# Patient Record
Sex: FEMALE | Race: WHITE | NOT HISPANIC OR LATINO | Employment: OTHER | URBAN - METROPOLITAN AREA
[De-identification: names, ages, dates, MRNs, and addresses within clinical notes are randomized per-mention and may not be internally consistent; named-entity substitution may affect disease eponyms.]

---

## 2017-01-10 ENCOUNTER — TELEPHONE (OUTPATIENT)
Dept: FAMILY MEDICINE | Facility: CLINIC | Age: 62
End: 2017-01-10

## 2017-01-10 NOTE — TELEPHONE ENCOUNTER
"01/10/17      Call not required per note    Lynda Mcdonald at 6/27/2016   Patient calling for 6-8-16 message, and informed of Mammo and Colonoscopy.  Patient states that she does the self breast exam and she is currently doing a cleanse.  Declined to schedule an appointments    Yecenia \"Tiffany\" Ehsan  Central Scheduler    "

## 2020-03-04 ENCOUNTER — ANCILLARY PROCEDURE (OUTPATIENT)
Dept: MAMMOGRAPHY | Facility: CLINIC | Age: 65
End: 2020-03-04
Attending: NURSE PRACTITIONER
Payer: COMMERCIAL

## 2020-03-04 ENCOUNTER — TRANSFERRED RECORDS (OUTPATIENT)
Dept: HEALTH INFORMATION MANAGEMENT | Facility: CLINIC | Age: 65
End: 2020-03-04

## 2020-03-04 ENCOUNTER — OFFICE VISIT (OUTPATIENT)
Dept: FAMILY MEDICINE | Facility: CLINIC | Age: 65
End: 2020-03-04
Payer: COMMERCIAL

## 2020-03-04 ENCOUNTER — ANCILLARY PROCEDURE (OUTPATIENT)
Dept: ULTRASOUND IMAGING | Facility: CLINIC | Age: 65
End: 2020-03-04
Attending: NURSE PRACTITIONER
Payer: COMMERCIAL

## 2020-03-04 VITALS
HEART RATE: 140 BPM | HEIGHT: 62 IN | TEMPERATURE: 98.5 F | SYSTOLIC BLOOD PRESSURE: 118 MMHG | DIASTOLIC BLOOD PRESSURE: 80 MMHG | BODY MASS INDEX: 45.64 KG/M2 | WEIGHT: 248 LBS | OXYGEN SATURATION: 93 %

## 2020-03-04 DIAGNOSIS — N64.52 NIPPLE DISCHARGE, BLOODY: ICD-10-CM

## 2020-03-04 DIAGNOSIS — R23.4 BREAST SKIN CHANGES: ICD-10-CM

## 2020-03-04 DIAGNOSIS — Z23 NEED FOR DIPHTHERIA-TETANUS-PERTUSSIS (TDAP) VACCINE: ICD-10-CM

## 2020-03-04 DIAGNOSIS — R23.4 BREAST SKIN CHANGES: Primary | ICD-10-CM

## 2020-03-04 DIAGNOSIS — Z80.3 FAMILY HISTORY OF MALIGNANT NEOPLASM OF BREAST: ICD-10-CM

## 2020-03-04 DIAGNOSIS — R73.09 ELEVATED GLUCOSE: ICD-10-CM

## 2020-03-04 DIAGNOSIS — Z23 INFLUENZA VACCINE NEEDED: ICD-10-CM

## 2020-03-04 LAB
ALBUMIN SERPL-MCNC: 3.6 G/DL (ref 3.4–5)
ALP SERPL-CCNC: 127 U/L (ref 40–150)
ALT SERPL W P-5'-P-CCNC: 56 U/L (ref 0–50)
ANION GAP SERPL CALCULATED.3IONS-SCNC: 4 MMOL/L (ref 3–14)
AST SERPL W P-5'-P-CCNC: 34 U/L (ref 0–45)
BASOPHILS # BLD AUTO: 0.1 10E9/L (ref 0–0.2)
BASOPHILS NFR BLD AUTO: 0.7 %
BILIRUB SERPL-MCNC: 0.5 MG/DL (ref 0.2–1.3)
BUN SERPL-MCNC: 12 MG/DL (ref 7–30)
CALCIUM SERPL-MCNC: 10.2 MG/DL (ref 8.5–10.1)
CHLORIDE SERPL-SCNC: 102 MMOL/L (ref 94–109)
CO2 SERPL-SCNC: 27 MMOL/L (ref 20–32)
CREAT SERPL-MCNC: 0.85 MG/DL (ref 0.52–1.04)
DIFFERENTIAL METHOD BLD: ABNORMAL
EOSINOPHIL # BLD AUTO: 0.1 10E9/L (ref 0–0.7)
EOSINOPHIL NFR BLD AUTO: 0.7 %
ERYTHROCYTE [DISTWIDTH] IN BLOOD BY AUTOMATED COUNT: 14.8 % (ref 10–15)
GFR SERPL CREATININE-BSD FRML MDRD: 73 ML/MIN/{1.73_M2}
GLUCOSE SERPL-MCNC: 192 MG/DL (ref 70–99)
HCT VFR BLD AUTO: 48.2 % (ref 35–47)
HGB BLD-MCNC: 15.7 G/DL (ref 11.7–15.7)
LYMPHOCYTES # BLD AUTO: 1.5 10E9/L (ref 0.8–5.3)
LYMPHOCYTES NFR BLD AUTO: 16.3 %
MCH RBC QN AUTO: 27.9 PG (ref 26.5–33)
MCHC RBC AUTO-ENTMCNC: 32.6 G/DL (ref 31.5–36.5)
MCV RBC AUTO: 86 FL (ref 78–100)
MONOCYTES # BLD AUTO: 0.6 10E9/L (ref 0–1.3)
MONOCYTES NFR BLD AUTO: 6.4 %
NEUTROPHILS # BLD AUTO: 7 10E9/L (ref 1.6–8.3)
NEUTROPHILS NFR BLD AUTO: 75.9 %
PLATELET # BLD AUTO: 325 10E9/L (ref 150–450)
POTASSIUM SERPL-SCNC: 4.7 MMOL/L (ref 3.4–5.3)
PROT SERPL-MCNC: 9 G/DL (ref 6.8–8.8)
RBC # BLD AUTO: 5.63 10E12/L (ref 3.8–5.2)
SODIUM SERPL-SCNC: 133 MMOL/L (ref 133–144)
WBC # BLD AUTO: 9.2 10E9/L (ref 4–11)

## 2020-03-04 PROCEDURE — 19084 BX BREAST ADD LESION US IMAG: CPT | Mod: LT

## 2020-03-04 PROCEDURE — 99205 OFFICE O/P NEW HI 60 MIN: CPT | Mod: 25 | Performed by: NURSE PRACTITIONER

## 2020-03-04 PROCEDURE — 90471 IMMUNIZATION ADMIN: CPT | Performed by: NURSE PRACTITIONER

## 2020-03-04 PROCEDURE — 36415 COLL VENOUS BLD VENIPUNCTURE: CPT | Performed by: NURSE PRACTITIONER

## 2020-03-04 PROCEDURE — 19084 BX BREAST ADD LESION US IMAG: CPT | Mod: RT

## 2020-03-04 PROCEDURE — 00000159 ZZHCL STATISTIC H-SEND OUTS PREP

## 2020-03-04 PROCEDURE — 90472 IMMUNIZATION ADMIN EACH ADD: CPT | Performed by: NURSE PRACTITIONER

## 2020-03-04 PROCEDURE — 90756 CCIIV4 VACC ABX FREE IM: CPT | Performed by: NURSE PRACTITIONER

## 2020-03-04 PROCEDURE — 00000158 ZZHCL STATISTIC H-FISH PROCESS B/S

## 2020-03-04 PROCEDURE — G0279 TOMOSYNTHESIS, MAMMO: HCPCS

## 2020-03-04 PROCEDURE — 90715 TDAP VACCINE 7 YRS/> IM: CPT | Performed by: NURSE PRACTITIONER

## 2020-03-04 PROCEDURE — 76642 ULTRASOUND BREAST LIMITED: CPT | Mod: 50

## 2020-03-04 PROCEDURE — 19083 BX BREAST 1ST LESION US IMAG: CPT | Mod: RT

## 2020-03-04 PROCEDURE — 88360 TUMOR IMMUNOHISTOCHEM/MANUAL: CPT

## 2020-03-04 PROCEDURE — 88377 M/PHMTRC ALYS ISHQUANT/SEMIQ: CPT

## 2020-03-04 PROCEDURE — 80053 COMPREHEN METABOLIC PANEL: CPT | Performed by: NURSE PRACTITIONER

## 2020-03-04 PROCEDURE — 85025 COMPLETE CBC W/AUTO DIFF WBC: CPT | Performed by: NURSE PRACTITIONER

## 2020-03-04 PROCEDURE — 77066 DX MAMMO INCL CAD BI: CPT

## 2020-03-04 PROCEDURE — 88305 TISSUE EXAM BY PATHOLOGIST: CPT

## 2020-03-04 ASSESSMENT — MIFFLIN-ST. JEOR: SCORE: 1633.17

## 2020-03-04 NOTE — PATIENT INSTRUCTIONS
We have scheduled you for diagnostic mammogram today. We are still waiting for the appointment with the [facility].   You will receive a call today regarding your results and next steps.     Please make an appointment to follow up with this clinic within a month.

## 2020-03-04 NOTE — TELEPHONE ENCOUNTER
RECORDS STATUS - BREAST    RECORDS REQUESTED FROM: Epic/[facility]   DATE REQUESTED: 3/12/2020    NOTES DETAILS STATUS   OFFICE NOTE from referring provider Complete  Princess Ring    OFFICE NOTE from medical oncologist     OFFICE NOTE from surgeon Pending Pt may have a biopsy done today at the [facility]    OFFICE NOTE from radiation oncologist     DISCHARGE SUMMARY from hospital N/A    DISCHARGE REPORT from the ER     OPERATIVE REPORT N/A    MEDICATION LIST Complete [facility]   CLINICAL TRIAL TREATMENTS TO DATE     LABS     PATHOLOGY REPORTS  (Tissue diagnosis, Stage, ER/WA percentage positive and intensity of staining, HER2 IHC, FISH, and all biopsies from breast and any distant metastasis)                 Pt said she might have a biopsy done today 3/4/2020     GENONOMIC TESTING     TYPE:   (Next Generation Sequencing, including Foundation One testing, and Oncotype score)     IMAGING (NEED IMAGES & REPORT)     CT SCANS     MRI     MAMMO Complete PACS- 3/4/2020 (Scheduled)    ULTRASOUND Complete PACS- Breast 3/4/2020    PET     BONE SCAN     BRAIN MRI       Action    Action Taken 3/4/2020 12:21pm     I called pt Mary - pt is having breast IMG done today and possibly Bx slides too. Pt doesn't have any outside records to collect.

## 2020-03-04 NOTE — TELEPHONE ENCOUNTER
ONCOLOGY INTAKE: Records Information      APPT INFORMATION:  Referring provider:  Princess Ring   Referring provider s clinic:   [facility]   Reason for visit/diagnosis:  Breast Cancer   Has patient been notified of appointment date and time?: yes     RECORDS INFORMATION:  Were the records received with the referral (via [employer])? Records in [facility]     Has patient been seen for any external appt for this diagnosis? No    If yes, where? NA    Has patient had any imaging or procedures outside of [facility] for this condition? no      If Yes, where? NA    ADDITIONAL INFORMATION:  None

## 2020-03-04 NOTE — PROGRESS NOTES
"Subjective     Mary Olson is a 64 year old female who presents to clinic today for the following health issues:    HPI     Breast Changes  Patient reports that she had what she believed to be a bug bite on left breast in October. She notes the 2 red spots weren't particularly itchy, but she put calamine lotion on and they sort of resolved but never completely went away, always had a little lump on the outer side of her left breast. The lump got larger around the holidays and she talked to her friend about it. Her friend had cysts so told her that was the problem and she should put tea tree oil on the area. She reports after doing this that the area opened up and drained a lot of clear fluid. There has also been some intermittent blood and pus draining from the side when the scab comes off. She denies that the drainage is coming from the nipple, but rather to the side of her nipple. On Monday the breast began to bleed again, so she decided to have it looked at. The breast is swollen and red, but per her report has improved a lot since the holidays.     Family history is notable for breast cancer in her mother. She had a lumpectomy in her 60's, 12 radiation treatments, and had to take a chemotherapy pill afterwards for over one year. Patient denies personal or family history of other cancers including colorectal, cervical, and ovarian.     Patient refuses to do mammograms because they are uncomfortable. She does \"self checks.\" She admits the breast has felt slightly different since she got the \"bug bites\" and she has felt a lump before. She denies feeling any lymphadenopathy. She denies fever, night sweats, and fatigue.     She has had no preventative health care in a few years as her provider left. She has been looking for a holistic provider because she wants to take care of her health naturally and avoid medications at all costs.     Patient Active Problem List   Diagnosis     Hyperlipidemia LDL goal <160 " "    Essential hypertension     Morbid obesity (H)     No past surgical history on file.    Social History     Tobacco Use     Smoking status: Never Smoker     Smokeless tobacco: Never Used   Substance Use Topics     Alcohol use: Yes     Comment: 2-3 drinks a month      Family History   Problem Relation Age of Onset     Hypertension Mother      Coronary Artery Disease Mother      Arrhythmia Brother      Diabetes No family hx of          BP Readings from Last 3 Encounters:   03/04/20 118/80   06/29/16 140/84   12/16/15 158/90    Wt Readings from Last 3 Encounters:   03/04/20 112.5 kg (248 lb)   06/29/16 103.4 kg (228 lb)   12/16/15 104.6 kg (230 lb 8 oz)            Reviewed and updated as needed this visit by Provider       Review of Systems   ROS COMP: CONSTITUTIONAL: NEGATIVE for fever, chills, change in weight  INTEGUMENTARY/SKIN: POSITIVE for left breast lump, non-healing lesion, and ulceration  EYES: NEGATIVE for vision changes or irritation  ENT/MOUTH: NEGATIVE for ear, mouth and throat problems  RESP: NEGATIVE for significant cough or SOB  BREAST: LEFT is POSITIVE for erythema, bloody nipple discharge, lump and NEGATIVE for pain. RIGHT is NEGATIVE for erythema, discharge, lump, or pain.   CV: NEGATIVE for chest pain, palpitations or peripheral edema  GI: NEGATIVE for nausea, abdominal pain, heartburn, or change in bowel habits  : NEGATIVE for frequency, dysuria, or hematuria  MUSCULOSKELETAL: NEGATIVE for significant arthralgias or myalgia  NEURO: NEGATIVE for weakness, dizziness or paresthesias  ENDOCRINE: NEGATIVE for temperature intolerance, skin/hair changes  HEME: NEGATIVE for bleeding problems  PSYCHIATRIC: NEGATIVE for changes in mood or affect      Objective    /80   Pulse 140   Temp 98.5  F (36.9  C) (Oral)   Ht 1.575 m (5' 2\")   Wt 112.5 kg (248 lb)   SpO2 93%   BMI 45.36 kg/m    Body mass index is 45.36 kg/m .  Physical Exam   GENERAL: healthy, alert and no distress  EYES: Eyes grossly " normal to inspection, PERRL and conjunctivae and sclerae normal  HENT: ear canals and TM's normal, nose and mouth without ulcers or lesions  NECK: no adenopathy, no asymmetry, masses, or scars and thyroid normal to palpation  RESP: lungs clear to auscultation - no rales, rhonchi or wheezes  BREAST: axillary findings: Left lymphadenopathy, breast asymmetry- right breast is pendulous and normal in appearance while the left is essentially immobile and higher on the chest wall, erythema left, bloody nipple discharge left, nipple inversion left, skin dimpling left, excoriation around left nipple. Left breast without solitary palpable mass, rather entire breast is firm, skin is taut. The nipple is almost indiscernible. There is no warmth, the breast is not tender to palpation. Right breast without lumps, skin or nipple changes, or nipple drainage  CV: regular rate and rhythm, normal S1 S2, no S3 or S4, no murmur, click or rub, no peripheral edema and peripheral pulses strong  ABDOMEN: markedly obese, distended abdomen. soft, nontender, without hepatosplenomegaly or masses  MS: no gross musculoskeletal defects noted, no edema  SKIN: no suspicious lesions or rashes other than those noted above for left breast  NEURO: Normal strength and tone, mentation intact and speech normal  PSYCH: mentation appears normal, affect normal/bright  LYMPH: axillary: enlarged lymph nodes in the left     Diagnostic Test Results:  Results for orders placed or performed in visit on 03/04/20 (from the past 24 hour(s))   CBC with platelets and differential   Result Value Ref Range    WBC 9.2 4.0 - 11.0 10e9/L    RBC Count 5.63 (H) 3.8 - 5.2 10e12/L    Hemoglobin 15.7 11.7 - 15.7 g/dL    Hematocrit 48.2 (H) 35.0 - 47.0 %    MCV 86 78 - 100 fl    MCH 27.9 26.5 - 33.0 pg    MCHC 32.6 31.5 - 36.5 g/dL    RDW 14.8 10.0 - 15.0 %    Platelet Count 325 150 - 450 10e9/L    % Neutrophils 75.9 %    % Lymphocytes 16.3 %    % Monocytes 6.4 %    % Eosinophils  0.7 %    % Basophils 0.7 %    Absolute Neutrophil 7.0 1.6 - 8.3 10e9/L    Absolute Lymphocytes 1.5 0.8 - 5.3 10e9/L    Absolute Monocytes 0.6 0.0 - 1.3 10e9/L    Absolute Eosinophils 0.1 0.0 - 0.7 10e9/L    Absolute Basophils 0.1 0.0 - 0.2 10e9/L    Diff Method Automated Method      Diagnostic Mammogram 3/4/2020        Assessment & Plan     1. Breast skin changes  Urgent. The physical exam is very concerning for left breast malignancy. CBC was ordered to rule out infection-normal WBC. Patient to have diagnostic mammogram this afternoon scheduled by [facility] and follow up per Breast Center recommendations. Referral for Breast Center placed for urgent appointment. Discussed with patient that based on her exam and family history, the mammogram needs to be completed today and that team will call her with the results and plan, including breast biopsy. Patient asked appropriate questions and voiced understanding of education and plan.   - BREAST CENTER REFERRAL  - MA Diagnostic Digital Bilateral; Future  - CBC with platelets and differential  - Comprehensive metabolic panel (BMP + Alb, Alk Phos, ALT, AST, Total. Bili, TP)    2. Nipple discharge, bloody  Plan as above.   - BREAST CENTER REFERRAL  - MA Diagnostic Digital Bilateral; Future  - CBC with platelets and differential  - Comprehensive metabolic panel (BMP + Alb, Alk Phos, ALT, AST, Total. Bili, TP)    3. Influenza vaccine needed  Risk and benefits of immunization discussed. Patient agreed to vaccine today.   - INFLUENZA VACCINE IM 4YRS+ 4 VALENT CCIIV4    4. Need for diphtheria-tetanus-pertussis (Tdap) vaccine  Risk and benefits of immunization discussed. Patient agreed to vaccine today.   - TDAP VACCINE (ADACEL)    5. Family history of malignant neoplasm of breast  Plan for evaluation as above.     Addendum: Sugar returned at quite elevated 190.  No history of diabetes.  Patient will need to return for A1c.   BMI:   Estimated body mass index is 45.36 kg/m  as calculated  "from the following:    Height as of this encounter: 1.575 m (5' 2\").    Weight as of this encounter: 112.5 kg (248 lb).   Weight management plan: Discussed healthy diet and exercise guidelines        FURTHER TESTING:       - mammogram  CONSULTATION/REFERRAL to Breast Center    Return in about 4 weeks (around 4/1/2020).    PUNEET BanksN, RN, CCTC  DNP-FNP student, [facility]      NICOLE Gomes [facility]    "

## 2020-03-06 ENCOUNTER — TELEPHONE (OUTPATIENT)
Dept: FAMILY MEDICINE | Facility: CLINIC | Age: 65
End: 2020-03-06

## 2020-03-06 NOTE — TELEPHONE ENCOUNTER
Please call patient to advise that her sugar level was quite elevated.  In light of what she is having to deal with (high suspicion of breast cancer pathology in process) the priority is following up with the breast center as scheduled.  I would like to see her at her earliest convenience in the next few weeks to review this as well as an annual exam.

## 2020-03-09 ENCOUNTER — TELEPHONE (OUTPATIENT)
Dept: GENERAL RADIOLOGY | Facility: CLINIC | Age: 65
End: 2020-03-09

## 2020-03-09 LAB — COPATH REPORT: NORMAL

## 2020-03-09 NOTE — TELEPHONE ENCOUNTER
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 Notified pt that the radiologist recommendation is oncologic consultation as well a breast MRI. Pt verbalized understanding of these results and all questions answered to her satisfaction. Pt defers scheduling an MRI until she meets with Dr. Birch.

## 2020-03-09 NOTE — TELEPHONE ENCOUNTER
Patient returns call to RN line.  She was notified of results and recommendations with understanding voiced. She was not fasting for the CMP, had eaten breakfast not long before that.  We discussed that this result may not be concerning then and that provider may order A1C in future to get average BG and status.  She'll attend breast center appt, then f/u with primary care after that in April.  No further questions/concerns at this time.    Joya Carey RN

## 2020-03-09 NOTE — TELEPHONE ENCOUNTER
Patient returns call to KAYLEY MEJIA.  RN called back right away with no answer. Patient/family was instructed to return call to [facility], directly on the RN Call back line at [phone].    Joya Carey RN

## 2020-03-10 LAB — COPATH REPORT: NORMAL

## 2020-03-12 ENCOUNTER — ONCOLOGY VISIT (OUTPATIENT)
Dept: ONCOLOGY | Facility: CLINIC | Age: 65
End: 2020-03-12
Attending: NURSE PRACTITIONER
Payer: COMMERCIAL

## 2020-03-12 ENCOUNTER — PRE VISIT (OUTPATIENT)
Dept: ONCOLOGY | Facility: CLINIC | Age: 65
End: 2020-03-12

## 2020-03-12 ENCOUNTER — PATIENT OUTREACH (OUTPATIENT)
Dept: ONCOLOGY | Facility: CLINIC | Age: 65
End: 2020-03-12

## 2020-03-12 VITALS
TEMPERATURE: 98.1 F | RESPIRATION RATE: 15 BRPM | HEART RATE: 107 BPM | HEIGHT: 63 IN | OXYGEN SATURATION: 96 % | BODY MASS INDEX: 43.59 KG/M2 | WEIGHT: 246 LBS

## 2020-03-12 DIAGNOSIS — C50.812 MALIGNANT NEOPLASM OF OVERLAPPING SITES OF LEFT BREAST IN FEMALE, ESTROGEN RECEPTOR POSITIVE (H): Primary | ICD-10-CM

## 2020-03-12 DIAGNOSIS — Z17.0 MALIGNANT NEOPLASM OF OVERLAPPING SITES OF LEFT BREAST IN FEMALE, ESTROGEN RECEPTOR POSITIVE (H): Primary | ICD-10-CM

## 2020-03-12 PROCEDURE — 99205 OFFICE O/P NEW HI 60 MIN: CPT | Performed by: INTERNAL MEDICINE

## 2020-03-12 RX ORDER — LIDOCAINE/PRILOCAINE 2.5 %-2.5%
CREAM (GRAM) TOPICAL
Qty: 30 G | Refills: 1 | Status: SHIPPED | OUTPATIENT
Start: 2020-03-12 | End: 2020-07-07

## 2020-03-12 ASSESSMENT — PAIN SCALES - GENERAL: PAINLEVEL: NO PAIN (0)

## 2020-03-12 ASSESSMENT — MIFFLIN-ST. JEOR: SCORE: 1632.04

## 2020-03-12 NOTE — PATIENT INSTRUCTIONS
Dr. Birch has recommended chemotherapy treatment for you.  This will consist of Taxol weekly x 12 weeks, followed by Adriamycin and Cytoxan every other week x 8 weeks (20 weeks total of chemotherapy treatments).  The chemotherapy will be given through a port-a-cath.  We can give you a prescription for numbing cream (EMLA cream), to apply to your port on your chemotherapy treatment dates.  Dr. Birch has also referred you to meet with a Surgeon, and has ordered some testing for you to have completed in the near future including imaging exams, heart test, and a lymph node biopsy.    Appointments to schedule:  1. PET scan.  2. Port-a-cath placement procedure.  3. MUGA (heart test).  4. Surgery Referral.  5. Breast MRI.  6. Lymph Node Biopsy - Axilla (armpit area) - preferably before starting chemo.  7. Start chemotherapy treatment as soon as possible.  8. See Dr. Birch in clinic again after she returns from vacation, to review your PET scan results in person.    9. The phone number for our Financial Counseling team is [phone].  10. The phone number for our Cancer Center  is [phone].    Please call with any questions or concerns following today's appointment.

## 2020-03-12 NOTE — LETTER
3/12/2020         RE: Mary Olson  [address]  [facility] [address]        Dear Colleague,    Thank you for referring your patient, Mary Olson, to the [facility]. Please see a copy of my visit note below.    2020         Lashaun Herrera MD    [facility] [address] [place] [address]       Patient:  Mary Olson   MRN:  [id number]   :  1955       Dear Dr. Herrera:      Thank you for asking me to see your patient, Mary Olson in consultation for further evaluation and management of her recently diagnosed left and right breast cancer.  Please see the enclosed note for details of our visit on 2020, as well as the recommendations.      HISTORY OF PRESENT ILLNESS:  Mary Olson is a 64-year-old female who presents to the [facility] for further evaluation of bilateral breast cancer.  The patient's history dates back to 2019, when she noticed what she believed to be a bug bite on her left breast.  She initially noted 3 red spots.  She tried calamine lotion, which gave some relief.  One of the lumps got larger and she tried a tree oil in this area, but then found that it opened up and started draining clear fluid.  There was also some blood and drainage when the scab came off.  The patient eventually sought medical help, met with Ms. Princess Ring.  She underwent imaging with a diagnostic mammogram on 2020, as well as ultrasound.  This did confirm in the left breast in the center of the left nipple, a large irregular mass with ill-defined margins measuring at least 6.2 cm.  Other masses are seen posterior to this including a 2.1 cm mass slightly medial on mammography.  Diffuse left breast skin thickening.  There are at least 3 abnormal enlarged lymph nodes in the left axilla measuring 2.5 x 1.3 x 2.4 cm.  The right breast at the 12:30 position 7 cm  "from the nipple, there is a spiculated mass corresponding mammographic finding measuring approximately 1.3 x 0.9 x 1.0 cm.  A smaller spiculated hypoechoic mass is at the 12 o'clock position 2 cm from the nipple, measuring 0.9 x 0.5 x 0.7 cm.  Normal lymph nodes in the right axilla.  The patient proceeded to undergo a biopsy.  The left breast mass confirmed invasive mammary carcinoma, no special type, invasive ductal, grade 2, estrogen receptor positive, progesterone receptor positive, HER-2 negative.  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  The second right breast mass at the 12 o'clock position showed benign breast tissue, composed predominantly of uncolonized stroma with rare ducts.  No discrete masses identified.  No atypical or malignant findings.  The patient was then referred to Medical Oncology for further evaluation.        On today's visit, the patient states that overall she is doing well.  She feels that the left breast nodules have decreased in size.  Denies any fevers, chills, nausea, vomiting, chest pain, shortness of breath and cough, no abdominal discomfort, and the remainder of the comprehensive review of systems is negative.      PAST MEDICAL HISTORY:   1.  Bilateral breast cancer, see above.   2.  Hypertension.      In the chart, it is noted she has seizures; however, the patient states this is inaccurate.  She had gone to [facility], was feeling lightheaded, dizzy and fainted, but she did not have a seizure.  She has undergone a neurological workup which was negative.      FAMILY HISTORY:  Mother had breast cancer at 62, underwent lumpectomy and radiation and then had \"pills.\"      SOCIAL HISTORY:  , comes in with her , Dieter today.  Has a son and daughter, 2 children.  Denies tobacco use.  Occasional alcohol use, " "usually during the holidays.  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      GYNECOLOGIC HISTORY:  Menses at 13 or 14.  Menopause at 56.   3, para 2 -- 1 miscarriage.  First live birth at 34.  Did not breast feed.  No prior history of fertility drugs or hormone replacement therapy.      PHYSICAL EXAMINATION  Pulse 107   Temp 98.1  F (36.7  C) (Oral)   Resp 15   Ht 1.588 m (5' 2.5\")   Wt 111.6 kg (246 lb)   SpO2 96%   BMI 44.28 kg/m     GENERAL:  Comfortable, in no acute distress, pleasant.   HEENT:  Atraumatic, normocephalic.  Pupils equal, round, reactive.  Sclerae anicteric.  Oropharynx:  Moist mucous membranes.  No lesions, ulcers or exudates.   NECK:  Supple, full range of motion.  Trachea midline.   HEART:  Regular rate and rhythm, normal S1, S2.  No murmurs or gallop.  No edema.   LUNGS:  Clear to auscultation bilaterally.  No crackles or wheezes.  Normal respiratory effort.   ABDOMEN:  Positive bowel sounds.  Soft, nontender, nondistended.  No hepatomegaly.   EXTREMITIES:  No cyanosis.  Warm.   MUSCULOSKELETAL:  No point tenderness.   LYMPH NODES:  No cervical, supraclavicular or axillary nodes palpable.   SKIN:  No petechiae or rashes.   NEUROLOGIC:  Alert and oriented.   BREASTS:  Right breast bruising at biopsy site, no dominant masses.  Nipple everted without discharge.  Left breast, a large palpable 8 cm mass eroding through the skin, thickening with crusting.   LYMPHATICS:  No cervical, supraclavicular or right axillary node palpable.  Left axilla, a large 2.5 cm lymph node palpable.      IMAGING:  As stated in the HPI.      PATHOLOGY:  As stated in the HPI.      LABORATORY:  From 2020, alkaline phosphatase 127, ALT 56, AST 34, calcium 10.2, creatinine 0.85.  WBC 9.2, hemoglobin 15.7, hematocrit 48.2, platelet 325,000, ANC 7.0.      ASSESSMENT AND PLAN:  Mary Olson is a 64-year-old female with newly diagnosed bilateral breast cancer.  The " left breast is a T4 with lymph node positive, ER positive, HER-2 negative cancer.  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  I discussed at length with the patient in the presence of her  and our care coordinator, Dmitri Figueredo regarding her diagnosis, prognosis and treatment options.  I explained that the patient has bilateral breast cancer.  The right-sided cancer is tubular differentiation and tends to have a good prognosis.  However, on the left side, I am quite concerned.  This is a large breast mass that is ulcerating through her skin.  I was able to palpate a lymph node on today's exam as well.  I explained that there are 4 stages and at this point the patient is at least a stage III.  However, I would want a PET scan to see whether this has metastasized to other places.  For the purpose of the visit, we proceeded as stage III, but the patient understands if she were to have disease elsewhere, this would change her prognosis and potentially her treatment.  I explained the role of chemotherapy followed by surgery.  She will likely require radiation followed by endocrine therapy.  Chemotherapy is to shrink the tumor down to optimize for surgery.  It would also give us time to assess her disease.  Based on her appearance, I do not feel she is a surgical candidate at this time.  The patient questioned this and I explained that this is because I do not feel that the surgeon will be able to completely remove the tumor and close her skin.   There are numerous treatments:        One is standard therapy with chemotherapy, which would be Taxol followed by Adriamycin and Cytoxan.  Reviewed potential side effects and toxicities including but not limited to alopecia, nausea, vomiting, bone marrow suppression, increase for infection, bleeding, allergic reactions, organ dysfunction, cardiotoxicity, neuropathy which may be permanent and debilitating, secondary malignancies, and death.        The  second option is consider a clinical trial.  I did speak to the patient briefly about the I-SPY trial.  During her visit, I was notified by the trial coordinator that this trial is closed to accrual.        The third option is do nothing, which I would not recommend since her disease would only continue to grow and I was quite guillermo with the patient that she would die of her disease.  The patient at this point is interested in pursuing standard therapy.      I did review with the patient that I do feel it is important for us to have an understanding of the extent of her disease and would recommend a PET scan.  I would also like an MRI of her breast to help us assess her response to disease.  I did palpate a lymph node on exam, and it would be helpful to have a biopsy of this area as well.      According to notes and discussion with the patient, she is interested more holistic therapy.  I was very honest with the patient that I do not practice homeopathic therapy and do not participate in these services.  I was also very candid and told the patient that if she does not receive treatment that her cancer will only continue to grow.  I would not support going to areas that do not practice standard of care or scientifically-proven Medicine.  I also advised the patient that if she were to pursue some of these other options, I would fear that she would die of her disease.  The patient did have a number of questions regarding homeopathic therapy, but again, I informed her that I do not partake in this type of protocol.        The patient's  was very clear that he wanted her to proceed with standard therapy.  We did review that ultimately it is the patient's decision and we would respect what she decided, but if she did want standard treatment, we would happy to deliver this through our cancer center.      PLAN:   1.  PET scan.   2.  Port placement.   3.  MUGA.   4.  General Surgery referral.   5.  MRI of the breast.    6.  Left axillary lymph node biopsy.   7.   to meet with the patient.   8.  The patient will return to see MD after PET scan.   9.  Taxol, Adriamycin, and Cytoxan teaching.   10.  The patient to return to start treatment as soon as possible.  She is willing to go to any site to have the above tests and procedures completed.      At the end of the discussion, the patient and her  have verbalized understanding and would like to proceed with standard therapy.  If at any point they decide otherwise, they should feel free to contact our clinic and we will respect their decision.      Dr. Herrera, thank you for giving me the opportunity to participate in this delightful patient's care.  If you have any questions, please feel free to contact me.         Sincerely,      BRY BIRCH MD      Case discussed with Dr. Joshi who offered to place port as well.    Above note accurate for diagnosis, plan and orders placed. Epic/EMR orders and data may not be accurate because of available options, information not entered correctly/updated by staff other than writer or interface issues. All data entered by writer with the intent patient care not be compromised nor patient be unnecessarily billed'        D: 2020   T: 2020   MT: RIC      Name:     ARTHUR JENKINS   MRN:      [id number]        Account:      [id number]   :      1955      Document: [id number]       cc: Princess RIVERA, BRYANNA Herrera MD       Again, thank you for allowing me to participate in the care of your patient.        Sincerely,        Bry Birch MD

## 2020-03-12 NOTE — NURSING NOTE
"Oncology Rooming Note    March 12, 2020 1:01 PM   Mary Olson is a 64 year old female who presents for:    Chief Complaint   Patient presents with     Oncology Clinic Visit     Initial Vitals: Pulse 107   Temp 98.1  F (36.7  C) (Oral)   Resp 15   Ht 1.588 m (5' 2.5\")   Wt 111.6 kg (246 lb)   SpO2 96%   BMI 44.28 kg/m   Estimated body mass index is 44.28 kg/m  as calculated from the following:    Height as of this encounter: 1.588 m (5' 2.5\").    Weight as of this encounter: 111.6 kg (246 lb). Body surface area is 2.22 meters squared.  No Pain (0) Comment: Data Unavailable   No LMP recorded. Patient is postmenopausal.  Allergies reviewed: Yes  Medications reviewed: Yes    Medications: Medication refills not needed today.  Pharmacy name entered into [email or web address]:    [facility] - [place], [place] - [address] [place] [address].  [facility] - FAX ONLY - [place], [place]  [facility], [place] - [address]      Lexy Butterfield RN              "

## 2020-03-12 NOTE — PROGRESS NOTES
2020         Lashaun Herrera MD    [facility] [address]   [place] [address]       Patient:  Mary Olson   MRN:  [id number]   :  1955       Dear Dr. Herrera:      Thank you for asking me to see your patient, Mary Olson in consultation for further evaluation and management of her recently diagnosed left and right breast cancer.  Please see the enclosed note for details of our visit on 2020, as well as the recommendations.      HISTORY OF PRESENT ILLNESS:  Mary Olson is a 64-year-old female who presents to the [facility] for further evaluation of bilateral breast cancer.  The patient's history dates back to 2019, when she noticed what she believed to be a bug bite on her left breast.  She initially noted 3 red spots.  She tried calamine lotion, which gave some relief.  One of the lumps got larger and she tried a tree oil in this area, but then found that it opened up and started draining clear fluid.  There was also some blood and drainage when the scab came off.  The patient eventually sought medical help, met with Ms. Princess Ring.  She underwent imaging with a diagnostic mammogram on 2020, as well as ultrasound.  This did confirm in the left breast in the center of the left nipple, a large irregular mass with ill-defined margins measuring at least 6.2 cm.  Other masses are seen posterior to this including a 2.1 cm mass slightly medial on mammography.  Diffuse left breast skin thickening.  There are at least 3 abnormal enlarged lymph nodes in the left axilla measuring 2.5 x 1.3 x 2.4 cm.  The right breast at the 12:30 position 7 cm from the nipple, there is a spiculated mass corresponding mammographic finding measuring approximately 1.3 x 0.9 x 1.0 cm.  A smaller spiculated hypoechoic mass is at the 12 o'clock position 2 cm from the nipple, measuring 0.9 x 0.5 x 0.7 cm.  Normal  "lymph nodes in the right axilla.  The patient proceeded to undergo a biopsy.  The left breast mass confirmed invasive mammary carcinoma, no special type, invasive ductal, grade 2, estrogen receptor positive, progesterone receptor positive, HER-2 negative.  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  The second right breast mass at the 12 o'clock position showed benign breast tissue, composed predominantly of uncolonized stroma with rare ducts.  No discrete masses identified.  No atypical or malignant findings.  The patient was then referred to Medical Oncology for further evaluation.        On today's visit, the patient states that overall she is doing well.  She feels that the left breast nodules have decreased in size.  Denies any fevers, chills, nausea, vomiting, chest pain, shortness of breath and cough, no abdominal discomfort, and the remainder of the comprehensive review of systems is negative.      PAST MEDICAL HISTORY:   1.  Bilateral breast cancer, see above.   2.  Hypertension.      In the chart, it is noted she has seizures; however, the patient states this is inaccurate.  She had gone to [facility], was feeling lightheaded, dizzy and fainted, but she did not have a seizure.  She has undergone a neurological workup which was negative.      FAMILY HISTORY:  Mother had breast cancer at 62, underwent lumpectomy and radiation and then had \"pills.\"      SOCIAL HISTORY:  , comes in with her , Dieter today.  Has a son and daughter, 2 children.  Denies tobacco use.  Occasional alcohol use, usually during the holidays.  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      GYNECOLOGIC HISTORY:  Menses at 13 or 14.  Menopause at 56.   3, para 2 -- 1 miscarriage.  First live birth at 34.  Did not breast " "feed.  No prior history of fertility drugs or hormone replacement therapy.      PHYSICAL EXAMINATION  Pulse 107   Temp 98.1  F (36.7  C) (Oral)   Resp 15   Ht 1.588 m (5' 2.5\")   Wt 111.6 kg (246 lb)   SpO2 96%   BMI 44.28 kg/m     GENERAL:  Comfortable, in no acute distress, pleasant.   HEENT:  Atraumatic, normocephalic.  Pupils equal, round, reactive.  Sclerae anicteric.  Oropharynx:  Moist mucous membranes.  No lesions, ulcers or exudates.   NECK:  Supple, full range of motion.  Trachea midline.   HEART:  Regular rate and rhythm, normal S1, S2.  No murmurs or gallop.  No edema.   LUNGS:  Clear to auscultation bilaterally.  No crackles or wheezes.  Normal respiratory effort.   ABDOMEN:  Positive bowel sounds.  Soft, nontender, nondistended.  No hepatomegaly.   EXTREMITIES:  No cyanosis.  Warm.   MUSCULOSKELETAL:  No point tenderness.   LYMPH NODES:  No cervical, supraclavicular or axillary nodes palpable.   SKIN:  No petechiae or rashes.   NEUROLOGIC:  Alert and oriented.   BREASTS:  Right breast bruising at biopsy site, no dominant masses.  Nipple everted without discharge.  Left breast, a large palpable 8 cm mass eroding through the skin, thickening with crusting.   LYMPHATICS:  No cervical, supraclavicular or right axillary node palpable.  Left axilla, a large 2.5 cm lymph node palpable.      IMAGING:  As stated in the HPI.      PATHOLOGY:  As stated in the HPI.      LABORATORY:  From March 4, 2020, alkaline phosphatase 127, ALT 56, AST 34, calcium 10.2, creatinine 0.85.  WBC 9.2, hemoglobin 15.7, hematocrit 48.2, platelet 325,000, ANC 7.0.      ASSESSMENT AND PLAN:  Mary Olson is a 64-year-old female with newly diagnosed bilateral breast cancer.  The left breast is a T4 with lymph node positive, ER positive, HER-2 negative cancer.  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  I discussed at length with the patient in the presence of her  and our care coordinator, Aren " Bea regarding her diagnosis, prognosis and treatment options.  I explained that the patient has bilateral breast cancer.  The right-sided cancer is tubular differentiation and tends to have a good prognosis.  However, on the left side, I am quite concerned.  This is a large breast mass that is ulcerating through her skin.  I was able to palpate a lymph node on today's exam as well.  I explained that there are 4 stages and at this point the patient is at least a stage III.  However, I would want a PET scan to see whether this has metastasized to other places.  For the purpose of the visit, we proceeded as stage III, but the patient understands if she were to have disease elsewhere, this would change her prognosis and potentially her treatment.  I explained the role of chemotherapy followed by surgery.  She will likely require radiation followed by endocrine therapy.  Chemotherapy is to shrink the tumor down to optimize for surgery.  It would also give us time to assess her disease.  Based on her appearance, I do not feel she is a surgical candidate at this time.  The patient questioned this and I explained that this is because I do not feel that the surgeon will be able to completely remove the tumor and close her skin.   There are numerous treatments:        One is standard therapy with chemotherapy, which would be Taxol followed by Adriamycin and Cytoxan.  Reviewed potential side effects and toxicities including but not limited to alopecia, nausea, vomiting, bone marrow suppression, increase for infection, bleeding, allergic reactions, organ dysfunction, cardiotoxicity, neuropathy which may be permanent and debilitating, secondary malignancies, and death.        The second option is consider a clinical trial.  I did speak to the patient briefly about the I-SPY trial.  During her visit, I was notified by the trial coordinator that this trial is closed to accrual.        The third option is do nothing, which I  would not recommend since her disease would only continue to grow and I was quite guillermo with the patient that she would die of her disease.  The patient at this point is interested in pursuing standard therapy.      I did review with the patient that I do feel it is important for us to have an understanding of the extent of her disease and would recommend a PET scan.  I would also like an MRI of her breast to help us assess her response to disease.  I did palpate a lymph node on exam, and it would be helpful to have a biopsy of this area as well.      According to notes and discussion with the patient, she is interested more holistic therapy.  I was very honest with the patient that I do not practice homeopathic therapy and do not participate in these services.  I was also very candid and told the patient that if she does not receive treatment that her cancer will only continue to grow.  I would not support going to areas that do not practice standard of care or scientifically-proven Medicine.  I also advised the patient that if she were to pursue some of these other options, I would fear that she would die of her disease.  The patient did have a number of questions regarding homeopathic therapy, but again, I informed her that I do not partake in this type of protocol.        The patient's  was very clear that he wanted her to proceed with standard therapy.  We did review that ultimately it is the patient's decision and we would respect what she decided, but if she did want standard treatment, we would happy to deliver this through our cancer center.      PLAN:   1.  PET scan.   2.  Port placement.   3.  MUGA.   4.  General Surgery referral.   5.  MRI of the breast.   6.  Left axillary lymph node biopsy.   7.   to meet with the patient.   8.  The patient will return to see MD after PET scan.   9.  Taxol, Adriamycin, and Cytoxan teaching.   10.  The patient to return to start treatment  as soon as possible.  She is willing to go to any site to have the above tests and procedures completed.      At the end of the discussion, the patient and her  have verbalized understanding and would like to proceed with standard therapy.  If at any point they decide otherwise, they should feel free to contact our clinic and we will respect their decision.      Dr. Herrera, thank you for giving me the opportunity to participate in this delightful patient's care.  If you have any questions, please feel free to contact me.         Sincerely,      BRY WHITFIELD MD      Case discussed with Dr. Joshi who offered to place port as well.    Above note accurate for diagnosis, plan and orders placed. Epic/EMR orders and data may not be accurate because of available options, information not entered correctly/updated by staff other than writer or interface issues. All data entered by writer with the intent patient care not be compromised nor patient be unnecessarily billed'        D: 2020   T: 2020   MT: RIC      Name:     ARTHUR JENKINS   MRN:      [id number]        Account:      [id number]   :      1955      Document: [id number]       cc: Princess RIVERA, BRYANNA Herrera MD

## 2020-03-12 NOTE — PROGRESS NOTES
"[facility]: Chemotherapy Education Notes    RN met with patient and spouse in clinic for chemotherapy education on Taxol, Adriamycin, and Cytoxan. Jacqueline handouts dated 3/12/20 were discussed and given to patient to take home.  Reviewed the following with the patient and their support person:     Treatment Goal/Regimen/Duration: specific medication names and medication delivery methods; possible chemotherapy side effects and management of side effects, including: skin changes/nail changes, anemia, neutropenia, thrombocytopenia, diarrhea/constipation, nausea/vomiting, hair loss, memory changes, mouth sores, taste changes, appetite changes, peripheral neuropathy, fatigue, myelosuppression, increased risk for infection, increased risk for bleeding and/or bruising, body aches, bladder sensitivity/irritation with the Cytoxan, or possible cardiotoxicity with the Adriamycin.  Infection prevention, and monitoring of lab values, what lab tests and what changes of these values meant, along with the possibility of IV hydration or the need to defer or hold treatment.  Also reviewed signs/symptoms that should be reported to care team or on-call provider immediately, including: temperature of 100.4 degrees or higher, shortness of breath, chest pain, unusual bruising, bleeding symptoms, extreme fatigue, >4-6 episodes of diarrhea in 24 hours, uncontrolled nausea/vomiting, symptoms of dehydration (severe dry mouth/severe thirst, rapid heart beat, dizziness, dark or decreased urination, and lethargy), signs of an allergic reaction.    General Chemotherapy Information, including: what to do if needing to miss a treatment, and when to call the provider.  Importance of Central line care (port-a-cath) or IV site care.  Reviewed Port book, provided [employer] handout \"Vascular Access Port Implantation,\" discussed port placement procedure and rationale for port placement. Reviewed usage and purpose of EMLA cream on days the port will be " "accessed, EMLA cream prescription has been sent to patient's local pharmacy.     Written Information: Patient was provided with the [facility] \"My Cancer Guidebook\" binder, which includes information on the following: \"Getting Ready for Chemotherapy: What to Expect, Before, During, and After your Treatment,\" printouts from [employer] on specific chemotherapy drugs, \"Eating Hints: Before, During, and After Cancer Treatment,\" printouts on possible chemotherapy side effects/ways to cope with side effects, \"When to Call the Doctor,\" and \"Self-Care Tips During Cancer Treatment.\"  Also, information regarding various programs offered at [facility], and our business card with contact information given for the following: Oncology Clinic/scheduling, RN Care Coordinator, and the after-hours Nurse Advice Line.    No barriers to learning identified. Patient and spouse verbalized understanding of all written and verbal information. All questions answered patient s satisfaction.  Learning barriers and method preference are documented in the patient education flowsheet.     General Orientation to the Medical Oncology department, Infusion Services department, scheduling, bathrooms, and usual flow of the treatment day provided.     Patient instructed to call with further questions or concerns.  Patient states understanding and is in agreement with this plan.  Patient and spouse were then escorted to the [facility] lobby to set up future appointments, as ordered by Dr. Birch.    Dmitri Figueredo RN, BSN, OCN  Oncology Care Coordinator  [facility]      "

## 2020-03-13 NOTE — PROGRESS NOTES
[facility]:  Care Coordination Note    Late entry from 3/12/20 - during chemotherapy education session today, patient mentioned to writer that she has a long-standing history of swallowing pills - even small ones.  She shares that most of what she takes now she either crushes, splits into smaller pieces, or takes in liquid form if available.  Dr. Birch and pharmacy team were notified, as patient is concerned about what forms of medications would be available for her antiemetic take-home pills (she is nervous about this).  Advised patient we would have our pharmacy team plan to discuss this with her further on her first chemo day, and patient felt comfortable with this plan.     Dmitri Figueredo, RN, BSN, OCN  Oncology Care Coordinator  [facility]

## 2020-03-16 ENCOUNTER — HOSPITAL ENCOUNTER (OUTPATIENT)
Dept: NUCLEAR MEDICINE | Facility: CLINIC | Age: 65
Setting detail: NUCLEAR MEDICINE
Discharge: HOME | End: 2020-03-16
Attending: INTERNAL MEDICINE | Admitting: INTERNAL MEDICINE
Payer: COMMERCIAL

## 2020-03-16 DIAGNOSIS — C50.812 MALIGNANT NEOPLASM OF OVERLAPPING SITES OF LEFT BREAST IN FEMALE, ESTROGEN RECEPTOR POSITIVE (H): ICD-10-CM

## 2020-03-16 DIAGNOSIS — Z17.0 MALIGNANT NEOPLASM OF OVERLAPPING SITES OF LEFT BREAST IN FEMALE, ESTROGEN RECEPTOR POSITIVE (H): Primary | ICD-10-CM

## 2020-03-16 DIAGNOSIS — C50.812 MALIGNANT NEOPLASM OF OVERLAPPING SITES OF LEFT BREAST IN FEMALE, ESTROGEN RECEPTOR POSITIVE (H): Primary | ICD-10-CM

## 2020-03-16 DIAGNOSIS — Z17.0 MALIGNANT NEOPLASM OF OVERLAPPING SITES OF LEFT BREAST IN FEMALE, ESTROGEN RECEPTOR POSITIVE (H): ICD-10-CM

## 2020-03-16 PROCEDURE — A9560 TC99M LABELED RBC: HCPCS | Performed by: INTERNAL MEDICINE

## 2020-03-16 PROCEDURE — 78472 GATED HEART PLANAR SINGLE: CPT

## 2020-03-16 PROCEDURE — 34300033 ZZH RX 343: Performed by: INTERNAL MEDICINE

## 2020-03-16 PROCEDURE — 40000141 ZZH STATISTIC PERIPHERAL IV START W/O US GUIDANCE

## 2020-03-16 RX ORDER — DIPHENHYDRAMINE HCL 25 MG
50 CAPSULE ORAL ONCE
Status: CANCELLED
Start: 2020-04-01

## 2020-03-16 RX ORDER — LORAZEPAM 2 MG/ML
0.5 INJECTION INTRAMUSCULAR EVERY 4 HOURS PRN
Status: CANCELLED
Start: 2020-03-25

## 2020-03-16 RX ORDER — DIPHENHYDRAMINE HYDROCHLORIDE 50 MG/ML
50 INJECTION INTRAMUSCULAR; INTRAVENOUS
Status: CANCELLED
Start: 2020-04-01

## 2020-03-16 RX ORDER — HEPARIN SODIUM (PORCINE) LOCK FLUSH IV SOLN 100 UNIT/ML 100 UNIT/ML
5 SOLUTION INTRAVENOUS
Status: CANCELLED | OUTPATIENT
Start: 2020-04-01

## 2020-03-16 RX ORDER — HEPARIN SODIUM (PORCINE) LOCK FLUSH IV SOLN 100 UNIT/ML 100 UNIT/ML
5 SOLUTION INTRAVENOUS
Status: CANCELLED | OUTPATIENT
Start: 2020-03-25

## 2020-03-16 RX ORDER — ALBUTEROL SULFATE 0.83 MG/ML
2.5 SOLUTION RESPIRATORY (INHALATION)
Status: CANCELLED | OUTPATIENT
Start: 2020-04-01

## 2020-03-16 RX ORDER — EPINEPHRINE 1 MG/ML
0.3 INJECTION, SOLUTION INTRAMUSCULAR; SUBCUTANEOUS EVERY 5 MIN PRN
Status: CANCELLED | OUTPATIENT
Start: 2020-04-01

## 2020-03-16 RX ORDER — MEPERIDINE HYDROCHLORIDE 25 MG/ML
25 INJECTION INTRAMUSCULAR; INTRAVENOUS; SUBCUTANEOUS EVERY 30 MIN PRN
Status: CANCELLED | OUTPATIENT
Start: 2020-03-25

## 2020-03-16 RX ORDER — HEPARIN SODIUM,PORCINE 10 UNIT/ML
5 VIAL (ML) INTRAVENOUS
Status: CANCELLED | OUTPATIENT
Start: 2020-04-01

## 2020-03-16 RX ORDER — ALBUTEROL SULFATE 0.83 MG/ML
2.5 SOLUTION RESPIRATORY (INHALATION)
Status: CANCELLED | OUTPATIENT
Start: 2020-03-25

## 2020-03-16 RX ORDER — METHYLPREDNISOLONE SODIUM SUCCINATE 125 MG/2ML
125 INJECTION, POWDER, LYOPHILIZED, FOR SOLUTION INTRAMUSCULAR; INTRAVENOUS
Status: CANCELLED
Start: 2020-04-01

## 2020-03-16 RX ORDER — NALOXONE HYDROCHLORIDE 0.4 MG/ML
.1-.4 INJECTION, SOLUTION INTRAMUSCULAR; INTRAVENOUS; SUBCUTANEOUS
Status: CANCELLED | OUTPATIENT
Start: 2020-03-25

## 2020-03-16 RX ORDER — DIPHENHYDRAMINE HYDROCHLORIDE 50 MG/ML
50 INJECTION INTRAMUSCULAR; INTRAVENOUS
Status: CANCELLED
Start: 2020-03-25

## 2020-03-16 RX ORDER — ALBUTEROL SULFATE 0.83 MG/ML
2.5 SOLUTION RESPIRATORY (INHALATION)
Status: CANCELLED | OUTPATIENT
Start: 2020-04-08

## 2020-03-16 RX ORDER — HEPARIN SODIUM,PORCINE 10 UNIT/ML
5 VIAL (ML) INTRAVENOUS
Status: CANCELLED | OUTPATIENT
Start: 2020-03-25

## 2020-03-16 RX ORDER — HEPARIN SODIUM,PORCINE 10 UNIT/ML
5 VIAL (ML) INTRAVENOUS
Status: CANCELLED | OUTPATIENT
Start: 2020-04-08

## 2020-03-16 RX ORDER — LORAZEPAM 2 MG/ML
0.5 INJECTION INTRAMUSCULAR EVERY 4 HOURS PRN
Status: CANCELLED
Start: 2020-04-01

## 2020-03-16 RX ORDER — LORAZEPAM 2 MG/ML
0.5 INJECTION INTRAMUSCULAR EVERY 4 HOURS PRN
Status: CANCELLED
Start: 2020-04-08

## 2020-03-16 RX ORDER — HEPARIN SODIUM (PORCINE) LOCK FLUSH IV SOLN 100 UNIT/ML 100 UNIT/ML
5 SOLUTION INTRAVENOUS
Status: CANCELLED | OUTPATIENT
Start: 2020-04-08

## 2020-03-16 RX ORDER — NALOXONE HYDROCHLORIDE 0.4 MG/ML
.1-.4 INJECTION, SOLUTION INTRAMUSCULAR; INTRAVENOUS; SUBCUTANEOUS
Status: CANCELLED | OUTPATIENT
Start: 2020-04-01

## 2020-03-16 RX ORDER — ALBUTEROL SULFATE 90 UG/1
1-2 AEROSOL, METERED RESPIRATORY (INHALATION)
Status: CANCELLED
Start: 2020-04-01

## 2020-03-16 RX ORDER — EPINEPHRINE 1 MG/ML
0.3 INJECTION, SOLUTION INTRAMUSCULAR; SUBCUTANEOUS EVERY 5 MIN PRN
Status: CANCELLED | OUTPATIENT
Start: 2020-04-08

## 2020-03-16 RX ORDER — DIPHENHYDRAMINE HCL 25 MG
50 CAPSULE ORAL ONCE
Status: CANCELLED
Start: 2020-03-25

## 2020-03-16 RX ORDER — METHYLPREDNISOLONE SODIUM SUCCINATE 125 MG/2ML
125 INJECTION, POWDER, LYOPHILIZED, FOR SOLUTION INTRAMUSCULAR; INTRAVENOUS
Status: CANCELLED
Start: 2020-03-25

## 2020-03-16 RX ORDER — SODIUM CHLORIDE 9 MG/ML
1000 INJECTION, SOLUTION INTRAVENOUS CONTINUOUS PRN
Status: CANCELLED
Start: 2020-04-01

## 2020-03-16 RX ORDER — SODIUM CHLORIDE 9 MG/ML
1000 INJECTION, SOLUTION INTRAVENOUS CONTINUOUS PRN
Status: CANCELLED
Start: 2020-04-08

## 2020-03-16 RX ORDER — EPINEPHRINE 1 MG/ML
0.3 INJECTION, SOLUTION INTRAMUSCULAR; SUBCUTANEOUS EVERY 5 MIN PRN
Status: CANCELLED | OUTPATIENT
Start: 2020-03-25

## 2020-03-16 RX ORDER — NALOXONE HYDROCHLORIDE 0.4 MG/ML
.1-.4 INJECTION, SOLUTION INTRAMUSCULAR; INTRAVENOUS; SUBCUTANEOUS
Status: CANCELLED | OUTPATIENT
Start: 2020-04-08

## 2020-03-16 RX ORDER — MEPERIDINE HYDROCHLORIDE 25 MG/ML
25 INJECTION INTRAMUSCULAR; INTRAVENOUS; SUBCUTANEOUS EVERY 30 MIN PRN
Status: CANCELLED | OUTPATIENT
Start: 2020-04-01

## 2020-03-16 RX ORDER — DIPHENHYDRAMINE HYDROCHLORIDE 50 MG/ML
50 INJECTION INTRAMUSCULAR; INTRAVENOUS
Status: CANCELLED
Start: 2020-04-08

## 2020-03-16 RX ORDER — ALBUTEROL SULFATE 90 UG/1
1-2 AEROSOL, METERED RESPIRATORY (INHALATION)
Status: CANCELLED
Start: 2020-03-25

## 2020-03-16 RX ORDER — EPINEPHRINE 0.3 MG/.3ML
0.3 INJECTION SUBCUTANEOUS EVERY 5 MIN PRN
Status: CANCELLED | OUTPATIENT
Start: 2020-04-08

## 2020-03-16 RX ORDER — EPINEPHRINE 0.3 MG/.3ML
0.3 INJECTION SUBCUTANEOUS EVERY 5 MIN PRN
Status: CANCELLED | OUTPATIENT
Start: 2020-03-25

## 2020-03-16 RX ORDER — EPINEPHRINE 0.3 MG/.3ML
0.3 INJECTION SUBCUTANEOUS EVERY 5 MIN PRN
Status: CANCELLED | OUTPATIENT
Start: 2020-04-01

## 2020-03-16 RX ORDER — METHYLPREDNISOLONE SODIUM SUCCINATE 125 MG/2ML
125 INJECTION, POWDER, LYOPHILIZED, FOR SOLUTION INTRAMUSCULAR; INTRAVENOUS
Status: CANCELLED
Start: 2020-04-08

## 2020-03-16 RX ORDER — MEPERIDINE HYDROCHLORIDE 25 MG/ML
25 INJECTION INTRAMUSCULAR; INTRAVENOUS; SUBCUTANEOUS EVERY 30 MIN PRN
Status: CANCELLED | OUTPATIENT
Start: 2020-04-08

## 2020-03-16 RX ORDER — DIPHENHYDRAMINE HCL 25 MG
50 CAPSULE ORAL ONCE
Status: CANCELLED
Start: 2020-04-08

## 2020-03-16 RX ORDER — SODIUM CHLORIDE 9 MG/ML
1000 INJECTION, SOLUTION INTRAVENOUS CONTINUOUS PRN
Status: CANCELLED
Start: 2020-03-25

## 2020-03-16 RX ORDER — ALBUTEROL SULFATE 90 UG/1
1-2 AEROSOL, METERED RESPIRATORY (INHALATION)
Status: CANCELLED
Start: 2020-04-08

## 2020-03-16 RX ADMIN — Medication 29 MCI.: at 11:10

## 2020-03-17 ENCOUNTER — TELEPHONE (OUTPATIENT)
Dept: SURGERY | Facility: CLINIC | Age: 65
End: 2020-03-17

## 2020-03-17 NOTE — TELEPHONE ENCOUNTER
Attempted to reach patient to inform her of the no visitor rule at her appointment with Dr. Joshi on 3/18/20. Line busy    Patient can skype, facetime or use speaker phone with a vistor/vistors.    Elizabeth Osborne LPN

## 2020-03-18 ENCOUNTER — TRANSFERRED RECORDS (OUTPATIENT)
Dept: HEALTH INFORMATION MANAGEMENT | Facility: CLINIC | Age: 65
End: 2020-03-18

## 2020-03-18 ENCOUNTER — ANCILLARY PROCEDURE (OUTPATIENT)
Dept: MRI IMAGING | Facility: CLINIC | Age: 65
End: 2020-03-18
Attending: INTERNAL MEDICINE
Payer: COMMERCIAL

## 2020-03-18 DIAGNOSIS — C50.812 MALIGNANT NEOPLASM OF OVERLAPPING SITES OF LEFT BREAST IN FEMALE, ESTROGEN RECEPTOR POSITIVE (H): ICD-10-CM

## 2020-03-18 DIAGNOSIS — Z17.0 MALIGNANT NEOPLASM OF OVERLAPPING SITES OF LEFT BREAST IN FEMALE, ESTROGEN RECEPTOR POSITIVE (H): ICD-10-CM

## 2020-03-18 PROCEDURE — A9585 GADOBUTROL INJECTION: HCPCS | Performed by: INTERNAL MEDICINE

## 2020-03-18 PROCEDURE — 77049 MRI BREAST C-+ W/CAD BI: CPT | Performed by: RADIOLOGY

## 2020-03-18 RX ORDER — GADOBUTROL 604.72 MG/ML
10 INJECTION INTRAVENOUS ONCE
Status: COMPLETED | OUTPATIENT
Start: 2020-03-18 | End: 2020-03-18

## 2020-03-18 RX ADMIN — GADOBUTROL 10 ML: 604.72 INJECTION INTRAVENOUS at 13:21

## 2020-03-18 NOTE — TELEPHONE ENCOUNTER
Writer called and spoke with patient and . They agreed with plan to use alternative methods to communicate during visit.    Elizabeth Osborne LPN

## 2020-03-19 ENCOUNTER — ANCILLARY PROCEDURE (OUTPATIENT)
Dept: ULTRASOUND IMAGING | Facility: CLINIC | Age: 65
End: 2020-03-19
Attending: INTERNAL MEDICINE
Payer: COMMERCIAL

## 2020-03-19 ENCOUNTER — PATIENT OUTREACH (OUTPATIENT)
Dept: ONCOLOGY | Facility: CLINIC | Age: 65
End: 2020-03-19

## 2020-03-19 DIAGNOSIS — Z17.0 MALIGNANT NEOPLASM OF OVERLAPPING SITES OF LEFT BREAST IN FEMALE, ESTROGEN RECEPTOR POSITIVE (H): ICD-10-CM

## 2020-03-19 DIAGNOSIS — C50.812 MALIGNANT NEOPLASM OF OVERLAPPING SITES OF LEFT BREAST IN FEMALE, ESTROGEN RECEPTOR POSITIVE (H): ICD-10-CM

## 2020-03-19 PROCEDURE — 00000159 ZZHCL STATISTIC H-SEND OUTS PREP: Performed by: RADIOLOGY

## 2020-03-19 PROCEDURE — 88305 TISSUE EXAM BY PATHOLOGIST: CPT | Performed by: RADIOLOGY

## 2020-03-19 PROCEDURE — 76942 ECHO GUIDE FOR BIOPSY: CPT | Performed by: RADIOLOGY

## 2020-03-19 PROCEDURE — 88377 M/PHMTRC ALYS ISHQUANT/SEMIQ: CPT | Performed by: RADIOLOGY

## 2020-03-19 PROCEDURE — 00000158 ZZHCL STATISTIC H-FISH PROCESS B/S: Performed by: RADIOLOGY

## 2020-03-19 PROCEDURE — 38505 NEEDLE BIOPSY LYMPH NODES: CPT | Performed by: RADIOLOGY

## 2020-03-19 NOTE — PROGRESS NOTES
Received faxed report from [facility] with details regarding patient's recent port placement.  Report labeled for EMR scanning.  Doc flowsheets updated with new port information.    Dmitri Figueredo RN, BSN, OCN  Oncology Care Coordinator  [facility]

## 2020-03-20 LAB — COPATH REPORT: NORMAL

## 2020-03-23 ENCOUNTER — HOSPITAL ENCOUNTER (OUTPATIENT)
Dept: PET IMAGING | Facility: CLINIC | Age: 65
Discharge: HOME | End: 2020-03-23
Attending: INTERNAL MEDICINE | Admitting: INTERNAL MEDICINE
Payer: COMMERCIAL

## 2020-03-23 ENCOUNTER — TELEPHONE (OUTPATIENT)
Dept: GENERAL RADIOLOGY | Facility: CLINIC | Age: 65
End: 2020-03-23

## 2020-03-23 DIAGNOSIS — C50.812 MALIGNANT NEOPLASM OF OVERLAPPING SITES OF LEFT BREAST IN FEMALE, ESTROGEN RECEPTOR POSITIVE (H): ICD-10-CM

## 2020-03-23 DIAGNOSIS — Z17.0 MALIGNANT NEOPLASM OF OVERLAPPING SITES OF LEFT BREAST IN FEMALE, ESTROGEN RECEPTOR POSITIVE (H): ICD-10-CM

## 2020-03-23 PROCEDURE — 25000128 H RX IP 250 OP 636: Performed by: INTERNAL MEDICINE

## 2020-03-23 PROCEDURE — 34300033 ZZH RX 343: Performed by: INTERNAL MEDICINE

## 2020-03-23 PROCEDURE — A9552 F18 FDG: HCPCS | Performed by: INTERNAL MEDICINE

## 2020-03-23 PROCEDURE — 40000141 ZZH STATISTIC PERIPHERAL IV START W/O US GUIDANCE

## 2020-03-23 PROCEDURE — 78816 PET IMAGE W/CT FULL BODY: CPT | Mod: PI

## 2020-03-23 RX ORDER — IOPAMIDOL 755 MG/ML
45-135 INJECTION, SOLUTION INTRAVASCULAR ONCE
Status: COMPLETED | OUTPATIENT
Start: 2020-03-23 | End: 2020-03-23

## 2020-03-23 RX ADMIN — IOPAMIDOL 135 ML: 755 INJECTION, SOLUTION INTRAVENOUS at 14:41

## 2020-03-23 RX ADMIN — FLUDEOXYGLUCOSE F-18 15.14 MCI.: 500 INJECTION, SOLUTION INTRAVENOUS at 13:15

## 2020-03-23 NOTE — TELEPHONE ENCOUNTER
Spoke to Desmond regarding the results from her recent Left axillary lymph node biopsy, which indicates Metastatic Mammary Ductal Carcinoma. Discussed the radiologist's recommendation for continued oncologic care and consultation. Desmond has consulted and seeking care with Dr. Keysha Birch in [place]. Desmond verbalized understanding and all questions have been addressed.

## 2020-03-24 DIAGNOSIS — Z17.0 MALIGNANT NEOPLASM OF OVERLAPPING SITES OF LEFT BREAST IN FEMALE, ESTROGEN RECEPTOR POSITIVE (H): Primary | ICD-10-CM

## 2020-03-24 DIAGNOSIS — C50.812 MALIGNANT NEOPLASM OF OVERLAPPING SITES OF LEFT BREAST IN FEMALE, ESTROGEN RECEPTOR POSITIVE (H): Primary | ICD-10-CM

## 2020-03-24 LAB — COPATH REPORT: NORMAL

## 2020-03-24 RX ORDER — PROCHLORPERAZINE MALEATE 10 MG
10 TABLET ORAL EVERY 6 HOURS PRN
Qty: 30 TABLET | Refills: 2 | Status: SHIPPED | OUTPATIENT
Start: 2020-03-24 | End: 2020-10-14

## 2020-03-24 RX ORDER — LORAZEPAM 0.5 MG/1
0.5 TABLET ORAL EVERY 4 HOURS PRN
Qty: 30 TABLET | Refills: 1 | Status: SHIPPED | OUTPATIENT
Start: 2020-03-24 | End: 2020-10-14

## 2020-03-24 NOTE — PROGRESS NOTES
Oncology Follow Up Visit: March 25, 2020    Oncologist: Dr Keysha Birch  PCP: Clinic, [facility]    Diagnosis:Metastatic Breast Cancer  Mary Olson is a 65 yo female who noticed what she believed to be a bug bite on her left breast in 10/2019.  She initially noted 3 red spots- treated at home. She underwent imaging with a diagnostic mammogram on March 4, 2020, as well as ultrasound where they found an ill-defined margins measuring at least 6.2 cm to center of the left nipple.  Other masses are seen posterior to this including a 2.1 cm mass slightly medial on mammography.  Diffuse left breast skin thickening.  There are at least 3 abnormal enlarged lymph nodes in the left axilla measuring 2.5 x 1.3 x 2.4 cm.  The right breast at the 12:30 position 7 cm from the nipple, there is a spiculated mass corresponding mammographic finding measuring approximately 1.3 x 0.9 x 1.0 cm.  A smaller spiculated hypoechoic mass is at the 12 o'clock position 2 cm from the nipple, measuring 0.9 x 0.5 x 0.7 cm.  Normal lymph nodes in the right axilla.  Biopsy of the left breast mass confirmed invasive mammary carcinoma, no special type, invasive ductal, grade 2, estrogen receptor positive, progesterone receptor positive, HER-2 negative.  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  The second right breast mass at the 12 o'clock position showed benign breast tissue, composed predominantly of uncolonized stroma with rare ducts.  No discrete masses identified.  No atypical or malignant findings.  3/12/2020 MUGA showed normal heart function with LVEF of 58% and no other abnormalities.  Left Axillary biopsy was positive for metastatic mammary cancer with Negative Her2  PET scan showing metastasis to left axilla, retropectoral lymph nodes and diffuse bone  "metastasis  Treatment:   3/25/2020 to begin weekly Taxol x 12 followed by ddAC x 4 cycles  3/25/2020 Begin zometa    Interval History: Ms. Olson comes to clinic to begin chemotherapy treatment for her bilateral breast cancer. She admits she is still trying to accept diagnosis.  She otherwise states she is feeling well with no recent illnesses fevers cough congestion increased fatigue or weight loss.  Patient does report that she is eating well with the use of a Mediterranean diet and protein supplements.  Bowel and bladder remain normal.  She admits she is somewhat anxious about the disease but is still sleeping well.  Rest of comprehensive and complete ROS is reviewed and is negative.   Past Medical History:   Diagnosis Date     ASCUS favoring benign 10/28/15    neg HPV     Current Outpatient Medications   Medication     Ascorbic Acid (VITAMIN C PO)     aspirin 81 MG tablet     calcium-vitamin D-vitamin K (VIACTIV) 500-500-40 MG-UNT-MCG CHEW     Cholecalciferol (VITAMIN D3 PO)     Coenzyme Q10 (COQ-10) 200 MG CAPS     Cyanocobalamin (B-12) 2500 MCG TABS     lidocaine-prilocaine (EMLA) 2.5-2.5 % external cream     LORazepam (ATIVAN) 0.5 MG tablet     Multiple Vitamins-Minerals (CENTRUM ADULTS PO)     Omega-3 Fatty Acids (OMEGA-3 FISH OIL PO)     prochlorperazine (COMPAZINE) 10 MG tablet     UNABLE TO FIND     VITAMIN E NATURAL PO     Zinc Acetate, Oral, (ZINC ACETATE PO)     No current facility-administered medications for this visit.      Facility-Administered Medications Ordered in Other Visits   Medication     sodium bicarbonate 8.4 % injection 1 mEq     Allergies   Allergen Reactions     Lisinopril Cough       Physical Exam:BP (!) 164/105 (BP Location: Right arm)   Pulse 117   Temp 98.3  F (36.8  C) (Oral)   Resp 20   Ht 1.575 m (5' 2\")   Wt 110.7 kg (244 lb 1.6 oz)   SpO2 95%   BMI 44.65 kg/m     ECOG PS- 0  Constitutional: Alert, cooperative, and in no distress.   ENT: Eyes bright , No mouth " sores  Neck: Supple, No adenopathy.Thyroid symmetric, normal size  Cardiac: Heart rate and rhythm is regular and strong without murmur  Respiratory: Breathing easy. Lung sounds clear to auscultation-no cough  Breasts: Left breast with large firm mass including nipple and working back into the breast with serous discharge.  Left axillary is noted to have small masses-area of biopsy healing well though still mildly tender.  Right breast cannot feel the masses.  Port in place and without redness or swelling today of her right breast.  GI: Abdomen is rounded, soft, non-tender, BS normal. No masses or organomegaly  MS: Muscle tone normal, extremities normal with no edema.   Skin: No suspicious lesions or rashes-except breast as above  Neuro: Sensory grossly WNL, gait normal.   Lymph: Normal ant/post cervical, axillary, supraclavicular nodes  Psych: Mentation appears normal and affect mildly anxious and talkative with tears as we discussed her metastasis.    Laboratory Results:   Results for orders placed or performed in visit on 03/25/20   Creatinine     Status: None   Result Value Ref Range    Creatinine 0.80 0.52 - 1.04 mg/dL    GFR Estimate 78 >60 mL/min/[1.73_m2]    GFR Estimate If Black >90 >60 mL/min/[1.73_m2]   Calcium     Status: None   Result Value Ref Range    Calcium 9.2 8.5 - 10.1 mg/dL   Albumin level     Status: Abnormal   Result Value Ref Range    Albumin 3.2 (L) 3.4 - 5.0 g/dL   Results for orders placed or performed in visit on 03/25/20   CBC with platelets differential     Status: None   Result Value Ref Range    WBC 10.3 4.0 - 11.0 10e9/L    RBC Count 5.14 3.8 - 5.2 10e12/L    Hemoglobin 14.4 11.7 - 15.7 g/dL    Hematocrit 44.8 35.0 - 47.0 %    MCV 87 78 - 100 fl    MCH 28.0 26.5 - 33.0 pg    MCHC 32.1 31.5 - 36.5 g/dL    RDW 13.5 10.0 - 15.0 %    Platelet Count 342 150 - 450 10e9/L    Diff Method Automated Method     % Neutrophils 70.9 %    % Lymphocytes 18.3 %    % Monocytes 7.9 %    % Eosinophils  1.8 %    % Basophils 0.8 %    % Immature Granulocytes 0.3 %    Absolute Neutrophil 7.3 1.6 - 8.3 10e9/L    Absolute Lymphocytes 1.9 0.8 - 5.3 10e9/L    Absolute Monocytes 0.8 0.0 - 1.3 10e9/L    Absolute Eosinophils 0.2 0.0 - 0.7 10e9/L    Absolute Basophils 0.1 0.0 - 0.2 10e9/L    Abs Immature Granulocytes 0.0 0 - 0.4 10e9/L   Hepatic panel     Status: Abnormal   Result Value Ref Range    Bilirubin Direct <0.1 0.0 - 0.2 mg/dL    Bilirubin Total 0.3 0.2 - 1.3 mg/dL    Albumin 3.1 (L) 3.4 - 5.0 g/dL    Protein Total 8.2 6.8 - 8.8 g/dL    Alkaline Phosphatase 121 40 - 150 U/L    ALT 34 0 - 50 U/L    AST 29 0 - 45 U/L     3/23/2020 PET scan-    IMPRESSION: In this patient with history of bilateral breast cancer  (invasive ductal carcinoma left and tubular carcinoma the right):  1. Large left breast mass with skin invasion and multiple satellite  nodules.  2. Right smaller breast mass.  3. Numerous left axillary and retropectoral metastatic  lymphadenopathy.  4. Diffuse metastatic involvement of the bones.  5. Right lung lupillo-fissural nodule, attention on follow up is  recommended. Favor unrelated to breast cancer, likely benign.    Assessment and Plan:   Metastatic Breast Cancer-patient now has proven metastatic disease as noted with left axillary biopsy and 3/23/2020 PET scan results showing retro-pectoral as well as the axillary nodes involved with disease and diffuse bony disease.    We discussed that we can treat and control disease but we cannot cure her disease at this time.    She had been told about the Taxol plan and we did review administration and side effects of continuing with this plan and she does give her permission to continue today  Due to the bone metastasis we will start Zometa as well today and patient is already on daily calcium plus vitamin D but did review recommended intake of calcium to support Zometa.  Adjustment disorder with anxiety and depression-we had a long talk today about the state of  metastasis.  I believe in the future she would benefit from seeing Dr. Smith however I think we need to get started with her treatment first.  Does appear her family is very supportive.  Though she is sleeping well she will have Ativan available to her if needed for sleep and anxiety.  Health maintenance -highly recommended the patient continue on a healthy diet though not expect to lose significant weight while she is on this plan.  She is on several supplements which were reviewed and she will stop the krill oil.  We will have dietitian call her for consult.    Also discussed trying to increase her movement throughout the day which may help her strength and weight.  This was a 40 min visit with > 50% in counseling and coordinating care including education and management of concerns.    Swathi Page,CNP

## 2020-03-25 ENCOUNTER — ONCOLOGY VISIT (OUTPATIENT)
Dept: ONCOLOGY | Facility: CLINIC | Age: 65
End: 2020-03-25
Payer: COMMERCIAL

## 2020-03-25 ENCOUNTER — INFUSION THERAPY VISIT (OUTPATIENT)
Dept: INFUSION THERAPY | Facility: CLINIC | Age: 65
End: 2020-03-25
Payer: COMMERCIAL

## 2020-03-25 ENCOUNTER — PATIENT OUTREACH (OUTPATIENT)
Dept: ONCOLOGY | Facility: CLINIC | Age: 65
End: 2020-03-25

## 2020-03-25 VITALS
BODY MASS INDEX: 44.92 KG/M2 | SYSTOLIC BLOOD PRESSURE: 164 MMHG | RESPIRATION RATE: 20 BRPM | HEART RATE: 117 BPM | DIASTOLIC BLOOD PRESSURE: 105 MMHG | OXYGEN SATURATION: 95 % | HEIGHT: 62 IN | TEMPERATURE: 98.3 F | WEIGHT: 244.1 LBS

## 2020-03-25 DIAGNOSIS — C50.812 MALIGNANT NEOPLASM OF OVERLAPPING SITES OF LEFT BREAST IN FEMALE, ESTROGEN RECEPTOR POSITIVE (H): Primary | ICD-10-CM

## 2020-03-25 DIAGNOSIS — Z17.0 MALIGNANT NEOPLASM OF OVERLAPPING SITES OF LEFT BREAST IN FEMALE, ESTROGEN RECEPTOR POSITIVE (H): Primary | ICD-10-CM

## 2020-03-25 DIAGNOSIS — C79.51 BONE METASTASIS: ICD-10-CM

## 2020-03-25 DIAGNOSIS — E66.813 CLASS 3 SEVERE OBESITY WITHOUT SERIOUS COMORBIDITY WITH BODY MASS INDEX (BMI) OF 40.0 TO 44.9 IN ADULT, UNSPECIFIED OBESITY TYPE (H): ICD-10-CM

## 2020-03-25 DIAGNOSIS — Z17.0 MALIGNANT NEOPLASM OF OVERLAPPING SITES OF LEFT BREAST IN FEMALE, ESTROGEN RECEPTOR POSITIVE (H): ICD-10-CM

## 2020-03-25 DIAGNOSIS — E66.01 CLASS 3 SEVERE OBESITY WITHOUT SERIOUS COMORBIDITY WITH BODY MASS INDEX (BMI) OF 40.0 TO 44.9 IN ADULT, UNSPECIFIED OBESITY TYPE (H): ICD-10-CM

## 2020-03-25 DIAGNOSIS — C50.812 MALIGNANT NEOPLASM OF OVERLAPPING SITES OF LEFT BREAST IN FEMALE, ESTROGEN RECEPTOR POSITIVE (H): ICD-10-CM

## 2020-03-25 DIAGNOSIS — F43.23 ADJUSTMENT DISORDER WITH MIXED ANXIETY AND DEPRESSED MOOD: ICD-10-CM

## 2020-03-25 LAB
ALBUMIN SERPL-MCNC: 3.1 G/DL (ref 3.4–5)
ALBUMIN SERPL-MCNC: 3.2 G/DL (ref 3.4–5)
ALP SERPL-CCNC: 121 U/L (ref 40–150)
ALT SERPL W P-5'-P-CCNC: 34 U/L (ref 0–50)
AST SERPL W P-5'-P-CCNC: 29 U/L (ref 0–45)
BASOPHILS # BLD AUTO: 0.1 10E9/L (ref 0–0.2)
BASOPHILS NFR BLD AUTO: 0.8 %
BILIRUB DIRECT SERPL-MCNC: <0.1 MG/DL (ref 0–0.2)
BILIRUB SERPL-MCNC: 0.3 MG/DL (ref 0.2–1.3)
CALCIUM SERPL-MCNC: 9.2 MG/DL (ref 8.5–10.1)
CREAT SERPL-MCNC: 0.8 MG/DL (ref 0.52–1.04)
DIFFERENTIAL METHOD BLD: NORMAL
EOSINOPHIL # BLD AUTO: 0.2 10E9/L (ref 0–0.7)
EOSINOPHIL NFR BLD AUTO: 1.8 %
ERYTHROCYTE [DISTWIDTH] IN BLOOD BY AUTOMATED COUNT: 13.5 % (ref 10–15)
GFR SERPL CREATININE-BSD FRML MDRD: 78 ML/MIN/{1.73_M2}
HCT VFR BLD AUTO: 44.8 % (ref 35–47)
HGB BLD-MCNC: 14.4 G/DL (ref 11.7–15.7)
IMM GRANULOCYTES # BLD: 0 10E9/L (ref 0–0.4)
IMM GRANULOCYTES NFR BLD: 0.3 %
LYMPHOCYTES # BLD AUTO: 1.9 10E9/L (ref 0.8–5.3)
LYMPHOCYTES NFR BLD AUTO: 18.3 %
MCH RBC QN AUTO: 28 PG (ref 26.5–33)
MCHC RBC AUTO-ENTMCNC: 32.1 G/DL (ref 31.5–36.5)
MCV RBC AUTO: 87 FL (ref 78–100)
MONOCYTES # BLD AUTO: 0.8 10E9/L (ref 0–1.3)
MONOCYTES NFR BLD AUTO: 7.9 %
NEUTROPHILS # BLD AUTO: 7.3 10E9/L (ref 1.6–8.3)
NEUTROPHILS NFR BLD AUTO: 70.9 %
PLATELET # BLD AUTO: 342 10E9/L (ref 150–450)
PROT SERPL-MCNC: 8.2 G/DL (ref 6.8–8.8)
RBC # BLD AUTO: 5.14 10E12/L (ref 3.8–5.2)
WBC # BLD AUTO: 10.3 10E9/L (ref 4–11)

## 2020-03-25 PROCEDURE — 99207 ZZC NO CHARGE NURSE ONLY: CPT

## 2020-03-25 PROCEDURE — 82040 ASSAY OF SERUM ALBUMIN: CPT | Performed by: NURSE PRACTITIONER

## 2020-03-25 PROCEDURE — 99215 OFFICE O/P EST HI 40 MIN: CPT | Mod: 25 | Performed by: NURSE PRACTITIONER

## 2020-03-25 PROCEDURE — 96367 TX/PROPH/DG ADDL SEQ IV INF: CPT | Performed by: NURSE PRACTITIONER

## 2020-03-25 PROCEDURE — 82310 ASSAY OF CALCIUM: CPT | Performed by: NURSE PRACTITIONER

## 2020-03-25 PROCEDURE — 36415 COLL VENOUS BLD VENIPUNCTURE: CPT | Performed by: NURSE PRACTITIONER

## 2020-03-25 PROCEDURE — 96375 TX/PRO/DX INJ NEW DRUG ADDON: CPT | Performed by: NURSE PRACTITIONER

## 2020-03-25 PROCEDURE — S0028 INJECTION, FAMOTIDINE, 20 MG: HCPCS | Performed by: NURSE PRACTITIONER

## 2020-03-25 PROCEDURE — 85025 COMPLETE CBC W/AUTO DIFF WBC: CPT | Performed by: INTERNAL MEDICINE

## 2020-03-25 PROCEDURE — 80076 HEPATIC FUNCTION PANEL: CPT | Performed by: INTERNAL MEDICINE

## 2020-03-25 PROCEDURE — 99207 ZZC NO CHARGE LOS: CPT

## 2020-03-25 PROCEDURE — 96413 CHEMO IV INFUSION 1 HR: CPT | Performed by: NURSE PRACTITIONER

## 2020-03-25 PROCEDURE — 82565 ASSAY OF CREATININE: CPT | Performed by: NURSE PRACTITIONER

## 2020-03-25 RX ORDER — HEPARIN SODIUM,PORCINE 10 UNIT/ML
5 VIAL (ML) INTRAVENOUS
Status: CANCELLED | OUTPATIENT
Start: 2020-03-25

## 2020-03-25 RX ORDER — HEPARIN SODIUM (PORCINE) LOCK FLUSH IV SOLN 100 UNIT/ML 100 UNIT/ML
5 SOLUTION INTRAVENOUS
Status: DISCONTINUED | OUTPATIENT
Start: 2020-03-25 | End: 2020-03-25 | Stop reason: HOSPADM

## 2020-03-25 RX ORDER — HEPARIN SODIUM (PORCINE) LOCK FLUSH IV SOLN 100 UNIT/ML 100 UNIT/ML
5 SOLUTION INTRAVENOUS
Status: CANCELLED | OUTPATIENT
Start: 2020-03-25

## 2020-03-25 RX ORDER — ZOLEDRONIC ACID 0.04 MG/ML
4 INJECTION, SOLUTION INTRAVENOUS ONCE
Status: CANCELLED | OUTPATIENT
Start: 2020-03-25

## 2020-03-25 RX ORDER — ZOLEDRONIC ACID 0.04 MG/ML
4 INJECTION, SOLUTION INTRAVENOUS ONCE
Status: COMPLETED | OUTPATIENT
Start: 2020-03-25 | End: 2020-03-25

## 2020-03-25 RX ADMIN — Medication 250 ML: at 14:40

## 2020-03-25 RX ADMIN — ZOLEDRONIC ACID 4 MG: 0.04 INJECTION, SOLUTION INTRAVENOUS at 16:41

## 2020-03-25 RX ADMIN — HEPARIN SODIUM (PORCINE) LOCK FLUSH IV SOLN 100 UNIT/ML 5 ML: 100 SOLUTION at 16:57

## 2020-03-25 ASSESSMENT — PAIN SCALES - GENERAL: PAINLEVEL: NO PAIN (0)

## 2020-03-25 ASSESSMENT — MIFFLIN-ST. JEOR: SCORE: 1615.48

## 2020-03-25 NOTE — PROGRESS NOTES
Infusion Nursing Note:  Mary Olson presents today for C1 D1 Taxol & Zometa  Patient seen by provider today: Yes: Swathi Page NP   present during visit today: Not Applicable.    Note:  Accessed patient's port today for the first time after being placed.  Patient reporting tenderness upon accessing. Site was yellow in color and swelling was noted around neck and slightly above port. Great blood return noted. Patient used emla cream, but only had it on for 15 minutes prior to accessing.     Patient orientated to infusion and all questions answered.  Pharmacist provided teaching on take home medications.      Intravenous Access:  Implanted Port.    Treatment Conditions:  Lab Results   Component Value Date    HGB 14.4 03/25/2020     Lab Results   Component Value Date    WBC 10.3 03/25/2020      Lab Results   Component Value Date    ANEU 7.3 03/25/2020     Lab Results   Component Value Date     03/25/2020      Lab Results   Component Value Date     03/04/2020                   Lab Results   Component Value Date    POTASSIUM 4.7 03/04/2020           No results found for: MAG         Lab Results   Component Value Date    CR 0.80 03/25/2020                   Lab Results   Component Value Date    BUFFY 9.2 03/25/2020                Lab Results   Component Value Date    BILITOTAL 0.3 03/25/2020           Lab Results   Component Value Date    ALBUMIN 3.1 03/25/2020    ALBUMIN 3.2 03/25/2020                    Lab Results   Component Value Date    ALT 34 03/25/2020           Lab Results   Component Value Date    AST 29 03/25/2020       Results reviewed, labs MET treatment parameters, ok to proceed with treatment.      Post Infusion Assessment:  Patient tolerated infusion without incident.  Blood return noted pre and post infusion.  Site patent and intact, free from redness, edema or discomfort.  No evidence of extravasations.  Access discontinued per protocol.       Discharge Plan:   Discharge  instructions reviewed with: Patient.  Patient and/or family verbalized understanding of discharge instructions and all questions answered.  Patient discharged in stable condition accompanied by: self.  Departure Mode: Ambulatory.    Marlena Duval RN

## 2020-03-25 NOTE — PROGRESS NOTES
Patient requested an arm draw to allow time for Emla cream to numb port site. Labs will be drawn peripherally. Lexy Butterfield RN

## 2020-03-25 NOTE — PATIENT INSTRUCTIONS
Suggest patient get 1200 to 1500 mg of calcium daily.  1 dairy serving equals 500 mg.  Recommend 1-2 calcium supplements daily with vitamin D should be  throughout the day to improve absorption.    Patient should focus on protein and fluids daily.  Protein source should be taken with each meal.  Fluids should total 64 ounces daily.  We will have dietitian call in near future

## 2020-03-25 NOTE — PROGRESS NOTES
[facility]:  Care Coordination Note    Received telephone call from patient this morning, calling with questions regarding her first chemotherapy treatment today.  Patient asking if she can still take all of her vitamins and supplements (multivitamin, krill oil, vitamin B12, Vitamin C, Vitamin E, Vitamin D3, COQ10, and Viactiv which contains Vitamin D3, Calcium, and Vitamin K).  Writer reviewed with Oncology Pharmacy team, who advises in regards to her chemo patient can safely continue all of her supplements except for the krill oil which they recommend she stop taking during chemo.  Patient was informed of this, and verbalizes understanding.    Patient asked many questions about what she should be eating and drinking, and if there are any food restrictions.  Reviewed with patient that she should make sure to eat at least a light breakfast on her treatment days, and she may find it easier to eat foods that are bland or easy to digest.  Discussed avoiding foods that are fatty/greasy, fried, or very spicy as these can sometimes contribute to stomach upset.  Reviewed the importance of adequate protein intake, and the importance of avoiding raw or undercooked meats/eggs, and washing raw fruits and vegetables thoroughly before eating.  Advised patient caffeine is ok in moderation, but to make sure she is drinking 8-10 glasses of fluids each day (water, juices, Gatorade).  Patient asked if she could bring some snacks from home, advise her this is ok.      Patient also mentions that she still has a dressing in place over her port-a-cath, and it seems to be stuck to some of her steri strips.  She is nervous about removing this herself at home, and at this time is unable to access her port to apply the EMLA cream.  Encouraged patient to bring her EMLA cream with to the clinic, and that our nursing team will assess her port site and assist with removing the dressing and applying the cream.  Advised patient that we  "could always do an arm draw for her labs if needed.    Patient also asked about hair loss resources, and if we have any wigs or items in our pharmacy or cancer center here.  Advised patient that unfortunately, we do not have any of these items in our cancer center, and unfortunately due to concerns with the COVID-19 pandemic most (if not all) wig shops and salons are temporarily closed.  Advised patient there are places that she could order from online, and will provide patient with some printed handouts when she is here in clinic today.    Patient also mentioned to writer that she found out her lymph node was positive for cancer.  Patient is aware that her PET scan results will be discussed with her at her clinic appointment today, with our Oncology NP Swathi.  Patient asked writer, \"Even if this is stage IV cancer, there are still treatments available right?\"  Writer assured patient that yes, there are many treatment options available for stage IV cancer at this time - and, there is still research being done and new treatment options becoming available all of the time.      Patient shares that she is very scared and nervous about starting her first treatment today.  Writer assured patient that these are normal feelings for her to have, and that she can call us at any time with questions or concerns.  Reassured patient that our cancer care team will take great care of her, and that we are here to help her in any way that we can.      No further questions or concerns from patient at this time.  All of patient's questions were answered to patient's satisfaction, and to the best of writer's ability.     Dmitri Figueredo, RN, BSN, OCN  Oncology Care Coordinator  [facility]      "

## 2020-03-25 NOTE — NURSING NOTE
"Oncology Rooming Note    March 25, 2020 1:20 PM   Mary Olson is a 64 year old female who presents for:    Chief Complaint   Patient presents with     Oncology Clinic Visit     Follow up/prior to infusion     Initial Vitals: BP (!) 164/105 (BP Location: Right arm)   Pulse 117   Temp 98.3  F (36.8  C) (Oral)   Resp 20   Ht 1.575 m (5' 2\")   Wt 110.7 kg (244 lb 1.6 oz)   SpO2 95%   BMI 44.65 kg/m   Estimated body mass index is 44.65 kg/m  as calculated from the following:    Height as of this encounter: 1.575 m (5' 2\").    Weight as of this encounter: 110.7 kg (244 lb 1.6 oz). Body surface area is 2.2 meters squared.  No Pain (0) Comment: Data Unavailable   No LMP recorded. Patient is postmenopausal.  Allergies reviewed: Yes  Medications reviewed: Yes    Medications: Medication refills not needed today.  Pharmacy name entered into Exacter:    [facility] [place] - [place], [place] - [address] [place] [address].  [facility] FAX ONLY - [place], [place]  [facility] - [place], [place] - [address]    Jayna Justice LPN              "

## 2020-03-25 NOTE — LETTER
3/25/2020         RE: Mary Olson  [address]  [facility] [address]        Dear Colleague,    Thank you for referring your patient, Mary Olson, to the [facility]. Please see a copy of my visit note below.    Oncology Follow Up Visit: March 25, 2020    Oncologist: Dr Keysha Birch  PCP: Clinic, [facility]    Diagnosis:Metastatic Breast Cancer  Mary Olson is a 65 yo female who noticed what she believed to be a bug bite on her left breast in 10/2019.  She initially noted 3 red spots- treated at home. She underwent imaging with a diagnostic mammogram on March 4, 2020, as well as ultrasound where they found an ill-defined margins measuring at least 6.2 cm to center of the left nipple.  Other masses are seen posterior to this including a 2.1 cm mass slightly medial on mammography.  Diffuse left breast skin thickening.  There are at least 3 abnormal enlarged lymph nodes in the left axilla measuring 2.5 x 1.3 x 2.4 cm.  The right breast at the 12:30 position 7 cm from the nipple, there is a spiculated mass corresponding mammographic finding measuring approximately 1.3 x 0.9 x 1.0 cm.  A smaller spiculated hypoechoic mass is at the 12 o'clock position 2 cm from the nipple, measuring 0.9 x 0.5 x 0.7 cm.  Normal lymph nodes in the right axilla.  Biopsy of the left breast mass confirmed invasive mammary carcinoma, no special type, invasive ductal, grade 2, estrogen receptor positive, progesterone receptor positive, HER-2 negative.  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  The second right breast mass at the 12 o'clock position showed benign breast tissue, composed predominantly of uncolonized stroma with rare ducts.  No discrete masses identified.  No atypical or malignant findings.  3/12/2020  MUGA showed normal heart function with LVEF of 58% and no other abnormalities.  Left Axillary biopsy was positive for metastatic mammary cancer with Negative Her2  PET scan showing metastasis to left axilla, retropectoral lymph nodes and diffuse bone metastasis  Treatment:   3/25/2020 to begin weekly Taxol x 12 followed by ddAC x 4 cycles  3/25/2020 Begin zometa    Interval History: Ms. Olson comes to clinic to begin chemotherapy treatment for her bilateral breast cancer. She admits she is still trying to accept diagnosis.  She otherwise states she is feeling well with no recent illnesses fevers cough congestion increased fatigue or weight loss.  Patient does report that she is eating well with the use of a Mediterranean diet and protein supplements.  Bowel and bladder remain normal.  She admits she is somewhat anxious about the disease but is still sleeping well.  Rest of comprehensive and complete ROS is reviewed and is negative.   Past Medical History:   Diagnosis Date     ASCUS favoring benign 10/28/15    neg HPV     Current Outpatient Medications   Medication     Ascorbic Acid (VITAMIN C PO)     aspirin 81 MG tablet     calcium-vitamin D-vitamin K (VIACTIV) 500-500-40 MG-UNT-MCG CHEW     Cholecalciferol (VITAMIN D3 PO)     Coenzyme Q10 (COQ-10) 200 MG CAPS     Cyanocobalamin (B-12) 2500 MCG TABS     lidocaine-prilocaine (EMLA) 2.5-2.5 % external cream     LORazepam (ATIVAN) 0.5 MG tablet     Multiple Vitamins-Minerals (CENTRUM ADULTS PO)     Omega-3 Fatty Acids (OMEGA-3 FISH OIL PO)     prochlorperazine (COMPAZINE) 10 MG tablet     UNABLE TO FIND     VITAMIN E NATURAL PO     Zinc Acetate, Oral, (ZINC ACETATE PO)     No current facility-administered medications for this visit.      Facility-Administered Medications Ordered in Other Visits   Medication     sodium bicarbonate 8.4 % injection 1 mEq     Allergies   Allergen Reactions     Lisinopril Cough       Physical Exam:BP (!) 164/105 (BP Location: Right arm)    "Pulse 117   Temp 98.3  F (36.8  C) (Oral)   Resp 20   Ht 1.575 m (5' 2\")   Wt 110.7 kg (244 lb 1.6 oz)   SpO2 95%   BMI 44.65 kg/m     ECOG PS- 0  Constitutional: Alert, cooperative, and in no distress.   ENT: Eyes bright , No mouth sores  Neck: Supple, No adenopathy.Thyroid symmetric, normal size  Cardiac: Heart rate and rhythm is regular and strong without murmur  Respiratory: Breathing easy. Lung sounds clear to auscultation-no cough  Breasts: Left breast with large firm mass including nipple and working back into the breast with serous discharge.  Left axillary is noted to have small masses-area of biopsy healing well though still mildly tender.  Right breast cannot feel the masses.  Port in place and without redness or swelling today of her right breast.  GI: Abdomen is rounded, soft, non-tender, BS normal. No masses or organomegaly  MS: Muscle tone normal, extremities normal with no edema.   Skin: No suspicious lesions or rashes-except breast as above  Neuro: Sensory grossly WNL, gait normal.   Lymph: Normal ant/post cervical, axillary, supraclavicular nodes  Psych: Mentation appears normal and affect mildly anxious and talkative with tears as we discussed her metastasis.    Laboratory Results:   Results for orders placed or performed in visit on 03/25/20   Creatinine     Status: None   Result Value Ref Range    Creatinine 0.80 0.52 - 1.04 mg/dL    GFR Estimate 78 >60 mL/min/[1.73_m2]    GFR Estimate If Black >90 >60 mL/min/[1.73_m2]   Calcium     Status: None   Result Value Ref Range    Calcium 9.2 8.5 - 10.1 mg/dL   Albumin level     Status: Abnormal   Result Value Ref Range    Albumin 3.2 (L) 3.4 - 5.0 g/dL   Results for orders placed or performed in visit on 03/25/20   CBC with platelets differential     Status: None   Result Value Ref Range    WBC 10.3 4.0 - 11.0 10e9/L    RBC Count 5.14 3.8 - 5.2 10e12/L    Hemoglobin 14.4 11.7 - 15.7 g/dL    Hematocrit 44.8 35.0 - 47.0 %    MCV 87 78 - 100 fl    " MCH 28.0 26.5 - 33.0 pg    MCHC 32.1 31.5 - 36.5 g/dL    RDW 13.5 10.0 - 15.0 %    Platelet Count 342 150 - 450 10e9/L    Diff Method Automated Method     % Neutrophils 70.9 %    % Lymphocytes 18.3 %    % Monocytes 7.9 %    % Eosinophils 1.8 %    % Basophils 0.8 %    % Immature Granulocytes 0.3 %    Absolute Neutrophil 7.3 1.6 - 8.3 10e9/L    Absolute Lymphocytes 1.9 0.8 - 5.3 10e9/L    Absolute Monocytes 0.8 0.0 - 1.3 10e9/L    Absolute Eosinophils 0.2 0.0 - 0.7 10e9/L    Absolute Basophils 0.1 0.0 - 0.2 10e9/L    Abs Immature Granulocytes 0.0 0 - 0.4 10e9/L   Hepatic panel     Status: Abnormal   Result Value Ref Range    Bilirubin Direct <0.1 0.0 - 0.2 mg/dL    Bilirubin Total 0.3 0.2 - 1.3 mg/dL    Albumin 3.1 (L) 3.4 - 5.0 g/dL    Protein Total 8.2 6.8 - 8.8 g/dL    Alkaline Phosphatase 121 40 - 150 U/L    ALT 34 0 - 50 U/L    AST 29 0 - 45 U/L     3/23/2020 PET scan-    IMPRESSION: In this patient with history of bilateral breast cancer  (invasive ductal carcinoma left and tubular carcinoma the right):  1. Large left breast mass with skin invasion and multiple satellite  nodules.  2. Right smaller breast mass.  3. Numerous left axillary and retropectoral metastatic  lymphadenopathy.  4. Diffuse metastatic involvement of the bones.  5. Right lung lupillo-fissural nodule, attention on follow up is  recommended. Favor unrelated to breast cancer, likely benign.    Assessment and Plan:   Metastatic Breast Cancer-patient now has proven metastatic disease as noted with left axillary biopsy and 3/23/2020 PET scan results showing retro-pectoral as well as the axillary nodes involved with disease and diffuse bony disease.    We discussed that we can treat and control disease but we cannot cure her disease at this time.    She had been told about the Taxol plan and we did review administration and side effects of continuing with this plan and she does give her permission to continue today  Due to the bone metastasis we will  start Zometa as well today and patient is already on daily calcium plus vitamin D but did review recommended intake of calcium to support Zometa.  Adjustment disorder with anxiety and depression-we had a long talk today about the state of metastasis.  I believe in the future she would benefit from seeing Dr. Smith however I think we need to get started with her treatment first.  Does appear her family is very supportive.  Though she is sleeping well she will have Ativan available to her if needed for sleep and anxiety.  Health maintenance -highly recommended the patient continue on a healthy diet though not expect to lose significant weight while she is on this plan.  She is on several supplements which were reviewed and she will stop the krill oil.  We will have dietitian call her for consult.    Also discussed trying to increase her movement throughout the day which may help her strength and weight.  This was a 40 min visit with > 50% in counseling and coordinating care including education and management of concerns.    Swathi Page CNP      Again, thank you for allowing me to participate in the care of your patient.        Sincerely,        Swathi Page, LONNY, APRN CNP

## 2020-03-31 ENCOUNTER — VIRTUAL VISIT (OUTPATIENT)
Dept: ONCOLOGY | Facility: CLINIC | Age: 65
End: 2020-03-31
Payer: COMMERCIAL

## 2020-03-31 VITALS — WEIGHT: 244 LBS | HEIGHT: 62 IN | BODY MASS INDEX: 44.9 KG/M2

## 2020-03-31 DIAGNOSIS — L03.313 CELLULITIS OF CHEST WALL: Primary | ICD-10-CM

## 2020-03-31 PROCEDURE — 99214 OFFICE O/P EST MOD 30 MIN: CPT | Mod: TEL | Performed by: INTERNAL MEDICINE

## 2020-03-31 ASSESSMENT — MIFFLIN-ST. JEOR: SCORE: 1615.03

## 2020-03-31 ASSESSMENT — PAIN SCALES - GENERAL: PAINLEVEL: NO PAIN (0)

## 2020-04-01 ENCOUNTER — INFUSION THERAPY VISIT (OUTPATIENT)
Dept: INFUSION THERAPY | Facility: CLINIC | Age: 65
End: 2020-04-01
Payer: COMMERCIAL

## 2020-04-01 ENCOUNTER — ONCOLOGY VISIT (OUTPATIENT)
Dept: ONCOLOGY | Facility: CLINIC | Age: 65
End: 2020-04-01
Payer: COMMERCIAL

## 2020-04-01 VITALS
DIASTOLIC BLOOD PRESSURE: 106 MMHG | HEART RATE: 106 BPM | SYSTOLIC BLOOD PRESSURE: 167 MMHG | BODY MASS INDEX: 44.81 KG/M2 | TEMPERATURE: 98.4 F | RESPIRATION RATE: 16 BRPM | OXYGEN SATURATION: 98 % | WEIGHT: 245 LBS

## 2020-04-01 DIAGNOSIS — L03.313 CELLULITIS OF CHEST WALL: ICD-10-CM

## 2020-04-01 DIAGNOSIS — C50.812 MALIGNANT NEOPLASM OF OVERLAPPING SITES OF LEFT BREAST IN FEMALE, ESTROGEN RECEPTOR POSITIVE (H): Primary | ICD-10-CM

## 2020-04-01 DIAGNOSIS — Z17.0 MALIGNANT NEOPLASM OF OVERLAPPING SITES OF LEFT BREAST IN FEMALE, ESTROGEN RECEPTOR POSITIVE (H): Primary | ICD-10-CM

## 2020-04-01 LAB
BASOPHILS # BLD AUTO: 0.1 10E9/L (ref 0–0.2)
BASOPHILS NFR BLD AUTO: 1.2 %
DIFFERENTIAL METHOD BLD: NORMAL
EOSINOPHIL # BLD AUTO: 0.2 10E9/L (ref 0–0.7)
EOSINOPHIL NFR BLD AUTO: 3.4 %
ERYTHROCYTE [DISTWIDTH] IN BLOOD BY AUTOMATED COUNT: 13.4 % (ref 10–15)
HCT VFR BLD AUTO: 42.9 % (ref 35–47)
HGB BLD-MCNC: 14 G/DL (ref 11.7–15.7)
IMM GRANULOCYTES # BLD: 0.1 10E9/L (ref 0–0.4)
IMM GRANULOCYTES NFR BLD: 0.9 %
LYMPHOCYTES # BLD AUTO: 1.4 10E9/L (ref 0.8–5.3)
LYMPHOCYTES NFR BLD AUTO: 24.7 %
MCH RBC QN AUTO: 27.8 PG (ref 26.5–33)
MCHC RBC AUTO-ENTMCNC: 32.6 G/DL (ref 31.5–36.5)
MCV RBC AUTO: 85 FL (ref 78–100)
MONOCYTES # BLD AUTO: 0.3 10E9/L (ref 0–1.3)
MONOCYTES NFR BLD AUTO: 5.7 %
NEUTROPHILS # BLD AUTO: 3.7 10E9/L (ref 1.6–8.3)
NEUTROPHILS NFR BLD AUTO: 64.1 %
PLATELET # BLD AUTO: 336 10E9/L (ref 150–450)
RBC # BLD AUTO: 5.04 10E12/L (ref 3.8–5.2)
WBC # BLD AUTO: 5.8 10E9/L (ref 4–11)

## 2020-04-01 PROCEDURE — 96375 TX/PRO/DX INJ NEW DRUG ADDON: CPT | Performed by: INTERNAL MEDICINE

## 2020-04-01 PROCEDURE — S0028 INJECTION, FAMOTIDINE, 20 MG: HCPCS | Performed by: INTERNAL MEDICINE

## 2020-04-01 PROCEDURE — 85025 COMPLETE CBC W/AUTO DIFF WBC: CPT | Performed by: INTERNAL MEDICINE

## 2020-04-01 PROCEDURE — 96413 CHEMO IV INFUSION 1 HR: CPT | Performed by: INTERNAL MEDICINE

## 2020-04-01 PROCEDURE — 87040 BLOOD CULTURE FOR BACTERIA: CPT | Performed by: INTERNAL MEDICINE

## 2020-04-01 PROCEDURE — 99207 ZZC NO CHARGE LOS: CPT

## 2020-04-01 PROCEDURE — 96367 TX/PROPH/DG ADDL SEQ IV INF: CPT | Performed by: INTERNAL MEDICINE

## 2020-04-01 PROCEDURE — 99207 ZZC NO CHARGE NURSE ONLY: CPT

## 2020-04-01 RX ORDER — DIPHENHYDRAMINE HCL 25 MG
50 CAPSULE ORAL ONCE
Status: COMPLETED | OUTPATIENT
Start: 2020-04-01 | End: 2020-04-01

## 2020-04-01 RX ORDER — CEPHALEXIN 500 MG/1
500 CAPSULE ORAL 4 TIMES DAILY
Qty: 28 CAPSULE | Refills: 0 | Status: SHIPPED | OUTPATIENT
Start: 2020-04-01 | End: 2020-04-14

## 2020-04-01 RX ORDER — HEPARIN SODIUM (PORCINE) LOCK FLUSH IV SOLN 100 UNIT/ML 100 UNIT/ML
5 SOLUTION INTRAVENOUS
Status: DISCONTINUED | OUTPATIENT
Start: 2020-04-01 | End: 2020-04-01 | Stop reason: HOSPADM

## 2020-04-01 RX ADMIN — Medication 50 MG: at 12:48

## 2020-04-01 RX ADMIN — Medication 250 ML: at 13:15

## 2020-04-01 ASSESSMENT — PAIN SCALES - GENERAL: PAINLEVEL: NO PAIN (0)

## 2020-04-01 NOTE — PROGRESS NOTES
Infusion Nursing Note:  Mary Olson presents today for C1 D8 Taxol.    Patient seen by provider today: No   present during visit today: Not Applicable.    Note: Patient took oral benadryl today prior to taxol.  Port unable to be used- Dr. Birch saw patient and ordered antibiotics.  Patient verbalized site care, fever precautions & instructions and will call with any changes.    Intravenous Access:  Peripheral IV placed.  Implanted Port- not usable today.    Treatment Conditions:  Lab Results   Component Value Date    HGB 14.0 04/01/2020     Lab Results   Component Value Date    WBC 5.8 04/01/2020      Lab Results   Component Value Date    ANEU 3.7 04/01/2020     Lab Results   Component Value Date     04/01/2020      Lab Results   Component Value Date     03/04/2020                   Lab Results   Component Value Date    POTASSIUM 4.7 03/04/2020           No results found for: MAG         Lab Results   Component Value Date    CR 0.80 03/25/2020                   Lab Results   Component Value Date    BUFFY 9.2 03/25/2020                Lab Results   Component Value Date    BILITOTAL 0.3 03/25/2020           Lab Results   Component Value Date    ALBUMIN 3.1 03/25/2020    ALBUMIN 3.2 03/25/2020                    Lab Results   Component Value Date    ALT 34 03/25/2020           Lab Results   Component Value Date    AST 29 03/25/2020       Results reviewed, labs MET treatment parameters, ok to proceed with treatment.      Post Infusion Assessment:  Patient tolerated infusion without incident.  Site patent and intact, free from redness, edema or discomfort.  No evidence of extravasations.  Access discontinued per protocol.       Discharge Plan:   Discharge instructions reviewed with: Patient.  Patient and/or family verbalized understanding of discharge instructions and all questions answered.  Patient discharged in stable condition accompanied by: self.  Departure Mode: Ambulatory.    Marlena  HERBERT Duval RN

## 2020-04-01 NOTE — PROGRESS NOTES
Patient arrived for labs via port access. Port site covered with emla cream and Glad Press and Seal. Noted skin pink to covered area and past, extending to armpit on port site only. Did not access port. Patient stated the part near her armpit felt warm and occasionally itchy. Reports symptoms started possibly over the weekend. Informed Dr. Birch who assessed patient's port and marked red area with a pen. Dr. Birch ordered peripheral labs and blood cultures. Okay to proceed with peripheral chemotherapy pending labs meet parameters. Instructed patient to monitor temperature and pink area to port daily. Take pictures of it today and take pictures with changes. Report if pink area extends beyond area marked by Dr. Birch. Start antibiotics as ordered. If experiencing fevers go to [email or web address] and stated  I am receiving chemotherapy and am having fevers .   Next week have a telephone visit with Swathi Page NP. Typed up instructions for patient and informed infusion KAYLEY Mendiola. Lexy Butterfield RN

## 2020-04-07 ENCOUNTER — VIRTUAL VISIT (OUTPATIENT)
Dept: ONCOLOGY | Facility: CLINIC | Age: 65
End: 2020-04-07
Payer: COMMERCIAL

## 2020-04-07 DIAGNOSIS — Z17.0 MALIGNANT NEOPLASM OF OVERLAPPING SITES OF LEFT BREAST IN FEMALE, ESTROGEN RECEPTOR POSITIVE (H): Primary | ICD-10-CM

## 2020-04-07 DIAGNOSIS — C50.812 MALIGNANT NEOPLASM OF OVERLAPPING SITES OF LEFT BREAST IN FEMALE, ESTROGEN RECEPTOR POSITIVE (H): Primary | ICD-10-CM

## 2020-04-07 LAB
BACTERIA SPEC CULT: NO GROWTH
SPECIMEN SOURCE: NORMAL

## 2020-04-07 PROCEDURE — 99441 ZZC PHYSICIAN TELEPHONE EVALUATION 5-10 MIN: CPT | Mod: TEL | Performed by: INTERNAL MEDICINE

## 2020-04-07 RX ORDER — MEPERIDINE HYDROCHLORIDE 25 MG/ML
25 INJECTION INTRAMUSCULAR; INTRAVENOUS; SUBCUTANEOUS EVERY 30 MIN PRN
Status: CANCELLED | OUTPATIENT
Start: 2020-04-29

## 2020-04-07 RX ORDER — DIPHENHYDRAMINE HCL 25 MG
50 CAPSULE ORAL ONCE
Status: CANCELLED
Start: 2020-04-29

## 2020-04-07 RX ORDER — NALOXONE HYDROCHLORIDE 0.4 MG/ML
.1-.4 INJECTION, SOLUTION INTRAMUSCULAR; INTRAVENOUS; SUBCUTANEOUS
Status: CANCELLED | OUTPATIENT
Start: 2020-04-29

## 2020-04-07 RX ORDER — METHYLPREDNISOLONE SODIUM SUCCINATE 125 MG/2ML
125 INJECTION, POWDER, LYOPHILIZED, FOR SOLUTION INTRAMUSCULAR; INTRAVENOUS
Status: CANCELLED
Start: 2020-04-14

## 2020-04-07 RX ORDER — HEPARIN SODIUM,PORCINE 10 UNIT/ML
5 VIAL (ML) INTRAVENOUS
Status: CANCELLED | OUTPATIENT
Start: 2020-04-22

## 2020-04-07 RX ORDER — HEPARIN SODIUM (PORCINE) LOCK FLUSH IV SOLN 100 UNIT/ML 100 UNIT/ML
5 SOLUTION INTRAVENOUS
Status: CANCELLED | OUTPATIENT
Start: 2020-04-29

## 2020-04-07 RX ORDER — HEPARIN SODIUM (PORCINE) LOCK FLUSH IV SOLN 100 UNIT/ML 100 UNIT/ML
5 SOLUTION INTRAVENOUS
Status: CANCELLED | OUTPATIENT
Start: 2020-04-14

## 2020-04-07 RX ORDER — ALBUTEROL SULFATE 0.83 MG/ML
2.5 SOLUTION RESPIRATORY (INHALATION)
Status: CANCELLED | OUTPATIENT
Start: 2020-04-22

## 2020-04-07 RX ORDER — SODIUM CHLORIDE 9 MG/ML
1000 INJECTION, SOLUTION INTRAVENOUS CONTINUOUS PRN
Status: CANCELLED
Start: 2020-04-22

## 2020-04-07 RX ORDER — ALBUTEROL SULFATE 90 UG/1
1-2 AEROSOL, METERED RESPIRATORY (INHALATION)
Status: CANCELLED
Start: 2020-04-29

## 2020-04-07 RX ORDER — EPINEPHRINE 0.3 MG/.3ML
0.3 INJECTION SUBCUTANEOUS EVERY 5 MIN PRN
Status: CANCELLED | OUTPATIENT
Start: 2020-04-29

## 2020-04-07 RX ORDER — ALBUTEROL SULFATE 0.83 MG/ML
2.5 SOLUTION RESPIRATORY (INHALATION)
Status: CANCELLED | OUTPATIENT
Start: 2020-04-29

## 2020-04-07 RX ORDER — ALBUTEROL SULFATE 90 UG/1
1-2 AEROSOL, METERED RESPIRATORY (INHALATION)
Status: CANCELLED
Start: 2020-04-22

## 2020-04-07 RX ORDER — LORAZEPAM 2 MG/ML
0.5 INJECTION INTRAMUSCULAR EVERY 4 HOURS PRN
Status: CANCELLED
Start: 2020-04-22

## 2020-04-07 RX ORDER — LORAZEPAM 2 MG/ML
0.5 INJECTION INTRAMUSCULAR EVERY 4 HOURS PRN
Status: CANCELLED
Start: 2020-04-29

## 2020-04-07 RX ORDER — LORAZEPAM 2 MG/ML
0.5 INJECTION INTRAMUSCULAR EVERY 4 HOURS PRN
Status: CANCELLED
Start: 2020-04-14

## 2020-04-07 RX ORDER — NALOXONE HYDROCHLORIDE 0.4 MG/ML
.1-.4 INJECTION, SOLUTION INTRAMUSCULAR; INTRAVENOUS; SUBCUTANEOUS
Status: CANCELLED | OUTPATIENT
Start: 2020-04-22

## 2020-04-07 RX ORDER — HEPARIN SODIUM (PORCINE) LOCK FLUSH IV SOLN 100 UNIT/ML 100 UNIT/ML
5 SOLUTION INTRAVENOUS
Status: CANCELLED | OUTPATIENT
Start: 2020-04-22

## 2020-04-07 RX ORDER — ALBUTEROL SULFATE 0.83 MG/ML
2.5 SOLUTION RESPIRATORY (INHALATION)
Status: CANCELLED | OUTPATIENT
Start: 2020-04-14

## 2020-04-07 RX ORDER — DIPHENHYDRAMINE HYDROCHLORIDE 50 MG/ML
50 INJECTION INTRAMUSCULAR; INTRAVENOUS
Status: CANCELLED
Start: 2020-04-14

## 2020-04-07 RX ORDER — MEPERIDINE HYDROCHLORIDE 25 MG/ML
25 INJECTION INTRAMUSCULAR; INTRAVENOUS; SUBCUTANEOUS EVERY 30 MIN PRN
Status: CANCELLED | OUTPATIENT
Start: 2020-04-14

## 2020-04-07 RX ORDER — DIPHENHYDRAMINE HYDROCHLORIDE 50 MG/ML
50 INJECTION INTRAMUSCULAR; INTRAVENOUS
Status: CANCELLED
Start: 2020-04-29

## 2020-04-07 RX ORDER — SODIUM CHLORIDE 9 MG/ML
1000 INJECTION, SOLUTION INTRAVENOUS CONTINUOUS PRN
Status: CANCELLED
Start: 2020-04-29

## 2020-04-07 RX ORDER — NALOXONE HYDROCHLORIDE 0.4 MG/ML
.1-.4 INJECTION, SOLUTION INTRAMUSCULAR; INTRAVENOUS; SUBCUTANEOUS
Status: CANCELLED | OUTPATIENT
Start: 2020-04-14

## 2020-04-07 RX ORDER — EPINEPHRINE 1 MG/ML
0.3 INJECTION, SOLUTION INTRAMUSCULAR; SUBCUTANEOUS EVERY 5 MIN PRN
Status: CANCELLED | OUTPATIENT
Start: 2020-04-14

## 2020-04-07 RX ORDER — DIPHENHYDRAMINE HCL 25 MG
50 CAPSULE ORAL ONCE
Status: CANCELLED
Start: 2020-04-14

## 2020-04-07 RX ORDER — EPINEPHRINE 1 MG/ML
0.3 INJECTION, SOLUTION INTRAMUSCULAR; SUBCUTANEOUS EVERY 5 MIN PRN
Status: CANCELLED | OUTPATIENT
Start: 2020-04-22

## 2020-04-07 RX ORDER — DIPHENHYDRAMINE HYDROCHLORIDE 50 MG/ML
50 INJECTION INTRAMUSCULAR; INTRAVENOUS
Status: CANCELLED
Start: 2020-04-22

## 2020-04-07 RX ORDER — METHYLPREDNISOLONE SODIUM SUCCINATE 125 MG/2ML
125 INJECTION, POWDER, LYOPHILIZED, FOR SOLUTION INTRAMUSCULAR; INTRAVENOUS
Status: CANCELLED
Start: 2020-04-22

## 2020-04-07 RX ORDER — METHYLPREDNISOLONE SODIUM SUCCINATE 125 MG/2ML
125 INJECTION, POWDER, LYOPHILIZED, FOR SOLUTION INTRAMUSCULAR; INTRAVENOUS
Status: CANCELLED
Start: 2020-04-29

## 2020-04-07 RX ORDER — MEPERIDINE HYDROCHLORIDE 25 MG/ML
25 INJECTION INTRAMUSCULAR; INTRAVENOUS; SUBCUTANEOUS EVERY 30 MIN PRN
Status: CANCELLED | OUTPATIENT
Start: 2020-04-22

## 2020-04-07 RX ORDER — EPINEPHRINE 0.3 MG/.3ML
0.3 INJECTION SUBCUTANEOUS EVERY 5 MIN PRN
Status: CANCELLED | OUTPATIENT
Start: 2020-04-22

## 2020-04-07 RX ORDER — SODIUM CHLORIDE 9 MG/ML
1000 INJECTION, SOLUTION INTRAVENOUS CONTINUOUS PRN
Status: CANCELLED
Start: 2020-04-14

## 2020-04-07 RX ORDER — ALBUTEROL SULFATE 90 UG/1
1-2 AEROSOL, METERED RESPIRATORY (INHALATION)
Status: CANCELLED
Start: 2020-04-14

## 2020-04-07 RX ORDER — EPINEPHRINE 0.3 MG/.3ML
0.3 INJECTION SUBCUTANEOUS EVERY 5 MIN PRN
Status: CANCELLED | OUTPATIENT
Start: 2020-04-14

## 2020-04-07 RX ORDER — HEPARIN SODIUM,PORCINE 10 UNIT/ML
5 VIAL (ML) INTRAVENOUS
Status: CANCELLED | OUTPATIENT
Start: 2020-04-14

## 2020-04-07 RX ORDER — DIPHENHYDRAMINE HCL 25 MG
50 CAPSULE ORAL ONCE
Status: CANCELLED
Start: 2020-04-22

## 2020-04-07 RX ORDER — EPINEPHRINE 1 MG/ML
0.3 INJECTION, SOLUTION INTRAMUSCULAR; SUBCUTANEOUS EVERY 5 MIN PRN
Status: CANCELLED | OUTPATIENT
Start: 2020-04-29

## 2020-04-07 RX ORDER — HEPARIN SODIUM,PORCINE 10 UNIT/ML
5 VIAL (ML) INTRAVENOUS
Status: CANCELLED | OUTPATIENT
Start: 2020-04-29

## 2020-04-07 ASSESSMENT — PAIN SCALES - GENERAL: PAINLEVEL: NO PAIN (0)

## 2020-04-07 NOTE — PROGRESS NOTES
"ONCOLOGY DIAGNOSIS:  1. March 2020: Metastatic Breast Cancer    THERAPY TO DATE:  1. March 2020 to Present: Weekly Taxol.    INTERVAL HISTORY:  64 yof diagnosed with metastatic breast cancer in March 2020 after noticing lesion on her breast. Telephone visit with patient today to for toxicity assessment prior to therapy and assess cellulitis. Patient states the erythema over port site is nearly gone. May have improved with antibiotic. But she also questions whether this was related to the blue gloves she uses to put on the EMLA cream. Tolerating chemotherapy. .Denies fever/chills/nausea/emesis. No chest pain or shortness of breath. Happy with how well she is tolerating treatment.     PAST MEDICAL HISTORY: (No new medical history since last visit per patient.)  1.  Bilateral breast cancer, see above.   2.  Hypertension.      FAMILY HISTORY:  Mother had breast cancer at 62, underwent lumpectomy and radiation and then had \"pills.\" (Not addressed on today's visit.)     SOCIAL HISTORY:   to her  Dieter. Has a son and daughter, 2 children. No tobacco use. 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    ALLERGY: Lisinopril    Assessment/Plan:  1. Metastatic Breast Cancer. Overall tolerating therapy. Reviewed labs. No contraindications. Proceed with treatment. RTC to see MD prior to cycle 3. Repeat PET after 12 weeks.  2. Erythema over port site. Unclear if cellulitis versus allergic reaction to blue gloves. Last week on initial telephone visit, patient did not have erythema and this was noticed after she placed EMLA cream. Improving. Complete antibiotic course. Avoid \"blue gloves.\" May use port.   3. Nausea. Controlled. Continue current anti-emetic regimen.  4. Bone Mets. Started Zometa. Coordinate with Taxol visits to avoid unnecessary trips.  5. COVID-19 Precautions.  Discussed the importance of good hand washing techniques, avoiding crowds, social distancing.    Telephone " Visit: 8:33- 8:41 AM     Initially scheduled as video visit. Camera at first would not work and them was not able to contact patient via video. Had to switch to telephone.    Keysha Birch MD

## 2020-04-07 NOTE — PROGRESS NOTES
"Mary Olson is a 64 year old female who is being evaluated via a billable video visit.      The patient has been notified of following:     \"This video visit will be conducted via a call between you and your physician/provider. We have found that certain health care needs can be provided without the need for an in-person physical exam.  This service lets us provide the care you need with a video conversation.  If a prescription is necessary we can send it directly to your pharmacy.  If lab work is needed we can place an order for that and you can then stop by our lab to have the test done at a later time.    If during the course of the call the physician/provider feels a video visit is not appropriate, you will not be charged for this service.\"     Patient has given verbal consent for Video visit? Yes    Patient would like the video invitation sent by: Text to cell phone: [phone]      Mary Olson complains of    Chief Complaint   Patient presents with     Oncology Clinic Visit     prior to treatment 4/8/20 - no concerns       I have reviewed and updated the patient's Past Medical History, Social History, Family History and Medication List.    ALLERGIES  Lisinopril    Sonia Vigil CMA      "

## 2020-04-08 ENCOUNTER — INFUSION THERAPY VISIT (OUTPATIENT)
Dept: INFUSION THERAPY | Facility: CLINIC | Age: 65
End: 2020-04-08
Attending: INTERNAL MEDICINE
Payer: COMMERCIAL

## 2020-04-08 ENCOUNTER — ONCOLOGY VISIT (OUTPATIENT)
Dept: ONCOLOGY | Facility: CLINIC | Age: 65
End: 2020-04-08
Payer: COMMERCIAL

## 2020-04-08 VITALS
SYSTOLIC BLOOD PRESSURE: 203 MMHG | DIASTOLIC BLOOD PRESSURE: 93 MMHG | BODY MASS INDEX: 44.81 KG/M2 | HEART RATE: 106 BPM | OXYGEN SATURATION: 95 % | WEIGHT: 245 LBS | TEMPERATURE: 98.3 F | RESPIRATION RATE: 16 BRPM

## 2020-04-08 DIAGNOSIS — C50.812 MALIGNANT NEOPLASM OF OVERLAPPING SITES OF LEFT BREAST IN FEMALE, ESTROGEN RECEPTOR POSITIVE (H): Primary | ICD-10-CM

## 2020-04-08 DIAGNOSIS — Z17.0 MALIGNANT NEOPLASM OF OVERLAPPING SITES OF LEFT BREAST IN FEMALE, ESTROGEN RECEPTOR POSITIVE (H): Primary | ICD-10-CM

## 2020-04-08 LAB
BASOPHILS # BLD AUTO: 0.1 10E9/L (ref 0–0.2)
BASOPHILS NFR BLD AUTO: 1.7 %
DIFFERENTIAL METHOD BLD: NORMAL
EOSINOPHIL # BLD AUTO: 0.1 10E9/L (ref 0–0.7)
EOSINOPHIL NFR BLD AUTO: 2.7 %
ERYTHROCYTE [DISTWIDTH] IN BLOOD BY AUTOMATED COUNT: 13.7 % (ref 10–15)
HCT VFR BLD AUTO: 40.2 % (ref 35–47)
HGB BLD-MCNC: 12.9 G/DL (ref 11.7–15.7)
IMM GRANULOCYTES # BLD: 0.1 10E9/L (ref 0–0.4)
IMM GRANULOCYTES NFR BLD: 1 %
LYMPHOCYTES # BLD AUTO: 1.6 10E9/L (ref 0.8–5.3)
LYMPHOCYTES NFR BLD AUTO: 33 %
MCH RBC QN AUTO: 27.9 PG (ref 26.5–33)
MCHC RBC AUTO-ENTMCNC: 32.1 G/DL (ref 31.5–36.5)
MCV RBC AUTO: 87 FL (ref 78–100)
MONOCYTES # BLD AUTO: 0.6 10E9/L (ref 0–1.3)
MONOCYTES NFR BLD AUTO: 11.7 %
NEUTROPHILS # BLD AUTO: 2.4 10E9/L (ref 1.6–8.3)
NEUTROPHILS NFR BLD AUTO: 49.9 %
PLATELET # BLD AUTO: 439 10E9/L (ref 150–450)
RBC # BLD AUTO: 4.62 10E12/L (ref 3.8–5.2)
WBC # BLD AUTO: 4.8 10E9/L (ref 4–11)

## 2020-04-08 PROCEDURE — S0028 INJECTION, FAMOTIDINE, 20 MG: HCPCS | Performed by: NURSE PRACTITIONER

## 2020-04-08 PROCEDURE — 85025 COMPLETE CBC W/AUTO DIFF WBC: CPT | Performed by: INTERNAL MEDICINE

## 2020-04-08 PROCEDURE — 99207 ZZC NO CHARGE LOS: CPT

## 2020-04-08 PROCEDURE — 96375 TX/PRO/DX INJ NEW DRUG ADDON: CPT | Performed by: NURSE PRACTITIONER

## 2020-04-08 PROCEDURE — 96367 TX/PROPH/DG ADDL SEQ IV INF: CPT | Performed by: NURSE PRACTITIONER

## 2020-04-08 PROCEDURE — 96413 CHEMO IV INFUSION 1 HR: CPT | Performed by: NURSE PRACTITIONER

## 2020-04-08 PROCEDURE — 99207 ZZC NO CHARGE NURSE ONLY: CPT

## 2020-04-08 RX ORDER — HEPARIN SODIUM (PORCINE) LOCK FLUSH IV SOLN 100 UNIT/ML 100 UNIT/ML
5 SOLUTION INTRAVENOUS
Status: DISCONTINUED | OUTPATIENT
Start: 2020-04-08 | End: 2020-04-08 | Stop reason: HOSPADM

## 2020-04-08 RX ORDER — DIPHENHYDRAMINE HCL 25 MG
50 CAPSULE ORAL ONCE
Status: COMPLETED | OUTPATIENT
Start: 2020-04-08 | End: 2020-04-08

## 2020-04-08 RX ADMIN — HEPARIN SODIUM (PORCINE) LOCK FLUSH IV SOLN 100 UNIT/ML 5 ML: 100 SOLUTION at 14:15

## 2020-04-08 RX ADMIN — HEPARIN SODIUM (PORCINE) LOCK FLUSH IV SOLN 100 UNIT/ML 5 ML: 100 SOLUTION at 12:05

## 2020-04-08 RX ADMIN — Medication 50 MG: at 12:33

## 2020-04-08 RX ADMIN — Medication 250 ML: at 12:32

## 2020-04-08 ASSESSMENT — PAIN SCALES - GENERAL: PAINLEVEL: NO PAIN (0)

## 2020-04-08 NOTE — PROGRESS NOTES
Infusion Nursing Note:  Mary Olson presents today for C1D15 Taxol.    Patient seen by provider today: No   present during visit today: Not Applicable.    Note: N/A.    Intravenous Access:  Implanted Port.    Treatment Conditions:  Lab Results   Component Value Date    HGB 12.9 04/08/2020     Lab Results   Component Value Date    WBC 4.8 04/08/2020      Lab Results   Component Value Date    ANEU 2.4 04/08/2020     Lab Results   Component Value Date     04/08/2020      Lab Results   Component Value Date     03/04/2020                   Lab Results   Component Value Date    POTASSIUM 4.7 03/04/2020           No results found for: MAG         Lab Results   Component Value Date    CR 0.80 03/25/2020                   Lab Results   Component Value Date    BUFFY 9.2 03/25/2020                Lab Results   Component Value Date    BILITOTAL 0.3 03/25/2020           Lab Results   Component Value Date    ALBUMIN 3.1 03/25/2020    ALBUMIN 3.2 03/25/2020                    Lab Results   Component Value Date    ALT 34 03/25/2020           Lab Results   Component Value Date    AST 29 03/25/2020       Post Infusion Assessment:  Patient tolerated infusion without incident.  Site patent and intact, free from redness, edema or discomfort.  No evidence of extravasations.  Access discontinued per protocol.     Discharge Plan:   Patient will return 4/14 for next appointment.   Patient discharged in stable condition accompanied by: self.  Departure Mode: Ambulatory.    Viviane Lawrence RN

## 2020-04-08 NOTE — PROGRESS NOTES
"Skin to port greatly improved. Light pink skin. Denies itching/warmth. Has two more abx tablets to take. Per Dr. Birch note, \"2. Erythema over port site. Unclear if cellulitis versus allergic reaction to blue gloves. Last week on initial telephone visit, patient did not have erythema and this was noticed after she placed EMLA cream. Improving. Complete antibiotic course. Avoid \"blue gloves.\" May use port.\" Port needle left accessed for treatment. Tolerated port access and draw without complaint. Port site scrubbed with Chloraprep for 30 seconds and allowed to dry completely prior to dressing application. Accessed using sterile technique. [place] tubes drawn-Red gel/Green/Purple tubes. Double signed by patient and RN. See documentation flowsheet. Lexy Butterfield RN, BSN, OCN      "

## 2020-04-14 ENCOUNTER — ONCOLOGY VISIT (OUTPATIENT)
Dept: ONCOLOGY | Facility: CLINIC | Age: 65
End: 2020-04-14
Payer: COMMERCIAL

## 2020-04-14 ENCOUNTER — INFUSION THERAPY VISIT (OUTPATIENT)
Dept: INFUSION THERAPY | Facility: CLINIC | Age: 65
End: 2020-04-14
Payer: COMMERCIAL

## 2020-04-14 VITALS
TEMPERATURE: 98.2 F | BODY MASS INDEX: 44.96 KG/M2 | WEIGHT: 245.8 LBS | RESPIRATION RATE: 20 BRPM | HEART RATE: 108 BPM | SYSTOLIC BLOOD PRESSURE: 175 MMHG | DIASTOLIC BLOOD PRESSURE: 101 MMHG | OXYGEN SATURATION: 96 %

## 2020-04-14 DIAGNOSIS — C50.812 MALIGNANT NEOPLASM OF OVERLAPPING SITES OF LEFT BREAST IN FEMALE, ESTROGEN RECEPTOR POSITIVE (H): Primary | ICD-10-CM

## 2020-04-14 DIAGNOSIS — Z17.0 MALIGNANT NEOPLASM OF OVERLAPPING SITES OF LEFT BREAST IN FEMALE, ESTROGEN RECEPTOR POSITIVE (H): Primary | ICD-10-CM

## 2020-04-14 LAB
ALBUMIN SERPL-MCNC: 3.1 G/DL (ref 3.4–5)
ALP SERPL-CCNC: 119 U/L (ref 40–150)
ALT SERPL W P-5'-P-CCNC: 39 U/L (ref 0–50)
AST SERPL W P-5'-P-CCNC: 20 U/L (ref 0–45)
BASOPHILS # BLD AUTO: 0.1 10E9/L (ref 0–0.2)
BASOPHILS NFR BLD AUTO: 1.6 %
BILIRUB DIRECT SERPL-MCNC: 0.1 MG/DL (ref 0–0.2)
BILIRUB SERPL-MCNC: 0.4 MG/DL (ref 0.2–1.3)
DIFFERENTIAL METHOD BLD: NORMAL
EOSINOPHIL # BLD AUTO: 0.1 10E9/L (ref 0–0.7)
EOSINOPHIL NFR BLD AUTO: 1.2 %
ERYTHROCYTE [DISTWIDTH] IN BLOOD BY AUTOMATED COUNT: 13.8 % (ref 10–15)
HCT VFR BLD AUTO: 39.8 % (ref 35–47)
HGB BLD-MCNC: 13 G/DL (ref 11.7–15.7)
IMM GRANULOCYTES # BLD: 0.1 10E9/L (ref 0–0.4)
IMM GRANULOCYTES NFR BLD: 0.9 %
LYMPHOCYTES # BLD AUTO: 1.5 10E9/L (ref 0.8–5.3)
LYMPHOCYTES NFR BLD AUTO: 27.2 %
MCH RBC QN AUTO: 28.2 PG (ref 26.5–33)
MCHC RBC AUTO-ENTMCNC: 32.7 G/DL (ref 31.5–36.5)
MCV RBC AUTO: 86 FL (ref 78–100)
MONOCYTES # BLD AUTO: 0.3 10E9/L (ref 0–1.3)
MONOCYTES NFR BLD AUTO: 5 %
NEUTROPHILS # BLD AUTO: 3.6 10E9/L (ref 1.6–8.3)
NEUTROPHILS NFR BLD AUTO: 64.1 %
PLATELET # BLD AUTO: 393 10E9/L (ref 150–450)
PROT SERPL-MCNC: 7.7 G/DL (ref 6.8–8.8)
RBC # BLD AUTO: 4.61 10E12/L (ref 3.8–5.2)
WBC # BLD AUTO: 5.6 10E9/L (ref 4–11)

## 2020-04-14 PROCEDURE — 96413 CHEMO IV INFUSION 1 HR: CPT | Performed by: INTERNAL MEDICINE

## 2020-04-14 PROCEDURE — 85025 COMPLETE CBC W/AUTO DIFF WBC: CPT | Performed by: INTERNAL MEDICINE

## 2020-04-14 PROCEDURE — 99207 ZZC NO CHARGE NURSE ONLY: CPT

## 2020-04-14 PROCEDURE — 80076 HEPATIC FUNCTION PANEL: CPT | Performed by: INTERNAL MEDICINE

## 2020-04-14 PROCEDURE — 99207 ZZC NO CHARGE LOS: CPT

## 2020-04-14 PROCEDURE — S0028 INJECTION, FAMOTIDINE, 20 MG: HCPCS | Performed by: INTERNAL MEDICINE

## 2020-04-14 PROCEDURE — 96375 TX/PRO/DX INJ NEW DRUG ADDON: CPT | Performed by: INTERNAL MEDICINE

## 2020-04-14 RX ORDER — DIPHENHYDRAMINE HCL 25 MG
50 CAPSULE ORAL ONCE
Status: COMPLETED | OUTPATIENT
Start: 2020-04-14 | End: 2020-04-14

## 2020-04-14 RX ORDER — BIOTIN 1 MG
3000 TABLET ORAL DAILY
COMMUNITY
End: 2023-10-31

## 2020-04-14 RX ORDER — HEPARIN SODIUM (PORCINE) LOCK FLUSH IV SOLN 100 UNIT/ML 100 UNIT/ML
5 SOLUTION INTRAVENOUS
Status: DISCONTINUED | OUTPATIENT
Start: 2020-04-14 | End: 2020-04-14 | Stop reason: HOSPADM

## 2020-04-14 RX ADMIN — Medication 50 MG: at 09:23

## 2020-04-14 RX ADMIN — Medication 250 ML: at 09:29

## 2020-04-14 RX ADMIN — HEPARIN SODIUM (PORCINE) LOCK FLUSH IV SOLN 100 UNIT/ML 5 ML: 100 SOLUTION at 10:58

## 2020-04-14 RX ADMIN — HEPARIN SODIUM (PORCINE) LOCK FLUSH IV SOLN 100 UNIT/ML 5 ML: 100 SOLUTION at 08:37

## 2020-04-14 ASSESSMENT — PAIN SCALES - GENERAL: PAINLEVEL: NO PAIN (0)

## 2020-04-14 NOTE — PROGRESS NOTES
Infusion Nursing Note:  Mary Olson presents today for C2D1 (week 4) Taxol.    Patient seen by provider today: No   present during visit today: Not Applicable.    Note: Patient's hair has begun falling out.  Patient will place an online order this week for wigs/hairpieces.    Intravenous Access:  Implanted Port.    Treatment Conditions:  Lab Results   Component Value Date    HGB 13.0 04/14/2020     Lab Results   Component Value Date    WBC 5.6 04/14/2020      Lab Results   Component Value Date    ANEU 3.6 04/14/2020     Lab Results   Component Value Date     04/14/2020      Lab Results   Component Value Date     03/04/2020                   Lab Results   Component Value Date    POTASSIUM 4.7 03/04/2020           No results found for: MAG         Lab Results   Component Value Date    CR 0.80 03/25/2020                   Lab Results   Component Value Date    BUFFY 9.2 03/25/2020                Lab Results   Component Value Date    BILITOTAL 0.3 03/25/2020           Lab Results   Component Value Date    ALBUMIN 3.1 03/25/2020    ALBUMIN 3.2 03/25/2020                    Lab Results   Component Value Date    ALT 34 03/25/2020           Lab Results   Component Value Date    AST 29 03/25/2020       Results reviewed, labs MET treatment parameters, ok to proceed with treatment.      Post Infusion Assessment:  Patient tolerated infusion without incident.  Blood return noted pre and post infusion.  Site patent and intact, free from redness, edema or discomfort.  No evidence of extravasations.  Access discontinued per protocol.       Discharge Plan:   Patient will return 4/22/2020 for next appointment.   Patient discharged in stable condition accompanied by: self.  Departure Mode: Ambulatory.    An Harding RN

## 2020-04-14 NOTE — PROGRESS NOTES
Port needle left accessed for treatment. Tolerated port access and draw without complaint. Port site scrubbed with Chloraprep for 30 seconds and allowed to dry completely prior to dressing application. Accessed using sterile technique. [place] tubes drawn-Red gel/Green/Purple tubes. Double signed by patient and RN. See documentation flowsheet. Lexy Butterfield, RN, BSN, OCN

## 2020-04-16 DIAGNOSIS — Z17.0 MALIGNANT NEOPLASM OF OVERLAPPING SITES OF LEFT BREAST IN FEMALE, ESTROGEN RECEPTOR POSITIVE (H): ICD-10-CM

## 2020-04-16 DIAGNOSIS — C79.51 BONE METASTASIS: Primary | ICD-10-CM

## 2020-04-16 DIAGNOSIS — C50.812 MALIGNANT NEOPLASM OF OVERLAPPING SITES OF LEFT BREAST IN FEMALE, ESTROGEN RECEPTOR POSITIVE (H): ICD-10-CM

## 2020-04-16 RX ORDER — HEPARIN SODIUM (PORCINE) LOCK FLUSH IV SOLN 100 UNIT/ML 100 UNIT/ML
5 SOLUTION INTRAVENOUS
Status: CANCELLED | OUTPATIENT
Start: 2020-04-22

## 2020-04-16 RX ORDER — HEPARIN SODIUM,PORCINE 10 UNIT/ML
5 VIAL (ML) INTRAVENOUS
Status: CANCELLED | OUTPATIENT
Start: 2020-04-22

## 2020-04-16 RX ORDER — ZOLEDRONIC ACID 0.04 MG/ML
4 INJECTION, SOLUTION INTRAVENOUS ONCE
Status: CANCELLED | OUTPATIENT
Start: 2020-04-22

## 2020-04-22 ENCOUNTER — INFUSION THERAPY VISIT (OUTPATIENT)
Dept: INFUSION THERAPY | Facility: CLINIC | Age: 65
End: 2020-04-22
Payer: COMMERCIAL

## 2020-04-22 ENCOUNTER — ONCOLOGY VISIT (OUTPATIENT)
Dept: ONCOLOGY | Facility: CLINIC | Age: 65
End: 2020-04-22
Payer: COMMERCIAL

## 2020-04-22 VITALS
DIASTOLIC BLOOD PRESSURE: 92 MMHG | SYSTOLIC BLOOD PRESSURE: 197 MMHG | WEIGHT: 245.8 LBS | OXYGEN SATURATION: 97 % | RESPIRATION RATE: 18 BRPM | BODY MASS INDEX: 44.96 KG/M2 | HEART RATE: 109 BPM | TEMPERATURE: 98.7 F

## 2020-04-22 DIAGNOSIS — Z17.0 MALIGNANT NEOPLASM OF OVERLAPPING SITES OF LEFT BREAST IN FEMALE, ESTROGEN RECEPTOR POSITIVE (H): Primary | ICD-10-CM

## 2020-04-22 DIAGNOSIS — C50.812 MALIGNANT NEOPLASM OF OVERLAPPING SITES OF LEFT BREAST IN FEMALE, ESTROGEN RECEPTOR POSITIVE (H): Primary | ICD-10-CM

## 2020-04-22 DIAGNOSIS — C79.51 BONE METASTASIS: ICD-10-CM

## 2020-04-22 LAB
ALBUMIN SERPL-MCNC: 3.2 G/DL (ref 3.4–5)
BASOPHILS # BLD AUTO: 0.1 10E9/L (ref 0–0.2)
BASOPHILS NFR BLD AUTO: 1.1 %
CALCIUM SERPL-MCNC: 9.3 MG/DL (ref 8.5–10.1)
CREAT SERPL-MCNC: 0.64 MG/DL (ref 0.52–1.04)
DIFFERENTIAL METHOD BLD: NORMAL
EOSINOPHIL # BLD AUTO: 0.1 10E9/L (ref 0–0.7)
EOSINOPHIL NFR BLD AUTO: 1.7 %
ERYTHROCYTE [DISTWIDTH] IN BLOOD BY AUTOMATED COUNT: 14.7 % (ref 10–15)
GFR SERPL CREATININE-BSD FRML MDRD: >90 ML/MIN/{1.73_M2}
HCT VFR BLD AUTO: 40.5 % (ref 35–47)
HGB BLD-MCNC: 13.1 G/DL (ref 11.7–15.7)
IMM GRANULOCYTES # BLD: 0.1 10E9/L (ref 0–0.4)
IMM GRANULOCYTES NFR BLD: 1.7 %
LYMPHOCYTES # BLD AUTO: 2.1 10E9/L (ref 0.8–5.3)
LYMPHOCYTES NFR BLD AUTO: 29.3 %
MCH RBC QN AUTO: 27.8 PG (ref 26.5–33)
MCHC RBC AUTO-ENTMCNC: 32.3 G/DL (ref 31.5–36.5)
MCV RBC AUTO: 86 FL (ref 78–100)
MONOCYTES # BLD AUTO: 0.5 10E9/L (ref 0–1.3)
MONOCYTES NFR BLD AUTO: 7.3 %
NEUTROPHILS # BLD AUTO: 4.1 10E9/L (ref 1.6–8.3)
NEUTROPHILS NFR BLD AUTO: 58.9 %
PLATELET # BLD AUTO: 382 10E9/L (ref 150–450)
RBC # BLD AUTO: 4.71 10E12/L (ref 3.8–5.2)
WBC # BLD AUTO: 7 10E9/L (ref 4–11)

## 2020-04-22 PROCEDURE — 82040 ASSAY OF SERUM ALBUMIN: CPT | Performed by: INTERNAL MEDICINE

## 2020-04-22 PROCEDURE — S0028 INJECTION, FAMOTIDINE, 20 MG: HCPCS | Performed by: INTERNAL MEDICINE

## 2020-04-22 PROCEDURE — 85025 COMPLETE CBC W/AUTO DIFF WBC: CPT | Performed by: INTERNAL MEDICINE

## 2020-04-22 PROCEDURE — 96413 CHEMO IV INFUSION 1 HR: CPT | Performed by: INTERNAL MEDICINE

## 2020-04-22 PROCEDURE — 82310 ASSAY OF CALCIUM: CPT | Performed by: INTERNAL MEDICINE

## 2020-04-22 PROCEDURE — 96375 TX/PRO/DX INJ NEW DRUG ADDON: CPT | Performed by: INTERNAL MEDICINE

## 2020-04-22 PROCEDURE — 99207 ZZC NO CHARGE LOS: CPT

## 2020-04-22 PROCEDURE — 99207 ZZC NO CHARGE NURSE ONLY: CPT

## 2020-04-22 PROCEDURE — 82565 ASSAY OF CREATININE: CPT | Performed by: INTERNAL MEDICINE

## 2020-04-22 PROCEDURE — 96367 TX/PROPH/DG ADDL SEQ IV INF: CPT | Performed by: INTERNAL MEDICINE

## 2020-04-22 RX ORDER — HEPARIN SODIUM (PORCINE) LOCK FLUSH IV SOLN 100 UNIT/ML 100 UNIT/ML
5 SOLUTION INTRAVENOUS
Status: DISCONTINUED | OUTPATIENT
Start: 2020-04-22 | End: 2020-04-22 | Stop reason: HOSPADM

## 2020-04-22 RX ORDER — ZOLEDRONIC ACID 0.04 MG/ML
4 INJECTION, SOLUTION INTRAVENOUS ONCE
Status: COMPLETED | OUTPATIENT
Start: 2020-04-22 | End: 2020-04-22

## 2020-04-22 RX ORDER — DIPHENHYDRAMINE HCL 25 MG
50 CAPSULE ORAL ONCE
Status: COMPLETED | OUTPATIENT
Start: 2020-04-22 | End: 2020-04-22

## 2020-04-22 RX ORDER — HEPARIN SODIUM,PORCINE 10 UNIT/ML
5 VIAL (ML) INTRAVENOUS
Status: DISCONTINUED | OUTPATIENT
Start: 2020-04-22 | End: 2020-04-22 | Stop reason: HOSPADM

## 2020-04-22 RX ADMIN — HEPARIN SODIUM (PORCINE) LOCK FLUSH IV SOLN 100 UNIT/ML 5 ML: 100 SOLUTION at 09:57

## 2020-04-22 RX ADMIN — Medication 250 ML: at 11:01

## 2020-04-22 RX ADMIN — ZOLEDRONIC ACID 4 MG: 0.04 INJECTION, SOLUTION INTRAVENOUS at 12:55

## 2020-04-22 RX ADMIN — HEPARIN SODIUM (PORCINE) LOCK FLUSH IV SOLN 100 UNIT/ML 5 ML: 100 SOLUTION at 13:27

## 2020-04-22 RX ADMIN — Medication 50 MG: at 11:28

## 2020-04-22 ASSESSMENT — PAIN SCALES - GENERAL: PAINLEVEL: NO PAIN (0)

## 2020-04-22 NOTE — PROGRESS NOTES
Infusion Nursing Note:  Mary Olson presents today for C2D8 Taxol and Zometa.    Patient seen by provider today: No   present during visit today: Not Applicable.    Note: Pt states she has no new symptoms or concerns other than occasional itchy fingers. Hypertensive (184/98) as has been her trend. Denies H/A, chest pain or SOA. She states she has nerves when she comes here.     Intravenous Access:  Implanted Port.    Treatment Conditions:  Lab Results   Component Value Date    HGB 13.1 04/22/2020     Lab Results   Component Value Date    WBC 7.0 04/22/2020      Lab Results   Component Value Date    ANEU 4.1 04/22/2020     Lab Results   Component Value Date     04/22/2020      Lab Results   Component Value Date     03/04/2020                   Lab Results   Component Value Date    POTASSIUM 4.7 03/04/2020           No results found for: MAG         Lab Results   Component Value Date    CR 0.64 04/22/2020                   Lab Results   Component Value Date    BUFFY 9.3 04/22/2020                Lab Results   Component Value Date    BILITOTAL 0.4 04/14/2020           Lab Results   Component Value Date    ALBUMIN 3.2 04/22/2020                    Lab Results   Component Value Date    ALT 39 04/14/2020           Lab Results   Component Value Date    AST 20 04/14/2020       Results reviewed, labs MET treatment parameters, ok to proceed with treatment. Patient's corrected Ca level today 9.94      Post Infusion Assessment:  Patient tolerated infusion without incident.  Blood return noted pre and post infusion.  Site patent and intact, free from redness, edema or discomfort.  No evidence of extravasations.  Access discontinued per protocol.       Discharge Plan:   Copy of AVS reviewed with patient and/or family.  Patient will return 4/29 for next appointment.  Patient discharged in stable condition accompanied by: self.  Departure Mode: Ambulatory.    Aracely Fung RN

## 2020-04-22 NOTE — PROGRESS NOTES
Port needle left accessed for treatment. Tolerated port access and draw without complaint. Port site scrubbed with Chloraprep for 30 seconds and allowed to dry completely prior to Opsite sensitive skin dressing application. Accessed using sterile technique. [place] tubes drawn-Red gel/Green/Purple tubes. Double signed by patient and RN. See documentation flowsheet. Lexy Butterfield, RN, BSN, OCN

## 2020-04-29 ENCOUNTER — INFUSION THERAPY VISIT (OUTPATIENT)
Dept: INFUSION THERAPY | Facility: CLINIC | Age: 65
End: 2020-04-29
Payer: COMMERCIAL

## 2020-04-29 ENCOUNTER — ONCOLOGY VISIT (OUTPATIENT)
Dept: ONCOLOGY | Facility: CLINIC | Age: 65
End: 2020-04-29
Payer: COMMERCIAL

## 2020-04-29 VITALS
OXYGEN SATURATION: 96 % | TEMPERATURE: 98.1 F | WEIGHT: 243.3 LBS | HEART RATE: 117 BPM | RESPIRATION RATE: 18 BRPM | SYSTOLIC BLOOD PRESSURE: 174 MMHG | BODY MASS INDEX: 44.5 KG/M2 | DIASTOLIC BLOOD PRESSURE: 100 MMHG

## 2020-04-29 DIAGNOSIS — C50.812 MALIGNANT NEOPLASM OF OVERLAPPING SITES OF LEFT BREAST IN FEMALE, ESTROGEN RECEPTOR POSITIVE (H): Primary | ICD-10-CM

## 2020-04-29 DIAGNOSIS — Z17.0 MALIGNANT NEOPLASM OF OVERLAPPING SITES OF LEFT BREAST IN FEMALE, ESTROGEN RECEPTOR POSITIVE (H): Primary | ICD-10-CM

## 2020-04-29 LAB
BASOPHILS # BLD AUTO: 0.1 10E9/L (ref 0–0.2)
BASOPHILS NFR BLD AUTO: 0.9 %
DIFFERENTIAL METHOD BLD: NORMAL
EOSINOPHIL # BLD AUTO: 0.1 10E9/L (ref 0–0.7)
EOSINOPHIL NFR BLD AUTO: 0.6 %
ERYTHROCYTE [DISTWIDTH] IN BLOOD BY AUTOMATED COUNT: 14.9 % (ref 10–15)
HCT VFR BLD AUTO: 38.5 % (ref 35–47)
HGB BLD-MCNC: 12.5 G/DL (ref 11.7–15.7)
IMM GRANULOCYTES # BLD: 0.1 10E9/L (ref 0–0.4)
IMM GRANULOCYTES NFR BLD: 1.5 %
LYMPHOCYTES # BLD AUTO: 1.4 10E9/L (ref 0.8–5.3)
LYMPHOCYTES NFR BLD AUTO: 15.6 %
MCH RBC QN AUTO: 27.8 PG (ref 26.5–33)
MCHC RBC AUTO-ENTMCNC: 32.5 G/DL (ref 31.5–36.5)
MCV RBC AUTO: 86 FL (ref 78–100)
MONOCYTES # BLD AUTO: 0.8 10E9/L (ref 0–1.3)
MONOCYTES NFR BLD AUTO: 8.7 %
NEUTROPHILS # BLD AUTO: 6.3 10E9/L (ref 1.6–8.3)
NEUTROPHILS NFR BLD AUTO: 72.7 %
PLATELET # BLD AUTO: 394 10E9/L (ref 150–450)
RBC # BLD AUTO: 4.49 10E12/L (ref 3.8–5.2)
WBC # BLD AUTO: 8.6 10E9/L (ref 4–11)

## 2020-04-29 PROCEDURE — 99207 ZZC NO CHARGE NURSE ONLY: CPT

## 2020-04-29 PROCEDURE — 99207 ZZC NO CHARGE LOS: CPT

## 2020-04-29 PROCEDURE — S0028 INJECTION, FAMOTIDINE, 20 MG: HCPCS | Performed by: INTERNAL MEDICINE

## 2020-04-29 PROCEDURE — 96367 TX/PROPH/DG ADDL SEQ IV INF: CPT | Performed by: INTERNAL MEDICINE

## 2020-04-29 PROCEDURE — 96375 TX/PRO/DX INJ NEW DRUG ADDON: CPT | Performed by: INTERNAL MEDICINE

## 2020-04-29 PROCEDURE — 96413 CHEMO IV INFUSION 1 HR: CPT | Performed by: INTERNAL MEDICINE

## 2020-04-29 PROCEDURE — 85025 COMPLETE CBC W/AUTO DIFF WBC: CPT | Performed by: INTERNAL MEDICINE

## 2020-04-29 RX ORDER — DIPHENHYDRAMINE HCL 25 MG
50 CAPSULE ORAL ONCE
Status: COMPLETED | OUTPATIENT
Start: 2020-04-29 | End: 2020-04-29

## 2020-04-29 RX ORDER — HEPARIN SODIUM (PORCINE) LOCK FLUSH IV SOLN 100 UNIT/ML 100 UNIT/ML
5 SOLUTION INTRAVENOUS
Status: DISCONTINUED | OUTPATIENT
Start: 2020-04-29 | End: 2020-04-29 | Stop reason: HOSPADM

## 2020-04-29 RX ADMIN — HEPARIN SODIUM (PORCINE) LOCK FLUSH IV SOLN 100 UNIT/ML 5 ML: 100 SOLUTION at 12:55

## 2020-04-29 RX ADMIN — Medication 250 ML: at 11:08

## 2020-04-29 RX ADMIN — HEPARIN SODIUM (PORCINE) LOCK FLUSH IV SOLN 100 UNIT/ML 5 ML: 100 SOLUTION at 10:32

## 2020-04-29 RX ADMIN — Medication 50 MG: at 11:02

## 2020-04-29 ASSESSMENT — PAIN SCALES - GENERAL: PAINLEVEL: NO PAIN (0)

## 2020-04-29 NOTE — PROGRESS NOTES
"Patient's name and  were verified.  See Doc Flowsheet - IV assess for details.  IVAD accessed with 20G  3/4\" carroll gripper plus needle  blood return positive: YES  Site without redness, tenderness or swelling: YES  flushed with 30cc NS and 5cc 100u/ml heparin  Needle: Left accessed for infusion    Comments: Labs drawn.  Patient tolerated procedure without incident.    Emily Brandon  RN, BSN, OCN        "

## 2020-04-29 NOTE — PROGRESS NOTES
Infusion Nursing Note:  Mary Olson presents today for C2D15 Taxol.    Patient seen by provider today: No   present during visit today: Not Applicable.    Note: N/A.    Intravenous Access:  Implanted Port.    Treatment Conditions:  Lab Results   Component Value Date    HGB 12.5 04/29/2020     Lab Results   Component Value Date    WBC 8.6 04/29/2020      Lab Results   Component Value Date    ANEU 6.3 04/29/2020     Lab Results   Component Value Date     04/29/2020      Results reviewed, labs MET treatment parameters, ok to proceed with treatment.      Post Infusion Assessment:  Patient tolerated infusion without incident.  Blood return noted pre and post infusion.  Site patent and intact, free from redness, edema or discomfort.  No evidence of extravasations.  Access discontinued per protocol.       Discharge Plan:   Discharge instructions reviewed with: Patient.  Patient and/or family verbalized understanding of discharge instructions and all questions answered.  Patient discharged in stable condition accompanied by: self.  Departure Mode: Ambulatory,  outside to pick her up.    Katharina Headley RN

## 2020-05-05 ENCOUNTER — VIRTUAL VISIT (OUTPATIENT)
Dept: ONCOLOGY | Facility: CLINIC | Age: 65
End: 2020-05-05
Attending: INTERNAL MEDICINE
Payer: COMMERCIAL

## 2020-05-05 VITALS — WEIGHT: 243 LBS | BODY MASS INDEX: 44.45 KG/M2

## 2020-05-05 DIAGNOSIS — C50.812 MALIGNANT NEOPLASM OF OVERLAPPING SITES OF LEFT BREAST IN FEMALE, ESTROGEN RECEPTOR POSITIVE (H): ICD-10-CM

## 2020-05-05 DIAGNOSIS — Z17.0 MALIGNANT NEOPLASM OF OVERLAPPING SITES OF LEFT BREAST IN FEMALE, ESTROGEN RECEPTOR POSITIVE (H): ICD-10-CM

## 2020-05-05 DIAGNOSIS — C79.51 BONE METASTASIS: Primary | ICD-10-CM

## 2020-05-05 PROCEDURE — 99214 OFFICE O/P EST MOD 30 MIN: CPT | Mod: 95 | Performed by: INTERNAL MEDICINE

## 2020-05-05 RX ORDER — LORAZEPAM 2 MG/ML
0.5 INJECTION INTRAMUSCULAR EVERY 4 HOURS PRN
Status: CANCELLED
Start: 2020-05-27

## 2020-05-05 RX ORDER — DIPHENHYDRAMINE HYDROCHLORIDE 50 MG/ML
50 INJECTION INTRAMUSCULAR; INTRAVENOUS
Status: CANCELLED
Start: 2020-05-27

## 2020-05-05 RX ORDER — DIPHENHYDRAMINE HYDROCHLORIDE 50 MG/ML
50 INJECTION INTRAMUSCULAR; INTRAVENOUS
Status: CANCELLED
Start: 2020-05-13

## 2020-05-05 RX ORDER — METHYLPREDNISOLONE SODIUM SUCCINATE 125 MG/2ML
125 INJECTION, POWDER, LYOPHILIZED, FOR SOLUTION INTRAMUSCULAR; INTRAVENOUS
Status: CANCELLED
Start: 2020-05-27

## 2020-05-05 RX ORDER — METHYLPREDNISOLONE SODIUM SUCCINATE 125 MG/2ML
125 INJECTION, POWDER, LYOPHILIZED, FOR SOLUTION INTRAMUSCULAR; INTRAVENOUS
Status: CANCELLED
Start: 2020-05-06

## 2020-05-05 RX ORDER — ALBUTEROL SULFATE 90 UG/1
1-2 AEROSOL, METERED RESPIRATORY (INHALATION)
Status: CANCELLED
Start: 2020-05-06

## 2020-05-05 RX ORDER — DIPHENHYDRAMINE HCL 25 MG
50 CAPSULE ORAL ONCE
Status: CANCELLED
Start: 2020-05-27

## 2020-05-05 RX ORDER — ALBUTEROL SULFATE 0.83 MG/ML
2.5 SOLUTION RESPIRATORY (INHALATION)
Status: CANCELLED | OUTPATIENT
Start: 2020-05-13

## 2020-05-05 RX ORDER — ALBUTEROL SULFATE 90 UG/1
1-2 AEROSOL, METERED RESPIRATORY (INHALATION)
Status: CANCELLED
Start: 2020-05-27

## 2020-05-05 RX ORDER — HEPARIN SODIUM (PORCINE) LOCK FLUSH IV SOLN 100 UNIT/ML 100 UNIT/ML
5 SOLUTION INTRAVENOUS
Status: CANCELLED | OUTPATIENT
Start: 2020-05-13

## 2020-05-05 RX ORDER — MEPERIDINE HYDROCHLORIDE 25 MG/ML
25 INJECTION INTRAMUSCULAR; INTRAVENOUS; SUBCUTANEOUS EVERY 30 MIN PRN
Status: CANCELLED | OUTPATIENT
Start: 2020-05-27

## 2020-05-05 RX ORDER — LORAZEPAM 2 MG/ML
0.5 INJECTION INTRAMUSCULAR EVERY 4 HOURS PRN
Status: CANCELLED
Start: 2020-05-06

## 2020-05-05 RX ORDER — METHYLPREDNISOLONE SODIUM SUCCINATE 125 MG/2ML
125 INJECTION, POWDER, LYOPHILIZED, FOR SOLUTION INTRAMUSCULAR; INTRAVENOUS
Status: CANCELLED
Start: 2020-05-13

## 2020-05-05 RX ORDER — ALBUTEROL SULFATE 90 UG/1
1-2 AEROSOL, METERED RESPIRATORY (INHALATION)
Status: CANCELLED
Start: 2020-05-13

## 2020-05-05 RX ORDER — NALOXONE HYDROCHLORIDE 0.4 MG/ML
.1-.4 INJECTION, SOLUTION INTRAMUSCULAR; INTRAVENOUS; SUBCUTANEOUS
Status: CANCELLED | OUTPATIENT
Start: 2020-05-13

## 2020-05-05 RX ORDER — ALBUTEROL SULFATE 0.83 MG/ML
2.5 SOLUTION RESPIRATORY (INHALATION)
Status: CANCELLED | OUTPATIENT
Start: 2020-05-06

## 2020-05-05 RX ORDER — EPINEPHRINE 0.3 MG/.3ML
0.3 INJECTION SUBCUTANEOUS EVERY 5 MIN PRN
Status: CANCELLED | OUTPATIENT
Start: 2020-05-06

## 2020-05-05 RX ORDER — NALOXONE HYDROCHLORIDE 0.4 MG/ML
.1-.4 INJECTION, SOLUTION INTRAMUSCULAR; INTRAVENOUS; SUBCUTANEOUS
Status: CANCELLED | OUTPATIENT
Start: 2020-05-27

## 2020-05-05 RX ORDER — HEPARIN SODIUM,PORCINE 10 UNIT/ML
5 VIAL (ML) INTRAVENOUS
Status: CANCELLED | OUTPATIENT
Start: 2020-05-27

## 2020-05-05 RX ORDER — HEPARIN SODIUM (PORCINE) LOCK FLUSH IV SOLN 100 UNIT/ML 100 UNIT/ML
5 SOLUTION INTRAVENOUS
Status: CANCELLED | OUTPATIENT
Start: 2020-05-27

## 2020-05-05 RX ORDER — NALOXONE HYDROCHLORIDE 0.4 MG/ML
.1-.4 INJECTION, SOLUTION INTRAMUSCULAR; INTRAVENOUS; SUBCUTANEOUS
Status: CANCELLED | OUTPATIENT
Start: 2020-05-06

## 2020-05-05 RX ORDER — HEPARIN SODIUM,PORCINE 10 UNIT/ML
5 VIAL (ML) INTRAVENOUS
Status: CANCELLED | OUTPATIENT
Start: 2020-05-13

## 2020-05-05 RX ORDER — LORAZEPAM 2 MG/ML
0.5 INJECTION INTRAMUSCULAR EVERY 4 HOURS PRN
Status: CANCELLED
Start: 2020-05-13

## 2020-05-05 RX ORDER — SODIUM CHLORIDE 9 MG/ML
1000 INJECTION, SOLUTION INTRAVENOUS CONTINUOUS PRN
Status: CANCELLED
Start: 2020-05-27

## 2020-05-05 RX ORDER — ALBUTEROL SULFATE 0.83 MG/ML
2.5 SOLUTION RESPIRATORY (INHALATION)
Status: CANCELLED | OUTPATIENT
Start: 2020-05-27

## 2020-05-05 RX ORDER — EPINEPHRINE 0.3 MG/.3ML
0.3 INJECTION SUBCUTANEOUS EVERY 5 MIN PRN
Status: CANCELLED | OUTPATIENT
Start: 2020-05-13

## 2020-05-05 RX ORDER — HEPARIN SODIUM,PORCINE 10 UNIT/ML
5 VIAL (ML) INTRAVENOUS
Status: CANCELLED | OUTPATIENT
Start: 2020-05-06

## 2020-05-05 RX ORDER — HEPARIN SODIUM (PORCINE) LOCK FLUSH IV SOLN 100 UNIT/ML 100 UNIT/ML
5 SOLUTION INTRAVENOUS
Status: CANCELLED | OUTPATIENT
Start: 2020-05-06

## 2020-05-05 RX ORDER — HEPARIN SODIUM,PORCINE 10 UNIT/ML
5 VIAL (ML) INTRAVENOUS
Status: CANCELLED | OUTPATIENT
Start: 2020-05-20

## 2020-05-05 RX ORDER — EPINEPHRINE 1 MG/ML
0.3 INJECTION, SOLUTION INTRAMUSCULAR; SUBCUTANEOUS EVERY 5 MIN PRN
Status: CANCELLED | OUTPATIENT
Start: 2020-05-13

## 2020-05-05 RX ORDER — DIPHENHYDRAMINE HCL 25 MG
50 CAPSULE ORAL ONCE
Status: CANCELLED
Start: 2020-05-13

## 2020-05-05 RX ORDER — MEPERIDINE HYDROCHLORIDE 25 MG/ML
25 INJECTION INTRAMUSCULAR; INTRAVENOUS; SUBCUTANEOUS EVERY 30 MIN PRN
Status: CANCELLED | OUTPATIENT
Start: 2020-05-13

## 2020-05-05 RX ORDER — MEPERIDINE HYDROCHLORIDE 25 MG/ML
25 INJECTION INTRAMUSCULAR; INTRAVENOUS; SUBCUTANEOUS EVERY 30 MIN PRN
Status: CANCELLED | OUTPATIENT
Start: 2020-05-06

## 2020-05-05 RX ORDER — DIPHENHYDRAMINE HYDROCHLORIDE 50 MG/ML
50 INJECTION INTRAMUSCULAR; INTRAVENOUS
Status: CANCELLED
Start: 2020-05-06

## 2020-05-05 RX ORDER — EPINEPHRINE 0.3 MG/.3ML
0.3 INJECTION SUBCUTANEOUS EVERY 5 MIN PRN
Status: CANCELLED | OUTPATIENT
Start: 2020-05-27

## 2020-05-05 RX ORDER — EPINEPHRINE 1 MG/ML
0.3 INJECTION, SOLUTION INTRAMUSCULAR; SUBCUTANEOUS EVERY 5 MIN PRN
Status: CANCELLED | OUTPATIENT
Start: 2020-05-06

## 2020-05-05 RX ORDER — SODIUM CHLORIDE 9 MG/ML
1000 INJECTION, SOLUTION INTRAVENOUS CONTINUOUS PRN
Status: CANCELLED
Start: 2020-05-13

## 2020-05-05 RX ORDER — EPINEPHRINE 1 MG/ML
0.3 INJECTION, SOLUTION INTRAMUSCULAR; SUBCUTANEOUS EVERY 5 MIN PRN
Status: CANCELLED | OUTPATIENT
Start: 2020-05-27

## 2020-05-05 RX ORDER — SODIUM CHLORIDE 9 MG/ML
1000 INJECTION, SOLUTION INTRAVENOUS CONTINUOUS PRN
Status: CANCELLED
Start: 2020-05-06

## 2020-05-05 RX ORDER — HEPARIN SODIUM (PORCINE) LOCK FLUSH IV SOLN 100 UNIT/ML 100 UNIT/ML
5 SOLUTION INTRAVENOUS
Status: CANCELLED | OUTPATIENT
Start: 2020-05-20

## 2020-05-05 RX ORDER — ZOLEDRONIC ACID 0.04 MG/ML
4 INJECTION, SOLUTION INTRAVENOUS ONCE
Status: CANCELLED | OUTPATIENT
Start: 2020-05-20

## 2020-05-05 RX ORDER — DIPHENHYDRAMINE HCL 25 MG
50 CAPSULE ORAL ONCE
Status: CANCELLED
Start: 2020-05-06

## 2020-05-05 ASSESSMENT — PAIN SCALES - GENERAL: PAINLEVEL: NO PAIN (0)

## 2020-05-05 NOTE — PROGRESS NOTES
"Mary Olson is a 64 year old female who is being evaluated via a billable telephone visit.      The patient has been notified of following:     \"This telephone visit will be conducted via a call between you and your physician/provider. We have found that certain health care needs can be provided without the need for a physical exam.  This service lets us provide the care you need with a short phone conversation.  If a prescription is necessary we can send it directly to your pharmacy.  If lab work is needed we can place an order for that and you can then stop by our lab to have the test done at a later time.    Telephone visits are billed at different rates depending on your insurance coverage. During this emergency period, for some insurers they may be billed the same as an in-person visit.  Please reach out to your insurance provider with any questions.    If during the course of the call the physician/provider feels a telephone visit is not appropriate, you will not be charged for this service.\"    Patient has given verbal consent for Telephone visit?  Yes    What phone number would you like to be contacted at? [phone]    How would you like to obtain your AVS? Jaydon Jarvis LPN on 5/5/2020 at 9:49 AM          "

## 2020-05-05 NOTE — NURSING NOTE
SYMPTOM ASSESSMENT    Pain: no, occasional cramping in right leg but states she can 'work it out', denies swelling    Nausea/Vomiting: no    Mouth Sores: no    Shortness of Breath: no    Smoking: no    Fever or Chills: no    Hard Stools: no    Soft Stools: yes, x 1 day post-chemo    Weight Loss: no    Weakness: no    Burning, numbness or tingling in hands or feet: no    Problems with skin or swelling: no    Memory Loss: no    Anxiety or Depression: no    Trouble Sleeping: no    Kailee Jarvis LPN on 5/5/2020 at 9:49 AM

## 2020-05-05 NOTE — PROGRESS NOTES
"Visit Date:   05/05/2020      ONCOLOGY DIAGNOSIS:  1. March 2020: Metastatic Breast Cancer     THERAPY TO DATE:  1. March 2020 to Present: Weekly Taxol.     INTERVAL HISTORY:  Desmond Olson is a 64-year-old female diagnosed with metastatic breast cancer in 03/2020 after noticing a lesion on her breast.  The patient presents via telephone visit today for toxicity evaluation prior to next therapy.  On today's visit, Desmond says that overall she is doing well.  In fact, she is quite surprised since she heard such terrible things about breast cancer treatment.  Denies any nausea or vomiting.  Has been social distancing and watching COVID precautions.  The rash over the port site that was previously noted has resolved.  She does think this is related to gloves she had used that day for the EMLA cream.  Does have diarrhea once or twice, but it resolves on its own.  Did have leg cramps x1 that also resolved.  She feels her breast mass is responding nicely, it is smaller in size and is no longer bleeding.  Denies any fevers, chills, nausea, vomiting, chest pain, shortness of breath, cough.  No abdominal discomfort.  The remainder of comprehensive review of systems is negative.      PAST MEDICAL HISTORY:  (No new medical history since last seen).   1.  Metastatic breast cancer, see above.   2.  Hypertension.      FAMILY HISTORY:  Mother had breast cancer at 62.  Underwent lumpectomy and radiation, then had \"pills.\"  Not addressed on today's visit.      SOCIAL HISTORY:   to her , Dieter.  Is on the telephone visit by herself.  Has 1 son and 1 daughter.  No current tobacco use.  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      ALLERGIES:  LISINOPRIL.      LABORATORY DATA:  Pending.      ASSESSMENT AND PLAN:   1.  Metastatic breast cancer.  Overall, patient appears to be tolerating therapy well.  We will check labs tomorrow and if no contraindications proceed with Taxol.  " Return to clinic in 3 weeks to see MD with PET scan.  Depending on response, can consider switching her over to endocrine therapy versus continuing Taxol, if she still has a large burden of disease can switch her over to Adriamycin and Cytoxan.   2.  Erythema at port site.  The patient was treated with a course of antibiotics.  However, retrospectively, the patient feels this was more of a rash related to the gloves she had used earlier in the day.  No further episodes.   3.  Nausea.  Controlled on current antiemetic regimen.   4.  Bone metastasis.  Due for Zometa .  We will coordinate with her Taxol infusion.   5.  COVID-19 Precautions. Discussed the importance of good hand washing techniques with soap and water for at least 20 seconds, avoid touching eyes, nose, mouth, avoiding crowds, social distancing.     Telephone visit from 10:00 a.m. to 10:11 a.m.         BRY WHITFIELD MD    Above note accurate for diagnosis, plan and orders placed. Epic/EMR orders and data may not be accurate because of available options, information not entered correctly/updated by staff other than writer or interface issues. All data entered by writer with the intent patient care not be compromised nor patient be unnecessarily billed       D: 2020   T: 2020   MT: [id number]      Name:     ARTHUR JENKINS   MRN:      [id number]        Account:      [id number]   :      1955           Visit Date:   2020      Document: [id number]

## 2020-05-06 ENCOUNTER — INFUSION THERAPY VISIT (OUTPATIENT)
Dept: INFUSION THERAPY | Facility: CLINIC | Age: 65
End: 2020-05-06
Payer: COMMERCIAL

## 2020-05-06 ENCOUNTER — ONCOLOGY VISIT (OUTPATIENT)
Dept: ONCOLOGY | Facility: CLINIC | Age: 65
End: 2020-05-06
Payer: COMMERCIAL

## 2020-05-06 VITALS
BODY MASS INDEX: 42.88 KG/M2 | HEIGHT: 63 IN | SYSTOLIC BLOOD PRESSURE: 183 MMHG | TEMPERATURE: 97 F | DIASTOLIC BLOOD PRESSURE: 103 MMHG | WEIGHT: 242 LBS | HEART RATE: 116 BPM | RESPIRATION RATE: 20 BRPM | OXYGEN SATURATION: 96 %

## 2020-05-06 VITALS
BODY MASS INDEX: 42.88 KG/M2 | RESPIRATION RATE: 20 BRPM | HEART RATE: 125 BPM | HEIGHT: 63 IN | SYSTOLIC BLOOD PRESSURE: 181 MMHG | OXYGEN SATURATION: 96 % | WEIGHT: 242 LBS | TEMPERATURE: 97 F | DIASTOLIC BLOOD PRESSURE: 93 MMHG

## 2020-05-06 DIAGNOSIS — I10 ESSENTIAL HYPERTENSION: ICD-10-CM

## 2020-05-06 DIAGNOSIS — Z17.0 MALIGNANT NEOPLASM OF OVERLAPPING SITES OF LEFT BREAST IN FEMALE, ESTROGEN RECEPTOR POSITIVE (H): Primary | ICD-10-CM

## 2020-05-06 DIAGNOSIS — I10 ESSENTIAL HYPERTENSION: Primary | ICD-10-CM

## 2020-05-06 DIAGNOSIS — C50.812 MALIGNANT NEOPLASM OF OVERLAPPING SITES OF LEFT BREAST IN FEMALE, ESTROGEN RECEPTOR POSITIVE (H): Primary | ICD-10-CM

## 2020-05-06 DIAGNOSIS — Z17.0 MALIGNANT NEOPLASM OF OVERLAPPING SITES OF LEFT BREAST IN FEMALE, ESTROGEN RECEPTOR POSITIVE (H): ICD-10-CM

## 2020-05-06 DIAGNOSIS — C50.812 MALIGNANT NEOPLASM OF OVERLAPPING SITES OF LEFT BREAST IN FEMALE, ESTROGEN RECEPTOR POSITIVE (H): ICD-10-CM

## 2020-05-06 LAB
ALBUMIN SERPL-MCNC: 3 G/DL (ref 3.4–5)
ALP SERPL-CCNC: 139 U/L (ref 40–150)
ALT SERPL W P-5'-P-CCNC: 41 U/L (ref 0–50)
AST SERPL W P-5'-P-CCNC: 18 U/L (ref 0–45)
BASOPHILS # BLD AUTO: 0.1 10E9/L (ref 0–0.2)
BASOPHILS NFR BLD AUTO: 1.2 %
BILIRUB DIRECT SERPL-MCNC: <0.1 MG/DL (ref 0–0.2)
BILIRUB SERPL-MCNC: 0.2 MG/DL (ref 0.2–1.3)
DIFFERENTIAL METHOD BLD: ABNORMAL
EOSINOPHIL # BLD AUTO: 0.1 10E9/L (ref 0–0.7)
EOSINOPHIL NFR BLD AUTO: 1.3 %
ERYTHROCYTE [DISTWIDTH] IN BLOOD BY AUTOMATED COUNT: 15.3 % (ref 10–15)
HCT VFR BLD AUTO: 37.9 % (ref 35–47)
HGB BLD-MCNC: 12.3 G/DL (ref 11.7–15.7)
IMM GRANULOCYTES # BLD: 0.1 10E9/L (ref 0–0.4)
IMM GRANULOCYTES NFR BLD: 1.3 %
LYMPHOCYTES # BLD AUTO: 2 10E9/L (ref 0.8–5.3)
LYMPHOCYTES NFR BLD AUTO: 27.2 %
MCH RBC QN AUTO: 28.1 PG (ref 26.5–33)
MCHC RBC AUTO-ENTMCNC: 32.5 G/DL (ref 31.5–36.5)
MCV RBC AUTO: 87 FL (ref 78–100)
MONOCYTES # BLD AUTO: 0.6 10E9/L (ref 0–1.3)
MONOCYTES NFR BLD AUTO: 8.1 %
NEUTROPHILS # BLD AUTO: 4.5 10E9/L (ref 1.6–8.3)
NEUTROPHILS NFR BLD AUTO: 60.9 %
PLATELET # BLD AUTO: 494 10E9/L (ref 150–450)
PROT SERPL-MCNC: 7.8 G/DL (ref 6.8–8.8)
RBC # BLD AUTO: 4.38 10E12/L (ref 3.8–5.2)
WBC # BLD AUTO: 7.4 10E9/L (ref 4–11)

## 2020-05-06 PROCEDURE — 80076 HEPATIC FUNCTION PANEL: CPT | Performed by: INTERNAL MEDICINE

## 2020-05-06 PROCEDURE — 85025 COMPLETE CBC W/AUTO DIFF WBC: CPT | Performed by: INTERNAL MEDICINE

## 2020-05-06 PROCEDURE — 99207 ZZC NO CHARGE LOS: CPT

## 2020-05-06 PROCEDURE — 96375 TX/PRO/DX INJ NEW DRUG ADDON: CPT | Performed by: INTERNAL MEDICINE

## 2020-05-06 PROCEDURE — 99207 ZZC NO CHARGE NURSE ONLY: CPT

## 2020-05-06 PROCEDURE — 96367 TX/PROPH/DG ADDL SEQ IV INF: CPT | Performed by: INTERNAL MEDICINE

## 2020-05-06 PROCEDURE — S0028 INJECTION, FAMOTIDINE, 20 MG: HCPCS | Performed by: INTERNAL MEDICINE

## 2020-05-06 PROCEDURE — 99213 OFFICE O/P EST LOW 20 MIN: CPT | Mod: 25 | Performed by: NURSE PRACTITIONER

## 2020-05-06 PROCEDURE — 96413 CHEMO IV INFUSION 1 HR: CPT | Performed by: INTERNAL MEDICINE

## 2020-05-06 RX ORDER — DIPHENHYDRAMINE HCL 25 MG
50 CAPSULE ORAL ONCE
Status: COMPLETED | OUTPATIENT
Start: 2020-05-06 | End: 2020-05-06

## 2020-05-06 RX ORDER — AMLODIPINE BESYLATE 5 MG/1
5 TABLET ORAL DAILY
Qty: 30 TABLET | Refills: 0 | Status: SHIPPED | OUTPATIENT
Start: 2020-05-06 | End: 2020-05-20 | Stop reason: DRUGHIGH

## 2020-05-06 RX ORDER — HEPARIN SODIUM (PORCINE) LOCK FLUSH IV SOLN 100 UNIT/ML 100 UNIT/ML
5 SOLUTION INTRAVENOUS
Status: DISCONTINUED | OUTPATIENT
Start: 2020-05-06 | End: 2020-05-06 | Stop reason: HOSPADM

## 2020-05-06 RX ADMIN — Medication 250 ML: at 11:09

## 2020-05-06 RX ADMIN — HEPARIN SODIUM (PORCINE) LOCK FLUSH IV SOLN 100 UNIT/ML 5 ML: 100 SOLUTION at 14:20

## 2020-05-06 RX ADMIN — HEPARIN SODIUM (PORCINE) LOCK FLUSH IV SOLN 100 UNIT/ML 5 ML: 100 SOLUTION at 10:34

## 2020-05-06 RX ADMIN — Medication 50 MG: at 11:14

## 2020-05-06 ASSESSMENT — MIFFLIN-ST. JEOR
SCORE: 1614.21
SCORE: 1613.89

## 2020-05-06 ASSESSMENT — PAIN SCALES - GENERAL
PAINLEVEL: NO PAIN (0)
PAINLEVEL: NO PAIN (0)

## 2020-05-06 NOTE — PATIENT INSTRUCTIONS
Pt to return on 05/13/20 for C3 D8 Taxol. Copies of medication list and upcoming appointments given prior to discharge.

## 2020-05-06 NOTE — PROGRESS NOTES
Port needle left accessed for treatment. Tolerated port access and draw without complaint. Port site scrubbed with Chloraprep for 30 seconds and allowed to dry completely prior to Opsite  dressing application. Accessed using sterile technique. McDermott tubes drawn-Red gel/Green/Purple tubes. Double signed by patient and RN. See documentation flowsheet. Lexy Butterfield, RN, BSN, OCN

## 2020-05-06 NOTE — LETTER
5/6/2020         RE: Mary Olson  [address]  [facility] [address]        Dear Colleague,    Thank you for referring your patient, Mary Olson, to the [facility]. Please see a copy of my visit note below.    Oncology Follow Up Visit: May 6, 2020     Oncologist: Dr Keysha Birch  PCP: [facility], [facility]    Diagnosis:Metastatic Breast Cancer- seen today for elevated blood pressure   Mary Olson is a 63 yo female who noticed what she believed to be a bug bite on her left breast in 10/2019.  She initially noted 3 red spots- treated at home. She underwent imaging with a diagnostic mammogram on March 4, 2020, as well as ultrasound where they found an ill-defined margins measuring at least 6.2 cm to center of the left nipple.  Other masses are seen posterior to this including a 2.1 cm mass slightly medial on mammography.  Diffuse left breast skin thickening.  There are at least 3 abnormal enlarged lymph nodes in the left axilla measuring 2.5 x 1.3 x 2.4 cm.  The right breast at the 12:30 position 7 cm from the nipple, there is a spiculated mass corresponding mammographic finding measuring approximately 1.3 x 0.9 x 1.0 cm.  A smaller spiculated hypoechoic mass is at the 12 o'clock position 2 cm from the nipple, measuring 0.9 x 0.5 x 0.7 cm.  Normal lymph nodes in the right axilla.  Biopsy of the left breast mass confirmed invasive mammary carcinoma, no special type, invasive ductal, grade 2, estrogen receptor positive, progesterone receptor positive, HER-2 negative.  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  The second right breast mass at the 12 o'clock position showed benign breast tissue, composed predominantly of uncolonized stroma with rare ducts.  No discrete masses identified.  No  atypical or malignant findings.  3/12/2020 MUGA showed normal heart function with LVEF of 58% and no other abnormalities.  Left Axillary biopsy was positive for metastatic mammary cancer with Negative Her2  PET scan showing metastasis to left axilla, retropectoral lymph nodes and diffuse bone metastasis  Treatment:   3/25/2020 to begin weekly Taxol x 12 followed by ddAC x 4 cycles  3/25/2020 Begin zometa    Interval History: Ms. Olson comes to clinic  For continuation of Taxol treatment week 7 for her bilateral breast cancer and was noted to have continued elevation of her blood pressure which is not new but not currently being treated. She is having some blurring of the vision to one eye but no headaches, hearing changes, weakness, SOB or chest pain. States she is receiving a new vitamin supplement from Dr Arceo that she is interested trying for help with this and is starting a walking program this week but aware these moves will not correct BP in a short time.   Rest of comprehensive and complete ROS is reviewed and is negative.   Past Medical History:   Diagnosis Date     ASCUS favoring benign 10/28/15    neg HPV     Current Outpatient Medications   Medication     amLODIPine (NORVASC) 5 MG tablet     Ascorbic Acid (VITAMIN C PO)     aspirin 81 MG tablet     biotin 1000 MCG TABS tablet     calcium-vitamin D-vitamin K (VIACTIV) 500-500-40 MG-UNT-MCG CHEW     Cholecalciferol (VITAMIN D3 PO)     Coenzyme Q10 (COQ-10) 200 MG CAPS     Cyanocobalamin (B-12) 2500 MCG TABS     lidocaine-prilocaine (EMLA) 2.5-2.5 % external cream     LORazepam (ATIVAN) 0.5 MG tablet     Multiple Vitamins-Minerals (CENTRUM ADULTS PO)     prochlorperazine (COMPAZINE) 10 MG tablet     UNABLE TO FIND     VITAMIN E NATURAL PO     Zinc Acetate, Oral, (ZINC ACETATE PO)     No current facility-administered medications for this visit.      Facility-Administered Medications Ordered in Other Visits   Medication     heparin 100 UNIT/ML injection 5  "mL     heparin 100 UNIT/ML injection 5 mL     PACLitaxel (TAXOL) 178 mg in sodium chloride 0.9 % 305 mL infusion     sodium bicarbonate 8.4 % injection 1 mEq     sodium chloride (PF) 0.9% PF flush 10-20 mL     sodium chloride (PF) 0.9% PF flush 3-20 mL     Allergies   Allergen Reactions     Lisinopril Cough       Physical Exam:BP (!) 181/93   Pulse 125   Temp 97  F (36.1  C) (Oral)   Resp 20   Ht 1.588 m (5' 2.52\")   Wt 109.8 kg (242 lb)   SpO2 96%   BMI 43.53 kg/m     BP Readings from Last 6 Encounters:   05/06/20 (!) 181/93   05/06/20 (!) 181/93   04/29/20 (!) 174/100   04/22/20 (!) 197/92   04/14/20 (!) 175/101   04/08/20 (!) 203/93   Constitutional: Alert, cooperative, and in no distress.   Cardiac: Heart rate and rhythm is regular and strong without murmur  Respiratory: Breathing easy-no cough  Port in place and without redness or swelling today of her right breast.  GI: Abdomen is rounded  MS: Muscle tone normal, extremities normal with no edema.   Skin: No suspicious lesions or rashes  Neuro: Sensory grossly WNL  Psych: Mentation appears normal and affect mildly anxious but with good conversation    Laboratory Results:   Results for orders placed or performed in visit on 05/06/20   CBC with platelets differential     Status: Abnormal   Result Value Ref Range    WBC 7.4 4.0 - 11.0 10e9/L    RBC Count 4.38 3.8 - 5.2 10e12/L    Hemoglobin 12.3 11.7 - 15.7 g/dL    Hematocrit 37.9 35.0 - 47.0 %    MCV 87 78 - 100 fl    MCH 28.1 26.5 - 33.0 pg    MCHC 32.5 31.5 - 36.5 g/dL    RDW 15.3 (H) 10.0 - 15.0 %    Platelet Count 494 (H) 150 - 450 10e9/L    Diff Method Automated Method     % Neutrophils 60.9 %    % Lymphocytes 27.2 %    % Monocytes 8.1 %    % Eosinophils 1.3 %    % Basophils 1.2 %    % Immature Granulocytes 1.3 %    Absolute Neutrophil 4.5 1.6 - 8.3 10e9/L    Absolute Lymphocytes 2.0 0.8 - 5.3 10e9/L    Absolute Monocytes 0.6 0.0 - 1.3 10e9/L    Absolute Eosinophils 0.1 0.0 - 0.7 10e9/L    Absolute " Basophils 0.1 0.0 - 0.2 10e9/L    Abs Immature Granulocytes 0.1 0 - 0.4 10e9/L   Hepatic panel     Status: Abnormal   Result Value Ref Range    Bilirubin Direct <0.1 0.0 - 0.2 mg/dL    Bilirubin Total 0.2 0.2 - 1.3 mg/dL    Albumin 3.0 (L) 3.4 - 5.0 g/dL    Protein Total 7.8 6.8 - 8.8 g/dL    Alkaline Phosphatase 139 40 - 150 U/L    ALT 41 0 - 50 U/L    AST 18 0 - 45 U/L     Assessment and Plan:   Continued elevated blood pressure- on this pt with obesity and >64 yo- since this has been elevated with previous checks concern that she should not be receiving treatment until improved. Pt is sharing that she is starting a walking program this week and wants to start taking Dr Arceo's BP and geronimo health vitamins instead of Hypertension medications but is aware of potential side effects of continued BP elevation and education on this was reinforced. Pt approved trialling a blood pressure medication- amlodipine 5mg daily - educated on administration and potential side effects- especially edema of lower extremities. We reviewed that vitamins and shared concerns of interactions and pt appreciated review and for now will hold off on vitamins until after chemotherapy.   We will need to continue to review BP and adjust medication as needed with each visit.   Encouraged the walking program.   Metastatic Breast Cancer-patient is being treated for proven metastatic disease as noted with left axillary biopsy and 3/23/2020 PET scan results showing retro-pectoral as well as the axillary nodes involved with disease and diffuse bony disease.    Currently on week 7 of Taxol and is aware of need for 12 weeks then moving to 4 cycles of AC.   She will beseen prior to week 10 of Taxol.   Bone metastasis - now using Zometa in plan and is on daily calcium plus vitamin D.  This was a 20 min visit with > 50% in counseling and coordinating care including education and management of concerns.    Swathi Page,CNP      Again, thank you for  allowing me to participate in the care of your patient.        Sincerely,        Swathi Page, LONNY, APRN CNP

## 2020-05-06 NOTE — NURSING NOTE
"Oncology Rooming Note    May 6, 2020 11:55 AM   Mary Olson is a 64 year old female who presents for:    Chief Complaint   Patient presents with     Oncology Clinic Visit     Elevated BP     Initial Vitals: BP (!) 181/93   Pulse 125   Temp 97  F (36.1  C) (Oral)   Resp 20   Ht 1.588 m (5' 2.52\")   Wt 109.8 kg (242 lb)   SpO2 96%   BMI 43.53 kg/m   Estimated body mass index is 43.53 kg/m  as calculated from the following:    Height as of this encounter: 1.588 m (5' 2.52\").    Weight as of this encounter: 109.8 kg (242 lb). Body surface area is 2.2 meters squared.  No Pain (0) Comment: Data Unavailable   No LMP recorded. Patient is postmenopausal.  Allergies reviewed: Yes  Medications reviewed: Yes    Medications: MEDICATION REFILLS NEEDED TODAY. Provider was notified.  Pharmacy name entered into [employer]:    [facility] - [place], [place] - [address] [place] [address].  [facility] FA[facility] - [place], [place]  [facility] - [place], [place] - [address]    Clinical concerns: Elevated BP      Kailee Jarvis LPN              "

## 2020-05-06 NOTE — PROGRESS NOTES
Oncology Follow Up Visit: May 6, 2020     Oncologist: Dr Keysha Birch  PCP: [facility]    Diagnosis:Metastatic Breast Cancer- seen today for elevated blood pressure   Mary Olson is a 63 yo female who noticed what she believed to be a bug bite on her left breast in 10/2019.  She initially noted 3 red spots- treated at home. She underwent imaging with a diagnostic mammogram on March 4, 2020, as well as ultrasound where they found an ill-defined margins measuring at least 6.2 cm to center of the left nipple.  Other masses are seen posterior to this including a 2.1 cm mass slightly medial on mammography.  Diffuse left breast skin thickening.  There are at least 3 abnormal enlarged lymph nodes in the left axilla measuring 2.5 x 1.3 x 2.4 cm.  The right breast at the 12:30 position 7 cm from the nipple, there is a spiculated mass corresponding mammographic finding measuring approximately 1.3 x 0.9 x 1.0 cm.  A smaller spiculated hypoechoic mass is at the 12 o'clock position 2 cm from the nipple, measuring 0.9 x 0.5 x 0.7 cm.  Normal lymph nodes in the right axilla.  Biopsy of the left breast mass confirmed invasive mammary carcinoma, no special type, invasive ductal, grade 2, estrogen receptor positive, progesterone receptor positive, HER-2 negative.  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  The second right breast mass at the 12 o'clock position showed benign breast tissue, composed predominantly of uncolonized stroma with rare ducts.  No discrete masses identified.  No atypical or malignant findings.  3/12/2020 MUGA showed normal heart function with LVEF of 58% and no other abnormalities.  Left Axillary biopsy was positive for metastatic mammary cancer with Negative Her2  PET scan showing metastasis to left axilla,  retropectoral lymph nodes and diffuse bone metastasis  Treatment:   3/25/2020 to begin weekly Taxol x 12 followed by ddAC x 4 cycles  3/25/2020 Begin zometa    Interval History: Ms. Olson comes to clinic  For continuation of Taxol treatment week 7 for her bilateral breast cancer and was noted to have continued elevation of her blood pressure which is not new but not currently being treated. She is having some blurring of the vision to one eye but no headaches, hearing changes, weakness, SOB or chest pain. States she is receiving a new vitamin supplement from Dr Arceo that she is interested trying for help with this and is starting a walking program this week but aware these moves will not correct BP in a short time.   Rest of comprehensive and complete ROS is reviewed and is negative.   Past Medical History:   Diagnosis Date     ASCUS favoring benign 10/28/15    neg HPV     Current Outpatient Medications   Medication     amLODIPine (NORVASC) 5 MG tablet     Ascorbic Acid (VITAMIN C PO)     aspirin 81 MG tablet     biotin 1000 MCG TABS tablet     calcium-vitamin D-vitamin K (VIACTIV) 500-500-40 MG-UNT-MCG CHEW     Cholecalciferol (VITAMIN D3 PO)     Coenzyme Q10 (COQ-10) 200 MG CAPS     Cyanocobalamin (B-12) 2500 MCG TABS     lidocaine-prilocaine (EMLA) 2.5-2.5 % external cream     LORazepam (ATIVAN) 0.5 MG tablet     Multiple Vitamins-Minerals (CENTRUM ADULTS PO)     prochlorperazine (COMPAZINE) 10 MG tablet     UNABLE TO FIND     VITAMIN E NATURAL PO     Zinc Acetate, Oral, (ZINC ACETATE PO)     No current facility-administered medications for this visit.      Facility-Administered Medications Ordered in Other Visits   Medication     heparin 100 UNIT/ML injection 5 mL     heparin 100 UNIT/ML injection 5 mL     PACLitaxel (TAXOL) 178 mg in sodium chloride 0.9 % 305 mL infusion     sodium bicarbonate 8.4 % injection 1 mEq     sodium chloride (PF) 0.9% PF flush 10-20 mL     sodium chloride (PF) 0.9% PF flush 3-20 mL  "    Allergies   Allergen Reactions     Lisinopril Cough       Physical Exam:BP (!) 181/93   Pulse 125   Temp 97  F (36.1  C) (Oral)   Resp 20   Ht 1.588 m (5' 2.52\")   Wt 109.8 kg (242 lb)   SpO2 96%   BMI 43.53 kg/m     BP Readings from Last 6 Encounters:   05/06/20 (!) 181/93   05/06/20 (!) 181/93   04/29/20 (!) 174/100   04/22/20 (!) 197/92   04/14/20 (!) 175/101   04/08/20 (!) 203/93   Constitutional: Alert, cooperative, and in no distress.   Cardiac: Heart rate and rhythm is regular and strong without murmur  Respiratory: Breathing easy-no cough  Port in place and without redness or swelling today of her right breast.  GI: Abdomen is rounded  MS: Muscle tone normal, extremities normal with no edema.   Skin: No suspicious lesions or rashes  Neuro: Sensory grossly WNL  Psych: Mentation appears normal and affect mildly anxious but with good conversation    Laboratory Results:   Results for orders placed or performed in visit on 05/06/20   CBC with platelets differential     Status: Abnormal   Result Value Ref Range    WBC 7.4 4.0 - 11.0 10e9/L    RBC Count 4.38 3.8 - 5.2 10e12/L    Hemoglobin 12.3 11.7 - 15.7 g/dL    Hematocrit 37.9 35.0 - 47.0 %    MCV 87 78 - 100 fl    MCH 28.1 26.5 - 33.0 pg    MCHC 32.5 31.5 - 36.5 g/dL    RDW 15.3 (H) 10.0 - 15.0 %    Platelet Count 494 (H) 150 - 450 10e9/L    Diff Method Automated Method     % Neutrophils 60.9 %    % Lymphocytes 27.2 %    % Monocytes 8.1 %    % Eosinophils 1.3 %    % Basophils 1.2 %    % Immature Granulocytes 1.3 %    Absolute Neutrophil 4.5 1.6 - 8.3 10e9/L    Absolute Lymphocytes 2.0 0.8 - 5.3 10e9/L    Absolute Monocytes 0.6 0.0 - 1.3 10e9/L    Absolute Eosinophils 0.1 0.0 - 0.7 10e9/L    Absolute Basophils 0.1 0.0 - 0.2 10e9/L    Abs Immature Granulocytes 0.1 0 - 0.4 10e9/L   Hepatic panel     Status: Abnormal   Result Value Ref Range    Bilirubin Direct <0.1 0.0 - 0.2 mg/dL    Bilirubin Total 0.2 0.2 - 1.3 mg/dL    Albumin 3.0 (L) 3.4 - 5.0 g/dL "    Protein Total 7.8 6.8 - 8.8 g/dL    Alkaline Phosphatase 139 40 - 150 U/L    ALT 41 0 - 50 U/L    AST 18 0 - 45 U/L     Assessment and Plan:   Continued elevated blood pressure- on this pt with obesity and >64 yo- since this has been elevated with previous checks concern that she should not be receiving treatment until improved. Pt is sharing that she is starting a walking program this week and wants to start taking Dr Arceo's BP and geronimo health vitamins instead of Hypertension medications but is aware of potential side effects of continued BP elevation and education on this was reinforced. Pt approved trialling a blood pressure medication- amlodipine 5mg daily - educated on administration and potential side effects- especially edema of lower extremities. We reviewed that vitamins and shared concerns of interactions and pt appreciated review and for now will hold off on vitamins until after chemotherapy.   We will need to continue to review BP and adjust medication as needed with each visit.   Encouraged the walking program.   Metastatic Breast Cancer-patient is being treated for proven metastatic disease as noted with left axillary biopsy and 3/23/2020 PET scan results showing retro-pectoral as well as the axillary nodes involved with disease and diffuse bony disease.    Currently on week 7 of Taxol and is aware of need for 12 weeks then moving to 4 cycles of AC.   She will beseen prior to week 10 of Taxol.   Bone metastasis - now using Zometa in plan and is on daily calcium plus vitamin D.  This was a 20 min visit with > 50% in counseling and coordinating care including education and management of concerns.    Swathi Page,CNP

## 2020-05-06 NOTE — PROGRESS NOTES
.Infusion Nursing Note:  Mary Olson presents today for C3 D1 Taxol.    Patient seen by provider today: Yes: Dr. Eyal tinsley and Swathi CHATTERJEE today   present during visit today: Not Applicable.    Note: Patients BP very elevated today, Swathi CHATTERJEE Consulted and assessed patient. Denies headache, chest pain or SOB. Amlodipine ordered for patient, received med from pharm for patient and first dose taken. At end of infusion, BP still elevated quite a bit after sitting for at least 30 min, Swathi solitario spoke with patient, ok to discharge and will take another dose of Amlodipine tonight.    Intravenous Access:  Implanted Port.    Treatment Conditions:  Lab Results   Component Value Date    HGB 12.3 05/06/2020     Lab Results   Component Value Date    WBC 7.4 05/06/2020      Lab Results   Component Value Date    ANEU 4.5 05/06/2020     Lab Results   Component Value Date     05/06/2020      Lab Results   Component Value Date     03/04/2020                   Lab Results   Component Value Date    POTASSIUM 4.7 03/04/2020           No results found for: MAG         Lab Results   Component Value Date    CR 0.64 04/22/2020                   Lab Results   Component Value Date    BUFFY 9.3 04/22/2020                Lab Results   Component Value Date    BILITOTAL 0.2 05/06/2020           Lab Results   Component Value Date    ALBUMIN 3.0 05/06/2020                    Lab Results   Component Value Date    ALT 41 05/06/2020           Lab Results   Component Value Date    AST 18 05/06/2020       Results reviewed, labs MET treatment parameters, ok to proceed with treatment.      Post Infusion Assessment:  Patient tolerated infusion without incident except see above.  Patient observed for 30 minutes post infusion due to blood pressure.  Blood return noted pre and post infusion.  Site patent and intact, free from redness, edema or discomfort.  No evidence of extravasations.  Access discontinued per protocol.        Discharge Plan:   Discharge instructions reviewed with: Patient.  Patient and/or family verbalized understanding of discharge instructions and all questions answered.  Patient discharged in stable condition accompanied by: self.  Departure Mode: Ambulatory.    Keysha Soler RN

## 2020-05-13 ENCOUNTER — INFUSION THERAPY VISIT (OUTPATIENT)
Dept: INFUSION THERAPY | Facility: CLINIC | Age: 65
End: 2020-05-13
Attending: INTERNAL MEDICINE

## 2020-05-13 ENCOUNTER — ONCOLOGY VISIT (OUTPATIENT)
Dept: ONCOLOGY | Facility: CLINIC | Age: 65
End: 2020-05-13
Payer: COMMERCIAL

## 2020-05-13 VITALS
SYSTOLIC BLOOD PRESSURE: 178 MMHG | DIASTOLIC BLOOD PRESSURE: 120 MMHG | RESPIRATION RATE: 16 BRPM | TEMPERATURE: 98.2 F | OXYGEN SATURATION: 95 % | WEIGHT: 240.9 LBS | BODY MASS INDEX: 43.33 KG/M2 | HEART RATE: 114 BPM

## 2020-05-13 DIAGNOSIS — Z17.0 MALIGNANT NEOPLASM OF OVERLAPPING SITES OF LEFT BREAST IN FEMALE, ESTROGEN RECEPTOR POSITIVE (H): Primary | ICD-10-CM

## 2020-05-13 DIAGNOSIS — C50.812 MALIGNANT NEOPLASM OF OVERLAPPING SITES OF LEFT BREAST IN FEMALE, ESTROGEN RECEPTOR POSITIVE (H): Primary | ICD-10-CM

## 2020-05-13 DIAGNOSIS — I10 ESSENTIAL HYPERTENSION: ICD-10-CM

## 2020-05-13 LAB
BASOPHILS # BLD AUTO: 0.1 10E9/L (ref 0–0.2)
BASOPHILS NFR BLD AUTO: 0.9 %
DIFFERENTIAL METHOD BLD: ABNORMAL
EOSINOPHIL # BLD AUTO: 0.1 10E9/L (ref 0–0.7)
EOSINOPHIL NFR BLD AUTO: 1.2 %
ERYTHROCYTE [DISTWIDTH] IN BLOOD BY AUTOMATED COUNT: 16.4 % (ref 10–15)
HCT VFR BLD AUTO: 39.7 % (ref 35–47)
HGB BLD-MCNC: 12.9 G/DL (ref 11.7–15.7)
IMM GRANULOCYTES # BLD: 0.2 10E9/L (ref 0–0.4)
IMM GRANULOCYTES NFR BLD: 2.6 %
LYMPHOCYTES # BLD AUTO: 2.3 10E9/L (ref 0.8–5.3)
LYMPHOCYTES NFR BLD AUTO: 31.4 %
MCH RBC QN AUTO: 28.2 PG (ref 26.5–33)
MCHC RBC AUTO-ENTMCNC: 32.5 G/DL (ref 31.5–36.5)
MCV RBC AUTO: 87 FL (ref 78–100)
MONOCYTES # BLD AUTO: 0.4 10E9/L (ref 0–1.3)
MONOCYTES NFR BLD AUTO: 5.4 %
NEUTROPHILS # BLD AUTO: 4.3 10E9/L (ref 1.6–8.3)
NEUTROPHILS NFR BLD AUTO: 58.5 %
PLATELET # BLD AUTO: 438 10E9/L (ref 150–450)
RBC # BLD AUTO: 4.58 10E12/L (ref 3.8–5.2)
WBC # BLD AUTO: 7.4 10E9/L (ref 4–11)

## 2020-05-13 PROCEDURE — 36591 DRAW BLOOD OFF VENOUS DEVICE: CPT | Performed by: NURSE PRACTITIONER

## 2020-05-13 PROCEDURE — 99207 ZZC NO CHARGE NURSE ONLY: CPT

## 2020-05-13 PROCEDURE — 85025 COMPLETE CBC W/AUTO DIFF WBC: CPT | Performed by: INTERNAL MEDICINE

## 2020-05-13 PROCEDURE — 99207 ZZC NO CHARGE LOS: CPT

## 2020-05-13 RX ORDER — HEPARIN SODIUM (PORCINE) LOCK FLUSH IV SOLN 100 UNIT/ML 100 UNIT/ML
5 SOLUTION INTRAVENOUS
Status: DISCONTINUED | OUTPATIENT
Start: 2020-05-13 | End: 2020-05-13 | Stop reason: HOSPADM

## 2020-05-13 RX ORDER — DIPHENHYDRAMINE HCL 25 MG
50 CAPSULE ORAL ONCE
Status: DISCONTINUED | OUTPATIENT
Start: 2020-05-13 | End: 2020-05-13 | Stop reason: HOSPADM

## 2020-05-13 RX ORDER — BLOOD PRESSURE TEST KIT
KIT MISCELLANEOUS
Qty: 1 KIT | Refills: 0 | Status: SHIPPED | OUTPATIENT
Start: 2020-05-13

## 2020-05-13 RX ADMIN — HEPARIN SODIUM (PORCINE) LOCK FLUSH IV SOLN 100 UNIT/ML 5 ML: 100 SOLUTION at 11:29

## 2020-05-13 RX ADMIN — HEPARIN SODIUM (PORCINE) LOCK FLUSH IV SOLN 100 UNIT/ML 5 ML: 100 SOLUTION at 12:46

## 2020-05-13 ASSESSMENT — PAIN SCALES - GENERAL: PAINLEVEL: NO PAIN (0)

## 2020-05-13 NOTE — PROGRESS NOTES
Patient's face, neck, upper chest red-states she is overheated from wearing an N95 mask with a cloth mask over it. Port needle left accessed for treatment. Tolerated port access and draw without complaint. Port site scrubbed with Chloraprep for 30 seconds and allowed to dry completely prior to dressing application. Accessed using sterile technique. [place] tubes drawn-Red gel/Green/Purple tubes. Double signed by patient and RN. See documentation flowsheet. Lexy Butterfield, RN, BSN, OCN

## 2020-05-13 NOTE — PROGRESS NOTES
Infusion Nursing Note:  Mary Olson presents today for C3D8 Taxol.    Patient seen by provider today: No   present during visit today: Not Applicable.    Note: Patient hypertensive today. Denies any headaches, dizziness or chest pains. Updated Swathi MELGAR CNP. Orders received.  Hold treatment today. Patient will  b/p cuff and monitor her b/p 2x daily. Am and pm.   Told patient if develops any symptoms to call triage or go to ER.     Intravenous Access:  Implanted Port.    Treatment Conditions:  Lab Results   Component Value Date    HGB 12.9 05/13/2020     Lab Results   Component Value Date    WBC 7.4 05/13/2020      Lab Results   Component Value Date    ANEU 4.3 05/13/2020     Lab Results   Component Value Date     05/13/2020      Labs WNL, But b/p elevated.      Post Infusion Assessment:  Access discontinued per protocol.       Discharge Plan:   Discharge instructions reviewed with: Patient.  Patient and/or family verbalized understanding of discharge instructions and all questions answered.  Patient discharged in stable condition accompanied by: self.  Departure Mode: Ambulatory. Patient requested new schedule to be mailed to her.     Katharina Headley RN

## 2020-05-13 NOTE — PROGRESS NOTES
This provider was notified of elevated blood pressures again noted today. Pt had been started on amlodipine 5 mg each evening last week. Denies symptoms of concern with elevated BPs. Since even more elevated - she was denied Taxol today and was told to increase to 10 mg amlodipine each evening and given prescription for blood pressure monitor at home. She did start exercising this week.  She was told of symptoms of concern and told to go to ED with any of the concerning symptoms.  Johnschrupert,CNp

## 2020-05-14 ENCOUNTER — MYC MEDICAL ADVICE (OUTPATIENT)
Dept: PEDIATRICS | Facility: CLINIC | Age: 65
End: 2020-05-14

## 2020-05-14 ENCOUNTER — TELEPHONE (OUTPATIENT)
Dept: ONCOLOGY | Facility: CLINIC | Age: 65
End: 2020-05-14

## 2020-05-14 NOTE — TELEPHONE ENCOUNTER
TC for follow up to elevated blood pressures yesterday. SHe was to go home and increase amlodipine from 5 to 10 mg and was to get a blood pressure kit. She reports blood pressures were still high yesterday but this morning was down to 130/80's and again with no symptoms of concern. She will continue to test and use the amlodipine 10 mg nightly. SGermschrupert,CNp

## 2020-05-18 DIAGNOSIS — Z17.0 MALIGNANT NEOPLASM OF OVERLAPPING SITES OF LEFT BREAST IN FEMALE, ESTROGEN RECEPTOR POSITIVE (H): Primary | ICD-10-CM

## 2020-05-18 DIAGNOSIS — C50.812 MALIGNANT NEOPLASM OF OVERLAPPING SITES OF LEFT BREAST IN FEMALE, ESTROGEN RECEPTOR POSITIVE (H): Primary | ICD-10-CM

## 2020-05-18 RX ORDER — DIPHENHYDRAMINE HYDROCHLORIDE 50 MG/ML
50 INJECTION INTRAMUSCULAR; INTRAVENOUS
Status: CANCELLED
Start: 2020-05-20

## 2020-05-18 RX ORDER — NALOXONE HYDROCHLORIDE 0.4 MG/ML
.1-.4 INJECTION, SOLUTION INTRAMUSCULAR; INTRAVENOUS; SUBCUTANEOUS
Status: CANCELLED | OUTPATIENT
Start: 2020-05-20

## 2020-05-18 RX ORDER — HEPARIN SODIUM,PORCINE 10 UNIT/ML
5 VIAL (ML) INTRAVENOUS
Status: CANCELLED | OUTPATIENT
Start: 2020-05-20

## 2020-05-18 RX ORDER — EPINEPHRINE 1 MG/ML
0.3 INJECTION, SOLUTION INTRAMUSCULAR; SUBCUTANEOUS EVERY 5 MIN PRN
Status: CANCELLED | OUTPATIENT
Start: 2020-05-20

## 2020-05-18 RX ORDER — LORAZEPAM 2 MG/ML
0.5 INJECTION INTRAMUSCULAR EVERY 4 HOURS PRN
Status: CANCELLED
Start: 2020-05-20

## 2020-05-18 RX ORDER — ALBUTEROL SULFATE 0.83 MG/ML
2.5 SOLUTION RESPIRATORY (INHALATION)
Status: CANCELLED | OUTPATIENT
Start: 2020-05-20

## 2020-05-18 RX ORDER — METHYLPREDNISOLONE SODIUM SUCCINATE 125 MG/2ML
125 INJECTION, POWDER, LYOPHILIZED, FOR SOLUTION INTRAMUSCULAR; INTRAVENOUS
Status: CANCELLED
Start: 2020-05-20

## 2020-05-18 RX ORDER — SODIUM CHLORIDE 9 MG/ML
1000 INJECTION, SOLUTION INTRAVENOUS CONTINUOUS PRN
Status: CANCELLED
Start: 2020-05-20

## 2020-05-18 RX ORDER — DIPHENHYDRAMINE HCL 25 MG
50 CAPSULE ORAL ONCE
Status: CANCELLED | OUTPATIENT
Start: 2020-05-20

## 2020-05-18 RX ORDER — MEPERIDINE HYDROCHLORIDE 25 MG/ML
25 INJECTION INTRAMUSCULAR; INTRAVENOUS; SUBCUTANEOUS EVERY 30 MIN PRN
Status: CANCELLED | OUTPATIENT
Start: 2020-05-20

## 2020-05-18 RX ORDER — HEPARIN SODIUM (PORCINE) LOCK FLUSH IV SOLN 100 UNIT/ML 100 UNIT/ML
5 SOLUTION INTRAVENOUS
Status: CANCELLED | OUTPATIENT
Start: 2020-05-20

## 2020-05-18 RX ORDER — EPINEPHRINE 0.3 MG/.3ML
0.3 INJECTION SUBCUTANEOUS EVERY 5 MIN PRN
Status: CANCELLED | OUTPATIENT
Start: 2020-05-20

## 2020-05-18 RX ORDER — ALBUTEROL SULFATE 90 UG/1
1-2 AEROSOL, METERED RESPIRATORY (INHALATION)
Status: CANCELLED
Start: 2020-05-20

## 2020-05-20 ENCOUNTER — ONCOLOGY VISIT (OUTPATIENT)
Dept: ONCOLOGY | Facility: CLINIC | Age: 65
End: 2020-05-20
Payer: COMMERCIAL

## 2020-05-20 ENCOUNTER — INFUSION THERAPY VISIT (OUTPATIENT)
Dept: INFUSION THERAPY | Facility: CLINIC | Age: 65
End: 2020-05-20
Payer: COMMERCIAL

## 2020-05-20 VITALS
BODY MASS INDEX: 42.59 KG/M2 | RESPIRATION RATE: 18 BRPM | WEIGHT: 240.4 LBS | OXYGEN SATURATION: 95 % | HEART RATE: 112 BPM | TEMPERATURE: 98.4 F | HEIGHT: 63 IN | DIASTOLIC BLOOD PRESSURE: 79 MMHG | SYSTOLIC BLOOD PRESSURE: 124 MMHG

## 2020-05-20 DIAGNOSIS — Z17.0 MALIGNANT NEOPLASM OF OVERLAPPING SITES OF LEFT BREAST IN FEMALE, ESTROGEN RECEPTOR POSITIVE (H): Primary | ICD-10-CM

## 2020-05-20 DIAGNOSIS — C50.812 MALIGNANT NEOPLASM OF OVERLAPPING SITES OF LEFT BREAST IN FEMALE, ESTROGEN RECEPTOR POSITIVE (H): Primary | ICD-10-CM

## 2020-05-20 DIAGNOSIS — C79.51 BONE METASTASIS: ICD-10-CM

## 2020-05-20 DIAGNOSIS — I10 ESSENTIAL HYPERTENSION: ICD-10-CM

## 2020-05-20 LAB
ALBUMIN SERPL-MCNC: 3.4 G/DL (ref 3.4–5)
BASOPHILS # BLD AUTO: 0.1 10E9/L (ref 0–0.2)
BASOPHILS NFR BLD AUTO: 1 %
CALCIUM SERPL-MCNC: 9.9 MG/DL (ref 8.5–10.1)
CREAT SERPL-MCNC: 0.65 MG/DL (ref 0.52–1.04)
DIFFERENTIAL METHOD BLD: ABNORMAL
EOSINOPHIL # BLD AUTO: 0.1 10E9/L (ref 0–0.7)
EOSINOPHIL NFR BLD AUTO: 0.9 %
ERYTHROCYTE [DISTWIDTH] IN BLOOD BY AUTOMATED COUNT: 17.5 % (ref 10–15)
GFR SERPL CREATININE-BSD FRML MDRD: >90 ML/MIN/{1.73_M2}
HCT VFR BLD AUTO: 43.3 % (ref 35–47)
HGB BLD-MCNC: 14 G/DL (ref 11.7–15.7)
IMM GRANULOCYTES # BLD: 0 10E9/L (ref 0–0.4)
IMM GRANULOCYTES NFR BLD: 0.3 %
LYMPHOCYTES # BLD AUTO: 2.4 10E9/L (ref 0.8–5.3)
LYMPHOCYTES NFR BLD AUTO: 26 %
MCH RBC QN AUTO: 28.1 PG (ref 26.5–33)
MCHC RBC AUTO-ENTMCNC: 32.3 G/DL (ref 31.5–36.5)
MCV RBC AUTO: 87 FL (ref 78–100)
MONOCYTES # BLD AUTO: 0.7 10E9/L (ref 0–1.3)
MONOCYTES NFR BLD AUTO: 7.6 %
NEUTROPHILS # BLD AUTO: 5.9 10E9/L (ref 1.6–8.3)
NEUTROPHILS NFR BLD AUTO: 64.2 %
PLATELET # BLD AUTO: 323 10E9/L (ref 150–450)
RBC # BLD AUTO: 4.99 10E12/L (ref 3.8–5.2)
WBC # BLD AUTO: 9.1 10E9/L (ref 4–11)

## 2020-05-20 PROCEDURE — 96375 TX/PRO/DX INJ NEW DRUG ADDON: CPT | Performed by: NURSE PRACTITIONER

## 2020-05-20 PROCEDURE — 99207 ZZC NO CHARGE LOS: CPT

## 2020-05-20 PROCEDURE — 96367 TX/PROPH/DG ADDL SEQ IV INF: CPT | Performed by: NURSE PRACTITIONER

## 2020-05-20 PROCEDURE — 82310 ASSAY OF CALCIUM: CPT | Performed by: NURSE PRACTITIONER

## 2020-05-20 PROCEDURE — 82565 ASSAY OF CREATININE: CPT | Performed by: NURSE PRACTITIONER

## 2020-05-20 PROCEDURE — 99207 ZZC NO CHARGE NURSE ONLY: CPT

## 2020-05-20 PROCEDURE — 82040 ASSAY OF SERUM ALBUMIN: CPT | Performed by: NURSE PRACTITIONER

## 2020-05-20 PROCEDURE — S0028 INJECTION, FAMOTIDINE, 20 MG: HCPCS | Performed by: NURSE PRACTITIONER

## 2020-05-20 PROCEDURE — 85025 COMPLETE CBC W/AUTO DIFF WBC: CPT | Performed by: NURSE PRACTITIONER

## 2020-05-20 PROCEDURE — 96413 CHEMO IV INFUSION 1 HR: CPT | Performed by: NURSE PRACTITIONER

## 2020-05-20 RX ORDER — AMLODIPINE BESYLATE 10 MG/1
10 TABLET ORAL DAILY
Qty: 30 TABLET | Refills: 2 | Status: SHIPPED | OUTPATIENT
Start: 2020-05-20 | End: 2020-06-23

## 2020-05-20 RX ORDER — HEPARIN SODIUM (PORCINE) LOCK FLUSH IV SOLN 100 UNIT/ML 100 UNIT/ML
5 SOLUTION INTRAVENOUS
Status: DISCONTINUED | OUTPATIENT
Start: 2020-05-20 | End: 2020-05-20 | Stop reason: HOSPADM

## 2020-05-20 RX ORDER — ZOLEDRONIC ACID 0.04 MG/ML
4 INJECTION, SOLUTION INTRAVENOUS ONCE
Status: COMPLETED | OUTPATIENT
Start: 2020-05-20 | End: 2020-05-20

## 2020-05-20 RX ORDER — DIPHENHYDRAMINE HCL 25 MG
50 CAPSULE ORAL ONCE
Status: COMPLETED | OUTPATIENT
Start: 2020-05-20 | End: 2020-05-20

## 2020-05-20 RX ADMIN — HEPARIN SODIUM (PORCINE) LOCK FLUSH IV SOLN 100 UNIT/ML 5 ML: 100 SOLUTION at 10:04

## 2020-05-20 RX ADMIN — Medication 250 ML: at 11:03

## 2020-05-20 RX ADMIN — HEPARIN SODIUM (PORCINE) LOCK FLUSH IV SOLN 100 UNIT/ML 5 ML: 100 SOLUTION at 13:19

## 2020-05-20 RX ADMIN — Medication 50 MG: at 11:09

## 2020-05-20 RX ADMIN — ZOLEDRONIC ACID 4 MG: 0.04 INJECTION, SOLUTION INTRAVENOUS at 11:34

## 2020-05-20 ASSESSMENT — PAIN SCALES - GENERAL: PAINLEVEL: NO PAIN (0)

## 2020-05-20 ASSESSMENT — MIFFLIN-ST. JEOR: SCORE: 1606.64

## 2020-05-20 NOTE — PROGRESS NOTES
Infusion Nursing Note:  Mary Olson presents today for C3 D8 Taxol/Zometa.    Patient seen by provider today: No   present during visit today: Not Applicable.    Note: Patient has been taking Amlodipine 10 mg at bedtime, starting BP still elevated but better than previous. Then checked in other arm (left arm) and was dramatically down to 120's/70-80's. Patient denies headaches, chest pains or shortness of breath, will keep a log of Bp readings at home between both arms.    Intravenous Access:  Implanted Port.    Treatment Conditions:  Lab Results   Component Value Date    HGB 14.0 05/20/2020     Lab Results   Component Value Date    WBC 9.1 05/20/2020      Lab Results   Component Value Date    ANEU 5.9 05/20/2020     Lab Results   Component Value Date     05/20/2020      Lab Results   Component Value Date     03/04/2020                   Lab Results   Component Value Date    POTASSIUM 4.7 03/04/2020           No results found for: MAG         Lab Results   Component Value Date    CR 0.65 05/20/2020                   Lab Results   Component Value Date    BUFFY 9.9 05/20/2020                Lab Results   Component Value Date    BILITOTAL 0.2 05/06/2020           Lab Results   Component Value Date    ALBUMIN 3.4 05/20/2020                    Lab Results   Component Value Date    ALT 41 05/06/2020           Lab Results   Component Value Date    AST 18 05/06/2020       Results reviewed, labs MET treatment parameters, ok to proceed with treatment.      Post Infusion Assessment:  Patient tolerated infusion without incident.  Patient observed for 15 minutes post infusion per protocol.  Blood return noted pre and post infusion.  Site patent and intact, free from redness, edema or discomfort.  No evidence of extravasations.  Access discontinued per protocol.       Discharge Plan:   Discharge instructions reviewed with: Patient.  Patient and/or family verbalized understanding of discharge  instructions and all questions answered.  Patient discharged in stable condition accompanied by: self.  Departure Mode: Ambulatory.    Keysha Soler RN

## 2020-05-20 NOTE — PATIENT INSTRUCTIONS
Pt to return on 05/27/20 for C3 D15 Taxol. Copies of medication list and upcoming appointments given prior to discharge.

## 2020-05-26 ENCOUNTER — TELEPHONE (OUTPATIENT)
Dept: FAMILY MEDICINE | Facility: CLINIC | Age: 65
End: 2020-05-26

## 2020-05-26 NOTE — TELEPHONE ENCOUNTER
Patient saw Princess Ring back in March for breast symptoms, subsequently was found to have cancer and has been seeing oncology for treatments and chemotherapy.  They started her on Norvasc for elevated BP, started at 5mg, then increased to 10mg, but wanted her to talk with primary care to see if patient should be on another medication, and for management.  RN discussed that Princess is out this week.  Assisted with scheduling video visit 6/2/20 with Princess Ring due to immunocompromised state.  "[employer]" sent with instructions.     Joya Carey RN

## 2020-05-26 NOTE — TELEPHONE ENCOUNTER
Reason for call:  Other   Patient called regarding (reason for call): call back    Additional comments: per patient , she wanted a call back to talk about her high blood pressure and her cancer treatment .     Phone number to reach patient:  Home number on file [phone] (home)    Best Time:  anytime    Can we leave a detailed message on this number?  NO    Travel screening: Not Applicable

## 2020-05-27 ENCOUNTER — INFUSION THERAPY VISIT (OUTPATIENT)
Dept: INFUSION THERAPY | Facility: CLINIC | Age: 65
End: 2020-05-27
Payer: COMMERCIAL

## 2020-05-27 ENCOUNTER — ONCOLOGY VISIT (OUTPATIENT)
Dept: ONCOLOGY | Facility: CLINIC | Age: 65
End: 2020-05-27
Payer: COMMERCIAL

## 2020-05-27 VITALS
HEART RATE: 100 BPM | OXYGEN SATURATION: 97 % | RESPIRATION RATE: 18 BRPM | WEIGHT: 238.2 LBS | DIASTOLIC BLOOD PRESSURE: 108 MMHG | BODY MASS INDEX: 42.87 KG/M2 | TEMPERATURE: 98.9 F | SYSTOLIC BLOOD PRESSURE: 170 MMHG

## 2020-05-27 DIAGNOSIS — C50.812 MALIGNANT NEOPLASM OF OVERLAPPING SITES OF LEFT BREAST IN FEMALE, ESTROGEN RECEPTOR POSITIVE (H): Primary | ICD-10-CM

## 2020-05-27 DIAGNOSIS — Z17.0 MALIGNANT NEOPLASM OF OVERLAPPING SITES OF LEFT BREAST IN FEMALE, ESTROGEN RECEPTOR POSITIVE (H): Primary | ICD-10-CM

## 2020-05-27 LAB
BASOPHILS # BLD AUTO: 0.1 10E9/L (ref 0–0.2)
BASOPHILS NFR BLD AUTO: 1.3 %
DIFFERENTIAL METHOD BLD: ABNORMAL
EOSINOPHIL # BLD AUTO: 0.2 10E9/L (ref 0–0.7)
EOSINOPHIL NFR BLD AUTO: 1.9 %
ERYTHROCYTE [DISTWIDTH] IN BLOOD BY AUTOMATED COUNT: 16.6 % (ref 10–15)
HCT VFR BLD AUTO: 40.6 % (ref 35–47)
HGB BLD-MCNC: 13.3 G/DL (ref 11.7–15.7)
IMM GRANULOCYTES # BLD: 0.1 10E9/L (ref 0–0.4)
IMM GRANULOCYTES NFR BLD: 0.9 %
LYMPHOCYTES # BLD AUTO: 2.3 10E9/L (ref 0.8–5.3)
LYMPHOCYTES NFR BLD AUTO: 26.7 %
MCH RBC QN AUTO: 28.3 PG (ref 26.5–33)
MCHC RBC AUTO-ENTMCNC: 32.8 G/DL (ref 31.5–36.5)
MCV RBC AUTO: 86 FL (ref 78–100)
MONOCYTES # BLD AUTO: 0.6 10E9/L (ref 0–1.3)
MONOCYTES NFR BLD AUTO: 6.8 %
NEUTROPHILS # BLD AUTO: 5.4 10E9/L (ref 1.6–8.3)
NEUTROPHILS NFR BLD AUTO: 62.4 %
PLATELET # BLD AUTO: 345 10E9/L (ref 150–450)
RBC # BLD AUTO: 4.7 10E12/L (ref 3.8–5.2)
WBC # BLD AUTO: 8.6 10E9/L (ref 4–11)

## 2020-05-27 PROCEDURE — 96413 CHEMO IV INFUSION 1 HR: CPT | Performed by: INTERNAL MEDICINE

## 2020-05-27 PROCEDURE — 85025 COMPLETE CBC W/AUTO DIFF WBC: CPT | Performed by: INTERNAL MEDICINE

## 2020-05-27 PROCEDURE — S0028 INJECTION, FAMOTIDINE, 20 MG: HCPCS | Performed by: INTERNAL MEDICINE

## 2020-05-27 PROCEDURE — 99207 ZZC NO CHARGE LOS: CPT

## 2020-05-27 PROCEDURE — 96367 TX/PROPH/DG ADDL SEQ IV INF: CPT | Performed by: INTERNAL MEDICINE

## 2020-05-27 PROCEDURE — 96375 TX/PRO/DX INJ NEW DRUG ADDON: CPT | Performed by: INTERNAL MEDICINE

## 2020-05-27 PROCEDURE — 99207 ZZC NO CHARGE NURSE ONLY: CPT

## 2020-05-27 RX ORDER — HEPARIN SODIUM (PORCINE) LOCK FLUSH IV SOLN 100 UNIT/ML 100 UNIT/ML
5 SOLUTION INTRAVENOUS
Status: DISCONTINUED | OUTPATIENT
Start: 2020-05-27 | End: 2020-05-27 | Stop reason: HOSPADM

## 2020-05-27 RX ORDER — DIPHENHYDRAMINE HCL 25 MG
50 CAPSULE ORAL ONCE
Status: COMPLETED | OUTPATIENT
Start: 2020-05-27 | End: 2020-05-27

## 2020-05-27 RX ADMIN — Medication 50 MG: at 13:08

## 2020-05-27 RX ADMIN — Medication 250 ML: at 13:13

## 2020-05-27 RX ADMIN — HEPARIN SODIUM (PORCINE) LOCK FLUSH IV SOLN 100 UNIT/ML 5 ML: 100 SOLUTION at 15:02

## 2020-05-27 RX ADMIN — HEPARIN SODIUM (PORCINE) LOCK FLUSH IV SOLN 100 UNIT/ML 5 ML: 100 SOLUTION at 12:40

## 2020-05-27 ASSESSMENT — PAIN SCALES - GENERAL: PAINLEVEL: NO PAIN (0)

## 2020-05-27 NOTE — PROGRESS NOTES
Infusion Nursing Note:  Mary Olson presents today for C3D15 Taxol.    Patient seen by provider today: No   present during visit today: Not Applicable.    Note: N/A.    Intravenous Access:  Implanted Port.    Treatment Conditions:  Lab Results   Component Value Date    HGB 13.3 05/27/2020     Lab Results   Component Value Date    WBC 8.6 05/27/2020      Lab Results   Component Value Date    ANEU 5.4 05/27/2020     Lab Results   Component Value Date     05/27/2020      Results reviewed, labs MET treatment parameters, ok to proceed with treatment.      Post Infusion Assessment:  Patient tolerated infusion without incident, except B/P elevated. Patient taking Amlodipine 10mg. Denies any HA, Dizziness or pain. Plans to follow up next Tuesday with PCP for Diuretic.  Blood return noted pre and post infusion.  No evidence of extravasations.  Access discontinued per protocol.       Discharge Plan:   Discharge instructions reviewed with: Patient.  Patient and/or family verbalized understanding of discharge instructions and all questions answered.  Departure Mode: Ambulatory.    Katharina Headley RN

## 2020-05-27 NOTE — PROGRESS NOTES
Port needle left accessed for treatment. Tolerated port access and draw with report of short pain with insertion. Port site scrubbed with Chloraprep for 30 seconds and allowed to dry completely prior to dressing application. Accessed using sterile technique. [place] tubes drawn-Red gel/Green/Purple tubes. Double signed by patient and RN. See documentation flowsheet. Lexy Butterfield RN, BSN, OCN

## 2020-06-02 ENCOUNTER — VIRTUAL VISIT (OUTPATIENT)
Dept: FAMILY MEDICINE | Facility: CLINIC | Age: 65
End: 2020-06-02
Payer: COMMERCIAL

## 2020-06-02 DIAGNOSIS — C79.51 BONE METASTASIS: ICD-10-CM

## 2020-06-02 DIAGNOSIS — Z79.4 TYPE 2 DIABETES MELLITUS WITH HYPERGLYCEMIA, WITH LONG-TERM CURRENT USE OF INSULIN (H): ICD-10-CM

## 2020-06-02 DIAGNOSIS — E11.65 TYPE 2 DIABETES MELLITUS WITH HYPERGLYCEMIA, WITH LONG-TERM CURRENT USE OF INSULIN (H): ICD-10-CM

## 2020-06-02 DIAGNOSIS — I10 BENIGN ESSENTIAL HYPERTENSION: Primary | ICD-10-CM

## 2020-06-02 PROCEDURE — 99214 OFFICE O/P EST MOD 30 MIN: CPT | Mod: 95 | Performed by: NURSE PRACTITIONER

## 2020-06-02 RX ORDER — CHLORTHALIDONE 25 MG/1
12.5 TABLET ORAL DAILY
Qty: 15 TABLET | Refills: 1 | Status: SHIPPED | OUTPATIENT
Start: 2020-06-02 | End: 2020-06-23

## 2020-06-02 NOTE — Clinical Note
Carlo Echevarria,  I did a virtual visit with our mutual patient today for her blood pressure follow-up.  I added chlorthalidone 12.5 mg to her amlodipine 10 mg daily.    I wanted to let you know how much she appreciates your care-she spoke very highly of you.  Tina

## 2020-06-02 NOTE — PROGRESS NOTES
"Mary Olson is a 64 year old female who is being evaluated via a billable video visit.      The patient has been notified of following:     \"This video visit will be conducted via a call between you and your physician/provider. We have found that certain health care needs can be provided without the need for an in-person physical exam.  This service lets us provide the care you need with a video conversation.  If a prescription is necessary we can send it directly to your pharmacy.  If lab work is needed we can place an order for that and you can then stop by our lab to have the test done at a later time.    Video visits are billed at different rates depending on your insurance coverage.  Please reach out to your insurance provider with any questions.    If during the course of the call the physician/provider feels a video visit is not appropriate, you will not be charged for this service.\"    Patient has given verbal consent for Video visit? Yes    How would you like to obtain your AVS? Jaydon    Patient would like the video invitation sent by: Send to e-mail at: [email or web address]    Will anyone else be joining your video visit? No    Subjective     Mary Olson is a 64 year old female who presents today via video visit for the following health issues:    HPI  Hypertension Follow-up      Do you check your blood pressure regularly outside of the clinic? Yes     Are you following a low salt diet? Yes    Are your blood pressures ever more than 140 on the top number (systolic) OR more   than 90 on the bottom number (diastolic), for example 140/90? No      How many servings of fruits and vegetables do you eat daily?  4 or more    On average, how many sweetened beverages do you drink each day (Examples: soda, juice, sweet tea, etc.  Do NOT count diet or artificially sweetened beverages)?   1    How many days per week do you exercise enough to make your heart beat faster? 4    How many minutes a day do " you exercise enough to make your heart beat faster? 10 - 19    How many days per week do you miss taking your medication? 0  She is taking her BP twice daily. And Takes her amlodipine 10 mg at night. BP's are still running high last checked 170/108 in clinic during her chemo infusion. Her oncologist recommended she f/u with her PCP to adjust medications.  She notes some LE edema bilat but improved with elevation.     Metastatic breast CA  She was diagnosed with metastatic breast cancer on 3/2020. She has a PET scan this Friday. Says she is undergoing chemotherapy infusions, she is tolerating treatment fairly well.  Says she has some numbness in her left hand from Taxol. She notices it most with fine motor activity.   She is doing Michael [employer] and yoga and walking. She is losing weight. She has a lot of people praying for her.   Says she cut her sugar down, and eating a lot of vegetables.     Says she has seasonal allergies. OTC Claritin which works.     Followed by Dr. Birch and Swathi Page oncology     New diagnosis of diabetes  At her last clinic visit her fasting sugar was 198.  The priority at this appointment was resting her breast mass which was eventually diagnosed as metastatic breast cancer and therefore her sugars were not addressed.  Says she tries to follow a low sugar diet.  She went in for a PET scan today and her fasting sugar was 250.     Video Start Time: 7:58am      Patient Active Problem List   Diagnosis     Hyperlipidemia LDL goal <160     Essential hypertension     Morbid obesity (H)     Family history of malignant neoplasm of breast     Breast skin changes     Nipple discharge, bloody     Malignant neoplasm of overlapping sites of left breast in female, estrogen receptor positive (H)     Bone metastasis (H)     Past Surgical History:   Procedure Laterality Date     BIOPSY  3/2020     GYN SURGERY  3/13/89. & 6/22/92    C Sections       Social History     Tobacco Use     Smoking status: Never  "Smoker     Smokeless tobacco: Never Used   Substance Use Topics     Alcohol use: Not Currently     Comment: 2-3 drinks a month      Family History   Problem Relation Age of Onset     Hypertension Mother      Coronary Artery Disease Mother      Breast Cancer Mother      Hyperlipidemia Mother      Depression Mother      Arrhythmia Brother      Cerebrovascular Disease Brother      Diabetes No family hx of            Reviewed and updated as needed this visit by Provider         Review of Systems   Constitutional, HEENT, cardiovascular, pulmonary, GI, , musculoskeletal, neuro, skin, endocrine and psych systems are negative, except as otherwise noted.      Objective    There were no vitals taken for this visit.  Estimated body mass index is 42.87 kg/m  as calculated from the following:    Height as of 5/20/20: 1.588 m (5' 2.5\").    Weight as of 5/27/20: 108 kg (238 lb 3.2 oz).  Physical Exam     GENERAL: Healthy, alert and no distress  EYES: Eyes grossly normal to inspection.  No discharge or erythema, or obvious scleral/conjunctival abnormalities.  RESP: No audible wheeze, cough, or visible cyanosis.  No visible retractions or increased work of breathing.    SKIN: Visible skin clear. No significant rash, abnormal pigmentation or lesions.  NEURO: Cranial nerves grossly intact.  Mentation and speech appropriate for age.  PSYCH: Mentation appears normal, affect normal/bright, judgement and insight intact, normal speech and appearance well-groomed.      Diagnostic Test Results:  Labs reviewed in Epic        Assessment & Plan     1. Benign essential hypertension  Uncontrolled recommend adding chlorthalidone, renal function stable will recheck in 2 weeks, monitor LE swelling with amlodipine   - chlorthalidone (HYGROTON) 25 MG tablet; Take 0.5 tablets (12.5 mg) by mouth daily  Dispense: 15 tablet; Refill: 1  - **Basic metabolic panel FUTURE anytime; Future    2. Bone metastasis (H)  Followed by oncology on Taxol infusions " following a low sugar diet and exercising. Mood has been stable .       (E11.65,  Z79.4) Type 2 diabetes mellitus with hyperglycemia, with long-term current use of insulin (H)  Comment: new diagnosis uncontrolled . Need to lower sugars ASAP so that pt can have PET scan completed.   Plan: insulin glargine (LANTUS PEN) 100 UNIT/ML pen,         MED THERAPY MANAGE REFERRAL, order for DME  Recommend staring pt on Lantus 10 U at bedtime. Administration and use discussed.  Advised patient that [facility] pharmacist Fang would be reaching out to patient hopefully today to discuss medication and administration in further detail.    Also ordered DME for glucometer kit including lancets etc.      No follow-ups on file.    NICOLE Gomes CNP  [facility]      Video-Visit Details    Type of service:  Video Visit    Video End Time:8:22 AM  Additional 10 minutes spent on the phone with patient after video call  Originating Location (pt. Location): Home    Distant Location (provider location):  [facility]     Platform used for Video Visit: Doximity    No follow-ups on file.     23:42 mins  NICOLE Gomes CNP

## 2020-06-05 ENCOUNTER — ALLIED HEALTH/NURSE VISIT (OUTPATIENT)
Dept: PHARMACY | Facility: CLINIC | Age: 65
End: 2020-06-05
Payer: COMMERCIAL

## 2020-06-05 ENCOUNTER — MYC MEDICAL ADVICE (OUTPATIENT)
Dept: PHARMACY | Facility: CLINIC | Age: 65
End: 2020-06-05

## 2020-06-05 DIAGNOSIS — Z79.4 TYPE 2 DIABETES MELLITUS WITH HYPERGLYCEMIA, WITH LONG-TERM CURRENT USE OF INSULIN (H): ICD-10-CM

## 2020-06-05 DIAGNOSIS — E11.65 TYPE 2 DIABETES MELLITUS WITH HYPERGLYCEMIA, WITH LONG-TERM CURRENT USE OF INSULIN (H): Primary | ICD-10-CM

## 2020-06-05 DIAGNOSIS — Z79.4 TYPE 2 DIABETES MELLITUS WITH HYPERGLYCEMIA, WITH LONG-TERM CURRENT USE OF INSULIN (H): Primary | ICD-10-CM

## 2020-06-05 DIAGNOSIS — E11.9 DIABETES MELLITUS, TYPE 2 (H): Primary | ICD-10-CM

## 2020-06-05 DIAGNOSIS — E11.65 TYPE 2 DIABETES MELLITUS WITH HYPERGLYCEMIA, WITH LONG-TERM CURRENT USE OF INSULIN (H): ICD-10-CM

## 2020-06-05 PROCEDURE — 99207 ZZC NO CHARGE LOS: CPT | Performed by: PHARMACIST

## 2020-06-05 RX ORDER — FLURBIPROFEN SODIUM 0.3 MG/ML
SOLUTION/ DROPS OPHTHALMIC
Qty: 100 EACH | Refills: 1 | Status: SHIPPED | OUTPATIENT
Start: 2020-06-05 | End: 2020-06-23

## 2020-06-05 RX ORDER — LANCETS
1 EACH MISCELLANEOUS DAILY
Qty: 100 EACH | Refills: 6 | Status: SHIPPED | OUTPATIENT
Start: 2020-06-05 | End: 2020-06-23

## 2020-06-05 RX ORDER — GLUCOSAMINE HCL/CHONDROITIN SU 500-400 MG
CAPSULE ORAL
Qty: 100 EACH | Refills: 3 | Status: SHIPPED | OUTPATIENT
Start: 2020-06-05 | End: 2020-08-18

## 2020-06-05 NOTE — PROGRESS NOTES
Clinical Pharmacy Consult:                                                    Mary Olson is a 64 year old female called for a clinical pharmacist consult.  She was referred to me from NICOLE Gomes CNP.     Reason for Consult: New diabetes diagnosis based on recent BG. No A1c or other routine diabetes labs on file. Fast start with insulin - needs to get BG levels down so she can undergo PET scan and MRI for her breast cancer treatment.    Lab Results   Component Value Date     03/04/2020     10/28/2015   No results found for: A1C      Discussion: Prescribed Lantus, meter and testing supplies earlier today. We reviewed appropriate use, benefits, risks and monitoring at length.     Plan:  1.  Start Lantus 10units injected under the skin once daily for 4-5 days. If FBG is >130mg/dL, then increase by 3units every other day until FBG is consistently 80-130mg/dL.  2. SMBG fasting (goal 80-130mg/dL) and with any symptoms of hypoglycemia.   3. Reviewed signs/symptoms of hypoglycemia vs hyperglycemia at length, BG goals (80-130mg/dL fasting and <180mg/dL 2hr PP), how to inject and store insulin pen, potential side effects.      Patient consented to a telehealth visit: yes  Telemedicine Visit Details  Type of service:  Telephone visit  Start Time: 12:49 PM  End Time: 1:19 PM  Originating Location (pt. Location): Home  Distant Location (provider location):  [facility]  Mode of Communication:  Telephone

## 2020-06-09 DIAGNOSIS — C50.812 MALIGNANT NEOPLASM OF OVERLAPPING SITES OF LEFT BREAST IN FEMALE, ESTROGEN RECEPTOR POSITIVE (H): Primary | ICD-10-CM

## 2020-06-09 DIAGNOSIS — Z17.0 MALIGNANT NEOPLASM OF OVERLAPPING SITES OF LEFT BREAST IN FEMALE, ESTROGEN RECEPTOR POSITIVE (H): Primary | ICD-10-CM

## 2020-06-09 RX ORDER — MEPERIDINE HYDROCHLORIDE 25 MG/ML
25 INJECTION INTRAMUSCULAR; INTRAVENOUS; SUBCUTANEOUS EVERY 30 MIN PRN
Status: CANCELLED | OUTPATIENT
Start: 2020-06-09

## 2020-06-09 RX ORDER — HEPARIN SODIUM,PORCINE 10 UNIT/ML
5 VIAL (ML) INTRAVENOUS
Status: CANCELLED | OUTPATIENT
Start: 2020-06-09

## 2020-06-09 RX ORDER — EPINEPHRINE 1 MG/ML
0.3 INJECTION, SOLUTION INTRAMUSCULAR; SUBCUTANEOUS EVERY 5 MIN PRN
Status: CANCELLED | OUTPATIENT
Start: 2020-06-09

## 2020-06-09 RX ORDER — EPINEPHRINE 1 MG/ML
0.3 INJECTION, SOLUTION INTRAMUSCULAR; SUBCUTANEOUS EVERY 5 MIN PRN
Status: CANCELLED | OUTPATIENT
Start: 2020-06-18

## 2020-06-09 RX ORDER — NALOXONE HYDROCHLORIDE 0.4 MG/ML
.1-.4 INJECTION, SOLUTION INTRAMUSCULAR; INTRAVENOUS; SUBCUTANEOUS
Status: CANCELLED | OUTPATIENT
Start: 2020-06-09

## 2020-06-09 RX ORDER — LORAZEPAM 2 MG/ML
0.5 INJECTION INTRAMUSCULAR EVERY 4 HOURS PRN
Status: CANCELLED
Start: 2020-06-25

## 2020-06-09 RX ORDER — ALBUTEROL SULFATE 90 UG/1
1-2 AEROSOL, METERED RESPIRATORY (INHALATION)
Status: CANCELLED
Start: 2020-06-18

## 2020-06-09 RX ORDER — DIPHENHYDRAMINE HCL 25 MG
50 CAPSULE ORAL ONCE
Status: CANCELLED
Start: 2020-06-09

## 2020-06-09 RX ORDER — NALOXONE HYDROCHLORIDE 0.4 MG/ML
.1-.4 INJECTION, SOLUTION INTRAMUSCULAR; INTRAVENOUS; SUBCUTANEOUS
Status: CANCELLED | OUTPATIENT
Start: 2020-06-25

## 2020-06-09 RX ORDER — SODIUM CHLORIDE 9 MG/ML
1000 INJECTION, SOLUTION INTRAVENOUS CONTINUOUS PRN
Status: CANCELLED
Start: 2020-06-18

## 2020-06-09 RX ORDER — DIPHENHYDRAMINE HYDROCHLORIDE 50 MG/ML
50 INJECTION INTRAMUSCULAR; INTRAVENOUS
Status: CANCELLED
Start: 2020-06-25

## 2020-06-09 RX ORDER — HEPARIN SODIUM (PORCINE) LOCK FLUSH IV SOLN 100 UNIT/ML 100 UNIT/ML
5 SOLUTION INTRAVENOUS
Status: CANCELLED | OUTPATIENT
Start: 2020-06-25

## 2020-06-09 RX ORDER — LORAZEPAM 2 MG/ML
0.5 INJECTION INTRAMUSCULAR EVERY 4 HOURS PRN
Status: CANCELLED
Start: 2020-06-18

## 2020-06-09 RX ORDER — SODIUM CHLORIDE 9 MG/ML
1000 INJECTION, SOLUTION INTRAVENOUS CONTINUOUS PRN
Status: CANCELLED
Start: 2020-06-25

## 2020-06-09 RX ORDER — EPINEPHRINE 0.3 MG/.3ML
0.3 INJECTION SUBCUTANEOUS EVERY 5 MIN PRN
Status: CANCELLED | OUTPATIENT
Start: 2020-06-25

## 2020-06-09 RX ORDER — LORAZEPAM 2 MG/ML
0.5 INJECTION INTRAMUSCULAR EVERY 4 HOURS PRN
Status: CANCELLED
Start: 2020-06-09

## 2020-06-09 RX ORDER — HEPARIN SODIUM,PORCINE 10 UNIT/ML
5 VIAL (ML) INTRAVENOUS
Status: CANCELLED | OUTPATIENT
Start: 2020-06-25

## 2020-06-09 RX ORDER — MEPERIDINE HYDROCHLORIDE 25 MG/ML
25 INJECTION INTRAMUSCULAR; INTRAVENOUS; SUBCUTANEOUS EVERY 30 MIN PRN
Status: CANCELLED | OUTPATIENT
Start: 2020-06-18

## 2020-06-09 RX ORDER — METHYLPREDNISOLONE SODIUM SUCCINATE 125 MG/2ML
125 INJECTION, POWDER, LYOPHILIZED, FOR SOLUTION INTRAMUSCULAR; INTRAVENOUS
Status: CANCELLED
Start: 2020-06-25

## 2020-06-09 RX ORDER — EPINEPHRINE 0.3 MG/.3ML
0.3 INJECTION SUBCUTANEOUS EVERY 5 MIN PRN
Status: CANCELLED | OUTPATIENT
Start: 2020-06-09

## 2020-06-09 RX ORDER — DIPHENHYDRAMINE HYDROCHLORIDE 50 MG/ML
50 INJECTION INTRAMUSCULAR; INTRAVENOUS
Status: CANCELLED
Start: 2020-06-18

## 2020-06-09 RX ORDER — EPINEPHRINE 0.3 MG/.3ML
0.3 INJECTION SUBCUTANEOUS EVERY 5 MIN PRN
Status: CANCELLED | OUTPATIENT
Start: 2020-06-18

## 2020-06-09 RX ORDER — ALBUTEROL SULFATE 90 UG/1
1-2 AEROSOL, METERED RESPIRATORY (INHALATION)
Status: CANCELLED
Start: 2020-06-09

## 2020-06-09 RX ORDER — DIPHENHYDRAMINE HCL 25 MG
50 CAPSULE ORAL ONCE
Status: CANCELLED
Start: 2020-06-18

## 2020-06-09 RX ORDER — ALBUTEROL SULFATE 0.83 MG/ML
2.5 SOLUTION RESPIRATORY (INHALATION)
Status: CANCELLED | OUTPATIENT
Start: 2020-06-09

## 2020-06-09 RX ORDER — ALBUTEROL SULFATE 0.83 MG/ML
2.5 SOLUTION RESPIRATORY (INHALATION)
Status: CANCELLED | OUTPATIENT
Start: 2020-06-18

## 2020-06-09 RX ORDER — ALBUTEROL SULFATE 90 UG/1
1-2 AEROSOL, METERED RESPIRATORY (INHALATION)
Status: CANCELLED
Start: 2020-06-25

## 2020-06-09 RX ORDER — MEPERIDINE HYDROCHLORIDE 25 MG/ML
25 INJECTION INTRAMUSCULAR; INTRAVENOUS; SUBCUTANEOUS EVERY 30 MIN PRN
Status: CANCELLED | OUTPATIENT
Start: 2020-06-25

## 2020-06-09 RX ORDER — NALOXONE HYDROCHLORIDE 0.4 MG/ML
.1-.4 INJECTION, SOLUTION INTRAMUSCULAR; INTRAVENOUS; SUBCUTANEOUS
Status: CANCELLED | OUTPATIENT
Start: 2020-06-18

## 2020-06-09 RX ORDER — EPINEPHRINE 1 MG/ML
0.3 INJECTION, SOLUTION INTRAMUSCULAR; SUBCUTANEOUS EVERY 5 MIN PRN
Status: CANCELLED | OUTPATIENT
Start: 2020-06-25

## 2020-06-09 RX ORDER — METHYLPREDNISOLONE SODIUM SUCCINATE 125 MG/2ML
125 INJECTION, POWDER, LYOPHILIZED, FOR SOLUTION INTRAMUSCULAR; INTRAVENOUS
Status: CANCELLED
Start: 2020-06-09

## 2020-06-09 RX ORDER — SODIUM CHLORIDE 9 MG/ML
1000 INJECTION, SOLUTION INTRAVENOUS CONTINUOUS PRN
Status: CANCELLED
Start: 2020-06-09

## 2020-06-09 RX ORDER — HEPARIN SODIUM (PORCINE) LOCK FLUSH IV SOLN 100 UNIT/ML 100 UNIT/ML
5 SOLUTION INTRAVENOUS
Status: CANCELLED | OUTPATIENT
Start: 2020-06-09

## 2020-06-09 RX ORDER — ALBUTEROL SULFATE 0.83 MG/ML
2.5 SOLUTION RESPIRATORY (INHALATION)
Status: CANCELLED | OUTPATIENT
Start: 2020-06-25

## 2020-06-09 RX ORDER — DIPHENHYDRAMINE HYDROCHLORIDE 50 MG/ML
50 INJECTION INTRAMUSCULAR; INTRAVENOUS
Status: CANCELLED
Start: 2020-06-09

## 2020-06-09 RX ORDER — HEPARIN SODIUM (PORCINE) LOCK FLUSH IV SOLN 100 UNIT/ML 100 UNIT/ML
5 SOLUTION INTRAVENOUS
Status: CANCELLED | OUTPATIENT
Start: 2020-06-18

## 2020-06-09 RX ORDER — METHYLPREDNISOLONE SODIUM SUCCINATE 125 MG/2ML
125 INJECTION, POWDER, LYOPHILIZED, FOR SOLUTION INTRAMUSCULAR; INTRAVENOUS
Status: CANCELLED
Start: 2020-06-18

## 2020-06-09 RX ORDER — HEPARIN SODIUM,PORCINE 10 UNIT/ML
5 VIAL (ML) INTRAVENOUS
Status: CANCELLED | OUTPATIENT
Start: 2020-06-18

## 2020-06-09 RX ORDER — DIPHENHYDRAMINE HCL 25 MG
50 CAPSULE ORAL ONCE
Status: CANCELLED
Start: 2020-06-25

## 2020-06-10 ENCOUNTER — ALLIED HEALTH/NURSE VISIT (OUTPATIENT)
Dept: PHARMACY | Facility: CLINIC | Age: 65
End: 2020-06-10
Attending: NURSE PRACTITIONER

## 2020-06-10 DIAGNOSIS — Z79.4 TYPE 2 DIABETES MELLITUS WITH HYPERGLYCEMIA, WITH LONG-TERM CURRENT USE OF INSULIN (H): ICD-10-CM

## 2020-06-10 DIAGNOSIS — E11.65 TYPE 2 DIABETES MELLITUS WITH HYPERGLYCEMIA, WITH LONG-TERM CURRENT USE OF INSULIN (H): ICD-10-CM

## 2020-06-10 PROCEDURE — 99606 MTMS BY PHARM EST 15 MIN: CPT | Performed by: PHARMACIST

## 2020-06-10 PROCEDURE — 99607 MTMS BY PHARM ADDL 15 MIN: CPT | Performed by: PHARMACIST

## 2020-06-10 NOTE — PROGRESS NOTES
"MTM ENCOUNTER  SUBJECTIVE/OBJECTIVE:                           Mary Olson is a 64 year old female called for a follow-up visit. She was referred to me from NICOLE Gomes CNP.  Today's visit is a follow-up MTM visit from 6/5/2020     Patient consented to a telehealth visit: yes  Telemedicine Visit Details  Type of service:  Telephone visit  Start Time: 3:03 PM  End Time: {video/phone visit end time:330389}  Originating Location (pt. Location): Home  Distant Location (provider location):  [facility]  Mode of Communication:  Telephone    Chief Complaint: Started Lantus at last visit - needs to BG down so she can undergo PET scan and MRI for her breast cancer treatment..  Personal Healthcare Goals: ***    Allergies/ADRs: Reviewed in EHR  Tobacco:  reports that she has never smoked. She has never used smokeless tobacco.  Alcohol: {ALCOHOL CONSUMPTION HX:010366}  Caffeine: {CAFFEINE INTAKE:438279}  Activity: ***  PMH: {1/2/3/4/5:360025}    Medication Adherence/Access: {fumedadherence:856590}    Type 2 Diabetes: Pt currently taking Lantus ***units daily.   SMBG Ranges {Pt report:267827}: ***  Symptoms of low blood sugar? {HYPOGLYCEMIA SYMPTOMS:856424::\"none\"}.   Symptoms of high blood sugar? {diabetessymptoms:742161}  Eye exam: {up to date:983181}  Foot exam: {up to date:514008}  Diet/Exercise: ***  Aspirin: {ASAyesno:578922}  Statin: {YES (EXPLAIN)/NO:321770} { :593978}  ACEi/ARB: {YES (EXPLAIN)/NO:817734}. Urine albumin is No results found for: UMALCR    No results found for: A1C   Lab Results   Component Value Date     03/04/2020     10/28/2015           ***: ***  ***: ***  ***: ***  ***: ***  ***: ***    Today's Vitals: There were no vitals taken for this visit.      ASSESSMENT:                          {mtmpartdquestion:969703}    Medication Adherence: {adherenceassess:614503}, {ADHERENCEOPTIONSASSES:644819}    ***: ***  ***: ***  ***: ***  ***: ***  ***: " "***    PLAN:                          {Remind patient about MTM survey:831425}{KOBE?:875100}  ***    I spent {time:458695} with this patient today{MTMpartdbillingquestion:904680}. { :014428}. A copy of the visit note was provided to the patient's {ccd chart:407029} provider.    Will follow up in ***.    The patient {GIVEN/NOT GIVEN:675204::\"was given\"} a summary of these recommendations. {covisit:403418}    ***          "

## 2020-06-10 NOTE — PROGRESS NOTES
MTM ENCOUNTER  SUBJECTIVE/OBJECTIVE:                           Mary Olson is a 64 year old female called for a follow-up visit. She was referred to me from NICOLE Gomes CNP.  Today's visit is a follow-up MTM visit from 6/5/2020     Patient consented to a telehealth visit: yes  Telemedicine Visit Details  Type of service:  Telephone visit  Start Time: 3:03 PM  End Time: 3:42 PM  Originating Location (pt. Location): Home  Distant Location (provider location):  [facility]  Mode of Communication:  Telephone    Chief Complaint: Started Lantus at last visit - needs to BG down so she can undergo PET scan and MRI for her breast cancer treatment.    Allergies/ADRs: Reviewed in EHR  Tobacco:  reports that she has never smoked. She has never used smokeless tobacco.  Alcohol: Less than 1 beverages / week  PMH: Reviewed in EHR    Medication Adherence/Access: no issues reported    Type 2 Diabetes: Pt currently taking Lantus 13units daily, just increased to this dose last night.   SMBG Ranges (patient reported):    Date FBG/ 2hours post HS   6/10 207    6/9 247    6/8 276    6/7 245    6/6 266    6/5  300   Symptoms of low blood sugar? none.   Symptoms of high blood sugar? vision changes  Aspirin: Taking 81mg daily  Statin: No  ACEi/ARB: No. Urine albumin is No results found for: UMALCR    No results found for: A1C   Lab Results   Component Value Date     03/04/2020     10/28/2015     The ASCVD Risk score ([place] GRAVES Jr., et al., 2013) failed to calculate for the following reasons:    Cannot find a previous HDL lab    Cannot find a previous total cholesterol lab    Recent Labs   Lab Test 10/28/15  1251   CHOL 244*   HDL 72   *   TRIG 98   CHOLHDLRATIO 3.4         Today's Vitals: There were no vitals taken for this visit. Phone visit.      ASSESSMENT:                              Medication Adherence: good, no issues identified    Type 2 Diabetes: Needs Improvement.  Self monitoring of blood glucose is not at goal of fasting  mg/dL.  Pt would benefit from Basal Insulin (Lantus) :  Titrate up on dose.      PLAN:                            1. Continue to titrate Lantus up by 3units every 2 days until FBG at goal 80-130mg/dL. Updated Rx sent to pharmacy. She will contact oncology office to review BG to see if/when she can get scheduled for further monitoring.     I spent 40 minutes with this patient today. All changes were made via collaborative practice agreement with NICOLE Gomes CNP. A copy of the visit note was provided to the patient's primary care provider.    Will follow up in 2 weeks.    The patient declined a summary of these recommendations.     Fang Moy, Pharm.D., BCACP  Medication Therapy Management Pharmacist  [phone]

## 2020-06-11 DIAGNOSIS — C79.51 BONE METASTASIS: ICD-10-CM

## 2020-06-11 DIAGNOSIS — C50.812 MALIGNANT NEOPLASM OF OVERLAPPING SITES OF LEFT BREAST IN FEMALE, ESTROGEN RECEPTOR POSITIVE (H): Primary | ICD-10-CM

## 2020-06-11 DIAGNOSIS — C79.51 BONE METASTASIS: Primary | ICD-10-CM

## 2020-06-11 DIAGNOSIS — Z17.0 MALIGNANT NEOPLASM OF OVERLAPPING SITES OF LEFT BREAST IN FEMALE, ESTROGEN RECEPTOR POSITIVE (H): Primary | ICD-10-CM

## 2020-06-11 DIAGNOSIS — Z17.0 MALIGNANT NEOPLASM OF OVERLAPPING SITES OF LEFT BREAST IN FEMALE, ESTROGEN RECEPTOR POSITIVE (H): ICD-10-CM

## 2020-06-11 DIAGNOSIS — C50.812 MALIGNANT NEOPLASM OF OVERLAPPING SITES OF LEFT BREAST IN FEMALE, ESTROGEN RECEPTOR POSITIVE (H): ICD-10-CM

## 2020-06-11 RX ORDER — EPINEPHRINE 1 MG/ML
0.3 INJECTION, SOLUTION INTRAMUSCULAR; SUBCUTANEOUS EVERY 5 MIN PRN
Status: CANCELLED | OUTPATIENT
Start: 2020-06-12

## 2020-06-11 RX ORDER — DIPHENHYDRAMINE HCL 25 MG
50 CAPSULE ORAL ONCE
Status: CANCELLED
Start: 2020-06-12

## 2020-06-11 RX ORDER — EPINEPHRINE 0.3 MG/.3ML
0.3 INJECTION SUBCUTANEOUS EVERY 5 MIN PRN
Status: CANCELLED | OUTPATIENT
Start: 2020-06-12

## 2020-06-11 RX ORDER — ZOLEDRONIC ACID 0.04 MG/ML
4 INJECTION, SOLUTION INTRAVENOUS ONCE
Status: CANCELLED | OUTPATIENT
Start: 2020-06-18

## 2020-06-11 RX ORDER — HEPARIN SODIUM (PORCINE) LOCK FLUSH IV SOLN 100 UNIT/ML 100 UNIT/ML
5 SOLUTION INTRAVENOUS
Status: CANCELLED | OUTPATIENT
Start: 2020-06-12

## 2020-06-11 RX ORDER — DIPHENHYDRAMINE HYDROCHLORIDE 50 MG/ML
50 INJECTION INTRAMUSCULAR; INTRAVENOUS
Status: CANCELLED
Start: 2020-06-12

## 2020-06-11 RX ORDER — MEPERIDINE HYDROCHLORIDE 25 MG/ML
25 INJECTION INTRAMUSCULAR; INTRAVENOUS; SUBCUTANEOUS EVERY 30 MIN PRN
Status: CANCELLED | OUTPATIENT
Start: 2020-06-12

## 2020-06-11 RX ORDER — ALBUTEROL SULFATE 90 UG/1
1-2 AEROSOL, METERED RESPIRATORY (INHALATION)
Status: CANCELLED
Start: 2020-06-12

## 2020-06-11 RX ORDER — ALBUTEROL SULFATE 0.83 MG/ML
2.5 SOLUTION RESPIRATORY (INHALATION)
Status: CANCELLED | OUTPATIENT
Start: 2020-06-12

## 2020-06-11 RX ORDER — HEPARIN SODIUM,PORCINE 10 UNIT/ML
5 VIAL (ML) INTRAVENOUS
Status: CANCELLED | OUTPATIENT
Start: 2020-06-12

## 2020-06-11 RX ORDER — LORAZEPAM 2 MG/ML
0.5 INJECTION INTRAMUSCULAR EVERY 4 HOURS PRN
Status: CANCELLED
Start: 2020-06-12

## 2020-06-11 RX ORDER — HEPARIN SODIUM,PORCINE 10 UNIT/ML
5 VIAL (ML) INTRAVENOUS
Status: CANCELLED | OUTPATIENT
Start: 2020-06-18

## 2020-06-11 RX ORDER — SODIUM CHLORIDE 9 MG/ML
1000 INJECTION, SOLUTION INTRAVENOUS CONTINUOUS PRN
Status: CANCELLED
Start: 2020-06-12

## 2020-06-11 RX ORDER — NALOXONE HYDROCHLORIDE 0.4 MG/ML
.1-.4 INJECTION, SOLUTION INTRAMUSCULAR; INTRAVENOUS; SUBCUTANEOUS
Status: CANCELLED | OUTPATIENT
Start: 2020-06-12

## 2020-06-11 RX ORDER — HEPARIN SODIUM (PORCINE) LOCK FLUSH IV SOLN 100 UNIT/ML 100 UNIT/ML
5 SOLUTION INTRAVENOUS
Status: CANCELLED | OUTPATIENT
Start: 2020-06-18

## 2020-06-11 RX ORDER — METHYLPREDNISOLONE SODIUM SUCCINATE 125 MG/2ML
125 INJECTION, POWDER, LYOPHILIZED, FOR SOLUTION INTRAMUSCULAR; INTRAVENOUS
Status: CANCELLED
Start: 2020-06-12

## 2020-06-12 ENCOUNTER — ONCOLOGY VISIT (OUTPATIENT)
Dept: ONCOLOGY | Facility: CLINIC | Age: 65
End: 2020-06-12
Payer: COMMERCIAL

## 2020-06-12 ENCOUNTER — INFUSION THERAPY VISIT (OUTPATIENT)
Dept: INFUSION THERAPY | Facility: CLINIC | Age: 65
End: 2020-06-12
Payer: COMMERCIAL

## 2020-06-12 ENCOUNTER — NURSE TRIAGE (OUTPATIENT)
Dept: FAMILY MEDICINE | Facility: CLINIC | Age: 65
End: 2020-06-12

## 2020-06-12 VITALS
OXYGEN SATURATION: 95 % | HEART RATE: 128 BPM | DIASTOLIC BLOOD PRESSURE: 108 MMHG | RESPIRATION RATE: 16 BRPM | SYSTOLIC BLOOD PRESSURE: 152 MMHG | TEMPERATURE: 98.7 F | BODY MASS INDEX: 42.39 KG/M2 | WEIGHT: 235.5 LBS

## 2020-06-12 DIAGNOSIS — C50.812 MALIGNANT NEOPLASM OF OVERLAPPING SITES OF LEFT BREAST IN FEMALE, ESTROGEN RECEPTOR POSITIVE (H): Primary | ICD-10-CM

## 2020-06-12 DIAGNOSIS — Z17.0 MALIGNANT NEOPLASM OF OVERLAPPING SITES OF LEFT BREAST IN FEMALE, ESTROGEN RECEPTOR POSITIVE (H): Primary | ICD-10-CM

## 2020-06-12 LAB
ALBUMIN SERPL-MCNC: 3.4 G/DL (ref 3.4–5)
ALP SERPL-CCNC: 116 U/L (ref 40–150)
ALT SERPL W P-5'-P-CCNC: 46 U/L (ref 0–50)
AST SERPL W P-5'-P-CCNC: 27 U/L (ref 0–45)
BASOPHILS # BLD AUTO: 0.1 10E9/L (ref 0–0.2)
BASOPHILS NFR BLD AUTO: 0.9 %
BILIRUB DIRECT SERPL-MCNC: 0.1 MG/DL (ref 0–0.2)
BILIRUB SERPL-MCNC: 0.5 MG/DL (ref 0.2–1.3)
DIFFERENTIAL METHOD BLD: ABNORMAL
EOSINOPHIL # BLD AUTO: 0.1 10E9/L (ref 0–0.7)
EOSINOPHIL NFR BLD AUTO: 1.1 %
ERYTHROCYTE [DISTWIDTH] IN BLOOD BY AUTOMATED COUNT: 16 % (ref 10–15)
HCT VFR BLD AUTO: 43.2 % (ref 35–47)
HGB BLD-MCNC: 14.2 G/DL (ref 11.7–15.7)
IMM GRANULOCYTES # BLD: 0 10E9/L (ref 0–0.4)
IMM GRANULOCYTES NFR BLD: 0.3 %
LYMPHOCYTES # BLD AUTO: 2.8 10E9/L (ref 0.8–5.3)
LYMPHOCYTES NFR BLD AUTO: 31.3 %
MCH RBC QN AUTO: 28.4 PG (ref 26.5–33)
MCHC RBC AUTO-ENTMCNC: 32.9 G/DL (ref 31.5–36.5)
MCV RBC AUTO: 86 FL (ref 78–100)
MONOCYTES # BLD AUTO: 0.8 10E9/L (ref 0–1.3)
MONOCYTES NFR BLD AUTO: 9.1 %
NEUTROPHILS # BLD AUTO: 5.1 10E9/L (ref 1.6–8.3)
NEUTROPHILS NFR BLD AUTO: 57.3 %
PLATELET # BLD AUTO: 354 10E9/L (ref 150–450)
PROT SERPL-MCNC: 8 G/DL (ref 6.8–8.8)
RBC # BLD AUTO: 5 10E12/L (ref 3.8–5.2)
WBC # BLD AUTO: 9 10E9/L (ref 4–11)

## 2020-06-12 PROCEDURE — 80076 HEPATIC FUNCTION PANEL: CPT | Performed by: INTERNAL MEDICINE

## 2020-06-12 PROCEDURE — 96413 CHEMO IV INFUSION 1 HR: CPT | Performed by: INTERNAL MEDICINE

## 2020-06-12 PROCEDURE — 99207 ZZC NO CHARGE NURSE ONLY: CPT

## 2020-06-12 PROCEDURE — 96375 TX/PRO/DX INJ NEW DRUG ADDON: CPT | Performed by: INTERNAL MEDICINE

## 2020-06-12 PROCEDURE — 96367 TX/PROPH/DG ADDL SEQ IV INF: CPT | Performed by: INTERNAL MEDICINE

## 2020-06-12 PROCEDURE — S0028 INJECTION, FAMOTIDINE, 20 MG: HCPCS | Performed by: INTERNAL MEDICINE

## 2020-06-12 PROCEDURE — 99207 ZZC NO CHARGE LOS: CPT

## 2020-06-12 PROCEDURE — 85025 COMPLETE CBC W/AUTO DIFF WBC: CPT | Performed by: INTERNAL MEDICINE

## 2020-06-12 RX ORDER — DIPHENHYDRAMINE HCL 25 MG
50 CAPSULE ORAL ONCE
Status: COMPLETED | OUTPATIENT
Start: 2020-06-12 | End: 2020-06-12

## 2020-06-12 RX ORDER — HEPARIN SODIUM (PORCINE) LOCK FLUSH IV SOLN 100 UNIT/ML 100 UNIT/ML
5 SOLUTION INTRAVENOUS
Status: DISCONTINUED | OUTPATIENT
Start: 2020-06-12 | End: 2020-06-12 | Stop reason: HOSPADM

## 2020-06-12 RX ADMIN — HEPARIN SODIUM (PORCINE) LOCK FLUSH IV SOLN 100 UNIT/ML 5 ML: 100 SOLUTION at 12:29

## 2020-06-12 RX ADMIN — Medication 50 MG: at 13:59

## 2020-06-12 RX ADMIN — HEPARIN SODIUM (PORCINE) LOCK FLUSH IV SOLN 100 UNIT/ML 5 ML: 100 SOLUTION at 16:06

## 2020-06-12 RX ADMIN — Medication 250 ML: at 13:44

## 2020-06-12 ASSESSMENT — PAIN SCALES - GENERAL: PAINLEVEL: NO PAIN (0)

## 2020-06-12 NOTE — PROGRESS NOTES
Port needle left accessed for treatment. Tolerated port access and draw without complaint. Port site scrubbed with Chloraprep for 30 seconds and allowed to dry completely prior to dressing application. Accessed using sterile technique. [place] tubes drawn-Red gel/Green/Purple tubes. Double signed by patient and RN. See documentation flowsheet. Lexy Butterfield, RN, BSN, OCN

## 2020-06-12 NOTE — TELEPHONE ENCOUNTER
"Patient was advised to f/u with PCP following [facility] treatment appointment, pt just had a treatment 6/12/20.  She states her Cancer doctors thought the BP was still too high today.    Advised patient that if her BP starts to elevate over the weekend that she will need to be evaluated by ED/UC.   Appointment was made with a covering provider given PCP is on furlough    Additional Information    Negative: Sounds like a life-threatening emergency to the triager    Negative: Pregnant > 20 weeks or postpartum (< 6 weeks after delivery) and new hand or face swelling    Negative: Pregnant > 20 weeks and BP > 140/90    Negative: Systolic BP >= 160 OR Diastolic >= 100, and any cardiac or neurologic symptoms (e.g., chest pain, difficulty breathing, unsteady gait, blurred vision)    Negative: Patient sounds very sick or weak to the triager    Negative: BP Systolic BP >= 140 OR Diastolic >= 90 and postpartum (from 0 to 6 weeks after delivery)    Negative: Systolic BP >= 180 OR Diastolic >= 110, and missed most recent dose of blood pressure medication    Negative: Systolic BP >= 180 OR Diastolic >= 110    Negative: Patient wants to be seen    Negative: Ran out of BP medications    Negative: Taking BP medications and feels is having side effects (e.g., impotence, cough, dizziness)    Systolic BP >= 160 OR Diastolic >= 100    Answer Assessment - Initial Assessment Questions  1. BLOOD PRESSURE: \"What is the blood pressure?\" \"Did you take at least two measurements 5 minutes apart?\"    189/116   181/91  132/108  2. ONSET: \"When did you take your blood pressure?\"   today  3. HOW: \"How did you obtain the blood pressure?\" (e.g., visiting nurse, automatic home BP monitor)      Nurse took   4. HISTORY: \"Do you have a history of high blood pressure?\"      yes  5. MEDICATIONS: \"Are you taking any medications for blood pressure?\" \"Have you missed any doses recently?\"     No, she takes her medicaitons at night  6. OTHER SYMPTOMS: " "\"Do you have any symptoms?\" (e.g., headache, chest pain, blurred vision, difficulty breathing, weakness)        No- but blurred vision is present but part of cancer treatment side effect    7. PREGNANCY: \"Is there any chance you are pregnant?\" \"When was your last menstrual period?\"     no    Her normal /80    Protocols used: HIGH BLOOD PRESSURE-A-OH    Sonia Henry RN  "

## 2020-06-12 NOTE — PROGRESS NOTES
Infusion Nursing Note:  Mary Olson presents today for Taxol.    Patient seen by provider today: No   present during visit today: Not Applicable.    Note: Pt b/p checked several times on admission and noted to be elevated - see VS flow sheet.  Discussed with dr Hilton who advised that it was ok to proceed with pt treatment but that pt needed to contact her primary/ Dr who is managing her b/p and notify them of her elevated b/p.  Pt acknowledged understanding and said that she would call him following her appointment.     Intravenous Access:  Labs drawn without difficulty.  Implanted Port.    Treatment Conditions:  Lab Results   Component Value Date    HGB 14.2 06/12/2020     Lab Results   Component Value Date    WBC 9.0 06/12/2020      Lab Results   Component Value Date    ANEU 5.1 06/12/2020     Lab Results   Component Value Date     06/12/2020      Results reviewed, labs MET treatment parameters, ok to proceed with treatment.      Post Infusion Assessment:  Patient tolerated infusion without incident.  Patient observed for 10 minutes post Taxol per protocol.  Blood return noted pre and post infusion.  Site patent and intact, free from redness, edema or discomfort.  No evidence of extravasations.  Access discontinued per protocol.       Discharge Plan:   Discharge instructions reviewed with: Patient.  Patient discharged in stable condition accompanied by: self.  Departure Mode: Ambulatory.    Mellisa Fiore RN

## 2020-06-15 ENCOUNTER — VIRTUAL VISIT (OUTPATIENT)
Dept: FAMILY MEDICINE | Facility: CLINIC | Age: 65
End: 2020-06-15
Payer: COMMERCIAL

## 2020-06-15 VITALS — DIASTOLIC BLOOD PRESSURE: 99 MMHG | SYSTOLIC BLOOD PRESSURE: 130 MMHG

## 2020-06-15 DIAGNOSIS — C50.812 MALIGNANT NEOPLASM OF OVERLAPPING SITES OF LEFT BREAST IN FEMALE, ESTROGEN RECEPTOR POSITIVE (H): ICD-10-CM

## 2020-06-15 DIAGNOSIS — I10 ESSENTIAL HYPERTENSION: Primary | ICD-10-CM

## 2020-06-15 DIAGNOSIS — E11.9 TYPE 2 DIABETES MELLITUS WITHOUT COMPLICATION, WITH LONG-TERM CURRENT USE OF INSULIN (H): ICD-10-CM

## 2020-06-15 DIAGNOSIS — Z79.4 TYPE 2 DIABETES MELLITUS WITHOUT COMPLICATION, WITH LONG-TERM CURRENT USE OF INSULIN (H): ICD-10-CM

## 2020-06-15 DIAGNOSIS — Z17.0 MALIGNANT NEOPLASM OF OVERLAPPING SITES OF LEFT BREAST IN FEMALE, ESTROGEN RECEPTOR POSITIVE (H): ICD-10-CM

## 2020-06-15 PROCEDURE — 99214 OFFICE O/P EST MOD 30 MIN: CPT | Mod: 95 | Performed by: PHYSICIAN ASSISTANT

## 2020-06-15 NOTE — PROGRESS NOTES
"Mary Olson is a 64 year old female who is being evaluated via a billable telephone visit.      The patient has been notified of following:     \"This telephone visit will be conducted via a call between you and your physician/provider. We have found that certain health care needs can be provided without the need for a physical exam.  This service lets us provide the care you need with a short phone conversation.  If a prescription is necessary we can send it directly to your pharmacy.  If lab work is needed we can place an order for that and you can then stop by our lab to have the test done at a later time.    Telephone visits are billed at different rates depending on your insurance coverage. During this emergency period, for some insurers they may be billed the same as an in-person visit.  Please reach out to your insurance provider with any questions.    If during the course of the call the physician/provider feels a telephone visit is not appropriate, you will not be charged for this service.\"    Patient has given verbal consent for Telephone visit?  Yes    What phone number would you like to be contacted at? [phone]    How would you like to obtain your AVS? Jaydon Quiroga     Mary Olson is a 64 year old female who presents via phone visit today for the following health issues:    HPI  Hypertension Follow-up      Do you check your blood pressure regularly outside of the clinic? Yes     Are you following a low salt diet? Yes    Are your blood pressures ever more than 140 on the top number (systolic) OR more   than 90 on the bottom number (diastolic), for example 140/90? Yes, 130/99 last night      How many servings of fruits and vegetables do you eat daily?  4 or more    On average, how many sweetened beverages do you drink each day (Examples: soda, juice, sweet tea, etc.  Do NOT count diet or artificially sweetened beverages)?   0    How many days per week do you exercise enough to make " "your heart beat faster? 3-4    How many minutes a day do you exercise enough to make your heart beat faster? 20 minutes, Walking, katerina chi, and yoga    How many days per week do you miss taking your medication? 0    BP running elevated at her visits for infusion for chemotherapy.   She reports it is better at home and after sitting for a time. \"White coat\" she states.  Reports she feels good. No symptoms of concern  Is tolerating her medications just fine  Has type 2 diabetes - on insulin currently  Also has current diagnosis of malignant breast cancer.   She has no concerns  Wants to avoid medications changes.    Is very motivated to focus on and beat her cancer.    Patient Active Problem List   Diagnosis     Hyperlipidemia LDL goal <160     Essential hypertension     Morbid obesity (H)     Family history of malignant neoplasm of breast     Breast skin changes     Nipple discharge, bloody     Malignant neoplasm of overlapping sites of left breast in female, estrogen receptor positive (H)     Bone metastasis (H)      Current Outpatient Medications   Medication     alcohol swab prep pads     amLODIPine (NORVASC) 10 MG tablet     Ascorbic Acid (VITAMIN C PO)     aspirin 81 MG tablet     biotin 1000 MCG TABS tablet     blood glucose (NO BRAND SPECIFIED) test strip     blood glucose calibration (NO BRAND SPECIFIED) solution     blood glucose monitoring (NO BRAND SPECIFIED) meter device kit     Blood Pressure KIT     calcium-vitamin D-vitamin K (VIACTIV) 500-500-40 MG-UNT-MCG CHEW     chlorthalidone (HYGROTON) 25 MG tablet     Cholecalciferol (VITAMIN D3 PO)     Coenzyme Q10 (COQ-10) 200 MG CAPS     Cyanocobalamin (B-12) 2500 MCG TABS     insulin glargine (LANTUS PEN) 100 UNIT/ML pen     insulin pen needle (B-D U/F) 31G X 5 MM miscellaneous     lidocaine-prilocaine (EMLA) 2.5-2.5 % external cream     Multiple Vitamins-Minerals (CENTRUM ADULTS PO)     order for DME     thin (NO BRAND SPECIFIED) lancets     UNABLE TO FIND "     VITAMIN E NATURAL PO     Zinc Acetate, Oral, (ZINC ACETATE PO)     LORazepam (ATIVAN) 0.5 MG tablet     prochlorperazine (COMPAZINE) 10 MG tablet     No current facility-administered medications for this visit.      Facility-Administered Medications Ordered in Other Visits   Medication     sodium bicarbonate 8.4 % injection 1 mEq             Reviewed and updated as needed this visit by Provider         Review of Systems   Constitutional, HEENT, cardiovascular, pulmonary, gi and gu systems are negative, except as otherwise noted.       Objective   Reported vitals:  There were no vitals taken for this visit.   healthy, alert and no distress  PSYCH: Alert and oriented times 3; coherent speech, normal   rate and volume, able to articulate logical thoughts, able   to abstract reason, no tangential thoughts, no hallucinations   or delusions  Her affect is normal  RESP: No cough, no audible wheezing, able to talk in full sentences  Remainder of exam unable to be completed due to telephone visits    Diagnostic Test Results:  Labs reviewed in Epic        Assessment/Plan:  1. Essential hypertension  Reviewed - Desmond declines any changes today. She wants to focus on diet, weight loss, prayer. She is aware her BP will be elevated when she goes in for infusions. If she is asymptomatic - this is probably okay for now. I suggest that she sit 5 full minutes prior to any infusion to give her a chance to let her BP to come down.     2. Malignant neoplasm of overlapping sites of left breast in female, estrogen receptor positive (H)  Counseling. Self cares. She is very motivated to work hard to beat cancer.    3. Type 2 diabetes mellitus without complication, with long-term current use of insulin  I don't see an A1C - counseling given, she is on insulin.  May consider Metformin - reviewed that in some studies Metformin has been shown to reduce cancer risk / not clear on current cancer risk. She will discuss with her PCP     No  follow-ups on file.      Phone call duration:  18 minutes    ASIYA Andrews, PA-C

## 2020-06-15 NOTE — Clinical Note
Fang Sánchez - LYNETTE - Not sure why she was on my schedule, I think the RN was nervous - Desmond didn't want me to change anything and declined any meds from me. I discussed briefly metformin use because of some of the data on cancer prevention, not clear on current cancer - but it may improve outcomes for small percentages of people. She wants to read about it -CRISTALI To you both. Thanks!! Marc Zamarripa

## 2020-06-18 ENCOUNTER — ONCOLOGY VISIT (OUTPATIENT)
Dept: ONCOLOGY | Facility: CLINIC | Age: 65
End: 2020-06-18
Payer: COMMERCIAL

## 2020-06-18 ENCOUNTER — INFUSION THERAPY VISIT (OUTPATIENT)
Dept: INFUSION THERAPY | Facility: CLINIC | Age: 65
End: 2020-06-18
Attending: NURSE PRACTITIONER
Payer: COMMERCIAL

## 2020-06-18 VITALS
WEIGHT: 232.3 LBS | TEMPERATURE: 98.8 F | RESPIRATION RATE: 18 BRPM | HEIGHT: 62 IN | BODY MASS INDEX: 42.75 KG/M2 | HEART RATE: 118 BPM | SYSTOLIC BLOOD PRESSURE: 185 MMHG | DIASTOLIC BLOOD PRESSURE: 111 MMHG | OXYGEN SATURATION: 97 %

## 2020-06-18 DIAGNOSIS — C79.51 BONE METASTASIS: ICD-10-CM

## 2020-06-18 DIAGNOSIS — Z17.0 MALIGNANT NEOPLASM OF OVERLAPPING SITES OF LEFT BREAST IN FEMALE, ESTROGEN RECEPTOR POSITIVE (H): Primary | ICD-10-CM

## 2020-06-18 DIAGNOSIS — C50.812 MALIGNANT NEOPLASM OF OVERLAPPING SITES OF LEFT BREAST IN FEMALE, ESTROGEN RECEPTOR POSITIVE (H): ICD-10-CM

## 2020-06-18 DIAGNOSIS — Z17.0 MALIGNANT NEOPLASM OF OVERLAPPING SITES OF LEFT BREAST IN FEMALE, ESTROGEN RECEPTOR POSITIVE (H): ICD-10-CM

## 2020-06-18 DIAGNOSIS — C50.812 MALIGNANT NEOPLASM OF OVERLAPPING SITES OF LEFT BREAST IN FEMALE, ESTROGEN RECEPTOR POSITIVE (H): Primary | ICD-10-CM

## 2020-06-18 DIAGNOSIS — C79.51 BONE METASTASIS: Primary | ICD-10-CM

## 2020-06-18 LAB
ALBUMIN SERPL-MCNC: 3.3 G/DL (ref 3.4–5)
BASOPHILS # BLD AUTO: 0.1 10E9/L (ref 0–0.2)
BASOPHILS NFR BLD AUTO: 1.3 %
CALCIUM SERPL-MCNC: 9.7 MG/DL (ref 8.5–10.1)
CREAT SERPL-MCNC: 0.66 MG/DL (ref 0.52–1.04)
DIFFERENTIAL METHOD BLD: ABNORMAL
EOSINOPHIL # BLD AUTO: 0.1 10E9/L (ref 0–0.7)
EOSINOPHIL NFR BLD AUTO: 0.8 %
ERYTHROCYTE [DISTWIDTH] IN BLOOD BY AUTOMATED COUNT: 15.4 % (ref 10–15)
GFR SERPL CREATININE-BSD FRML MDRD: >90 ML/MIN/{1.73_M2}
HCT VFR BLD AUTO: 40.9 % (ref 35–47)
HGB BLD-MCNC: 13.6 G/DL (ref 11.7–15.7)
IMM GRANULOCYTES # BLD: 0.1 10E9/L (ref 0–0.4)
IMM GRANULOCYTES NFR BLD: 1.1 %
LYMPHOCYTES # BLD AUTO: 1.8 10E9/L (ref 0.8–5.3)
LYMPHOCYTES NFR BLD AUTO: 21.7 %
MCH RBC QN AUTO: 28.9 PG (ref 26.5–33)
MCHC RBC AUTO-ENTMCNC: 33.3 G/DL (ref 31.5–36.5)
MCV RBC AUTO: 87 FL (ref 78–100)
MONOCYTES # BLD AUTO: 0.5 10E9/L (ref 0–1.3)
MONOCYTES NFR BLD AUTO: 5.3 %
NEUTROPHILS # BLD AUTO: 5.9 10E9/L (ref 1.6–8.3)
NEUTROPHILS NFR BLD AUTO: 69.8 %
PLATELET # BLD AUTO: 371 10E9/L (ref 150–450)
RBC # BLD AUTO: 4.71 10E12/L (ref 3.8–5.2)
WBC # BLD AUTO: 8.5 10E9/L (ref 4–11)

## 2020-06-18 PROCEDURE — 82040 ASSAY OF SERUM ALBUMIN: CPT | Performed by: NURSE PRACTITIONER

## 2020-06-18 PROCEDURE — 96375 TX/PRO/DX INJ NEW DRUG ADDON: CPT | Performed by: NURSE PRACTITIONER

## 2020-06-18 PROCEDURE — 85025 COMPLETE CBC W/AUTO DIFF WBC: CPT | Performed by: NURSE PRACTITIONER

## 2020-06-18 PROCEDURE — 82310 ASSAY OF CALCIUM: CPT | Performed by: NURSE PRACTITIONER

## 2020-06-18 PROCEDURE — 82565 ASSAY OF CREATININE: CPT | Performed by: NURSE PRACTITIONER

## 2020-06-18 PROCEDURE — 99207 ZZC NO CHARGE LOS: CPT

## 2020-06-18 PROCEDURE — S0028 INJECTION, FAMOTIDINE, 20 MG: HCPCS | Performed by: NURSE PRACTITIONER

## 2020-06-18 PROCEDURE — 96367 TX/PROPH/DG ADDL SEQ IV INF: CPT | Performed by: NURSE PRACTITIONER

## 2020-06-18 PROCEDURE — 99207 ZZC NO CHARGE NURSE ONLY: CPT

## 2020-06-18 PROCEDURE — 96413 CHEMO IV INFUSION 1 HR: CPT | Performed by: NURSE PRACTITIONER

## 2020-06-18 RX ORDER — DIPHENHYDRAMINE HCL 25 MG
50 CAPSULE ORAL ONCE
Status: COMPLETED | OUTPATIENT
Start: 2020-06-18 | End: 2020-06-18

## 2020-06-18 RX ORDER — HEPARIN SODIUM (PORCINE) LOCK FLUSH IV SOLN 100 UNIT/ML 100 UNIT/ML
5 SOLUTION INTRAVENOUS
Status: DISCONTINUED | OUTPATIENT
Start: 2020-06-18 | End: 2020-06-18 | Stop reason: HOSPADM

## 2020-06-18 RX ORDER — ZOLEDRONIC ACID 0.04 MG/ML
4 INJECTION, SOLUTION INTRAVENOUS ONCE
Status: COMPLETED | OUTPATIENT
Start: 2020-06-18 | End: 2020-06-18

## 2020-06-18 RX ADMIN — ZOLEDRONIC ACID 4 MG: 0.04 INJECTION, SOLUTION INTRAVENOUS at 12:52

## 2020-06-18 RX ADMIN — HEPARIN SODIUM (PORCINE) LOCK FLUSH IV SOLN 100 UNIT/ML 5 ML: 100 SOLUTION at 14:32

## 2020-06-18 RX ADMIN — HEPARIN SODIUM (PORCINE) LOCK FLUSH IV SOLN 100 UNIT/ML 5 ML: 100 SOLUTION at 11:34

## 2020-06-18 RX ADMIN — Medication 250 ML: at 12:32

## 2020-06-18 RX ADMIN — Medication 50 MG: at 12:40

## 2020-06-18 ASSESSMENT — MIFFLIN-ST. JEOR: SCORE: 1561.96

## 2020-06-18 NOTE — PROGRESS NOTES
Port needle left accessed for treatment. Tolerated port access and draw without complaint. Port site scrubbed with Chloraprep for 30 seconds and allowed to dry completely prior to dressing application. Accessed using sterile technique. [place] tubes drawn-Red gel/Green/Purple tubes. Double signed by patient and RN. See documentation flowsheet. Lexy Butterfield, RN, BSN, OCN

## 2020-06-19 ENCOUNTER — ANCILLARY PROCEDURE (OUTPATIENT)
Dept: NUCLEAR MEDICINE | Facility: CLINIC | Age: 65
End: 2020-06-19
Attending: NURSE PRACTITIONER
Payer: COMMERCIAL

## 2020-06-19 ENCOUNTER — ANCILLARY PROCEDURE (OUTPATIENT)
Dept: CT IMAGING | Facility: CLINIC | Age: 65
End: 2020-06-19
Attending: NURSE PRACTITIONER
Payer: COMMERCIAL

## 2020-06-19 ENCOUNTER — APPOINTMENT (OUTPATIENT)
Dept: GENERAL RADIOLOGY | Facility: CLINIC | Age: 65
End: 2020-06-19
Payer: COMMERCIAL

## 2020-06-19 DIAGNOSIS — Z17.0 MALIGNANT NEOPLASM OF OVERLAPPING SITES OF LEFT BREAST IN FEMALE, ESTROGEN RECEPTOR POSITIVE (H): ICD-10-CM

## 2020-06-19 DIAGNOSIS — C50.812 MALIGNANT NEOPLASM OF OVERLAPPING SITES OF LEFT BREAST IN FEMALE, ESTROGEN RECEPTOR POSITIVE (H): ICD-10-CM

## 2020-06-19 DIAGNOSIS — C79.51 BONE METASTASIS: ICD-10-CM

## 2020-06-19 PROCEDURE — 71260 CT THORAX DX C+: CPT | Performed by: RADIOLOGY

## 2020-06-19 PROCEDURE — A9503 TC99M MEDRONATE: HCPCS

## 2020-06-19 PROCEDURE — 78306 BONE IMAGING WHOLE BODY: CPT

## 2020-06-19 PROCEDURE — 74177 CT ABD & PELVIS W/CONTRAST: CPT | Performed by: RADIOLOGY

## 2020-06-19 RX ORDER — TC 99M MEDRONATE 20 MG/10ML
20-30 INJECTION, POWDER, LYOPHILIZED, FOR SOLUTION INTRAVENOUS ONCE
Status: COMPLETED | OUTPATIENT
Start: 2020-06-19 | End: 2020-06-19

## 2020-06-19 RX ORDER — IOPAMIDOL 755 MG/ML
135 INJECTION, SOLUTION INTRAVASCULAR ONCE
Status: COMPLETED | OUTPATIENT
Start: 2020-06-19 | End: 2020-06-19

## 2020-06-19 RX ORDER — HEPARIN SODIUM (PORCINE) LOCK FLUSH IV SOLN 100 UNIT/ML 100 UNIT/ML
5 SOLUTION INTRAVENOUS ONCE
Status: COMPLETED | OUTPATIENT
Start: 2020-06-19 | End: 2020-06-19

## 2020-06-19 RX ADMIN — IOPAMIDOL 135 ML: 755 INJECTION, SOLUTION INTRAVASCULAR at 13:45

## 2020-06-19 RX ADMIN — TC 99M MEDRONATE 27.3 MCI.: 20 INJECTION, POWDER, LYOPHILIZED, FOR SOLUTION INTRAVENOUS at 11:56

## 2020-06-19 RX ADMIN — HEPARIN SODIUM (PORCINE) LOCK FLUSH IV SOLN 100 UNIT/ML 5 ML: 100 SOLUTION at 11:59

## 2020-06-23 ENCOUNTER — ALLIED HEALTH/NURSE VISIT (OUTPATIENT)
Dept: PHARMACY | Facility: CLINIC | Age: 65
End: 2020-06-23

## 2020-06-23 ENCOUNTER — VIRTUAL VISIT (OUTPATIENT)
Dept: FAMILY MEDICINE | Facility: CLINIC | Age: 65
End: 2020-06-23
Payer: COMMERCIAL

## 2020-06-23 ENCOUNTER — TELEPHONE (OUTPATIENT)
Dept: FAMILY MEDICINE | Facility: CLINIC | Age: 65
End: 2020-06-23

## 2020-06-23 DIAGNOSIS — Z17.0 MALIGNANT NEOPLASM OF OVERLAPPING SITES OF LEFT BREAST IN FEMALE, ESTROGEN RECEPTOR POSITIVE (H): ICD-10-CM

## 2020-06-23 DIAGNOSIS — G62.9 NEUROPATHY: ICD-10-CM

## 2020-06-23 DIAGNOSIS — E87.6 HYPOKALEMIA: Primary | ICD-10-CM

## 2020-06-23 DIAGNOSIS — E11.65 TYPE 2 DIABETES MELLITUS WITH HYPERGLYCEMIA, WITH LONG-TERM CURRENT USE OF INSULIN (H): Primary | ICD-10-CM

## 2020-06-23 DIAGNOSIS — I10 BENIGN ESSENTIAL HYPERTENSION: ICD-10-CM

## 2020-06-23 DIAGNOSIS — C50.812 MALIGNANT NEOPLASM OF OVERLAPPING SITES OF LEFT BREAST IN FEMALE, ESTROGEN RECEPTOR POSITIVE (H): ICD-10-CM

## 2020-06-23 DIAGNOSIS — Z79.4 TYPE 2 DIABETES MELLITUS WITH HYPERGLYCEMIA, WITH LONG-TERM CURRENT USE OF INSULIN (H): Primary | ICD-10-CM

## 2020-06-23 PROCEDURE — 99607 MTMS BY PHARM ADDL 15 MIN: CPT | Performed by: PHARMACIST

## 2020-06-23 PROCEDURE — 99215 OFFICE O/P EST HI 40 MIN: CPT | Mod: 95 | Performed by: NURSE PRACTITIONER

## 2020-06-23 PROCEDURE — 99606 MTMS BY PHARM EST 15 MIN: CPT | Performed by: PHARMACIST

## 2020-06-23 RX ORDER — AMLODIPINE BESYLATE 10 MG/1
10 TABLET ORAL DAILY
Qty: 60 TABLET | Refills: 3 | Status: SHIPPED | OUTPATIENT
Start: 2020-06-23 | End: 2021-02-26

## 2020-06-23 RX ORDER — FLURBIPROFEN SODIUM 0.3 MG/ML
SOLUTION/ DROPS OPHTHALMIC
Qty: 400 EACH | Refills: 1 | Status: SHIPPED | OUTPATIENT
Start: 2020-06-23 | End: 2020-08-18

## 2020-06-23 RX ORDER — INSULIN ASPART 100 [IU]/ML
INJECTION, SOLUTION INTRAVENOUS; SUBCUTANEOUS
Qty: 15 ML | Refills: 3 | Status: SHIPPED | OUTPATIENT
Start: 2020-06-23 | End: 2020-07-07

## 2020-06-23 RX ORDER — LANCETS
3 EACH MISCELLANEOUS DAILY
Qty: 300 EACH | Refills: 6 | Status: SHIPPED | OUTPATIENT
Start: 2020-06-23 | End: 2023-11-07

## 2020-06-23 RX ORDER — GABAPENTIN 250 MG/5ML
SOLUTION ORAL
Qty: 470 ML | Refills: 0 | Status: SHIPPED | OUTPATIENT
Start: 2020-06-23 | End: 2020-07-07

## 2020-06-23 RX ORDER — CHLORTHALIDONE 25 MG/1
25 TABLET ORAL DAILY
Qty: 60 TABLET | Refills: 3 | Status: SHIPPED | OUTPATIENT
Start: 2020-06-23 | End: 2021-02-23

## 2020-06-23 RX ORDER — AMLODIPINE BESYLATE 10 MG/1
10 TABLET ORAL DAILY
Qty: 60 TABLET | Refills: 3 | Status: SHIPPED | OUTPATIENT
Start: 2020-06-23 | End: 2020-06-23

## 2020-06-23 NOTE — PROGRESS NOTES
MTM ENCOUNTER  SUBJECTIVE/OBJECTIVE:                           Mary Olson is a 64 year old female called for a follow-up visit. She was referred to me from NICOLE Gomes CNP.  Today's visit is a follow-up MTM visit from 6/10/2020. She had a phone visit with Tina earlier this morning.    Patient consented to a telehealth visit: yes  Telemedicine Visit Details  Type of service:  Telephone visit  Start Time: 10:30 AM  End Time: 11:11 AM  Originating Location (pt. Location): Home  Distant Location (provider location):  [facility] MTM  Mode of Communication:  Telephone    Chief Complaint: Lantus titration. Working to get BG down so she can proceed with her cancer treatment. She was able to undergo a PET scan on 6/19/20.    Allergies/ADRs: Reviewed in EHR  Tobacco:  reports that she has never smoked. She has never used smokeless tobacco.  Alcohol: Less than 1 beverages / week  PMH: Reviewed in EHR    Medication Adherence/Access: no issues reported    Type 2 Diabetes: Pt currently taking Lantus 34units daily (starting this dose today), has been titrating dose by 3units every 2 days.    SMBG Ranges (patient reported):    Date FBG/ 2hours post   6/23 6/22 276   6/21 276   6/20 271   6/19 234   6/18 473 (day after chemo infusion)   Symptoms of low blood sugar? none.   Symptoms of high blood sugar? vision changes, neuropathy in R fingers > L. Just prescribed gabapentin solution at visit earlier today.  Aspirin: Taking 81mg daily  Statin: No  ACEi/ARB: No. Urine albumin is No results found for: UMALCR    No results found for: A1C   Lab Results   Component Value Date     03/04/2020     10/28/2015       Today's Vitals: There were no vitals taken for this visit. Phone visit.      ASSESSMENT:                              Medication Adherence: good, no issues identified    Type 2 Diabetes: Needs Improvement. Self monitoring of blood glucose is not at goal of fasting   mg/dL.  Pt would benefit from   Basal Insulin (Lantus) :  Titrate up on dose.  Bolus Insulin (Novolog): Initiate to help reduce BG throughout the day.      PLAN:                            1. Continue to titrate Lantus up by 3units every 2 days until FBG at goal 80-130mg/dL. Updated Rx sent to pharmacy.   2. Start Novolog per sliding scale insulin prior to 3 meals per day.  (B-149: 4 units, 150-199: 6 units, 200-249: 8 units, 250-299: 10 units, 300-349: 12 units,  >350: 14 units). Reviewed appropriate use, benefits, risks and monitoring at length. Rx sent to pharmacy.      I spent 40 minutes with this patient today. All changes were made via collaborative practice agreement with NICOLE Gomes CNP. A copy of the visit note was provided to the patient's primary care provider.    Will follow up in 2 weeks.    The patient was sent a summary of these recommendations via [employer].     Fang Moy, Pharm.D., BCACP  Medication Therapy Management Pharmacist  [phone]

## 2020-06-23 NOTE — Clinical Note
Please schedule this pt on July 14th at 7am (double book) for follow up HTN, DM, and neuropathy thanks you

## 2020-06-23 NOTE — TELEPHONE ENCOUNTER
Novolog flexpen is not covered by the patient's formulary.  Please send new rx.    Thanks,    Jayna Brown  [facility]

## 2020-06-23 NOTE — PROGRESS NOTES
"Mary Olson is a 64 year old female who is being evaluated via a billable video visit.      The patient has been notified of following:     \"This video visit will be conducted via a call between you and your physician/provider. We have found that certain health care needs can be provided without the need for an in-person physical exam.  This service lets us provide the care you need with a video conversation.  If a prescription is necessary we can send it directly to your pharmacy.  If lab work is needed we can place an order for that and you can then stop by our lab to have the test done at a later time.    Video visits are billed at different rates depending on your insurance coverage.  Please reach out to your insurance provider with any questions.    If during the course of the call the physician/provider feels a video visit is not appropriate, you will not be charged for this service.\"    Patient has given verbal consent for Video visit? Yes    Will anyone else be joining your video visit? No    Subjective     Mary Olson is a 64 year old female who presents today via video visit for the following health issues:    HPI    Hypertension Follow-up      Do you check your blood pressure regularly outside of the clinic? Yes     Are you following a low salt diet? Yes    Are your blood pressures ever more than 140 on the top number (systolic) OR more   than 90 on the bottom number (diastolic), for example 140/90? No  On amlodipine 10 mg  and chlorthalidone 12.5 mg    128/95, says her diastolic fluctuates over 90 still.   No side effects from medication  BP Readings from Last 2 Encounters:   06/18/20 (!) 185/111   06/15/20 (!) 130/99     LDL Cholesterol Calculated (mg/dL)   Date Value   10/28/2015 152 (H)         How many servings of fruits and vegetables do you eat daily?  4 or more    On average, how many sweetened beverages do you drink each day (Examples: soda, juice, sweet tea, etc.  Do NOT count diet " or artificially sweetened beverages)?   0-1    How many days per week do you exercise enough to make your heart beat faster? 5    How many minutes a day do you exercise enough to make your heart beat faster? 30 - 60    How many days per week do you miss taking your medication? 0      Diabetes Follow-up    How often are you checking your blood sugar? Two times daily  Blood sugar testing frequency justification:  Uncontrolled diabetes  What time of day are you checking your blood sugars (select all that apply)?  Before and after meals  Have you had any blood sugars above 200?  Yes highest 300  Have you had any blood sugars below 70?  No    What symptoms do you notice when your blood sugar is low?  None    What concerns do you have today about your diabetes? Blood sugar is often over 200 and Other: new neuropathy in right fingers     Do you have any of these symptoms? (Select all that apply) numbness and tingling in right fingers     Have you had a diabetic eye exam in the last 12 months? No  New numbness and tingling her right fingers. Otherwise she denies any body aches or pain.     On lantus 34 units at bedtime she has an appointment today with [facility].   Sugars are ranging 230-270.     She had a PET scan on Thursday. She does not know the results. She has f/u with Onc on Wednesday    BP Readings from Last 2 Encounters:   06/18/20 (!) 185/111   06/15/20 (!) 130/99     LDL Cholesterol Calculated (mg/dL)   Date Value   10/28/2015 152 (H)                     Video Start Time: 7:34 AM    Patient Active Problem List   Diagnosis     Hyperlipidemia LDL goal <160     Essential hypertension     Morbid obesity (H)     Family history of malignant neoplasm of breast     Breast skin changes     Nipple discharge, bloody     Malignant neoplasm of overlapping sites of left breast in female, estrogen receptor positive (H)     Bone metastasis (H)     Past Surgical History:   Procedure Laterality Date     BIOPSY  3/2020     GYN  "SURGERY  3/13/89. & 6/22/92    C Sections       Social History     Tobacco Use     Smoking status: Never Smoker     Smokeless tobacco: Never Used   Substance Use Topics     Alcohol use: Not Currently     Comment: 2-3 drinks a month      Family History   Problem Relation Age of Onset     Hypertension Mother      Coronary Artery Disease Mother      Breast Cancer Mother      Hyperlipidemia Mother      Depression Mother      Arrhythmia Brother      Cerebrovascular Disease Brother      Diabetes No family hx of            Reviewed and updated as needed this visit by Provider         Review of Systems   Constitutional, HEENT, cardiovascular, pulmonary, GI, , musculoskeletal, neuro, skin, endocrine and psych systems are negative, except as otherwise noted.      Objective    There were no vitals taken for this visit.  Estimated body mass index is 42.49 kg/m  as calculated from the following:    Height as of 6/18/20: 1.575 m (5' 2\").    Weight as of 6/18/20: 105.4 kg (232 lb 4.8 oz).  Physical Exam     GENERAL: alert and no distress  EYES: Eyes grossly normal to inspection.  No discharge or erythema, or obvious scleral/conjunctival abnormalities.  RESP: No audible wheeze, cough, or visible cyanosis.  No visible retractions or increased work of breathing.    SKIN: Visible skin clear. No significant rash, abnormal pigmentation or lesions.  NEURO: Cranial nerves grossly intact.  Mentation and speech appropriate for age.  PSYCH: Mentation appears normal, affect normal/bright, judgement and insight intact, normal speech and appearance well-groomed.      Diagnostic Test Results:  Labs reviewed in Epic  none         Assessment & Plan     1. Type 2 diabetes mellitus with hyperglycemia, with long-term current use of insulin (H)  Uncontrolled. Sugars >250 on Lantus 34 units. Has scheduled appointment with Fang HENAO today - discussed likelihood of adding short acting insulin. She will have A1c and BMP drawn at next infusion " appointment   - **A1C FUTURE anytime; Future    2. Neuropathy  New diagnosis in right hand likely multi factorial from chemotherapy agents and DM recommend starting gabapentin at bedtime   - gabapentin (NEURONTIN) 250 MG/5ML solution; Take 2.5 ml by mouth at bedtime for 3 days, then 5 ml at bedtime and stay on this dose  Dispense: 470 mL; Refill: 0    3. Benign essential hypertension  Uncontrolled chlorthalidone increased.   Pt will assure K+ in diet and recheck BMP in 2 weeks   - chlorthalidone (HYGROTON) 25 MG tablet; Take 1 tablet (25 mg) by mouth daily  Dispense: 60 tablet; Refill: 3    4. Malignant neoplasm of overlapping sites of left breast in female, estrogen receptor positive (H)  Breast CA with diffuse metastatic involvement undergoing chemotherapy.   Recent PET results reviewed by provider but NOT discussed with pt, she has an appointment scheduled in 2 days with Onc.     F/u in 2 weeks with video visit     No follow-ups on file.    NICOLE Gomes CNP  [facility]      Video-Visit Details    Type of service:  Video Visit    Video End Time:7:53 AM    Originating Location (pt. Location): Home    Distant Location (provider location):  [facility]     Platform used for Video Visit: Doximity    No follow-ups on file.       NICOLE Gomes CNP

## 2020-06-24 ENCOUNTER — VIRTUAL VISIT (OUTPATIENT)
Dept: ONCOLOGY | Facility: CLINIC | Age: 65
End: 2020-06-24
Payer: COMMERCIAL

## 2020-06-24 VITALS — BODY MASS INDEX: 41.34 KG/M2 | WEIGHT: 226 LBS

## 2020-06-24 DIAGNOSIS — C79.51 BONE METASTASIS: ICD-10-CM

## 2020-06-24 DIAGNOSIS — Z17.0 MALIGNANT NEOPLASM OF OVERLAPPING SITES OF LEFT BREAST IN FEMALE, ESTROGEN RECEPTOR POSITIVE (H): Primary | ICD-10-CM

## 2020-06-24 DIAGNOSIS — C50.812 MALIGNANT NEOPLASM OF OVERLAPPING SITES OF LEFT BREAST IN FEMALE, ESTROGEN RECEPTOR POSITIVE (H): Primary | ICD-10-CM

## 2020-06-24 PROCEDURE — 99214 OFFICE O/P EST MOD 30 MIN: CPT | Mod: 95 | Performed by: INTERNAL MEDICINE

## 2020-06-24 RX ORDER — METHYLPREDNISOLONE SODIUM SUCCINATE 125 MG/2ML
125 INJECTION, POWDER, LYOPHILIZED, FOR SOLUTION INTRAMUSCULAR; INTRAVENOUS
Status: CANCELLED
Start: 2020-07-01

## 2020-06-24 RX ORDER — LORAZEPAM 2 MG/ML
0.5 INJECTION INTRAMUSCULAR EVERY 4 HOURS PRN
Status: CANCELLED
Start: 2020-07-08

## 2020-06-24 RX ORDER — LORAZEPAM 2 MG/ML
0.5 INJECTION INTRAMUSCULAR EVERY 4 HOURS PRN
Status: CANCELLED
Start: 2020-07-15

## 2020-06-24 RX ORDER — METHYLPREDNISOLONE SODIUM SUCCINATE 125 MG/2ML
125 INJECTION, POWDER, LYOPHILIZED, FOR SOLUTION INTRAMUSCULAR; INTRAVENOUS
Status: CANCELLED
Start: 2020-07-08

## 2020-06-24 RX ORDER — ALBUTEROL SULFATE 90 UG/1
1-2 AEROSOL, METERED RESPIRATORY (INHALATION)
Status: CANCELLED
Start: 2020-07-15

## 2020-06-24 RX ORDER — EPINEPHRINE 1 MG/ML
0.3 INJECTION, SOLUTION INTRAMUSCULAR; SUBCUTANEOUS EVERY 5 MIN PRN
Status: CANCELLED | OUTPATIENT
Start: 2020-07-01

## 2020-06-24 RX ORDER — METHYLPREDNISOLONE SODIUM SUCCINATE 125 MG/2ML
125 INJECTION, POWDER, LYOPHILIZED, FOR SOLUTION INTRAMUSCULAR; INTRAVENOUS
Status: CANCELLED
Start: 2020-07-15

## 2020-06-24 RX ORDER — NALOXONE HYDROCHLORIDE 0.4 MG/ML
.1-.4 INJECTION, SOLUTION INTRAMUSCULAR; INTRAVENOUS; SUBCUTANEOUS
Status: CANCELLED | OUTPATIENT
Start: 2020-07-15

## 2020-06-24 RX ORDER — ALBUTEROL SULFATE 90 UG/1
1-2 AEROSOL, METERED RESPIRATORY (INHALATION)
Status: CANCELLED
Start: 2020-07-08

## 2020-06-24 RX ORDER — ALBUTEROL SULFATE 90 UG/1
1-2 AEROSOL, METERED RESPIRATORY (INHALATION)
Status: CANCELLED
Start: 2020-07-01

## 2020-06-24 RX ORDER — DIPHENHYDRAMINE HYDROCHLORIDE 50 MG/ML
50 INJECTION INTRAMUSCULAR; INTRAVENOUS
Status: CANCELLED
Start: 2020-07-01

## 2020-06-24 RX ORDER — DIPHENHYDRAMINE HYDROCHLORIDE 50 MG/ML
50 INJECTION INTRAMUSCULAR; INTRAVENOUS
Status: CANCELLED
Start: 2020-07-08

## 2020-06-24 RX ORDER — SODIUM CHLORIDE 9 MG/ML
1000 INJECTION, SOLUTION INTRAVENOUS CONTINUOUS PRN
Status: CANCELLED
Start: 2020-07-15

## 2020-06-24 RX ORDER — LORAZEPAM 2 MG/ML
0.5 INJECTION INTRAMUSCULAR EVERY 4 HOURS PRN
Status: CANCELLED
Start: 2020-07-01

## 2020-06-24 RX ORDER — HEPARIN SODIUM (PORCINE) LOCK FLUSH IV SOLN 100 UNIT/ML 100 UNIT/ML
5 SOLUTION INTRAVENOUS
Status: CANCELLED | OUTPATIENT
Start: 2020-07-15

## 2020-06-24 RX ORDER — HEPARIN SODIUM,PORCINE 10 UNIT/ML
5 VIAL (ML) INTRAVENOUS
Status: CANCELLED | OUTPATIENT
Start: 2020-07-15

## 2020-06-24 RX ORDER — EPINEPHRINE 1 MG/ML
0.3 INJECTION, SOLUTION INTRAMUSCULAR; SUBCUTANEOUS EVERY 5 MIN PRN
Status: CANCELLED | OUTPATIENT
Start: 2020-07-15

## 2020-06-24 RX ORDER — EPINEPHRINE 0.3 MG/.3ML
0.3 INJECTION SUBCUTANEOUS EVERY 5 MIN PRN
Status: CANCELLED | OUTPATIENT
Start: 2020-07-08

## 2020-06-24 RX ORDER — DIPHENHYDRAMINE HCL 25 MG
50 CAPSULE ORAL ONCE
Status: CANCELLED
Start: 2020-07-01

## 2020-06-24 RX ORDER — EPINEPHRINE 0.3 MG/.3ML
0.3 INJECTION SUBCUTANEOUS EVERY 5 MIN PRN
Status: CANCELLED | OUTPATIENT
Start: 2020-07-15

## 2020-06-24 RX ORDER — ALBUTEROL SULFATE 0.83 MG/ML
2.5 SOLUTION RESPIRATORY (INHALATION)
Status: CANCELLED | OUTPATIENT
Start: 2020-07-15

## 2020-06-24 RX ORDER — HEPARIN SODIUM (PORCINE) LOCK FLUSH IV SOLN 100 UNIT/ML 100 UNIT/ML
5 SOLUTION INTRAVENOUS
Status: CANCELLED | OUTPATIENT
Start: 2020-07-08

## 2020-06-24 RX ORDER — ALBUTEROL SULFATE 0.83 MG/ML
2.5 SOLUTION RESPIRATORY (INHALATION)
Status: CANCELLED | OUTPATIENT
Start: 2020-07-01

## 2020-06-24 RX ORDER — EPINEPHRINE 0.3 MG/.3ML
0.3 INJECTION SUBCUTANEOUS EVERY 5 MIN PRN
Status: CANCELLED | OUTPATIENT
Start: 2020-07-01

## 2020-06-24 RX ORDER — SODIUM CHLORIDE 9 MG/ML
1000 INJECTION, SOLUTION INTRAVENOUS CONTINUOUS PRN
Status: CANCELLED
Start: 2020-07-08

## 2020-06-24 RX ORDER — ALBUTEROL SULFATE 0.83 MG/ML
2.5 SOLUTION RESPIRATORY (INHALATION)
Status: CANCELLED | OUTPATIENT
Start: 2020-07-08

## 2020-06-24 RX ORDER — SODIUM CHLORIDE 9 MG/ML
1000 INJECTION, SOLUTION INTRAVENOUS CONTINUOUS PRN
Status: CANCELLED
Start: 2020-07-01

## 2020-06-24 RX ORDER — HEPARIN SODIUM,PORCINE 10 UNIT/ML
5 VIAL (ML) INTRAVENOUS
Status: CANCELLED | OUTPATIENT
Start: 2020-07-08

## 2020-06-24 RX ORDER — DIPHENHYDRAMINE HCL 25 MG
50 CAPSULE ORAL ONCE
Status: CANCELLED | OUTPATIENT
Start: 2020-07-08

## 2020-06-24 RX ORDER — NALOXONE HYDROCHLORIDE 0.4 MG/ML
.1-.4 INJECTION, SOLUTION INTRAMUSCULAR; INTRAVENOUS; SUBCUTANEOUS
Status: CANCELLED | OUTPATIENT
Start: 2020-07-08

## 2020-06-24 RX ORDER — MEPERIDINE HYDROCHLORIDE 25 MG/ML
25 INJECTION INTRAMUSCULAR; INTRAVENOUS; SUBCUTANEOUS EVERY 30 MIN PRN
Status: CANCELLED | OUTPATIENT
Start: 2020-07-01

## 2020-06-24 RX ORDER — DIPHENHYDRAMINE HYDROCHLORIDE 50 MG/ML
50 INJECTION INTRAMUSCULAR; INTRAVENOUS
Status: CANCELLED
Start: 2020-07-15

## 2020-06-24 RX ORDER — NALOXONE HYDROCHLORIDE 0.4 MG/ML
.1-.4 INJECTION, SOLUTION INTRAMUSCULAR; INTRAVENOUS; SUBCUTANEOUS
Status: CANCELLED | OUTPATIENT
Start: 2020-07-01

## 2020-06-24 RX ORDER — EPINEPHRINE 1 MG/ML
0.3 INJECTION, SOLUTION INTRAMUSCULAR; SUBCUTANEOUS EVERY 5 MIN PRN
Status: CANCELLED | OUTPATIENT
Start: 2020-07-08

## 2020-06-24 RX ORDER — MEPERIDINE HYDROCHLORIDE 25 MG/ML
25 INJECTION INTRAMUSCULAR; INTRAVENOUS; SUBCUTANEOUS EVERY 30 MIN PRN
Status: CANCELLED | OUTPATIENT
Start: 2020-07-15

## 2020-06-24 RX ORDER — ZOLEDRONIC ACID 0.04 MG/ML
4 INJECTION, SOLUTION INTRAVENOUS ONCE
Status: CANCELLED | OUTPATIENT
Start: 2020-07-15

## 2020-06-24 RX ORDER — HEPARIN SODIUM (PORCINE) LOCK FLUSH IV SOLN 100 UNIT/ML 100 UNIT/ML
5 SOLUTION INTRAVENOUS
Status: CANCELLED | OUTPATIENT
Start: 2020-07-01

## 2020-06-24 RX ORDER — DIPHENHYDRAMINE HCL 25 MG
50 CAPSULE ORAL ONCE
Status: CANCELLED | OUTPATIENT
Start: 2020-07-15

## 2020-06-24 RX ORDER — HEPARIN SODIUM,PORCINE 10 UNIT/ML
5 VIAL (ML) INTRAVENOUS
Status: CANCELLED | OUTPATIENT
Start: 2020-07-01

## 2020-06-24 RX ORDER — MEPERIDINE HYDROCHLORIDE 25 MG/ML
25 INJECTION INTRAMUSCULAR; INTRAVENOUS; SUBCUTANEOUS EVERY 30 MIN PRN
Status: CANCELLED | OUTPATIENT
Start: 2020-07-08

## 2020-06-24 ASSESSMENT — PAIN SCALES - GENERAL: PAINLEVEL: NO PAIN (0)

## 2020-06-24 NOTE — LETTER
"    6/24/2020         RE: Mary Olson  [address]  [facility] [address]        Dear Colleague,    Thank you for referring your patient, Mary Olson, to the [facility]. Please see a copy of my visit note below.    Visit Date:   06/24/2020      ONCOLOGY DIAGNOSIS:  1. March 2020: Metastatic Breast Cancer     THERAPY TO DATE:  1. March 2020 to Present: Weekly Taxol.     INTERVAL HISTORY:  Desmond is a 64-year-old female diagnosed with metastatic breast cancer in 2020 after noticing a lesion on her breast.  The patient presents via Doximity video visit for toxicity evaluation, as well as a review of most recent imaging.  The patient states that overall she is tolerating the chemotherapy.  Does have some tingling and was started on gabapentin.  Also, feels this may be related to her blood sugars going up.  This has been increasingly difficult to control and was started on insulin.  Denies any fevers, chills, nausea, vomiting, chest pain, shortness of breath, or cough.  No abdominal discomfort.  No new palpable masses, and the remainder of comprehensive review of systems is negative.      PAST MEDICAL HISTORY:   1.  Metastatic breast cancer, see above.   2.  Hypertension.   3.  Diabetes.      FAMILY HISTORY:  Mother had breast cancer at 62.  Underwent lumpectomy and radiation, then had \"pills.\"  No new family history since last seen.      SOCIAL HISTORY:   to her , Dieter.  Is on video visit by herself from home.  Has 1 son and 1 daughter.  No current tobacco use.  Currently not working.        ALLERGIES:  LISINOPRIL.      PHYSICAL EXAM:  GENERAL: Comfortable, no acute distress.   HEAD: Atraumatic/Normocephalic.  EYES: Sclera non-icteric. Grossly normal.  RESPIRATORY: Normal breathing, non-labored. No audible wheezes/cough.  SKIN: No jaundice, discoloration or obvious lesions.  NERUO: No tremors visible. Normal speech.  PSYCH: Alert, oriented. Answered questions " appropriately.    The rest of a comprehensive physical examination is deferred due to PHE (public health emergency) video visit restrictions.      6/25/2020  Day 15   Hemoglobin 11.7 - 15.7 g/dL 13.3   Hematocrit 35.0 - 47.0 % 39.4   Platelet Count 150 - 450 10e9/L 430   Absolute Neutrophil 1.6 - 8.3 10e9/L 2.8   Sodium 133 - 144 mmol/L 133   Potassium 3.4 - 5.3 mmol/L 2.9 (A)   Chloride 94 - 109 mmol/L 99   Carbon Dioxide 20 - 32 mmol/L 29   Urea Nitrogen 7 - 30 mg/dL 12   Creatinine 0.52 - 1.04 mg/dL 0.66   Calcium 8.5 - 10.1 mg/dL 9.5   WBC 4.0 - 11.0 10e9/L 5.9     IMAGING:  CT chest, abdomen and pelvis from 06/19/2020 shows multiple left breast lesion, left axillary lymph nodes and some pectoral lymph nodes, some of the same size, others slightly smaller.  No new lesions.  The bone lesions appear slightly more sclerotic on today's exam, suggesting healing.    Bone scan from 06/19/2020 shows abnormal radiotracer uptake in the posterior left 8th rib and right occipital calvarium, new since PET from 03/2020.  Abnormal radiotracer uptake in the right occipital calvarium, which also appears new compared to PET.  Additional multifocal abnormal radiotracer uptake to the anterior right fourth rib, thoracolumbar spine, left sacral ala, right anterior iliac crest, left scapula and left femoral lesser trochanter, corresponding to the lesions previously seen on PET scan.  Soft tissue uptake to the left breast corresponds with primary malignancy.      ASSESSMENT AND PLAN:   1.  Metastatic breast cancer.  CT shows positive response. On CT, the bone lesions are overall stable to healing; however, bone scan shows new lesions when compared to prior PET.  I discussed this at length with the patient.  It is unclear to me whether the patient has response or stability or progression.  One of my concerns is that we currently are looking at bone scan and comparing it to a PET scan, and so it is unclear to me whether these are truly  new lesions or not.  Ideally, we should like to compare PET scan to PET scans.  Unfortunately, we have not been able to do this because of the patient's blood sugars.  Since by CT at least we had a positive response.  I would like to go ahead with current therapy until we can compare to PET or another CT. We will go ahead with Taxol.  We will check a CA 27-29 today, as well as with next cycle.  In the meantime, we will try to get her blood sugars under better control to get a PET scan. If we do not feel the patient is having an adequate response, can consider switching to other therapy.   2.  Nausea.  Controlled on current antiemetic regimen.   3.  Bony metastases.  Continue Zometa.   4.  COVID-19 Precautions. Discussed the importance of good hand washing techniques with soap and water for at least 20 seconds, avoid touching eyes, nose, mouth, avoiding crowds, social distancing.    The patient understands my concerns regarding the imaging modalities and how to truly assess her disease.  Again, I am not completely 100% certain the patient has had progression based on her current imaging since her breast lesion and areas by CT have showed response.  Therefore, I would like to give her at least 1 more cycle and will consider switching therapies if needed.      At the end of the discussion, all questions addressed to the best of my ability.  The patient verbalized understanding and was agreeable with the plan.      Video-Visit Details    Type of service:  Video Visit    Video Start and End Time: 10:37 a.m. to 10:53 a.m.     Originating Location (pt. Location): Home    Distant Location (provider location):  [facility]     Mode of Communication:  Video Conference via [employer].     BRY WHITFIELD MD             D: 2020   T: 2020   MT: DOUGIE      Name:     ARTHUR JENKINS   MRN:      [id number]        Account:      [id number]   :      1955           Visit Date:   2020       Document: [id number]        Again, thank you for allowing me to participate in the care of your patient.        Sincerely,        Keysha Birch MD

## 2020-06-24 NOTE — PROGRESS NOTES
"Visit Date:   06/24/2020      ONCOLOGY DIAGNOSIS:  1. March 2020: Metastatic Breast Cancer     THERAPY TO DATE:  1. March 2020 to Present: Weekly Taxol.     INTERVAL HISTORY:  Desmond is a 64-year-old female diagnosed with metastatic breast cancer in 2020 after noticing a lesion on her breast.  The patient presents via Doximity video visit for toxicity evaluation, as well as a review of most recent imaging.  The patient states that overall she is tolerating the chemotherapy.  Does have some tingling and was started on gabapentin.  Also, feels this may be related to her blood sugars going up.  This has been increasingly difficult to control and was started on insulin.  Denies any fevers, chills, nausea, vomiting, chest pain, shortness of breath, or cough.  No abdominal discomfort.  No new palpable masses, and the remainder of comprehensive review of systems is negative.      PAST MEDICAL HISTORY:   1.  Metastatic breast cancer, see above.   2.  Hypertension.   3.  Diabetes.      FAMILY HISTORY:  Mother had breast cancer at 62.  Underwent lumpectomy and radiation, then had \"pills.\"  No new family history since last seen.      SOCIAL HISTORY:   to her , Dieter.  Is on video visit by herself from home.  Has 1 son and 1 daughter.  No current tobacco use.  Currently not working.        ALLERGIES:  LISINOPRIL.      PHYSICAL EXAM:  GENERAL: Comfortable, no acute distress.   HEAD: Atraumatic/Normocephalic.  EYES: Sclera non-icteric. Grossly normal.  RESPIRATORY: Normal breathing, non-labored. No audible wheezes/cough.  SKIN: No jaundice, discoloration or obvious lesions.  NERUO: No tremors visible. Normal speech.  PSYCH: Alert, oriented. Answered questions appropriately.    The rest of a comprehensive physical examination is deferred due to [facility] (public health emergency) video visit restrictions.      6/25/2020  Day 15   Hemoglobin 11.7 - 15.7 g/dL 13.3   Hematocrit 35.0 - 47.0 % 39.4   Platelet Count 150 - 450 " 10e9/L 430   Absolute Neutrophil 1.6 - 8.3 10e9/L 2.8   Sodium 133 - 144 mmol/L 133   Potassium 3.4 - 5.3 mmol/L 2.9 (A)   Chloride 94 - 109 mmol/L 99   Carbon Dioxide 20 - 32 mmol/L 29   Urea Nitrogen 7 - 30 mg/dL 12   Creatinine 0.52 - 1.04 mg/dL 0.66   Calcium 8.5 - 10.1 mg/dL 9.5   WBC 4.0 - 11.0 10e9/L 5.9     IMAGING:  CT chest, abdomen and pelvis from 06/19/2020 shows multiple left breast lesion, left axillary lymph nodes and some pectoral lymph nodes, some of the same size, others slightly smaller.  No new lesions.  The bone lesions appear slightly more sclerotic on today's exam, suggesting healing.    Bone scan from 06/19/2020 shows abnormal radiotracer uptake in the posterior left 8th rib and right occipital calvarium, new since PET from 03/2020.  Abnormal radiotracer uptake in the right occipital calvarium, which also appears new compared to PET.  Additional multifocal abnormal radiotracer uptake to the anterior right fourth rib, thoracolumbar spine, left sacral ala, right anterior iliac crest, left scapula and left femoral lesser trochanter, corresponding to the lesions previously seen on PET scan.  Soft tissue uptake to the left breast corresponds with primary malignancy.      ASSESSMENT AND PLAN:   1.  Metastatic breast cancer.  CT shows positive response. On CT, the bone lesions are overall stable to healing; however, bone scan shows new lesions when compared to prior PET.  I discussed this at length with the patient.  It is unclear to me whether the patient has response or stability or progression.  One of my concerns is that we currently are looking at bone scan and comparing it to a PET scan, and so it is unclear to me whether these are truly new lesions or not.  Ideally, we should like to compare PET scan to PET scans.  Unfortunately, we have not been able to do this because of the patient's blood sugars.  Since by CT at least we had a positive response.  I would like to go ahead with current  therapy until we can compare to PET or another CT. We will go ahead with Taxol.  We will check a CA 27-29 today, as well as with next cycle.  In the meantime, we will try to get her blood sugars under better control to get a PET scan. If we do not feel the patient is having an adequate response, can consider switching to other therapy.   2.  Nausea.  Controlled on current antiemetic regimen.   3.  Bony metastases.  Continue Zometa.   4.  COVID-19 Precautions. Discussed the importance of good hand washing techniques with soap and water for at least 20 seconds, avoid touching eyes, nose, mouth, avoiding crowds, social distancing.    The patient understands my concerns regarding the imaging modalities and how to truly assess her disease.  Again, I am not completely 100% certain the patient has had progression based on her current imaging since her breast lesion and areas by CT have showed response.  Therefore, I would like to give her at least 1 more cycle and will consider switching therapies if needed.      At the end of the discussion, all questions addressed to the best of my ability.  The patient verbalized understanding and was agreeable with the plan.      Video-Visit Details    Type of service:  Video Visit    Video Start and End Time: 10:37 a.m. to 10:53 a.m.     Originating Location (pt. Location): Home    Distant Location (provider location):  [facility]     Mode of Communication:  Video Conference via [employer].     BRY WHITFIELD MD             D: 2020   T: 2020   MT:       Name:     ARTHUR JENKINS   MRN:      [id number]        Account:      [id number]   :      1955           Visit Date:   2020      Document: [id number]

## 2020-06-24 NOTE — NURSING NOTE
SYMPTOM QUESTIONNAIRE    Pain: no    Nausea/Vomiting: no    Mouth Sores: no    Shortness of Breath: no    Smoking: no    Fever or Chills: no    Hard Stools: no    Soft Stools: no    Weight Loss: no    Weakness: no    Burning, numbness or tingling in hands or feet: tingling- just started on Gabapentin. Also, increased dosing of Insulin- see med list.    Problems with skin or swelling: no    Memory Loss: no    Anxiety or Depression: no    Trouble Sleeping: no    Kailee Jarvis LPN on 6/24/2020 at 10:14 AM

## 2020-06-25 ENCOUNTER — ONCOLOGY VISIT (OUTPATIENT)
Dept: ONCOLOGY | Facility: CLINIC | Age: 65
End: 2020-06-25
Payer: COMMERCIAL

## 2020-06-25 ENCOUNTER — INFUSION THERAPY VISIT (OUTPATIENT)
Dept: INFUSION THERAPY | Facility: CLINIC | Age: 65
End: 2020-06-25
Attending: NURSE PRACTITIONER
Payer: COMMERCIAL

## 2020-06-25 VITALS
RESPIRATION RATE: 16 BRPM | WEIGHT: 232 LBS | DIASTOLIC BLOOD PRESSURE: 90 MMHG | TEMPERATURE: 98.2 F | BODY MASS INDEX: 42.43 KG/M2 | HEART RATE: 101 BPM | SYSTOLIC BLOOD PRESSURE: 143 MMHG | OXYGEN SATURATION: 96 %

## 2020-06-25 DIAGNOSIS — Z17.0 MALIGNANT NEOPLASM OF OVERLAPPING SITES OF LEFT BREAST IN FEMALE, ESTROGEN RECEPTOR POSITIVE (H): Primary | ICD-10-CM

## 2020-06-25 DIAGNOSIS — C50.812 MALIGNANT NEOPLASM OF OVERLAPPING SITES OF LEFT BREAST IN FEMALE, ESTROGEN RECEPTOR POSITIVE (H): Primary | ICD-10-CM

## 2020-06-25 DIAGNOSIS — I10 BENIGN ESSENTIAL HYPERTENSION: ICD-10-CM

## 2020-06-25 DIAGNOSIS — E11.65 TYPE 2 DIABETES MELLITUS WITH HYPERGLYCEMIA, WITH LONG-TERM CURRENT USE OF INSULIN (H): ICD-10-CM

## 2020-06-25 DIAGNOSIS — Z79.4 TYPE 2 DIABETES MELLITUS WITH HYPERGLYCEMIA, WITH LONG-TERM CURRENT USE OF INSULIN (H): ICD-10-CM

## 2020-06-25 LAB
ANION GAP SERPL CALCULATED.3IONS-SCNC: 5 MMOL/L (ref 3–14)
BASOPHILS # BLD AUTO: 0.1 10E9/L (ref 0–0.2)
BASOPHILS NFR BLD AUTO: 1.2 %
BUN SERPL-MCNC: 12 MG/DL (ref 7–30)
CALCIUM SERPL-MCNC: 9.5 MG/DL (ref 8.5–10.1)
CHLORIDE SERPL-SCNC: 99 MMOL/L (ref 94–109)
CO2 SERPL-SCNC: 29 MMOL/L (ref 20–32)
CREAT SERPL-MCNC: 0.66 MG/DL (ref 0.52–1.04)
DIFFERENTIAL METHOD BLD: ABNORMAL
EOSINOPHIL # BLD AUTO: 0.1 10E9/L (ref 0–0.7)
EOSINOPHIL NFR BLD AUTO: 2 %
ERYTHROCYTE [DISTWIDTH] IN BLOOD BY AUTOMATED COUNT: 15.5 % (ref 10–15)
GFR SERPL CREATININE-BSD FRML MDRD: >90 ML/MIN/{1.73_M2}
GLUCOSE SERPL-MCNC: 180 MG/DL (ref 70–99)
HBA1C MFR BLD: 10.6 % (ref 0–5.6)
HCT VFR BLD AUTO: 39.4 % (ref 35–47)
HGB BLD-MCNC: 13.3 G/DL (ref 11.7–15.7)
IMM GRANULOCYTES # BLD: 0 10E9/L (ref 0–0.4)
IMM GRANULOCYTES NFR BLD: 0.7 %
LYMPHOCYTES # BLD AUTO: 2.5 10E9/L (ref 0.8–5.3)
LYMPHOCYTES NFR BLD AUTO: 42 %
MCH RBC QN AUTO: 28.9 PG (ref 26.5–33)
MCHC RBC AUTO-ENTMCNC: 33.8 G/DL (ref 31.5–36.5)
MCV RBC AUTO: 86 FL (ref 78–100)
MONOCYTES # BLD AUTO: 0.4 10E9/L (ref 0–1.3)
MONOCYTES NFR BLD AUTO: 6.3 %
NEUTROPHILS # BLD AUTO: 2.8 10E9/L (ref 1.6–8.3)
NEUTROPHILS NFR BLD AUTO: 47.8 %
PLATELET # BLD AUTO: 430 10E9/L (ref 150–450)
POTASSIUM SERPL-SCNC: 2.9 MMOL/L (ref 3.4–5.3)
RBC # BLD AUTO: 4.6 10E12/L (ref 3.8–5.2)
SODIUM SERPL-SCNC: 133 MMOL/L (ref 133–144)
WBC # BLD AUTO: 5.9 10E9/L (ref 4–11)

## 2020-06-25 PROCEDURE — 80048 BASIC METABOLIC PNL TOTAL CA: CPT | Performed by: NURSE PRACTITIONER

## 2020-06-25 PROCEDURE — S0028 INJECTION, FAMOTIDINE, 20 MG: HCPCS | Performed by: NURSE PRACTITIONER

## 2020-06-25 PROCEDURE — 96375 TX/PRO/DX INJ NEW DRUG ADDON: CPT | Performed by: NURSE PRACTITIONER

## 2020-06-25 PROCEDURE — 96413 CHEMO IV INFUSION 1 HR: CPT | Performed by: NURSE PRACTITIONER

## 2020-06-25 PROCEDURE — 99207 ZZC NO CHARGE LOS: CPT

## 2020-06-25 PROCEDURE — 99207 ZZC NO CHARGE NURSE ONLY: CPT

## 2020-06-25 PROCEDURE — 96367 TX/PROPH/DG ADDL SEQ IV INF: CPT | Performed by: NURSE PRACTITIONER

## 2020-06-25 PROCEDURE — 85025 COMPLETE CBC W/AUTO DIFF WBC: CPT | Performed by: NURSE PRACTITIONER

## 2020-06-25 PROCEDURE — 83036 HEMOGLOBIN GLYCOSYLATED A1C: CPT | Performed by: NURSE PRACTITIONER

## 2020-06-25 RX ORDER — DIPHENHYDRAMINE HCL 25 MG
50 CAPSULE ORAL ONCE
Status: COMPLETED | OUTPATIENT
Start: 2020-06-25 | End: 2020-06-25

## 2020-06-25 RX ORDER — HEPARIN SODIUM (PORCINE) LOCK FLUSH IV SOLN 100 UNIT/ML 100 UNIT/ML
5 SOLUTION INTRAVENOUS
Status: DISCONTINUED | OUTPATIENT
Start: 2020-06-25 | End: 2020-06-25 | Stop reason: HOSPADM

## 2020-06-25 RX ORDER — POTASSIUM CHLORIDE 1500 MG/1
20 TABLET, EXTENDED RELEASE ORAL 2 TIMES DAILY
Qty: 60 TABLET | Refills: 3 | Status: SHIPPED | OUTPATIENT
Start: 2020-06-25 | End: 2020-07-07

## 2020-06-25 RX ADMIN — Medication 250 ML: at 14:20

## 2020-06-25 RX ADMIN — HEPARIN SODIUM (PORCINE) LOCK FLUSH IV SOLN 100 UNIT/ML 5 ML: 100 SOLUTION at 13:18

## 2020-06-25 RX ADMIN — Medication 50 MG: at 14:27

## 2020-06-25 RX ADMIN — HEPARIN SODIUM (PORCINE) LOCK FLUSH IV SOLN 100 UNIT/ML 5 ML: 100 SOLUTION at 16:08

## 2020-06-25 ASSESSMENT — PAIN SCALES - GENERAL: PAINLEVEL: NO PAIN (0)

## 2020-06-25 NOTE — PROGRESS NOTES
See IV flow sheet for details of port access. Labs sent: cbc, bmp, hemoglobin A1C. IVAD left accessed for chemotherapy today.    Katharina Enamorado RN

## 2020-06-25 NOTE — PROGRESS NOTES
Infusion Nursing Note:  Mary Olson presents today for C4D15 Taxol.    Patient seen by provider today: No   present during visit today: Not Applicable.    Note: Patient states her BP is elevated d/t white coat syndrome.  States it was lower at home this morning.    Intravenous Access:  Implanted Port.    Treatment Conditions:  Lab Results   Component Value Date    HGB 13.3 06/25/2020     Lab Results   Component Value Date    WBC 5.9 06/25/2020      Lab Results   Component Value Date    ANEU 2.8 06/25/2020     Lab Results   Component Value Date     06/25/2020      Results reviewed, labs MET treatment parameters, ok to proceed with treatment.      Post Infusion Assessment:  Patient tolerated infusion without incident.  Blood return noted pre and post infusion.  Site patent and intact, free from redness, edema or discomfort.  No evidence of extravasations.  Access discontinued per protocol.       Discharge Plan:   Patient will return 7/1/2020 for next appointment.   Patient discharged in stable condition accompanied by: self.  Departure Mode: Ambulatory.    An Harding RN

## 2020-06-26 ENCOUNTER — TELEPHONE (OUTPATIENT)
Dept: FAMILY MEDICINE | Facility: CLINIC | Age: 65
End: 2020-06-26

## 2020-06-26 DIAGNOSIS — E87.6 HYPOKALEMIA: Primary | ICD-10-CM

## 2020-06-26 RX ORDER — POTASSIUM CHLORIDE 1.5 G/1.58G
20 POWDER, FOR SOLUTION ORAL 2 TIMES DAILY
Qty: 60 PACKET | Refills: 2 | Status: SHIPPED | OUTPATIENT
Start: 2020-06-26 | End: 2020-09-09

## 2020-06-26 NOTE — TELEPHONE ENCOUNTER
----- Message from NICOLE Jane CNP sent at 6/25/2020  2:16 PM CDT -----  Please call pt today to advise her potassium is low 2.9. this is a result of the diuretic. She will need to start potassium supplements. Orders placed. Please have her return in 2 weeks for repeat labs.

## 2020-06-26 NOTE — TELEPHONE ENCOUNTER
Reason for Call:  Other     Detailed comments:  Patient was returning Haresh's call. Please call patient back. She said she will be able to answer call.    Phone Number Patient can be reached at: Cell number on file:    Telephone Information:   Mobile [phone]       Best Time: any    Can we leave a detailed message on this number? YES    Call taken on 6/26/2020 at 12:16 PM by Charisse Mccrary

## 2020-06-26 NOTE — TELEPHONE ENCOUNTER
Left message on answering machine for patient to call back.  Marlena David CMA ([facility])

## 2020-06-26 NOTE — TELEPHONE ENCOUNTER
Relayed Tina Ring message to Patient.  She states she has difficulty taking tablet medication in general and was wondering if you could prescribe a solution alternative for her.   She knows Tina Ring might be off on furlough and was wondering if Martin Zamarripa could prescribe this for her in her absence.

## 2020-07-01 ENCOUNTER — INFUSION THERAPY VISIT (OUTPATIENT)
Dept: INFUSION THERAPY | Facility: CLINIC | Age: 65
End: 2020-07-01
Payer: COMMERCIAL

## 2020-07-01 ENCOUNTER — ONCOLOGY VISIT (OUTPATIENT)
Dept: ONCOLOGY | Facility: CLINIC | Age: 65
End: 2020-07-01
Payer: COMMERCIAL

## 2020-07-01 VITALS
RESPIRATION RATE: 18 BRPM | BODY MASS INDEX: 42.25 KG/M2 | DIASTOLIC BLOOD PRESSURE: 89 MMHG | SYSTOLIC BLOOD PRESSURE: 152 MMHG | HEART RATE: 112 BPM | OXYGEN SATURATION: 96 % | WEIGHT: 231 LBS | TEMPERATURE: 98.6 F

## 2020-07-01 DIAGNOSIS — C50.812 MALIGNANT NEOPLASM OF OVERLAPPING SITES OF LEFT BREAST IN FEMALE, ESTROGEN RECEPTOR POSITIVE (H): Primary | ICD-10-CM

## 2020-07-01 DIAGNOSIS — Z17.0 MALIGNANT NEOPLASM OF OVERLAPPING SITES OF LEFT BREAST IN FEMALE, ESTROGEN RECEPTOR POSITIVE (H): Primary | ICD-10-CM

## 2020-07-01 LAB
ALBUMIN SERPL-MCNC: 3.3 G/DL (ref 3.4–5)
ALP SERPL-CCNC: 111 U/L (ref 40–150)
ALT SERPL W P-5'-P-CCNC: 47 U/L (ref 0–50)
AST SERPL W P-5'-P-CCNC: 28 U/L (ref 0–45)
BASOPHILS # BLD AUTO: 0.1 10E9/L (ref 0–0.2)
BASOPHILS NFR BLD AUTO: 1.2 %
BILIRUB DIRECT SERPL-MCNC: 0.2 MG/DL (ref 0–0.2)
BILIRUB SERPL-MCNC: 0.5 MG/DL (ref 0.2–1.3)
CANCER AG27-29 SERPL-ACNC: 347 U/ML (ref 0–39)
DIFFERENTIAL METHOD BLD: ABNORMAL
EOSINOPHIL # BLD AUTO: 0.1 10E9/L (ref 0–0.7)
EOSINOPHIL NFR BLD AUTO: 1.2 %
ERYTHROCYTE [DISTWIDTH] IN BLOOD BY AUTOMATED COUNT: 15.5 % (ref 10–15)
HCT VFR BLD AUTO: 39.9 % (ref 35–47)
HGB BLD-MCNC: 13.2 G/DL (ref 11.7–15.7)
IMM GRANULOCYTES # BLD: 0 10E9/L (ref 0–0.4)
IMM GRANULOCYTES NFR BLD: 0.5 %
LYMPHOCYTES # BLD AUTO: 1.9 10E9/L (ref 0.8–5.3)
LYMPHOCYTES NFR BLD AUTO: 43.2 %
MCH RBC QN AUTO: 29 PG (ref 26.5–33)
MCHC RBC AUTO-ENTMCNC: 33.1 G/DL (ref 31.5–36.5)
MCV RBC AUTO: 88 FL (ref 78–100)
MONOCYTES # BLD AUTO: 0.2 10E9/L (ref 0–1.3)
MONOCYTES NFR BLD AUTO: 5.4 %
NEUTROPHILS # BLD AUTO: 2.1 10E9/L (ref 1.6–8.3)
NEUTROPHILS NFR BLD AUTO: 48.5 %
PLATELET # BLD AUTO: 377 10E9/L (ref 150–450)
PROT SERPL-MCNC: 7.6 G/DL (ref 6.8–8.8)
RBC # BLD AUTO: 4.55 10E12/L (ref 3.8–5.2)
WBC # BLD AUTO: 4.3 10E9/L (ref 4–11)

## 2020-07-01 PROCEDURE — 99207 ZZC NO CHARGE LOS: CPT

## 2020-07-01 PROCEDURE — 96375 TX/PRO/DX INJ NEW DRUG ADDON: CPT | Performed by: NURSE PRACTITIONER

## 2020-07-01 PROCEDURE — 86300 IMMUNOASSAY TUMOR CA 15-3: CPT | Performed by: INTERNAL MEDICINE

## 2020-07-01 PROCEDURE — 96413 CHEMO IV INFUSION 1 HR: CPT | Performed by: NURSE PRACTITIONER

## 2020-07-01 PROCEDURE — 99207 ZZC NO CHARGE NURSE ONLY: CPT

## 2020-07-01 PROCEDURE — S0028 INJECTION, FAMOTIDINE, 20 MG: HCPCS | Performed by: NURSE PRACTITIONER

## 2020-07-01 PROCEDURE — 80076 HEPATIC FUNCTION PANEL: CPT | Performed by: INTERNAL MEDICINE

## 2020-07-01 PROCEDURE — 85025 COMPLETE CBC W/AUTO DIFF WBC: CPT | Performed by: INTERNAL MEDICINE

## 2020-07-01 RX ORDER — HEPARIN SODIUM (PORCINE) LOCK FLUSH IV SOLN 100 UNIT/ML 100 UNIT/ML
5 SOLUTION INTRAVENOUS
Status: DISCONTINUED | OUTPATIENT
Start: 2020-07-01 | End: 2020-07-01 | Stop reason: HOSPADM

## 2020-07-01 RX ORDER — DIPHENHYDRAMINE HCL 25 MG
50 CAPSULE ORAL ONCE
Status: COMPLETED | OUTPATIENT
Start: 2020-07-01 | End: 2020-07-01

## 2020-07-01 RX ADMIN — Medication 50 MG: at 11:19

## 2020-07-01 RX ADMIN — Medication 250 ML: at 11:37

## 2020-07-01 RX ADMIN — HEPARIN SODIUM (PORCINE) LOCK FLUSH IV SOLN 100 UNIT/ML 5 ML: 100 SOLUTION at 10:52

## 2020-07-01 ASSESSMENT — PAIN SCALES - GENERAL: PAINLEVEL: NO PAIN (0)

## 2020-07-01 NOTE — PROGRESS NOTES
"Patient's name and  were verified.  See Doc Flowsheet - IV assess for details.  IVAD accessed with 20G 3\" carroll gripper plus needle  blood return positive: YES  Site without redness, tenderness or swelling: YES  flushed with 20cc NS and 5cc 100u/ml heparin  Needle: left accessed for chemotherapy  Comments: Labs drawn.  Patient tolerated procedure without incident.    Hernán Karimi  RN, BSN      "

## 2020-07-01 NOTE — PROGRESS NOTES
Infusion Nursing Note:  Mary Olson presents today for  C5 D1 Taxol.    Patient seen by provider today: No   present during visit today: Not Applicable.    Note: Patient stating that Taxol will continue until her blood sugars are stable to receive a PET scan. Slightly worried about the progression of cancer, but realizes that any chemo is better than no chemo.      Intravenous Access:  Implanted Port.    Treatment Conditions:  Lab Results   Component Value Date    HGB 13.2 07/01/2020     Lab Results   Component Value Date    WBC 4.3 07/01/2020      Lab Results   Component Value Date    ANEU 2.1 07/01/2020     Lab Results   Component Value Date     07/01/2020          Post Infusion Assessment:  Patient tolerated infusion without incident.  Blood return noted pre and post infusion.  No evidence of extravasations.  Access discontinued per protocol.       Discharge Plan:   Discharge instructions reviewed with: Patient.  Patient and/or family verbalized understanding of discharge instructions and all questions answered.  Patient discharged in stable condition accompanied by: self.  Departure Mode: Ambulatory.    Marlena Duval RN

## 2020-07-07 ENCOUNTER — TELEPHONE (OUTPATIENT)
Dept: FAMILY MEDICINE | Facility: CLINIC | Age: 65
End: 2020-07-07

## 2020-07-07 ENCOUNTER — ALLIED HEALTH/NURSE VISIT (OUTPATIENT)
Dept: PHARMACY | Facility: CLINIC | Age: 65
End: 2020-07-07

## 2020-07-07 DIAGNOSIS — E11.65 TYPE 2 DIABETES MELLITUS WITH HYPERGLYCEMIA, WITH LONG-TERM CURRENT USE OF INSULIN (H): ICD-10-CM

## 2020-07-07 DIAGNOSIS — G62.9 NEUROPATHY: ICD-10-CM

## 2020-07-07 DIAGNOSIS — Z17.0 MALIGNANT NEOPLASM OF OVERLAPPING SITES OF LEFT BREAST IN FEMALE, ESTROGEN RECEPTOR POSITIVE (H): ICD-10-CM

## 2020-07-07 DIAGNOSIS — Z79.4 TYPE 2 DIABETES MELLITUS WITH HYPERGLYCEMIA, WITH LONG-TERM CURRENT USE OF INSULIN (H): ICD-10-CM

## 2020-07-07 DIAGNOSIS — C50.812 MALIGNANT NEOPLASM OF OVERLAPPING SITES OF LEFT BREAST IN FEMALE, ESTROGEN RECEPTOR POSITIVE (H): ICD-10-CM

## 2020-07-07 DIAGNOSIS — E11.65 TYPE 2 DIABETES MELLITUS WITH HYPERGLYCEMIA, WITH LONG-TERM CURRENT USE OF INSULIN (H): Primary | ICD-10-CM

## 2020-07-07 DIAGNOSIS — Z79.4 TYPE 2 DIABETES MELLITUS WITH HYPERGLYCEMIA, WITH LONG-TERM CURRENT USE OF INSULIN (H): Primary | ICD-10-CM

## 2020-07-07 DIAGNOSIS — K59.03 DRUG-INDUCED CONSTIPATION: ICD-10-CM

## 2020-07-07 PROCEDURE — 99606 MTMS BY PHARM EST 15 MIN: CPT | Performed by: PHARMACIST

## 2020-07-07 PROCEDURE — 99607 MTMS BY PHARM ADDL 15 MIN: CPT | Performed by: PHARMACIST

## 2020-07-07 RX ORDER — LIDOCAINE/PRILOCAINE 2.5 %-2.5%
CREAM (GRAM) TOPICAL
Qty: 30 G | Refills: 1 | Status: SHIPPED | OUTPATIENT
Start: 2020-07-07 | End: 2022-08-05

## 2020-07-07 RX ORDER — INSULIN ASPART 100 [IU]/ML
INJECTION, SOLUTION INTRAVENOUS; SUBCUTANEOUS
Qty: 15 ML | Refills: 3 | Status: SHIPPED | OUTPATIENT
Start: 2020-07-07 | End: 2020-07-07

## 2020-07-07 RX ORDER — INSULIN LISPRO 100 [IU]/ML
INJECTION, SOLUTION INTRAVENOUS; SUBCUTANEOUS
Qty: 15 ML | Refills: 3 | Status: SHIPPED | OUTPATIENT
Start: 2020-07-07 | End: 2020-08-25

## 2020-07-07 RX ORDER — GABAPENTIN 250 MG/5ML
SOLUTION ORAL
Qty: 470 ML | Refills: 3 | Status: SHIPPED | OUTPATIENT
Start: 2020-07-07 | End: 2021-02-26

## 2020-07-07 RX ORDER — DOCUSATE SODIUM 100 MG/1
100 CAPSULE, LIQUID FILLED ORAL 2 TIMES DAILY PRN
Qty: 100 CAPSULE | Refills: 0 | Status: SHIPPED | OUTPATIENT
Start: 2020-07-07 | End: 2022-02-09

## 2020-07-07 NOTE — TELEPHONE ENCOUNTER
Had a visit with MT. Will forward to Fang as I've never met this patient..  Marc Zamarripa, MPAS, PA-C

## 2020-07-07 NOTE — TELEPHONE ENCOUNTER
Hello,     Novolog is not covered by patients insurance, they prefer Humalog.we have been filling Humalog most recently. Please send a new Rx for the covered product.     Thanks,    Viviane

## 2020-07-07 NOTE — TELEPHONE ENCOUNTER
Humalog sent to pharmacy - [facility] removed from med list.    Fang Moy, Pharm.D., [facility]  Medication Therapy Management Pharmacist  [phone]

## 2020-07-08 ENCOUNTER — ONCOLOGY VISIT (OUTPATIENT)
Dept: ONCOLOGY | Facility: CLINIC | Age: 65
End: 2020-07-08
Payer: COMMERCIAL

## 2020-07-08 ENCOUNTER — INFUSION THERAPY VISIT (OUTPATIENT)
Dept: INFUSION THERAPY | Facility: CLINIC | Age: 65
End: 2020-07-08
Attending: INTERNAL MEDICINE
Payer: COMMERCIAL

## 2020-07-08 VITALS
TEMPERATURE: 98.6 F | HEART RATE: 115 BPM | OXYGEN SATURATION: 98 % | WEIGHT: 235.8 LBS | RESPIRATION RATE: 16 BRPM | DIASTOLIC BLOOD PRESSURE: 84 MMHG | BODY MASS INDEX: 43.13 KG/M2 | SYSTOLIC BLOOD PRESSURE: 138 MMHG

## 2020-07-08 DIAGNOSIS — C50.812 MALIGNANT NEOPLASM OF OVERLAPPING SITES OF LEFT BREAST IN FEMALE, ESTROGEN RECEPTOR POSITIVE (H): Primary | ICD-10-CM

## 2020-07-08 DIAGNOSIS — Z17.0 MALIGNANT NEOPLASM OF OVERLAPPING SITES OF LEFT BREAST IN FEMALE, ESTROGEN RECEPTOR POSITIVE (H): Primary | ICD-10-CM

## 2020-07-08 LAB
BASOPHILS # BLD AUTO: 0.1 10E9/L (ref 0–0.2)
BASOPHILS NFR BLD AUTO: 2 %
DIFFERENTIAL METHOD BLD: ABNORMAL
EOSINOPHIL # BLD AUTO: 0.1 10E9/L (ref 0–0.7)
EOSINOPHIL NFR BLD AUTO: 1 %
ERYTHROCYTE [DISTWIDTH] IN BLOOD BY AUTOMATED COUNT: 15.9 % (ref 10–15)
HCT VFR BLD AUTO: 37 % (ref 35–47)
HGB BLD-MCNC: 12.3 G/DL (ref 11.7–15.7)
LYMPHOCYTES # BLD AUTO: 2.5 10E9/L (ref 0.8–5.3)
LYMPHOCYTES NFR BLD AUTO: 50 %
MCH RBC QN AUTO: 29.4 PG (ref 26.5–33)
MCHC RBC AUTO-ENTMCNC: 33.2 G/DL (ref 31.5–36.5)
MCV RBC AUTO: 88 FL (ref 78–100)
MONOCYTES # BLD AUTO: 0.3 10E9/L (ref 0–1.3)
MONOCYTES NFR BLD AUTO: 5 %
NEUTROPHILS # BLD AUTO: 2.1 10E9/L (ref 1.6–8.3)
NEUTROPHILS NFR BLD AUTO: 42 %
NRBC # BLD AUTO: 0.1 10*3/UL
NRBC BLD AUTO-RTO: 1 /100
PLATELET # BLD AUTO: 398 10E9/L (ref 150–450)
PLATELET # BLD EST: ABNORMAL 10*3/UL
RBC # BLD AUTO: 4.19 10E12/L (ref 3.8–5.2)
RBC MORPH BLD: ABNORMAL
WBC # BLD AUTO: 5 10E9/L (ref 4–11)

## 2020-07-08 PROCEDURE — 99207 ZZC NO CHARGE LOS: CPT

## 2020-07-08 PROCEDURE — 96367 TX/PROPH/DG ADDL SEQ IV INF: CPT | Performed by: INTERNAL MEDICINE

## 2020-07-08 PROCEDURE — 96375 TX/PRO/DX INJ NEW DRUG ADDON: CPT | Performed by: INTERNAL MEDICINE

## 2020-07-08 PROCEDURE — 99207 ZZC NO CHARGE NURSE ONLY: CPT

## 2020-07-08 PROCEDURE — S0028 INJECTION, FAMOTIDINE, 20 MG: HCPCS | Performed by: INTERNAL MEDICINE

## 2020-07-08 PROCEDURE — 96413 CHEMO IV INFUSION 1 HR: CPT | Performed by: INTERNAL MEDICINE

## 2020-07-08 PROCEDURE — 85025 COMPLETE CBC W/AUTO DIFF WBC: CPT | Performed by: INTERNAL MEDICINE

## 2020-07-08 RX ORDER — HEPARIN SODIUM (PORCINE) LOCK FLUSH IV SOLN 100 UNIT/ML 100 UNIT/ML
5 SOLUTION INTRAVENOUS
Status: DISCONTINUED | OUTPATIENT
Start: 2020-07-08 | End: 2020-07-08 | Stop reason: HOSPADM

## 2020-07-08 RX ORDER — DIPHENHYDRAMINE HCL 25 MG
50 CAPSULE ORAL ONCE
Status: COMPLETED | OUTPATIENT
Start: 2020-07-08 | End: 2020-07-08

## 2020-07-08 RX ADMIN — Medication 250 ML: at 10:36

## 2020-07-08 RX ADMIN — HEPARIN SODIUM (PORCINE) LOCK FLUSH IV SOLN 100 UNIT/ML 5 ML: 100 SOLUTION at 09:43

## 2020-07-08 RX ADMIN — HEPARIN SODIUM (PORCINE) LOCK FLUSH IV SOLN 100 UNIT/ML 5 ML: 100 SOLUTION at 12:15

## 2020-07-08 RX ADMIN — Medication 50 MG: at 10:30

## 2020-07-08 ASSESSMENT — PAIN SCALES - GENERAL: PAINLEVEL: NO PAIN (0)

## 2020-07-08 NOTE — PROGRESS NOTES
Infusion Nursing Note:  Mary Olson presents today for C5 D8 Taxol.    Patient seen by provider today: No   present during visit today: Not Applicable.    Note: N/A.    Intravenous Access:  Implanted Port.    Treatment Conditions:  Lab Results   Component Value Date    HGB 12.3 07/08/2020     Lab Results   Component Value Date    WBC 5.0 07/08/2020      Lab Results   Component Value Date    ANEU 2.1 07/01/2020     Lab Results   Component Value Date     07/08/2020      Lab Results   Component Value Date     06/25/2020                   Lab Results   Component Value Date    POTASSIUM 2.9 06/25/2020           No results found for: MAG         Lab Results   Component Value Date    CR 0.66 06/25/2020                   Lab Results   Component Value Date    BUFFY 9.5 06/25/2020                Lab Results   Component Value Date    BILITOTAL 0.5 07/01/2020           Lab Results   Component Value Date    ALBUMIN 3.3 07/01/2020                    Lab Results   Component Value Date    ALT 47 07/01/2020           Lab Results   Component Value Date    AST 28 07/01/2020           Post Infusion Assessment:  Patient tolerated infusion without incident.  Site patent and intact, free from redness, edema or discomfort.  No evidence of extravasations.  Access discontinued per protocol.       Discharge Plan:   Discharge instructions reviewed with: Patient.  Patient and/or family verbalized understanding of discharge instructions and all questions answered.  Patient discharged in stable condition accompanied by: self.  Departure Mode: Ambulatory.    Marlena Duval RN

## 2020-07-14 ENCOUNTER — ALLIED HEALTH/NURSE VISIT (OUTPATIENT)
Dept: PHARMACY | Facility: CLINIC | Age: 65
End: 2020-07-14

## 2020-07-14 ENCOUNTER — VIRTUAL VISIT (OUTPATIENT)
Dept: FAMILY MEDICINE | Facility: CLINIC | Age: 65
End: 2020-07-14
Payer: COMMERCIAL

## 2020-07-14 DIAGNOSIS — G62.9 NEUROPATHY: ICD-10-CM

## 2020-07-14 DIAGNOSIS — E11.9 TYPE 2 DIABETES MELLITUS WITHOUT COMPLICATION, WITHOUT LONG-TERM CURRENT USE OF INSULIN (H): Primary | ICD-10-CM

## 2020-07-14 DIAGNOSIS — E11.65 TYPE 2 DIABETES MELLITUS WITH HYPERGLYCEMIA, WITH LONG-TERM CURRENT USE OF INSULIN (H): Primary | ICD-10-CM

## 2020-07-14 DIAGNOSIS — K59.03 DRUG-INDUCED CONSTIPATION: ICD-10-CM

## 2020-07-14 DIAGNOSIS — E87.6 HYPOKALEMIA: ICD-10-CM

## 2020-07-14 DIAGNOSIS — C79.51 BONE METASTASIS: ICD-10-CM

## 2020-07-14 DIAGNOSIS — I10 BENIGN ESSENTIAL HYPERTENSION: ICD-10-CM

## 2020-07-14 DIAGNOSIS — Z79.4 TYPE 2 DIABETES MELLITUS WITH HYPERGLYCEMIA, WITH LONG-TERM CURRENT USE OF INSULIN (H): Primary | ICD-10-CM

## 2020-07-14 DIAGNOSIS — Z79.4 TYPE 2 DIABETES MELLITUS WITH HYPERGLYCEMIA, WITH LONG-TERM CURRENT USE OF INSULIN (H): ICD-10-CM

## 2020-07-14 DIAGNOSIS — E11.65 TYPE 2 DIABETES MELLITUS WITH HYPERGLYCEMIA, WITH LONG-TERM CURRENT USE OF INSULIN (H): ICD-10-CM

## 2020-07-14 DIAGNOSIS — R21 RASH AND NONSPECIFIC SKIN ERUPTION: ICD-10-CM

## 2020-07-14 PROCEDURE — 99214 OFFICE O/P EST MOD 30 MIN: CPT | Mod: 95 | Performed by: NURSE PRACTITIONER

## 2020-07-14 PROCEDURE — 99606 MTMS BY PHARM EST 15 MIN: CPT | Performed by: PHARMACIST

## 2020-07-14 PROCEDURE — 99607 MTMS BY PHARM ADDL 15 MIN: CPT | Performed by: PHARMACIST

## 2020-07-14 RX ORDER — HYDROCORTISONE 2.5 %
CREAM (GRAM) TOPICAL 2 TIMES DAILY
Qty: 3.5 G | Refills: 0 | Status: SHIPPED | OUTPATIENT
Start: 2020-07-14

## 2020-07-14 NOTE — PROGRESS NOTES
MTM ENCOUNTER  SUBJECTIVE/OBJECTIVE:                           Mary Olson is a 65 year old female called for a follow-up visit. She was referred to me from NICOLE Gomes CNP.  Today's visit is a follow-up MTM visit from 2020.     Patient consented to a telehealth visit: yes  Telemedicine Visit Details  Type of service:  Telephone visit  Start Time: 11:33 AM  End Time: 12:04 PM  Originating Location (pt. Location): Home  Distant Location (provider location):  [facility] MTM  Mode of Communication:  Telephone    Chief Complaint: BG Check    Allergies/ADRs: Reviewed in EHR  Tobacco:  reports that she has never smoked. She has never used smokeless tobacco.  Alcohol: Less than 1 beverages / week  PMH: Reviewed in EHR    Medication Adherence/Access: no issues reported    Type 2 Diabetes: Pt currently taking Lantus 52units daily (has been on this dose since ), had previously been titrating dose by 3units every 2 days and also recently started on Novolog per sliding scale insulin prior to 3 meals per day.  (B-149: 6 units, 150-199: 8 units, 200-249: 10 units, 250-299: 12 units, 300-349: 14 units,  >350: 16 units)  SMBG Ranges (patient reported):    Date FBG/ 2hours post Lunch/2hours post Dinner /2hours post    139     7/13 163 141    7/12 116 135    7/11 129 181    7/10 83 148    7/9 183     7/8 169 329 (day of treatment) 216   Symptoms of low blood sugar? Shaky, dizzy, sweaty, weak with BG of 83mg/dL; corrected with juice and breakfast and symptoms resolved.  Symptoms of high blood sugar? vision changes  Aspirin: Taking 81mg daily  Statin: No  ACEi/ARB: No. Urine albumin is No results found for: UMALCR    Lab Results   Component Value Date    A1C 10.6 2020     Lab Results   Component Value Date     2020     10/28/2015       Constipation: Notes hard stools and feels this could be due to her potassium powder - she started mixing this with pear  juice and this has helped with hard stools. She also recently purchased docusate, but hasn't needed to use yet.      Today's Vitals: There were no vitals taken for this visit. Phone visit.      ASSESSMENT:                              Medication Adherence: good, no issues identified    Type 2 Diabetes: Improved. Self monitoring of blood glucose is more often at goal of fasting or pre-prandial  mg/dL  Pt would benefit from   Basal Insulin (Lantus) :  Stay on the same dose.  Bolus Insulin (Novolog): Stay on the same dose.    Constipation: Improved.      PLAN:                            1. Continue current insulin doses. We reviewed that true hypoglycemia is <70mg/dL and her recent BG of 83mg/dL was a normal number -- and her body adjusting to more normal BG levels. The longer her BG are WNL, the less often she will experience symptoms of hypoglycemia, unless she truly has BG <70mg/dL.    I spent 31 minutes with this patient today. All changes were made via collaborative practice agreement with NICOLE Gomes CNP. A copy of the visit note was provided to the patient's primary care provider.    Will follow up in 1.5 weeks.    The patient declined a summary of these recommendations via [employer].     Fang Moy, Pharm.D., BCACP  Medication Therapy Management Pharmacist  [phone]

## 2020-07-14 NOTE — PROGRESS NOTES
"Mary Olson is a 65 year old female who is being evaluated via a billable video visit.      The patient has been notified of following:     \"This video visit will be conducted via a call between you and your physician/provider. We have found that certain health care needs can be provided without the need for an in-person physical exam.  This service lets us provide the care you need with a video conversation.  If a prescription is necessary we can send it directly to your pharmacy.  If lab work is needed we can place an order for that and you can then stop by our lab to have the test done at a later time.    Video visits are billed at different rates depending on your insurance coverage.  Please reach out to your insurance provider with any questions.    If during the course of the call the physician/provider feels a video visit is not appropriate, you will not be charged for this service.\"    Patient has given verbal consent for Video visit? Yes  How would you like to obtain your AVS? Jaydon  Patient would like the video invitation sent by: Text to cell phone: [phone]  Will anyone else be joining your video visit? No    Subjective     Mary Olson is a 65 year old female who presents today via video visit for the following health issues:    HPI  Diabetes Follow-up  Does not run in family, due to her chemo she is dealing with diabetes and hypertension.       How often are you checking your blood sugar? Checking at meal times     Dose recently increased. She is still hovering above/around the 130's.      What concerns do you have today about your diabetes? Numbers around 130, feels she might need another week of current medication regimen to bring numbers down to where they were wanted for her to get her PET scan.      Do you have any of these symptoms? (Select all that apply)  No numbness or tingling in feet.  No redness, sores or blisters on feet.  No complaints of excessive thirst.  No reports of " blurry vision.  No significant changes to weight.    Have you had a diabetic eye exam in the last 12 months? She is newly diabetic due to her chemo treatments.   On Lantus 52 units HS.   Sugars have been running below 150 she did have one low of 80 in the AM and she felt a bit shaky. She thinks it's on account of not eating much that evening.   Humalog x 3 according to sliding scale.     She needs to schedule a PET scan but her sugars need to be stable. She's wondering if she can schedule it now?    Says she is still getting drainage from left breast from the tumor but it is not infected. She is undergoing chemotherapy and is tolerating it fairly well.  She was recently started on gabapentin in the evening 300 mg for neuropathy she feels that this is helped          Hyperlipidemia Follow-Up      Are you regularly taking any medication or supplement to lower your cholesterol?   No    Are you having muscle aches or other side effects that you think could be caused by your cholesterol lowering medication?  No    Hypertension Follow-up      Do you check your blood pressure regularly outside of the clinic? Yes     Are you following a low salt diet? Yes    Are your blood pressures ever more than 140 on the top number (systolic) OR more   than 90 on the bottom number (diastolic), for example 140/90? Not high like it was, she feels its more normal. 125/90's    BP Readings from Last 2 Encounters:   07/08/20 138/84   07/01/20 (!) 152/89     Hemoglobin A1C (%)   Date Value   06/25/2020 10.6 (H)     LDL Cholesterol Calculated (mg/dL)   Date Value   10/28/2015 152 (H)     Rash  Says she developed a rash in the lab between her thumb and index finger, and she also notices the outside of her hands bilaterally. Red and irritated no pustules and no crusting.  No pain just very itchy.  She wonders if it is medication related because she was recently started on gabapentin and potassium.  No other household members infected.   Video  Start Time: 7:11 AM      Patient Active Problem List   Diagnosis     Hyperlipidemia LDL goal <160     Essential hypertension     Morbid obesity (H)     Family history of malignant neoplasm of breast     Breast skin changes     Nipple discharge, bloody     Malignant neoplasm of overlapping sites of left breast in female, estrogen receptor positive (H)     Bone metastasis (H)     Diabetes mellitus, type 2 (H)     Past Surgical History:   Procedure Laterality Date     BIOPSY  3/2020     GYN SURGERY  3/13/89. & 6/22/92    C Sections       Social History     Tobacco Use     Smoking status: Never Smoker     Smokeless tobacco: Never Used   Substance Use Topics     Alcohol use: Not Currently     Comment: 2-3 drinks a month      Family History   Problem Relation Age of Onset     Hypertension Mother      Coronary Artery Disease Mother      Breast Cancer Mother      Hyperlipidemia Mother      Depression Mother      Arrhythmia Brother      Cerebrovascular Disease Brother      Diabetes No family hx of            Reviewed and updated as needed this visit by Provider         Review of Systems   Constitutional, HEENT, cardiovascular, pulmonary, GI, , musculoskeletal, neuro, skin, endocrine and psych systems are negative, except as otherwise noted.      Objective             Physical Exam     GENERAL: Healthy, alert and no distress  EYES: Eyes grossly normal to inspection.  No discharge or erythema, or obvious scleral/conjunctival abnormalities.  RESP: No audible wheeze, cough, or visible cyanosis.  No visible retractions or increased work of breathing.    SKIN: Visible skin difficult to visualize rash over the phone but there appears to be an area of irritated skin in web of thumb and index finger.  No crusting or pustule seen.  NEURO: Cranial nerves grossly intact.  Mentation and speech appropriate for age.  PSYCH: Mentation appears normal, affect normal/bright, judgement and insight intact, normal speech and appearance  well-groomed.      Diagnostic Test Results:  Labs reviewed in Epic  No results found for this or any previous visit (from the past 24 hour(s)).        Assessment & Plan     1. Type 2 diabetes mellitus without complication, without long-term current use of insulin (H)  Improved with Lantus 52 units at at bedtime and Humalog x3 according to sliding scale.  She did have 1 isolated low sugar of 80 and she was symptomatic but this was likely caused by her not eating that evening.  I recommend she continue her current regimen.  I also think she is ready to schedule her PET scan.  If she continues to eat less in the evening we will have to decrease her Lantus dose.  She has follow-up with Fang HENAO today    2. Neuropathy  Improved with starting gabapentin 300 mg at at bedtime    3. Benign essential hypertension  Stable she has hypokalemia second to diuretic and at her last infusion they did not check her potassium as requested.  She has been started on potassium supplements this needs to be checked at her next infusion.    4. Rash and nonspecific skin eruption  Likely dermatitis I did consider scabies given location and she is immunocompromised it is difficult to see the rash over the phone but there was no pustules or crusting and there was no other family members with similar symptoms.  I recommend we trial hydrocortisone cream first if no improvement after 1 week consider treating with permethrin.  - hydrocortisone 2.5 % cream; Apply topically 2 times daily  Dispense: 3.5 g; Refill: 0    5. Type 2 diabetes mellitus with hyperglycemia, with long-term current use of insulin (H)  As above  - insulin glargine (LANTUS PEN) 100 UNIT/ML pen; Inject up to 52 units every evening. Dose as of 7/14/2020  Dispense: 15 mL; Refill: 3    6. Bone metastasis (H)  Followed by oncology undergoing chemotherapy infusions need to schedule PET scan sugars have been stable    7. Hypokalemia  Asymptomatic last potassium 2.9 she was started on  replacement and her potassium was to be drawn at her last infusion but this was not done she will go in next week to have her labs drawn             Return in about 1 month (around 8/14/2020), or if symptoms worsen or fail to improve.    NICOLE Gomes CNP  [facility]      Video-Visit Details    Type of service:  Video Visit    Video End Time:7:30 AM    Originating Location (pt. Location): Home    Distant Location (provider location):  [facility]     Platform used for Video Visit: [employer]    No follow-ups on file.       NICOLE Gomes CNP

## 2020-07-15 ENCOUNTER — ONCOLOGY VISIT (OUTPATIENT)
Dept: ONCOLOGY | Facility: CLINIC | Age: 65
End: 2020-07-15
Payer: COMMERCIAL

## 2020-07-15 ENCOUNTER — INFUSION THERAPY VISIT (OUTPATIENT)
Dept: INFUSION THERAPY | Facility: CLINIC | Age: 65
End: 2020-07-15
Attending: INTERNAL MEDICINE
Payer: COMMERCIAL

## 2020-07-15 VITALS
TEMPERATURE: 98.4 F | DIASTOLIC BLOOD PRESSURE: 89 MMHG | RESPIRATION RATE: 16 BRPM | BODY MASS INDEX: 42.8 KG/M2 | WEIGHT: 234 LBS | HEART RATE: 114 BPM | OXYGEN SATURATION: 98 % | SYSTOLIC BLOOD PRESSURE: 150 MMHG

## 2020-07-15 DIAGNOSIS — C50.812 MALIGNANT NEOPLASM OF OVERLAPPING SITES OF LEFT BREAST IN FEMALE, ESTROGEN RECEPTOR POSITIVE (H): Primary | ICD-10-CM

## 2020-07-15 DIAGNOSIS — Z17.0 MALIGNANT NEOPLASM OF OVERLAPPING SITES OF LEFT BREAST IN FEMALE, ESTROGEN RECEPTOR POSITIVE (H): Primary | ICD-10-CM

## 2020-07-15 DIAGNOSIS — C79.51 BONE METASTASIS: ICD-10-CM

## 2020-07-15 DIAGNOSIS — E87.6 HYPOKALEMIA: ICD-10-CM

## 2020-07-15 LAB
ALBUMIN SERPL-MCNC: 3.2 G/DL (ref 3.4–5)
BASOPHILS # BLD AUTO: 0 10E9/L (ref 0–0.2)
BASOPHILS NFR BLD AUTO: 1 %
CALCIUM SERPL-MCNC: 9.5 MG/DL (ref 8.5–10.1)
CREAT SERPL-MCNC: 0.67 MG/DL (ref 0.52–1.04)
DIFFERENTIAL METHOD BLD: ABNORMAL
EOSINOPHIL # BLD AUTO: 0.1 10E9/L (ref 0–0.7)
EOSINOPHIL NFR BLD AUTO: 3 %
ERYTHROCYTE [DISTWIDTH] IN BLOOD BY AUTOMATED COUNT: 16.2 % (ref 10–15)
GFR SERPL CREATININE-BSD FRML MDRD: >90 ML/MIN/{1.73_M2}
HCT VFR BLD AUTO: 36.4 % (ref 35–47)
HGB BLD-MCNC: 12.1 G/DL (ref 11.7–15.7)
LYMPHOCYTES # BLD AUTO: 1.7 10E9/L (ref 0.8–5.3)
LYMPHOCYTES NFR BLD AUTO: 41 %
MCH RBC QN AUTO: 29.4 PG (ref 26.5–33)
MCHC RBC AUTO-ENTMCNC: 33.2 G/DL (ref 31.5–36.5)
MCV RBC AUTO: 88 FL (ref 78–100)
MONOCYTES # BLD AUTO: 0.2 10E9/L (ref 0–1.3)
MONOCYTES NFR BLD AUTO: 4 %
NEUTROPHILS # BLD AUTO: 2.1 10E9/L (ref 1.6–8.3)
NEUTROPHILS NFR BLD AUTO: 51 %
NRBC # BLD AUTO: 0 10*3/UL
NRBC BLD AUTO-RTO: 1 /100
PLATELET # BLD AUTO: 389 10E9/L (ref 150–450)
PLATELET # BLD EST: ABNORMAL 10*3/UL
POTASSIUM SERPL-SCNC: 3.4 MMOL/L (ref 3.4–5.3)
RBC # BLD AUTO: 4.12 10E12/L (ref 3.8–5.2)
RBC MORPH BLD: NORMAL
WBC # BLD AUTO: 4.1 10E9/L (ref 4–11)

## 2020-07-15 PROCEDURE — 99207 ZZC NO CHARGE NURSE ONLY: CPT

## 2020-07-15 PROCEDURE — 82040 ASSAY OF SERUM ALBUMIN: CPT | Performed by: INTERNAL MEDICINE

## 2020-07-15 PROCEDURE — 99207 ZZC NO CHARGE LOS: CPT

## 2020-07-15 PROCEDURE — 84132 ASSAY OF SERUM POTASSIUM: CPT | Performed by: INTERNAL MEDICINE

## 2020-07-15 PROCEDURE — 85025 COMPLETE CBC W/AUTO DIFF WBC: CPT | Performed by: INTERNAL MEDICINE

## 2020-07-15 PROCEDURE — S0028 INJECTION, FAMOTIDINE, 20 MG: HCPCS | Performed by: NURSE PRACTITIONER

## 2020-07-15 PROCEDURE — 96375 TX/PRO/DX INJ NEW DRUG ADDON: CPT | Performed by: NURSE PRACTITIONER

## 2020-07-15 PROCEDURE — 82310 ASSAY OF CALCIUM: CPT | Performed by: INTERNAL MEDICINE

## 2020-07-15 PROCEDURE — 82565 ASSAY OF CREATININE: CPT | Performed by: INTERNAL MEDICINE

## 2020-07-15 PROCEDURE — 96413 CHEMO IV INFUSION 1 HR: CPT | Performed by: NURSE PRACTITIONER

## 2020-07-15 RX ORDER — HEPARIN SODIUM (PORCINE) LOCK FLUSH IV SOLN 100 UNIT/ML 100 UNIT/ML
5 SOLUTION INTRAVENOUS
Status: DISCONTINUED | OUTPATIENT
Start: 2020-07-15 | End: 2020-07-15 | Stop reason: HOSPADM

## 2020-07-15 RX ORDER — ZOLEDRONIC ACID 0.04 MG/ML
4 INJECTION, SOLUTION INTRAVENOUS ONCE
Status: COMPLETED | OUTPATIENT
Start: 2020-07-15 | End: 2020-07-15

## 2020-07-15 RX ORDER — DIPHENHYDRAMINE HCL 25 MG
50 CAPSULE ORAL ONCE
Status: COMPLETED | OUTPATIENT
Start: 2020-07-15 | End: 2020-07-15

## 2020-07-15 RX ADMIN — Medication 250 ML: at 11:31

## 2020-07-15 RX ADMIN — ZOLEDRONIC ACID 4 MG: 0.04 INJECTION, SOLUTION INTRAVENOUS at 13:15

## 2020-07-15 RX ADMIN — Medication 50 MG: at 11:35

## 2020-07-15 RX ADMIN — HEPARIN SODIUM (PORCINE) LOCK FLUSH IV SOLN 100 UNIT/ML 5 ML: 100 SOLUTION at 10:48

## 2020-07-15 RX ADMIN — HEPARIN SODIUM (PORCINE) LOCK FLUSH IV SOLN 100 UNIT/ML 5 ML: 100 SOLUTION at 13:36

## 2020-07-15 ASSESSMENT — PAIN SCALES - GENERAL: PAINLEVEL: NO PAIN (0)

## 2020-07-15 NOTE — PROGRESS NOTES
Port needle left accessed for treatment. Tolerated port access and draw without complaint. Port site scrubbed with Chloraprep for 30 seconds and allowed to dry completely prior to dressing application. Accessed using sterile technique. [place] tubes drawn-Red gel/Green/Purple tubes. Double signed by patient and RN. See documentation flowsheet. Lexy Butterfield, RN, BSN, OCN

## 2020-07-15 NOTE — PROGRESS NOTES
Infusion Nursing Note:  Mary Olson presents today for  Taxol & Zometa.    Patient seen by provider today: Yes: Swathi Page NP to assess arm rash   present during visit today: Not Applicable.    Note: Patient sharing that she has a new rash on both her hands and her arms. Denies pain but does state that they itch. Saw her primary and was given hydrocortisone cream for 10 days. Asked Swathi Page NP to assess patient's rash as it is likely related to taxol. Area one on her hand appears to be dermatitis and the rash on her arms appears to be from Taxol.    Patient instructed that should rash become worse or continue to spread that she is to let us know and Swathi will order a different cream. Patient verbalized understanding.       Intravenous Access:  Implanted Port.    Treatment Conditions:  Lab Results   Component Value Date    HGB 12.1 07/15/2020     Lab Results   Component Value Date    WBC 4.1 07/15/2020      Lab Results   Component Value Date    ANEU 2.1 07/15/2020     Lab Results   Component Value Date     07/15/2020      Lab Results   Component Value Date     06/25/2020                   Lab Results   Component Value Date    POTASSIUM 3.4 07/15/2020           No results found for: MAG         Lab Results   Component Value Date    CR 0.67 07/15/2020                   Lab Results   Component Value Date    BUFFY 9.5 07/15/2020                Lab Results   Component Value Date    BILITOTAL 0.5 07/01/2020           Lab Results   Component Value Date    ALBUMIN 3.2 07/15/2020                    Lab Results   Component Value Date    ALT 47 07/01/2020           Lab Results   Component Value Date    AST 28 07/01/2020       Results reviewed, labs MET treatment parameters, ok to proceed with treatment.      Post Infusion Assessment:  Patient tolerated infusion without incident.  Site patent and intact, free from redness, edema or discomfort.  No evidence of extravasations.  Access  discontinued per protocol.       Discharge Plan:   Discharge instructions reviewed with: Patient.  Copy of AVS reviewed with patient and/or family.  Patient will return next week for next appointment.  Patient discharged in stable condition accompanied by: self.  Departure Mode: Ambulatory.    Marlena Duval RN

## 2020-07-22 ENCOUNTER — INFUSION THERAPY VISIT (OUTPATIENT)
Dept: INFUSION THERAPY | Facility: CLINIC | Age: 65
End: 2020-07-22
Payer: COMMERCIAL

## 2020-07-22 ENCOUNTER — VIRTUAL VISIT (OUTPATIENT)
Dept: ONCOLOGY | Facility: CLINIC | Age: 65
End: 2020-07-22
Payer: COMMERCIAL

## 2020-07-22 ENCOUNTER — ONCOLOGY VISIT (OUTPATIENT)
Dept: ONCOLOGY | Facility: CLINIC | Age: 65
End: 2020-07-22
Payer: COMMERCIAL

## 2020-07-22 VITALS — WEIGHT: 234 LBS | BODY MASS INDEX: 42.8 KG/M2

## 2020-07-22 VITALS
TEMPERATURE: 97.2 F | RESPIRATION RATE: 16 BRPM | DIASTOLIC BLOOD PRESSURE: 99 MMHG | OXYGEN SATURATION: 98 % | HEART RATE: 121 BPM | SYSTOLIC BLOOD PRESSURE: 132 MMHG

## 2020-07-22 DIAGNOSIS — Z17.0 MALIGNANT NEOPLASM OF OVERLAPPING SITES OF LEFT BREAST IN FEMALE, ESTROGEN RECEPTOR POSITIVE (H): ICD-10-CM

## 2020-07-22 DIAGNOSIS — C79.51 BONE METASTASIS: Primary | ICD-10-CM

## 2020-07-22 DIAGNOSIS — Z17.0 MALIGNANT NEOPLASM OF OVERLAPPING SITES OF LEFT BREAST IN FEMALE, ESTROGEN RECEPTOR POSITIVE (H): Primary | ICD-10-CM

## 2020-07-22 DIAGNOSIS — C50.812 MALIGNANT NEOPLASM OF OVERLAPPING SITES OF LEFT BREAST IN FEMALE, ESTROGEN RECEPTOR POSITIVE (H): ICD-10-CM

## 2020-07-22 DIAGNOSIS — C50.812 MALIGNANT NEOPLASM OF OVERLAPPING SITES OF LEFT BREAST IN FEMALE, ESTROGEN RECEPTOR POSITIVE (H): Primary | ICD-10-CM

## 2020-07-22 LAB
ALBUMIN SERPL-MCNC: 3.2 G/DL (ref 3.4–5)
ALP SERPL-CCNC: 93 U/L (ref 40–150)
ALT SERPL W P-5'-P-CCNC: 44 U/L (ref 0–50)
AST SERPL W P-5'-P-CCNC: 31 U/L (ref 0–45)
BASOPHILS # BLD AUTO: 0.1 10E9/L (ref 0–0.2)
BASOPHILS NFR BLD AUTO: 3 %
BILIRUB DIRECT SERPL-MCNC: 0.1 MG/DL (ref 0–0.2)
BILIRUB SERPL-MCNC: 0.6 MG/DL (ref 0.2–1.3)
CANCER AG27-29 SERPL-ACNC: 280 U/ML (ref 0–39)
DIFFERENTIAL METHOD BLD: ABNORMAL
EOSINOPHIL # BLD AUTO: 0 10E9/L (ref 0–0.7)
EOSINOPHIL NFR BLD AUTO: 1 %
ERYTHROCYTE [DISTWIDTH] IN BLOOD BY AUTOMATED COUNT: 16.1 % (ref 10–15)
HCT VFR BLD AUTO: 36.2 % (ref 35–47)
HGB BLD-MCNC: 12.2 G/DL (ref 11.7–15.7)
LYMPHOCYTES # BLD AUTO: 2.3 10E9/L (ref 0.8–5.3)
LYMPHOCYTES NFR BLD AUTO: 48 %
MACROCYTES BLD QL SMEAR: PRESENT
MCH RBC QN AUTO: 29.7 PG (ref 26.5–33)
MCHC RBC AUTO-ENTMCNC: 33.7 G/DL (ref 31.5–36.5)
MCV RBC AUTO: 88 FL (ref 78–100)
MONOCYTES # BLD AUTO: 0.2 10E9/L (ref 0–1.3)
MONOCYTES NFR BLD AUTO: 4 %
NEUTROPHILS # BLD AUTO: 2.1 10E9/L (ref 1.6–8.3)
NEUTROPHILS NFR BLD AUTO: 44 %
NRBC # BLD AUTO: 0.2 10*3/UL
NRBC BLD AUTO-RTO: 4 /100
PLATELET # BLD AUTO: 409 10E9/L (ref 150–450)
PLATELET # BLD EST: ABNORMAL 10*3/UL
PROT SERPL-MCNC: 7.3 G/DL (ref 6.8–8.8)
RBC # BLD AUTO: 4.11 10E12/L (ref 3.8–5.2)
VARIANT LYMPHS BLD QL SMEAR: PRESENT
WBC # BLD AUTO: 4.8 10E9/L (ref 4–11)

## 2020-07-22 PROCEDURE — 96375 TX/PRO/DX INJ NEW DRUG ADDON: CPT | Performed by: NURSE PRACTITIONER

## 2020-07-22 PROCEDURE — 99207 ZZC NO CHARGE NURSE ONLY: CPT

## 2020-07-22 PROCEDURE — 96367 TX/PROPH/DG ADDL SEQ IV INF: CPT | Performed by: NURSE PRACTITIONER

## 2020-07-22 PROCEDURE — 99214 OFFICE O/P EST MOD 30 MIN: CPT | Mod: 95 | Performed by: INTERNAL MEDICINE

## 2020-07-22 PROCEDURE — 80076 HEPATIC FUNCTION PANEL: CPT | Performed by: INTERNAL MEDICINE

## 2020-07-22 PROCEDURE — 96413 CHEMO IV INFUSION 1 HR: CPT | Performed by: NURSE PRACTITIONER

## 2020-07-22 PROCEDURE — 86300 IMMUNOASSAY TUMOR CA 15-3: CPT | Performed by: INTERNAL MEDICINE

## 2020-07-22 PROCEDURE — 85025 COMPLETE CBC W/AUTO DIFF WBC: CPT | Performed by: INTERNAL MEDICINE

## 2020-07-22 PROCEDURE — 99207 ZZC NO CHARGE LOS: CPT

## 2020-07-22 PROCEDURE — S0028 INJECTION, FAMOTIDINE, 20 MG: HCPCS | Performed by: NURSE PRACTITIONER

## 2020-07-22 RX ORDER — HEPARIN SODIUM (PORCINE) LOCK FLUSH IV SOLN 100 UNIT/ML 100 UNIT/ML
5 SOLUTION INTRAVENOUS
Status: DISCONTINUED | OUTPATIENT
Start: 2020-07-22 | End: 2020-07-22 | Stop reason: HOSPADM

## 2020-07-22 RX ORDER — HEPARIN SODIUM (PORCINE) LOCK FLUSH IV SOLN 100 UNIT/ML 100 UNIT/ML
5 SOLUTION INTRAVENOUS
Status: CANCELLED | OUTPATIENT
Start: 2020-07-29

## 2020-07-22 RX ORDER — LORAZEPAM 2 MG/ML
0.5 INJECTION INTRAMUSCULAR EVERY 4 HOURS PRN
Status: CANCELLED
Start: 2020-08-05

## 2020-07-22 RX ORDER — HEPARIN SODIUM (PORCINE) LOCK FLUSH IV SOLN 100 UNIT/ML 100 UNIT/ML
5 SOLUTION INTRAVENOUS
Status: CANCELLED | OUTPATIENT
Start: 2020-07-22

## 2020-07-22 RX ORDER — DIPHENHYDRAMINE HYDROCHLORIDE 50 MG/ML
50 INJECTION INTRAMUSCULAR; INTRAVENOUS
Status: CANCELLED
Start: 2020-07-29

## 2020-07-22 RX ORDER — METHYLPREDNISOLONE SODIUM SUCCINATE 125 MG/2ML
125 INJECTION, POWDER, LYOPHILIZED, FOR SOLUTION INTRAMUSCULAR; INTRAVENOUS
Status: CANCELLED
Start: 2020-07-22

## 2020-07-22 RX ORDER — HEPARIN SODIUM (PORCINE) LOCK FLUSH IV SOLN 100 UNIT/ML 100 UNIT/ML
5 SOLUTION INTRAVENOUS
Status: CANCELLED | OUTPATIENT
Start: 2020-08-12

## 2020-07-22 RX ORDER — SODIUM CHLORIDE 9 MG/ML
1000 INJECTION, SOLUTION INTRAVENOUS CONTINUOUS PRN
Status: CANCELLED
Start: 2020-08-05

## 2020-07-22 RX ORDER — METHYLPREDNISOLONE SODIUM SUCCINATE 125 MG/2ML
125 INJECTION, POWDER, LYOPHILIZED, FOR SOLUTION INTRAMUSCULAR; INTRAVENOUS
Status: CANCELLED
Start: 2020-07-29

## 2020-07-22 RX ORDER — EPINEPHRINE 0.3 MG/.3ML
0.3 INJECTION SUBCUTANEOUS EVERY 5 MIN PRN
Status: CANCELLED | OUTPATIENT
Start: 2020-08-05

## 2020-07-22 RX ORDER — NALOXONE HYDROCHLORIDE 0.4 MG/ML
.1-.4 INJECTION, SOLUTION INTRAMUSCULAR; INTRAVENOUS; SUBCUTANEOUS
Status: CANCELLED | OUTPATIENT
Start: 2020-08-05

## 2020-07-22 RX ORDER — HEPARIN SODIUM,PORCINE 10 UNIT/ML
5 VIAL (ML) INTRAVENOUS
Status: CANCELLED | OUTPATIENT
Start: 2020-07-22

## 2020-07-22 RX ORDER — DIPHENHYDRAMINE HCL 25 MG
50 CAPSULE ORAL ONCE
Status: CANCELLED
Start: 2020-07-22

## 2020-07-22 RX ORDER — MEPERIDINE HYDROCHLORIDE 25 MG/ML
25 INJECTION INTRAMUSCULAR; INTRAVENOUS; SUBCUTANEOUS EVERY 30 MIN PRN
Status: CANCELLED | OUTPATIENT
Start: 2020-07-29

## 2020-07-22 RX ORDER — EPINEPHRINE 1 MG/ML
0.3 INJECTION, SOLUTION INTRAMUSCULAR; SUBCUTANEOUS EVERY 5 MIN PRN
Status: CANCELLED | OUTPATIENT
Start: 2020-07-29

## 2020-07-22 RX ORDER — LORAZEPAM 2 MG/ML
0.5 INJECTION INTRAMUSCULAR EVERY 4 HOURS PRN
Status: CANCELLED
Start: 2020-07-22

## 2020-07-22 RX ORDER — METHYLPREDNISOLONE SODIUM SUCCINATE 125 MG/2ML
125 INJECTION, POWDER, LYOPHILIZED, FOR SOLUTION INTRAMUSCULAR; INTRAVENOUS
Status: CANCELLED
Start: 2020-08-05

## 2020-07-22 RX ORDER — MEPERIDINE HYDROCHLORIDE 25 MG/ML
25 INJECTION INTRAMUSCULAR; INTRAVENOUS; SUBCUTANEOUS EVERY 30 MIN PRN
Status: CANCELLED | OUTPATIENT
Start: 2020-07-22

## 2020-07-22 RX ORDER — ALBUTEROL SULFATE 90 UG/1
1-2 AEROSOL, METERED RESPIRATORY (INHALATION)
Status: CANCELLED
Start: 2020-08-05

## 2020-07-22 RX ORDER — ALBUTEROL SULFATE 90 UG/1
1-2 AEROSOL, METERED RESPIRATORY (INHALATION)
Status: CANCELLED
Start: 2020-07-29

## 2020-07-22 RX ORDER — HEPARIN SODIUM,PORCINE 10 UNIT/ML
5 VIAL (ML) INTRAVENOUS
Status: CANCELLED | OUTPATIENT
Start: 2020-08-05

## 2020-07-22 RX ORDER — DIPHENHYDRAMINE HCL 25 MG
50 CAPSULE ORAL ONCE
Status: CANCELLED | OUTPATIENT
Start: 2020-08-05

## 2020-07-22 RX ORDER — DIPHENHYDRAMINE HYDROCHLORIDE 50 MG/ML
50 INJECTION INTRAMUSCULAR; INTRAVENOUS
Status: CANCELLED
Start: 2020-07-22

## 2020-07-22 RX ORDER — DIPHENHYDRAMINE HYDROCHLORIDE 50 MG/ML
50 INJECTION INTRAMUSCULAR; INTRAVENOUS
Status: CANCELLED
Start: 2020-08-05

## 2020-07-22 RX ORDER — LORAZEPAM 2 MG/ML
0.5 INJECTION INTRAMUSCULAR EVERY 4 HOURS PRN
Status: CANCELLED
Start: 2020-07-29

## 2020-07-22 RX ORDER — ALBUTEROL SULFATE 0.83 MG/ML
2.5 SOLUTION RESPIRATORY (INHALATION)
Status: CANCELLED | OUTPATIENT
Start: 2020-08-05

## 2020-07-22 RX ORDER — SODIUM CHLORIDE 9 MG/ML
1000 INJECTION, SOLUTION INTRAVENOUS CONTINUOUS PRN
Status: CANCELLED
Start: 2020-07-22

## 2020-07-22 RX ORDER — SODIUM CHLORIDE 9 MG/ML
1000 INJECTION, SOLUTION INTRAVENOUS CONTINUOUS PRN
Status: CANCELLED
Start: 2020-07-29

## 2020-07-22 RX ORDER — DIPHENHYDRAMINE HCL 25 MG
50 CAPSULE ORAL ONCE
Status: COMPLETED | OUTPATIENT
Start: 2020-07-22 | End: 2020-07-22

## 2020-07-22 RX ORDER — EPINEPHRINE 1 MG/ML
0.3 INJECTION, SOLUTION INTRAMUSCULAR; SUBCUTANEOUS EVERY 5 MIN PRN
Status: CANCELLED | OUTPATIENT
Start: 2020-07-22

## 2020-07-22 RX ORDER — ALBUTEROL SULFATE 90 UG/1
1-2 AEROSOL, METERED RESPIRATORY (INHALATION)
Status: CANCELLED
Start: 2020-07-22

## 2020-07-22 RX ORDER — HEPARIN SODIUM,PORCINE 10 UNIT/ML
5 VIAL (ML) INTRAVENOUS
Status: CANCELLED | OUTPATIENT
Start: 2020-07-29

## 2020-07-22 RX ORDER — ALBUTEROL SULFATE 0.83 MG/ML
2.5 SOLUTION RESPIRATORY (INHALATION)
Status: CANCELLED | OUTPATIENT
Start: 2020-07-22

## 2020-07-22 RX ORDER — MEPERIDINE HYDROCHLORIDE 25 MG/ML
25 INJECTION INTRAMUSCULAR; INTRAVENOUS; SUBCUTANEOUS EVERY 30 MIN PRN
Status: CANCELLED | OUTPATIENT
Start: 2020-08-05

## 2020-07-22 RX ORDER — HEPARIN SODIUM,PORCINE 10 UNIT/ML
5 VIAL (ML) INTRAVENOUS
Status: CANCELLED | OUTPATIENT
Start: 2020-08-12

## 2020-07-22 RX ORDER — ZOLEDRONIC ACID 0.04 MG/ML
4 INJECTION, SOLUTION INTRAVENOUS ONCE
Status: CANCELLED | OUTPATIENT
Start: 2020-08-12

## 2020-07-22 RX ORDER — EPINEPHRINE 0.3 MG/.3ML
0.3 INJECTION SUBCUTANEOUS EVERY 5 MIN PRN
Status: CANCELLED | OUTPATIENT
Start: 2020-07-29

## 2020-07-22 RX ORDER — NALOXONE HYDROCHLORIDE 0.4 MG/ML
.1-.4 INJECTION, SOLUTION INTRAMUSCULAR; INTRAVENOUS; SUBCUTANEOUS
Status: CANCELLED | OUTPATIENT
Start: 2020-07-22

## 2020-07-22 RX ORDER — NALOXONE HYDROCHLORIDE 0.4 MG/ML
.1-.4 INJECTION, SOLUTION INTRAMUSCULAR; INTRAVENOUS; SUBCUTANEOUS
Status: CANCELLED | OUTPATIENT
Start: 2020-07-29

## 2020-07-22 RX ORDER — EPINEPHRINE 0.3 MG/.3ML
0.3 INJECTION SUBCUTANEOUS EVERY 5 MIN PRN
Status: CANCELLED | OUTPATIENT
Start: 2020-07-22

## 2020-07-22 RX ORDER — DIPHENHYDRAMINE HCL 25 MG
50 CAPSULE ORAL ONCE
Status: CANCELLED | OUTPATIENT
Start: 2020-07-29

## 2020-07-22 RX ORDER — HEPARIN SODIUM (PORCINE) LOCK FLUSH IV SOLN 100 UNIT/ML 100 UNIT/ML
5 SOLUTION INTRAVENOUS
Status: CANCELLED | OUTPATIENT
Start: 2020-08-05

## 2020-07-22 RX ORDER — EPINEPHRINE 1 MG/ML
0.3 INJECTION, SOLUTION INTRAMUSCULAR; SUBCUTANEOUS EVERY 5 MIN PRN
Status: CANCELLED | OUTPATIENT
Start: 2020-08-05

## 2020-07-22 RX ORDER — ALBUTEROL SULFATE 0.83 MG/ML
2.5 SOLUTION RESPIRATORY (INHALATION)
Status: CANCELLED | OUTPATIENT
Start: 2020-07-29

## 2020-07-22 RX ADMIN — Medication 50 MG: at 12:53

## 2020-07-22 RX ADMIN — Medication 250 ML: at 12:54

## 2020-07-22 RX ADMIN — HEPARIN SODIUM (PORCINE) LOCK FLUSH IV SOLN 100 UNIT/ML 5 ML: 100 SOLUTION at 14:39

## 2020-07-22 RX ADMIN — HEPARIN SODIUM (PORCINE) LOCK FLUSH IV SOLN 100 UNIT/ML 5 ML: 100 SOLUTION at 11:39

## 2020-07-22 ASSESSMENT — PAIN SCALES - GENERAL
PAINLEVEL: NO PAIN (0)
PAINLEVEL: NO PAIN (0)

## 2020-07-22 NOTE — NURSING NOTE
"SYMPTOM QUESTIONNAIRE    Pain: no     Nausea/Vomiting: no    Mouth Sores: no    Shortness of Breath: no    Smoking: no    Fever or Chills: no    Hard Stools: no    Soft Stools: no    Weight Loss: no    Weakness: fatigue    Burning, numbness or tingling in hands or feet: hands, unchanged    Problems with skin or swelling: \"Taxol rash\" on right arm, using cortisone cream    Memory Loss: no    Anxiety or Depression: no    Trouble Sleeping: no    Kailee Jarvis LPN on 7/22/2020 at 8:58 AM        "

## 2020-07-22 NOTE — PROGRESS NOTES
"Visit Date:   07/22/2020      ONCOLOGY DIAGNOSIS:  1. March 2020: Metastatic Breast Cancer     THERAPY TO DATE:  1. March 2020 to Present: Weekly Taxol.     INTERVAL HISTORY:  Desmond is a 65-year-old female diagnosed with metastatic breast cancer in 2020 after noticing a lesion on her breast.  The patient presents via [employer] video visit for toxicity evaluation prior to therapy.  On today's visit, the patient tells me overall, she is tolerating therapy.  No significant issues.  She feels have blood pressures are getting under better control.  Right after the Taxol, it tends to be higher but improves throughout the week.  She also noticed a rash, which she thinks may be related to Taxol.  She was started given cortisone, which seems to help.  Denies any fevers, chills, nausea, vomiting, chest pain, shortness of breath, or cough.  No abdominal discomfort.  No new palpable masses.  Remaining comprehensive review of systems is negative.      PAST MEDICAL HISTORY:   1.  Metastatic breast cancer, see above.   2.  Hypertension.   3.  Diabetes.      FAMILY HISTORY:  Mother had breast cancer at 62.  Underwent lumpectomy and radiation.  Then had \"pills.\"  No new family history since last seen.      SOCIAL HISTORY:   to her , Dieter.  On video visit by herself from home.  Has 1 son and 1 daughter.  No current tobacco use.  Currently not working.      ALLERGIES:  LISINOPRIL.      PHYSICAL EXAM:  GENERAL: Comfortable, no acute distress.   HEAD: Atraumatic/Normocephalic.  EYES: Sclera non-icteric. Grossly normal.  RESPIRATORY: Normal breathing, non-labored. No audible wheezes/cough.  SKIN: No jaundice, discoloration or obvious lesions.  NERUO: No tremors visible. Normal speech.  PSYCH: Alert, oriented. Answered questions appropriately.    The rest of a comprehensive physical examination is deferred due to PHE (public health emergency) video visit restrictions.       7/22/2020  Day 1   Hemoglobin 11.7 - 15.7 g/dL 12.2 "   Hematocrit 35.0 - 47.0 % 36.2   Platelet Count 150 - 450 10e9/L 409   Absolute Neutrophil 1.6 - 8.3 10e9/L 2.1   Bilirubin Total 0.2 - 1.3 mg/dL 0.6   ALT 0 - 50 U/L 44   AST 0 - 45 U/L 31   Alkaline Phosphatase 40 - 150 U/L 93   Albumin 3.4 - 5.0 g/dL 3.2 (A)   Protein Total 6.8 - 8.8 g/dL 7.3   WBC 4.0 - 11.0 10e9/L 4.8   CA 27-29 0 - 39 U/mL 280 (A)  Assay Method:  Chemiluminescence using Siemens Centaur XP   Ca 27.29 (7/1/20): 347     IMAGING:  CT chest, abdomen and pelvis from 06/19/2020 shows multiple left breast lesion, left axillary lymph nodes and some pectoral lymph nodes, some of the same size, others slightly smaller.  No new lesions.  The bone lesions appear slightly more sclerotic on today's exam, suggesting healing.    Bone scan from 06/19/2020 shows abnormal radiotracer uptake in the posterior left 8th rib and right occipital calvarium, new since PET from 03/2020.  Abnormal radiotracer uptake in the right occipital calvarium, which also appears new compared to PET.  Additional multifocal abnormal radiotracer uptake to the anterior right fourth rib, thoracolumbar spine, left sacral ala, right anterior iliac crest, left scapula and left femoral lesser trochanter, corresponding to the lesions previously seen on PET scan.  Soft tissue uptake to the left breast corresponds with primary malignancy.      ASSESSMENT AND PLAN:   1.  Metastatic breast cancer.  CT showed positive response.  On CT, the bone lesions are overall stable to healing; however, bone scan shows new lesions when compared to prior PET.  I had some concerns that this inconsistency between CT bone and PET may be because of the different imaging modalities used. This is reassured that the Ca 27.29 is going down (347-->280). Therefore, the plan was to continue with current therapy and repeat PET in short interval and compare with similar imaging.  The patient appears to be tolerating therapy.  Reviewed labs, no contraindications to proceed  with therapy.  Return to clinic to see MD prior to cycle 7.  We will plan for PET scan on 08/10.   2.  Nausea.  Controlled on current antiemetic regimen.   3.  Bony metastases.  Continue Zometa.  Order signed.   4.  Imaging: Had issues with obtaining PET scan because the patient's blood sugars were poorly controlled.  It seems we are heading in the right direction.  Will ask for a PET scan to be done on 08/10, which will be 2 days before her next Taxol.  Hopefully, by then, the effects of the steroids on her blood sugars would have worn off.   5.  Health care maintenance.  Discussed the importance of healthy lifestyle with diet and exercise.   6. COVID-19 Precautions. Discussed the importance of good hand washing techniques with soap and water for at least 20 seconds, avoid touching eyes, nose, mouth, avoiding crowds, social distancing.    Video-Visit Details    Type of service:  Video Visit    Video Start and End Time:  9:01 a.m. to 9:12 a.m.     Originating Location (pt. Location): Home    Distant Location (provider location):  [facility]     Mode of Communication:  Video Conference via [employer].     BRY WHITFIELD MD    Above note accurate for diagnosis, plan and orders placed. Epic/EMR orders and data may not be accurate because of available options, information not entered correctly/updated by staff other than writer or interface issues. All data entered by writer with the intent patient care not be compromised nor patient be unnecessarily billed                 D: 2020   T: 2020   MT:       Name:     ARTHUR JENKINS   MRN:      [id number]        Account:      [id number]   :      1955           Visit Date:   2020      Document: [id number]

## 2020-07-22 NOTE — LETTER
"    7/22/2020         RE: Mary Olson  [address]  [place] [place] [address]        Dear Colleague,    Thank you for referring your patient, Mary Olson, to the [facility]. Please see a copy of my visit note below.    Visit Date:   07/22/2020      ONCOLOGY DIAGNOSIS:  1. March 2020: Metastatic Breast Cancer     THERAPY TO DATE:  1. March 2020 to Present: Weekly Taxol.     INTERVAL HISTORY:  Desmond is a 65-year-old female diagnosed with metastatic breast cancer in 2020 after noticing a lesion on her breast.  The patient presents via [employer] video visit for toxicity evaluation prior to therapy.  On today's visit, the patient tells me overall, she is tolerating therapy.  No significant issues.  She feels have blood pressures are getting under better control.  Right after the Taxol, it tends to be higher but improves throughout the week.  She also noticed a rash, which she thinks may be related to Taxol.  She was started given cortisone, which seems to help.  Denies any fevers, chills, nausea, vomiting, chest pain, shortness of breath, or cough.  No abdominal discomfort.  No new palpable masses.  Remaining comprehensive review of systems is negative.      PAST MEDICAL HISTORY:   1.  Metastatic breast cancer, see above.   2.  Hypertension.   3.  Diabetes.      FAMILY HISTORY:  Mother had breast cancer at 62.  Underwent lumpectomy and radiation.  Then had \"pills.\"  No new family history since last seen.      SOCIAL HISTORY:   to her , Dieter.  On video visit by herself from home.  Has 1 son and 1 daughter.  No current tobacco use.  Currently not working.      ALLERGIES:  LISINOPRIL.      PHYSICAL EXAM:  GENERAL: Comfortable, no acute distress.   HEAD: Atraumatic/Normocephalic.  EYES: Sclera non-icteric. Grossly normal.  RESPIRATORY: Normal breathing, non-labored. No audible wheezes/cough.  SKIN: No jaundice, discoloration or obvious lesions.  NERUO: No tremors visible. Normal " speech.  PSYCH: Alert, oriented. Answered questions appropriately.    The rest of a comprehensive physical examination is deferred due to PHE (public health emergency) video visit restrictions.       7/22/2020  Day 1   Hemoglobin 11.7 - 15.7 g/dL 12.2   Hematocrit 35.0 - 47.0 % 36.2   Platelet Count 150 - 450 10e9/L 409   Absolute Neutrophil 1.6 - 8.3 10e9/L 2.1   Bilirubin Total 0.2 - 1.3 mg/dL 0.6   ALT 0 - 50 U/L 44   AST 0 - 45 U/L 31   Alkaline Phosphatase 40 - 150 U/L 93   Albumin 3.4 - 5.0 g/dL 3.2 (A)   Protein Total 6.8 - 8.8 g/dL 7.3   WBC 4.0 - 11.0 10e9/L 4.8   CA 27-29 0 - 39 U/mL 280 (A)  Assay Method:  Chemiluminescence using Siemens Centaur XP   Ca 27.29 (7/1/20): 347     IMAGING:  CT chest, abdomen and pelvis from 06/19/2020 shows multiple left breast lesion, left axillary lymph nodes and some pectoral lymph nodes, some of the same size, others slightly smaller.  No new lesions.  The bone lesions appear slightly more sclerotic on today's exam, suggesting healing.    Bone scan from 06/19/2020 shows abnormal radiotracer uptake in the posterior left 8th rib and right occipital calvarium, new since PET from 03/2020.  Abnormal radiotracer uptake in the right occipital calvarium, which also appears new compared to PET.  Additional multifocal abnormal radiotracer uptake to the anterior right fourth rib, thoracolumbar spine, left sacral ala, right anterior iliac crest, left scapula and left femoral lesser trochanter, corresponding to the lesions previously seen on PET scan.  Soft tissue uptake to the left breast corresponds with primary malignancy.      ASSESSMENT AND PLAN:   1.  Metastatic breast cancer.  CT showed positive response.  On CT, the bone lesions are overall stable to healing; however, bone scan shows new lesions when compared to prior PET.  I had some concerns that this inconsistency between CT bone and PET may be because of the different imaging modalities used. This is reassured that the Ca  27.29 is going down (347-->280). Therefore, the plan was to continue with current therapy and repeat PET in short interval and compare with similar imaging.  The patient appears to be tolerating therapy.  Reviewed labs, no contraindications to proceed with therapy.  Return to clinic to see MD prior to cycle 7.  We will plan for PET scan on 08/10.   2.  Nausea.  Controlled on current antiemetic regimen.   3.  Bony metastases.  Continue Zometa.  Order signed.   4.  Imaging: Had issues with obtaining PET scan because the patient's blood sugars were poorly controlled.  It seems we are heading in the right direction.  Will ask for a PET scan to be done on 08/10, which will be 2 days before her next Taxol.  Hopefully, by then, the effects of the steroids on her blood sugars would have worn off.   5.  Health care maintenance.  Discussed the importance of healthy lifestyle with diet and exercise.   6. COVID-19 Precautions. Discussed the importance of good hand washing techniques with soap and water for at least 20 seconds, avoid touching eyes, nose, mouth, avoiding crowds, social distancing.    Video-Visit Details    Type of service:  Video Visit    Video Start and End Time:  9:01 a.m. to 9:12 a.m.     Originating Location (pt. Location): Home    Distant Location (provider location):  [facility]     Mode of Communication:  Video Conference via [employer].     BRY WHITFIELD MD    Above note accurate for diagnosis, plan and orders placed. Epic/EMR orders and data may not be accurate because of available options, information not entered correctly/updated by staff other than writer or interface issues. All data entered by writer with the intent patient care not be compromised nor patient be unnecessarily billed                 D: 2020   T: 2020   MT: DOUGIE      Name:     ARTHUR JENKINS   MRN:      [id number]        Account:      [id number]   :      1955           Visit Date:    07/22/2020      Document: [id number]        Again, thank you for allowing me to participate in the care of your patient.        Sincerely,        Keysha Birch MD

## 2020-07-22 NOTE — PROGRESS NOTES
Port needle left accessed for treatment. Tolerated port access and draw without complaint. Port site scrubbed with Chloraprep for 30 seconds and allowed to dry completely prior to dressing application. Accessed using sterile technique. [place] tubes drawn-Red gel/Green/Purple tubes. Double signed by patient and RN. See documentation flowsheet. Lexy Butterfield, RN, BSN, OCN

## 2020-07-22 NOTE — PROGRESS NOTES
Infusion Nursing Note:  Mary Olson presents today for C6 D1 Taxol.    Patient seen by provider today: Yes: Video Visit with Dr. Birch   present during visit today: Not Applicable.    Note: N/A.    Intravenous Access:  Implanted Port.    Treatment Conditions:  Lab Results   Component Value Date    HGB 12.2 07/22/2020     Lab Results   Component Value Date    WBC 4.8 07/22/2020      Lab Results   Component Value Date    ANEU 2.1 07/22/2020     Lab Results   Component Value Date     07/22/2020      Lab Results   Component Value Date     06/25/2020                   Lab Results   Component Value Date    POTASSIUM 3.4 07/15/2020           No results found for: MAG         Lab Results   Component Value Date    CR 0.67 07/15/2020                   Lab Results   Component Value Date    BUFFY 9.5 07/15/2020                Lab Results   Component Value Date    BILITOTAL 0.6 07/22/2020           Lab Results   Component Value Date    ALBUMIN 3.2 07/22/2020                    Lab Results   Component Value Date    ALT 44 07/22/2020           Lab Results   Component Value Date    AST 31 07/22/2020       Results reviewed, labs MET treatment parameters, ok to proceed with treatment.      Post Infusion Assessment:  Patient tolerated infusion without incident.  Site patent and intact, free from redness, edema or discomfort.  No evidence of extravasations.  Access discontinued per protocol.       Discharge Plan:   Discharge instructions reviewed with: Patient.  Patient and/or family verbalized understanding of discharge instructions and all questions answered.  Patient discharged in stable condition accompanied by: self.  Departure Mode: Ambulatory.    Marlena Duval RN

## 2020-07-23 ENCOUNTER — ALLIED HEALTH/NURSE VISIT (OUTPATIENT)
Dept: PHARMACY | Facility: CLINIC | Age: 65
End: 2020-07-23

## 2020-07-23 DIAGNOSIS — E11.65 TYPE 2 DIABETES MELLITUS WITH HYPERGLYCEMIA, WITH LONG-TERM CURRENT USE OF INSULIN (H): Primary | ICD-10-CM

## 2020-07-23 DIAGNOSIS — K59.03 DRUG-INDUCED CONSTIPATION: ICD-10-CM

## 2020-07-23 DIAGNOSIS — Z79.4 TYPE 2 DIABETES MELLITUS WITH HYPERGLYCEMIA, WITH LONG-TERM CURRENT USE OF INSULIN (H): Primary | ICD-10-CM

## 2020-07-23 PROCEDURE — 99606 MTMS BY PHARM EST 15 MIN: CPT | Performed by: PHARMACIST

## 2020-07-23 PROCEDURE — 99607 MTMS BY PHARM ADDL 15 MIN: CPT | Performed by: PHARMACIST

## 2020-07-23 NOTE — PROGRESS NOTES
MTM ENCOUNTER  SUBJECTIVE/OBJECTIVE:                           Mary Olson is a 65 year old female called for a follow-up visit. She was referred to me from NICOLE Gomes CNP.  Today's visit is a follow-up MTM visit from 2020.     Patient consented to a telehealth visit: yes  Telemedicine Visit Details  Type of service:  Telephone visit  Start Time: 11:03 AM  End Time: 11:43 AM  Originating Location (pt. Location): Home  Distant Location (provider location):  [facility] [place]  Mode of Communication:  Telephone    Chief Complaint: BG Check    Allergies/ADRs: Reviewed in EHR  Tobacco:  reports that she has never smoked. She has never used smokeless tobacco.  Alcohol: Less than 1 beverages / week  PMH: Reviewed in EHR    Medication Adherence/Access: no issues reported    Type 2 Diabetes: Pt currently taking Lantus 52units daily, and Novolog per sliding scale insulin prior to 3 meals per day.  (B-149: 6 units, 150-199: 8 units, 200-249: 10 units, 250-299: 12 units, 300-349: 14 units,  >350: 16 units)  Of note, she receives an injection of dexamethasone with her chemo treatments.  SMBG Ranges (patient reported):    Date FBG/ 2hours post Lunch/2hours post Dinner /2hours post     179       157 137 244 Chemo    164 138      165 160      134 119     18 120 182      118 143     16 244 246     /15 129 249  Chemo Chemo   Symptoms of low blood sugar? None since last visit.  Symptoms of high blood sugar? vision changes  Aspirin: Taking 81mg daily  Statin: No  ACEi/ARB: No. Urine albumin is No results found for: UMALCR    Lab Results   Component Value Date    A1C 10.6 2020     Lab Results   Component Value Date     2020     10/28/2015       Constipation: No current issues with hard stools. Using Tucks Pads as needed and has been mixing her potassium powder with pear juice and this has helped with hard stools. She also recently  purchased docusate, but hasn't needed to use yet.      Today's Vitals: There were no vitals taken for this visit. Phone visit.      ASSESSMENT:                              Medication Adherence: good, no issues identified    Type 2 Diabetes: Stable. Self monitoring of blood glucose is up a little bit this week, but was at goal last week of fasting or pre-prandial  mg/dL  Pt would benefit from   Basal Insulin (Lantus) :  Stay on the same dose.  Bolus Insulin (Novolog): Stay on the same dose.    Constipation: Stable.      PLAN:                            1. Continue current insulin doses. She will follow-up with PCP in 3 weeks.     I spent 40 minutes with this patient today. All changes were made via collaborative practice agreement with NICOLE Gomes CNP. A copy of the visit note was provided to the patient's primary care provider.    Will follow up in 4 months, sooner if needed.    The patient declined a summary of these recommendations via [employer].     Fang Moy, Pharm.D., BCACP  Medication Therapy Management Pharmacist  [phone]

## 2020-07-23 NOTE — Clinical Note
LYNETTE - I have kept her insulin doses the same the last few weeks -- not being SUPER concerned that BG aren't consistently under 130mg/dL -- I'm assuming this will go up and down throughout her treatment. You are seeing her 8/18 and I'm not scheduled to follow-up with her again until November, but let me know if you think she needs dose changes before then.    Thanks!  Fang

## 2020-07-28 ENCOUNTER — ALLIED HEALTH/NURSE VISIT (OUTPATIENT)
Dept: INFUSION THERAPY | Facility: CLINIC | Age: 65
End: 2020-07-28
Attending: INTERNAL MEDICINE
Payer: COMMERCIAL

## 2020-07-28 ENCOUNTER — HOSPITAL ENCOUNTER (OUTPATIENT)
Facility: CLINIC | Age: 65
Setting detail: SPECIMEN
Discharge: HOME | End: 2020-07-28
Attending: INTERNAL MEDICINE | Admitting: INTERNAL MEDICINE
Payer: COMMERCIAL

## 2020-07-28 DIAGNOSIS — C50.812 MALIGNANT NEOPLASM OF OVERLAPPING SITES OF LEFT BREAST IN FEMALE, ESTROGEN RECEPTOR POSITIVE (H): Primary | ICD-10-CM

## 2020-07-28 DIAGNOSIS — Z17.0 MALIGNANT NEOPLASM OF OVERLAPPING SITES OF LEFT BREAST IN FEMALE, ESTROGEN RECEPTOR POSITIVE (H): Primary | ICD-10-CM

## 2020-07-28 LAB
BASOPHILS # BLD AUTO: 0.1 10E9/L (ref 0–0.2)
BASOPHILS NFR BLD AUTO: 1.2 %
DIFFERENTIAL METHOD BLD: ABNORMAL
EOSINOPHIL # BLD AUTO: 0 10E9/L (ref 0–0.7)
EOSINOPHIL NFR BLD AUTO: 0.3 %
ERYTHROCYTE [DISTWIDTH] IN BLOOD BY AUTOMATED COUNT: 16.9 % (ref 10–15)
HCT VFR BLD AUTO: 35.5 % (ref 35–47)
HGB BLD-MCNC: 11.7 G/DL (ref 11.7–15.7)
IMM GRANULOCYTES # BLD: 0 10E9/L (ref 0–0.4)
IMM GRANULOCYTES NFR BLD: 0.7 %
LYMPHOCYTES # BLD AUTO: 2 10E9/L (ref 0.8–5.3)
LYMPHOCYTES NFR BLD AUTO: 33.7 %
MCH RBC QN AUTO: 29.6 PG (ref 26.5–33)
MCHC RBC AUTO-ENTMCNC: 33 G/DL (ref 31.5–36.5)
MCV RBC AUTO: 90 FL (ref 78–100)
MONOCYTES # BLD AUTO: 0.4 10E9/L (ref 0–1.3)
MONOCYTES NFR BLD AUTO: 6.8 %
NEUTROPHILS # BLD AUTO: 3.4 10E9/L (ref 1.6–8.3)
NEUTROPHILS NFR BLD AUTO: 57.3 %
NRBC # BLD AUTO: 0.1 10*3/UL
NRBC BLD AUTO-RTO: 1 /100
PLATELET # BLD AUTO: 442 10E9/L (ref 150–450)
RBC # BLD AUTO: 3.95 10E12/L (ref 3.8–5.2)
WBC # BLD AUTO: 5.9 10E9/L (ref 4–11)

## 2020-07-28 PROCEDURE — 36591 DRAW BLOOD OFF VENOUS DEVICE: CPT

## 2020-07-28 PROCEDURE — 85025 COMPLETE CBC W/AUTO DIFF WBC: CPT | Performed by: INTERNAL MEDICINE

## 2020-07-28 NOTE — PROGRESS NOTES
Nursing Note:  Mary Olson presents today for port labs.    Patient seen by provider today: No   present during visit today: Not Applicable.    Note: N/A.    Intravenous Access:  Labs drawn without difficulty.  Implanted Port.    Discharge Plan:   Patient was sent to home for tomorrows appointment.    Joanna Mayorga RN

## 2020-07-29 ENCOUNTER — INFUSION THERAPY VISIT (OUTPATIENT)
Dept: INFUSION THERAPY | Facility: CLINIC | Age: 65
End: 2020-07-29
Attending: INTERNAL MEDICINE
Payer: COMMERCIAL

## 2020-07-29 VITALS
RESPIRATION RATE: 20 BRPM | HEART RATE: 125 BPM | TEMPERATURE: 98.2 F | SYSTOLIC BLOOD PRESSURE: 155 MMHG | BODY MASS INDEX: 41.59 KG/M2 | WEIGHT: 227.4 LBS | DIASTOLIC BLOOD PRESSURE: 87 MMHG | OXYGEN SATURATION: 97 %

## 2020-07-29 DIAGNOSIS — Z17.0 MALIGNANT NEOPLASM OF OVERLAPPING SITES OF LEFT BREAST IN FEMALE, ESTROGEN RECEPTOR POSITIVE (H): Primary | ICD-10-CM

## 2020-07-29 DIAGNOSIS — C50.812 MALIGNANT NEOPLASM OF OVERLAPPING SITES OF LEFT BREAST IN FEMALE, ESTROGEN RECEPTOR POSITIVE (H): Primary | ICD-10-CM

## 2020-07-29 PROCEDURE — 96413 CHEMO IV INFUSION 1 HR: CPT

## 2020-07-29 PROCEDURE — 25800030 ZZH RX IP 258 OP 636: Performed by: INTERNAL MEDICINE

## 2020-07-29 PROCEDURE — 25000128 H RX IP 250 OP 636: Performed by: INTERNAL MEDICINE

## 2020-07-29 PROCEDURE — 25000132 ZZH RX MED GY IP 250 OP 250 PS 637: Performed by: INTERNAL MEDICINE

## 2020-07-29 PROCEDURE — 96375 TX/PRO/DX INJ NEW DRUG ADDON: CPT

## 2020-07-29 RX ORDER — HEPARIN SODIUM (PORCINE) LOCK FLUSH IV SOLN 100 UNIT/ML 100 UNIT/ML
5 SOLUTION INTRAVENOUS
Status: DISCONTINUED | OUTPATIENT
Start: 2020-07-29 | End: 2020-07-29 | Stop reason: HOSPADM

## 2020-07-29 RX ORDER — DIPHENHYDRAMINE HCL 25 MG
50 CAPSULE ORAL ONCE
Status: COMPLETED | OUTPATIENT
Start: 2020-07-29 | End: 2020-07-29

## 2020-07-29 RX ADMIN — PACLITAXEL 178 MG: 6 INJECTION, SOLUTION INTRAVENOUS at 09:33

## 2020-07-29 RX ADMIN — SODIUM CHLORIDE 250 ML: 9 INJECTION, SOLUTION INTRAVENOUS at 08:57

## 2020-07-29 RX ADMIN — DIPHENHYDRAMINE HYDROCHLORIDE 50 MG: 25 CAPSULE ORAL at 08:57

## 2020-07-29 RX ADMIN — FAMOTIDINE 20 MG: 20 INJECTION, SOLUTION INTRAVENOUS at 09:10

## 2020-07-29 RX ADMIN — Medication 5 ML: at 10:35

## 2020-07-29 RX ADMIN — DEXAMETHASONE SODIUM PHOSPHATE 20 MG: 10 INJECTION, SOLUTION INTRAMUSCULAR; INTRAVENOUS at 08:57

## 2020-07-29 ASSESSMENT — PAIN SCALES - GENERAL: PAINLEVEL: NO PAIN (0)

## 2020-07-29 NOTE — PROGRESS NOTES
Infusion Nursing Note:  Mary Olson presents today for Cycle 6 Day 8 Taxol.    Patient seen by provider today: No   present during visit today: Not Applicable.    Note: N/A.    Intravenous Access:  Implanted Port.    Treatment Conditions:  Lab Results   Component Value Date    HGB 11.7 07/28/2020     Lab Results   Component Value Date    WBC 5.9 07/28/2020      Lab Results   Component Value Date    ANEU 3.4 07/28/2020     Lab Results   Component Value Date     07/28/2020      Results reviewed, labs MET treatment parameters, ok to proceed with treatment.      Post Infusion Assessment:  Patient tolerated infusion without incident.  Blood return noted pre and post infusion.  Site patent and intact, free from redness, edema or discomfort.  No evidence of extravasations.  Access discontinued per protocol.       Discharge Plan:   Patient declined prescription refills.  Discharge instructions reviewed with: Patient.  Patient verbalized understanding of discharge instructions and all questions answered.  AVS to patient via [employer].  Patient will return 8/5/20 for next appointment.   Patient discharged in stable condition accompanied by: self.  Departure Mode: Ambulatory.    Princess Mendez RN

## 2020-07-31 ENCOUNTER — MYC MEDICAL ADVICE (OUTPATIENT)
Dept: FAMILY MEDICINE | Facility: CLINIC | Age: 65
End: 2020-07-31

## 2020-08-05 ENCOUNTER — INFUSION THERAPY VISIT (OUTPATIENT)
Dept: INFUSION THERAPY | Facility: CLINIC | Age: 65
End: 2020-08-05
Payer: COMMERCIAL

## 2020-08-05 ENCOUNTER — ONCOLOGY VISIT (OUTPATIENT)
Dept: ONCOLOGY | Facility: CLINIC | Age: 65
End: 2020-08-05
Payer: COMMERCIAL

## 2020-08-05 VITALS
DIASTOLIC BLOOD PRESSURE: 95 MMHG | WEIGHT: 236 LBS | SYSTOLIC BLOOD PRESSURE: 158 MMHG | TEMPERATURE: 99 F | OXYGEN SATURATION: 95 % | HEART RATE: 71 BPM | BODY MASS INDEX: 43.16 KG/M2 | RESPIRATION RATE: 16 BRPM

## 2020-08-05 DIAGNOSIS — Z17.0 MALIGNANT NEOPLASM OF OVERLAPPING SITES OF LEFT BREAST IN FEMALE, ESTROGEN RECEPTOR POSITIVE (H): Primary | ICD-10-CM

## 2020-08-05 DIAGNOSIS — C50.812 MALIGNANT NEOPLASM OF OVERLAPPING SITES OF LEFT BREAST IN FEMALE, ESTROGEN RECEPTOR POSITIVE (H): Primary | ICD-10-CM

## 2020-08-05 LAB
ANISOCYTOSIS BLD QL SMEAR: SLIGHT
DIFFERENTIAL METHOD BLD: ABNORMAL
ERYTHROCYTE [DISTWIDTH] IN BLOOD BY AUTOMATED COUNT: 17.8 % (ref 10–15)
HCT VFR BLD AUTO: 33.5 % (ref 35–47)
HGB BLD-MCNC: 11.2 G/DL (ref 11.7–15.7)
LYMPHOCYTES # BLD AUTO: 2.7 10E9/L (ref 0.8–5.3)
LYMPHOCYTES NFR BLD AUTO: 38 %
MCH RBC QN AUTO: 29.8 PG (ref 26.5–33)
MCHC RBC AUTO-ENTMCNC: 33.4 G/DL (ref 31.5–36.5)
MCV RBC AUTO: 89 FL (ref 78–100)
METAMYELOCYTES # BLD: 0.1 10E9/L
METAMYELOCYTES NFR BLD MANUAL: 1 %
MONOCYTES # BLD AUTO: 0.4 10E9/L (ref 0–1.3)
MONOCYTES NFR BLD AUTO: 5 %
NEUTROPHILS # BLD AUTO: 4 10E9/L (ref 1.6–8.3)
NEUTROPHILS NFR BLD AUTO: 56 %
NRBC # BLD AUTO: 0.4 10*3/UL
NRBC BLD AUTO-RTO: 5 /100
PLATELET # BLD AUTO: 454 10E9/L (ref 150–450)
PLATELET # BLD EST: ABNORMAL 10*3/UL
RBC # BLD AUTO: 3.76 10E12/L (ref 3.8–5.2)
WBC # BLD AUTO: 7.2 10E9/L (ref 4–11)

## 2020-08-05 PROCEDURE — 96413 CHEMO IV INFUSION 1 HR: CPT | Performed by: NURSE PRACTITIONER

## 2020-08-05 PROCEDURE — 99207 ZZC NO CHARGE NURSE ONLY: CPT

## 2020-08-05 PROCEDURE — 96375 TX/PRO/DX INJ NEW DRUG ADDON: CPT | Performed by: NURSE PRACTITIONER

## 2020-08-05 PROCEDURE — S0028 INJECTION, FAMOTIDINE, 20 MG: HCPCS | Performed by: NURSE PRACTITIONER

## 2020-08-05 PROCEDURE — 99207 ZZC NO CHARGE LOS: CPT

## 2020-08-05 PROCEDURE — 85025 COMPLETE CBC W/AUTO DIFF WBC: CPT | Performed by: INTERNAL MEDICINE

## 2020-08-05 RX ORDER — HEPARIN SODIUM (PORCINE) LOCK FLUSH IV SOLN 100 UNIT/ML 100 UNIT/ML
5 SOLUTION INTRAVENOUS
Status: DISCONTINUED | OUTPATIENT
Start: 2020-08-05 | End: 2020-08-05 | Stop reason: HOSPADM

## 2020-08-05 RX ORDER — DIPHENHYDRAMINE HCL 25 MG
50 CAPSULE ORAL ONCE
Status: COMPLETED | OUTPATIENT
Start: 2020-08-05 | End: 2020-08-05

## 2020-08-05 RX ORDER — HEPARIN SODIUM,PORCINE 10 UNIT/ML
5 VIAL (ML) INTRAVENOUS
Status: DISCONTINUED | OUTPATIENT
Start: 2020-08-05 | End: 2020-08-05 | Stop reason: HOSPADM

## 2020-08-05 RX ADMIN — HEPARIN SODIUM (PORCINE) LOCK FLUSH IV SOLN 100 UNIT/ML 5 ML: 100 SOLUTION at 13:52

## 2020-08-05 RX ADMIN — Medication 250 ML: at 12:18

## 2020-08-05 RX ADMIN — Medication 50 MG: at 12:22

## 2020-08-05 RX ADMIN — HEPARIN SODIUM (PORCINE) LOCK FLUSH IV SOLN 100 UNIT/ML 5 ML: 100 SOLUTION at 11:30

## 2020-08-05 ASSESSMENT — PAIN SCALES - GENERAL: PAINLEVEL: NO PAIN (0)

## 2020-08-05 NOTE — PROGRESS NOTES
Patient stated her vision was blurry lately, wearing sunglasses inside. She requested nurse to provide SBA with walking to make sure she didn't run into anything. Offered a wheelchair which she declined. Walked patient to lab and back to [facility] after draw. Informed her infusion nurse Marlena. Port needle left accessed for treatment. Tolerated port access and draw without complaint. Port site scrubbed with Chloraprep for 30 seconds and allowed to dry completely prior to Opsite sensitive skin dressing application. Accessed using sterile technique. Cornish tubes drawn-Red gel/Green/Purple tubes. Double signed by patient and RN. See documentation flowsheet. Lexy Butterfield, RN, BSN, OCN

## 2020-08-05 NOTE — PROGRESS NOTES
Infusion Nursing Note:  Mary Olson presents today for C6 D15 taxol.    Patient seen by provider today: No   present during visit today: Not Applicable.    Note: Patient comes in today reporting blurry vision that has worsened over the week along with balance issues. Taxol rash continues to travel up both of her arms, remains red in color but does not itch. Skin appears dry and flakey.  Patient also reported that her blood sugar was 90 this morning, and she did not eat much prior to coming in. Spoke with Swathi Page NP regarding these symptoms. Encouraged patient to eat and drink something as her blood sugars are not typically that low, which could be the culprit. Patient verbalized understanding. Upon departure patient was more balanced and experiencing less vision issues.     Intravenous Access:  Implanted Port.    Treatment Conditions:  Lab Results   Component Value Date    HGB 11.2 08/05/2020     Lab Results   Component Value Date    WBC 7.2 08/05/2020      Lab Results   Component Value Date    ANEU 4.0 08/05/2020     Lab Results   Component Value Date     08/05/2020      Lab Results   Component Value Date     06/25/2020                   Lab Results   Component Value Date    POTASSIUM 3.4 07/15/2020           No results found for: MAG         Lab Results   Component Value Date    CR 0.67 07/15/2020                   Lab Results   Component Value Date    BUFFY 9.5 07/15/2020                Lab Results   Component Value Date    BILITOTAL 0.6 07/22/2020           Lab Results   Component Value Date    ALBUMIN 3.2 07/22/2020                    Lab Results   Component Value Date    ALT 44 07/22/2020           Lab Results   Component Value Date    AST 31 07/22/2020       Results reviewed, labs MET treatment parameters, ok to proceed with treatment.      Post Infusion Assessment:     Patient tolerated infusion without incident.  Site patent and intact, free from redness, edema or  discomfort.  No evidence of extravasations.  Access discontinued per protocol.    Discharge Plan:   Discharge instructions reviewed with: Patient.  Patient and/or family verbalized understanding of discharge instructions and all questions answered.  Patient discharged in stable condition accompanied by: self.  Departure Mode: Ambulatory.    Marlena Duval RN

## 2020-08-10 ENCOUNTER — HOSPITAL ENCOUNTER (OUTPATIENT)
Dept: PET IMAGING | Facility: CLINIC | Age: 65
Discharge: HOME | End: 2020-08-10
Attending: INTERNAL MEDICINE | Admitting: INTERNAL MEDICINE
Payer: COMMERCIAL

## 2020-08-10 DIAGNOSIS — C50.812 MALIGNANT NEOPLASM OF OVERLAPPING SITES OF LEFT BREAST IN FEMALE, ESTROGEN RECEPTOR POSITIVE (H): ICD-10-CM

## 2020-08-10 DIAGNOSIS — Z17.0 MALIGNANT NEOPLASM OF OVERLAPPING SITES OF LEFT BREAST IN FEMALE, ESTROGEN RECEPTOR POSITIVE (H): ICD-10-CM

## 2020-08-10 DIAGNOSIS — C79.51 BONE METASTASIS: ICD-10-CM

## 2020-08-10 PROCEDURE — 34300033 ZZH RX 343: Performed by: INTERNAL MEDICINE

## 2020-08-10 PROCEDURE — 25000128 H RX IP 250 OP 636: Performed by: INTERNAL MEDICINE

## 2020-08-10 PROCEDURE — 40000557 PET ONCOLOGY WHOLE BODY

## 2020-08-10 PROCEDURE — A9552 F18 FDG: HCPCS | Performed by: INTERNAL MEDICINE

## 2020-08-10 RX ORDER — IOPAMIDOL 755 MG/ML
135 INJECTION, SOLUTION INTRAVASCULAR ONCE
Status: COMPLETED | OUTPATIENT
Start: 2020-08-10 | End: 2020-08-10

## 2020-08-10 RX ORDER — HEPARIN SODIUM (PORCINE) LOCK FLUSH IV SOLN 100 UNIT/ML 100 UNIT/ML
500 SOLUTION INTRAVENOUS ONCE
Status: COMPLETED | OUTPATIENT
Start: 2020-08-10 | End: 2020-08-10

## 2020-08-10 RX ADMIN — IOPAMIDOL 135 ML: 755 INJECTION, SOLUTION INTRAVENOUS at 09:01

## 2020-08-10 RX ADMIN — FLUDEOXYGLUCOSE F-18 14.1 MCI.: 500 INJECTION, SOLUTION INTRAVENOUS at 09:01

## 2020-08-10 RX ADMIN — Medication 500 UNITS: at 09:08

## 2020-08-11 ENCOUNTER — PATIENT OUTREACH (OUTPATIENT)
Dept: ONCOLOGY | Facility: CLINIC | Age: 65
End: 2020-08-11

## 2020-08-11 ENCOUNTER — VIRTUAL VISIT (OUTPATIENT)
Dept: ONCOLOGY | Facility: CLINIC | Age: 65
End: 2020-08-11
Payer: COMMERCIAL

## 2020-08-11 DIAGNOSIS — C50.812 MALIGNANT NEOPLASM OF OVERLAPPING SITES OF LEFT BREAST IN FEMALE, ESTROGEN RECEPTOR POSITIVE (H): ICD-10-CM

## 2020-08-11 DIAGNOSIS — Z17.0 MALIGNANT NEOPLASM OF OVERLAPPING SITES OF LEFT BREAST IN FEMALE, ESTROGEN RECEPTOR POSITIVE (H): ICD-10-CM

## 2020-08-11 DIAGNOSIS — Z78.0 MENOPAUSE: ICD-10-CM

## 2020-08-11 DIAGNOSIS — C79.51 BONE METASTASIS: Primary | ICD-10-CM

## 2020-08-11 DIAGNOSIS — C50.919 METASTATIC BREAST CANCER: ICD-10-CM

## 2020-08-11 DIAGNOSIS — R93.3 ABNORMAL FINDING ON GI TRACT IMAGING: ICD-10-CM

## 2020-08-11 PROCEDURE — 99214 OFFICE O/P EST MOD 30 MIN: CPT | Mod: 95 | Performed by: INTERNAL MEDICINE

## 2020-08-11 RX ORDER — HEPARIN SODIUM,PORCINE 10 UNIT/ML
5 VIAL (ML) INTRAVENOUS
Status: CANCELLED | OUTPATIENT
Start: 2020-09-30

## 2020-08-11 RX ORDER — MEPERIDINE HYDROCHLORIDE 25 MG/ML
25 INJECTION INTRAMUSCULAR; INTRAVENOUS; SUBCUTANEOUS EVERY 30 MIN PRN
Status: CANCELLED | OUTPATIENT
Start: 2020-09-15

## 2020-08-11 RX ORDER — METHYLPREDNISOLONE SODIUM SUCCINATE 125 MG/2ML
125 INJECTION, POWDER, LYOPHILIZED, FOR SOLUTION INTRAMUSCULAR; INTRAVENOUS
Status: CANCELLED
Start: 2020-09-15

## 2020-08-11 RX ORDER — HEPARIN SODIUM,PORCINE 10 UNIT/ML
5 VIAL (ML) INTRAVENOUS
Status: CANCELLED | OUTPATIENT
Start: 2020-09-15

## 2020-08-11 RX ORDER — LORAZEPAM 2 MG/ML
0.5 INJECTION INTRAMUSCULAR EVERY 4 HOURS PRN
Status: CANCELLED
Start: 2020-09-15

## 2020-08-11 RX ORDER — NALOXONE HYDROCHLORIDE 0.4 MG/ML
.1-.4 INJECTION, SOLUTION INTRAMUSCULAR; INTRAVENOUS; SUBCUTANEOUS
Status: CANCELLED | OUTPATIENT
Start: 2020-09-01

## 2020-08-11 RX ORDER — MEPERIDINE HYDROCHLORIDE 25 MG/ML
25 INJECTION INTRAMUSCULAR; INTRAVENOUS; SUBCUTANEOUS EVERY 30 MIN PRN
Status: CANCELLED | OUTPATIENT
Start: 2020-09-08

## 2020-08-11 RX ORDER — ALBUTEROL SULFATE 0.83 MG/ML
2.5 SOLUTION RESPIRATORY (INHALATION)
Status: CANCELLED | OUTPATIENT
Start: 2020-09-08

## 2020-08-11 RX ORDER — ALBUTEROL SULFATE 0.83 MG/ML
2.5 SOLUTION RESPIRATORY (INHALATION)
Status: CANCELLED | OUTPATIENT
Start: 2020-09-15

## 2020-08-11 RX ORDER — ALBUTEROL SULFATE 90 UG/1
1-2 AEROSOL, METERED RESPIRATORY (INHALATION)
Status: CANCELLED
Start: 2020-09-01

## 2020-08-11 RX ORDER — DIPHENHYDRAMINE HCL 25 MG
50 CAPSULE ORAL ONCE
Status: CANCELLED | OUTPATIENT
Start: 2020-09-15

## 2020-08-11 RX ORDER — LORAZEPAM 2 MG/ML
0.5 INJECTION INTRAMUSCULAR EVERY 4 HOURS PRN
Status: CANCELLED
Start: 2020-09-08

## 2020-08-11 RX ORDER — EPINEPHRINE 0.3 MG/.3ML
0.3 INJECTION SUBCUTANEOUS EVERY 5 MIN PRN
Status: CANCELLED | OUTPATIENT
Start: 2020-09-01

## 2020-08-11 RX ORDER — ALBUTEROL SULFATE 90 UG/1
1-2 AEROSOL, METERED RESPIRATORY (INHALATION)
Status: CANCELLED
Start: 2020-09-15

## 2020-08-11 RX ORDER — HEPARIN SODIUM (PORCINE) LOCK FLUSH IV SOLN 100 UNIT/ML 100 UNIT/ML
5 SOLUTION INTRAVENOUS
Status: CANCELLED | OUTPATIENT
Start: 2020-09-15

## 2020-08-11 RX ORDER — EPINEPHRINE 1 MG/ML
0.3 INJECTION, SOLUTION INTRAMUSCULAR; SUBCUTANEOUS EVERY 5 MIN PRN
Status: CANCELLED | OUTPATIENT
Start: 2020-09-15

## 2020-08-11 RX ORDER — ALBUTEROL SULFATE 90 UG/1
1-2 AEROSOL, METERED RESPIRATORY (INHALATION)
Status: CANCELLED
Start: 2020-09-08

## 2020-08-11 RX ORDER — SODIUM CHLORIDE 9 MG/ML
1000 INJECTION, SOLUTION INTRAVENOUS CONTINUOUS PRN
Status: CANCELLED
Start: 2020-09-08

## 2020-08-11 RX ORDER — MEPERIDINE HYDROCHLORIDE 25 MG/ML
25 INJECTION INTRAMUSCULAR; INTRAVENOUS; SUBCUTANEOUS EVERY 30 MIN PRN
Status: CANCELLED | OUTPATIENT
Start: 2020-09-01

## 2020-08-11 RX ORDER — EPINEPHRINE 1 MG/ML
0.3 INJECTION, SOLUTION INTRAMUSCULAR; SUBCUTANEOUS EVERY 5 MIN PRN
Status: CANCELLED | OUTPATIENT
Start: 2020-09-01

## 2020-08-11 RX ORDER — SODIUM CHLORIDE 9 MG/ML
1000 INJECTION, SOLUTION INTRAVENOUS CONTINUOUS PRN
Status: CANCELLED
Start: 2020-09-01

## 2020-08-11 RX ORDER — DIPHENHYDRAMINE HYDROCHLORIDE 50 MG/ML
50 INJECTION INTRAMUSCULAR; INTRAVENOUS
Status: CANCELLED
Start: 2020-09-01

## 2020-08-11 RX ORDER — EPINEPHRINE 0.3 MG/.3ML
0.3 INJECTION SUBCUTANEOUS EVERY 5 MIN PRN
Status: CANCELLED | OUTPATIENT
Start: 2020-09-15

## 2020-08-11 RX ORDER — HEPARIN SODIUM (PORCINE) LOCK FLUSH IV SOLN 100 UNIT/ML 100 UNIT/ML
5 SOLUTION INTRAVENOUS
Status: CANCELLED | OUTPATIENT
Start: 2020-09-08

## 2020-08-11 RX ORDER — HEPARIN SODIUM (PORCINE) LOCK FLUSH IV SOLN 100 UNIT/ML 100 UNIT/ML
5 SOLUTION INTRAVENOUS
Status: CANCELLED | OUTPATIENT
Start: 2020-09-01

## 2020-08-11 RX ORDER — SODIUM CHLORIDE 9 MG/ML
1000 INJECTION, SOLUTION INTRAVENOUS CONTINUOUS PRN
Status: CANCELLED
Start: 2020-09-15

## 2020-08-11 RX ORDER — NALOXONE HYDROCHLORIDE 0.4 MG/ML
.1-.4 INJECTION, SOLUTION INTRAMUSCULAR; INTRAVENOUS; SUBCUTANEOUS
Status: CANCELLED | OUTPATIENT
Start: 2020-09-15

## 2020-08-11 RX ORDER — DIPHENHYDRAMINE HYDROCHLORIDE 50 MG/ML
50 INJECTION INTRAMUSCULAR; INTRAVENOUS
Status: CANCELLED
Start: 2020-09-08

## 2020-08-11 RX ORDER — ZOLEDRONIC ACID 0.04 MG/ML
4 INJECTION, SOLUTION INTRAVENOUS ONCE
Status: CANCELLED | OUTPATIENT
Start: 2020-09-30

## 2020-08-11 RX ORDER — METHYLPREDNISOLONE SODIUM SUCCINATE 125 MG/2ML
125 INJECTION, POWDER, LYOPHILIZED, FOR SOLUTION INTRAMUSCULAR; INTRAVENOUS
Status: CANCELLED
Start: 2020-09-01

## 2020-08-11 RX ORDER — DIPHENHYDRAMINE HYDROCHLORIDE 50 MG/ML
50 INJECTION INTRAMUSCULAR; INTRAVENOUS
Status: CANCELLED
Start: 2020-09-15

## 2020-08-11 RX ORDER — HEPARIN SODIUM,PORCINE 10 UNIT/ML
5 VIAL (ML) INTRAVENOUS
Status: CANCELLED | OUTPATIENT
Start: 2020-09-01

## 2020-08-11 RX ORDER — ANASTROZOLE 1 MG/1
1 TABLET ORAL DAILY
Qty: 30 TABLET | Refills: 1 | Status: SHIPPED | OUTPATIENT
Start: 2020-08-11 | End: 2020-09-09

## 2020-08-11 RX ORDER — METHYLPREDNISOLONE SODIUM SUCCINATE 125 MG/2ML
125 INJECTION, POWDER, LYOPHILIZED, FOR SOLUTION INTRAMUSCULAR; INTRAVENOUS
Status: CANCELLED
Start: 2020-09-08

## 2020-08-11 RX ORDER — EPINEPHRINE 1 MG/ML
0.3 INJECTION, SOLUTION INTRAMUSCULAR; SUBCUTANEOUS EVERY 5 MIN PRN
Status: CANCELLED | OUTPATIENT
Start: 2020-09-08

## 2020-08-11 RX ORDER — HEPARIN SODIUM (PORCINE) LOCK FLUSH IV SOLN 100 UNIT/ML 100 UNIT/ML
5 SOLUTION INTRAVENOUS
Status: CANCELLED | OUTPATIENT
Start: 2020-09-30

## 2020-08-11 RX ORDER — NALOXONE HYDROCHLORIDE 0.4 MG/ML
.1-.4 INJECTION, SOLUTION INTRAMUSCULAR; INTRAVENOUS; SUBCUTANEOUS
Status: CANCELLED | OUTPATIENT
Start: 2020-09-08

## 2020-08-11 RX ORDER — DIPHENHYDRAMINE HCL 25 MG
50 CAPSULE ORAL ONCE
Status: CANCELLED | OUTPATIENT
Start: 2020-09-08

## 2020-08-11 RX ORDER — ALBUTEROL SULFATE 0.83 MG/ML
2.5 SOLUTION RESPIRATORY (INHALATION)
Status: CANCELLED | OUTPATIENT
Start: 2020-09-01

## 2020-08-11 RX ORDER — DIPHENHYDRAMINE HCL 25 MG
50 CAPSULE ORAL ONCE
Status: CANCELLED
Start: 2020-09-01

## 2020-08-11 RX ORDER — HEPARIN SODIUM,PORCINE 10 UNIT/ML
5 VIAL (ML) INTRAVENOUS
Status: CANCELLED | OUTPATIENT
Start: 2020-09-08

## 2020-08-11 RX ORDER — EPINEPHRINE 0.3 MG/.3ML
0.3 INJECTION SUBCUTANEOUS EVERY 5 MIN PRN
Status: CANCELLED | OUTPATIENT
Start: 2020-09-08

## 2020-08-11 RX ORDER — LORAZEPAM 2 MG/ML
0.5 INJECTION INTRAMUSCULAR EVERY 4 HOURS PRN
Status: CANCELLED
Start: 2020-09-01

## 2020-08-11 NOTE — LETTER
"    8/11/2020         RE: Mary Olson  [address]  [place] [address]        Dear Colleague,    Thank you for referring your patient, Mary Olson, to the [facility]. Please see a copy of my visit note below.    Visit Date:   08/11/2020      ONCOLOGY DIAGNOSIS:  1. March 2020: Metastatic Breast Cancer     THERAPY TO DATE:  1. March 2020 to Present: Weekly Taxol.     INTERVAL HISTORY:  Desmond is a 65-year-old female diagnosed with metastatic breast cancer in 03/2020 after noticing a lesion on her breast.  The patient presents via [employer] video visit for toxicity evaluation prior to her next cycle and to review her most recent imaging.  On today's visit, the patient states that she continues to have rash associated with the Taxol.  Feels her \"taste buds are fried.\"  Sugars went very low last week and is planning to speak to her pharmacist in regards to this.  Denies any fevers, chills, nausea, vomiting, chest pain, shortness of breath, cough.  No new palpable masses and the remainder of her comprehensive review of systems is negative.      PAST MEDICAL HISTORY:   1.  Metastatic breast cancer, see above.   2.  Hypertension.   3.  Diabetes.      FAMILY HISTORY:  Mother had breast cancer at 62.  Underwent lumpectomy and radiation, then had \"pills.\"  No new family history since last seen.      SOCIAL HISTORY:   to her , Dieter.  On [employer] video visit by herself from home.  Planning to go to her cabin Thursday after treatment.  Has 1 son and 1 daughter.  No current tobacco use.      ALLERGIES:  LISINOPRIL.      PHYSICAL EXAM:  GENERAL: Comfortable, no acute distress.   HEAD: Atraumatic/Normocephalic.  EYES: Sclera non-icteric. Grossly normal.  RESPIRATORY: Normal breathing, non-labored. No audible wheezes/cough.  SKIN: No jaundice, discoloration or obvious lesions.  NERUO: No tremors visible. Normal speech.  PSYCH: Alert, oriented. Answered questions appropriately.    The " rest of a comprehensive physical examination is deferred due to PHE (public health emergency) video visit restrictions.     LABORATORY:  From 08/05/2020, WBC 7.2, hemoglobin 11.2, hematocrit 33.5, platelets 454, ANC is 4.0.      IMAGING:  PET scan from 08/10/2020 shows interval slight decrease in large left breast mass with skin invasion and multiple satellite nodules; however, this mass continues to be hypermetabolic consistent with viable tumor.  Interval slight decrease in smaller right breast mass and decrease in metabolic activity.  Subcentimeter hypermetabolic lesion of the transverse colon, which was seen on prior exam in retrospect.  Consider evaluation colonoscopy as this lesion may represent a primary GI malignancy versus metastatic disease.  Stable left axillary and retropectoral metastatic involvement.  Stable diffuse metastatic involvement of the bones.      CT chest, abdomen and pelvis from 06/19/2020 shows multiple left breast lesion, left axillary lymph nodes and some pectoral lymph nodes, some of the same size, others slightly smaller.  No new lesions.  The bone lesions appear slightly more sclerotic on today's exam, suggesting healing.    Bone scan from 06/19/2020 shows abnormal radiotracer uptake in the posterior left 8th rib and right occipital calvarium, new since PET from 03/2020.  Abnormal radiotracer uptake in the right occipital calvarium, which also appears new compared to PET.  Additional multifocal abnormal radiotracer uptake to the anterior right fourth rib, thoracolumbar spine, left sacral ala, right anterior iliac crest, left scapula and left femoral lesser trochanter, corresponding to the lesions previously seen on PET scan.  Soft tissue uptake to the left breast corresponds with primary malignancy.      ASSESSMENT AND PLAN:   1.  Metastatic breast cancer.  PET scan is showing a positive response, though there still continues to be active disease.  Discussed with the patient, ideally  I would like to switch her over to endocrine therapy to give her a break from the chemotherapy.  One of the concerns is the patient's difficulty with pills.  I have reached out to our pharmacy team to see whether the pills can be crushed or what would be the easiest way for her to swallow pills.  If patient were to go on Arimidex or Femara, we reviewed potential side effects and toxicities including, but not limited to hot flashes, vaginal dryness, weight gain, swelling, decreased libido, elevated cholesterol, and increase for osteoporosis.  At this point, the patient will continue on the Taxol if her blood counts are within parameters.  We will plan to switch her over to an aromatase inhibitor once we figure out how best she can swallow pills.   2.  Nausea.  Controlled on current antiemetic regimen.   3.  Bony metastases.  Continue Zometa.  Most recent imaging shows stability.   4.  Health care maintenance.  Discussed the importance of healthy lifestyle with diet and exercise.   5.  Diabetes.  The patient will be meeting with her diabetic pharmacist to optimize control.   6.  COVID-19 Precautions. Discussed the importance of good hand washing techniques with soap and water for at least 20 seconds, avoid touching eyes, nose, mouth, avoiding crowds, social distancing.  7.  GI PET abnormality.  In retrospect, this may have been there in the past.  A referral for colonoscopy.  I have asked that I be notified when the colonoscopy is scheduled so that we can hold the Taxol to ensure her counts have recovered.   8.  Molecular diagnostics.  Check [facility] testing.     Addendum: Received message from pharmacy team that AI cannot be crushed or chewed. Can be put in apple sauce to help patient swallow. Information relayed to patient. She would like arimidex. Orders sent. Hold Taxol. Follow up in 1 months with MD.    Video-Visit Details    Type of service:  Video Visit    Video Start and End Time: 10:08 a.m. to 10:22 a.m.      Originating Location (pt. Location): Home    Distant Location (provider location):  [facility]     Mode of Communication:  Video Conference via D[employer]ity video visit.     BRY BIRCH MD    Above note accurate for diagnosis, plan and orders placed. Epic/EMR orders and data may not be accurate because of available options, information not entered correctly/updated by staff other than writer or interface issues. All data entered by writer with the intent patient care not be compromised nor patient be unnecessarily billed     D: 2020   T: 2020   MT: INOCENCIA      Name:     ARTHUR JENKINS   MRN:      [id number]        Account:      [id number]   :      1955           Visit Date:   2020      Document: [id number]        Again, thank you for allowing me to participate in the care of your patient.        Sincerely,        Bry Birch MD

## 2020-08-11 NOTE — PROGRESS NOTES
[facility]:  Care Coordination Note    Telephone call placed to patient this morning to provide Arimidex information.  Patient is considering starting the Arimidex in place of Taxol infusions, but would like to take some time to think this over.  Patient's main concern is the size of the pill, as patient has much difficulties with taking pills.  Per Oncology Pharmacy team, the Arimidex pills are fairly small (6 mm in size), but unfortunately they cannot be crushed, split, or chewed - they must be taken whole.  Pharmacy team suggested taking with applesauce to help the pill go down easier.    Patient was provided with this information on the Arimidex, along with possible side effects.  Handouts sent to patient via e-mail as she has been unable to access her [employer] portal.  Patient states she will call back with her decision after she reviews the information.    Update 8/11/20 12:49 PM - Patient returned call to writer stating she has decided to proceed with trying the Arimidex.  Reviewed with patient that Dr. Birch would like her to have labs drawn this week (appointment scheduled for tomorrow at 9:30 am), and a follow-up visit (video) in 4 weeks with repeat labs the day prior.  Also reviewed with patient that Dr. Birch has ordered a Dexa scan for patient to have completed in the near future - informed patient that one of our schedulers will be in touch with her to help set up these appointments.  Arimidex prescription has been sent to the [facility].  Patient verbalizes understanding, and agrees with this plan.  Upcoming chemotherapy infusion appointments cancelled.    Note sent to Dr. Birch to clarify if patient should plan to continue with her Zometa infusions - if so, this would need to be rescheduled to next week as patient will be out of town starting Thursday this week.     Dmitri Figueredo, RN, BSN, OCN  Oncology Care Coordinator  [facility]

## 2020-08-11 NOTE — NURSING NOTE
"Mary Olson is a 65 year old female who is being evaluated via a billable video visit.      The patient has been notified of following:     \"This video visit will be conducted via a call between you and your physician/provider. We have found that certain health care needs can be provided without the need for an in-person physical exam.  This service lets us provide the care you need with a video conversation.  If a prescription is necessary we can send it directly to your pharmacy.  If lab work is needed we can place an order for that and you can then stop by our lab to have the test done at a later time.    Video visits are billed at different rates depending on your insurance coverage.  Please reach out to your insurance provider with any questions.    If during the course of the call the physician/provider feels a video visit is not appropriate, you will not be charged for this service.\"    Patient has given verbal consent for Video visit? Yes  How would you like to obtain your AVS? MyChart  If you are dropped from the video visit, the video invite should be resent to: MyChart  Will anyone else be joining your video visit? No      Video-Visit Details    Type of service:  Video Visit    Originating Location (pt. Location): Home    Distant Location (provider location):  [facility]     Platform used for Video Visit: Marquez     Patient states that she thinks her taste buds are fried.  She states that some food just does not taste good.  She states that she has been losing about 1 pound per week.  She is currently at 226lbs.  She states that she had a couple of days of weakness britni to low sugars this past week.  She has numbness in her hands feet and mouth.  She has a rash from her medication which has not spread.  She is using hydrocortisone.  She is also having nail issues.  They are falling off, they have a horrible odor and are slightly painful.  She is soaking her hands in vinegar and " water.  She states that due to the odor, she has to wear gloves a lot.      She would like to discuss lowering her Lantus due to her sugars being so low.  She will need a refill of her lantus and her test strips but she stated that she will talk to the pharmacy.    Sonia Vigil, CMA

## 2020-08-11 NOTE — PROGRESS NOTES
[facility]:  Care Coordination Note    Received request from Dr. Birch to assist with submitting requisition order for [employer] [facility] testing - requisition order was submitted on 8/13/20 via the [employer] online portal.  Requested testing for pathology specimen #[id number] collected 03/04/2020.    Writer will continue to follow for updates on test results.     Dmitri Figueredo, RN, BSN, OCN  Oncology Care Coordinator  [facility]

## 2020-08-11 NOTE — PROGRESS NOTES
"Visit Date:   08/11/2020      ONCOLOGY DIAGNOSIS:  1. March 2020: Metastatic Breast Cancer     THERAPY TO DATE:  1. March 2020 to Present: Weekly Taxol.     INTERVAL HISTORY:  Desmond is a 65-year-old female diagnosed with metastatic breast cancer in 03/2020 after noticing a lesion on her breast.  The patient presents via [employer] video visit for toxicity evaluation prior to her next cycle and to review her most recent imaging.  On today's visit, the patient states that she continues to have rash associated with the Taxol.  Feels her \"taste buds are fried.\"  Sugars went very low last week and is planning to speak to her pharmacist in regards to this.  Denies any fevers, chills, nausea, vomiting, chest pain, shortness of breath, cough.  No new palpable masses and the remainder of her comprehensive review of systems is negative.      PAST MEDICAL HISTORY:   1.  Metastatic breast cancer, see above.   2.  Hypertension.   3.  Diabetes.      FAMILY HISTORY:  Mother had breast cancer at 62.  Underwent lumpectomy and radiation, then had \"pills.\"  No new family history since last seen.      SOCIAL HISTORY:   to her , Dieter.  On [employer] video visit by herself from home.  Planning to go to her cabin Thursday after treatment.  Has 1 son and 1 daughter.  No current tobacco use.      ALLERGIES:  LISINOPRIL.      PHYSICAL EXAM:  GENERAL: Comfortable, no acute distress.   HEAD: Atraumatic/Normocephalic.  EYES: Sclera non-icteric. Grossly normal.  RESPIRATORY: Normal breathing, non-labored. No audible wheezes/cough.  SKIN: No jaundice, discoloration or obvious lesions.  NERUO: No tremors visible. Normal speech.  PSYCH: Alert, oriented. Answered questions appropriately.    The rest of a comprehensive physical examination is deferred due to [facility] (public health emergency) video visit restrictions.     LABORATORY:  From 08/05/2020, WBC 7.2, hemoglobin 11.2, hematocrit 33.5, platelets 454, ANC is 4.0.      IMAGING:  PET scan " from 08/10/2020 shows interval slight decrease in large left breast mass with skin invasion and multiple satellite nodules; however, this mass continues to be hypermetabolic consistent with viable tumor.  Interval slight decrease in smaller right breast mass and decrease in metabolic activity.  Subcentimeter hypermetabolic lesion of the transverse colon, which was seen on prior exam in retrospect.  Consider evaluation colonoscopy as this lesion may represent a primary GI malignancy versus metastatic disease.  Stable left axillary and retropectoral metastatic involvement.  Stable diffuse metastatic involvement of the bones.      CT chest, abdomen and pelvis from 06/19/2020 shows multiple left breast lesion, left axillary lymph nodes and some pectoral lymph nodes, some of the same size, others slightly smaller.  No new lesions.  The bone lesions appear slightly more sclerotic on today's exam, suggesting healing.    Bone scan from 06/19/2020 shows abnormal radiotracer uptake in the posterior left 8th rib and right occipital calvarium, new since PET from 03/2020.  Abnormal radiotracer uptake in the right occipital calvarium, which also appears new compared to PET.  Additional multifocal abnormal radiotracer uptake to the anterior right fourth rib, thoracolumbar spine, left sacral ala, right anterior iliac crest, left scapula and left femoral lesser trochanter, corresponding to the lesions previously seen on PET scan.  Soft tissue uptake to the left breast corresponds with primary malignancy.      ASSESSMENT AND PLAN:   1.  Metastatic breast cancer.  PET scan is showing a positive response, though there still continues to be active disease.  Discussed with the patient, ideally I would like to switch her over to endocrine therapy to give her a break from the chemotherapy.  One of the concerns is the patient's difficulty with pills.  I have reached out to our pharmacy team to see whether the pills can be crushed or what  would be the easiest way for her to swallow pills.  If patient were to go on Arimidex or Femara, we reviewed potential side effects and toxicities including, but not limited to hot flashes, vaginal dryness, weight gain, swelling, decreased libido, elevated cholesterol, and increase for osteoporosis.  At this point, the patient will continue on the Taxol if her blood counts are within parameters.  We will plan to switch her over to an aromatase inhibitor once we figure out how best she can swallow pills.   2.  Nausea.  Controlled on current antiemetic regimen.   3.  Bony metastases.  Continue Zometa.  Most recent imaging shows stability.   4.  Health care maintenance.  Discussed the importance of healthy lifestyle with diet and exercise.   5.  Diabetes.  The patient will be meeting with her diabetic pharmacist to optimize control.   6.  COVID-19 Precautions. Discussed the importance of good hand washing techniques with soap and water for at least 20 seconds, avoid touching eyes, nose, mouth, avoiding crowds, social distancing.  7.  GI PET abnormality.  In retrospect, this may have been there in the past.  A referral for colonoscopy.  I have asked that I be notified when the colonoscopy is scheduled so that we can hold the Taxol to ensure her counts have recovered.   8.  Molecular diagnostics.  Check Caris testing.     Addendum: Received message from pharmacy team that AI cannot be crushed or chewed. Can be put in apple sauce to help patient swallow. Information relayed to patient. She would like arimidex. Orders sent. Hold Taxol. Follow up in 1 months with MD.    Video-Visit Details    Type of service:  Video Visit    Video Start and End Time: 10:08 a.m. to 10:22 a.m.     Originating Location (pt. Location): Home    Distant Location (provider location):  [facility]     Mode of Communication:  Video Conference via [employer] video visit.     BRY WHITFIELD MD    Above note accurate for diagnosis,  plan and orders placed. Epic/EMR orders and data may not be accurate because of available options, information not entered correctly/updated by staff other than writer or interface issues. All data entered by writer with the intent patient care not be compromised nor patient be unnecessarily billed     D: 2020   T: 2020   MT: INOCENCIA      Name:     ARTHUR JENKINS   MRN:      [id number]        Account:      [id number]   :      1955           Visit Date:   2020      Document: [id number]

## 2020-08-12 ENCOUNTER — TELEPHONE (OUTPATIENT)
Dept: PHARMACY | Facility: CLINIC | Age: 65
End: 2020-08-12

## 2020-08-12 ENCOUNTER — ONCOLOGY VISIT (OUTPATIENT)
Dept: ONCOLOGY | Facility: CLINIC | Age: 65
End: 2020-08-12
Payer: COMMERCIAL

## 2020-08-12 DIAGNOSIS — Z17.0 MALIGNANT NEOPLASM OF OVERLAPPING SITES OF LEFT BREAST IN FEMALE, ESTROGEN RECEPTOR POSITIVE (H): ICD-10-CM

## 2020-08-12 DIAGNOSIS — C50.919 METASTATIC BREAST CANCER: ICD-10-CM

## 2020-08-12 DIAGNOSIS — C50.812 MALIGNANT NEOPLASM OF OVERLAPPING SITES OF LEFT BREAST IN FEMALE, ESTROGEN RECEPTOR POSITIVE (H): ICD-10-CM

## 2020-08-12 LAB
ALBUMIN SERPL-MCNC: 3.1 G/DL (ref 3.4–5)
ALP SERPL-CCNC: 75 U/L (ref 40–150)
ALT SERPL W P-5'-P-CCNC: 35 U/L (ref 0–50)
ANION GAP SERPL CALCULATED.3IONS-SCNC: 9 MMOL/L (ref 3–14)
ANISOCYTOSIS BLD QL SMEAR: SLIGHT
AST SERPL W P-5'-P-CCNC: 23 U/L (ref 0–45)
BASOPHILS # BLD AUTO: 0.3 10E9/L (ref 0–0.2)
BASOPHILS NFR BLD AUTO: 5 %
BILIRUB SERPL-MCNC: 0.5 MG/DL (ref 0.2–1.3)
BUN SERPL-MCNC: 6 MG/DL (ref 7–30)
CALCIUM SERPL-MCNC: 8.9 MG/DL (ref 8.5–10.1)
CANCER AG27-29 SERPL-ACNC: 273 U/ML (ref 0–39)
CHLORIDE SERPL-SCNC: 100 MMOL/L (ref 94–109)
CO2 SERPL-SCNC: 27 MMOL/L (ref 20–32)
CREAT SERPL-MCNC: 0.68 MG/DL (ref 0.52–1.04)
DIFFERENTIAL METHOD BLD: ABNORMAL
EOSINOPHIL # BLD AUTO: 0.1 10E9/L (ref 0–0.7)
EOSINOPHIL NFR BLD AUTO: 2 %
ERYTHROCYTE [DISTWIDTH] IN BLOOD BY AUTOMATED COUNT: 18.3 % (ref 10–15)
GFR SERPL CREATININE-BSD FRML MDRD: >90 ML/MIN/{1.73_M2}
GLUCOSE SERPL-MCNC: 135 MG/DL (ref 70–99)
HCT VFR BLD AUTO: 32.7 % (ref 35–47)
HGB BLD-MCNC: 10.8 G/DL (ref 11.7–15.7)
LYMPHOCYTES # BLD AUTO: 1.6 10E9/L (ref 0.8–5.3)
LYMPHOCYTES NFR BLD AUTO: 29 %
MACROCYTES BLD QL SMEAR: PRESENT
MCH RBC QN AUTO: 29.8 PG (ref 26.5–33)
MCHC RBC AUTO-ENTMCNC: 33 G/DL (ref 31.5–36.5)
MCV RBC AUTO: 90 FL (ref 78–100)
MONOCYTES # BLD AUTO: 0.3 10E9/L (ref 0–1.3)
MONOCYTES NFR BLD AUTO: 5 %
NEUTROPHILS # BLD AUTO: 3.2 10E9/L (ref 1.6–8.3)
NEUTROPHILS NFR BLD AUTO: 59 %
NRBC # BLD AUTO: 0.2 10*3/UL
NRBC BLD AUTO-RTO: 4 /100
PLATELET # BLD AUTO: 402 10E9/L (ref 150–450)
PLATELET # BLD EST: ABNORMAL 10*3/UL
POLYCHROMASIA BLD QL SMEAR: ABNORMAL
POTASSIUM SERPL-SCNC: 3.3 MMOL/L (ref 3.4–5.3)
PROT SERPL-MCNC: 6.8 G/DL (ref 6.8–8.8)
RBC # BLD AUTO: 3.63 10E12/L (ref 3.8–5.2)
SODIUM SERPL-SCNC: 136 MMOL/L (ref 133–144)
WBC # BLD AUTO: 5.5 10E9/L (ref 4–11)

## 2020-08-12 PROCEDURE — 80053 COMPREHEN METABOLIC PANEL: CPT | Performed by: INTERNAL MEDICINE

## 2020-08-12 PROCEDURE — 36591 DRAW BLOOD OFF VENOUS DEVICE: CPT

## 2020-08-12 PROCEDURE — 86300 IMMUNOASSAY TUMOR CA 15-3: CPT | Performed by: INTERNAL MEDICINE

## 2020-08-12 PROCEDURE — 85025 COMPLETE CBC W/AUTO DIFF WBC: CPT | Performed by: INTERNAL MEDICINE

## 2020-08-12 RX ORDER — HEPARIN SODIUM (PORCINE) LOCK FLUSH IV SOLN 100 UNIT/ML 100 UNIT/ML
5 SOLUTION INTRAVENOUS
Status: DISCONTINUED | OUTPATIENT
Start: 2020-08-12 | End: 2020-08-12 | Stop reason: HOSPADM

## 2020-08-12 RX ADMIN — HEPARIN SODIUM (PORCINE) LOCK FLUSH IV SOLN 100 UNIT/ML 5 ML: 100 SOLUTION at 09:43

## 2020-08-12 NOTE — TELEPHONE ENCOUNTER
"Returned call to patient.    S/O: Had symptoms of hypoglycemia with BG of 98 and 82mg/dL - experienced weakness, felt like she couldn't walk. Feels like these lower BG is \"taking a lot out of me\". Oncology told her these numbers were too low for her.  She has been taking Lantus 52units for elevated BG/diabetes.    Lab Results   Component Value Date    A1C 10.6 06/25/2020       A/P  1. Reduce Lantus to 45units once daily to reduce risk of weakness and/or falls. Will aim for FBG readings of around 120mg/dL. Continue on same sliding scale of Humalog with meals for now.    Fang Moy, Pharm.D., BCACP  Medication Therapy Management Pharmacist  [phone]    "

## 2020-08-12 NOTE — TELEPHONE ENCOUNTER
Reason for Call:  Other call back    Detailed comments: Patient requesting a call back to discuss lancet increase.    Phone Number Patient can be reached at: Cell number on file:    Telephone Information:   Mobile [phone]       Best Time: any     Can we leave a detailed message on this number? YES    Call taken on 8/12/2020 at 2:03 PM by Alla Bhatti

## 2020-08-12 NOTE — PROGRESS NOTES
"Patient's name and  were verified.  See Doc Flowsheet - IV assess for details.  IVAD accessed with 20G  3/4\" carroll gripper plus needle  blood return positive: YES  Site without redness, tenderness or swelling: YES  flushed with 30cc NS and 5cc 100u/ml heparin  Needle: De-accessed    Comments: Labs drawn.  Patient tolerated procedure without incident.    Emily Brandon  RN, BSN, OCN        "

## 2020-08-18 ENCOUNTER — VIRTUAL VISIT (OUTPATIENT)
Dept: FAMILY MEDICINE | Facility: CLINIC | Age: 65
End: 2020-08-18
Payer: COMMERCIAL

## 2020-08-18 ENCOUNTER — ALLIED HEALTH/NURSE VISIT (OUTPATIENT)
Dept: PHARMACY | Facility: CLINIC | Age: 65
End: 2020-08-18

## 2020-08-18 DIAGNOSIS — Z79.4 TYPE 2 DIABETES MELLITUS WITH HYPOGLYCEMIA WITHOUT COMA, WITH LONG-TERM CURRENT USE OF INSULIN (H): Primary | ICD-10-CM

## 2020-08-18 DIAGNOSIS — E87.6 HYPOKALEMIA: ICD-10-CM

## 2020-08-18 DIAGNOSIS — C79.51 BONE METASTASIS: ICD-10-CM

## 2020-08-18 DIAGNOSIS — Z13.220 LIPID SCREENING: ICD-10-CM

## 2020-08-18 DIAGNOSIS — E11.65 TYPE 2 DIABETES MELLITUS WITH HYPERGLYCEMIA, WITH LONG-TERM CURRENT USE OF INSULIN (H): Primary | ICD-10-CM

## 2020-08-18 DIAGNOSIS — Z11.59 ENCOUNTER FOR HEPATITIS C SCREENING TEST FOR LOW RISK PATIENT: ICD-10-CM

## 2020-08-18 DIAGNOSIS — E11.649 TYPE 2 DIABETES MELLITUS WITH HYPOGLYCEMIA WITHOUT COMA, WITH LONG-TERM CURRENT USE OF INSULIN (H): Primary | ICD-10-CM

## 2020-08-18 DIAGNOSIS — Z79.4 TYPE 2 DIABETES MELLITUS WITH HYPERGLYCEMIA, WITH LONG-TERM CURRENT USE OF INSULIN (H): Primary | ICD-10-CM

## 2020-08-18 PROCEDURE — 99607 MTMS BY PHARM ADDL 15 MIN: CPT | Performed by: PHARMACIST

## 2020-08-18 PROCEDURE — 99606 MTMS BY PHARM EST 15 MIN: CPT | Performed by: PHARMACIST

## 2020-08-18 PROCEDURE — 99214 OFFICE O/P EST MOD 30 MIN: CPT | Mod: 95 | Performed by: NURSE PRACTITIONER

## 2020-08-18 RX ORDER — GLUCOSAMINE HCL/CHONDROITIN SU 500-400 MG
CAPSULE ORAL
Qty: 400 EACH | Refills: 3 | Status: SHIPPED | OUTPATIENT
Start: 2020-08-18

## 2020-08-18 RX ORDER — FLURBIPROFEN SODIUM 0.3 MG/ML
SOLUTION/ DROPS OPHTHALMIC
Qty: 400 EACH | Refills: 3 | Status: SHIPPED | OUTPATIENT
Start: 2020-08-18 | End: 2020-09-01

## 2020-08-18 NOTE — PROGRESS NOTES
MTM ENCOUNTER  SUBJECTIVE/OBJECTIVE:                           Mary Olson is a 65 year old female called for a follow-up visit. She was referred to me from NICOLE Gomes CNP.  Today's visit is a follow-up MTM visit from 2020 phone call.     Patient consented to a telehealth visit: yes  Telemedicine Visit Details  Type of service:  Telephone visit  Start Time: 9:33 AM  End Time: 10:02 AM  Originating Location (pt. Location): Home  Distant Location (provider location):  [facility] MTM  Mode of Communication:  Telephone    Chief Complaint: BG Check    Allergies/ADRs: Reviewed in EHR  Tobacco:  reports that she has never smoked. She has never used smokeless tobacco.  Alcohol: Less than 1 beverages / week  PMH: Reviewed in EHR    Medication Adherence/Access: no issues reported    Type 2 Diabetes: Pt currently taking Lantus 45units daily, and Novolog per sliding scale insulin prior to 3 meals per day.  (B-149: 6 units, 150-199: 8 units, 200-249: 10 units, 250-299: 12 units, 300-349: 14 units,  >350: 16 units)  Of note, she receives an injection of dexamethasone with her chemo treatments. Requests a refill of pen needles  SMBG Ranges (patient reported):    Date FBG/ 2hours post Lunch/2hours post Dinner /2hours post HS    161 (not fasting)       128 143      141 110     8/15 115 151      113 189      121       113 127     Symptoms of low blood sugar? Does feel low around 113, 111mg/dL - feels weak, doesn't feel right. Thinks part of this is her body adjusting to changes in chemo regimen, BG values.  Symptoms of high blood sugar? None reported  Aspirin: Taking 81mg daily  Statin: No  ACEi/ARB: No. Urine albumin is No results found for: UMALCR    Lab Results   Component Value Date    A1C 10.6 2020     Glucose   Date Value Ref Range Status   2020 135 (H) 70 - 99 mg/dL Final   2020 180 (H) 70 - 99 mg/dL Final   2020 192 (H) 70 - 99  mg/dL Final   10/28/2015 103 (H) 70 - 99 mg/dL Final         Today's Vitals: There were no vitals taken for this visit. Phone visit.      ASSESSMENT:                              Medication Adherence: good, no issues identified    Type 2 Diabetes: Stable. Self monitoring of blood glucose is WNL and having less issues with symptoms of hypoglycemia. She is completed with Taxol infusions now, unclear if she will continue to receive steroid injections prior to further treatment. Will plan to monitor closely, as BG may continue to decline without regular steroid injections.  Basal Insulin (Lantus) :  Stay on the same dose.  Bolus Insulin (Novolog): Stay on the same dose.      PLAN:                            1. Continue current insulin doses. She will call sooner if needed for insulin adjustments or low blood sugars.    I spent 29 minutes with this patient today. All changes were made via collaborative practice agreement with NICOLE Gomes CNP. A copy of the visit note was provided to the patient's primary care provider.    Will follow up in 3 weeks, sooner if needed.    The patient declined a summary of these recommendations via [employer].     Fang Moy, Pharm.D., BCACP  Medication Therapy Management Pharmacist  [phone]

## 2020-08-18 NOTE — PROGRESS NOTES
"Mary Olson is a 65 year old female who is being evaluated via a billable video visit.      The patient has been notified of following:     \"This video visit will be conducted via a call between you and your physician/provider. We have found that certain health care needs can be provided without the need for an in-person physical exam.  This service lets us provide the care you need with a video conversation.  If a prescription is necessary we can send it directly to your pharmacy.  If lab work is needed we can place an order for that and you can then stop by our lab to have the test done at a later time.    Video visits are billed at different rates depending on your insurance coverage.  Please reach out to your insurance provider with any questions.    If during the course of the call the physician/provider feels a video visit is not appropriate, you will not be charged for this service.\"    Patient has given verbal consent for Video visit? Yes  How would you like to obtain your AVS? MyChart  If you are dropped from the video visit, the video invite should be resent to: Text to cell phone: [phone]  Will anyone else be joining your video visit? No    Subjective     Mary Olson is a 65 year old female who presents today via video visit for the following health issues:    HPI    Diabetes Follow-up    How often are you checking your blood sugar? Two times daily  Blood sugar testing frequency justification:  low blood sugars  What time of day are you checking your blood sugars (select all that apply)?  Before and after meals  Have you had any blood sugars above 200?  No  Have you had any blood sugars below 70?  No    What symptoms do you notice when your blood sugar is low?  None    What concerns do you have today about your diabetes? None     Do you have any of these symptoms? (Select all that apply)  No numbness or tingling in feet.  No redness, sores or blisters on feet.  No complaints of excessive " thirst.  No reports of blurry vision.  No significant changes to weight.    Have you had a diabetic eye exam in the last 12 months? No  Blood sugar went down to 89 and she felt terrible. Her lantus was decreased by Fang MTM to 45 units at night.   Her latest sugars have been good ranging between [phone]   Eating 2 meals a day.   Sliding scale Humalog 4-6 units 3 times daily.   Lantus 45 U bedtime      Breast cancer with bone metastasis  Off Taxol -Nails have turned yellow/white- says they will fall off says it's an awful smell.  Rash has resolved.   She had repeat PET scan-  IMPRESSION:    In this patient with a history of metastatic bilateral invasive  mammary carcinoma with  partial response to treatment with continued  marked disease:      1. Interval, slight decrease in large left breast mass with skin  invasion and multiple satellite nodules. However, this mass continues  to be hypermetabolic consistent with viable tumor.         2. Interval slight decrease in smaller right breast mass and decrease  in metabolic activity.        3. Subcentimeter, hypermetabolic lesion of the transverse colon which  was seen on prior exam in retrospect. Consider evaluation with  colonoscopy as this lesion may represent primary GI malignancy versus  metastatic disease.     4. Stable left axillary and retropectoral metastatic involvement.     5. Stable, diffuse metastatic involvement of the bones.     Start Arimidex 1 mg has no side effects -needs Dexa scan   No pain      BP Readings from Last 2 Encounters:   08/05/20 (!) 158/95   07/29/20 (!) 155/87     Hemoglobin A1C (%)   Date Value   06/25/2020 10.6 (H)     LDL Cholesterol Calculated (mg/dL)   Date Value   10/28/2015 152 (H)               Hypertension Follow-up      Do you check your blood pressure regularly outside of the clinic? Yes     Are you following a low salt diet? Yes    Are your blood pressures ever more than 140 on the top number (systolic) OR more   than  90 on the bottom number (diastolic), for example 140/90? No    107/93    * follow-up on rash - patient stated that it is almost gone. Possible side effect to medication         Video Start Time:7:30      Patient Active Problem List   Diagnosis     Hyperlipidemia LDL goal <160     Essential hypertension     Morbid obesity (H)     Family history of malignant neoplasm of breast     Breast skin changes     Nipple discharge, bloody     Malignant neoplasm of overlapping sites of left breast in female, estrogen receptor positive (H)     Bone metastasis (H)     Diabetes mellitus, type 2 (H)     Past Surgical History:   Procedure Laterality Date     BIOPSY  3/2020     GYN SURGERY  3/13/89. & 6/22/92    C Sections       Social History     Tobacco Use     Smoking status: Never Smoker     Smokeless tobacco: Never Used   Substance Use Topics     Alcohol use: Not Currently     Comment: 2-3 drinks a month      Family History   Problem Relation Age of Onset     Hypertension Mother      Coronary Artery Disease Mother      Breast Cancer Mother      Hyperlipidemia Mother      Depression Mother      Arrhythmia Brother      Cerebrovascular Disease Brother      Diabetes No family hx of            Reviewed and updated as needed this visit by Provider         Review of Systems   Constitutional, HEENT, cardiovascular, pulmonary, GI, , musculoskeletal, neuro, skin, endocrine and psych systems are negative, except as otherwise noted.      Objective           Vitals:  No vitals were obtained today due to virtual visit.    Physical Exam     GENERAL: Healthy, alert and no distress  EYES: Eyes grossly normal to inspection.  No discharge or erythema, or obvious scleral/conjunctival abnormalities.  RESP: No audible wheeze, cough, or visible cyanosis.  No visible retractions or increased work of breathing.    SKIN: Visible skin clear. No significant rash, abnormal pigmentation or lesions.  NEURO: Cranial nerves grossly intact.  Mentation and  speech appropriate for age.  PSYCH: Mentation appears normal, affect normal/bright, judgement and insight intact, normal speech and appearance well-groomed.      Diagnostic Test Results:  Labs reviewed in Epic  No results found for this or any previous visit (from the past 24 hour(s)).        Assessment & Plan     ICD-10-CM    1. Type 2 diabetes mellitus with hypoglycemia without coma, with long-term current use of insulin (H)  E11.649 Albumin Random Urine Quantitative with Creat Ratio    Z79.4    2. Lipid screening  Z13.220 Lipid panel reflex to direct LDL Fasting   3. Encounter for hepatitis C screening test for low risk patient  Z11.59 **Hepatitis C Screen Reflex to RNA FUTURE anytime   4. Hypokalemia  E87.6 **Basic metabolic panel FUTURE anytime   5. Bone metastasis (H)  C79.51      Patient with metastatic breast cancer completed Taxol now on Arimidex needs bone density scan   sugars have been stable for the most part she did have some lows in the 80s and she was symptomatic.  Fang HENAO decrease Lantus to 45 units at bedtime. Continue to monitor.   She is due to come into clinic for some routine lab work I recommended she get this done at her next oncology lab draw and I can see her in 2 months in clinic for f/u  Blood pressure has improved but still just above  goal.  We will not make any changes today but I will review this in clinic with her when I see her.  Her potassium was noted to be low again on her last BMP at 3.3 says she missed taking potassium supplement at times.-Repeat BMP       No follow-ups on file.    NICOLE Gomes CNP  [facility]      Video-Visit Details    Type of service:  Video Visit    Video End Time: This had to be converted to a telephone visit having connectivity issues.  Originating Location (pt. Location): Home    Distant Location (provider location):  [facility]     Platform used for Video Visit: [employer]    Telephone -20 mins

## 2020-08-18 NOTE — Clinical Note
This video visit had to be turned into a telephone visit because of connectivity issue.  Not sure if we need to change anything?

## 2020-08-25 ENCOUNTER — ALLIED HEALTH/NURSE VISIT (OUTPATIENT)
Dept: PHARMACY | Facility: CLINIC | Age: 65
End: 2020-08-25

## 2020-08-25 DIAGNOSIS — Z79.4 TYPE 2 DIABETES MELLITUS WITH HYPERGLYCEMIA, WITH LONG-TERM CURRENT USE OF INSULIN (H): Primary | ICD-10-CM

## 2020-08-25 DIAGNOSIS — E11.65 TYPE 2 DIABETES MELLITUS WITH HYPERGLYCEMIA, WITH LONG-TERM CURRENT USE OF INSULIN (H): Primary | ICD-10-CM

## 2020-08-25 PROCEDURE — 99607 MTMS BY PHARM ADDL 15 MIN: CPT | Performed by: PHARMACIST

## 2020-08-25 PROCEDURE — 99606 MTMS BY PHARM EST 15 MIN: CPT | Performed by: PHARMACIST

## 2020-08-25 NOTE — PROGRESS NOTES
MTM ENCOUNTER  SUBJECTIVE/OBJECTIVE:                           Mary Olson is a 65 year old female called for a follow-up visit. She was referred to me from NICOLE Gomes CNP.  Today's visit is a follow-up MTM visit from 2020 phone call.     Patient consented to a telehealth visit: yes  Telemedicine Visit Details  Type of service:  Telephone visit  Start Time: 3:15 PM  End Time: 3:52 PM  Originating Location (pt. Location): Home  Distant Location (provider location):  [facility] MTM  Mode of Communication:  Telephone    Chief Complaint: BG Check    Allergies/ADRs: Reviewed in EHR  Tobacco:  reports that she has never smoked. She has never used smokeless tobacco.Alcohol: Less than 1 beverages / week  PMH: Reviewed in EHR    Medication Adherence/Access: no issues reported    Type 2 Diabetes: Pt currently taking Lantus 45units daily, and Humalog per sliding scale insulin prior to 3 meals per day.  (B-149: 6 units, 150-199: 8 units, 200-249: 10 units, 250-299: 12 units, 300-349: 14 units,  >350: 16 units)  Of note, she is done with chemo and therefore pre-treatment dexamethasone.  SMBG Ranges (patient reported):    Date FBG/ 2hours post Lunch/2hours post Dinner /2hours post     106       93       102 105      94 155      109 120      88 124      97 115     Symptoms of low blood sugar? Does feel low around 113, 111mg/dL - feels weak, doesn't feel right, blurry vision.  Symptoms of high blood sugar? None reported  Aspirin: Taking 81mg daily  Statin: No  ACEi/ARB: No. Urine albumin is No results found for: UMALCR    Lab Results   Component Value Date    A1C 10.6 2020     Glucose   Date Value Ref Range Status   2020 135 (H) 70 - 99 mg/dL Final   2020 180 (H) 70 - 99 mg/dL Final   2020 192 (H) 70 - 99 mg/dL Final   10/28/2015 103 (H) 70 - 99 mg/dL Final     BP Readings from Last 3 Encounters:   20 (!) 158/95   20 (!)  155/87   07/22/20 (!) 132/99       Today's Vitals: There were no vitals taken for this visit. Phone visit.      ASSESSMENT:                              Medication Adherence: good, no issues identified    Type 2 Diabetes: Needs improvement. Self monitoring of blood glucose is WNL, but BG are lower than previous and having ongoing issues with symptoms of hypoglycemia. She is completed with Taxol infusions and pre-treatment dexamethasone. Will plan to start weaning insulin doses at this time.   Basal Insulin (Lantus) :  Reduce dose by 50%.  Bolus Insulin (Humalog): Discontinue.      PLAN:                            1. Reduce Lantus to 22units once daily.  2. Discontinue Humalog.  3. Pt will SMBG fasting, 2hrs PP, and with symptoms of hypoglycemia.      I spent 37 minutes with this patient today. All changes were made via collaborative practice agreement with NICOLE Gomes CNP. A copy of the visit note was provided to the patient's primary care provider.    Will follow up in 3 days.    The patient declined a summary of these recommendations via [employer].     Fang Moy, Pharm.D., BCACP  Medication Therapy Management Pharmacist  [phone]

## 2020-08-28 ENCOUNTER — ALLIED HEALTH/NURSE VISIT (OUTPATIENT)
Dept: PHARMACY | Facility: CLINIC | Age: 65
End: 2020-08-28

## 2020-08-28 DIAGNOSIS — Z79.4 TYPE 2 DIABETES MELLITUS WITH HYPERGLYCEMIA, WITH LONG-TERM CURRENT USE OF INSULIN (H): Primary | ICD-10-CM

## 2020-08-28 DIAGNOSIS — E11.65 TYPE 2 DIABETES MELLITUS WITH HYPERGLYCEMIA, WITH LONG-TERM CURRENT USE OF INSULIN (H): Primary | ICD-10-CM

## 2020-08-28 PROCEDURE — 99606 MTMS BY PHARM EST 15 MIN: CPT | Performed by: PHARMACIST

## 2020-08-28 NOTE — PROGRESS NOTES
MTM ENCOUNTER  SUBJECTIVE/OBJECTIVE:                           Mary Olson is a 65 year old female called for a follow-up visit. She was referred to me from NICOLE Gomes CNP.  Today's visit is a follow-up MTM visit from 8/25/2020.     Patient consented to a telehealth visit: yes  Telemedicine Visit Details  Type of service:  Telephone visit  Start Time: 8:35 AM  End Time: 8:45 AM  Originating Location (pt. Location): Home  Distant Location (provider location):  [facility] [place]  Mode of Communication:  Telephone    Chief Complaint: BG Check    Allergies/ADRs: Reviewed in EHR  Tobacco:  reports that she has never smoked. She has never used smokeless tobacco.Alcohol: Less than 1 beverages / week  PMH: Reviewed in EHR    Medication Adherence/Access: no issues reported    Type 2 Diabetes: Pt currently taking Lantus 22units daily (dose reduced by 50% earlier this week). Humalog stopped completely last visit.  Of note, she is done with chemo and therefore pre-treatment dexamethasone.  SMBG Ranges (patient reported):    Date FBG/ 2hours post Lunch/2hours post Dinner /2hours post    8/28 150      8/27 123      8/26 104      8/25 106      8/24 93      8/23 102 105     8/22 94 155     8/21 109 120     8/20 88 124     8/19 97 115     Symptoms of low blood sugar? Does feel low around 113, 111mg/dL - feels weak, doesn't feel right, blurry vision. Reports slight improvements to weakness and blurry vision in last few days  Symptoms of high blood sugar? None reported  Aspirin: Taking 81mg daily  Statin: No  ACEi/ARB: No. Urine albumin is No results found for: UMALCR    Lab Results   Component Value Date    A1C 10.6 06/25/2020     Glucose   Date Value Ref Range Status   08/12/2020 135 (H) 70 - 99 mg/dL Final   06/25/2020 180 (H) 70 - 99 mg/dL Final   03/04/2020 192 (H) 70 - 99 mg/dL Final   10/28/2015 103 (H) 70 - 99 mg/dL Final     BP Readings from Last 3 Encounters:   08/05/20 (!) 158/95    07/29/20 (!) 155/87   07/22/20 (!) 132/99       Today's Vitals: There were no vitals taken for this visit. Phone visit.      ASSESSMENT:                              Medication Adherence: good, no issues identified    Type 2 Diabetes: Stable. Self monitoring of blood glucose is WNL, but would like to continue current dose of Lantus for a few more days before further adjusting or reducing. She is physically feeling better off of Humalog.  Basal Insulin (Lantus) :  Stay on same dose for now.      PLAN:                            1.Continue on Lantus 22units once daily and continue OFF of Humalog.  2. SMBG fasting and with symptoms of hypoglycemia..      I spent 10 minutes with this patient today. All changes were made via collaborative practice agreement with NICOLE Gomes CNP. A copy of the visit note was provided to the patient's primary care provider.    Will follow up in 4 days.    The patient declined a summary of these recommendations via [facility].     Fang Moy, Pharm.D., BCACP  Medication Therapy Management Pharmacist  [phone]

## 2020-09-01 ENCOUNTER — ALLIED HEALTH/NURSE VISIT (OUTPATIENT)
Dept: PHARMACY | Facility: CLINIC | Age: 65
End: 2020-09-01

## 2020-09-01 DIAGNOSIS — Z79.4 TYPE 2 DIABETES MELLITUS WITH HYPERGLYCEMIA, WITH LONG-TERM CURRENT USE OF INSULIN (H): Primary | ICD-10-CM

## 2020-09-01 DIAGNOSIS — E11.65 TYPE 2 DIABETES MELLITUS WITH HYPERGLYCEMIA, WITH LONG-TERM CURRENT USE OF INSULIN (H): Primary | ICD-10-CM

## 2020-09-01 LAB — LAB SCANNED RESULT: NORMAL

## 2020-09-01 PROCEDURE — 99606 MTMS BY PHARM EST 15 MIN: CPT | Performed by: PHARMACIST

## 2020-09-01 PROCEDURE — 99607 MTMS BY PHARM ADDL 15 MIN: CPT | Performed by: PHARMACIST

## 2020-09-01 NOTE — PROGRESS NOTES
MTM ENCOUNTER  SUBJECTIVE/OBJECTIVE:                           Mary Olson is a 65 year old female called for a follow-up visit. She was referred to me from NICOLE Gomes CNP.  Today's visit is a follow-up MTM visit from 8/25/2020.     Patient consented to a telehealth visit: yes  Telemedicine Visit Details  Type of service:  Telephone visit  Start Time: 11:32 AM  End Time: 11:50 AM  Originating Location (pt. Location): Home  Distant Location (provider location):  [facility] MTM  Mode of Communication:  Telephone    Chief Complaint: BG Check    Allergies/ADRs: Reviewed in EHR  Tobacco:  reports that she has never smoked. She has never used smokeless tobacco.   Alcohol: Less than 1 beverages / week  PMH: Reviewed in EHR    Medication Adherence/Access: no issues reported    Type 2 Diabetes: Pt currently taking Lantus 22units daily (dose reduced by 50% last week). Humalog stopped completely last visit.  Of note, she is done with chemo and therefore pre-treatment dexamethasone.  SMBG Ranges (patient reported):    Date FBG/ 2hours post   9/1 118   8/31 112   8/30 122   8/29 100   Symptoms of low blood sugar? Ongoing issues with feeling weak, blurry vision, especially with BG in low 100's.   Symptoms of high blood sugar? None reported  Aspirin: Taking 81mg daily  Statin: No  ACEi/ARB: No. Urine albumin is No results found for: UMALCR    Lab Results   Component Value Date    A1C 10.6 06/25/2020     Glucose   Date Value Ref Range Status   08/12/2020 135 (H) 70 - 99 mg/dL Final   06/25/2020 180 (H) 70 - 99 mg/dL Final   03/04/2020 192 (H) 70 - 99 mg/dL Final   10/28/2015 103 (H) 70 - 99 mg/dL Final     BP Readings from Last 3 Encounters:   08/05/20 (!) 158/95   07/29/20 (!) 155/87   07/22/20 (!) 132/99       Today's Vitals: There were no vitals taken for this visit. Phone visit.      ASSESSMENT:                              Medication Adherence: good, no issues identified    Type 2 Diabetes:  Stable. Self monitoring of blood glucose is WNL, but would like to continue current dose of Lantus for a few more days before further adjusting or reducing. She is physically feeling better off of Humalog.  Basal Insulin (Lantus) :  Discontinue.      PLAN:                            1. Discontinue Lantus.  2. SMBG fasting.    I spent 18 minutes with this patient today. All changes were made via collaborative practice agreement with NICOLE Gomes CNP. A copy of the visit note was provided to the patient's primary care provider.    Will follow up in 2 weeks.    The patient declined a summary of these recommendations via [employer].     Fang Moy, Pharm.D., [facility]  Medication Therapy Management Pharmacist  [phone]

## 2020-09-02 ENCOUNTER — ONCOLOGY VISIT (OUTPATIENT)
Dept: ONCOLOGY | Facility: CLINIC | Age: 65
End: 2020-09-02
Payer: COMMERCIAL

## 2020-09-02 ENCOUNTER — INFUSION THERAPY VISIT (OUTPATIENT)
Dept: INFUSION THERAPY | Facility: CLINIC | Age: 65
End: 2020-09-02
Payer: COMMERCIAL

## 2020-09-02 VITALS
SYSTOLIC BLOOD PRESSURE: 164 MMHG | WEIGHT: 227 LBS | TEMPERATURE: 98.1 F | HEART RATE: 106 BPM | RESPIRATION RATE: 18 BRPM | BODY MASS INDEX: 41.52 KG/M2 | DIASTOLIC BLOOD PRESSURE: 96 MMHG | OXYGEN SATURATION: 97 %

## 2020-09-02 DIAGNOSIS — C50.812 MALIGNANT NEOPLASM OF OVERLAPPING SITES OF LEFT BREAST IN FEMALE, ESTROGEN RECEPTOR POSITIVE (H): ICD-10-CM

## 2020-09-02 DIAGNOSIS — Z17.0 MALIGNANT NEOPLASM OF OVERLAPPING SITES OF LEFT BREAST IN FEMALE, ESTROGEN RECEPTOR POSITIVE (H): ICD-10-CM

## 2020-09-02 DIAGNOSIS — C79.51 BONE METASTASIS: Primary | ICD-10-CM

## 2020-09-02 DIAGNOSIS — Z13.220 LIPID SCREENING: ICD-10-CM

## 2020-09-02 DIAGNOSIS — Z79.4 TYPE 2 DIABETES MELLITUS WITH HYPOGLYCEMIA WITHOUT COMA, WITH LONG-TERM CURRENT USE OF INSULIN (H): ICD-10-CM

## 2020-09-02 DIAGNOSIS — E87.6 HYPOKALEMIA: ICD-10-CM

## 2020-09-02 DIAGNOSIS — E11.649 TYPE 2 DIABETES MELLITUS WITH HYPOGLYCEMIA WITHOUT COMA, WITH LONG-TERM CURRENT USE OF INSULIN (H): ICD-10-CM

## 2020-09-02 DIAGNOSIS — E11.65 TYPE 2 DIABETES MELLITUS WITH HYPERGLYCEMIA, WITH LONG-TERM CURRENT USE OF INSULIN (H): ICD-10-CM

## 2020-09-02 DIAGNOSIS — Z79.4 TYPE 2 DIABETES MELLITUS WITH HYPERGLYCEMIA, WITH LONG-TERM CURRENT USE OF INSULIN (H): ICD-10-CM

## 2020-09-02 DIAGNOSIS — Z11.59 ENCOUNTER FOR HEPATITIS C SCREENING TEST FOR LOW RISK PATIENT: ICD-10-CM

## 2020-09-02 LAB
ALBUMIN SERPL-MCNC: 3.2 G/DL (ref 3.4–5)
ANION GAP SERPL CALCULATED.3IONS-SCNC: 7 MMOL/L (ref 3–14)
BUN SERPL-MCNC: 7 MG/DL (ref 7–30)
CALCIUM SERPL-MCNC: 9.6 MG/DL (ref 8.5–10.1)
CALCIUM SERPL-MCNC: 9.6 MG/DL (ref 8.5–10.1)
CHLORIDE SERPL-SCNC: 100 MMOL/L (ref 94–109)
CHOLEST SERPL-MCNC: 267 MG/DL
CO2 SERPL-SCNC: 30 MMOL/L (ref 20–32)
CREAT SERPL-MCNC: 0.69 MG/DL (ref 0.52–1.04)
CREAT SERPL-MCNC: 0.7 MG/DL (ref 0.52–1.04)
CREAT UR-MCNC: 30 MG/DL
GFR SERPL CREATININE-BSD FRML MDRD: >90 ML/MIN/{1.73_M2}
GFR SERPL CREATININE-BSD FRML MDRD: >90 ML/MIN/{1.73_M2}
GLUCOSE SERPL-MCNC: 129 MG/DL (ref 70–99)
HBA1C MFR BLD: 6.3 % (ref 0–5.6)
HDLC SERPL-MCNC: 64 MG/DL
LDLC SERPL CALC-MCNC: 170 MG/DL
MICROALBUMIN UR-MCNC: 47 MG/L
MICROALBUMIN/CREAT UR: 158.45 MG/G CR (ref 0–25)
NONHDLC SERPL-MCNC: 203 MG/DL
POTASSIUM SERPL-SCNC: 3.1 MMOL/L (ref 3.4–5.3)
SODIUM SERPL-SCNC: 137 MMOL/L (ref 133–144)
TRIGL SERPL-MCNC: 163 MG/DL

## 2020-09-02 PROCEDURE — 80061 LIPID PANEL: CPT | Performed by: INTERNAL MEDICINE

## 2020-09-02 PROCEDURE — 99207 ZZC NO CHARGE NURSE ONLY: CPT

## 2020-09-02 PROCEDURE — 86803 HEPATITIS C AB TEST: CPT | Performed by: INTERNAL MEDICINE

## 2020-09-02 PROCEDURE — 99207 ZZC NO CHARGE LOS: CPT

## 2020-09-02 PROCEDURE — 96365 THER/PROPH/DIAG IV INF INIT: CPT | Performed by: INTERNAL MEDICINE

## 2020-09-02 PROCEDURE — 82040 ASSAY OF SERUM ALBUMIN: CPT | Performed by: INTERNAL MEDICINE

## 2020-09-02 PROCEDURE — 80048 BASIC METABOLIC PNL TOTAL CA: CPT | Performed by: INTERNAL MEDICINE

## 2020-09-02 PROCEDURE — 83036 HEMOGLOBIN GLYCOSYLATED A1C: CPT | Performed by: INTERNAL MEDICINE

## 2020-09-02 PROCEDURE — 82043 UR ALBUMIN QUANTITATIVE: CPT | Performed by: INTERNAL MEDICINE

## 2020-09-02 PROCEDURE — 82565 ASSAY OF CREATININE: CPT | Performed by: INTERNAL MEDICINE

## 2020-09-02 PROCEDURE — 82310 ASSAY OF CALCIUM: CPT | Performed by: INTERNAL MEDICINE

## 2020-09-02 RX ORDER — ZOLEDRONIC ACID 0.04 MG/ML
4 INJECTION, SOLUTION INTRAVENOUS ONCE
Status: COMPLETED | OUTPATIENT
Start: 2020-09-02 | End: 2020-09-02

## 2020-09-02 RX ORDER — HEPARIN SODIUM (PORCINE) LOCK FLUSH IV SOLN 100 UNIT/ML 100 UNIT/ML
5 SOLUTION INTRAVENOUS
Status: DISCONTINUED | OUTPATIENT
Start: 2020-09-02 | End: 2020-09-02 | Stop reason: HOSPADM

## 2020-09-02 RX ADMIN — Medication 250 ML: at 09:36

## 2020-09-02 RX ADMIN — HEPARIN SODIUM (PORCINE) LOCK FLUSH IV SOLN 100 UNIT/ML 5 ML: 100 SOLUTION at 08:52

## 2020-09-02 RX ADMIN — ZOLEDRONIC ACID 4 MG: 0.04 INJECTION, SOLUTION INTRAVENOUS at 09:38

## 2020-09-02 RX ADMIN — HEPARIN SODIUM (PORCINE) LOCK FLUSH IV SOLN 100 UNIT/ML 5 ML: 100 SOLUTION at 09:57

## 2020-09-02 ASSESSMENT — PAIN SCALES - GENERAL: PAINLEVEL: NO PAIN (0)

## 2020-09-02 NOTE — PROGRESS NOTES
Infusion Nursing Note:  Mary Olson presents today for Zometa infusion.    Patient seen by provider today: No   present during visit today: Not Applicable.    Note:   Patient states she takes Viactiv Chew at night for Calcium.    Patient denies any dental issues.  States she is due for a dental cleaning as she has postponed due to going through chemo treatment during the Covid pandemic.    Reminded patient to drink extra fluid today/tomorrow to protect her kidneys.    States she is taking Potassium powder twice a day.  However, states she has not taken yet today due to today's appointment.  Will take Potassium when she gets home.    Intravenous Access:  Implanted Port.    Treatment Conditions:  Lab Results   Component Value Date     09/02/2020                   Lab Results   Component Value Date    POTASSIUM 3.1 09/02/2020           No results found for: MAG         Lab Results   Component Value Date    CR 0.70 09/02/2020    CR 0.69 09/02/2020                   Lab Results   Component Value Date    BUFFY 9.6 09/02/2020    BUFFY 9.6 09/02/2020                Lab Results   Component Value Date    BILITOTAL 0.5 08/12/2020           Lab Results   Component Value Date    ALBUMIN 3.2 09/02/2020                    Lab Results   Component Value Date    ALT 35 08/12/2020           Lab Results   Component Value Date    AST 23 08/12/2020       Results reviewed, labs MET treatment parameters, ok to proceed with treatment.    Post Infusion Assessment:  Patient tolerated infusion without incident.  Blood return noted pre and post infusion.  Site patent and intact, free from redness, edema or discomfort.  No evidence of extravasations.  Access discontinued per protocol.       Discharge Plan:   Patient will schedule next appointment before leaving today.  is driving.  Patient discharged in stable condition accompanied by: self.   driving.  Departure Mode: Wheelchair.    An Harding  RN

## 2020-09-02 NOTE — PROGRESS NOTES
Patient confirmed labs should be drawn for Dr. Ring today as well. Reported fasting since midnight last night. Patient borrowed a wheelchair from a friend because of her blurry vision which she reports is improving. Provided stand by assist with transferring to the lab chair and bathroom. Port needle left accessed for treatment. Tolerated port access and draw without complaint. Port site scrubbed with Chloraprep for 30 seconds and allowed to dry completely prior to dressing application. Accessed using sterile technique. [place] tubes drawn-Red gel/Green/Purple tubes. Double signed by patient and RN. See documentation flowsheet. Lexy Butterfield, RN, BSN, OCN

## 2020-09-03 LAB — HCV AB SERPL QL IA: NONREACTIVE

## 2020-09-09 ENCOUNTER — VIRTUAL VISIT (OUTPATIENT)
Dept: ONCOLOGY | Facility: CLINIC | Age: 65
End: 2020-09-09
Payer: COMMERCIAL

## 2020-09-09 ENCOUNTER — MYC MEDICAL ADVICE (OUTPATIENT)
Dept: FAMILY MEDICINE | Facility: CLINIC | Age: 65
End: 2020-09-09

## 2020-09-09 DIAGNOSIS — C79.51 BONE METASTASIS: ICD-10-CM

## 2020-09-09 DIAGNOSIS — C50.812 MALIGNANT NEOPLASM OF OVERLAPPING SITES OF LEFT BREAST IN FEMALE, ESTROGEN RECEPTOR POSITIVE (H): Primary | ICD-10-CM

## 2020-09-09 DIAGNOSIS — Z11.59 ENCOUNTER FOR SCREENING FOR OTHER VIRAL DISEASES: Primary | ICD-10-CM

## 2020-09-09 DIAGNOSIS — Z17.0 MALIGNANT NEOPLASM OF OVERLAPPING SITES OF LEFT BREAST IN FEMALE, ESTROGEN RECEPTOR POSITIVE (H): Primary | ICD-10-CM

## 2020-09-09 DIAGNOSIS — E87.6 HYPOKALEMIA: Primary | ICD-10-CM

## 2020-09-09 DIAGNOSIS — C50.919 METASTATIC BREAST CANCER: ICD-10-CM

## 2020-09-09 PROCEDURE — 99214 OFFICE O/P EST MOD 30 MIN: CPT | Mod: 95 | Performed by: INTERNAL MEDICINE

## 2020-09-09 RX ORDER — ANASTROZOLE 1 MG/1
1 TABLET ORAL DAILY
Qty: 30 TABLET | Refills: 1 | Status: SHIPPED | OUTPATIENT
Start: 2020-09-09 | End: 2020-11-11

## 2020-09-09 NOTE — NURSING NOTE
"Mary Olson is a 65 year old female who is being evaluated via a billable video visit.      The patient has been notified of following:     \"This video visit will be conducted via a call between you and your physician/provider. We have found that certain health care needs can be provided without the need for an in-person physical exam.  This service lets us provide the care you need with a video conversation.  If a prescription is necessary we can send it directly to your pharmacy.  If lab work is needed we can place an order for that and you can then stop by our lab to have the test done at a later time.    Video visits are billed at different rates depending on your insurance coverage.  Please reach out to your insurance provider with any questions.    If during the course of the call the physician/provider feels a video visit is not appropriate, you will not be charged for this service.\"    Patient has given verbal consent for Video visit? Yes  How would you like to obtain your AVS? MyChart  If you are dropped from the video visit, the video invite should be resent to: Text to cell phone: [phone]  Will anyone else be joining your video visit? No      Video-Visit Details    Type of service:  Video Visit    Originating Location (pt. Location): Home    Distant Location (provider location):  [facility]     Platform used for Video Visit: [employer]    Patient states that she had some weakness when she was on the steroid.  Her walking was slow and she was very tired.  She has numbness in her fingertips, toes and around her mouth.  She does get some swelling in her feet due to her BP medication.    Discuss how long she will be on the medication and next steps.    Sonia Vigil CMA        "

## 2020-09-09 NOTE — LETTER
"    9/9/2020         RE: Mary Olson  [address]  [place] [address]        Dear Colleague,    Thank you for referring your patient, Mary Olson, to the [facility]. Please see a copy of my visit note below.    Visit Date:   09/09/2020      ONCOLOGY DIAGNOSIS:  1. March 2020: Metastatic Breast Cancer     THERAPY TO DATE:  1. March 2020 to August 2020: Weekly Taxol.  2. August 2020 to Present: Anastrozole.     INTERVAL HISTORY:  Desmond is a 65-year-old female diagnosed with metastatic breast cancer in 03/2020 after noticing a lesion in her breast.  The patient presents via [employer] video visit for followup of her malignancy since starting the anastrozole.  On today's visit, the patient states that overall she is tolerating therapy without significant issues.  In fact, she is feeling much better since she has come off the steroids that were being used with the Taxol.  She has now been told that she is not a diabetic since coming off the steroids.  Her vision is also improving.  She is trying to be more active.  Does have some numbness in her fingertips and toes, but it does not affect her activities of daily living.  Denies any fevers, chills, nausea, vomiting, chest pain, shortness of breath, cough.  No new palpable masses.  Remaining comprehensive review of systems is negative.      PAST MEDICAL HISTORY:   1.  Metastatic breast cancer, see above.   2.  Hypertension.   3.  Steroid-induced diabetes.  According to the patient, since she has been off steroids, she was told that her blood sugars has normalized and she is off medication.      FAMILY HISTORY:  Mother had breast cancer at 62; underwent lumpectomy and radiation, then had \"pills.\"  No new family history since last seen.      SOCIAL HISTORY:   to her , Dieter.  On [employer] video visit by herself from her home.  No current tobacco use.  Retired.      ALLERGIES:  LISINOPRIL.      PHYSICAL EXAM:  GENERAL: " Comfortable, no acute distress.   HEAD: Atraumatic/Normocephalic.  EYES: Sclera non-icteric. Grossly normal.  RESPIRATORY: Normal breathing, non-labored. No audible wheezes/cough.  SKIN: No jaundice, discoloration or obvious lesions.  NERUO: No tremors visible. Normal speech.  PSYCH: Alert, oriented. Answered questions appropriately.    The rest of a comprehensive physical examination is deferred due to PHE (public health emergency) video visit restrictions.     LABORATORY DATA (09/02/2020):  Sodium 137, potassium 3.1, creatinine 0.69.      IMAGING:  PET scan from 08/10/2020 shows interval slight decrease in large left breast mass with skin invasion and multiple satellite nodules; however, this mass continues to be hypermetabolic consistent with viable tumor.  Interval slight decrease in smaller right breast mass and decrease in metabolic activity.  Subcentimeter hypermetabolic lesion of the transverse colon, which was seen on prior exam in retrospect.  Consider evaluation colonoscopy as this lesion may represent a primary GI malignancy versus metastatic disease.  Stable left axillary and retropectoral metastatic involvement.  Stable diffuse metastatic involvement of the bones.       CT chest, abdomen and pelvis from 06/19/2020 shows multiple left breast lesion, left axillary lymph nodes and some pectoral lymph nodes, some of the same size, others slightly smaller.  No new lesions.  The bone lesions appear slightly more sclerotic on today's exam, suggesting healing.    Bone scan from 06/19/2020 shows abnormal radiotracer uptake in the posterior left 8th rib and right occipital calvarium, new since PET from 03/2020.  Abnormal radiotracer uptake in the right occipital calvarium, which also appears new compared to PET.  Additional multifocal abnormal radiotracer uptake to the anterior right fourth rib, thoracolumbar spine, left sacral ala, right anterior iliac crest, left scapula and left femoral lesser trochanter,  corresponding to the lesions previously seen on PET scan.  Soft tissue uptake to the left breast corresponds with primary malignancy.      ASSESSMENT AND PLAN:   1.  Metastatic breast cancer.  PET scan shows positive response, though there still continues to be active disease.  Since the patient was having such difficulty with Taxol, we have switched her over to Arimidex, which she has tolerated. Return to clinic in 2 months to see MD with labs and PET scan.   2.  Nausea.  No further issues.   3.  Bony metastases.  Continue monthly Zometa, due .   4.  Diabetes.  Per patient, she was told this is steroid-induced and since she has been off the steroids, her blood sugar has improved.  I emphasized to the patient to have her blood sugars checked 1 week prior to her PET scan since this was a hold up in the past and being able to obtain proper imaging.  The patient assured me she would.   5.  Health care maintenance.  Discussed the importance of healthy lifestyle with diet and exercise.   6.  GI PET abnormality.  The patient was to have a colonoscopy, but she had held off, she wanted to do this after Labor Day.  I advised the patient to have this scheduled as soon as possible.   7.  Molecular diagnostics.  Caris testing sent.   8.  COVID-19 precautions. Discussed the importance of good hand washing techniques with soap and water for at least 20 seconds, avoid touching eyes, nose, mouth, avoiding crowds, social distancing.    Video-Visit Details    Type of service:  Video Visit    Video Start and End Time:  08:28 a.m. to 8:43 a.m.     Originating Location (pt. Location): Home    Distant Location (provider location):  [facility] [place]     Mode of Communication:  Video Conference via [employer] video visit.     BRY WHITFIELD MD             D: 2020   T: 2020   MT: OSWALD      Name:     ARTHUR JENKINS   MRN:      [id number]        Account:      [id number]   :      1955           Visit  Date:   09/09/2020      Document: [id number]       cc: [facility]        Again, thank you for allowing me to participate in the care of your patient.        Sincerely,        Keysha Birch MD

## 2020-09-09 NOTE — PROGRESS NOTES
"Visit Date:   09/09/2020      ONCOLOGY DIAGNOSIS:  1. March 2020: Metastatic Breast Cancer     THERAPY TO DATE:  1. March 2020 to August 2020: Weekly Taxol.  2. August 2020 to Present: Anastrozole.     INTERVAL HISTORY:  Desmond is a 65-year-old female diagnosed with metastatic breast cancer in 03/2020 after noticing a lesion in her breast.  The patient presents via [email or web address] video visit for followup of her malignancy since starting the anastrozole.  On today's visit, the patient states that overall she is tolerating therapy without significant issues.  In fact, she is feeling much better since she has come off the steroids that were being used with the Taxol.  She has now been told that she is not a diabetic since coming off the steroids.  Her vision is also improving.  She is trying to be more active.  Does have some numbness in her fingertips and toes, but it does not affect her activities of daily living.  Denies any fevers, chills, nausea, vomiting, chest pain, shortness of breath, cough.  No new palpable masses.  Remaining comprehensive review of systems is negative.      PAST MEDICAL HISTORY:   1.  Metastatic breast cancer, see above.   2.  Hypertension.   3.  Steroid-induced diabetes.  According to the patient, since she has been off steroids, she was told that her blood sugars has normalized and she is off medication.      FAMILY HISTORY:  Mother had breast cancer at 62; underwent lumpectomy and radiation, then had \"pills.\"  No new family history since last seen.      SOCIAL HISTORY:   to her , Dieter.  On [employer] video visit by herself from her home.  No current tobacco use.  Retired.      ALLERGIES:  LISINOPRIL.      PHYSICAL EXAM:  GENERAL: Comfortable, no acute distress.   HEAD: Atraumatic/Normocephalic.  EYES: Sclera non-icteric. Grossly normal.  RESPIRATORY: Normal breathing, non-labored. No audible wheezes/cough.  SKIN: No jaundice, discoloration or obvious lesions.  NERUO: No tremors " visible. Normal speech.  PSYCH: Alert, oriented. Answered questions appropriately.    The rest of a comprehensive physical examination is deferred due to PHE (public health emergency) video visit restrictions.     LABORATORY DATA (09/02/2020):  Sodium 137, potassium 3.1, creatinine 0.69.      IMAGING:  PET scan from 08/10/2020 shows interval slight decrease in large left breast mass with skin invasion and multiple satellite nodules; however, this mass continues to be hypermetabolic consistent with viable tumor.  Interval slight decrease in smaller right breast mass and decrease in metabolic activity.  Subcentimeter hypermetabolic lesion of the transverse colon, which was seen on prior exam in retrospect.  Consider evaluation colonoscopy as this lesion may represent a primary GI malignancy versus metastatic disease.  Stable left axillary and retropectoral metastatic involvement.  Stable diffuse metastatic involvement of the bones.       CT chest, abdomen and pelvis from 06/19/2020 shows multiple left breast lesion, left axillary lymph nodes and some pectoral lymph nodes, some of the same size, others slightly smaller.  No new lesions.  The bone lesions appear slightly more sclerotic on today's exam, suggesting healing.    Bone scan from 06/19/2020 shows abnormal radiotracer uptake in the posterior left 8th rib and right occipital calvarium, new since PET from 03/2020.  Abnormal radiotracer uptake in the right occipital calvarium, which also appears new compared to PET.  Additional multifocal abnormal radiotracer uptake to the anterior right fourth rib, thoracolumbar spine, left sacral ala, right anterior iliac crest, left scapula and left femoral lesser trochanter, corresponding to the lesions previously seen on PET scan.  Soft tissue uptake to the left breast corresponds with primary malignancy.      ASSESSMENT AND PLAN:   1.  Metastatic breast cancer.  PET scan shows positive response, though there still continues  to be active disease.  Since the patient was having such difficulty with Taxol, we have switched her over to Arimidex, which she has tolerated. Return to clinic in 2 months to see MD with labs and PET scan.   2.  Nausea.  No further issues.   3.  Bony metastases.  Continue monthly Zometa, due .   4.  Diabetes.  Per patient, she was told this is steroid-induced and since she has been off the steroids, her blood sugar has improved.  I emphasized to the patient to have her blood sugars checked 1 week prior to her PET scan since this was a hold up in the past and being able to obtain proper imaging.  The patient assured me she would.   5.  Health care maintenance.  Discussed the importance of healthy lifestyle with diet and exercise.   6.  GI PET abnormality.  The patient was to have a colonoscopy, but she had held off, she wanted to do this after Labor Day.  I advised the patient to have this scheduled as soon as possible.   7.  Molecular diagnostics.  Caris testing sent.   8.  COVID-19 precautions. Discussed the importance of good hand washing techniques with soap and water for at least 20 seconds, avoid touching eyes, nose, mouth, avoiding crowds, social distancing.    Video-Visit Details    Type of service:  Video Visit    Video Start and End Time:  08:28 a.m. to 8:43 a.m.     Originating Location (pt. Location): Home    Distant Location (provider location):  [facility]     Mode of Communication:  Video Conference via [employer] video visit.     BRY WHITFIELD MD             D: 2020   T: 2020   MT: OSWALD      Name:     ARTHUR JENKINS   MRN:      [id number]        Account:      [id number]   :      1955           Visit Date:   2020      Document: [id number]       cc: [facility]

## 2020-09-15 ENCOUNTER — ALLIED HEALTH/NURSE VISIT (OUTPATIENT)
Dept: PHARMACY | Facility: CLINIC | Age: 65
End: 2020-09-15

## 2020-09-15 ENCOUNTER — ANCILLARY PROCEDURE (OUTPATIENT)
Dept: BONE DENSITY | Facility: CLINIC | Age: 65
End: 2020-09-15
Attending: INTERNAL MEDICINE
Payer: COMMERCIAL

## 2020-09-15 DIAGNOSIS — C50.812 MALIGNANT NEOPLASM OF OVERLAPPING SITES OF LEFT BREAST IN FEMALE, ESTROGEN RECEPTOR POSITIVE (H): ICD-10-CM

## 2020-09-15 DIAGNOSIS — C79.51 BONE METASTASIS: ICD-10-CM

## 2020-09-15 DIAGNOSIS — Z79.4 TYPE 2 DIABETES MELLITUS WITH HYPERGLYCEMIA, WITH LONG-TERM CURRENT USE OF INSULIN (H): Primary | ICD-10-CM

## 2020-09-15 DIAGNOSIS — Z17.0 MALIGNANT NEOPLASM OF OVERLAPPING SITES OF LEFT BREAST IN FEMALE, ESTROGEN RECEPTOR POSITIVE (H): ICD-10-CM

## 2020-09-15 DIAGNOSIS — Z78.0 MENOPAUSE: ICD-10-CM

## 2020-09-15 DIAGNOSIS — E11.65 TYPE 2 DIABETES MELLITUS WITH HYPERGLYCEMIA, WITH LONG-TERM CURRENT USE OF INSULIN (H): Primary | ICD-10-CM

## 2020-09-15 PROCEDURE — 77080 DXA BONE DENSITY AXIAL: CPT | Performed by: RADIOLOGY

## 2020-09-15 PROCEDURE — 99606 MTMS BY PHARM EST 15 MIN: CPT | Performed by: PHARMACIST

## 2020-09-15 NOTE — PROGRESS NOTES
"MTM ENCOUNTER  SUBJECTIVE/OBJECTIVE:                           Mary Olson is a 65 year old female called for a follow-up visit. She was referred to me from NICOLE Gomes CNP.  Today's visit is a follow-up MTM visit from 9/1/2020.     Patient consented to a telehealth visit: yes  Telemedicine Visit Details  Type of service:  Telephone visit  Start Time: 1:04 PM  End Time: 1:20 PM  Originating Location (pt. Location): Home  Distant Location (provider location):  [facility] MTM  Mode of Communication:  Telephone    Chief Complaint: BG Check    Allergies/ADRs: Reviewed in EHR  Tobacco:  reports that she has never smoked. She has never used smokeless tobacco.   Alcohol: Less than 1 beverages / week  PMH: Reviewed in EHR    Medication Adherence/Access: no issues reported    Type 2 Diabetes: Pt currently taking no medications - Lantus stopped 2 weeks ago and Humalog stopped at 8/28 visit.   Of note, she is done with chemo and therefore pre-treatment dexamethasone.  SMBG Ranges (patient reported): Fasting readings 145, 153, 151, 163, 147, 147, 138, 143  Symptoms of low blood sugar? Physically feeling better - vision is improved. Discussed ongoing issues with feeling weak and difficultly walking with oncology and was described as \"after-effects\" from her taxol regimen. Denies any symptoms of lightheadedness or dizziness.   Symptoms of high blood sugar? None reported  Aspirin: Taking 81mg daily  Statin: No  ACEi/ARB: No. Urine albumin is   Lab Results   Component Value Date    UMALCR 158.45 (H) 09/02/2020       Lab Results   Component Value Date    A1C 6.3 09/02/2020    A1C 10.6 06/25/2020       Glucose   Date Value Ref Range Status   09/02/2020 129 (H) 70 - 99 mg/dL Final   08/12/2020 135 (H) 70 - 99 mg/dL Final   06/25/2020 180 (H) 70 - 99 mg/dL Final   03/04/2020 192 (H) 70 - 99 mg/dL Final   10/28/2015 103 (H) 70 - 99 mg/dL Final     BP Readings from Last 3 Encounters:   09/02/20 (!) " 164/96   08/05/20 (!) 158/95   07/29/20 (!) 155/87       Today's Vitals: There were no vitals taken for this visit. Phone visit.      ASSESSMENT:                              Medication Adherence: good, no issues identified    Type 2 Diabetes: Improved. A1c is at goal <7%.  Self monitoring of blood glucose is slightly higher than goal of fasting 80-130mg/dL, however will continue off of insulin at this time. She is working on healthy eating and exercise as tolerated. Can plan to recheck urine albumin in the future as well.    PLAN:                            1. Continue off insulin for now.  2. Check blood sugar 2-3x/week.  3. A1c and urine albumin in 3 months for continued monitoring. Future labs ordered.    I spent 16 minutes with this patient today. All changes were made via collaborative practice agreement with NICOLE Gomes CNP. A copy of the visit note was provided to the patient's primary care provider.    Will follow up if needed in 3 months after next A1c.    The patient declined a summary of these recommendations via [employer].     Fang Moy, Pharm.D., BCACP  Medication Therapy Management Pharmacist  [phone]

## 2020-09-30 ENCOUNTER — ONCOLOGY VISIT (OUTPATIENT)
Dept: ONCOLOGY | Facility: CLINIC | Age: 65
End: 2020-09-30
Payer: COMMERCIAL

## 2020-09-30 ENCOUNTER — INFUSION THERAPY VISIT (OUTPATIENT)
Dept: INFUSION THERAPY | Facility: CLINIC | Age: 65
End: 2020-09-30
Payer: COMMERCIAL

## 2020-09-30 VITALS
WEIGHT: 225 LBS | OXYGEN SATURATION: 98 % | RESPIRATION RATE: 16 BRPM | HEART RATE: 114 BPM | TEMPERATURE: 97.5 F | DIASTOLIC BLOOD PRESSURE: 100 MMHG | SYSTOLIC BLOOD PRESSURE: 179 MMHG | BODY MASS INDEX: 41.15 KG/M2

## 2020-09-30 DIAGNOSIS — Z17.0 MALIGNANT NEOPLASM OF OVERLAPPING SITES OF LEFT BREAST IN FEMALE, ESTROGEN RECEPTOR POSITIVE (H): ICD-10-CM

## 2020-09-30 DIAGNOSIS — C79.51 BONE METASTASIS: Primary | ICD-10-CM

## 2020-09-30 DIAGNOSIS — C50.812 MALIGNANT NEOPLASM OF OVERLAPPING SITES OF LEFT BREAST IN FEMALE, ESTROGEN RECEPTOR POSITIVE (H): ICD-10-CM

## 2020-09-30 DIAGNOSIS — C50.919 METASTATIC BREAST CANCER: ICD-10-CM

## 2020-09-30 LAB
ALBUMIN SERPL-MCNC: 3.4 G/DL (ref 3.4–5)
BASOPHILS # BLD AUTO: 0.1 10E9/L (ref 0–0.2)
BASOPHILS NFR BLD AUTO: 0.8 %
CALCIUM SERPL-MCNC: 9.6 MG/DL (ref 8.5–10.1)
CREAT SERPL-MCNC: 0.64 MG/DL (ref 0.52–1.04)
DIFFERENTIAL METHOD BLD: NORMAL
EOSINOPHIL # BLD AUTO: 0.2 10E9/L (ref 0–0.7)
EOSINOPHIL NFR BLD AUTO: 2.1 %
ERYTHROCYTE [DISTWIDTH] IN BLOOD BY AUTOMATED COUNT: 14.9 % (ref 10–15)
GFR SERPL CREATININE-BSD FRML MDRD: >90 ML/MIN/{1.73_M2}
HCT VFR BLD AUTO: 44.8 % (ref 35–47)
HGB BLD-MCNC: 14.6 G/DL (ref 11.7–15.7)
IMM GRANULOCYTES # BLD: 0 10E9/L (ref 0–0.4)
IMM GRANULOCYTES NFR BLD: 0.4 %
LYMPHOCYTES # BLD AUTO: 1.6 10E9/L (ref 0.8–5.3)
LYMPHOCYTES NFR BLD AUTO: 16.8 %
MCH RBC QN AUTO: 29.1 PG (ref 26.5–33)
MCHC RBC AUTO-ENTMCNC: 32.6 G/DL (ref 31.5–36.5)
MCV RBC AUTO: 89 FL (ref 78–100)
MONOCYTES # BLD AUTO: 0.5 10E9/L (ref 0–1.3)
MONOCYTES NFR BLD AUTO: 5.5 %
NEUTROPHILS # BLD AUTO: 6.9 10E9/L (ref 1.6–8.3)
NEUTROPHILS NFR BLD AUTO: 74.4 %
PLATELET # BLD AUTO: 344 10E9/L (ref 150–450)
RBC # BLD AUTO: 5.01 10E12/L (ref 3.8–5.2)
WBC # BLD AUTO: 9.2 10E9/L (ref 4–11)

## 2020-09-30 PROCEDURE — 99207 ZZC NO CHARGE LOS: CPT

## 2020-09-30 PROCEDURE — 82040 ASSAY OF SERUM ALBUMIN: CPT | Performed by: INTERNAL MEDICINE

## 2020-09-30 PROCEDURE — 96374 THER/PROPH/DIAG INJ IV PUSH: CPT | Performed by: INTERNAL MEDICINE

## 2020-09-30 PROCEDURE — 82310 ASSAY OF CALCIUM: CPT | Performed by: INTERNAL MEDICINE

## 2020-09-30 PROCEDURE — 85025 COMPLETE CBC W/AUTO DIFF WBC: CPT | Performed by: INTERNAL MEDICINE

## 2020-09-30 PROCEDURE — 99207 ZZC NO CHARGE NURSE ONLY: CPT

## 2020-09-30 PROCEDURE — 82565 ASSAY OF CREATININE: CPT | Performed by: INTERNAL MEDICINE

## 2020-09-30 RX ORDER — HEPARIN SODIUM (PORCINE) LOCK FLUSH IV SOLN 100 UNIT/ML 100 UNIT/ML
5 SOLUTION INTRAVENOUS
Status: DISCONTINUED | OUTPATIENT
Start: 2020-09-30 | End: 2020-09-30 | Stop reason: HOSPADM

## 2020-09-30 RX ORDER — ZOLEDRONIC ACID 0.04 MG/ML
4 INJECTION, SOLUTION INTRAVENOUS ONCE
Status: COMPLETED | OUTPATIENT
Start: 2020-09-30 | End: 2020-09-30

## 2020-09-30 RX ADMIN — HEPARIN SODIUM (PORCINE) LOCK FLUSH IV SOLN 100 UNIT/ML 5 ML: 100 SOLUTION at 08:23

## 2020-09-30 RX ADMIN — Medication 250 ML: at 09:15

## 2020-09-30 RX ADMIN — HEPARIN SODIUM (PORCINE) LOCK FLUSH IV SOLN 100 UNIT/ML 5 ML: 100 SOLUTION at 09:30

## 2020-09-30 RX ADMIN — ZOLEDRONIC ACID 4 MG: 0.04 INJECTION, SOLUTION INTRAVENOUS at 09:15

## 2020-09-30 ASSESSMENT — PAIN SCALES - GENERAL: PAINLEVEL: NO PAIN (0)

## 2020-09-30 NOTE — PROGRESS NOTES
Port needle left accessed for treatment. Tolerated port access and draw. Port site scrubbed with Chloraprep for 30 seconds and allowed to dry completely prior to Opsite sensitive skin dressing application. Accessed using sterile technique. [place] tubes drawn-Red gel/Green/Purple tubes. Double signed by patient and RN. See documentation flowsheet. Provided patient with a copy of her CBC results to bring to her dental cleaning. Lexy Butterfield RN, BSN, OCN

## 2020-09-30 NOTE — PROGRESS NOTES
Infusion Nursing Note:  Mary Olson presents today for Zometa.    Patient seen by provider today: No   present during visit today: Not Applicable.    Note: Pt had elevated BP. Pt reports she has white coat syndrome so it is always elevated when she comes here. She states she checks it at home and it is normal.    Intravenous Access:  Implanted Port.    Treatment Conditions:  Lab Results   Component Value Date    HGB 14.6 09/30/2020     Lab Results   Component Value Date    WBC 9.2 09/30/2020      Lab Results   Component Value Date    ANEU 6.9 09/30/2020     Lab Results   Component Value Date     09/30/2020      Lab Results   Component Value Date     09/02/2020                   Lab Results   Component Value Date    POTASSIUM 3.1 09/02/2020                Lab Results   Component Value Date    CR 0.64 09/30/2020                   Lab Results   Component Value Date    BUFFY 9.6 09/30/2020                Lab Results   Component Value Date    BILITOTAL 0.5 08/12/2020           Lab Results   Component Value Date    ALBUMIN 3.4 09/30/2020                    Lab Results   Component Value Date    ALT 35 08/12/2020           Lab Results   Component Value Date    AST 23 08/12/2020       Post Infusion Assessment:  Patient tolerated infusion without incident.       Discharge Plan:   Patient will return 10/28 for next appointment.   Patient discharged in stable condition accompanied by: self.  Departure Mode: Wheelchair.    Viviane Lawrence RN

## 2020-10-10 DIAGNOSIS — Z11.59 ENCOUNTER FOR SCREENING FOR OTHER VIRAL DISEASES: ICD-10-CM

## 2020-10-10 PROCEDURE — U0003 INFECTIOUS AGENT DETECTION BY NUCLEIC ACID (DNA OR RNA); SEVERE ACUTE RESPIRATORY SYNDROME CORONAVIRUS 2 (SARS-COV-2) (CORONAVIRUS DISEASE [COVID-19]), AMPLIFIED PROBE TECHNIQUE, MAKING USE OF HIGH THROUGHPUT TECHNOLOGIES AS DESCRIBED BY CMS-2020-01-R: HCPCS | Performed by: INTERNAL MEDICINE

## 2020-10-11 LAB
SARS-COV-2 RNA SPEC QL NAA+PROBE: NOT DETECTED
SPECIMEN SOURCE: NORMAL

## 2020-10-14 ENCOUNTER — HOSPITAL ENCOUNTER (OUTPATIENT)
Facility: AMBULATORY SURGERY CENTER | Age: 65
Discharge: HOME | End: 2020-10-14
Attending: INTERNAL MEDICINE | Admitting: INTERNAL MEDICINE
Payer: COMMERCIAL

## 2020-10-14 VITALS
OXYGEN SATURATION: 95 % | TEMPERATURE: 97.4 F | RESPIRATION RATE: 14 BRPM | DIASTOLIC BLOOD PRESSURE: 86 MMHG | HEART RATE: 101 BPM | SYSTOLIC BLOOD PRESSURE: 142 MMHG

## 2020-10-14 DIAGNOSIS — Z12.11 COLON CANCER SCREENING: ICD-10-CM

## 2020-10-14 DIAGNOSIS — C79.51 BONE METASTASIS: Primary | ICD-10-CM

## 2020-10-14 LAB — COLONOSCOPY: NORMAL

## 2020-10-14 PROCEDURE — 88305 TISSUE EXAM BY PATHOLOGIST: CPT | Mod: GC | Performed by: PATHOLOGY

## 2020-10-14 PROCEDURE — 45385 COLONOSCOPY W/LESION REMOVAL: CPT | Mod: 33

## 2020-10-14 RX ORDER — LIDOCAINE 40 MG/G
CREAM TOPICAL
Status: DISCONTINUED | OUTPATIENT
Start: 2020-10-14 | End: 2020-10-15 | Stop reason: HOSPADM

## 2020-10-14 RX ORDER — NALOXONE HYDROCHLORIDE 0.4 MG/ML
.1-.4 INJECTION, SOLUTION INTRAMUSCULAR; INTRAVENOUS; SUBCUTANEOUS
Status: DISCONTINUED | OUTPATIENT
Start: 2020-10-14 | End: 2020-10-15 | Stop reason: HOSPADM

## 2020-10-14 RX ORDER — FLUMAZENIL 0.1 MG/ML
0.2 INJECTION, SOLUTION INTRAVENOUS
Status: ACTIVE | OUTPATIENT
Start: 2020-10-14 | End: 2020-10-14

## 2020-10-14 RX ORDER — LIDOCAINE 40 MG/G
CREAM TOPICAL
Status: DISCONTINUED | OUTPATIENT
Start: 2020-10-14 | End: 2020-10-14 | Stop reason: HOSPADM

## 2020-10-14 RX ORDER — FENTANYL CITRATE 50 UG/ML
INJECTION, SOLUTION INTRAMUSCULAR; INTRAVENOUS PRN
Status: DISCONTINUED | OUTPATIENT
Start: 2020-10-14 | End: 2020-10-14 | Stop reason: HOSPADM

## 2020-10-14 RX ORDER — ONDANSETRON 2 MG/ML
4 INJECTION INTRAMUSCULAR; INTRAVENOUS
Status: DISCONTINUED | OUTPATIENT
Start: 2020-10-14 | End: 2020-10-14 | Stop reason: HOSPADM

## 2020-10-14 RX ORDER — ONDANSETRON 4 MG/1
4 TABLET, ORALLY DISINTEGRATING ORAL EVERY 6 HOURS PRN
Status: DISCONTINUED | OUTPATIENT
Start: 2020-10-14 | End: 2020-10-15 | Stop reason: HOSPADM

## 2020-10-14 RX ORDER — HEPARIN SODIUM (PORCINE) LOCK FLUSH IV SOLN 100 UNIT/ML 100 UNIT/ML
5 SOLUTION INTRAVENOUS
Status: DISCONTINUED | OUTPATIENT
Start: 2020-10-14 | End: 2020-10-15 | Stop reason: HOSPADM

## 2020-10-14 RX ORDER — PROCHLORPERAZINE MALEATE 10 MG
10 TABLET ORAL EVERY 6 HOURS PRN
Status: DISCONTINUED | OUTPATIENT
Start: 2020-10-14 | End: 2020-10-15 | Stop reason: HOSPADM

## 2020-10-14 RX ORDER — ONDANSETRON 2 MG/ML
4 INJECTION INTRAMUSCULAR; INTRAVENOUS EVERY 6 HOURS PRN
Status: DISCONTINUED | OUTPATIENT
Start: 2020-10-14 | End: 2020-10-15 | Stop reason: HOSPADM

## 2020-10-14 RX ADMIN — HEPARIN SODIUM (PORCINE) LOCK FLUSH IV SOLN 100 UNIT/ML 5 ML: 100 SOLUTION at 09:06

## 2020-10-14 NOTE — DISCHARGE INSTRUCTIONS
Discharge Instructions after Colonoscopy  or Sigmoidoscopy    Today you had a _x___ Colonoscopy ____ Sigmoidoscopy    Activity and Diet  You were given medicine for pain. You may be dizzy or sleepy.  For 24 hours:    Do not drive or use heavy equipment.    Do not make important decisions.    Do not drink any alcohol.  You may return to your normal diet and medicines.    Discomfort    Air was placed in your colon during the exam in order to see it. Walking helps to pass the air.    You may take Tylenol (acetaminophen) for pain unless your doctor has told you not to.  Do not take aspirin or ibuprofen (Advil, Motrin, or other anti-inflammatory  drugs) for _____ days.    Follow-up  ____ We took small tissue samples or polyps to study. Your doctor will call you with the results  within two weeks.    When to call:    Call right away if you have:    Unusual pain in belly or chest pain not relieved with passing air.    More than 1 to 2 Tablespoons of bleeding from your rectum.    Fever above 100.6  F (37.5  C).    If you have severe pain, bleeding, or shortness of breath, go to an emergency room.    If you have questions, call:  Monday to Friday, 7 a.m. to 4:30 p.m.  Endoscopy: [phone] (We may have to call you back)    After hours  Hospital: [phone] (Ask for the GI fellow on call)

## 2020-10-15 LAB — COPATH REPORT: NORMAL

## 2020-10-27 DIAGNOSIS — C79.51 BONE METASTASIS: Primary | ICD-10-CM

## 2020-10-27 DIAGNOSIS — Z17.0 MALIGNANT NEOPLASM OF OVERLAPPING SITES OF LEFT BREAST IN FEMALE, ESTROGEN RECEPTOR POSITIVE (H): ICD-10-CM

## 2020-10-27 DIAGNOSIS — C50.812 MALIGNANT NEOPLASM OF OVERLAPPING SITES OF LEFT BREAST IN FEMALE, ESTROGEN RECEPTOR POSITIVE (H): ICD-10-CM

## 2020-10-27 RX ORDER — HEPARIN SODIUM,PORCINE 10 UNIT/ML
5 VIAL (ML) INTRAVENOUS
Status: CANCELLED | OUTPATIENT
Start: 2020-10-28

## 2020-10-27 RX ORDER — ZOLEDRONIC ACID 0.04 MG/ML
4 INJECTION, SOLUTION INTRAVENOUS ONCE
Status: CANCELLED | OUTPATIENT
Start: 2020-10-28 | End: 2020-10-28

## 2020-10-27 RX ORDER — HEPARIN SODIUM (PORCINE) LOCK FLUSH IV SOLN 100 UNIT/ML 100 UNIT/ML
5 SOLUTION INTRAVENOUS
Status: CANCELLED | OUTPATIENT
Start: 2020-10-28

## 2020-10-28 ENCOUNTER — INFUSION THERAPY VISIT (OUTPATIENT)
Dept: INFUSION THERAPY | Facility: CLINIC | Age: 65
End: 2020-10-28
Payer: COMMERCIAL

## 2020-10-28 ENCOUNTER — ONCOLOGY VISIT (OUTPATIENT)
Dept: ONCOLOGY | Facility: CLINIC | Age: 65
End: 2020-10-28
Payer: COMMERCIAL

## 2020-10-28 VITALS
HEART RATE: 114 BPM | TEMPERATURE: 98.4 F | DIASTOLIC BLOOD PRESSURE: 84 MMHG | SYSTOLIC BLOOD PRESSURE: 187 MMHG | OXYGEN SATURATION: 96 % | RESPIRATION RATE: 16 BRPM

## 2020-10-28 DIAGNOSIS — C50.812 MALIGNANT NEOPLASM OF OVERLAPPING SITES OF LEFT BREAST IN FEMALE, ESTROGEN RECEPTOR POSITIVE (H): ICD-10-CM

## 2020-10-28 DIAGNOSIS — Z17.0 MALIGNANT NEOPLASM OF OVERLAPPING SITES OF LEFT BREAST IN FEMALE, ESTROGEN RECEPTOR POSITIVE (H): ICD-10-CM

## 2020-10-28 DIAGNOSIS — C79.51 BONE METASTASIS: Primary | ICD-10-CM

## 2020-10-28 LAB
ALBUMIN SERPL-MCNC: 3.4 G/DL (ref 3.4–5)
CALCIUM SERPL-MCNC: 9.5 MG/DL (ref 8.5–10.1)
CREAT SERPL-MCNC: 0.66 MG/DL (ref 0.52–1.04)
GFR SERPL CREATININE-BSD FRML MDRD: >90 ML/MIN/{1.73_M2}

## 2020-10-28 PROCEDURE — 99207 PR NO CHARGE LOS: CPT

## 2020-10-28 PROCEDURE — 82310 ASSAY OF CALCIUM: CPT | Performed by: NURSE PRACTITIONER

## 2020-10-28 PROCEDURE — 82565 ASSAY OF CREATININE: CPT | Performed by: NURSE PRACTITIONER

## 2020-10-28 PROCEDURE — 82040 ASSAY OF SERUM ALBUMIN: CPT | Performed by: NURSE PRACTITIONER

## 2020-10-28 PROCEDURE — 96365 THER/PROPH/DIAG IV INF INIT: CPT | Performed by: NURSE PRACTITIONER

## 2020-10-28 RX ORDER — ZOLEDRONIC ACID 0.04 MG/ML
4 INJECTION, SOLUTION INTRAVENOUS ONCE
Status: COMPLETED | OUTPATIENT
Start: 2020-10-28 | End: 2020-10-28

## 2020-10-28 RX ORDER — HEPARIN SODIUM (PORCINE) LOCK FLUSH IV SOLN 100 UNIT/ML 100 UNIT/ML
5 SOLUTION INTRAVENOUS
Status: DISCONTINUED | OUTPATIENT
Start: 2020-10-28 | End: 2020-10-28 | Stop reason: HOSPADM

## 2020-10-28 RX ORDER — HEPARIN SODIUM (PORCINE) LOCK FLUSH IV SOLN 100 UNIT/ML 100 UNIT/ML
5 SOLUTION INTRAVENOUS
Status: DISCONTINUED | OUTPATIENT
Start: 2020-10-28 | End: 2020-11-05 | Stop reason: HOSPADM

## 2020-10-28 RX ADMIN — ZOLEDRONIC ACID 4 MG: 0.04 INJECTION, SOLUTION INTRAVENOUS at 10:30

## 2020-10-28 RX ADMIN — HEPARIN SODIUM (PORCINE) LOCK FLUSH IV SOLN 100 UNIT/ML 5 ML: 100 SOLUTION at 10:48

## 2020-10-28 RX ADMIN — Medication 250 ML: at 10:30

## 2020-10-28 RX ADMIN — HEPARIN SODIUM (PORCINE) LOCK FLUSH IV SOLN 100 UNIT/ML 5 ML: 100 SOLUTION at 09:56

## 2020-10-28 ASSESSMENT — PAIN SCALES - GENERAL: PAINLEVEL: NO PAIN (0)

## 2020-10-28 NOTE — PROGRESS NOTES
Infusion Nursing Note:  Mary Olson presents today for Zometa.    Patient seen by provider today: No   present during visit today: Not Applicable.    Intravenous Access:  Implanted Port.    Treatment Conditions:  Lab Results   Component Value Date     09/02/2020                   Lab Results   Component Value Date    POTASSIUM 3.1 09/02/2020           No results found for: MAG         Lab Results   Component Value Date    CR 0.66 10/28/2020                   Lab Results   Component Value Date    BUFFY 9.5 10/28/2020                Lab Results   Component Value Date    BILITOTAL 0.5 08/12/2020           Lab Results   Component Value Date    ALBUMIN 3.4 10/28/2020                    Lab Results   Component Value Date    ALT 35 08/12/2020           Lab Results   Component Value Date    AST 23 08/12/2020         Post Infusion Assessment:  Patient tolerated infusion without incident.  Site patent and intact, free from redness, edema or discomfort.  No evidence of extravasations.  Access discontinued per protocol.       Discharge Plan:   Patient will return 11/25 for next appointment.   Patient discharged in stable condition accompanied by: self.  Departure Mode: Wheelchair.    Viviane Lawrence RN

## 2020-11-06 ENCOUNTER — ANCILLARY PROCEDURE (OUTPATIENT)
Dept: PET IMAGING | Facility: CLINIC | Age: 65
End: 2020-11-06
Attending: INTERNAL MEDICINE
Payer: COMMERCIAL

## 2020-11-06 DIAGNOSIS — C50.812 MALIGNANT NEOPLASM OF OVERLAPPING SITES OF LEFT BREAST IN FEMALE, ESTROGEN RECEPTOR POSITIVE (H): ICD-10-CM

## 2020-11-06 DIAGNOSIS — C50.919 METASTATIC BREAST CANCER: ICD-10-CM

## 2020-11-06 DIAGNOSIS — Z17.0 MALIGNANT NEOPLASM OF OVERLAPPING SITES OF LEFT BREAST IN FEMALE, ESTROGEN RECEPTOR POSITIVE (H): ICD-10-CM

## 2020-11-06 DIAGNOSIS — C79.51 BONE METASTASIS: ICD-10-CM

## 2020-11-06 PROCEDURE — 71260 CT THORAX DX C+: CPT | Mod: GC

## 2020-11-06 PROCEDURE — 74177 CT ABD & PELVIS W/CONTRAST: CPT | Mod: GC

## 2020-11-06 PROCEDURE — 78816 PET IMAGE W/CT FULL BODY: CPT | Mod: PS | Performed by: RADIOLOGY

## 2020-11-06 PROCEDURE — A9552 F18 FDG: HCPCS | Performed by: INTERNAL MEDICINE

## 2020-11-06 RX ORDER — HEPARIN SODIUM (PORCINE) LOCK FLUSH IV SOLN 100 UNIT/ML 100 UNIT/ML
500 SOLUTION INTRAVENOUS EVERY 8 HOURS
Status: SHIPPED | OUTPATIENT
Start: 2020-11-06

## 2020-11-06 RX ORDER — IOPAMIDOL 755 MG/ML
50-135 INJECTION, SOLUTION INTRAVASCULAR ONCE
Status: COMPLETED | OUTPATIENT
Start: 2020-11-06 | End: 2020-11-06

## 2020-11-06 RX ADMIN — HEPARIN SODIUM (PORCINE) LOCK FLUSH IV SOLN 100 UNIT/ML 500 UNITS: 100 SOLUTION at 10:17

## 2020-11-06 RX ADMIN — IOPAMIDOL 135 ML: 755 INJECTION, SOLUTION INTRAVASCULAR at 10:17

## 2020-11-11 ENCOUNTER — VIRTUAL VISIT (OUTPATIENT)
Dept: ONCOLOGY | Facility: CLINIC | Age: 65
End: 2020-11-11
Attending: INTERNAL MEDICINE
Payer: COMMERCIAL

## 2020-11-11 DIAGNOSIS — Z17.0 MALIGNANT NEOPLASM OF OVERLAPPING SITES OF LEFT BREAST IN FEMALE, ESTROGEN RECEPTOR POSITIVE (H): ICD-10-CM

## 2020-11-11 DIAGNOSIS — C50.812 MALIGNANT NEOPLASM OF OVERLAPPING SITES OF LEFT BREAST IN FEMALE, ESTROGEN RECEPTOR POSITIVE (H): ICD-10-CM

## 2020-11-11 DIAGNOSIS — C50.919 METASTATIC BREAST CANCER: ICD-10-CM

## 2020-11-11 DIAGNOSIS — C79.51 BONE METASTASIS: Primary | ICD-10-CM

## 2020-11-11 PROCEDURE — 99214 OFFICE O/P EST MOD 30 MIN: CPT | Mod: 95 | Performed by: INTERNAL MEDICINE

## 2020-11-11 RX ORDER — HEPARIN SODIUM,PORCINE 10 UNIT/ML
5 VIAL (ML) INTRAVENOUS
Status: CANCELLED | OUTPATIENT
Start: 2020-11-25

## 2020-11-11 RX ORDER — ZOLEDRONIC ACID 0.04 MG/ML
4 INJECTION, SOLUTION INTRAVENOUS ONCE
Status: CANCELLED | OUTPATIENT
Start: 2020-11-25 | End: 2020-11-25

## 2020-11-11 RX ORDER — HEPARIN SODIUM (PORCINE) LOCK FLUSH IV SOLN 100 UNIT/ML 100 UNIT/ML
5 SOLUTION INTRAVENOUS
Status: CANCELLED | OUTPATIENT
Start: 2020-11-25

## 2020-11-11 RX ORDER — ANASTROZOLE 1 MG/1
1 TABLET ORAL DAILY
Qty: 90 TABLET | Refills: 1 | Status: SHIPPED | OUTPATIENT
Start: 2020-11-11 | End: 2021-01-14

## 2020-11-11 ASSESSMENT — PAIN SCALES - GENERAL: PAINLEVEL: NO PAIN (0)

## 2020-11-11 NOTE — PROGRESS NOTES
"Visit Date:   11/11/2020      ONCOLOGY DIAGNOSIS:  1. March 2020: Metastatic Breast Cancer     THERAPY TO DATE:  1. March 2020 to August 2020: Weekly Taxol.  2. August 2020 to Present: Anastrozole.     INTERVAL HISTORY:  The patient is a 65-year-old female diagnosed with metastatic breast cancer in 03/2020 after noticing a lesion on her breast.  The patient presents via [employer] video visit for followup of her malignancy, toxicity evaluation and review of her most recent scans.  On today's visit, she states that overall she is tolerating the current therapy.  Does have some neuropathy in her hands and feet, but it seems to be getting better.  She has lost some weight, but is okay with it.  Her eyelashes and hair are coming back.  Brows have not yet come back.  Vision is better.  Denies any fevers, chills, nausea, vomiting, chest pain, shortness of breath, cough.  No new palpable masses.  Remaining comprehensive review of systems is negative.      PAST MEDICAL HISTORY: (No new medical history since last visit per patient.)  1.  Metastatic breast cancer, see above.   2.  Hypertension.   3.  Steroid-induced diabetes.  According to the patient, since she has been off steroids, she was told that her blood sugars has normalized and she is off medication.      FAMILY HISTORY:  Mother had breast cancer at 62; underwent lumpectomy and radiation, then had \"pills.\"  No new family history since last seen.     SOCIAL HISTORY:   to her , Dieter.  On [employer] video visit by herself from home.  No current tobacco use.  Retired.  She will be spending Thanksgiving with her , Dieter, 2 children and someone's boyfriend.  Typically, she has 30, but now is down to 5 and there will be social distancing.  She is also in the middle of a kitchen remodel.      ALLERGIES:  LISINOPRIL.      PHYSICAL EXAM:  GENERAL: Comfortable, no acute distress.   HEAD: Atraumatic/Normocephalic.  EYES: Sclera non-icteric. Grossly " normal.  RESPIRATORY: Normal breathing, non-labored. No audible wheezes/cough.  SKIN: No jaundice, discoloration or obvious lesions.  NERUO: No tremors visible. Normal speech.  PSYCH: Alert, oriented. Answered questions appropriately.    The rest of a comprehensive physical examination is deferred due to PHE (public health emergency) video visit restrictions.     LABORATORY DATA:  Creatinine 0.66.      PET scan (11/06/2020):  Response of the 5.7 x 4.4 cm hypermetabolic left breast mass and multiple left axillary and subpectoral FDG avid metastatic lymphadenopathy.  In comparison with 08/2020, the size and hypermetabolism of the primary left breast and left axillary lymphadenopathy have decreased in size.  Resolution of hypermetabolic activity within the right breast.  Extensive metastatic bone disease, some of which demonstrate associated FDG metabolism.  Compared to 08/2020, the intensity of the hypermetabolism (particularly of the thoracic and lumbar spine) has decreased.      PET scan from 08/10/2020 shows interval slight decrease in large left breast mass with skin invasion and multiple satellite nodules; however, this mass continues to be hypermetabolic consistent with viable tumor.  Interval slight decrease in smaller right breast mass and decrease in metabolic activity.  Subcentimeter hypermetabolic lesion of the transverse colon, which was seen on prior exam in retrospect.  Consider evaluation colonoscopy as this lesion may represent a primary GI malignancy versus metastatic disease.  Stable left axillary and retropectoral metastatic involvement.  Stable diffuse metastatic involvement of the bones.       CT chest, abdomen and pelvis from 06/19/2020 shows multiple left breast lesion, left axillary lymph nodes and some pectoral lymph nodes, some of the same size, others slightly smaller.  No new lesions.  The bone lesions appear slightly more sclerotic on today's exam, suggesting healing.    Bone scan from  06/19/2020 shows abnormal radiotracer uptake in the posterior left 8th rib and right occipital calvarium, new since PET from 03/2020.  Abnormal radiotracer uptake in the right occipital calvarium, which also appears new compared to PET.  Additional multifocal abnormal radiotracer uptake to the anterior right fourth rib, thoracolumbar spine, left sacral ala, right anterior iliac crest, left scapula and left femoral lesser trochanter, corresponding to the lesions previously seen on PET scan.  Soft tissue uptake to the left breast corresponds with primary malignancy.     ASSESSMENT AND PLAN:   1.  Metastatic breast cancer.  The patient is tolerating Arimidex.  PET scan shows positive response.  We will continue with single-agent Arimidex.  Return to clinic in 3 months to see MD with labs and PET scan.  I would like her to also have another CBC with diff, CMP and CA 27-29 when she comes in in a few weeks for her Zometa.   2.  Nausea.  No issues.   3.  Bony metastases.  Currently on Zometa, due in 2 weeks.   4.  Diabetes.  Per patient, she was told this is steroid induced and since being off steroids, her blood sugar has improved. With the upcoming PET scan, she should ensure that her blood sugar is under good control.   5.  GI abnormality.  The patient did undergo colonoscopy.  One polyp was found, which was tubular adenoma.  Repeat colonoscopy in 5 years, which is 2025.   6.  Molecular diagnostics.  Caris testing sent.   7.  COVID-19 precautions.  I advised patient to follow state and federal guidelines, especially with upcoming holidays.     Video-Visit Details    Type of service:  Video Visit    Video Start and End Time:  8:43 a.m. to 9 a.m.     Originating Location (pt. Location): Home    Distant Location (provider location):  [facility]     Mode of Communication:  Video Conference via  [employer] video visit.     BRY WHITFIELD MD             D: 11/11/2020   T: 11/11/2020   MT: [id number]      Name:      ARTHUR JENKINS   MRN:      [id number]        Account:      [id number]   :      1955           Visit Date:   2020      Document: [id number]       cc: [facility]

## 2020-11-11 NOTE — LETTER
"    11/11/2020         RE: Mary Olson  [address]  [place] [address]        Dear Colleague,    Thank you for referring your patient, Mary Olson, to the [facility]. Please see a copy of my visit note below.    Visit Date:   11/11/2020      ONCOLOGY DIAGNOSIS:  1. March 2020: Metastatic Breast Cancer     THERAPY TO DATE:  1. March 2020 to August 2020: Weekly Taxol.  2. August 2020 to Present: Anastrozole.     INTERVAL HISTORY:  The patient is a 65-year-old female diagnosed with metastatic breast cancer in 03/2020 after noticing a lesion on her breast.  The patient presents via [employer] video visit for followup of her malignancy, toxicity evaluation and review of her most recent scans.  On today's visit, she states that overall she is tolerating the current therapy.  Does have some neuropathy in her hands and feet, but it seems to be getting better.  She has lost some weight, but is okay with it.  Her eyelashes and hair are coming back.  Brows have not yet come back.  Vision is better.  Denies any fevers, chills, nausea, vomiting, chest pain, shortness of breath, cough.  No new palpable masses.  Remaining comprehensive review of systems is negative.      PAST MEDICAL HISTORY: (No new medical history since last visit per patient.)  1.  Metastatic breast cancer, see above.   2.  Hypertension.   3.  Steroid-induced diabetes.  According to the patient, since she has been off steroids, she was told that her blood sugars has normalized and she is off medication.      FAMILY HISTORY:  Mother had breast cancer at 62; underwent lumpectomy and radiation, then had \"pills.\"  No new family history since last seen.     SOCIAL HISTORY:   to her , Dieter.  On [employer] video visit by herself from home.  No current tobacco use.  Retired.  She will be spending Thanksgiving with her , Dieter, 2 children and someone's boyfriend.  Typically, she has 30, but now is down " to 5 and there will be social distancing.  She is also in the middle of a kitchen remodel.      ALLERGIES:  LISINOPRIL.      PHYSICAL EXAM:  GENERAL: Comfortable, no acute distress.   HEAD: Atraumatic/Normocephalic.  EYES: Sclera non-icteric. Grossly normal.  RESPIRATORY: Normal breathing, non-labored. No audible wheezes/cough.  SKIN: No jaundice, discoloration or obvious lesions.  NERUO: No tremors visible. Normal speech.  PSYCH: Alert, oriented. Answered questions appropriately.    The rest of a comprehensive physical examination is deferred due to PHE (public health emergency) video visit restrictions.     LABORATORY DATA:  Creatinine 0.66.      PET scan (11/06/2020):  Response of the 5.7 x 4.4 cm hypermetabolic left breast mass and multiple left axillary and subpectoral FDG avid metastatic lymphadenopathy.  In comparison with 08/2020, the size and hypermetabolism of the primary left breast and left axillary lymphadenopathy have decreased in size.  Resolution of hypermetabolic activity within the right breast.  Extensive metastatic bone disease, some of which demonstrate associated FDG metabolism.  Compared to 08/2020, the intensity of the hypermetabolism (particularly of the thoracic and lumbar spine) has decreased.      PET scan from 08/10/2020 shows interval slight decrease in large left breast mass with skin invasion and multiple satellite nodules; however, this mass continues to be hypermetabolic consistent with viable tumor.  Interval slight decrease in smaller right breast mass and decrease in metabolic activity.  Subcentimeter hypermetabolic lesion of the transverse colon, which was seen on prior exam in retrospect.  Consider evaluation colonoscopy as this lesion may represent a primary GI malignancy versus metastatic disease.  Stable left axillary and retropectoral metastatic involvement.  Stable diffuse metastatic involvement of the bones.       CT chest, abdomen and pelvis from 06/19/2020 shows  multiple left breast lesion, left axillary lymph nodes and some pectoral lymph nodes, some of the same size, others slightly smaller.  No new lesions.  The bone lesions appear slightly more sclerotic on today's exam, suggesting healing.    Bone scan from 06/19/2020 shows abnormal radiotracer uptake in the posterior left 8th rib and right occipital calvarium, new since PET from 03/2020.  Abnormal radiotracer uptake in the right occipital calvarium, which also appears new compared to PET.  Additional multifocal abnormal radiotracer uptake to the anterior right fourth rib, thoracolumbar spine, left sacral ala, right anterior iliac crest, left scapula and left femoral lesser trochanter, corresponding to the lesions previously seen on PET scan.  Soft tissue uptake to the left breast corresponds with primary malignancy.     ASSESSMENT AND PLAN:   1.  Metastatic breast cancer.  The patient is tolerating Arimidex.  PET scan shows positive response.  We will continue with single-agent Arimidex.  Return to clinic in 3 months to see MD with labs and PET scan.  I would like her to also have another CBC with diff, CMP and CA 27-29 when she comes in in a few weeks for her Zometa.   2.  Nausea.  No issues.   3.  Bony metastases.  Currently on Zometa, due in 2 weeks.   4.  Diabetes.  Per patient, she was told this is steroid induced and since being off steroids, her blood sugar has improved. With the upcoming PET scan, she should ensure that her blood sugar is under good control.   5.  GI abnormality.  The patient did undergo colonoscopy.  One polyp was found, which was tubular adenoma.  Repeat colonoscopy in 5 years, which is 2025.   6.  Molecular diagnostics.  Caris testing sent.   7.  COVID-19 precautions.  I advised patient to follow state and federal guidelines, especially with upcoming holidays.     Video-Visit Details    Type of service:  Video Visit    Video Start and End Time:  8:43 a.m. to 9 a.m.     Originating Location  (pt. Location): Home    Distant Location (provider location):  [facility]     Mode of Communication:  Video Conference via  Doximity video visit.     BRY BIRCH MD             D: 2020   T: 2020   MT: OSWALD      Name:     ARTHUR JENKINS   MRN:      [id number]        Account:      [id number]   :      1955           Visit Date:   2020      Document: [id number]       cc: [facility]          Again, thank you for allowing me to participate in the care of your patient.        Sincerely,        Bry Birch MD

## 2020-11-11 NOTE — NURSING NOTE
"Mary Olson is a 65 year old female who is being evaluated via a billable video visit.      The patient has been notified of following:     \"This video visit will be conducted via a call between you and your physician/provider. We have found that certain health care needs can be provided without the need for an in-person physical exam.  This service lets us provide the care you need with a video conversation.  If a prescription is necessary we can send it directly to your pharmacy.  If lab work is needed we can place an order for that and you can then stop by our lab to have the test done at a later time.    Video visits are billed at different rates depending on your insurance coverage.  Please reach out to your insurance provider with any questions.    If during the course of the call the physician/provider feels a video visit is not appropriate, you will not be charged for this service.\"    Patient has given verbal consent for Video visit? Yes  How would you like to obtain your AVS? MyChart  If you are dropped from the video visit, the video invite should be resent to: Text to cell phone: [phone]  Will anyone else be joining your video visit? No      Video-Visit Details    Type of service:  Video Visit    Originating Location (pt. Location): Home    Distant Location (provider location):  [facility]     Platform used for Video Visit: Marquez     Patient states she has had some weight loss but she is ok with it.  She has lost 7 pounds since 9/30/20.  She has neuropathy in her hands but she states it is not to bad.She gets swelling her feet but she believes it is from medication.    Sonia Vigil, CMA        "

## 2020-11-25 ENCOUNTER — ONCOLOGY VISIT (OUTPATIENT)
Dept: ONCOLOGY | Facility: CLINIC | Age: 65
End: 2020-11-25
Payer: COMMERCIAL

## 2020-11-25 ENCOUNTER — INFUSION THERAPY VISIT (OUTPATIENT)
Dept: INFUSION THERAPY | Facility: CLINIC | Age: 65
End: 2020-11-25
Payer: COMMERCIAL

## 2020-11-25 VITALS
WEIGHT: 223.4 LBS | TEMPERATURE: 98.1 F | DIASTOLIC BLOOD PRESSURE: 95 MMHG | RESPIRATION RATE: 14 BRPM | SYSTOLIC BLOOD PRESSURE: 173 MMHG | OXYGEN SATURATION: 97 % | HEART RATE: 111 BPM | BODY MASS INDEX: 40.86 KG/M2

## 2020-11-25 DIAGNOSIS — C50.812 MALIGNANT NEOPLASM OF OVERLAPPING SITES OF LEFT BREAST IN FEMALE, ESTROGEN RECEPTOR POSITIVE (H): ICD-10-CM

## 2020-11-25 DIAGNOSIS — Z17.0 MALIGNANT NEOPLASM OF OVERLAPPING SITES OF LEFT BREAST IN FEMALE, ESTROGEN RECEPTOR POSITIVE (H): ICD-10-CM

## 2020-11-25 DIAGNOSIS — C79.51 BONE METASTASIS: Primary | ICD-10-CM

## 2020-11-25 DIAGNOSIS — C50.919 METASTATIC BREAST CANCER: ICD-10-CM

## 2020-11-25 LAB
ALBUMIN SERPL-MCNC: 3.6 G/DL (ref 3.4–5)
ALBUMIN SERPL-MCNC: 3.6 G/DL (ref 3.4–5)
ALP SERPL-CCNC: 88 U/L (ref 40–150)
ALT SERPL W P-5'-P-CCNC: 23 U/L (ref 0–50)
AST SERPL W P-5'-P-CCNC: 17 U/L (ref 0–45)
BASOPHILS # BLD AUTO: 0.1 10E9/L (ref 0–0.2)
BASOPHILS NFR BLD AUTO: 1 %
BILIRUB DIRECT SERPL-MCNC: <0.1 MG/DL (ref 0–0.2)
BILIRUB SERPL-MCNC: 0.3 MG/DL (ref 0.2–1.3)
CALCIUM SERPL-MCNC: 9.8 MG/DL (ref 8.5–10.1)
CANCER AG27-29 SERPL-ACNC: 166 U/ML (ref 0–39)
CREAT SERPL-MCNC: 0.58 MG/DL (ref 0.52–1.04)
DIFFERENTIAL METHOD BLD: ABNORMAL
EOSINOPHIL # BLD AUTO: 0.2 10E9/L (ref 0–0.7)
EOSINOPHIL NFR BLD AUTO: 2.1 %
ERYTHROCYTE [DISTWIDTH] IN BLOOD BY AUTOMATED COUNT: 13.7 % (ref 10–15)
GFR SERPL CREATININE-BSD FRML MDRD: >90 ML/MIN/{1.73_M2}
HCT VFR BLD AUTO: 48.1 % (ref 35–47)
HGB BLD-MCNC: 15.7 G/DL (ref 11.7–15.7)
IMM GRANULOCYTES # BLD: 0 10E9/L (ref 0–0.4)
IMM GRANULOCYTES NFR BLD: 0.2 %
LYMPHOCYTES # BLD AUTO: 2.2 10E9/L (ref 0.8–5.3)
LYMPHOCYTES NFR BLD AUTO: 24 %
MCH RBC QN AUTO: 27.1 PG (ref 26.5–33)
MCHC RBC AUTO-ENTMCNC: 32.6 G/DL (ref 31.5–36.5)
MCV RBC AUTO: 83 FL (ref 78–100)
MONOCYTES # BLD AUTO: 0.6 10E9/L (ref 0–1.3)
MONOCYTES NFR BLD AUTO: 6.9 %
NEUTROPHILS # BLD AUTO: 6 10E9/L (ref 1.6–8.3)
NEUTROPHILS NFR BLD AUTO: 65.8 %
PLATELET # BLD AUTO: 296 10E9/L (ref 150–450)
PROT SERPL-MCNC: 8.4 G/DL (ref 6.8–8.8)
RBC # BLD AUTO: 5.79 10E12/L (ref 3.8–5.2)
WBC # BLD AUTO: 9.1 10E9/L (ref 4–11)

## 2020-11-25 PROCEDURE — 82565 ASSAY OF CREATININE: CPT | Performed by: INTERNAL MEDICINE

## 2020-11-25 PROCEDURE — 99207 PR NO CHARGE LOS: CPT

## 2020-11-25 PROCEDURE — 80076 HEPATIC FUNCTION PANEL: CPT | Performed by: INTERNAL MEDICINE

## 2020-11-25 PROCEDURE — 82310 ASSAY OF CALCIUM: CPT | Performed by: INTERNAL MEDICINE

## 2020-11-25 PROCEDURE — 86300 IMMUNOASSAY TUMOR CA 15-3: CPT | Performed by: INTERNAL MEDICINE

## 2020-11-25 PROCEDURE — 85025 COMPLETE CBC W/AUTO DIFF WBC: CPT | Performed by: INTERNAL MEDICINE

## 2020-11-25 PROCEDURE — 96365 THER/PROPH/DIAG IV INF INIT: CPT | Performed by: INTERNAL MEDICINE

## 2020-11-25 RX ORDER — HEPARIN SODIUM (PORCINE) LOCK FLUSH IV SOLN 100 UNIT/ML 100 UNIT/ML
5 SOLUTION INTRAVENOUS
Status: DISCONTINUED | OUTPATIENT
Start: 2020-11-25 | End: 2020-11-25 | Stop reason: HOSPADM

## 2020-11-25 RX ORDER — ZOLEDRONIC ACID 0.04 MG/ML
4 INJECTION, SOLUTION INTRAVENOUS ONCE
Status: COMPLETED | OUTPATIENT
Start: 2020-11-25 | End: 2020-11-25

## 2020-11-25 RX ADMIN — HEPARIN SODIUM (PORCINE) LOCK FLUSH IV SOLN 100 UNIT/ML 5 ML: 100 SOLUTION at 12:31

## 2020-11-25 RX ADMIN — ZOLEDRONIC ACID 4 MG: 0.04 INJECTION, SOLUTION INTRAVENOUS at 13:36

## 2020-11-25 RX ADMIN — HEPARIN SODIUM (PORCINE) LOCK FLUSH IV SOLN 100 UNIT/ML 5 ML: 100 SOLUTION at 14:05

## 2020-11-25 RX ADMIN — Medication 250 ML: at 13:21

## 2020-11-25 ASSESSMENT — PAIN SCALES - GENERAL: PAINLEVEL: NO PAIN (0)

## 2020-11-25 NOTE — PROGRESS NOTES
Port site scrubbed with Chloraprep for 30 seconds. Accessed port using sterile technique. [place] drawn-Red/Green/Purple tubes. Double signed by patient and RN. See documentation flowsheet. Port needle left accessed for treatment. Tolerated port access and draw without complaint. An Harding RN on 11/25/2020 at 12:19 PM

## 2020-11-25 NOTE — PROGRESS NOTES
Infusion Nursing Note:  Mary Olson presents today for Zometa infusion.    Patient seen by provider today: No   present during visit today: Not Applicable.    Note:   Patient denies dental issues at this time.  States she has an upcoming dental cleaning scheduled within the next month. Patient will inform dentist that she got Zometa infusion, prior to any invasive dental work in the future.    Reminded patient to drink 6-8 glasses of water today, and tomorrow, to protect kidneys.    Patient states she has white coat syndrome.  BP elevated.  States her systolic BP is 120's/130's at home.    Intravenous Access:  Implanted Port.    Treatment Conditions:  Lab Results   Component Value Date     09/02/2020                   Lab Results   Component Value Date    POTASSIUM 3.1 09/02/2020           No results found for: MAG         Lab Results   Component Value Date    CR 0.58 11/25/2020                   Lab Results   Component Value Date    BUFFY 9.8 11/25/2020                Lab Results   Component Value Date    BILITOTAL 0.3 11/25/2020           Lab Results   Component Value Date    ALBUMIN 3.6 11/25/2020    ALBUMIN 3.6 11/25/2020                    Lab Results   Component Value Date    ALT 23 11/25/2020           Lab Results   Component Value Date    AST 17 11/25/2020       Results reviewed, labs MET treatment parameters, ok to proceed with treatment.      Post Infusion Assessment:  Patient tolerated infusion without incident.  Blood return noted pre and post infusion.  Site patent and intact, free from redness, edema or discomfort.  No evidence of extravasations.  Access discontinued per protocol.       Discharge Plan:   AVS to patient via MYCHART.  Patient will return 12/23/2020 for next appointment.   Patient discharged in stable condition accompanied by: self.  is .  Departure Mode: Wheelchair.    An Harding RN

## 2020-12-12 ENCOUNTER — HEALTH MAINTENANCE LETTER (OUTPATIENT)
Age: 65
End: 2020-12-12

## 2020-12-20 DIAGNOSIS — C79.51 BONE METASTASIS: Primary | ICD-10-CM

## 2020-12-20 DIAGNOSIS — C50.812 MALIGNANT NEOPLASM OF OVERLAPPING SITES OF LEFT BREAST IN FEMALE, ESTROGEN RECEPTOR POSITIVE (H): ICD-10-CM

## 2020-12-20 DIAGNOSIS — Z17.0 MALIGNANT NEOPLASM OF OVERLAPPING SITES OF LEFT BREAST IN FEMALE, ESTROGEN RECEPTOR POSITIVE (H): ICD-10-CM

## 2020-12-20 RX ORDER — HEPARIN SODIUM (PORCINE) LOCK FLUSH IV SOLN 100 UNIT/ML 100 UNIT/ML
5 SOLUTION INTRAVENOUS
Status: CANCELLED | OUTPATIENT
Start: 2020-12-23

## 2020-12-20 RX ORDER — HEPARIN SODIUM,PORCINE 10 UNIT/ML
5 VIAL (ML) INTRAVENOUS
Status: CANCELLED | OUTPATIENT
Start: 2020-12-23

## 2020-12-20 RX ORDER — ZOLEDRONIC ACID 0.04 MG/ML
4 INJECTION, SOLUTION INTRAVENOUS ONCE
Status: CANCELLED | OUTPATIENT
Start: 2020-12-23 | End: 2020-12-23

## 2020-12-23 ENCOUNTER — INFUSION THERAPY VISIT (OUTPATIENT)
Dept: INFUSION THERAPY | Facility: CLINIC | Age: 65
End: 2020-12-23
Payer: COMMERCIAL

## 2020-12-23 ENCOUNTER — ONCOLOGY VISIT (OUTPATIENT)
Dept: ONCOLOGY | Facility: CLINIC | Age: 65
End: 2020-12-23
Payer: COMMERCIAL

## 2020-12-23 VITALS
DIASTOLIC BLOOD PRESSURE: 80 MMHG | RESPIRATION RATE: 18 BRPM | HEART RATE: 111 BPM | SYSTOLIC BLOOD PRESSURE: 164 MMHG | OXYGEN SATURATION: 95 % | WEIGHT: 215.6 LBS | BODY MASS INDEX: 39.43 KG/M2 | TEMPERATURE: 98.4 F

## 2020-12-23 DIAGNOSIS — C50.812 MALIGNANT NEOPLASM OF OVERLAPPING SITES OF LEFT BREAST IN FEMALE, ESTROGEN RECEPTOR POSITIVE (H): ICD-10-CM

## 2020-12-23 DIAGNOSIS — Z79.4 TYPE 2 DIABETES MELLITUS WITH HYPERGLYCEMIA, WITH LONG-TERM CURRENT USE OF INSULIN (H): ICD-10-CM

## 2020-12-23 DIAGNOSIS — Z17.0 MALIGNANT NEOPLASM OF OVERLAPPING SITES OF LEFT BREAST IN FEMALE, ESTROGEN RECEPTOR POSITIVE (H): ICD-10-CM

## 2020-12-23 DIAGNOSIS — C79.51 BONE METASTASIS: Primary | ICD-10-CM

## 2020-12-23 DIAGNOSIS — E87.6 HYPOKALEMIA: ICD-10-CM

## 2020-12-23 DIAGNOSIS — E11.65 TYPE 2 DIABETES MELLITUS WITH HYPERGLYCEMIA, WITH LONG-TERM CURRENT USE OF INSULIN (H): ICD-10-CM

## 2020-12-23 LAB
ALBUMIN SERPL-MCNC: 3.7 G/DL (ref 3.4–5)
CALCIUM SERPL-MCNC: 10 MG/DL (ref 8.5–10.1)
CREAT SERPL-MCNC: 0.67 MG/DL (ref 0.52–1.04)
CREAT UR-MCNC: 13 MG/DL
GFR SERPL CREATININE-BSD FRML MDRD: >90 ML/MIN/{1.73_M2}
HBA1C MFR BLD: 6.5 % (ref 0–5.6)
MICROALBUMIN UR-MCNC: 9 MG/L
MICROALBUMIN/CREAT UR: 66.34 MG/G CR (ref 0–25)
POTASSIUM SERPL-SCNC: 3.2 MMOL/L (ref 3.4–5.3)

## 2020-12-23 PROCEDURE — 82565 ASSAY OF CREATININE: CPT | Performed by: INTERNAL MEDICINE

## 2020-12-23 PROCEDURE — 82043 UR ALBUMIN QUANTITATIVE: CPT | Performed by: NURSE PRACTITIONER

## 2020-12-23 PROCEDURE — 82310 ASSAY OF CALCIUM: CPT | Performed by: INTERNAL MEDICINE

## 2020-12-23 PROCEDURE — 84132 ASSAY OF SERUM POTASSIUM: CPT | Performed by: NURSE PRACTITIONER

## 2020-12-23 PROCEDURE — 99207 PR NO CHARGE NURSE ONLY: CPT

## 2020-12-23 PROCEDURE — 96365 THER/PROPH/DIAG IV INF INIT: CPT | Performed by: NURSE PRACTITIONER

## 2020-12-23 PROCEDURE — 83036 HEMOGLOBIN GLYCOSYLATED A1C: CPT | Performed by: NURSE PRACTITIONER

## 2020-12-23 PROCEDURE — 99207 PR NO CHARGE LOS: CPT

## 2020-12-23 PROCEDURE — 82040 ASSAY OF SERUM ALBUMIN: CPT | Performed by: INTERNAL MEDICINE

## 2020-12-23 RX ORDER — HEPARIN SODIUM (PORCINE) LOCK FLUSH IV SOLN 100 UNIT/ML 100 UNIT/ML
5 SOLUTION INTRAVENOUS
Status: DISCONTINUED | OUTPATIENT
Start: 2020-12-23 | End: 2020-12-23 | Stop reason: HOSPADM

## 2020-12-23 RX ORDER — ZOLEDRONIC ACID 0.04 MG/ML
4 INJECTION, SOLUTION INTRAVENOUS ONCE
Status: COMPLETED | OUTPATIENT
Start: 2020-12-23 | End: 2020-12-23

## 2020-12-23 RX ADMIN — HEPARIN SODIUM (PORCINE) LOCK FLUSH IV SOLN 100 UNIT/ML 5 ML: 100 SOLUTION at 13:19

## 2020-12-23 RX ADMIN — Medication 250 ML: at 12:58

## 2020-12-23 RX ADMIN — ZOLEDRONIC ACID 4 MG: 0.04 INJECTION, SOLUTION INTRAVENOUS at 13:00

## 2020-12-23 RX ADMIN — HEPARIN SODIUM (PORCINE) LOCK FLUSH IV SOLN 100 UNIT/ML 5 ML: 100 SOLUTION at 12:08

## 2020-12-23 NOTE — PROGRESS NOTES
Infusion Nursing Note:  Mary Olson presents today for Zometa.    Patient seen by provider today: No   present during visit today: Not Applicable.    Note: Patient will inform dentist/providers about Zometa infusions, prior to any invasive dental work/and or procedures in the future.     Reminded patient to drink 6-8 glasses of water today, and tomorrow, to protect kidneys.  .    Intravenous Access:  Peripheral IV placed.    Treatment Conditions:  Lab Results   Component Value Date     09/02/2020                   Lab Results   Component Value Date    POTASSIUM 3.2 12/23/2020           No results found for: MAG         Lab Results   Component Value Date    CR 0.67 12/23/2020                   Lab Results   Component Value Date    BUFFY 10.0 12/23/2020                Lab Results   Component Value Date    BILITOTAL 0.3 11/25/2020           Lab Results   Component Value Date    ALBUMIN 3.7 12/23/2020                    Lab Results   Component Value Date    ALT 23 11/25/2020           Lab Results   Component Value Date    AST 17 11/25/2020       Results reviewed, labs MET treatment parameters, ok to proceed with treatment.      Post Infusion Assessment:  Patient tolerated infusion without incident.       Discharge Plan:   Patient discharged in stable condition accompanied by: self.    Shyanne Flores RN

## 2020-12-23 NOTE — PROGRESS NOTES
Potassium today is 3.2.  Desmond takes powder packet potassium TID.  She is unable to take oral pill replacement, she cannot swallow them.  The weather is bad today, Desmond does not want to stay for 2 hour IV potassium replacement.  Per Swathi Page CNP, OK for Desmond to take an additional dose of Potassium today at home.  Desmond verbalizes understanding and agrees to this plan.

## 2020-12-23 NOTE — PROGRESS NOTES
Declined asymptomatic Covid19 test offered to all infusion patients. Port needle left accessed for treatment. Tolerated port access and draw without complaint. Port site scrubbed with Chloraprep for 30 seconds and allowed to dry completely prior to dressing application. Accessed using sterile technique. Rincon tubes drawn-Red gel/Green/Purplex2 tubes. Double signed by patient and RN. See documentation flowsheet. Gave patient a urine specimen cup for Dr. Aparicio orders. Lexy Butterfield RN, BSN, OCN

## 2020-12-28 DIAGNOSIS — E87.6 HYPOKALEMIA: ICD-10-CM

## 2020-12-30 NOTE — TELEPHONE ENCOUNTER
Routing refill request to provider for review/approval because:  Labs out of range:    Potassium   Date Value Ref Range Status   12/23/2020 3.2 (L) 3.4 - 5.3 mmol/L Final

## 2020-12-31 RX ORDER — POTASSIUM CHLORIDE 1.5 G/1.58G
20 POWDER, FOR SOLUTION ORAL 3 TIMES DAILY
Qty: 90 PACKET | Refills: 2 | Status: SHIPPED | OUTPATIENT
Start: 2020-12-31 | End: 2021-02-26

## 2021-01-11 ENCOUNTER — MYC MEDICAL ADVICE (OUTPATIENT)
Dept: FAMILY MEDICINE | Facility: CLINIC | Age: 66
End: 2021-01-11

## 2021-01-11 DIAGNOSIS — C50.812 MALIGNANT NEOPLASM OF OVERLAPPING SITES OF LEFT BREAST IN FEMALE, ESTROGEN RECEPTOR POSITIVE (H): ICD-10-CM

## 2021-01-11 DIAGNOSIS — I10 BENIGN ESSENTIAL HYPERTENSION: ICD-10-CM

## 2021-01-11 DIAGNOSIS — Z79.4 TYPE 2 DIABETES MELLITUS WITH HYPERGLYCEMIA, WITH LONG-TERM CURRENT USE OF INSULIN (H): ICD-10-CM

## 2021-01-11 DIAGNOSIS — C50.919 METASTATIC BREAST CANCER: ICD-10-CM

## 2021-01-11 DIAGNOSIS — E87.6 HYPOKALEMIA: ICD-10-CM

## 2021-01-11 DIAGNOSIS — Z17.0 MALIGNANT NEOPLASM OF OVERLAPPING SITES OF LEFT BREAST IN FEMALE, ESTROGEN RECEPTOR POSITIVE (H): ICD-10-CM

## 2021-01-11 DIAGNOSIS — G62.9 NEUROPATHY: ICD-10-CM

## 2021-01-11 DIAGNOSIS — E11.65 TYPE 2 DIABETES MELLITUS WITH HYPERGLYCEMIA, WITH LONG-TERM CURRENT USE OF INSULIN (H): ICD-10-CM

## 2021-01-11 NOTE — TELEPHONE ENCOUNTER
RN replied to patient via [employer]. See message for details.     Joyce Becker RN, BSN, PHN  [facility]: [place]

## 2021-01-14 ENCOUNTER — PATIENT OUTREACH (OUTPATIENT)
Dept: ONCOLOGY | Facility: CLINIC | Age: 66
End: 2021-01-14

## 2021-01-14 DIAGNOSIS — Z17.0 MALIGNANT NEOPLASM OF OVERLAPPING SITES OF LEFT BREAST IN FEMALE, ESTROGEN RECEPTOR POSITIVE (H): ICD-10-CM

## 2021-01-14 DIAGNOSIS — C50.919 METASTATIC BREAST CANCER: ICD-10-CM

## 2021-01-14 DIAGNOSIS — C79.51 BONE METASTASIS: Primary | ICD-10-CM

## 2021-01-14 DIAGNOSIS — C50.812 MALIGNANT NEOPLASM OF OVERLAPPING SITES OF LEFT BREAST IN FEMALE, ESTROGEN RECEPTOR POSITIVE (H): ICD-10-CM

## 2021-01-14 RX ORDER — ANASTROZOLE 1 MG/1
1 TABLET ORAL DAILY
Qty: 90 TABLET | Refills: 1 | Status: SHIPPED | OUTPATIENT
Start: 2021-01-14 | End: 2021-03-25

## 2021-01-14 RX ORDER — HEPARIN SODIUM (PORCINE) LOCK FLUSH IV SOLN 100 UNIT/ML 100 UNIT/ML
5 SOLUTION INTRAVENOUS
Status: CANCELLED | OUTPATIENT
Start: 2021-01-20

## 2021-01-14 RX ORDER — ZOLEDRONIC ACID 0.04 MG/ML
4 INJECTION, SOLUTION INTRAVENOUS ONCE
Status: CANCELLED | OUTPATIENT
Start: 2021-01-20 | End: 2021-01-20

## 2021-01-14 RX ORDER — HEPARIN SODIUM,PORCINE 10 UNIT/ML
5 VIAL (ML) INTRAVENOUS
Status: CANCELLED | OUTPATIENT
Start: 2021-01-20

## 2021-01-14 NOTE — PROGRESS NOTES
[facility]:  Care Coordination Note    Upon chart review, noted this patient is due to have follow-up labs (CBC/p/d, CMP, and CA 27.29) prior to her February 2021 Oncology appointment.  However, there are no current/active lab orders in this patient's chart.  Lab orders for the CBC/p/d, CMP, and CA 27.29 routed to provider Swathi Page CNP, for review and signature.     Dmitri Figueredo RN, BSN, OCN  Oncology Care Coordinator  [facility]

## 2021-01-14 NOTE — PROGRESS NOTES
[facility]:  Care Coordination Note    Received staff message from cancer center scheduling team that this patient is requesting a callback from RN to discuss questions she has regarding prescription refills.  Telephone call was attempted to patient this afternoon to discuss, however patient did not answer call at this time.  Voicemail message was left on patient's cell phone, provided patient with direct phone number for this RN to callback at patient's earliest convenience.    Addendum 01/14/2021 4:24 PM - Received voicemail message from patient this afternoon, returning writer's call.  What patient is requesting is to have a 3-month supply of her Arimidex sent to [facility] - per patient, this was initially sent to the [facility], but it needs to go through [facility] instead.  Refill request was routed to provider Swathi Page CNP, for review and approval.     Dmitri Figueredo RN, BSN, OCN  Oncology Care Coordinator  [facility]

## 2021-01-20 ENCOUNTER — INFUSION THERAPY VISIT (OUTPATIENT)
Dept: INFUSION THERAPY | Facility: CLINIC | Age: 66
End: 2021-01-20
Payer: COMMERCIAL

## 2021-01-20 ENCOUNTER — OFFICE VISIT (OUTPATIENT)
Dept: ONCOLOGY | Facility: CLINIC | Age: 66
End: 2021-01-20
Payer: COMMERCIAL

## 2021-01-20 VITALS
WEIGHT: 214 LBS | TEMPERATURE: 98.3 F | OXYGEN SATURATION: 95 % | HEART RATE: 120 BPM | SYSTOLIC BLOOD PRESSURE: 188 MMHG | RESPIRATION RATE: 18 BRPM | DIASTOLIC BLOOD PRESSURE: 82 MMHG | BODY MASS INDEX: 39.14 KG/M2

## 2021-01-20 DIAGNOSIS — C50.812 MALIGNANT NEOPLASM OF OVERLAPPING SITES OF LEFT BREAST IN FEMALE, ESTROGEN RECEPTOR POSITIVE (H): ICD-10-CM

## 2021-01-20 DIAGNOSIS — C79.51 BONE METASTASIS: Primary | ICD-10-CM

## 2021-01-20 DIAGNOSIS — Z17.0 MALIGNANT NEOPLASM OF OVERLAPPING SITES OF LEFT BREAST IN FEMALE, ESTROGEN RECEPTOR POSITIVE (H): ICD-10-CM

## 2021-01-20 LAB
ALBUMIN SERPL-MCNC: 3.4 G/DL (ref 3.4–5)
CALCIUM SERPL-MCNC: 9.8 MG/DL (ref 8.5–10.1)
CREAT SERPL-MCNC: 0.75 MG/DL (ref 0.52–1.04)
GFR SERPL CREATININE-BSD FRML MDRD: 84 ML/MIN/{1.73_M2}

## 2021-01-20 PROCEDURE — 99207 PR NO CHARGE LOS: CPT

## 2021-01-20 PROCEDURE — 82040 ASSAY OF SERUM ALBUMIN: CPT | Performed by: INTERNAL MEDICINE

## 2021-01-20 PROCEDURE — 96365 THER/PROPH/DIAG IV INF INIT: CPT | Performed by: INTERNAL MEDICINE

## 2021-01-20 PROCEDURE — 82310 ASSAY OF CALCIUM: CPT | Performed by: INTERNAL MEDICINE

## 2021-01-20 PROCEDURE — 90682 RIV4 VACC RECOMBINANT DNA IM: CPT | Performed by: INTERNAL MEDICINE

## 2021-01-20 PROCEDURE — 90471 IMMUNIZATION ADMIN: CPT | Performed by: INTERNAL MEDICINE

## 2021-01-20 PROCEDURE — 82565 ASSAY OF CREATININE: CPT | Performed by: INTERNAL MEDICINE

## 2021-01-20 RX ORDER — ZOLEDRONIC ACID 0.04 MG/ML
4 INJECTION, SOLUTION INTRAVENOUS ONCE
Status: COMPLETED | OUTPATIENT
Start: 2021-01-20 | End: 2021-01-20

## 2021-01-20 RX ORDER — HEPARIN SODIUM (PORCINE) LOCK FLUSH IV SOLN 100 UNIT/ML 100 UNIT/ML
5 SOLUTION INTRAVENOUS
Status: DISCONTINUED | OUTPATIENT
Start: 2021-01-20 | End: 2021-01-20 | Stop reason: HOSPADM

## 2021-01-20 RX ORDER — HEPARIN SODIUM (PORCINE) LOCK FLUSH IV SOLN 100 UNIT/ML 100 UNIT/ML
500 SOLUTION INTRAVENOUS DAILY PRN
Status: DISCONTINUED | OUTPATIENT
Start: 2021-01-20 | End: 2021-01-20 | Stop reason: HOSPADM

## 2021-01-20 RX ADMIN — HEPARIN SODIUM (PORCINE) LOCK FLUSH IV SOLN 100 UNIT/ML 5 ML: 100 SOLUTION at 10:52

## 2021-01-20 RX ADMIN — HEPARIN SODIUM (PORCINE) LOCK FLUSH IV SOLN 100 UNIT/ML 500 UNITS: 100 SOLUTION at 09:24

## 2021-01-20 RX ADMIN — Medication 250 ML: at 10:26

## 2021-01-20 RX ADMIN — ZOLEDRONIC ACID 4 MG: 0.04 INJECTION, SOLUTION INTRAVENOUS at 10:26

## 2021-01-20 ASSESSMENT — PAIN SCALES - GENERAL: PAINLEVEL: NO PAIN (0)

## 2021-01-20 NOTE — PROGRESS NOTES
Infusion Nursing Note:  Mary Olson presents today for Zometa    Patient seen by provider today: No   present during visit today: Not Applicable.    Note: Pt requesting a flu vaccine today - denied any problems with prior vaccines. Labs WNL for Zometa today.  Patient did meet criteria for an asymptomatic covid-19 PCR test in infusion today. Patient declined the covid-19 test.    Intravenous Access:  Implanted Port.    Treatment Conditions:  Lab Results   Component Value Date     09/02/2020                   Lab Results   Component Value Date    POTASSIUM 3.2 12/23/2020           No results found for: MAG         Lab Results   Component Value Date    CR 0.75 01/20/2021                   Lab Results   Component Value Date    BUFFY 9.8 01/20/2021                Lab Results   Component Value Date    BILITOTAL 0.3 11/25/2020           Lab Results   Component Value Date    ALBUMIN 3.4 01/20/2021                    Lab Results   Component Value Date    ALT 23 11/25/2020           Lab Results   Component Value Date    AST 17 11/25/2020       Results reviewed, labs MET treatment parameters, ok to proceed with treatment.      Post Infusion Assessment:  Patient tolerated infusion without incident.  Patient tolerated injection without incident.  Blood return noted pre and post infusion.  Site patent and intact, free from redness, edema or discomfort.  No evidence of extravasations.  Access discontinued per protocol.       Discharge Plan:   Return 02/23/2021 for Zometa  Discharge instructions reviewed with: Patient.  Patient and/or family verbalized understanding of discharge instructions and all questions answered.  Patient discharged in stable condition accompanied by: self.  Departure Mode: Ambulatory.    Vaishali Zhu RN

## 2021-01-20 NOTE — PROGRESS NOTES
Port accessed using 20 G 3/4 inch needle.  Labs collected from port.  Line flushed with NS and Heparin.  Pt tolerated procedure.  Port left accessed for infusion.    Lucila Rocha RN-BSN, PHN, OCN  [facility]

## 2021-02-19 ENCOUNTER — ANCILLARY PROCEDURE (OUTPATIENT)
Dept: PET IMAGING | Facility: CLINIC | Age: 66
End: 2021-02-19
Attending: INTERNAL MEDICINE
Payer: COMMERCIAL

## 2021-02-19 DIAGNOSIS — I10 BENIGN ESSENTIAL HYPERTENSION: ICD-10-CM

## 2021-02-19 DIAGNOSIS — C79.51 BONE METASTASIS: ICD-10-CM

## 2021-02-19 DIAGNOSIS — Z17.0 MALIGNANT NEOPLASM OF OVERLAPPING SITES OF LEFT BREAST IN FEMALE, ESTROGEN RECEPTOR POSITIVE (H): ICD-10-CM

## 2021-02-19 DIAGNOSIS — C50.919 METASTATIC BREAST CANCER: ICD-10-CM

## 2021-02-19 DIAGNOSIS — C50.812 MALIGNANT NEOPLASM OF OVERLAPPING SITES OF LEFT BREAST IN FEMALE, ESTROGEN RECEPTOR POSITIVE (H): ICD-10-CM

## 2021-02-19 PROCEDURE — 71260 CT THORAX DX C+: CPT | Mod: 59

## 2021-02-19 PROCEDURE — 74177 CT ABD & PELVIS W/CONTRAST: CPT | Mod: 59

## 2021-02-19 PROCEDURE — 78816 PET IMAGE W/CT FULL BODY: CPT | Mod: PS

## 2021-02-19 PROCEDURE — A9552 F18 FDG: HCPCS

## 2021-02-19 RX ORDER — IOPAMIDOL 755 MG/ML
100 INJECTION, SOLUTION INTRAVASCULAR ONCE
Status: COMPLETED | OUTPATIENT
Start: 2021-02-19 | End: 2021-02-19

## 2021-02-19 RX ORDER — HEPARIN SODIUM (PORCINE) LOCK FLUSH IV SOLN 100 UNIT/ML 100 UNIT/ML
500 SOLUTION INTRAVENOUS ONCE
Status: COMPLETED | OUTPATIENT
Start: 2021-02-19 | End: 2021-02-19

## 2021-02-19 RX ADMIN — IOPAMIDOL 131 ML: 755 INJECTION, SOLUTION INTRAVASCULAR at 09:11

## 2021-02-19 RX ADMIN — HEPARIN SODIUM (PORCINE) LOCK FLUSH IV SOLN 100 UNIT/ML 500 UNITS: 100 SOLUTION at 09:16

## 2021-02-22 DIAGNOSIS — C50.812 MALIGNANT NEOPLASM OF OVERLAPPING SITES OF LEFT BREAST IN FEMALE, ESTROGEN RECEPTOR POSITIVE (H): ICD-10-CM

## 2021-02-22 DIAGNOSIS — C79.51 BONE METASTASIS: Primary | ICD-10-CM

## 2021-02-22 DIAGNOSIS — Z17.0 MALIGNANT NEOPLASM OF OVERLAPPING SITES OF LEFT BREAST IN FEMALE, ESTROGEN RECEPTOR POSITIVE (H): ICD-10-CM

## 2021-02-22 RX ORDER — ZOLEDRONIC ACID 0.04 MG/ML
4 INJECTION, SOLUTION INTRAVENOUS ONCE
Status: CANCELLED | OUTPATIENT
Start: 2021-02-23 | End: 2021-02-23

## 2021-02-22 RX ORDER — HEPARIN SODIUM (PORCINE) LOCK FLUSH IV SOLN 100 UNIT/ML 100 UNIT/ML
5 SOLUTION INTRAVENOUS
Status: CANCELLED | OUTPATIENT
Start: 2021-02-23

## 2021-02-22 RX ORDER — HEPARIN SODIUM,PORCINE 10 UNIT/ML
5 VIAL (ML) INTRAVENOUS
Status: CANCELLED | OUTPATIENT
Start: 2021-02-23

## 2021-02-22 NOTE — TELEPHONE ENCOUNTER
Routing refill request to provider for review/approval because:  BP Readings from Last 3 Encounters:   01/20/21 (!) 188/82   12/23/20 (!) 164/80   11/25/20 (!) 173/95     Potassium   Date Value Ref Range Status   12/23/2020 3.2 (L) 3.4 - 5.3 mmol/L Final     Joyce Becker, RN, BSN, PHN  [facility]: [place]

## 2021-02-23 ENCOUNTER — INFUSION THERAPY VISIT (OUTPATIENT)
Dept: INFUSION THERAPY | Facility: CLINIC | Age: 66
End: 2021-02-23
Payer: COMMERCIAL

## 2021-02-23 ENCOUNTER — OFFICE VISIT (OUTPATIENT)
Dept: ONCOLOGY | Facility: CLINIC | Age: 66
End: 2021-02-23
Payer: COMMERCIAL

## 2021-02-23 VITALS
DIASTOLIC BLOOD PRESSURE: 118 MMHG | BODY MASS INDEX: 39.14 KG/M2 | TEMPERATURE: 98.4 F | OXYGEN SATURATION: 94 % | WEIGHT: 214 LBS | SYSTOLIC BLOOD PRESSURE: 191 MMHG | HEART RATE: 126 BPM

## 2021-02-23 DIAGNOSIS — C79.51 BONE METASTASIS: Primary | ICD-10-CM

## 2021-02-23 DIAGNOSIS — C50.812 MALIGNANT NEOPLASM OF OVERLAPPING SITES OF LEFT BREAST IN FEMALE, ESTROGEN RECEPTOR POSITIVE (H): ICD-10-CM

## 2021-02-23 DIAGNOSIS — Z17.0 MALIGNANT NEOPLASM OF OVERLAPPING SITES OF LEFT BREAST IN FEMALE, ESTROGEN RECEPTOR POSITIVE (H): ICD-10-CM

## 2021-02-23 LAB
ALBUMIN SERPL-MCNC: 3.5 G/DL (ref 3.4–5)
CALCIUM SERPL-MCNC: 10.1 MG/DL (ref 8.5–10.1)
CREAT SERPL-MCNC: 0.73 MG/DL (ref 0.52–1.04)
GFR SERPL CREATININE-BSD FRML MDRD: 87 ML/MIN/{1.73_M2}

## 2021-02-23 PROCEDURE — 99207 PR NO CHARGE LOS: CPT

## 2021-02-23 PROCEDURE — 82565 ASSAY OF CREATININE: CPT | Performed by: INTERNAL MEDICINE

## 2021-02-23 PROCEDURE — 82310 ASSAY OF CALCIUM: CPT | Performed by: INTERNAL MEDICINE

## 2021-02-23 PROCEDURE — 82040 ASSAY OF SERUM ALBUMIN: CPT | Performed by: INTERNAL MEDICINE

## 2021-02-23 PROCEDURE — 96365 THER/PROPH/DIAG IV INF INIT: CPT | Performed by: NURSE PRACTITIONER

## 2021-02-23 RX ORDER — HEPARIN SODIUM (PORCINE) LOCK FLUSH IV SOLN 100 UNIT/ML 100 UNIT/ML
5 SOLUTION INTRAVENOUS EVERY 8 HOURS
Status: DISCONTINUED | OUTPATIENT
Start: 2021-02-23 | End: 2021-02-23 | Stop reason: HOSPADM

## 2021-02-23 RX ORDER — HEPARIN SODIUM (PORCINE) LOCK FLUSH IV SOLN 100 UNIT/ML 100 UNIT/ML
5 SOLUTION INTRAVENOUS
Status: DISCONTINUED | OUTPATIENT
Start: 2021-02-23 | End: 2021-02-23 | Stop reason: HOSPADM

## 2021-02-23 RX ORDER — ZOLEDRONIC ACID 0.04 MG/ML
4 INJECTION, SOLUTION INTRAVENOUS ONCE
Status: COMPLETED | OUTPATIENT
Start: 2021-02-23 | End: 2021-02-23

## 2021-02-23 RX ORDER — CHLORTHALIDONE 25 MG/1
25 TABLET ORAL DAILY
Qty: 60 TABLET | Refills: 3 | Status: SHIPPED | OUTPATIENT
Start: 2021-02-23 | End: 2021-02-26

## 2021-02-23 RX ADMIN — HEPARIN SODIUM (PORCINE) LOCK FLUSH IV SOLN 100 UNIT/ML 5 ML: 100 SOLUTION at 10:48

## 2021-02-23 RX ADMIN — HEPARIN SODIUM (PORCINE) LOCK FLUSH IV SOLN 100 UNIT/ML 5 ML: 100 SOLUTION at 09:30

## 2021-02-23 RX ADMIN — Medication 250 ML: at 10:19

## 2021-02-23 RX ADMIN — ZOLEDRONIC ACID 4 MG: 0.04 INJECTION, SOLUTION INTRAVENOUS at 10:28

## 2021-02-23 ASSESSMENT — PAIN SCALES - GENERAL: PAINLEVEL: NO PAIN (0)

## 2021-02-23 NOTE — PROGRESS NOTES
Port needle left for access for treatment, Sterile technique performed and maintained. Patient tolerated procedure well. Tubes drawn in rainbow order: red, green, purple. Transparent dressing placed with use of tegaderm.    Shyanne Flores RN  [facility] Oncology/Infusion [place]

## 2021-02-23 NOTE — PROGRESS NOTES
Infusion Nursing Note:  Mary Olson presents today for Zometa.    Patient seen by provider today: No   present during visit today: Not Applicable.    Note: Pt denies new medical concerns, see flow sheet flow sheet for assessment.    Intravenous Access:  Implanted Port.    Treatment Conditions:  Lab Results   Component Value Date     09/02/2020                   Lab Results   Component Value Date    POTASSIUM 3.2 12/23/2020           No results found for: MAG         Lab Results   Component Value Date    CR 0.73 02/23/2021                   Lab Results   Component Value Date    BUFFY 10.1 02/23/2021                Lab Results   Component Value Date    BILITOTAL 0.3 11/25/2020           Lab Results   Component Value Date    ALBUMIN 3.5 02/23/2021                    Lab Results   Component Value Date    ALT 23 11/25/2020           Lab Results   Component Value Date    AST 17 11/25/2020       Results reviewed, labs MET treatment parameters, ok to proceed with treatment.      Post Infusion Assessment:  Patient tolerated infusion without incident.  Blood return noted pre and post infusion.  Site patent and intact, free from redness, edema or discomfort.  No evidence of extravasations.  Access discontinued per protocol.       Discharge Plan:   Patient discharged in stable condition accompanied by: self.  Departure Mode: Wheelchair.  Pt will RTC 3/24/21 for zometa.    Hernán Karimi RN

## 2021-02-23 NOTE — PROGRESS NOTES
SPIRITUAL HEALTH SERVICES Progress Note  [facility]    Visited Mary (DesmondLouie  REY Olson in the infusion center for ongoing emotional/spiritual support.  Patient has not been seen by the  dept since this past summer.  Offered reflective conversation, support and affirmation as she shared her  journey. Prayed with her for her concerns today.       Illness Narrative - Patient states that the treatments were not as bad as she had anticipated.       Concerns - Her entire family is trying to be very careful about the Covid 19 restrictions.       Coping - She is hopeful and staying positive. Her adult children have been very supportive. Patient has a good support network, people who call and pray with her, which she appreciates.  Patient is Gnosticist by tamika tradition.        Meaning-Making - Her family gives her life meaning.       Plan - Patient  knows that she can request a Spiritual Health encounter as needed.     Namrata Michaud  Staff

## 2021-02-25 ENCOUNTER — VIRTUAL VISIT (OUTPATIENT)
Dept: ONCOLOGY | Facility: CLINIC | Age: 66
End: 2021-02-25
Attending: INTERNAL MEDICINE
Payer: COMMERCIAL

## 2021-02-25 ENCOUNTER — TELEPHONE (OUTPATIENT)
Dept: ONCOLOGY | Facility: CLINIC | Age: 66
End: 2021-02-25

## 2021-02-25 VITALS — WEIGHT: 214 LBS | BODY MASS INDEX: 39.14 KG/M2

## 2021-02-25 DIAGNOSIS — Z17.0 MALIGNANT NEOPLASM OF OVERLAPPING SITES OF LEFT BREAST IN FEMALE, ESTROGEN RECEPTOR POSITIVE (H): Primary | ICD-10-CM

## 2021-02-25 DIAGNOSIS — C50.812 MALIGNANT NEOPLASM OF OVERLAPPING SITES OF LEFT BREAST IN FEMALE, ESTROGEN RECEPTOR POSITIVE (H): Primary | ICD-10-CM

## 2021-02-25 PROCEDURE — 99215 OFFICE O/P EST HI 40 MIN: CPT | Mod: 95 | Performed by: INTERNAL MEDICINE

## 2021-02-25 ASSESSMENT — PAIN SCALES - GENERAL: PAINLEVEL: NO PAIN (0)

## 2021-02-25 NOTE — NURSING NOTE
SYMPTOM QUESTIONNAIRE    Pain: no    Nausea/Vomiting: no    Mouth Sores: no    Shortness of Breath: no    Smoking: no    Fever or Chills: no    Hard Stools: no    Soft Stools: no    Weight Loss: no    Weakness: no    Burning, numbness or tingling in hands or feet: fingers and toes- unchanged    Problems with skin or swelling: no    Memory Loss: no    Anxiety or Depression: no    Trouble Sleeping: no    Kailee Jarvis LPN on 2/25/2021 at 8:42 AM

## 2021-02-25 NOTE — TELEPHONE ENCOUNTER
Prior Authorization Approval    Authorization Effective Date: 1/26/2021  Authorization Expiration Date: 2/25/2024  Medication: Verzenio-Approved  Approved Dose/Quantity: 150mg  Reference #:     Insurance Company: [employer] - Phone [phone] Fax [phone]  Expected CoPay:       CoPay Card Available:      Foundation Assistance Needed:    Which Pharmacy is filling the prescription (Not needed for infusion/clinic administered): [facility] [address]  Pharmacy Notified:    Patient Notified:

## 2021-02-25 NOTE — LETTER
2/25/2021         RE: Mary Olson  [address]  [facility] [address]        Dear Colleague,    Thank you for referring your patient, Mary Olson, to the [facility]. Please see a copy of my visit note below.    Desmond is a 65 year old who is being evaluated via a billable video visit.      How would you like to obtain your AVS? MyChart  If the video visit is dropped, the invitation should be resent by: Text to cell phone: [phone]  Will anyone else be joining your video visit? No      Video Start Time: 0910  Video-Visit Details    Type of service:  Video Visit    Video End Time:0950    Originating Location (pt. Location): Home    Distant Location (provider location):  [facility]     Platform used for Video Visit: [employer]    Visit Date:   11/11/2020      ONCOLOGY DIAGNOSIS:  1. March 2020: Metastatic Breast Cancer ER+, MI+ Her2 Negative; with bone metastasis and a left breast primary     THERAPY TO DATE:  1. March 2020 to August 2020: Weekly Taxol.  2. August 2020 to Present: Anastrozole.     INTERVAL HISTORY:  The patient is a 65-year-old female diagnosed with metastatic breast cancer in 03/2020 after noticing a lesion on her breast.  She presents for video visit during the COVID-19 pandemic.  She notes she has been on her Zometa monthly and is tolerating this without any significant problems.  She had her last infusion earlier this week.  She is taking her Arimidex without much in the way of side effects.  She has been feeling good and has no new aches pains or other problems.  She does not have any back pain.  She has lost about 32 pounds in the last year plus that this is been through intentionally changing her diet.  Is been a slow weight loss of a couple of pounds at a time and nothing excessive.  No other new issues     PAST MEDICAL HISTORY: (No new medical history since last visit per patient.)  1.  Metastatic breast  cancer, see above.   2.  Hypertension.   3.  Steroid-induced diabetes.  According to the patient, since she has been off steroids, she was told that her blood sugars has normalized and she is off medication.      FAMILY HISTORY:  Mother had breast cancer at 62; underwent lumpectomy and radiation, and arimidex  No new family history since last seen.     SOCIAL HISTORY:   to her , Dieter.    No current tobacco use.  Retired.  ALLERGIES:  LISINOPRIL.      PHYSICAL EXAM:  GENERAL: Comfortable, no acute distress.   HEAD: Atraumatic/Normocephalic.  EYES: Sclera non-icteric. Grossly normal.  RESPIRATORY: Normal breathing, non-labored. No audible wheezes/cough.  SKIN: No jaundice, discoloration or obvious lesions.  NERUO: No tremors visible. Normal speech.  PSYCH: Alert, oriented. Answered questions appropriately.    The rest of a comprehensive physical examination is deferred due to PHE (public health emergency) video visit restrictions.     Recent Labs   Lab Test 02/23/21  0930 01/20/21  0920 12/23/20  1210 11/25/20  1230 10/28/20  0945 09/02/20  0851 09/02/20  0851 08/12/20  0945 07/15/20  1037 06/25/20  1305 03/04/20  0828 03/04/20  0828 10/28/15  1251   NA  --   --   --   --   --   --  137 136  --  133  --  133 136   POTASSIUM  --   --  3.2*  --   --   --  3.1* 3.3* 3.4 2.9*  --  4.7 4.2   CHLORIDE  --   --   --   --   --   --  100 100  --  99  --  102 102   CO2  --   --   --   --   --   --  30 27  --  29  --  27 24   ANIONGAP  --   --   --   --   --   --  7 9  --  5  --  4 10   BUN  --   --   --   --   --   --  7 6*  --  12  --  12 12   CR 0.73 0.75 0.67 0.58 0.66   < > 0.70  0.69 0.68 0.67 0.66   < > 0.85 0.70   GLC  --   --   --   --   --   --  129* 135*  --  180*  --  192* 103*   BUFFY 10.1 9.8 10.0 9.8 9.5   < > 9.6  9.6 8.9 9.5 9.5   < > 10.2* 10.0    < > = values in this interval not displayed.     No results for input(s): MAG, PHOS in the last 06073 hours.  Recent Labs   Lab Test 11/25/20  1234  09/30/20  0825 08/12/20  0945 08/05/20  1130 07/28/20  0845   WBC 9.1 9.2 5.5 7.2 5.9   HGB 15.7 14.6 10.8* 11.2* 11.7    344 402 454* 442   MCV 83 89 90 89 90   NEUTROPHIL 65.8 74.4 59.0 56.0 57.3     Recent Labs   Lab Test 02/23/21  0930 01/20/21  0920 12/23/20  1210 11/25/20  1230 08/12/20  0945 08/12/20  0945 07/22/20  1140   BILITOTAL  --   --   --  0.3  --  0.5 0.6   ALKPHOS  --   --   --  88  --  75 93   ALT  --   --   --  23  --  35 44   AST  --   --   --  17  --  23 31   ALBUMIN 3.5 3.4 3.7 3.6  3.6   < > 3.1* 3.2*    < > = values in this interval not displayed.     TSH   Date Value Ref Range Status   10/28/2015 1.23 0.40 - 4.00 mU/L Final     No results for input(s): CEA in the last 03203 hours.  Results for orders placed or performed in visit on 02/19/21   PET Oncology Whole Body    Narrative    Combined Report of:    PET and CT on  2/19/2021 10:34 AM :    1. PET of the neck, chest, abdomen, and pelvis.  2. PET CT Fusion for Attenuation Correction and Anatomical  Localization:    3. Diagnostic CT scan of the chest, abdomen, and pelvis with  intravenous contrast for interpretation.  3. CT of the chest, abdomen and pelvis obtained for diagnostic  interpretation.  4. 3D MIP and PET-CT fused images were processed on an independent  workstation and archived to PACS and reviewed by a radiologist.    Technique:    1. PET: The patient received 13.19 mCi of F-18-FDG; the serum glucose  was 131 prior to administration, body weight was 97.1 kg. Images were  evaluated in the axial, sagittal, and coronal planes as well as the  rotational whole body MIP. Images were acquired from the Vertex to the  Feet.    UPTAKE WAS MEASURED AT 60 MINUTES.     BACKGROUND:  Liver SUV max= 5.86,   Aorta Blood SUV Max: 3.60.     2. CT: Volumetric acquisition for clinical interpretation of the  chest, abdomen, and pelvis acquired at 3 mm sections . The chest,  abdomen, and pelvis were evaluated at 5 mm sections in bone,  soft  tissue, and lung windows.      The patient received 131 cc of Isovue 370 intravenously for the  examination.      3. 3D MIP and PET-CT fused images were processed on an independent  workstation and archived to PACS and reviewed by a radiologist.    INDICATION: Bone metastasis (H); Malignant neoplasm of overlapping  sites of left breast in female, estrogen receptor positive (H);  Malignant neoplasm of overlapping sites of left breast in female,  estrogen receptor positive (H); Metastatic breast cancer (H)    ADDITIONAL INFORMATION OBTAINED FROM EMR: 65-year-old female with  history of left breast invasive ductal carcinoma currently on  chemotherapy.    COMPARISON: 11/6/2020, 8/10/2020, 3/23/2020.    FINDINGS:     HEAD/NECK:  There is no  suspicious FDG uptake in the neck.     The paranasal sinuses are clear. The mastoid air cells are clear. The  mucosal pharyngeal space, the , prevertebral and carotid  spaces are within normal limits. No masses, mass effect or  pathologically enlarged lymph nodes are evident. The thyroid gland is  normal.    CHEST:  Redemonstration of the FDG avid subareolar left breast mass with  overlying skin thickening and internal calcifications (SUV max 17.30).  The mass measures approximately 3.8 x 4.5 cm (previously 5.7 x 4.4 cm  with SUV max of 11.3). There are multiple satellite lesions with FDG  uptake, most notably a 1.4 cm lesion in the lower outer quadrant of  the breast with SUV max of 9.0, previously 7.1. There are multiple  metastatic left axillary and left supraclavicular lymph nodes with SUV  max measuring up to 13.99 (previously 11.23) in a 1.4 cm left axillary  node (series 5, image 164). No suspicious FDG uptake in the right  breast.    Normal heart size, no pericardial effusion. Normal caliber thoracic  aorta and main pulmonary artery. Normal branching of the thoracic  great vessels. Normal thyroid. No suspicious lower cervical, axillary,  or mediastinal  lymphadenopathy. Normal caliber esophagus.    Central tracheobronchial tree is widely patent. No pleural effusion or  pneumothorax. No focal pulmonary opacity or consolidation. No  suspicious pulmonary mass or nodule.    ABDOMEN AND PELVIS:  There is no suspicious FDG uptake in the abdomen or pelvis.    Unremarkable hepatic parenchyma. Portal vasculature is patent.  Gallbladder, pancreas, spleen, adrenals, and right kidney is normal.  Subcentimeter left renal hypodensity, likely simple cyst. Left  extrarenal pelvis. No radiodense genitourinary stones. Normal caliber  small bowel. Colon and appendix are normal. Intra-abdominal  vasculature is patent. No concerning abdominal pelvic lymphadenopathy.  No suspicious pelvic mass.    LOWER EXTREMITIES:   No suspicious FDG uptake in the extremities.    BONES:   Innumerable metastases throughout the axial and appendicular skeleton.  For example the L5 vertebral body (SUV max 6.17 , previously 8.77).  Destructive endplate changes of L1-2 from metastatic disease.  Hypermetabolic skeletal metastases involving the bilateral femoral  necks with SUV max measuring up to 5.06 on the left.         Impression    IMPRESSION: In this patient with history of metastatic infiltrating  ductal carcinoma, currently on chemotherapy and now has evidence of  progressive disease:    1. Increased size and metabolism of the left breast subareolar mass  with satellite metastatic breast nodules. Increased size and  hypermetabolism of the metastatic left axillary and supraclavicular  nodes as described above. No suspicious FDG uptake in the right  breast.  2. No suspicious FDG uptake in the abdomen or pelvis.  3. Innumerable osseous metastases in the axial and appendicular  skeleton, some of which demonstrate increased and others decreased  metabolism as above.  4. Increased destructive endplate changes of L1-2 from metastatic  disease. There is increased risk of compression fractures at  these  levels.     I have personally reviewed the examination and initial interpretation  and I agree with the findings.    DIVINA CANTU MD   CT Chest/Abdomen/Pelvis w Contrast    Narrative    Combined Report of:    PET and CT on  2/19/2021 10:34 AM :    1. PET of the neck, chest, abdomen, and pelvis.  2. PET CT Fusion for Attenuation Correction and Anatomical  Localization:    3. Diagnostic CT scan of the chest, abdomen, and pelvis with  intravenous contrast for interpretation.  3. CT of the chest, abdomen and pelvis obtained for diagnostic  interpretation.  4. 3D MIP and PET-CT fused images were processed on an independent  workstation and archived to PACS and reviewed by a radiologist.    Technique:    1. PET: The patient received 13.19 mCi of F-18-FDG; the serum glucose  was 131 prior to administration, body weight was 97.1 kg. Images were  evaluated in the axial, sagittal, and coronal planes as well as the  rotational whole body MIP. Images were acquired from the Vertex to the  Feet.    UPTAKE WAS MEASURED AT 60 MINUTES.     BACKGROUND:  Liver SUV max= 5.86,   Aorta Blood SUV Max: 3.60.     2. CT: Volumetric acquisition for clinical interpretation of the  chest, abdomen, and pelvis acquired at 3 mm sections . The chest,  abdomen, and pelvis were evaluated at 5 mm sections in bone, soft  tissue, and lung windows.      The patient received 131 cc of Isovue 370 intravenously for the  examination.      3. 3D MIP and PET-CT fused images were processed on an independent  workstation and archived to PACS and reviewed by a radiologist.    INDICATION: Bone metastasis (H); Malignant neoplasm of overlapping  sites of left breast in female, estrogen receptor positive (H);  Malignant neoplasm of overlapping sites of left breast in female,  estrogen receptor positive (H); Metastatic breast cancer (H)    ADDITIONAL INFORMATION OBTAINED FROM EMR: 65-year-old female with  history of left breast invasive ductal carcinoma  currently on  chemotherapy.    COMPARISON: 11/6/2020, 8/10/2020, 3/23/2020.    FINDINGS:     HEAD/NECK:  There is no  suspicious FDG uptake in the neck.     The paranasal sinuses are clear. The mastoid air cells are clear. The  mucosal pharyngeal space, the , prevertebral and carotid  spaces are within normal limits. No masses, mass effect or  pathologically enlarged lymph nodes are evident. The thyroid gland is  normal.    CHEST:  Redemonstration of the FDG avid subareolar left breast mass with  overlying skin thickening and internal calcifications (SUV max 17.30).  The mass measures approximately 3.8 x 4.5 cm (previously 5.7 x 4.4 cm  with SUV max of 11.3). There are multiple satellite lesions with FDG  uptake, most notably a 1.4 cm lesion in the lower outer quadrant of  the breast with SUV max of 9.0, previously 7.1. There are multiple  metastatic left axillary and left supraclavicular lymph nodes with SUV  max measuring up to 13.99 (previously 11.23) in a 1.4 cm left axillary  node (series 5, image 164). No suspicious FDG uptake in the right  breast.    Normal heart size, no pericardial effusion. Normal caliber thoracic  aorta and main pulmonary artery. Normal branching of the thoracic  great vessels. Normal thyroid. No suspicious lower cervical, axillary,  or mediastinal lymphadenopathy. Normal caliber esophagus.    Central tracheobronchial tree is widely patent. No pleural effusion or  pneumothorax. No focal pulmonary opacity or consolidation. No  suspicious pulmonary mass or nodule.    ABDOMEN AND PELVIS:  There is no suspicious FDG uptake in the abdomen or pelvis.    Unremarkable hepatic parenchyma. Portal vasculature is patent.  Gallbladder, pancreas, spleen, adrenals, and right kidney is normal.  Subcentimeter left renal hypodensity, likely simple cyst. Left  extrarenal pelvis. No radiodense genitourinary stones. Normal caliber  small bowel. Colon and appendix are normal.  Intra-abdominal  vasculature is patent. No concerning abdominal pelvic lymphadenopathy.  No suspicious pelvic mass.    LOWER EXTREMITIES:   No suspicious FDG uptake in the extremities.    BONES:   Innumerable metastases throughout the axial and appendicular skeleton.  For example the L5 vertebral body (SUV max 6.17 , previously 8.77).  Destructive endplate changes of L1-2 from metastatic disease.  Hypermetabolic skeletal metastases involving the bilateral femoral  necks with SUV max measuring up to 5.06 on the left.         Impression    IMPRESSION: In this patient with history of metastatic infiltrating  ductal carcinoma, currently on chemotherapy and now has evidence of  progressive disease:    1. Increased size and metabolism of the left breast subareolar mass  with satellite metastatic breast nodules. Increased size and  hypermetabolism of the metastatic left axillary and supraclavicular  nodes as described above. No suspicious FDG uptake in the right  breast.  2. No suspicious FDG uptake in the abdomen or pelvis.  3. Innumerable osseous metastases in the axial and appendicular  skeleton, some of which demonstrate increased and others decreased  metabolism as above.  4. Increased destructive endplate changes of L1-2 from metastatic  disease. There is increased risk of compression fractures at these  levels.     I have personally reviewed the examination and initial interpretation  and I agree with the findings.    DIVINA CANTU MD       ASSESSMENT AND PLAN:   1.  Metastatic ER positive TX positive HER-2/gabriela negative breast cancer with a left breast primary and bone metastasis.    Plan    We discussed the results of her imaging which shows slight increase in both the disease in her left breast and axilla.  Additionally it shows some slight increase in uptake within the bone.  We discussed that she has had a slightly suboptimal result but not overt progression.  I think at this point given that she is on single  agent Arimidex it is most reasonable to add in abemaciclib and we discussed the risk benefits alternatives and side effects for this.  We discussed this at some length and she is interested in doing this.  Additionally we have discussed changing her Zometa which she has been receiving monthly now for the last year to every 3 months.  We discussed this at some length and I will send a prescription for abemaciclib in and have the oral chemotherapy pharmacy team call her to begin this.  She will see me back in 1 month to see how she is tolerating it.    Tiffany Hilton MD on 2/25/2021 at 10:01 AM                   Again, thank you for allowing me to participate in the care of your patient.        Sincerely,        Tiffany Hilton MD

## 2021-02-25 NOTE — PROGRESS NOTES
Desmond is a 65 year old who is being evaluated via a billable video visit.      How would you like to obtain your AVS? MyChart  If the video visit is dropped, the invitation should be resent by: Text to cell phone: [phone]  Will anyone else be joining your video visit? No      Video Start Time: 0910  Video-Visit Details    Type of service:  Video Visit    Video End Time:0950    Originating Location (pt. Location): Home    Distant Location (provider location):  [facility]     Platform used for Video Visit: [employer]    Visit Date:   11/11/2020      ONCOLOGY DIAGNOSIS:  1. March 2020: Metastatic Breast Cancer ER+, CA+ Her2 Negative; with bone metastasis and a left breast primary     THERAPY TO DATE:  1. March 2020 to August 2020: Weekly Taxol.  2. August 2020 to Present: Anastrozole.     INTERVAL HISTORY:  The patient is a 65-year-old female diagnosed with metastatic breast cancer in 03/2020 after noticing a lesion on her breast.  She presents for video visit during the COVID-19 pandemic.  She notes she has been on her Zometa monthly and is tolerating this without any significant problems.  She had her last infusion earlier this week.  She is taking her Arimidex without much in the way of side effects.  She has been feeling good and has no new aches pains or other problems.  She does not have any back pain.  She has lost about 32 pounds in the last year plus that this is been through intentionally changing her diet.  Is been a slow weight loss of a couple of pounds at a time and nothing excessive.  No other new issues     PAST MEDICAL HISTORY: (No new medical history since last visit per patient.)  1.  Metastatic breast cancer, see above.   2.  Hypertension.   3.  Steroid-induced diabetes.  According to the patient, since she has been off steroids, she was told that her blood sugars has normalized and she is off medication.      FAMILY HISTORY:  Mother had breast cancer at 62;  underwent lumpectomy and radiation, and arimidex  No new family history since last seen.     SOCIAL HISTORY:   to her , Dieter.    No current tobacco use.  Retired.  ALLERGIES:  LISINOPRIL.      PHYSICAL EXAM:  GENERAL: Comfortable, no acute distress.   HEAD: Atraumatic/Normocephalic.  EYES: Sclera non-icteric. Grossly normal.  RESPIRATORY: Normal breathing, non-labored. No audible wheezes/cough.  SKIN: No jaundice, discoloration or obvious lesions.  NERUO: No tremors visible. Normal speech.  PSYCH: Alert, oriented. Answered questions appropriately.    The rest of a comprehensive physical examination is deferred due to PHE (public health emergency) video visit restrictions.     Recent Labs   Lab Test 02/23/21  0930 01/20/21  0920 12/23/20  1210 11/25/20  1230 10/28/20  0945 09/02/20  0851 09/02/20  0851 08/12/20  0945 07/15/20  1037 06/25/20  1305 03/04/20  0828 03/04/20  0828 10/28/15  1251   NA  --   --   --   --   --   --  137 136  --  133  --  133 136   POTASSIUM  --   --  3.2*  --   --   --  3.1* 3.3* 3.4 2.9*  --  4.7 4.2   CHLORIDE  --   --   --   --   --   --  100 100  --  99  --  102 102   CO2  --   --   --   --   --   --  30 27  --  29  --  27 24   ANIONGAP  --   --   --   --   --   --  7 9  --  5  --  4 10   BUN  --   --   --   --   --   --  7 6*  --  12  --  12 12   CR 0.73 0.75 0.67 0.58 0.66   < > 0.70  0.69 0.68 0.67 0.66   < > 0.85 0.70   GLC  --   --   --   --   --   --  129* 135*  --  180*  --  192* 103*   BUFFY 10.1 9.8 10.0 9.8 9.5   < > 9.6  9.6 8.9 9.5 9.5   < > 10.2* 10.0    < > = values in this interval not displayed.     No results for input(s): MAG, PHOS in the last 41067 hours.  Recent Labs   Lab Test 11/25/20  1230 09/30/20  0825 08/12/20  0945 08/05/20  1130 07/28/20  0845   WBC 9.1 9.2 5.5 7.2 5.9   HGB 15.7 14.6 10.8* 11.2* 11.7    344 402 454* 442   MCV 83 89 90 89 90   NEUTROPHIL 65.8 74.4 59.0 56.0 57.3     Recent Labs   Lab Test 02/23/21  0930 01/20/21  0920  12/23/20  1210 11/25/20  1230 08/12/20  0945 08/12/20  0945 07/22/20  1140   BILITOTAL  --   --   --  0.3  --  0.5 0.6   ALKPHOS  --   --   --  88  --  75 93   ALT  --   --   --  23  --  35 44   AST  --   --   --  17  --  23 31   ALBUMIN 3.5 3.4 3.7 3.6  3.6   < > 3.1* 3.2*    < > = values in this interval not displayed.     TSH   Date Value Ref Range Status   10/28/2015 1.23 0.40 - 4.00 mU/L Final     No results for input(s): CEA in the last 36110 hours.  Results for orders placed or performed in visit on 02/19/21   PET Oncology Whole Body    Narrative    Combined Report of:    PET and CT on  2/19/2021 10:34 AM :    1. PET of the neck, chest, abdomen, and pelvis.  2. PET CT Fusion for Attenuation Correction and Anatomical  Localization:    3. Diagnostic CT scan of the chest, abdomen, and pelvis with  intravenous contrast for interpretation.  3. CT of the chest, abdomen and pelvis obtained for diagnostic  interpretation.  4. 3D MIP and PET-CT fused images were processed on an independent  workstation and archived to PACS and reviewed by a radiologist.    Technique:    1. PET: The patient received 13.19 mCi of F-18-FDG; the serum glucose  was 131 prior to administration, body weight was 97.1 kg. Images were  evaluated in the axial, sagittal, and coronal planes as well as the  rotational whole body MIP. Images were acquired from the Vertex to the  Feet.    UPTAKE WAS MEASURED AT 60 MINUTES.     BACKGROUND:  Liver SUV max= 5.86,   Aorta Blood SUV Max: 3.60.     2. CT: Volumetric acquisition for clinical interpretation of the  chest, abdomen, and pelvis acquired at 3 mm sections . The chest,  abdomen, and pelvis were evaluated at 5 mm sections in bone, soft  tissue, and lung windows.      The patient received 131 cc of Isovue 370 intravenously for the  examination.      3. 3D MIP and PET-CT fused images were processed on an independent  workstation and archived to PACS and reviewed by a radiologist.    INDICATION: Bone  metastasis (H); Malignant neoplasm of overlapping  sites of left breast in female, estrogen receptor positive (H);  Malignant neoplasm of overlapping sites of left breast in female,  estrogen receptor positive (H); Metastatic breast cancer (H)    ADDITIONAL INFORMATION OBTAINED FROM EMR: 65-year-old female with  history of left breast invasive ductal carcinoma currently on  chemotherapy.    COMPARISON: 11/6/2020, 8/10/2020, 3/23/2020.    FINDINGS:     HEAD/NECK:  There is no  suspicious FDG uptake in the neck.     The paranasal sinuses are clear. The mastoid air cells are clear. The  mucosal pharyngeal space, the , prevertebral and carotid  spaces are within normal limits. No masses, mass effect or  pathologically enlarged lymph nodes are evident. The thyroid gland is  normal.    CHEST:  Redemonstration of the FDG avid subareolar left breast mass with  overlying skin thickening and internal calcifications (SUV max 17.30).  The mass measures approximately 3.8 x 4.5 cm (previously 5.7 x 4.4 cm  with SUV max of 11.3). There are multiple satellite lesions with FDG  uptake, most notably a 1.4 cm lesion in the lower outer quadrant of  the breast with SUV max of 9.0, previously 7.1. There are multiple  metastatic left axillary and left supraclavicular lymph nodes with SUV  max measuring up to 13.99 (previously 11.23) in a 1.4 cm left axillary  node (series 5, image 164). No suspicious FDG uptake in the right  breast.    Normal heart size, no pericardial effusion. Normal caliber thoracic  aorta and main pulmonary artery. Normal branching of the thoracic  great vessels. Normal thyroid. No suspicious lower cervical, axillary,  or mediastinal lymphadenopathy. Normal caliber esophagus.    Central tracheobronchial tree is widely patent. No pleural effusion or  pneumothorax. No focal pulmonary opacity or consolidation. No  suspicious pulmonary mass or nodule.    ABDOMEN AND PELVIS:  There is no suspicious FDG uptake in  the abdomen or pelvis.    Unremarkable hepatic parenchyma. Portal vasculature is patent.  Gallbladder, pancreas, spleen, adrenals, and right kidney is normal.  Subcentimeter left renal hypodensity, likely simple cyst. Left  extrarenal pelvis. No radiodense genitourinary stones. Normal caliber  small bowel. Colon and appendix are normal. Intra-abdominal  vasculature is patent. No concerning abdominal pelvic lymphadenopathy.  No suspicious pelvic mass.    LOWER EXTREMITIES:   No suspicious FDG uptake in the extremities.    BONES:   Innumerable metastases throughout the axial and appendicular skeleton.  For example the L5 vertebral body (SUV max 6.17 , previously 8.77).  Destructive endplate changes of L1-2 from metastatic disease.  Hypermetabolic skeletal metastases involving the bilateral femoral  necks with SUV max measuring up to 5.06 on the left.         Impression    IMPRESSION: In this patient with history of metastatic infiltrating  ductal carcinoma, currently on chemotherapy and now has evidence of  progressive disease:    1. Increased size and metabolism of the left breast subareolar mass  with satellite metastatic breast nodules. Increased size and  hypermetabolism of the metastatic left axillary and supraclavicular  nodes as described above. No suspicious FDG uptake in the right  breast.  2. No suspicious FDG uptake in the abdomen or pelvis.  3. Innumerable osseous metastases in the axial and appendicular  skeleton, some of which demonstrate increased and others decreased  metabolism as above.  4. Increased destructive endplate changes of L1-2 from metastatic  disease. There is increased risk of compression fractures at these  levels.     I have personally reviewed the examination and initial interpretation  and I agree with the findings.    DIVINA CANTU MD   CT Chest/Abdomen/Pelvis w Contrast    Narrative    Combined Report of:    PET and CT on  2/19/2021 10:34 AM :    1. PET of the neck, chest,  abdomen, and pelvis.  2. PET CT Fusion for Attenuation Correction and Anatomical  Localization:    3. Diagnostic CT scan of the chest, abdomen, and pelvis with  intravenous contrast for interpretation.  3. CT of the chest, abdomen and pelvis obtained for diagnostic  interpretation.  4. 3D MIP and PET-CT fused images were processed on an independent  workstation and archived to PACS and reviewed by a radiologist.    Technique:    1. PET: The patient received 13.19 mCi of F-18-FDG; the serum glucose  was 131 prior to administration, body weight was 97.1 kg. Images were  evaluated in the axial, sagittal, and coronal planes as well as the  rotational whole body MIP. Images were acquired from the Vertex to the  Feet.    UPTAKE WAS MEASURED AT 60 MINUTES.     BACKGROUND:  Liver SUV max= 5.86,   Aorta Blood SUV Max: 3.60.     2. CT: Volumetric acquisition for clinical interpretation of the  chest, abdomen, and pelvis acquired at 3 mm sections . The chest,  abdomen, and pelvis were evaluated at 5 mm sections in bone, soft  tissue, and lung windows.      The patient received 131 cc of Isovue 370 intravenously for the  examination.      3. 3D MIP and PET-CT fused images were processed on an independent  workstation and archived to PACS and reviewed by a radiologist.    INDICATION: Bone metastasis (H); Malignant neoplasm of overlapping  sites of left breast in female, estrogen receptor positive (H);  Malignant neoplasm of overlapping sites of left breast in female,  estrogen receptor positive (H); Metastatic breast cancer (H)    ADDITIONAL INFORMATION OBTAINED FROM EMR: 65-year-old female with  history of left breast invasive ductal carcinoma currently on  chemotherapy.    COMPARISON: 11/6/2020, 8/10/2020, 3/23/2020.    FINDINGS:     HEAD/NECK:  There is no  suspicious FDG uptake in the neck.     The paranasal sinuses are clear. The mastoid air cells are clear. The  mucosal pharyngeal space, the , prevertebral and  carotid  spaces are within normal limits. No masses, mass effect or  pathologically enlarged lymph nodes are evident. The thyroid gland is  normal.    CHEST:  Redemonstration of the FDG avid subareolar left breast mass with  overlying skin thickening and internal calcifications (SUV max 17.30).  The mass measures approximately 3.8 x 4.5 cm (previously 5.7 x 4.4 cm  with SUV max of 11.3). There are multiple satellite lesions with FDG  uptake, most notably a 1.4 cm lesion in the lower outer quadrant of  the breast with SUV max of 9.0, previously 7.1. There are multiple  metastatic left axillary and left supraclavicular lymph nodes with SUV  max measuring up to 13.99 (previously 11.23) in a 1.4 cm left axillary  node (series 5, image 164). No suspicious FDG uptake in the right  breast.    Normal heart size, no pericardial effusion. Normal caliber thoracic  aorta and main pulmonary artery. Normal branching of the thoracic  great vessels. Normal thyroid. No suspicious lower cervical, axillary,  or mediastinal lymphadenopathy. Normal caliber esophagus.    Central tracheobronchial tree is widely patent. No pleural effusion or  pneumothorax. No focal pulmonary opacity or consolidation. No  suspicious pulmonary mass or nodule.    ABDOMEN AND PELVIS:  There is no suspicious FDG uptake in the abdomen or pelvis.    Unremarkable hepatic parenchyma. Portal vasculature is patent.  Gallbladder, pancreas, spleen, adrenals, and right kidney is normal.  Subcentimeter left renal hypodensity, likely simple cyst. Left  extrarenal pelvis. No radiodense genitourinary stones. Normal caliber  small bowel. Colon and appendix are normal. Intra-abdominal  vasculature is patent. No concerning abdominal pelvic lymphadenopathy.  No suspicious pelvic mass.    LOWER EXTREMITIES:   No suspicious FDG uptake in the extremities.    BONES:   Innumerable metastases throughout the axial and appendicular skeleton.  For example the L5 vertebral body (SUV max  6.17 , previously 8.77).  Destructive endplate changes of L1-2 from metastatic disease.  Hypermetabolic skeletal metastases involving the bilateral femoral  necks with SUV max measuring up to 5.06 on the left.         Impression    IMPRESSION: In this patient with history of metastatic infiltrating  ductal carcinoma, currently on chemotherapy and now has evidence of  progressive disease:    1. Increased size and metabolism of the left breast subareolar mass  with satellite metastatic breast nodules. Increased size and  hypermetabolism of the metastatic left axillary and supraclavicular  nodes as described above. No suspicious FDG uptake in the right  breast.  2. No suspicious FDG uptake in the abdomen or pelvis.  3. Innumerable osseous metastases in the axial and appendicular  skeleton, some of which demonstrate increased and others decreased  metabolism as above.  4. Increased destructive endplate changes of L1-2 from metastatic  disease. There is increased risk of compression fractures at these  levels.     I have personally reviewed the examination and initial interpretation  and I agree with the findings.    DIVINA CANTU MD       ASSESSMENT AND PLAN:   1.  Metastatic ER positive DC positive HER-2/gabriela negative breast cancer with a left breast primary and bone metastasis.    Plan    We discussed the results of her imaging which shows slight increase in both the disease in her left breast and axilla.  Additionally it shows some slight increase in uptake within the bone.  We discussed that she has had a slightly suboptimal result but not overt progression.  I think at this point given that she is on single agent Arimidex it is most reasonable to add in abemaciclib and we discussed the risk benefits alternatives and side effects for this.  We discussed this at some length and she is interested in doing this.  Additionally we have discussed changing her Zometa which she has been receiving monthly now for the last  year to every 3 months.  We discussed this at some length and I will send a prescription for abemaciclib in and have the oral chemotherapy pharmacy team call her to begin this.  She will see me back in 1 month to see how she is tolerating it.    Tiffany Hilton MD on 2/25/2021 at 10:01 AM

## 2021-02-25 NOTE — TELEPHONE ENCOUNTER
"Oral Chemotherapy Monitoring Program    Primary Oncologist: Dr. Tiffany Hilton  Primary Oncology Clinic: [facility]  Cancer Diagnosis: Breast Cancer     Drug: Verzenio (abemeciclib) 150 mg BID  Start Date: TBD  Expected duration of therapy: Until disease progression or unacceptable toxicity    Drug Interaction Assessment: None noted    Lab Monitoring Plan  Place oral chemo monograph lab box here  Labs drawn outside of [facility]: q2 weeks x 2 months, then monthly   Subjective/Objective:  Mary Olson is a 65 year old female contacted by phone for an initial visit for oral chemotherapy education.      ORAL CHEMOTHERAPY 2/25/2021   Assessment Type New Teach   Diagnosis Code Breast Cancer   Providers Yobani   Clinic Name/Location [facility]   Drug Name Verzenio (Abemaciclib)   Dose 150 mg   Current Schedule BID   Cycle Details Continuous       Vitals:  BP:   BP Readings from Last 1 Encounters:   02/23/21 (!) 191/118     Wt Readings from Last 1 Encounters:   02/25/21 97.1 kg (214 lb)     Estimated body surface area is 2.06 meters squared as calculated from the following:    Height as of 6/18/20: 1.575 m (5' 2\").    Weight as of an earlier encounter on 2/25/21: 97.1 kg (214 lb).      Labs:  No results found for NA within last 30 days.     No results found for K within last 30 days.     _  Result Component Current Result Ref Range   Calcium 10.1 (2/23/2021) 8.5 - 10.1 mg/dL     No results found for Mag within last 30 days.     No results found for Phos within last 30 days.     _  Result Component Current Result Ref Range   Albumin 3.5 (2/23/2021) 3.4 - 5.0 g/dL     No results found for BUN within last 30 days.     _  Result Component Current Result Ref Range   Creatinine 0.73 (2/23/2021) 0.52 - 1.04 mg/dL       No results found for AST within last 30 days.     No results found for ALT within last 30 days.     No results found for BILITOTAL within last 30 days.       No results found " for WBC within last 30 days.     No results found for HGB within last 30 days.     No results found for PLT within last 30 days.     No results found for ANC within last 30 days.       Assessment:  Patient is appropriate to start therapy.    Plan:  Basic chemotherapy teaching was reviewed with the patient including indication, start date of therapy, dose, administration, adverse effects, missed doses, food and drug interactions, monitoring, side effect management, office contact information, and safe handling. Written materials were provided and all questions answered.    Follow-Up:  3/8 1 week f/u call to confirm start date and see how patient is tolerating therapy, confirm she is getting labs q2 weeks for first 2 months     Siva Vega, PharmD  Hematology/Oncology Clinical Pharmacist  [facility]  [phone]

## 2021-02-26 ENCOUNTER — VIRTUAL VISIT (OUTPATIENT)
Dept: FAMILY MEDICINE | Facility: CLINIC | Age: 66
End: 2021-02-26
Payer: COMMERCIAL

## 2021-02-26 DIAGNOSIS — Z17.0 MALIGNANT NEOPLASM OF OVERLAPPING SITES OF LEFT BREAST IN FEMALE, ESTROGEN RECEPTOR POSITIVE (H): Primary | ICD-10-CM

## 2021-02-26 DIAGNOSIS — E87.6 HYPOKALEMIA: ICD-10-CM

## 2021-02-26 DIAGNOSIS — I10 ESSENTIAL HYPERTENSION: ICD-10-CM

## 2021-02-26 DIAGNOSIS — I10 BENIGN ESSENTIAL HYPERTENSION: ICD-10-CM

## 2021-02-26 DIAGNOSIS — C79.51 BONE METASTASIS: ICD-10-CM

## 2021-02-26 DIAGNOSIS — G62.9 NEUROPATHY: ICD-10-CM

## 2021-02-26 DIAGNOSIS — C50.812 MALIGNANT NEOPLASM OF OVERLAPPING SITES OF LEFT BREAST IN FEMALE, ESTROGEN RECEPTOR POSITIVE (H): Primary | ICD-10-CM

## 2021-02-26 PROCEDURE — 99214 OFFICE O/P EST MOD 30 MIN: CPT | Mod: 95 | Performed by: NURSE PRACTITIONER

## 2021-02-26 RX ORDER — GABAPENTIN 250 MG/5ML
SOLUTION ORAL
Qty: 470 ML | Refills: 6 | Status: SHIPPED | OUTPATIENT
Start: 2021-02-26 | End: 2021-10-20

## 2021-02-26 RX ORDER — POTASSIUM CHLORIDE 1.5 G/1.58G
20 POWDER, FOR SOLUTION ORAL 3 TIMES DAILY
Qty: 90 PACKET | Refills: 6 | Status: SHIPPED | OUTPATIENT
Start: 2021-02-26 | End: 2021-08-09

## 2021-02-26 RX ORDER — CHLORTHALIDONE 25 MG/1
25 TABLET ORAL DAILY
Qty: 60 TABLET | Refills: 6 | Status: SHIPPED | OUTPATIENT
Start: 2021-02-26 | End: 2021-10-20

## 2021-02-26 RX ORDER — AMLODIPINE BESYLATE 10 MG/1
10 TABLET ORAL DAILY
Qty: 60 TABLET | Refills: 6 | Status: SHIPPED | OUTPATIENT
Start: 2021-02-26 | End: 2021-10-20

## 2021-02-26 NOTE — PROGRESS NOTES
"Desmond is a 65 year old who is being evaluated via a billable video visit.      How would you like to obtain your AVS? MyChart  If the video visit is dropped, the invitation should be resent by: Text to cell phone: [phone]  Will anyone else be joining your video visit? No    Video Start Time: 8:49 AM    Assessment & Plan   Problem List Items Addressed This Visit     Essential hypertension    Malignant neoplasm of overlapping sites of left breast in female, estrogen receptor positive (H) - Primary    Bone metastasis (H)      Other Visit Diagnoses     Benign essential hypertension        Relevant Medications    chlorthalidone (HYGROTON) 25 MG tablet    amLODIPine (NORVASC) 10 MG tablet    Neuropathy        Relevant Medications    gabapentin (NEURONTIN) 250 MG/5ML solution    Hypokalemia        Relevant Medications    potassium chloride (KLOR-CON) 20 MEQ packet         Diastolic number fluctuates over 90 we discussed this and goal of <140/90  She is on amlodipine 10 mg and chlorthalidone 25 mg daily she will continue monitor this. She is on potassium supplements. BMP checked with ONC  discussed diet rich in potassium she is wanting to get off potassium medications continue for now.     Met breast cancer followed by ONC she has new a physician as Dr. Birch left [facility] will start new medication  Neuropathy in hands - continue gabapentin     Needs PAP and eyes examined.     Review of the result(s) of each unique test - a1c, bmp,   35minutes spent on the date of the encounter doing chart review, history and exam, documentation and further activities as noted above       BMI:   Estimated body mass index is 39.14 kg/m  as calculated from the following:    Height as of 6/18/20: 1.575 m (5' 2\").    Weight as of 2/25/21: 97.1 kg (214 lb).   Weight management plan: Discussed healthy diet and exercise guidelines    FUTURE APPOINTMENTS:       - Follow-up visit in August     No follow-ups on file.    Princess Ring, NICOLE " [facility]    Junaid Logan is a 65 year old who presents for the following health issues     HPI       Hypertension Follow-up      Do you check your blood pressure regularly outside of the clinic? Yes     Are you following a low salt diet? Yes    Are your blood pressures ever more than 140 on the top number (systolic) OR more   than 90 on the bottom number (diastolic), for example 140/90? No      How many servings of fruits and vegetables do you eat daily?  4 or more    On average, how many sweetened beverages do you drink each day (Examples: soda, juice, sweet tea, etc.  Do NOT count diet or artificially sweetened beverages)?   0    How many days per week do you exercise enough to make your heart beat faster?     How many minutes a day do you exercise enough to make your heart beat faster?     How many days per week do you miss taking your medication? 0  132/98    DM- off all DM meds. Last   Lab Results   Component Value Date    A1C 6.5 12/23/2020    A1C 6.3 09/02/2020    A1C 10.6 06/25/2020     Watching salt.     Metastatic breast CA  Treatment includes: will start Verzenio in a week or 2 will need to labs every 2 weeks, Arimidex.     No pain. Wants to get back into yoga. Losing weight.   Next PET scan in May.   She was on calcium infusions every 3 months.       Review of Systems   Constitutional, HEENT, cardiovascular, pulmonary, GI, , musculoskeletal, neuro, skin, endocrine and psych systems are negative, except as otherwise noted.      Objective           Vitals:  No vitals were obtained today due to virtual visit.    Physical Exam   GENERAL: Healthy, alert and no distress  EYES: Eyes grossly normal to inspection.  No discharge or erythema, or obvious scleral/conjunctival abnormalities.  RESP: No audible wheeze, cough, or visible cyanosis.  No visible retractions or increased work of breathing.    SKIN: Visible skin clear. No significant rash, abnormal pigmentation or  lesions.  NEURO: Cranial nerves grossly intact.  Mentation and speech appropriate for age.  PSYCH: Mentation appears normal, affect normal/bright, judgement and insight intact, normal speech and appearance well-groomed.    Office Visit on 02/23/2021   Component Date Value Ref Range Status     Creatinine 02/23/2021 0.73  0.52 - 1.04 mg/dL Final     GFR Estimate 02/23/2021 87  >60 mL/min/[1.73_m2] Final    Comment: Non  GFR Calc  Starting 12/18/2018, serum creatinine based estimated GFR (eGFR) will be   calculated using the Chronic Kidney Disease Epidemiology Collaboration   (CKD-EPI) equation.       GFR Estimate If Black 02/23/2021 >90  >60 mL/min/[1.73_m2] Final    Comment:  GFR Calc  Starting 12/18/2018, serum creatinine based estimated GFR (eGFR) will be   calculated using the Chronic Kidney Disease Epidemiology Collaboration   (CKD-EPI) equation.       Calcium 02/23/2021 10.1  8.5 - 10.1 mg/dL Final     Albumin 02/23/2021 3.5  3.4 - 5.0 g/dL Final             Video-Visit Details    Type of service:  Video Visit    Video End Time:9:23 AM    Originating Location (pt. Location): Home    Distant Location (provider location):  [facility]     Platform used for Video Visit: [employer]

## 2021-03-17 ENCOUNTER — IMMUNIZATION (OUTPATIENT)
Dept: PEDIATRICS | Facility: CLINIC | Age: 66
End: 2021-03-17
Payer: COMMERCIAL

## 2021-03-17 PROCEDURE — 91300 PR COVID VAC PFIZER DIL RECON 30 MCG/0.3 ML IM: CPT

## 2021-03-17 PROCEDURE — 0001A PR COVID VAC PFIZER DIL RECON 30 MCG/0.3 ML IM: CPT

## 2021-03-18 DIAGNOSIS — C50.812 MALIGNANT NEOPLASM OF OVERLAPPING SITES OF LEFT BREAST IN FEMALE, ESTROGEN RECEPTOR POSITIVE (H): Primary | ICD-10-CM

## 2021-03-18 DIAGNOSIS — Z17.0 MALIGNANT NEOPLASM OF OVERLAPPING SITES OF LEFT BREAST IN FEMALE, ESTROGEN RECEPTOR POSITIVE (H): Primary | ICD-10-CM

## 2021-03-19 DIAGNOSIS — C50.812 MALIGNANT NEOPLASM OF OVERLAPPING SITES OF LEFT BREAST IN FEMALE, ESTROGEN RECEPTOR POSITIVE (H): ICD-10-CM

## 2021-03-19 DIAGNOSIS — Z17.0 MALIGNANT NEOPLASM OF OVERLAPPING SITES OF LEFT BREAST IN FEMALE, ESTROGEN RECEPTOR POSITIVE (H): ICD-10-CM

## 2021-03-19 DIAGNOSIS — C79.51 BONE METASTASIS: Primary | ICD-10-CM

## 2021-03-19 RX ORDER — HEPARIN SODIUM (PORCINE) LOCK FLUSH IV SOLN 100 UNIT/ML 100 UNIT/ML
5 SOLUTION INTRAVENOUS
Status: CANCELLED | OUTPATIENT
Start: 2021-05-19

## 2021-03-19 RX ORDER — ZOLEDRONIC ACID 0.04 MG/ML
4 INJECTION, SOLUTION INTRAVENOUS ONCE
Status: CANCELLED | OUTPATIENT
Start: 2021-05-19 | End: 2021-05-24

## 2021-03-19 RX ORDER — HEPARIN SODIUM,PORCINE 10 UNIT/ML
5 VIAL (ML) INTRAVENOUS
Status: CANCELLED | OUTPATIENT
Start: 2021-05-19

## 2021-03-22 NOTE — PROGRESS NOTES
Oral Chemotherapy Monitoring Program     Primary Oncologist: Dr. Tiffany Hilton  Primary Oncology Clinic: [facility]  Cancer Diagnosis: Breast Cancer           Drug: Verzenio (abemeciclib) 150 mg BID  Start Date: 3/14/21  Expected duration of therapy: Until disease progression or unacceptable toxicity     Drug Interaction Assessment: None noted     Lab Monitoring Plan  Place oral chemo monograph lab box here  Labs drawn outside of [place]: q2 weeks x 2 months, then monthly     Subjective/Objective:  Mary Olson is a 65 year old female contacted by phone for a follow-up visit for oral chemotherapy.      ORAL CHEMOTHERAPY 2/25/2021 3/22/2021   Assessment Type New Teach Initial Follow up   Diagnosis Code Breast Cancer Breast Cancer   Providers Yobani Hilton   Clinic Name/Location [facility]   Drug Name Verzenio (Abemaciclib) Verzenio (abemaciclib)   Dose 150 mg 150 mg   Current Schedule BID BID   Cycle Details Continuous Continuous   Start Date of Last Cycle - 3/14/2021   Planned next cycle start date - 4/11/2021   Doses missed in last 2 weeks - 0   Adherence Assessment - Adherent   Adverse Effects - No AE identified during assessment   Home BPs - Not applicable   Any new drug interactions? - No   Is the dose as ordered appropriate for the patient? - No   Pharmacist intervention for dose adjustment? - No   Is the patient currently in pain? - No   Has the patient been assessed within the past 6 months for depression? - Yes   Has the patient missed any days of school, work, or other routine activity? - No   Since the last time we talked, have you been hospitalized or used the emergency room? - No       Vitals:  BP:   BP Readings from Last 1 Encounters:   02/23/21 (!) 191/118     Wt Readings from Last 1 Encounters:   02/25/21 97.1 kg (214 lb)     Estimated body surface area is 2.06 meters squared as calculated from the following:    Height as of 6/18/20: 1.575 m (5'  "2\").    Weight as of 2/25/21: 97.1 kg (214 lb).    Labs:  _  Result Component Current Result Ref Range   Calcium 10.1 (2/23/2021) 8.5 - 10.1 mg/dL     _  Result Component Current Result Ref Range   Albumin 3.5 (2/23/2021) 3.4 - 5.0 g/dL     _  Result Component Current Result Ref Range   Creatinine 0.73 (2/23/2021) 0.52 - 1.04 mg/dL       Assessment/Plan:  Desmond is tolerating her Verzenio therapy well. Will continue with current regimen of 150 mg BID.     Follow-Up:  3/24 0930 L  3/25 0930 V with Yobani Vega, MarthaD  Hematology/Oncology Clinical Pharmacist  [facility]  [phone]  "

## 2021-03-22 NOTE — ORAL ONC MGMT
Siva Vega, PharmD  Hematology/Oncology Clinical Pharmacist  [facility]  [phone]

## 2021-03-24 ENCOUNTER — DOCUMENTATION ONLY (OUTPATIENT)
Dept: ONCOLOGY | Facility: CLINIC | Age: 66
End: 2021-03-24

## 2021-03-24 DIAGNOSIS — C50.812 MALIGNANT NEOPLASM OF OVERLAPPING SITES OF LEFT BREAST IN FEMALE, ESTROGEN RECEPTOR POSITIVE (H): ICD-10-CM

## 2021-03-24 DIAGNOSIS — Z17.0 MALIGNANT NEOPLASM OF OVERLAPPING SITES OF LEFT BREAST IN FEMALE, ESTROGEN RECEPTOR POSITIVE (H): ICD-10-CM

## 2021-03-24 LAB
ALBUMIN SERPL-MCNC: 3.6 G/DL (ref 3.4–5)
ALP SERPL-CCNC: 66 U/L (ref 40–150)
ALT SERPL W P-5'-P-CCNC: 24 U/L (ref 0–50)
ANION GAP SERPL CALCULATED.3IONS-SCNC: 7 MMOL/L (ref 3–14)
AST SERPL W P-5'-P-CCNC: 15 U/L (ref 0–45)
BASOPHILS # BLD AUTO: 0.1 10E9/L (ref 0–0.2)
BASOPHILS NFR BLD AUTO: 0.8 %
BILIRUB SERPL-MCNC: 0.6 MG/DL (ref 0.2–1.3)
BUN SERPL-MCNC: 10 MG/DL (ref 7–30)
CALCIUM SERPL-MCNC: 9.5 MG/DL (ref 8.5–10.1)
CHLORIDE SERPL-SCNC: 102 MMOL/L (ref 94–109)
CO2 SERPL-SCNC: 29 MMOL/L (ref 20–32)
CREAT SERPL-MCNC: 0.96 MG/DL (ref 0.52–1.04)
DIFFERENTIAL METHOD BLD: ABNORMAL
EOSINOPHIL # BLD AUTO: 0.1 10E9/L (ref 0–0.7)
EOSINOPHIL NFR BLD AUTO: 1.5 %
ERYTHROCYTE [DISTWIDTH] IN BLOOD BY AUTOMATED COUNT: 13.9 % (ref 10–15)
GFR SERPL CREATININE-BSD FRML MDRD: 63 ML/MIN/{1.73_M2}
GLUCOSE SERPL-MCNC: 121 MG/DL (ref 70–99)
HCT VFR BLD AUTO: 44.6 % (ref 35–47)
HGB BLD-MCNC: 14.7 G/DL (ref 11.7–15.7)
IMM GRANULOCYTES # BLD: 0 10E9/L (ref 0–0.4)
IMM GRANULOCYTES NFR BLD: 0.5 %
LYMPHOCYTES # BLD AUTO: 1.8 10E9/L (ref 0.8–5.3)
LYMPHOCYTES NFR BLD AUTO: 27.4 %
MCH RBC QN AUTO: 27.7 PG (ref 26.5–33)
MCHC RBC AUTO-ENTMCNC: 33 G/DL (ref 31.5–36.5)
MCV RBC AUTO: 84 FL (ref 78–100)
MONOCYTES # BLD AUTO: 0.2 10E9/L (ref 0–1.3)
MONOCYTES NFR BLD AUTO: 3.7 %
NEUTROPHILS # BLD AUTO: 4.3 10E9/L (ref 1.6–8.3)
NEUTROPHILS NFR BLD AUTO: 66.1 %
PLATELET # BLD AUTO: 226 10E9/L (ref 150–450)
POTASSIUM SERPL-SCNC: 3.3 MMOL/L (ref 3.4–5.3)
PROT SERPL-MCNC: 8.2 G/DL (ref 6.8–8.8)
RBC # BLD AUTO: 5.3 10E12/L (ref 3.8–5.2)
SODIUM SERPL-SCNC: 138 MMOL/L (ref 133–144)
WBC # BLD AUTO: 6.5 10E9/L (ref 4–11)

## 2021-03-24 PROCEDURE — 85025 COMPLETE CBC W/AUTO DIFF WBC: CPT | Performed by: INTERNAL MEDICINE

## 2021-03-24 PROCEDURE — 36591 DRAW BLOOD OFF VENOUS DEVICE: CPT

## 2021-03-24 PROCEDURE — 80053 COMPREHEN METABOLIC PANEL: CPT | Performed by: INTERNAL MEDICINE

## 2021-03-24 RX ORDER — HEPARIN SODIUM (PORCINE) LOCK FLUSH IV SOLN 100 UNIT/ML 100 UNIT/ML
500 SOLUTION INTRAVENOUS ONCE
Status: COMPLETED | OUTPATIENT
Start: 2021-03-24 | End: 2021-03-24

## 2021-03-24 RX ADMIN — HEPARIN SODIUM (PORCINE) LOCK FLUSH IV SOLN 100 UNIT/ML 500 UNITS: 100 SOLUTION at 09:23

## 2021-03-25 ENCOUNTER — ONCOLOGY VISIT (OUTPATIENT)
Dept: ONCOLOGY | Facility: CLINIC | Age: 66
End: 2021-03-25
Payer: COMMERCIAL

## 2021-03-25 VITALS
OXYGEN SATURATION: 97 % | HEIGHT: 62 IN | WEIGHT: 212 LBS | RESPIRATION RATE: 14 BRPM | SYSTOLIC BLOOD PRESSURE: 128 MMHG | BODY MASS INDEX: 39.01 KG/M2 | DIASTOLIC BLOOD PRESSURE: 97 MMHG | HEART RATE: 107 BPM | TEMPERATURE: 98.1 F

## 2021-03-25 DIAGNOSIS — C50.812 MALIGNANT NEOPLASM OF OVERLAPPING SITES OF LEFT BREAST IN FEMALE, ESTROGEN RECEPTOR POSITIVE (H): ICD-10-CM

## 2021-03-25 DIAGNOSIS — C50.919 METASTATIC BREAST CANCER: ICD-10-CM

## 2021-03-25 DIAGNOSIS — Z17.0 MALIGNANT NEOPLASM OF OVERLAPPING SITES OF LEFT BREAST IN FEMALE, ESTROGEN RECEPTOR POSITIVE (H): ICD-10-CM

## 2021-03-25 PROCEDURE — 99214 OFFICE O/P EST MOD 30 MIN: CPT | Performed by: INTERNAL MEDICINE

## 2021-03-25 RX ORDER — ANASTROZOLE 1 MG/1
1 TABLET ORAL DAILY
Qty: 90 TABLET | Refills: 3 | Status: SHIPPED | OUTPATIENT
Start: 2021-03-25 | End: 2021-05-28

## 2021-03-25 ASSESSMENT — PAIN SCALES - GENERAL: PAINLEVEL: NO PAIN (0)

## 2021-03-25 ASSESSMENT — MIFFLIN-ST. JEOR: SCORE: 1464.88

## 2021-03-25 NOTE — NURSING NOTE
"Oncology Rooming Note    March 25, 2021 9:31 AM   Mary Olson is a 65 year old female who presents for:    Chief Complaint   Patient presents with     Oncology Clinic Visit     Follow up     Initial Vitals: BP (!) 128/97 (BP Location: Other (Comment), Patient Position: Sitting)   Pulse 107   Temp 98.1  F (36.7  C) (Oral)   Resp 14   Ht 1.575 m (5' 2\")   Wt 96.2 kg (212 lb)   SpO2 97%   BMI 38.78 kg/m   Estimated body mass index is 38.78 kg/m  as calculated from the following:    Height as of this encounter: 1.575 m (5' 2\").    Weight as of this encounter: 96.2 kg (212 lb). Body surface area is 2.05 meters squared.  No Pain (0) Comment: Data Unavailable   No LMP recorded. Patient is postmenopausal.  Allergies reviewed: Yes  Medications reviewed: Yes    Medications: Medication refills not needed today.  Pharmacy name entered into EPIC:    [facility] - [address]  [facility] - [place], [place] - [address] [place] [address] PHARMACY [facility] - [address].  [place], [place] - [address] [place] [address]    Clinical concerns: Will she have continued diarrhea with Verzenio?  Concerns with the COVID19 virus Dr. Hilton was notified.      Irina Padron [facility]              "

## 2021-03-25 NOTE — LETTER
3/25/2021         RE: Mary Olson  [address]  [place] [address]        Dear Colleague,    Thank you for referring your patient, Mary Olson, to the [facility]. Please see a copy of my visit note below.      Visit Date:   03/25/21       ONCOLOGY DIAGNOSIS:  1. March 2020: Metastatic Breast Cancer ER+, NV+ Her2 Negative; with bone metastasis and a left breast primary     THERAPY TO DATE:  1. March 2020 to August 2020: Weekly Taxol.  2. August 2020 to March 2021: Anastrozole   3. 3/19/21 began abemeciclib with arimidex     INTERVAL HISTORY:  The patient is a 65-year-old female diagnosed with metastatic breast cancer in 03/2020 after noticing a lesion on her breast.  She presents a face-to-face visit today.  We have changed her Zometa to every 3 months and she is not due for this until December.  She started her abemaciclib on 3/14/2021 and states she is tolerating it well.  She had one episode of diarrhea for which she took some Imodium and this resolved fairly quickly.  She has had no other new problems since.  No nausea vomiting abdominal pain fevers chills or night sweats she is feeling fairly well.  She does present in a wheelchair today given her neuropathy and balance issues since she had chemotherapy.  (She had 17 weekly treatment of Taxol at diagnosis)     PAST MEDICAL HISTORY: (No new medical history since last visit per patient.)  1.  Metastatic breast cancer, see above.   2.  Hypertension.   3.  Steroid-induced diabetes.  According to the patient, since she has been off steroids, she was told that her blood sugars has normalized and she is off medication.   4.  Chemotherapy-induced neuropathy     FAMILY HISTORY:  Mother had breast cancer at 62; underwent lumpectomy and radiation, and arimidex  No new family history since last seen.     SOCIAL HISTORY:   to her , Dieter.    No current tobacco use.  Retired.  ALLERGIES:  LISINOPRIL.       PHYSICAL EXAM:  GENERAL: Comfortable, no acute distress.   HEAD: Atraumatic/Normocephalic.  EYES: Sclera non-icteric. Grossly normal.  RESPIRATORY: Normal breathing, non-labored. No audible wheezes/cough.  SKIN: No jaundice, discoloration or obvious lesions.  NERUO: No tremors visible. Normal speech.  PSYCH: Alert, oriented. Answered questions appropriately.    The rest of a comprehensive physical examination is deferred due to PHE (public health emergency) video visit restrictions.     Recent Labs   Lab Test 03/24/21  0930 02/23/21  0930 01/20/21  0920 12/23/20  1210 11/25/20  1230 09/02/20  0851 09/02/20  0851 08/12/20  0945 07/15/20  1037 06/25/20  1305 03/04/20  0828 03/04/20  0828     --   --   --   --   --  137 136  --  133  --  133   POTASSIUM 3.3*  --   --  3.2*  --   --  3.1* 3.3* 3.4 2.9*  --  4.7   CHLORIDE 102  --   --   --   --   --  100 100  --  99  --  102   CO2 29  --   --   --   --   --  30 27  --  29  --  27   ANIONGAP 7  --   --   --   --   --  7 9  --  5  --  4   BUN 10  --   --   --   --   --  7 6*  --  12  --  12   CR 0.96 0.73 0.75 0.67 0.58   < > 0.70  0.69 0.68 0.67 0.66   < > 0.85   *  --   --   --   --   --  129* 135*  --  180*  --  192*   BUFFY 9.5 10.1 9.8 10.0 9.8   < > 9.6  9.6 8.9 9.5 9.5   < > 10.2*    < > = values in this interval not displayed.     No results for input(s): MAG, PHOS in the last 09813 hours.  Recent Labs   Lab Test 03/24/21  0930 11/25/20  1230 09/30/20  0825 08/12/20  0945 08/05/20  1130   WBC 6.5 9.1 9.2 5.5 7.2   HGB 14.7 15.7 14.6 10.8* 11.2*    296 344 402 454*   MCV 84 83 89 90 89   NEUTROPHIL 66.1 65.8 74.4 59.0 56.0     Recent Labs   Lab Test 03/24/21  0930 02/23/21  0930 01/20/21  0920 11/25/20  1230 11/25/20  1230 08/12/20  0945 08/12/20  0945   BILITOTAL 0.6  --   --   --  0.3  --  0.5   ALKPHOS 66  --   --   --  88  --  75   ALT 24  --   --   --  23  --  35   AST 15  --   --   --  17  --  23   ALBUMIN 3.6 3.5 3.4   < > 3.6  3.6   <  > 3.1*    < > = values in this interval not displayed.     TSH   Date Value Ref Range Status   10/28/2015 1.23 0.40 - 4.00 mU/L Final     No results for input(s): CEA in the last 57110 hours.  Results for orders placed or performed in visit on 02/19/21   PET Oncology Whole Body    Narrative    Combined Report of:    PET and CT on  2/19/2021 10:34 AM :    1. PET of the neck, chest, abdomen, and pelvis.  2. PET CT Fusion for Attenuation Correction and Anatomical  Localization:    3. Diagnostic CT scan of the chest, abdomen, and pelvis with  intravenous contrast for interpretation.  3. CT of the chest, abdomen and pelvis obtained for diagnostic  interpretation.  4. 3D MIP and PET-CT fused images were processed on an independent  workstation and archived to PACS and reviewed by a radiologist.    Technique:    1. PET: The patient received 13.19 mCi of F-18-FDG; the serum glucose  was 131 prior to administration, body weight was 97.1 kg. Images were  evaluated in the axial, sagittal, and coronal planes as well as the  rotational whole body MIP. Images were acquired from the Vertex to the  Feet.    UPTAKE WAS MEASURED AT 60 MINUTES.     BACKGROUND:  Liver SUV max= 5.86,   Aorta Blood SUV Max: 3.60.     2. CT: Volumetric acquisition for clinical interpretation of the  chest, abdomen, and pelvis acquired at 3 mm sections . The chest,  abdomen, and pelvis were evaluated at 5 mm sections in bone, soft  tissue, and lung windows.      The patient received 131 cc of Isovue 370 intravenously for the  examination.      3. 3D MIP and PET-CT fused images were processed on an independent  workstation and archived to PACS and reviewed by a radiologist.    INDICATION: Bone metastasis (H); Malignant neoplasm of overlapping  sites of left breast in female, estrogen receptor positive (H);  Malignant neoplasm of overlapping sites of left breast in female,  estrogen receptor positive (H); Metastatic breast cancer (H)    ADDITIONAL  INFORMATION OBTAINED FROM EMR: 65-year-old female with  history of left breast invasive ductal carcinoma currently on  chemotherapy.    COMPARISON: 11/6/2020, 8/10/2020, 3/23/2020.    FINDINGS:     HEAD/NECK:  There is no  suspicious FDG uptake in the neck.     The paranasal sinuses are clear. The mastoid air cells are clear. The  mucosal pharyngeal space, the , prevertebral and carotid  spaces are within normal limits. No masses, mass effect or  pathologically enlarged lymph nodes are evident. The thyroid gland is  normal.    CHEST:  Redemonstration of the FDG avid subareolar left breast mass with  overlying skin thickening and internal calcifications (SUV max 17.30).  The mass measures approximately 3.8 x 4.5 cm (previously 5.7 x 4.4 cm  with SUV max of 11.3). There are multiple satellite lesions with FDG  uptake, most notably a 1.4 cm lesion in the lower outer quadrant of  the breast with SUV max of 9.0, previously 7.1. There are multiple  metastatic left axillary and left supraclavicular lymph nodes with SUV  max measuring up to 13.99 (previously 11.23) in a 1.4 cm left axillary  node (series 5, image 164). No suspicious FDG uptake in the right  breast.    Normal heart size, no pericardial effusion. Normal caliber thoracic  aorta and main pulmonary artery. Normal branching of the thoracic  great vessels. Normal thyroid. No suspicious lower cervical, axillary,  or mediastinal lymphadenopathy. Normal caliber esophagus.    Central tracheobronchial tree is widely patent. No pleural effusion or  pneumothorax. No focal pulmonary opacity or consolidation. No  suspicious pulmonary mass or nodule.    ABDOMEN AND PELVIS:  There is no suspicious FDG uptake in the abdomen or pelvis.    Unremarkable hepatic parenchyma. Portal vasculature is patent.  Gallbladder, pancreas, spleen, adrenals, and right kidney is normal.  Subcentimeter left renal hypodensity, likely simple cyst. Left  extrarenal pelvis. No radiodense  genitourinary stones. Normal caliber  small bowel. Colon and appendix are normal. Intra-abdominal  vasculature is patent. No concerning abdominal pelvic lymphadenopathy.  No suspicious pelvic mass.    LOWER EXTREMITIES:   No suspicious FDG uptake in the extremities.    BONES:   Innumerable metastases throughout the axial and appendicular skeleton.  For example the L5 vertebral body (SUV max 6.17 , previously 8.77).  Destructive endplate changes of L1-2 from metastatic disease.  Hypermetabolic skeletal metastases involving the bilateral femoral  necks with SUV max measuring up to 5.06 on the left.         Impression    IMPRESSION: In this patient with history of metastatic infiltrating  ductal carcinoma, currently on chemotherapy and now has evidence of  progressive disease:    1. Increased size and metabolism of the left breast subareolar mass  with satellite metastatic breast nodules. Increased size and  hypermetabolism of the metastatic left axillary and supraclavicular  nodes as described above. No suspicious FDG uptake in the right  breast.  2. No suspicious FDG uptake in the abdomen or pelvis.  3. Innumerable osseous metastases in the axial and appendicular  skeleton, some of which demonstrate increased and others decreased  metabolism as above.  4. Increased destructive endplate changes of L1-2 from metastatic  disease. There is increased risk of compression fractures at these  levels.     I have personally reviewed the examination and initial interpretation  and I agree with the findings.    DIVINA CANTU MD   CT Chest/Abdomen/Pelvis w Contrast    Narrative    Combined Report of:    PET and CT on  2/19/2021 10:34 AM :    1. PET of the neck, chest, abdomen, and pelvis.  2. PET CT Fusion for Attenuation Correction and Anatomical  Localization:    3. Diagnostic CT scan of the chest, abdomen, and pelvis with  intravenous contrast for interpretation.  3. CT of the chest, abdomen and pelvis obtained for  diagnostic  interpretation.  4. 3D MIP and PET-CT fused images were processed on an independent  workstation and archived to PACS and reviewed by a radiologist.    Technique:    1. PET: The patient received 13.19 mCi of F-18-FDG; the serum glucose  was 131 prior to administration, body weight was 97.1 kg. Images were  evaluated in the axial, sagittal, and coronal planes as well as the  rotational whole body MIP. Images were acquired from the Vertex to the  Feet.    UPTAKE WAS MEASURED AT 60 MINUTES.     BACKGROUND:  Liver SUV max= 5.86,   Aorta Blood SUV Max: 3.60.     2. CT: Volumetric acquisition for clinical interpretation of the  chest, abdomen, and pelvis acquired at 3 mm sections . The chest,  abdomen, and pelvis were evaluated at 5 mm sections in bone, soft  tissue, and lung windows.      The patient received 131 cc of Isovue 370 intravenously for the  examination.      3. 3D MIP and PET-CT fused images were processed on an independent  workstation and archived to PACS and reviewed by a radiologist.    INDICATION: Bone metastasis (H); Malignant neoplasm of overlapping  sites of left breast in female, estrogen receptor positive (H);  Malignant neoplasm of overlapping sites of left breast in female,  estrogen receptor positive (H); Metastatic breast cancer (H)    ADDITIONAL INFORMATION OBTAINED FROM EMR: 65-year-old female with  history of left breast invasive ductal carcinoma currently on  chemotherapy.    COMPARISON: 11/6/2020, 8/10/2020, 3/23/2020.    FINDINGS:     HEAD/NECK:  There is no  suspicious FDG uptake in the neck.     The paranasal sinuses are clear. The mastoid air cells are clear. The  mucosal pharyngeal space, the , prevertebral and carotid  spaces are within normal limits. No masses, mass effect or  pathologically enlarged lymph nodes are evident. The thyroid gland is  normal.    CHEST:  Redemonstration of the FDG avid subareolar left breast mass with  overlying skin thickening and  internal calcifications (SUV max 17.30).  The mass measures approximately 3.8 x 4.5 cm (previously 5.7 x 4.4 cm  with SUV max of 11.3). There are multiple satellite lesions with FDG  uptake, most notably a 1.4 cm lesion in the lower outer quadrant of  the breast with SUV max of 9.0, previously 7.1. There are multiple  metastatic left axillary and left supraclavicular lymph nodes with SUV  max measuring up to 13.99 (previously 11.23) in a 1.4 cm left axillary  node (series 5, image 164). No suspicious FDG uptake in the right  breast.    Normal heart size, no pericardial effusion. Normal caliber thoracic  aorta and main pulmonary artery. Normal branching of the thoracic  great vessels. Normal thyroid. No suspicious lower cervical, axillary,  or mediastinal lymphadenopathy. Normal caliber esophagus.    Central tracheobronchial tree is widely patent. No pleural effusion or  pneumothorax. No focal pulmonary opacity or consolidation. No  suspicious pulmonary mass or nodule.    ABDOMEN AND PELVIS:  There is no suspicious FDG uptake in the abdomen or pelvis.    Unremarkable hepatic parenchyma. Portal vasculature is patent.  Gallbladder, pancreas, spleen, adrenals, and right kidney is normal.  Subcentimeter left renal hypodensity, likely simple cyst. Left  extrarenal pelvis. No radiodense genitourinary stones. Normal caliber  small bowel. Colon and appendix are normal. Intra-abdominal  vasculature is patent. No concerning abdominal pelvic lymphadenopathy.  No suspicious pelvic mass.    LOWER EXTREMITIES:   No suspicious FDG uptake in the extremities.    BONES:   Innumerable metastases throughout the axial and appendicular skeleton.  For example the L5 vertebral body (SUV max 6.17 , previously 8.77).  Destructive endplate changes of L1-2 from metastatic disease.  Hypermetabolic skeletal metastases involving the bilateral femoral  necks with SUV max measuring up to 5.06 on the left.         Impression    IMPRESSION: In this  patient with history of metastatic infiltrating  ductal carcinoma, currently on chemotherapy and now has evidence of  progressive disease:    1. Increased size and metabolism of the left breast subareolar mass  with satellite metastatic breast nodules. Increased size and  hypermetabolism of the metastatic left axillary and supraclavicular  nodes as described above. No suspicious FDG uptake in the right  breast.  2. No suspicious FDG uptake in the abdomen or pelvis.  3. Innumerable osseous metastases in the axial and appendicular  skeleton, some of which demonstrate increased and others decreased  metabolism as above.  4. Increased destructive endplate changes of L1-2 from metastatic  disease. There is increased risk of compression fractures at these  levels.     I have personally reviewed the examination and initial interpretation  and I agree with the findings.    DIVINA CANTU MD       ASSESSMENT AND PLAN:   1.  Metastatic ER positive ME positive HER-2/gabriela negative breast cancer with a left breast primary and bone metastasis.    Plan    1.  She will continue on her Verzenio and see me back with cycle 2 with labs prior.  We will do a CA 27-29 at that time.  2.  Continue Zometa every 3 months.  3.  We will go off and send molecular testing on her biopsy from the left axilla from 3/19/2020 to look for any targetable mutations for subsequent line therapy.  4.  She will do reimaging after she is on her current regimen 3 to 4 months.    Tiffany Hilton MD on 3/25/2021 at 11:49 AM                 Again, thank you for allowing me to participate in the care of your patient.        Sincerely,        Tiffany Hilton MD

## 2021-03-25 NOTE — PROGRESS NOTES
Visit Date:   03/25/21       ONCOLOGY DIAGNOSIS:  1. March 2020: Metastatic Breast Cancer ER+, TN+ Her2 Negative; with bone metastasis and a left breast primary     THERAPY TO DATE:  1. March 2020 to August 2020: Weekly Taxol.  2. August 2020 to March 2021: Anastrozole   3. 3/19/21 began abemeciclib with arimidex     INTERVAL HISTORY:  The patient is a 65-year-old female diagnosed with metastatic breast cancer in 03/2020 after noticing a lesion on her breast.  She presents a face-to-face visit today.  We have changed her Zometa to every 3 months and she is not due for this until December.  She started her abemaciclib on 3/14/2021 and states she is tolerating it well.  She had one episode of diarrhea for which she took some Imodium and this resolved fairly quickly.  She has had no other new problems since.  No nausea vomiting abdominal pain fevers chills or night sweats she is feeling fairly well.  She does present in a wheelchair today given her neuropathy and balance issues since she had chemotherapy.  (She had 17 weekly treatment of Taxol at diagnosis)     PAST MEDICAL HISTORY: (No new medical history since last visit per patient.)  1.  Metastatic breast cancer, see above.   2.  Hypertension.   3.  Steroid-induced diabetes.  According to the patient, since she has been off steroids, she was told that her blood sugars has normalized and she is off medication.   4.  Chemotherapy-induced neuropathy     FAMILY HISTORY:  Mother had breast cancer at 62; underwent lumpectomy and radiation, and arimidex  No new family history since last seen.     SOCIAL HISTORY:   to her , Dieter.    No current tobacco use.  Retired.  ALLERGIES:  LISINOPRIL.      PHYSICAL EXAM:  GENERAL: Comfortable, no acute distress.   HEAD: Atraumatic/Normocephalic.  EYES: Sclera non-icteric. Grossly normal.  RESPIRATORY: Normal breathing, non-labored. No audible wheezes/cough.  SKIN: No jaundice, discoloration or obvious lesions.  NERUO:  No tremors visible. Normal speech.  PSYCH: Alert, oriented. Answered questions appropriately.    The rest of a comprehensive physical examination is deferred due to PHE (public health emergency) video visit restrictions.     Recent Labs   Lab Test 03/24/21 0930 02/23/21  0930 01/20/21  0920 12/23/20  1210 11/25/20  1230 09/02/20  0851 09/02/20  0851 08/12/20  0945 07/15/20  1037 06/25/20  1305 03/04/20  0828 03/04/20  0828     --   --   --   --   --  137 136  --  133  --  133   POTASSIUM 3.3*  --   --  3.2*  --   --  3.1* 3.3* 3.4 2.9*  --  4.7   CHLORIDE 102  --   --   --   --   --  100 100  --  99  --  102   CO2 29  --   --   --   --   --  30 27  --  29  --  27   ANIONGAP 7  --   --   --   --   --  7 9  --  5  --  4   BUN 10  --   --   --   --   --  7 6*  --  12  --  12   CR 0.96 0.73 0.75 0.67 0.58   < > 0.70  0.69 0.68 0.67 0.66   < > 0.85   *  --   --   --   --   --  129* 135*  --  180*  --  192*   BUFFY 9.5 10.1 9.8 10.0 9.8   < > 9.6  9.6 8.9 9.5 9.5   < > 10.2*    < > = values in this interval not displayed.     No results for input(s): MAG, PHOS in the last 03422 hours.  Recent Labs   Lab Test 03/24/21 0930 11/25/20  1230 09/30/20  0825 08/12/20  0945 08/05/20  1130   WBC 6.5 9.1 9.2 5.5 7.2   HGB 14.7 15.7 14.6 10.8* 11.2*    296 344 402 454*   MCV 84 83 89 90 89   NEUTROPHIL 66.1 65.8 74.4 59.0 56.0     Recent Labs   Lab Test 03/24/21  0930 02/23/21  0930 01/20/21  0920 11/25/20  1230 11/25/20  1230 08/12/20  0945 08/12/20  0945   BILITOTAL 0.6  --   --   --  0.3  --  0.5   ALKPHOS 66  --   --   --  88  --  75   ALT 24  --   --   --  23  --  35   AST 15  --   --   --  17  --  23   ALBUMIN 3.6 3.5 3.4   < > 3.6  3.6   < > 3.1*    < > = values in this interval not displayed.     TSH   Date Value Ref Range Status   10/28/2015 1.23 0.40 - 4.00 mU/L Final     No results for input(s): CEA in the last 32208 hours.  Results for orders placed or performed in visit on 02/19/21   PET Oncology  Whole Body    Narrative    Combined Report of:    PET and CT on  2/19/2021 10:34 AM :    1. PET of the neck, chest, abdomen, and pelvis.  2. PET CT Fusion for Attenuation Correction and Anatomical  Localization:    3. Diagnostic CT scan of the chest, abdomen, and pelvis with  intravenous contrast for interpretation.  3. CT of the chest, abdomen and pelvis obtained for diagnostic  interpretation.  4. 3D MIP and PET-CT fused images were processed on an independent  workstation and archived to PACS and reviewed by a radiologist.    Technique:    1. PET: The patient received 13.19 mCi of F-18-FDG; the serum glucose  was 131 prior to administration, body weight was 97.1 kg. Images were  evaluated in the axial, sagittal, and coronal planes as well as the  rotational whole body MIP. Images were acquired from the Vertex to the  Feet.    UPTAKE WAS MEASURED AT 60 MINUTES.     BACKGROUND:  Liver SUV max= 5.86,   Aorta Blood SUV Max: 3.60.     2. CT: Volumetric acquisition for clinical interpretation of the  chest, abdomen, and pelvis acquired at 3 mm sections . The chest,  abdomen, and pelvis were evaluated at 5 mm sections in bone, soft  tissue, and lung windows.      The patient received 131 cc of Isovue 370 intravenously for the  examination.      3. 3D MIP and PET-CT fused images were processed on an independent  workstation and archived to PACS and reviewed by a radiologist.    INDICATION: Bone metastasis (H); Malignant neoplasm of overlapping  sites of left breast in female, estrogen receptor positive (H);  Malignant neoplasm of overlapping sites of left breast in female,  estrogen receptor positive (H); Metastatic breast cancer (H)    ADDITIONAL INFORMATION OBTAINED FROM EMR: 65-year-old female with  history of left breast invasive ductal carcinoma currently on  chemotherapy.    COMPARISON: 11/6/2020, 8/10/2020, 3/23/2020.    FINDINGS:     HEAD/NECK:  There is no  suspicious FDG uptake in the neck.     The paranasal  sinuses are clear. The mastoid air cells are clear. The  mucosal pharyngeal space, the , prevertebral and carotid  spaces are within normal limits. No masses, mass effect or  pathologically enlarged lymph nodes are evident. The thyroid gland is  normal.    CHEST:  Redemonstration of the FDG avid subareolar left breast mass with  overlying skin thickening and internal calcifications (SUV max 17.30).  The mass measures approximately 3.8 x 4.5 cm (previously 5.7 x 4.4 cm  with SUV max of 11.3). There are multiple satellite lesions with FDG  uptake, most notably a 1.4 cm lesion in the lower outer quadrant of  the breast with SUV max of 9.0, previously 7.1. There are multiple  metastatic left axillary and left supraclavicular lymph nodes with SUV  max measuring up to 13.99 (previously 11.23) in a 1.4 cm left axillary  node (series 5, image 164). No suspicious FDG uptake in the right  breast.    Normal heart size, no pericardial effusion. Normal caliber thoracic  aorta and main pulmonary artery. Normal branching of the thoracic  great vessels. Normal thyroid. No suspicious lower cervical, axillary,  or mediastinal lymphadenopathy. Normal caliber esophagus.    Central tracheobronchial tree is widely patent. No pleural effusion or  pneumothorax. No focal pulmonary opacity or consolidation. No  suspicious pulmonary mass or nodule.    ABDOMEN AND PELVIS:  There is no suspicious FDG uptake in the abdomen or pelvis.    Unremarkable hepatic parenchyma. Portal vasculature is patent.  Gallbladder, pancreas, spleen, adrenals, and right kidney is normal.  Subcentimeter left renal hypodensity, likely simple cyst. Left  extrarenal pelvis. No radiodense genitourinary stones. Normal caliber  small bowel. Colon and appendix are normal. Intra-abdominal  vasculature is patent. No concerning abdominal pelvic lymphadenopathy.  No suspicious pelvic mass.    LOWER EXTREMITIES:   No suspicious FDG uptake in the extremities.    BONES:    Innumerable metastases throughout the axial and appendicular skeleton.  For example the L5 vertebral body (SUV max 6.17 , previously 8.77).  Destructive endplate changes of L1-2 from metastatic disease.  Hypermetabolic skeletal metastases involving the bilateral femoral  necks with SUV max measuring up to 5.06 on the left.         Impression    IMPRESSION: In this patient with history of metastatic infiltrating  ductal carcinoma, currently on chemotherapy and now has evidence of  progressive disease:    1. Increased size and metabolism of the left breast subareolar mass  with satellite metastatic breast nodules. Increased size and  hypermetabolism of the metastatic left axillary and supraclavicular  nodes as described above. No suspicious FDG uptake in the right  breast.  2. No suspicious FDG uptake in the abdomen or pelvis.  3. Innumerable osseous metastases in the axial and appendicular  skeleton, some of which demonstrate increased and others decreased  metabolism as above.  4. Increased destructive endplate changes of L1-2 from metastatic  disease. There is increased risk of compression fractures at these  levels.     I have personally reviewed the examination and initial interpretation  and I agree with the findings.    DIVINA CANTU MD   CT Chest/Abdomen/Pelvis w Contrast    Narrative    Combined Report of:    PET and CT on  2/19/2021 10:34 AM :    1. PET of the neck, chest, abdomen, and pelvis.  2. PET CT Fusion for Attenuation Correction and Anatomical  Localization:    3. Diagnostic CT scan of the chest, abdomen, and pelvis with  intravenous contrast for interpretation.  3. CT of the chest, abdomen and pelvis obtained for diagnostic  interpretation.  4. 3D MIP and PET-CT fused images were processed on an independent  workstation and archived to PACS and reviewed by a radiologist.    Technique:    1. PET: The patient received 13.19 mCi of F-18-FDG; the serum glucose  was 131 prior to administration, body  weight was 97.1 kg. Images were  evaluated in the axial, sagittal, and coronal planes as well as the  rotational whole body MIP. Images were acquired from the Vertex to the  Feet.    UPTAKE WAS MEASURED AT 60 MINUTES.     BACKGROUND:  Liver SUV max= 5.86,   Aorta Blood SUV Max: 3.60.     2. CT: Volumetric acquisition for clinical interpretation of the  chest, abdomen, and pelvis acquired at 3 mm sections . The chest,  abdomen, and pelvis were evaluated at 5 mm sections in bone, soft  tissue, and lung windows.      The patient received 131 cc of Isovue 370 intravenously for the  examination.      3. 3D MIP and PET-CT fused images were processed on an independent  workstation and archived to PACS and reviewed by a radiologist.    INDICATION: Bone metastasis (H); Malignant neoplasm of overlapping  sites of left breast in female, estrogen receptor positive (H);  Malignant neoplasm of overlapping sites of left breast in female,  estrogen receptor positive (H); Metastatic breast cancer (H)    ADDITIONAL INFORMATION OBTAINED FROM EMR: 65-year-old female with  history of left breast invasive ductal carcinoma currently on  chemotherapy.    COMPARISON: 11/6/2020, 8/10/2020, 3/23/2020.    FINDINGS:     HEAD/NECK:  There is no  suspicious FDG uptake in the neck.     The paranasal sinuses are clear. The mastoid air cells are clear. The  mucosal pharyngeal space, the , prevertebral and carotid  spaces are within normal limits. No masses, mass effect or  pathologically enlarged lymph nodes are evident. The thyroid gland is  normal.    CHEST:  Redemonstration of the FDG avid subareolar left breast mass with  overlying skin thickening and internal calcifications (SUV max 17.30).  The mass measures approximately 3.8 x 4.5 cm (previously 5.7 x 4.4 cm  with SUV max of 11.3). There are multiple satellite lesions with FDG  uptake, most notably a 1.4 cm lesion in the lower outer quadrant of  the breast with SUV max of 9.0,  previously 7.1. There are multiple  metastatic left axillary and left supraclavicular lymph nodes with SUV  max measuring up to 13.99 (previously 11.23) in a 1.4 cm left axillary  node (series 5, image 164). No suspicious FDG uptake in the right  breast.    Normal heart size, no pericardial effusion. Normal caliber thoracic  aorta and main pulmonary artery. Normal branching of the thoracic  great vessels. Normal thyroid. No suspicious lower cervical, axillary,  or mediastinal lymphadenopathy. Normal caliber esophagus.    Central tracheobronchial tree is widely patent. No pleural effusion or  pneumothorax. No focal pulmonary opacity or consolidation. No  suspicious pulmonary mass or nodule.    ABDOMEN AND PELVIS:  There is no suspicious FDG uptake in the abdomen or pelvis.    Unremarkable hepatic parenchyma. Portal vasculature is patent.  Gallbladder, pancreas, spleen, adrenals, and right kidney is normal.  Subcentimeter left renal hypodensity, likely simple cyst. Left  extrarenal pelvis. No radiodense genitourinary stones. Normal caliber  small bowel. Colon and appendix are normal. Intra-abdominal  vasculature is patent. No concerning abdominal pelvic lymphadenopathy.  No suspicious pelvic mass.    LOWER EXTREMITIES:   No suspicious FDG uptake in the extremities.    BONES:   Innumerable metastases throughout the axial and appendicular skeleton.  For example the L5 vertebral body (SUV max 6.17 , previously 8.77).  Destructive endplate changes of L1-2 from metastatic disease.  Hypermetabolic skeletal metastases involving the bilateral femoral  necks with SUV max measuring up to 5.06 on the left.         Impression    IMPRESSION: In this patient with history of metastatic infiltrating  ductal carcinoma, currently on chemotherapy and now has evidence of  progressive disease:    1. Increased size and metabolism of the left breast subareolar mass  with satellite metastatic breast nodules. Increased size  and  hypermetabolism of the metastatic left axillary and supraclavicular  nodes as described above. No suspicious FDG uptake in the right  breast.  2. No suspicious FDG uptake in the abdomen or pelvis.  3. Innumerable osseous metastases in the axial and appendicular  skeleton, some of which demonstrate increased and others decreased  metabolism as above.  4. Increased destructive endplate changes of L1-2 from metastatic  disease. There is increased risk of compression fractures at these  levels.     I have personally reviewed the examination and initial interpretation  and I agree with the findings.    DIVINA CANTU MD       ASSESSMENT AND PLAN:   1.  Metastatic ER positive [place] positive HER-2/gabriela negative breast cancer with a left breast primary and bone metastasis.    Plan    1.  She will continue on her Verzenio and see me back with cycle 2 with labs prior.  We will do a CA 27-29 at that time.  2.  Continue Zometa every 3 months.  3.  We will go off and send molecular testing on her biopsy from the left axilla from 3/19/2020 to look for any targetable mutations for subsequent line therapy.  4.  She will do reimaging after she is on her current regimen 3 to 4 months.    Tiffany Hilton MD on 3/25/2021 at 11:49 AM

## 2021-03-26 ENCOUNTER — PATIENT OUTREACH (OUTPATIENT)
Dept: ONCOLOGY | Facility: CLINIC | Age: 66
End: 2021-03-26

## 2021-03-26 DIAGNOSIS — C50.812 MALIGNANT NEOPLASM OF OVERLAPPING SITES OF LEFT BREAST IN FEMALE, ESTROGEN RECEPTOR POSITIVE (H): ICD-10-CM

## 2021-03-26 DIAGNOSIS — Z17.0 MALIGNANT NEOPLASM OF OVERLAPPING SITES OF LEFT BREAST IN FEMALE, ESTROGEN RECEPTOR POSITIVE (H): ICD-10-CM

## 2021-03-26 NOTE — PROGRESS NOTES
[facility]:  Care Coordination Note       Dmitri Figueredo, RN, BSN, OCN  Oncology Care Coordinator  [facility]

## 2021-03-29 ENCOUNTER — TELEPHONE (OUTPATIENT)
Dept: ONCOLOGY | Facility: CLINIC | Age: 66
End: 2021-03-29

## 2021-03-29 NOTE — TELEPHONE ENCOUNTER
Received call from patient to report a pink rash in her left groin area. She has been using an antibiotic cream to the area. States she talked to a pharmacist and was told that was ok. She also reports a mild sore throat at times and occasional diarrhea - once every 3 days. Informed patient that she could try Hydrocortisone cream OTC instead of the antibiotic cream. Patient will call if symptoms get worse.

## 2021-04-02 DIAGNOSIS — C50.812 MALIGNANT NEOPLASM OF OVERLAPPING SITES OF LEFT BREAST IN FEMALE, ESTROGEN RECEPTOR POSITIVE (H): Primary | ICD-10-CM

## 2021-04-02 DIAGNOSIS — Z17.0 MALIGNANT NEOPLASM OF OVERLAPPING SITES OF LEFT BREAST IN FEMALE, ESTROGEN RECEPTOR POSITIVE (H): Primary | ICD-10-CM

## 2021-04-07 ENCOUNTER — IMMUNIZATION (OUTPATIENT)
Dept: PEDIATRICS | Facility: CLINIC | Age: 66
End: 2021-04-07
Attending: INTERNAL MEDICINE
Payer: COMMERCIAL

## 2021-04-07 PROCEDURE — 0002A PR COVID VAC PFIZER DIL RECON 30 MCG/0.3 ML IM: CPT

## 2021-04-07 PROCEDURE — 91300 PR COVID VAC PFIZER DIL RECON 30 MCG/0.3 ML IM: CPT

## 2021-04-11 ENCOUNTER — HEALTH MAINTENANCE LETTER (OUTPATIENT)
Age: 66
End: 2021-04-11

## 2021-04-21 ENCOUNTER — DOCUMENTATION ONLY (OUTPATIENT)
Dept: PHARMACY | Facility: CLINIC | Age: 66
End: 2021-04-21

## 2021-04-21 DIAGNOSIS — Z17.0 MALIGNANT NEOPLASM OF OVERLAPPING SITES OF LEFT BREAST IN FEMALE, ESTROGEN RECEPTOR POSITIVE (H): Primary | ICD-10-CM

## 2021-04-21 DIAGNOSIS — C50.812 MALIGNANT NEOPLASM OF OVERLAPPING SITES OF LEFT BREAST IN FEMALE, ESTROGEN RECEPTOR POSITIVE (H): Primary | ICD-10-CM

## 2021-04-21 LAB
ALBUMIN SERPL-MCNC: 3.7 G/DL (ref 3.4–5)
ALP SERPL-CCNC: 50 U/L (ref 40–150)
ALT SERPL W P-5'-P-CCNC: 25 U/L (ref 0–50)
ANION GAP SERPL CALCULATED.3IONS-SCNC: 6 MMOL/L (ref 3–14)
AST SERPL W P-5'-P-CCNC: 21 U/L (ref 0–45)
BASOPHILS # BLD AUTO: 0.1 10E9/L (ref 0–0.2)
BASOPHILS NFR BLD AUTO: 2 %
BILIRUB SERPL-MCNC: 0.4 MG/DL (ref 0.2–1.3)
BUN SERPL-MCNC: 9 MG/DL (ref 7–30)
CALCIUM SERPL-MCNC: 10 MG/DL (ref 8.5–10.1)
CANCER AG27-29 SERPL-ACNC: 309 U/ML (ref 0–39)
CHLORIDE SERPL-SCNC: 97 MMOL/L (ref 94–109)
CO2 SERPL-SCNC: 29 MMOL/L (ref 20–32)
CREAT SERPL-MCNC: 0.84 MG/DL (ref 0.52–1.04)
DIFFERENTIAL METHOD BLD: ABNORMAL
EOSINOPHIL # BLD AUTO: 0.1 10E9/L (ref 0–0.7)
EOSINOPHIL NFR BLD AUTO: 2 %
ERYTHROCYTE [DISTWIDTH] IN BLOOD BY AUTOMATED COUNT: 15.9 % (ref 10–15)
GFR SERPL CREATININE-BSD FRML MDRD: 73 ML/MIN/{1.73_M2}
GLUCOSE SERPL-MCNC: 101 MG/DL (ref 70–99)
HCT VFR BLD AUTO: 38.4 % (ref 35–47)
HGB BLD-MCNC: 13.1 G/DL (ref 11.7–15.7)
LYMPHOCYTES # BLD AUTO: 1.8 10E9/L (ref 0.8–5.3)
LYMPHOCYTES NFR BLD AUTO: 46 %
MCH RBC QN AUTO: 28.7 PG (ref 26.5–33)
MCHC RBC AUTO-ENTMCNC: 34.1 G/DL (ref 31.5–36.5)
MCV RBC AUTO: 84 FL (ref 78–100)
MONOCYTES # BLD AUTO: 0.2 10E9/L (ref 0–1.3)
MONOCYTES NFR BLD AUTO: 6 %
NEUTROPHILS # BLD AUTO: 1.7 10E9/L (ref 1.6–8.3)
NEUTROPHILS NFR BLD AUTO: 44 %
PLATELET # BLD AUTO: 213 10E9/L (ref 150–450)
POTASSIUM SERPL-SCNC: 3.9 MMOL/L (ref 3.4–5.3)
PROT SERPL-MCNC: 7.9 G/DL (ref 6.8–8.8)
RBC # BLD AUTO: 4.57 10E12/L (ref 3.8–5.2)
SODIUM SERPL-SCNC: 132 MMOL/L (ref 133–144)
WBC # BLD AUTO: 3.9 10E9/L (ref 4–11)

## 2021-04-21 PROCEDURE — 85025 COMPLETE CBC W/AUTO DIFF WBC: CPT | Performed by: INTERNAL MEDICINE

## 2021-04-21 PROCEDURE — 80053 COMPREHEN METABOLIC PANEL: CPT | Performed by: INTERNAL MEDICINE

## 2021-04-21 PROCEDURE — 86300 IMMUNOASSAY TUMOR CA 15-3: CPT | Performed by: INTERNAL MEDICINE

## 2021-04-21 PROCEDURE — 36591 DRAW BLOOD OFF VENOUS DEVICE: CPT

## 2021-04-21 RX ORDER — HEPARIN SODIUM (PORCINE) LOCK FLUSH IV SOLN 100 UNIT/ML 100 UNIT/ML
500 SOLUTION INTRAVENOUS ONCE
Status: COMPLETED | OUTPATIENT
Start: 2021-04-21 | End: 2021-04-21

## 2021-04-21 RX ADMIN — HEPARIN SODIUM (PORCINE) LOCK FLUSH IV SOLN 100 UNIT/ML 500 UNITS: 100 SOLUTION at 08:37

## 2021-04-21 NOTE — PROGRESS NOTES
Oral Chemotherapy Monitoring Program  Lab Follow Up    Reviewed lab results from 4/21/21.    ORAL CHEMOTHERAPY 2/25/2021 3/22/2021 3/24/2021 4/2/2021 4/21/2021   Assessment Type New Teach Initial Follow up Lab Monitoring Refill Lab Monitoring   Diagnosis Code Breast Cancer Breast Cancer Breast Cancer Breast Cancer Breast Cancer   [facility]   [facility] Name/Location [facility]   Drug Name Verzenio (Abemaciclib) Verzenio (abemaciclib) Verzenio (abemaciclib) Verzenio (abemaciclib) Verzenio (abemaciclib)   Dose 150 mg 150 mg 150 mg 150 mg 150 mg   Current Schedule BID BID BID BID BID   Cycle Details Continuous Continuous Continuous Continuous Continuous   Start Date of Last Cycle - 3/14/2021 - - 4/11/2021   Planned next cycle start date - 4/11/2021 - - 5/9/2021   Doses missed in last 2 weeks - 0 - - -   Adherence Assessment - Adherent - - -   Adverse Effects - No AE identified during assessment - - No AE identified during assessment   Home BPs - Not applicable - - -   Any new drug interactions? - No - - -   Is the dose as ordered appropriate for the patient? - No - - -   Pharmacist intervention for dose adjustment? - No - - -   Is the patient currently in pain? - No - - -   Has the patient been assessed within the past 6 months for depression? - Yes - - -   Has the patient missed any days of school, work, or other routine activity? - No - - -   Since the last time we talked, have you been hospitalized or used the emergency room? - No - - -       Labs:  _  Result Component Current Result Ref Range   Sodium 132 (L) (4/21/2021) 133 - 144 mmol/L     _  Result Component Current Result Ref Range   Potassium 3.9 (4/21/2021) 3.4 - 5.3 mmol/L     _  Result Component Current Result Ref Range   Calcium 10.0 (4/21/2021) 8.5 - 10.1 mg/dL     No results found for Mag within last 30 days.     No results found for Phos within last 30 days.      _  Result Component Current Result Ref Range   Albumin 3.7 (4/21/2021) 3.4 - 5.0 g/dL     _  Result Component Current Result Ref Range   Urea Nitrogen 9 (4/21/2021) 7 - 30 mg/dL     _  Result Component Current Result Ref Range   Creatinine 0.84 (4/21/2021) 0.52 - 1.04 mg/dL     _  Result Component Current Result Ref Range   AST 21 (4/21/2021) 0 - 45 U/L     _  Result Component Current Result Ref Range   ALT 25 (4/21/2021) 0 - 50 U/L     _  Result Component Current Result Ref Range   Bilirubin Total 0.4 (4/21/2021) 0.2 - 1.3 mg/dL     _  Result Component Current Result Ref Range   WBC 3.9 (L) (4/21/2021) 4.0 - 11.0 10e9/L     _  Result Component Current Result Ref Range   Hemoglobin 13.1 (4/21/2021) 11.7 - 15.7 g/dL     _  Result Component Current Result Ref Range   Platelet Count 213 (4/21/2021) 150 - 450 10e9/L     _  Result Component Current Result Ref Range   Absolute Neutrophil 1.7 (4/21/2021) 1.6 - 8.3 10e9/L       Assessment & Plan:  No concerning abnormalities. Follow up with Dr. Hilton on 4/22    Follow-Up:  4/22: review appt with Yobani Maria, PharmD, MS  Hematology/Oncology Clinical Pharmacist  [facility]  [phone]

## 2021-04-22 ENCOUNTER — VIRTUAL VISIT (OUTPATIENT)
Dept: ONCOLOGY | Facility: CLINIC | Age: 66
End: 2021-04-22
Payer: COMMERCIAL

## 2021-04-22 VITALS — BODY MASS INDEX: 38.41 KG/M2 | WEIGHT: 210 LBS

## 2021-04-22 DIAGNOSIS — Z17.0 MALIGNANT NEOPLASM OF OVERLAPPING SITES OF LEFT BREAST IN FEMALE, ESTROGEN RECEPTOR POSITIVE (H): Primary | ICD-10-CM

## 2021-04-22 DIAGNOSIS — C50.812 MALIGNANT NEOPLASM OF OVERLAPPING SITES OF LEFT BREAST IN FEMALE, ESTROGEN RECEPTOR POSITIVE (H): Primary | ICD-10-CM

## 2021-04-22 PROCEDURE — 99214 OFFICE O/P EST MOD 30 MIN: CPT | Mod: 95 | Performed by: INTERNAL MEDICINE

## 2021-04-22 ASSESSMENT — PAIN SCALES - GENERAL: PAINLEVEL: NO PAIN (0)

## 2021-04-22 NOTE — LETTER
4/22/2021         RE: Mary Olson  [address]  [facility] [address]        Dear Colleague,    Thank you for referring your patient, Mary Olson, to the [facility]. Please see a copy of my visit note below.    Desmond is a 65 year old who is being evaluated via a billable video visit.      How would you like to obtain your AVS? MyChart  If the video visit is dropped, the invitation should be resent by: Text to cell phone: [phone]  Will anyone else be joining your video visit? No      Video Start Time: 0830  Video-Visit Details    Type of service:  Video Visit    Video End Time:0900    Originating Location (pt. Location): Home    Distant Location (provider location):  [facility]     Platform used for Video Visit: Docea Power       Visit Date:   03/25/21       ONCOLOGY DIAGNOSIS:  1. March 2020: Metastatic Breast Cancer ER+, MA+ Her2 Negative; with bone metastasis and a left breast primary     THERAPY TO DATE:  1. March 2020 to August 2020: Weekly Taxol.  2. August 2020 to March 2021: Anastrozole   3. 3/19/21 began abemeciclib with arimidex     INTERVAL HISTORY:  The patient is a 65-year-old female diagnosed with metastatic breast cancer in 03/2020 after noticing a lesion on her breast.  She presents a face-to-face visit today.  We have changed her Zometa to every 3 months.      She started her abemaciclib on 3/14/2021 and states she is tolerating it well.  She is having intermittent diarrhea and has to take 0-3 Imodium a day.  This sometimes interrupts her course of the day.  She has a little bit of fatigue but it is not severe.  She is noted a slight rash on her arms for which she is using hydrocortisone-based cream and is otherwise not bad.  She otherwise states she is doing well and has no pain nausea vomiting fevers chills or night sweats.     PAST MEDICAL HISTORY: (No new medical history since last visit per patient.)  1.   Metastatic breast cancer, see above.   2.  Hypertension.   3.  Steroid-induced diabetes.  According to the patient, since she has been off steroids, she was told that her blood sugars has normalized and she is off medication.   4.  Chemotherapy-induced neuropathy     FAMILY HISTORY:  Mother had breast cancer at 62; underwent lumpectomy and radiation, and arimidex  No new family history since last seen.     SOCIAL HISTORY:   to her , Dieter.    No current tobacco use.  Retired.  ALLERGIES:  LISINOPRIL.      PHYSICAL EXAM:  GENERAL: Comfortable, no acute distress.   HEAD: Atraumatic/Normocephalic.  EYES: Sclera non-icteric. Grossly normal.  RESPIRATORY: Normal breathing, non-labored. No audible wheezes/cough.  SKIN: No jaundice, discoloration or obvious lesions.  NERUO: No tremors visible. Normal speech.  PSYCH: Alert, oriented. Answered questions appropriately.    The rest of a comprehensive physical examination is deferred due to PHE (public health emergency) video visit restrictions.     Recent Labs   Lab Test 04/21/21  0840 03/24/21  0930 02/23/21  0930 01/20/21  0920 12/23/20  1210 09/02/20  0851 09/02/20  0851 08/12/20  0945 06/25/20  1305 06/25/20  1305   * 138  --   --   --   --  137 136  --  133   POTASSIUM 3.9 3.3*  --   --  3.2*  --  3.1* 3.3*   < > 2.9*   CHLORIDE 97 102  --   --   --   --  100 100  --  99   CO2 29 29  --   --   --   --  30 27  --  29   ANIONGAP 6 7  --   --   --   --  7 9  --  5   BUN 9 10  --   --   --   --  7 6*  --  12   CR 0.84 0.96 0.73 0.75 0.67   < > 0.70  0.69 0.68   < > 0.66   * 121*  --   --   --   --  129* 135*  --  180*   BUFFY 10.0 9.5 10.1 9.8 10.0   < > 9.6  9.6 8.9   < > 9.5    < > = values in this interval not displayed.     No results for input(s): MAG, PHOS in the last 95393 hours.  Recent Labs   Lab Test 04/21/21  0840 03/24/21  0930 11/25/20  1230 09/30/20  0825 08/12/20  0945   WBC 3.9* 6.5 9.1 9.2 5.5   HGB 13.1 14.7 15.7 14.6 10.8*   PLT  213 226 296 344 402   MCV 84 84 83 89 90   NEUTROPHIL 44.0 66.1 65.8 74.4 59.0     Recent Labs   Lab Test 04/21/21  0840 03/24/21  0930 02/23/21  0930 11/25/20  1230 11/25/20  1230   BILITOTAL 0.4 0.6  --   --  0.3   ALKPHOS 50 66  --   --  88   ALT 25 24  --   --  23   AST 21 15  --   --  17   ALBUMIN 3.7 3.6 3.5   < > 3.6  3.6    < > = values in this interval not displayed.     TSH   Date Value Ref Range Status   10/28/2015 1.23 0.40 - 4.00 mU/L Final     No results for input(s): CEA in the last 69878 hours.  Results for orders placed or performed in visit on 02/19/21   PET Oncology Whole Body    Narrative    Combined Report of:    PET and CT on  2/19/2021 10:34 AM :    1. PET of the neck, chest, abdomen, and pelvis.  2. PET CT Fusion for Attenuation Correction and Anatomical  Localization:    3. Diagnostic CT scan of the chest, abdomen, and pelvis with  intravenous contrast for interpretation.  3. CT of the chest, abdomen and pelvis obtained for diagnostic  interpretation.  4. 3D MIP and PET-CT fused images were processed on an independent  workstation and archived to PACS and reviewed by a radiologist.    Technique:    1. PET: The patient received 13.19 mCi of F-18-FDG; the serum glucose  was 131 prior to administration, body weight was 97.1 kg. Images were  evaluated in the axial, sagittal, and coronal planes as well as the  rotational whole body MIP. Images were acquired from the Vertex to the  Feet.    UPTAKE WAS MEASURED AT 60 MINUTES.     BACKGROUND:  Liver SUV max= 5.86,   Aorta Blood SUV Max: 3.60.     2. CT: Volumetric acquisition for clinical interpretation of the  chest, abdomen, and pelvis acquired at 3 mm sections . The chest,  abdomen, and pelvis were evaluated at 5 mm sections in bone, soft  tissue, and lung windows.      The patient received 131 cc of Isovue 370 intravenously for the  examination.      3. 3D MIP and PET-CT fused images were processed on an independent  workstation and archived to  PACS and reviewed by a radiologist.    INDICATION: Bone metastasis (H); Malignant neoplasm of overlapping  sites of left breast in female, estrogen receptor positive (H);  Malignant neoplasm of overlapping sites of left breast in female,  estrogen receptor positive (H); Metastatic breast cancer (H)    ADDITIONAL INFORMATION OBTAINED FROM EMR: 65-year-old female with  history of left breast invasive ductal carcinoma currently on  chemotherapy.    COMPARISON: 11/6/2020, 8/10/2020, 3/23/2020.    FINDINGS:     HEAD/NECK:  There is no  suspicious FDG uptake in the neck.     The paranasal sinuses are clear. The mastoid air cells are clear. The  mucosal pharyngeal space, the , prevertebral and carotid  spaces are within normal limits. No masses, mass effect or  pathologically enlarged lymph nodes are evident. The thyroid gland is  normal.    CHEST:  Redemonstration of the FDG avid subareolar left breast mass with  overlying skin thickening and internal calcifications (SUV max 17.30).  The mass measures approximately 3.8 x 4.5 cm (previously 5.7 x 4.4 cm  with SUV max of 11.3). There are multiple satellite lesions with FDG  uptake, most notably a 1.4 cm lesion in the lower outer quadrant of  the breast with SUV max of 9.0, previously 7.1. There are multiple  metastatic left axillary and left supraclavicular lymph nodes with SUV  max measuring up to 13.99 (previously 11.23) in a 1.4 cm left axillary  node (series 5, image 164). No suspicious FDG uptake in the right  breast.    Normal heart size, no pericardial effusion. Normal caliber thoracic  aorta and main pulmonary artery. Normal branching of the thoracic  great vessels. Normal thyroid. No suspicious lower cervical, axillary,  or mediastinal lymphadenopathy. Normal caliber esophagus.    Central tracheobronchial tree is widely patent. No pleural effusion or  pneumothorax. No focal pulmonary opacity or consolidation. No  suspicious pulmonary mass or  nodule.    ABDOMEN AND PELVIS:  There is no suspicious FDG uptake in the abdomen or pelvis.    Unremarkable hepatic parenchyma. Portal vasculature is patent.  Gallbladder, pancreas, spleen, adrenals, and right kidney is normal.  Subcentimeter left renal hypodensity, likely simple cyst. Left  extrarenal pelvis. No radiodense genitourinary stones. Normal caliber  small bowel. Colon and appendix are normal. Intra-abdominal  vasculature is patent. No concerning abdominal pelvic lymphadenopathy.  No suspicious pelvic mass.    LOWER EXTREMITIES:   No suspicious FDG uptake in the extremities.    BONES:   Innumerable metastases throughout the axial and appendicular skeleton.  For example the L5 vertebral body (SUV max 6.17 , previously 8.77).  Destructive endplate changes of L1-2 from metastatic disease.  Hypermetabolic skeletal metastases involving the bilateral femoral  necks with SUV max measuring up to 5.06 on the left.         Impression    IMPRESSION: In this patient with history of metastatic infiltrating  ductal carcinoma, currently on chemotherapy and now has evidence of  progressive disease:    1. Increased size and metabolism of the left breast subareolar mass  with satellite metastatic breast nodules. Increased size and  hypermetabolism of the metastatic left axillary and supraclavicular  nodes as described above. No suspicious FDG uptake in the right  breast.  2. No suspicious FDG uptake in the abdomen or pelvis.  3. Innumerable osseous metastases in the axial and appendicular  skeleton, some of which demonstrate increased and others decreased  metabolism as above.  4. Increased destructive endplate changes of L1-2 from metastatic  disease. There is increased risk of compression fractures at these  levels.     I have personally reviewed the examination and initial interpretation  and I agree with the findings.    DIVINA CANTU MD   CT Chest/Abdomen/Pelvis w Contrast    Narrative    Combined Report of:    PET  and CT on  2/19/2021 10:34 AM :    1. PET of the neck, chest, abdomen, and pelvis.  2. PET CT Fusion for Attenuation Correction and Anatomical  Localization:    3. Diagnostic CT scan of the chest, abdomen, and pelvis with  intravenous contrast for interpretation.  3. CT of the chest, abdomen and pelvis obtained for diagnostic  interpretation.  4. 3D MIP and PET-CT fused images were processed on an independent  workstation and archived to PACS and reviewed by a radiologist.    Technique:    1. PET: The patient received 13.19 mCi of F-18-FDG; the serum glucose  was 131 prior to administration, body weight was 97.1 kg. Images were  evaluated in the axial, sagittal, and coronal planes as well as the  rotational whole body MIP. Images were acquired from the Vertex to the  Feet.    UPTAKE WAS MEASURED AT 60 MINUTES.     BACKGROUND:  Liver SUV max= 5.86,   Aorta Blood SUV Max: 3.60.     2. CT: Volumetric acquisition for clinical interpretation of the  chest, abdomen, and pelvis acquired at 3 mm sections . The chest,  abdomen, and pelvis were evaluated at 5 mm sections in bone, soft  tissue, and lung windows.      The patient received 131 cc of Isovue 370 intravenously for the  examination.      3. 3D MIP and PET-CT fused images were processed on an independent  workstation and archived to PACS and reviewed by a radiologist.    INDICATION: Bone metastasis (H); Malignant neoplasm of overlapping  sites of left breast in female, estrogen receptor positive (H);  Malignant neoplasm of overlapping sites of left breast in female,  estrogen receptor positive (H); Metastatic breast cancer (H)    ADDITIONAL INFORMATION OBTAINED FROM EMR: 65-year-old female with  history of left breast invasive ductal carcinoma currently on  chemotherapy.    COMPARISON: 11/6/2020, 8/10/2020, 3/23/2020.    FINDINGS:     HEAD/NECK:  There is no  suspicious FDG uptake in the neck.     The paranasal sinuses are clear. The mastoid air cells are clear.  The  mucosal pharyngeal space, the , prevertebral and carotid  spaces are within normal limits. No masses, mass effect or  pathologically enlarged lymph nodes are evident. The thyroid gland is  normal.    CHEST:  Redemonstration of the FDG avid subareolar left breast mass with  overlying skin thickening and internal calcifications (SUV max 17.30).  The mass measures approximately 3.8 x 4.5 cm (previously 5.7 x 4.4 cm  with SUV max of 11.3). There are multiple satellite lesions with FDG  uptake, most notably a 1.4 cm lesion in the lower outer quadrant of  the breast with SUV max of 9.0, previously 7.1. There are multiple  metastatic left axillary and left supraclavicular lymph nodes with SUV  max measuring up to 13.99 (previously 11.23) in a 1.4 cm left axillary  node (series 5, image 164). No suspicious FDG uptake in the right  breast.    Normal heart size, no pericardial effusion. Normal caliber thoracic  aorta and main pulmonary artery. Normal branching of the thoracic  great vessels. Normal thyroid. No suspicious lower cervical, axillary,  or mediastinal lymphadenopathy. Normal caliber esophagus.    Central tracheobronchial tree is widely patent. No pleural effusion or  pneumothorax. No focal pulmonary opacity or consolidation. No  suspicious pulmonary mass or nodule.    ABDOMEN AND PELVIS:  There is no suspicious FDG uptake in the abdomen or pelvis.    Unremarkable hepatic parenchyma. Portal vasculature is patent.  Gallbladder, pancreas, spleen, adrenals, and right kidney is normal.  Subcentimeter left renal hypodensity, likely simple cyst. Left  extrarenal pelvis. No radiodense genitourinary stones. Normal caliber  small bowel. Colon and appendix are normal. Intra-abdominal  vasculature is patent. No concerning abdominal pelvic lymphadenopathy.  No suspicious pelvic mass.    LOWER EXTREMITIES:   No suspicious FDG uptake in the extremities.    BONES:   Innumerable metastases throughout the axial and  appendicular skeleton.  For example the L5 vertebral body (SUV max 6.17 , previously 8.77).  Destructive endplate changes of L1-2 from metastatic disease.  Hypermetabolic skeletal metastases involving the bilateral femoral  necks with SUV max measuring up to 5.06 on the left.         Impression    IMPRESSION: In this patient with history of metastatic infiltrating  ductal carcinoma, currently on chemotherapy and now has evidence of  progressive disease:    1. Increased size and metabolism of the left breast subareolar mass  with satellite metastatic breast nodules. Increased size and  hypermetabolism of the metastatic left axillary and supraclavicular  nodes as described above. No suspicious FDG uptake in the right  breast.  2. No suspicious FDG uptake in the abdomen or pelvis.  3. Innumerable osseous metastases in the axial and appendicular  skeleton, some of which demonstrate increased and others decreased  metabolism as above.  4. Increased destructive endplate changes of L1-2 from metastatic  disease. There is increased risk of compression fractures at these  levels.     I have personally reviewed the examination and initial interpretation  and I agree with the findings.    DIVINA CANTU MD           ASSESSMENT AND PLAN:   1.  Metastatic ER positive NM positive HER-2/gabriela negative breast cancer with a left breast primary and bone metastasis.    Plan    1.  She will continue on her of Verzenio and Arimidex.  We will continue with monthly blood work and see me in 1 month.  We did discuss her CA 27-29 which is increased compared to her last one from November.  This may simply be because it had not been checked for a while.  We will continue to watching it going forward with monthly draws.  2.  Zometa in 3 months   3.  We will do reimaging in the next 2 to 4 months.   4.  We discussed her Caris testing which found her to be ER positive NM positive HER-2 negative she does have a PI K3 CA mutation of note it is a  pathologic variant which is outside the mutation of specific variants enrolled in the solar one trial.  She is MSI stable TMB low and no other abnormalities were found.    Tiffany Hilton MD on 4/22/2021 at 9:38 AM                Again, thank you for allowing me to participate in the care of your patient.        Sincerely,        Tiffany Hilton MD

## 2021-04-22 NOTE — PROGRESS NOTES
Desmond is a 65 year old who is being evaluated via a billable video visit.      How would you like to obtain your AVS? MyChart  If the video visit is dropped, the invitation should be resent by: Text to cell phone: [phone]  Will anyone else be joining your video visit? No      Video Start Time: 0830  Video-Visit Details    Type of service:  Video Visit    Video End Time:0900    Originating Location (pt. Location): Home    Distant Location (provider location):  [facility]     Platform used for Video Visit: VicariousWell       Visit Date:   03/25/21       ONCOLOGY DIAGNOSIS:  1. March 2020: Metastatic Breast Cancer ER+, RI+ Her2 Negative; with bone metastasis and a left breast primary     THERAPY TO DATE:  1. March 2020 to August 2020: Weekly Taxol.  2. August 2020 to March 2021: Anastrozole   3. 3/19/21 began abemeciclib with arimidex     INTERVAL HISTORY:  The patient is a 65-year-old female diagnosed with metastatic breast cancer in 03/2020 after noticing a lesion on her breast.  She presents a face-to-face visit today.  We have changed her Zometa to every 3 months.      She started her abemaciclib on 3/14/2021 and states she is tolerating it well.  She is having intermittent diarrhea and has to take 0-3 Imodium a day.  This sometimes interrupts her course of the day.  She has a little bit of fatigue but it is not severe.  She is noted a slight rash on her arms for which she is using hydrocortisone-based cream and is otherwise not bad.  She otherwise states she is doing well and has no pain nausea vomiting fevers chills or night sweats.     PAST MEDICAL HISTORY: (No new medical history since last visit per patient.)  1.  Metastatic breast cancer, see above.   2.  Hypertension.   3.  Steroid-induced diabetes.  According to the patient, since she has been off steroids, she was told that her blood sugars has normalized and she is off medication.   4.  Chemotherapy-induced neuropathy      FAMILY HISTORY:  Mother had breast cancer at 62; underwent lumpectomy and radiation, and arimidex  No new family history since last seen.     SOCIAL HISTORY:   to her , Dieter.    No current tobacco use.  Retired.  ALLERGIES:  LISINOPRIL.      PHYSICAL EXAM:  GENERAL: Comfortable, no acute distress.   HEAD: Atraumatic/Normocephalic.  EYES: Sclera non-icteric. Grossly normal.  RESPIRATORY: Normal breathing, non-labored. No audible wheezes/cough.  SKIN: No jaundice, discoloration or obvious lesions.  NERUO: No tremors visible. Normal speech.  PSYCH: Alert, oriented. Answered questions appropriately.    The rest of a comprehensive physical examination is deferred due to PHE (public health emergency) video visit restrictions.     Recent Labs   Lab Test 04/21/21  0840 03/24/21  0930 02/23/21  0930 01/20/21  0920 12/23/20  1210 09/02/20  0851 09/02/20  0851 08/12/20  0945 06/25/20  1305 06/25/20  1305   * 138  --   --   --   --  137 136  --  133   POTASSIUM 3.9 3.3*  --   --  3.2*  --  3.1* 3.3*   < > 2.9*   CHLORIDE 97 102  --   --   --   --  100 100  --  99   CO2 29 29  --   --   --   --  30 27  --  29   ANIONGAP 6 7  --   --   --   --  7 9  --  5   BUN 9 10  --   --   --   --  7 6*  --  12   CR 0.84 0.96 0.73 0.75 0.67   < > 0.70  0.69 0.68   < > 0.66   * 121*  --   --   --   --  129* 135*  --  180*   BUFFY 10.0 9.5 10.1 9.8 10.0   < > 9.6  9.6 8.9   < > 9.5    < > = values in this interval not displayed.     No results for input(s): MAG, PHOS in the last 59031 hours.  Recent Labs   Lab Test 04/21/21  0840 03/24/21  0930 11/25/20  1230 09/30/20  0825 08/12/20  0945   WBC 3.9* 6.5 9.1 9.2 5.5   HGB 13.1 14.7 15.7 14.6 10.8*    226 296 344 402   MCV 84 84 83 89 90   NEUTROPHIL 44.0 66.1 65.8 74.4 59.0     Recent Labs   Lab Test 04/21/21  0840 03/24/21  0930 02/23/21  0930 11/25/20  1230 11/25/20  1230   BILITOTAL 0.4 0.6  --   --  0.3   ALKPHOS 50 66  --   --  88   ALT 25 24  --   --  23    AST 21 15  --   --  17   ALBUMIN 3.7 3.6 3.5   < > 3.6  3.6    < > = values in this interval not displayed.     TSH   Date Value Ref Range Status   10/28/2015 1.23 0.40 - 4.00 mU/L Final     No results for input(s): CEA in the last 09077 hours.  Results for orders placed or performed in visit on 02/19/21   PET Oncology Whole Body    Narrative    Combined Report of:    PET and CT on  2/19/2021 10:34 AM :    1. PET of the neck, chest, abdomen, and pelvis.  2. PET CT Fusion for Attenuation Correction and Anatomical  Localization:    3. Diagnostic CT scan of the chest, abdomen, and pelvis with  intravenous contrast for interpretation.  3. CT of the chest, abdomen and pelvis obtained for diagnostic  interpretation.  4. 3D MIP and PET-CT fused images were processed on an independent  workstation and archived to PACS and reviewed by a radiologist.    Technique:    1. PET: The patient received 13.19 mCi of F-18-FDG; the serum glucose  was 131 prior to administration, body weight was 97.1 kg. Images were  evaluated in the axial, sagittal, and coronal planes as well as the  rotational whole body MIP. Images were acquired from the Vertex to the  Feet.    UPTAKE WAS MEASURED AT 60 MINUTES.     BACKGROUND:  Liver SUV max= 5.86,   Aorta Blood SUV Max: 3.60.     2. CT: Volumetric acquisition for clinical interpretation of the  chest, abdomen, and pelvis acquired at 3 mm sections . The chest,  abdomen, and pelvis were evaluated at 5 mm sections in bone, soft  tissue, and lung windows.      The patient received 131 cc of Isovue 370 intravenously for the  examination.      3. 3D MIP and PET-CT fused images were processed on an independent  workstation and archived to PACS and reviewed by a radiologist.    INDICATION: Bone metastasis (H); Malignant neoplasm of overlapping  sites of left breast in female, estrogen receptor positive (H);  Malignant neoplasm of overlapping sites of left breast in female,  estrogen receptor positive  (H); Metastatic breast cancer (H)    ADDITIONAL INFORMATION OBTAINED FROM EMR: 65-year-old female with  history of left breast invasive ductal carcinoma currently on  chemotherapy.    COMPARISON: 11/6/2020, 8/10/2020, 3/23/2020.    FINDINGS:     HEAD/NECK:  There is no  suspicious FDG uptake in the neck.     The paranasal sinuses are clear. The mastoid air cells are clear. The  mucosal pharyngeal space, the , prevertebral and carotid  spaces are within normal limits. No masses, mass effect or  pathologically enlarged lymph nodes are evident. The thyroid gland is  normal.    CHEST:  Redemonstration of the FDG avid subareolar left breast mass with  overlying skin thickening and internal calcifications (SUV max 17.30).  The mass measures approximately 3.8 x 4.5 cm (previously 5.7 x 4.4 cm  with SUV max of 11.3). There are multiple satellite lesions with FDG  uptake, most notably a 1.4 cm lesion in the lower outer quadrant of  the breast with SUV max of 9.0, previously 7.1. There are multiple  metastatic left axillary and left supraclavicular lymph nodes with SUV  max measuring up to 13.99 (previously 11.23) in a 1.4 cm left axillary  node (series 5, image 164). No suspicious FDG uptake in the right  breast.    Normal heart size, no pericardial effusion. Normal caliber thoracic  aorta and main pulmonary artery. Normal branching of the thoracic  great vessels. Normal thyroid. No suspicious lower cervical, axillary,  or mediastinal lymphadenopathy. Normal caliber esophagus.    Central tracheobronchial tree is widely patent. No pleural effusion or  pneumothorax. No focal pulmonary opacity or consolidation. No  suspicious pulmonary mass or nodule.    ABDOMEN AND PELVIS:  There is no suspicious FDG uptake in the abdomen or pelvis.    Unremarkable hepatic parenchyma. Portal vasculature is patent.  Gallbladder, pancreas, spleen, adrenals, and right kidney is normal.  Subcentimeter left renal hypodensity, likely  simple cyst. Left  extrarenal pelvis. No radiodense genitourinary stones. Normal caliber  small bowel. Colon and appendix are normal. Intra-abdominal  vasculature is patent. No concerning abdominal pelvic lymphadenopathy.  No suspicious pelvic mass.    LOWER EXTREMITIES:   No suspicious FDG uptake in the extremities.    BONES:   Innumerable metastases throughout the axial and appendicular skeleton.  For example the L5 vertebral body (SUV max 6.17 , previously 8.77).  Destructive endplate changes of L1-2 from metastatic disease.  Hypermetabolic skeletal metastases involving the bilateral femoral  necks with SUV max measuring up to 5.06 on the left.         Impression    IMPRESSION: In this patient with history of metastatic infiltrating  ductal carcinoma, currently on chemotherapy and now has evidence of  progressive disease:    1. Increased size and metabolism of the left breast subareolar mass  with satellite metastatic breast nodules. Increased size and  hypermetabolism of the metastatic left axillary and supraclavicular  nodes as described above. No suspicious FDG uptake in the right  breast.  2. No suspicious FDG uptake in the abdomen or pelvis.  3. Innumerable osseous metastases in the axial and appendicular  skeleton, some of which demonstrate increased and others decreased  metabolism as above.  4. Increased destructive endplate changes of L1-2 from metastatic  disease. There is increased risk of compression fractures at these  levels.     I have personally reviewed the examination and initial interpretation  and I agree with the findings.    DIVINA CANTU MD   CT Chest/Abdomen/Pelvis w Contrast    Narrative    Combined Report of:    PET and CT on  2/19/2021 10:34 AM :    1. PET of the neck, chest, abdomen, and pelvis.  2. PET CT Fusion for Attenuation Correction and Anatomical  Localization:    3. Diagnostic CT scan of the chest, abdomen, and pelvis with  intravenous contrast for interpretation.  3. CT of  the chest, abdomen and pelvis obtained for diagnostic  interpretation.  4. 3D MIP and PET-CT fused images were processed on an independent  workstation and archived to PACS and reviewed by a radiologist.    Technique:    1. PET: The patient received 13.19 mCi of F-18-FDG; the serum glucose  was 131 prior to administration, body weight was 97.1 kg. Images were  evaluated in the axial, sagittal, and coronal planes as well as the  rotational whole body MIP. Images were acquired from the Vertex to the  Feet.    UPTAKE WAS MEASURED AT 60 MINUTES.     BACKGROUND:  Liver SUV max= 5.86,   Aorta Blood SUV Max: 3.60.     2. CT: Volumetric acquisition for clinical interpretation of the  chest, abdomen, and pelvis acquired at 3 mm sections . The chest,  abdomen, and pelvis were evaluated at 5 mm sections in bone, soft  tissue, and lung windows.      The patient received 131 cc of Isovue 370 intravenously for the  examination.      3. 3D MIP and PET-CT fused images were processed on an independent  workstation and archived to PACS and reviewed by a radiologist.    INDICATION: Bone metastasis (H); Malignant neoplasm of overlapping  sites of left breast in female, estrogen receptor positive (H);  Malignant neoplasm of overlapping sites of left breast in female,  estrogen receptor positive (H); Metastatic breast cancer (H)    ADDITIONAL INFORMATION OBTAINED FROM EMR: 65-year-old female with  history of left breast invasive ductal carcinoma currently on  chemotherapy.    COMPARISON: 11/6/2020, 8/10/2020, 3/23/2020.    FINDINGS:     HEAD/NECK:  There is no  suspicious FDG uptake in the neck.     The paranasal sinuses are clear. The mastoid air cells are clear. The  mucosal pharyngeal space, the , prevertebral and carotid  spaces are within normal limits. No masses, mass effect or  pathologically enlarged lymph nodes are evident. The thyroid gland is  normal.    CHEST:  Redemonstration of the FDG avid subareolar left breast  mass with  overlying skin thickening and internal calcifications (SUV max 17.30).  The mass measures approximately 3.8 x 4.5 cm (previously 5.7 x 4.4 cm  with SUV max of 11.3). There are multiple satellite lesions with FDG  uptake, most notably a 1.4 cm lesion in the lower outer quadrant of  the breast with SUV max of 9.0, previously 7.1. There are multiple  metastatic left axillary and left supraclavicular lymph nodes with SUV  max measuring up to 13.99 (previously 11.23) in a 1.4 cm left axillary  node (series 5, image 164). No suspicious FDG uptake in the right  breast.    Normal heart size, no pericardial effusion. Normal caliber thoracic  aorta and main pulmonary artery. Normal branching of the thoracic  great vessels. Normal thyroid. No suspicious lower cervical, axillary,  or mediastinal lymphadenopathy. Normal caliber esophagus.    Central tracheobronchial tree is widely patent. No pleural effusion or  pneumothorax. No focal pulmonary opacity or consolidation. No  suspicious pulmonary mass or nodule.    ABDOMEN AND PELVIS:  There is no suspicious FDG uptake in the abdomen or pelvis.    Unremarkable hepatic parenchyma. Portal vasculature is patent.  Gallbladder, pancreas, spleen, adrenals, and right kidney is normal.  Subcentimeter left renal hypodensity, likely simple cyst. Left  extrarenal pelvis. No radiodense genitourinary stones. Normal caliber  small bowel. Colon and appendix are normal. Intra-abdominal  vasculature is patent. No concerning abdominal pelvic lymphadenopathy.  No suspicious pelvic mass.    LOWER EXTREMITIES:   No suspicious FDG uptake in the extremities.    BONES:   Innumerable metastases throughout the axial and appendicular skeleton.  For example the L5 vertebral body (SUV max 6.17 , previously 8.77).  Destructive endplate changes of L1-2 from metastatic disease.  Hypermetabolic skeletal metastases involving the bilateral femoral  necks with SUV max measuring up to 5.06 on the left.          Impression    IMPRESSION: In this patient with history of metastatic infiltrating  ductal carcinoma, currently on chemotherapy and now has evidence of  progressive disease:    1. Increased size and metabolism of the left breast subareolar mass  with satellite metastatic breast nodules. Increased size and  hypermetabolism of the metastatic left axillary and supraclavicular  nodes as described above. No suspicious FDG uptake in the right  breast.  2. No suspicious FDG uptake in the abdomen or pelvis.  3. Innumerable osseous metastases in the axial and appendicular  skeleton, some of which demonstrate increased and others decreased  metabolism as above.  4. Increased destructive endplate changes of L1-2 from metastatic  disease. There is increased risk of compression fractures at these  levels.     I have personally reviewed the examination and initial interpretation  and I agree with the findings.    DIVINA CANTU MD           ASSESSMENT AND PLAN:   1.  Metastatic ER positive OH positive HER-2/gabriela negative breast cancer with a left breast primary and bone metastasis.    Plan    1.  She will continue on her of Verzenio and Arimidex.  We will continue with monthly blood work and see me in 1 month.  We did discuss her CA 27-29 which is increased compared to her last one from November.  This may simply be because it had not been checked for a while.  We will continue to watching it going forward with monthly draws.  2.  Zometa in 3 months   3.  We will do reimaging in the next 2 to 4 months.   4.  We discussed her Caris testing which found her to be ER positive OH positive HER-2 negative she does have a PI K3 CA mutation of note it is a pathologic variant which is outside the mutation of specific variants enrolled in the solar one trial.  She is MSI stable TMB low and no other abnormalities were found.    Tiffany Hilton MD on 4/22/2021 at 9:38 AM

## 2021-04-26 DIAGNOSIS — C50.812 MALIGNANT NEOPLASM OF OVERLAPPING SITES OF LEFT BREAST IN FEMALE, ESTROGEN RECEPTOR POSITIVE (H): Primary | ICD-10-CM

## 2021-04-26 DIAGNOSIS — Z17.0 MALIGNANT NEOPLASM OF OVERLAPPING SITES OF LEFT BREAST IN FEMALE, ESTROGEN RECEPTOR POSITIVE (H): Primary | ICD-10-CM

## 2021-04-30 ENCOUNTER — TELEPHONE (OUTPATIENT)
Dept: PHARMACY | Facility: CLINIC | Age: 66
End: 2021-04-30

## 2021-04-30 DIAGNOSIS — Z17.0 MALIGNANT NEOPLASM OF OVERLAPPING SITES OF LEFT BREAST IN FEMALE, ESTROGEN RECEPTOR POSITIVE (H): Primary | ICD-10-CM

## 2021-04-30 DIAGNOSIS — C50.812 MALIGNANT NEOPLASM OF OVERLAPPING SITES OF LEFT BREAST IN FEMALE, ESTROGEN RECEPTOR POSITIVE (H): Primary | ICD-10-CM

## 2021-05-19 ENCOUNTER — TELEPHONE (OUTPATIENT)
Dept: PHARMACY | Facility: CLINIC | Age: 66
End: 2021-05-19

## 2021-05-19 ENCOUNTER — INFUSION THERAPY VISIT (OUTPATIENT)
Dept: INFUSION THERAPY | Facility: CLINIC | Age: 66
End: 2021-05-19
Attending: INTERNAL MEDICINE
Payer: COMMERCIAL

## 2021-05-19 VITALS
SYSTOLIC BLOOD PRESSURE: 139 MMHG | WEIGHT: 209 LBS | TEMPERATURE: 97.8 F | DIASTOLIC BLOOD PRESSURE: 71 MMHG | HEART RATE: 97 BPM | BODY MASS INDEX: 38.23 KG/M2 | OXYGEN SATURATION: 96 %

## 2021-05-19 DIAGNOSIS — C50.812 MALIGNANT NEOPLASM OF OVERLAPPING SITES OF LEFT BREAST IN FEMALE, ESTROGEN RECEPTOR POSITIVE (H): ICD-10-CM

## 2021-05-19 DIAGNOSIS — C79.51 BONE METASTASIS: Primary | ICD-10-CM

## 2021-05-19 DIAGNOSIS — Z17.0 MALIGNANT NEOPLASM OF OVERLAPPING SITES OF LEFT BREAST IN FEMALE, ESTROGEN RECEPTOR POSITIVE (H): ICD-10-CM

## 2021-05-19 LAB
ALBUMIN SERPL-MCNC: 3.8 G/DL (ref 3.4–5)
ALBUMIN SERPL-MCNC: 3.9 G/DL (ref 3.4–5)
ALP SERPL-CCNC: 49 U/L (ref 40–150)
ALT SERPL W P-5'-P-CCNC: 30 U/L (ref 0–50)
ANION GAP SERPL CALCULATED.3IONS-SCNC: 6 MMOL/L (ref 3–14)
AST SERPL W P-5'-P-CCNC: 22 U/L (ref 0–45)
BASOPHILS # BLD AUTO: 0.1 10E9/L (ref 0–0.2)
BASOPHILS NFR BLD AUTO: 2.3 %
BILIRUB SERPL-MCNC: 0.6 MG/DL (ref 0.2–1.3)
BUN SERPL-MCNC: 11 MG/DL (ref 7–30)
CALCIUM SERPL-MCNC: 9.5 MG/DL (ref 8.5–10.1)
CALCIUM SERPL-MCNC: 9.7 MG/DL (ref 8.5–10.1)
CANCER AG27-29 SERPL-ACNC: 225 U/ML (ref 0–39)
CHLORIDE SERPL-SCNC: 102 MMOL/L (ref 94–109)
CO2 SERPL-SCNC: 28 MMOL/L (ref 20–32)
CREAT SERPL-MCNC: 0.9 MG/DL (ref 0.52–1.04)
CREAT SERPL-MCNC: 0.91 MG/DL (ref 0.52–1.04)
DIFFERENTIAL METHOD BLD: ABNORMAL
EOSINOPHIL # BLD AUTO: 0.1 10E9/L (ref 0–0.7)
EOSINOPHIL NFR BLD AUTO: 2.1 %
ERYTHROCYTE [DISTWIDTH] IN BLOOD BY AUTOMATED COUNT: 21.4 % (ref 10–15)
GFR SERPL CREATININE-BSD FRML MDRD: 66 ML/MIN/{1.73_M2}
GFR SERPL CREATININE-BSD FRML MDRD: 67 ML/MIN/{1.73_M2}
GLUCOSE SERPL-MCNC: 137 MG/DL (ref 70–99)
HCT VFR BLD AUTO: 38.4 % (ref 35–47)
HGB BLD-MCNC: 13 G/DL (ref 11.7–15.7)
IMM GRANULOCYTES # BLD: 0 10E9/L (ref 0–0.4)
IMM GRANULOCYTES NFR BLD: 0.2 %
LYMPHOCYTES # BLD AUTO: 2.3 10E9/L (ref 0.8–5.3)
LYMPHOCYTES NFR BLD AUTO: 44 %
MCH RBC QN AUTO: 30.2 PG (ref 26.5–33)
MCHC RBC AUTO-ENTMCNC: 33.9 G/DL (ref 31.5–36.5)
MCV RBC AUTO: 89 FL (ref 78–100)
MONOCYTES # BLD AUTO: 0.4 10E9/L (ref 0–1.3)
MONOCYTES NFR BLD AUTO: 7.2 %
NEUTROPHILS # BLD AUTO: 2.3 10E9/L (ref 1.6–8.3)
NEUTROPHILS NFR BLD AUTO: 44.2 %
PLATELET # BLD AUTO: 268 10E9/L (ref 150–450)
POTASSIUM SERPL-SCNC: 3.6 MMOL/L (ref 3.4–5.3)
PROT SERPL-MCNC: 8.4 G/DL (ref 6.8–8.8)
RBC # BLD AUTO: 4.3 10E12/L (ref 3.8–5.2)
SODIUM SERPL-SCNC: 136 MMOL/L (ref 133–144)
WBC # BLD AUTO: 5.2 10E9/L (ref 4–11)

## 2021-05-19 PROCEDURE — 82565 ASSAY OF CREATININE: CPT | Performed by: NURSE PRACTITIONER

## 2021-05-19 PROCEDURE — 99207 PR NO CHARGE NURSE ONLY: CPT

## 2021-05-19 PROCEDURE — 99207 PR NO CHARGE LOS: CPT

## 2021-05-19 PROCEDURE — 82040 ASSAY OF SERUM ALBUMIN: CPT | Performed by: NURSE PRACTITIONER

## 2021-05-19 PROCEDURE — 96365 THER/PROPH/DIAG IV INF INIT: CPT | Performed by: NURSE PRACTITIONER

## 2021-05-19 PROCEDURE — 82310 ASSAY OF CALCIUM: CPT | Performed by: NURSE PRACTITIONER

## 2021-05-19 PROCEDURE — 80053 COMPREHEN METABOLIC PANEL: CPT | Mod: 59 | Performed by: NURSE PRACTITIONER

## 2021-05-19 PROCEDURE — 86300 IMMUNOASSAY TUMOR CA 15-3: CPT | Performed by: NURSE PRACTITIONER

## 2021-05-19 PROCEDURE — 85025 COMPLETE CBC W/AUTO DIFF WBC: CPT | Performed by: NURSE PRACTITIONER

## 2021-05-19 RX ORDER — ZOLEDRONIC ACID 0.04 MG/ML
4 INJECTION, SOLUTION INTRAVENOUS ONCE
Status: COMPLETED | OUTPATIENT
Start: 2021-05-19 | End: 2021-05-19

## 2021-05-19 RX ORDER — HEPARIN SODIUM (PORCINE) LOCK FLUSH IV SOLN 100 UNIT/ML 100 UNIT/ML
500 SOLUTION INTRAVENOUS ONCE
Status: COMPLETED | OUTPATIENT
Start: 2021-05-19 | End: 2021-05-19

## 2021-05-19 RX ORDER — HEPARIN SODIUM (PORCINE) LOCK FLUSH IV SOLN 100 UNIT/ML 100 UNIT/ML
5 SOLUTION INTRAVENOUS
Status: DISCONTINUED | OUTPATIENT
Start: 2021-05-19 | End: 2021-05-19 | Stop reason: HOSPADM

## 2021-05-19 RX ADMIN — HEPARIN SODIUM (PORCINE) LOCK FLUSH IV SOLN 100 UNIT/ML 5 ML: 100 SOLUTION at 09:43

## 2021-05-19 RX ADMIN — Medication 250 ML: at 09:22

## 2021-05-19 RX ADMIN — ZOLEDRONIC ACID 4 MG: 0.04 INJECTION, SOLUTION INTRAVENOUS at 09:23

## 2021-05-19 RX ADMIN — HEPARIN SODIUM (PORCINE) LOCK FLUSH IV SOLN 100 UNIT/ML 500 UNITS: 100 SOLUTION at 08:30

## 2021-05-19 ASSESSMENT — PAIN SCALES - GENERAL: PAINLEVEL: NO PAIN (0)

## 2021-05-19 NOTE — PROGRESS NOTES
Patient's port accessed using sterile procedure. Blood return noted. Gold, green and purple tube drawn, labeled and sent to lab. Port flushed with saline and heparin. Patient tolerated lab draw well.       Ifeoma Barron RN

## 2021-05-19 NOTE — TELEPHONE ENCOUNTER
Oral Chemotherapy lab review 5/19    ORAL CHEMOTHERAPY 2/25/2021 3/22/2021 3/24/2021 4/2/2021 4/21/2021 4/30/2021 5/19/2021   Assessment Type New Teach Initial Follow up Lab Monitoring Refill Lab Monitoring Refill Lab Monitoring   Diagnosis Code Breast Cancer Breast Cancer Breast Cancer Breast Cancer Breast Cancer Breast Cancer Breast Cancer   Providers Yobani Yobani [facility]   Clinic Name/Location [facility]   Drug Name Verzenio (Abemaciclib) Verzenio (abemaciclib) Verzenio (abemaciclib) Verzenio (abemaciclib) Verzenio (abemaciclib) Verzenio (abemaciclib) Verzenio (abemaciclib)   Dose 150 mg 150 mg 150 mg 150 mg 150 mg 150 mg 150 mg   Current Schedule BID BID BID BID BID BID BID   Cycle Details Continuous Continuous Continuous Continuous Continuous Continuous Continuous   Start Date of Last Cycle - 3/14/2021 - - 4/11/2021 - -   Planned next cycle start date - 4/11/2021 - - 5/9/2021 - -   Doses missed in last 2 weeks - 0 - - - - -   Adherence Assessment - Adherent - - - - -   Adverse Effects - No AE identified during assessment - - No AE identified during assessment - -   Home BPs - Not applicable - - - - -   Any new drug interactions? - No - - - - -   Is the dose as ordered appropriate for the patient? - No - - - - -   Pharmacist intervention for dose adjustment? - No - - - - -   Is the patient currently in pain? - No - - - - -   Has the patient been assessed within the past 6 months for depression? - Yes - - - - -   Has the patient missed any days of school, work, or other routine activity? - No - - - - -   Since the last time we talked, have you been hospitalized or used the emergency room? - No - - - - -       Vitals:  BP:   BP Readings from Last 1 Encounters:   05/19/21 139/71     Wt Readings from Last 1 Encounters:   05/19/21 94.8 kg (209 lb)     Estimated body surface area is 2.04 meters squared as  "calculated from the following:    Height as of 3/25/21: 1.575 m (5' 2\").    Weight as of an earlier encounter on 5/19/21: 94.8 kg (209 lb).    Labs:  _  Result Component Current Result Ref Range   Sodium 136 (5/19/2021) 133 - 144 mmol/L     _  Result Component Current Result Ref Range   Potassium 3.6 (5/19/2021) 3.4 - 5.3 mmol/L     _  Result Component Current Result Ref Range   Calcium 9.5 (5/19/2021) 8.5 - 10.1 mg/dL    9.7 (5/19/2021) 8.5 - 10.1 mg/dL     No results found for Mag within last 30 days.     No results found for Phos within last 30 days.     _  Result Component Current Result Ref Range   Albumin 3.8 (5/19/2021) 3.4 - 5.0 g/dL    3.9 (5/19/2021) 3.4 - 5.0 g/dL     _  Result Component Current Result Ref Range   Urea Nitrogen 11 (5/19/2021) 7 - 30 mg/dL     _  Result Component Current Result Ref Range   Creatinine 0.90 (5/19/2021) 0.52 - 1.04 mg/dL    0.91 (5/19/2021) 0.52 - 1.04 mg/dL       _  Result Component Current Result Ref Range   AST 22 (5/19/2021) 0 - 45 U/L     _  Result Component Current Result Ref Range   ALT 30 (5/19/2021) 0 - 50 U/L     _  Result Component Current Result Ref Range   Bilirubin Total 0.6 (5/19/2021) 0.2 - 1.3 mg/dL       _  Result Component Current Result Ref Range   WBC 5.2 (5/19/2021) 4.0 - 11.0 10e9/L     _  Result Component Current Result Ref Range   Hemoglobin 13.0 (5/19/2021) 11.7 - 15.7 g/dL     _  Result Component Current Result Ref Range   Platelet Count 268 (5/19/2021) 150 - 450 10e9/L     _  Result Component Current Result Ref Range   Absolute Neutrophil 2.3 (5/19/2021) 1.6 - 8.3 10e9/L     Assessment & Plan:  No concerning abnormalities.      Follow-Up:  6/16 labs and review for MD sammie Ocampo, PharmD, BCOP  May 19, 2021    "

## 2021-05-19 NOTE — PROGRESS NOTES
Infusion Nursing Note:  Marysweta Olson presents today for Zometa.    Patient seen by provider today: No   present during visit today: Not Applicable.    Note: Pt c/o ongoing numbness/tingling in fingers and feet, occasional diarrhea from verzenio that improves with immodium.  See flow sheet for assessment    Intravenous Access:  Implanted Port.    Treatment Conditions:  Lab Results   Component Value Date     04/21/2021                   Lab Results   Component Value Date    POTASSIUM 3.9 04/21/2021           No results found for: MAG         Lab Results   Component Value Date    CR 0.91 05/19/2021                   Lab Results   Component Value Date    BUFFY 9.7 05/19/2021                Lab Results   Component Value Date    BILITOTAL 0.4 04/21/2021           Lab Results   Component Value Date    ALBUMIN 3.9 05/19/2021                    Lab Results   Component Value Date    ALT 25 04/21/2021           Lab Results   Component Value Date    AST 21 04/21/2021       Results reviewed, labs MET treatment parameters, ok to proceed with treatment.      Post Infusion Assessment:  Patient tolerated infusion without incident.  Blood return noted pre and post infusion.  Site patent and intact, free from redness, edema or discomfort.  No evidence of extravasations.  Access discontinued per protocol.       Discharge Plan:   Patient discharged in stable condition accompanied by: self.  Departure Mode: Ambulatory.  Pt will RTC in 3 months for zometa.    Hernán Karimi RN

## 2021-05-28 ENCOUNTER — ONCOLOGY VISIT (OUTPATIENT)
Dept: ONCOLOGY | Facility: CLINIC | Age: 66
End: 2021-05-28
Payer: COMMERCIAL

## 2021-05-28 VITALS
HEART RATE: 121 BPM | TEMPERATURE: 97.8 F | WEIGHT: 212.7 LBS | RESPIRATION RATE: 18 BRPM | BODY MASS INDEX: 39.14 KG/M2 | SYSTOLIC BLOOD PRESSURE: 175 MMHG | DIASTOLIC BLOOD PRESSURE: 87 MMHG | HEIGHT: 62 IN | OXYGEN SATURATION: 97 %

## 2021-05-28 DIAGNOSIS — C50.919 METASTATIC BREAST CANCER: ICD-10-CM

## 2021-05-28 DIAGNOSIS — C50.812 MALIGNANT NEOPLASM OF OVERLAPPING SITES OF LEFT BREAST IN FEMALE, ESTROGEN RECEPTOR POSITIVE (H): ICD-10-CM

## 2021-05-28 DIAGNOSIS — Z17.0 MALIGNANT NEOPLASM OF OVERLAPPING SITES OF LEFT BREAST IN FEMALE, ESTROGEN RECEPTOR POSITIVE (H): ICD-10-CM

## 2021-05-28 PROCEDURE — 99214 OFFICE O/P EST MOD 30 MIN: CPT | Performed by: INTERNAL MEDICINE

## 2021-05-28 RX ORDER — ANASTROZOLE 1 MG/1
1 TABLET ORAL DAILY
Qty: 90 TABLET | Refills: 3 | Status: SHIPPED | OUTPATIENT
Start: 2021-05-28 | End: 2021-08-27

## 2021-05-28 ASSESSMENT — MIFFLIN-ST. JEOR: SCORE: 1468.18

## 2021-05-28 ASSESSMENT — PAIN SCALES - GENERAL: PAINLEVEL: NO PAIN (0)

## 2021-05-28 NOTE — NURSING NOTE
"Oncology Rooming Note    May 28, 2021 11:56 AM   Mary Olson is a 65 year old female who presents for:    Chief Complaint   Patient presents with     Oncology Clinic Visit     Follow up     Initial Vitals: BP (!) 175/87 (BP Location: Left arm)   Pulse 121   Temp 97.8  F (36.6  C) (Oral)   Resp 18   Ht 1.575 m (5' 2.01\")   Wt 96.5 kg (212 lb 11.2 oz)   SpO2 97%   BMI 38.89 kg/m   Estimated body mass index is 38.89 kg/m  as calculated from the following:    Height as of this encounter: 1.575 m (5' 2.01\").    Weight as of this encounter: 96.5 kg (212 lb 11.2 oz). Body surface area is 2.05 meters squared.  No Pain (0) Comment: Data Unavailable   No LMP recorded. Patient is postmenopausal.  Allergies reviewed: Yes  Medications reviewed: Yes    Medications: MEDICATION REFILLS NEEDED TODAY. Provider was notified.  Pharmacy name entered into EPIC:    [facility] - [place], [place] - [facility]  [facility] - [place], [place] - [facility] [place] - [place], [place] - [address] [place] [address] [facility]LLO[facility]  [facility] [place] [place] [facility] [address] [place] [address]      Kailee Jarvis LPN              "

## 2021-05-28 NOTE — PROGRESS NOTES
Visit Date:   5/28/21       ONCOLOGY DIAGNOSIS:  1. March 2020: Metastatic Breast Cancer ER+, FL+ Her2 Negative; with bone metastasis and a left breast primary. PI K3 CA mutation of note it is a pathologic variant which is outside the mutation of specific variants enrolled in the solar one trial.  TEJAS, TMB low     THERAPY TO DATE:  1. March 2020 to August 2020: Weekly Taxol.  2. August 2020 to March 2021: Anastrozole   3. 3/19/21 began abemeciclib with arimidex she has been dose reduced on her abemaciclib 200 mg a day      INTERVAL HISTORY:  The patient is a 65-year-old female diagnosed with metastatic breast cancer in 03/2020 after noticing a lesion on her breast.  She presents a face-to-face visit today.  We have changed her Zometa to every 3 months.      She started her abemaciclib on 3/14/2021     She is here for routine follow-up and is doing well.  She is currently tolerating her Verzenio quite well.  She has not had any untoward diarrhea nausea vomiting fevers chills or night sweats.  No pain.  She feels she is doing quite well and is pleased with this.     PAST MEDICAL HISTORY: (No new medical history since last visit per patient.)  1.  Metastatic breast cancer, see above.   2.  Hypertension.   3.  Steroid-induced diabetes.  According to the patient, since she has been off steroids, she was told that her blood sugars has normalized and she is off medication.   4.  Chemotherapy-induced neuropathy     FAMILY HISTORY:  Mother had breast cancer at 62; underwent lumpectomy and radiation, and arimidex  No new family history since last seen.     SOCIAL HISTORY:   to her , Dieter.    No current tobacco use.  Retired.  ALLERGIES:  LISINOPRIL.      PHYSICAL EXAM:  GENERAL: Comfortable, no acute distress.   HEAD: Atraumatic/Normocephalic.  EYES: Sclera non-icteric. Grossly normal.  RESPIRATORY: Normal breathing, non-labored. No audible wheezes/cough.  SKIN: No jaundice, discoloration or obvious  lesions.  NERUO: No tremors visible. Normal speech.  PSYCH: Alert, oriented. Answered questions appropriately.    Labs  Recent Labs   Lab Test 05/19/21  0830 04/21/21  0840 03/24/21  0930 02/23/21  0930 01/20/21  0920 12/23/20  1210 09/02/20  0851 09/02/20  0851 08/12/20  0945    132* 138  --   --   --   --  137 136   POTASSIUM 3.6 3.9 3.3*  --   --  3.2*  --  3.1* 3.3*   CHLORIDE 102 97 102  --   --   --   --  100 100   CO2 28 29 29  --   --   --   --  30 27   ANIONGAP 6 6 7  --   --   --   --  7 9   BUN 11 9 10  --   --   --   --  7 6*   CR 0.90  0.91 0.84 0.96 0.73 0.75 0.67   < > 0.70  0.69 0.68   * 101* 121*  --   --   --   --  129* 135*   BUFFY 9.5  9.7 10.0 9.5 10.1 9.8 10.0   < > 9.6  9.6 8.9    < > = values in this interval not displayed.     No results for input(s): MAG, PHOS in the last 98341 hours.  Recent Labs   Lab Test 05/19/21  0830 04/21/21  0840 03/24/21  0930 11/25/20  1230 09/30/20  0825   WBC 5.2 3.9* 6.5 9.1 9.2   HGB 13.0 13.1 14.7 15.7 14.6    213 226 296 344   MCV 89 84 84 83 89   NEUTROPHIL 44.2 44.0 66.1 65.8 74.4     Recent Labs   Lab Test 05/19/21  0830 04/21/21  0840 03/24/21  0930   BILITOTAL 0.6 0.4 0.6   ALKPHOS 49 50 66   ALT 30 25 24   AST 22 21 15   ALBUMIN 3.8  3.9 3.7 3.6     TSH   Date Value Ref Range Status   10/28/2015 1.23 0.40 - 4.00 mU/L Final     No results for input(s): CEA in the last 01763 hours.  Results for orders placed or performed in visit on 02/19/21   PET Oncology Whole Body    Narrative    Combined Report of:    PET and CT on  2/19/2021 10:34 AM :    1. PET of the neck, chest, abdomen, and pelvis.  2. PET CT Fusion for Attenuation Correction and Anatomical  Localization:    3. Diagnostic CT scan of the chest, abdomen, and pelvis with  intravenous contrast for interpretation.  3. CT of the chest, abdomen and pelvis obtained for diagnostic  interpretation.  4. 3D MIP and PET-CT fused images were processed on an independent  workstation and  archived to PACS and reviewed by a radiologist.    Technique:    1. PET: The patient received 13.19 mCi of F-18-FDG; the serum glucose  was 131 prior to administration, body weight was 97.1 kg. Images were  evaluated in the axial, sagittal, and coronal planes as well as the  rotational whole body MIP. Images were acquired from the Vertex to the  Feet.    UPTAKE WAS MEASURED AT 60 MINUTES.     BACKGROUND:  Liver SUV max= 5.86,   Aorta Blood SUV Max: 3.60.     2. CT: Volumetric acquisition for clinical interpretation of the  chest, abdomen, and pelvis acquired at 3 mm sections . The chest,  abdomen, and pelvis were evaluated at 5 mm sections in bone, soft  tissue, and lung windows.      The patient received 131 cc of Isovue 370 intravenously for the  examination.      3. 3D MIP and PET-CT fused images were processed on an independent  workstation and archived to PACS and reviewed by a radiologist.    INDICATION: Bone metastasis (H); Malignant neoplasm of overlapping  sites of left breast in female, estrogen receptor positive (H);  Malignant neoplasm of overlapping sites of left breast in female,  estrogen receptor positive (H); Metastatic breast cancer (H)    ADDITIONAL INFORMATION OBTAINED FROM EMR: 65-year-old female with  history of left breast invasive ductal carcinoma currently on  chemotherapy.    COMPARISON: 11/6/2020, 8/10/2020, 3/23/2020.    FINDINGS:     HEAD/NECK:  There is no  suspicious FDG uptake in the neck.     The paranasal sinuses are clear. The mastoid air cells are clear. The  mucosal pharyngeal space, the , prevertebral and carotid  spaces are within normal limits. No masses, mass effect or  pathologically enlarged lymph nodes are evident. The thyroid gland is  normal.    CHEST:  Redemonstration of the FDG avid subareolar left breast mass with  overlying skin thickening and internal calcifications (SUV max 17.30).  The mass measures approximately 3.8 x 4.5 cm (previously 5.7 x 4.4  cm  with SUV max of 11.3). There are multiple satellite lesions with FDG  uptake, most notably a 1.4 cm lesion in the lower outer quadrant of  the breast with SUV max of 9.0, previously 7.1. There are multiple  metastatic left axillary and left supraclavicular lymph nodes with SUV  max measuring up to 13.99 (previously 11.23) in a 1.4 cm left axillary  node (series 5, image 164). No suspicious FDG uptake in the right  breast.    Normal heart size, no pericardial effusion. Normal caliber thoracic  aorta and main pulmonary artery. Normal branching of the thoracic  great vessels. Normal thyroid. No suspicious lower cervical, axillary,  or mediastinal lymphadenopathy. Normal caliber esophagus.    Central tracheobronchial tree is widely patent. No pleural effusion or  pneumothorax. No focal pulmonary opacity or consolidation. No  suspicious pulmonary mass or nodule.    ABDOMEN AND PELVIS:  There is no suspicious FDG uptake in the abdomen or pelvis.    Unremarkable hepatic parenchyma. Portal vasculature is patent.  Gallbladder, pancreas, spleen, adrenals, and right kidney is normal.  Subcentimeter left renal hypodensity, likely simple cyst. Left  extrarenal pelvis. No radiodense genitourinary stones. Normal caliber  small bowel. Colon and appendix are normal. Intra-abdominal  vasculature is patent. No concerning abdominal pelvic lymphadenopathy.  No suspicious pelvic mass.    LOWER EXTREMITIES:   No suspicious FDG uptake in the extremities.    BONES:   Innumerable metastases throughout the axial and appendicular skeleton.  For example the L5 vertebral body (SUV max 6.17 , previously 8.77).  Destructive endplate changes of L1-2 from metastatic disease.  Hypermetabolic skeletal metastases involving the bilateral femoral  necks with SUV max measuring up to 5.06 on the left.         Impression    IMPRESSION: In this patient with history of metastatic infiltrating  ductal carcinoma, currently on chemotherapy and now has  evidence of  progressive disease:    1. Increased size and metabolism of the left breast subareolar mass  with satellite metastatic breast nodules. Increased size and  hypermetabolism of the metastatic left axillary and supraclavicular  nodes as described above. No suspicious FDG uptake in the right  breast.  2. No suspicious FDG uptake in the abdomen or pelvis.  3. Innumerable osseous metastases in the axial and appendicular  skeleton, some of which demonstrate increased and others decreased  metabolism as above.  4. Increased destructive endplate changes of L1-2 from metastatic  disease. There is increased risk of compression fractures at these  levels.     I have personally reviewed the examination and initial interpretation  and I agree with the findings.    DIVINA CANTU MD   CT Chest/Abdomen/Pelvis w Contrast    Narrative    Combined Report of:    PET and CT on  2/19/2021 10:34 AM :    1. PET of the neck, chest, abdomen, and pelvis.  2. PET CT Fusion for Attenuation Correction and Anatomical  Localization:    3. Diagnostic CT scan of the chest, abdomen, and pelvis with  intravenous contrast for interpretation.  3. CT of the chest, abdomen and pelvis obtained for diagnostic  interpretation.  4. 3D MIP and PET-CT fused images were processed on an independent  workstation and archived to PACS and reviewed by a radiologist.    Technique:    1. PET: The patient received 13.19 mCi of F-18-FDG; the serum glucose  was 131 prior to administration, body weight was 97.1 kg. Images were  evaluated in the axial, sagittal, and coronal planes as well as the  rotational whole body MIP. Images were acquired from the Vertex to the  Feet.    UPTAKE WAS MEASURED AT 60 MINUTES.     BACKGROUND:  Liver SUV max= 5.86,   Aorta Blood SUV Max: 3.60.     2. CT: Volumetric acquisition for clinical interpretation of the  chest, abdomen, and pelvis acquired at 3 mm sections . The chest,  abdomen, and pelvis were evaluated at 5 mm sections  in bone, soft  tissue, and lung windows.      The patient received 131 cc of Isovue 370 intravenously for the  examination.      3. 3D MIP and PET-CT fused images were processed on an independent  workstation and archived to PACS and reviewed by a radiologist.    INDICATION: Bone metastasis (H); Malignant neoplasm of overlapping  sites of left breast in female, estrogen receptor positive (H);  Malignant neoplasm of overlapping sites of left breast in female,  estrogen receptor positive (H); Metastatic breast cancer (H)    ADDITIONAL INFORMATION OBTAINED FROM EMR: 65-year-old female with  history of left breast invasive ductal carcinoma currently on  chemotherapy.    COMPARISON: 11/6/2020, 8/10/2020, 3/23/2020.    FINDINGS:     HEAD/NECK:  There is no  suspicious FDG uptake in the neck.     The paranasal sinuses are clear. The mastoid air cells are clear. The  mucosal pharyngeal space, the , prevertebral and carotid  spaces are within normal limits. No masses, mass effect or  pathologically enlarged lymph nodes are evident. The thyroid gland is  normal.    CHEST:  Redemonstration of the FDG avid subareolar left breast mass with  overlying skin thickening and internal calcifications (SUV max 17.30).  The mass measures approximately 3.8 x 4.5 cm (previously 5.7 x 4.4 cm  with SUV max of 11.3). There are multiple satellite lesions with FDG  uptake, most notably a 1.4 cm lesion in the lower outer quadrant of  the breast with SUV max of 9.0, previously 7.1. There are multiple  metastatic left axillary and left supraclavicular lymph nodes with SUV  max measuring up to 13.99 (previously 11.23) in a 1.4 cm left axillary  node (series 5, image 164). No suspicious FDG uptake in the right  breast.    Normal heart size, no pericardial effusion. Normal caliber thoracic  aorta and main pulmonary artery. Normal branching of the thoracic  great vessels. Normal thyroid. No suspicious lower cervical, axillary,  or  mediastinal lymphadenopathy. Normal caliber esophagus.    Central tracheobronchial tree is widely patent. No pleural effusion or  pneumothorax. No focal pulmonary opacity or consolidation. No  suspicious pulmonary mass or nodule.    ABDOMEN AND PELVIS:  There is no suspicious FDG uptake in the abdomen or pelvis.    Unremarkable hepatic parenchyma. Portal vasculature is patent.  Gallbladder, pancreas, spleen, adrenals, and right kidney is normal.  Subcentimeter left renal hypodensity, likely simple cyst. Left  extrarenal pelvis. No radiodense genitourinary stones. Normal caliber  small bowel. Colon and appendix are normal. Intra-abdominal  vasculature is patent. No concerning abdominal pelvic lymphadenopathy.  No suspicious pelvic mass.    LOWER EXTREMITIES:   No suspicious FDG uptake in the extremities.    BONES:   Innumerable metastases throughout the axial and appendicular skeleton.  For example the L5 vertebral body (SUV max 6.17 , previously 8.77).  Destructive endplate changes of L1-2 from metastatic disease.  Hypermetabolic skeletal metastases involving the bilateral femoral  necks with SUV max measuring up to 5.06 on the left.         Impression    IMPRESSION: In this patient with history of metastatic infiltrating  ductal carcinoma, currently on chemotherapy and now has evidence of  progressive disease:    1. Increased size and metabolism of the left breast subareolar mass  with satellite metastatic breast nodules. Increased size and  hypermetabolism of the metastatic left axillary and supraclavicular  nodes as described above. No suspicious FDG uptake in the right  breast.  2. No suspicious FDG uptake in the abdomen or pelvis.  3. Innumerable osseous metastases in the axial and appendicular  skeleton, some of which demonstrate increased and others decreased  metabolism as above.  4. Increased destructive endplate changes of L1-2 from metastatic  disease. There is increased risk of compression fractures at  these  levels.     I have personally reviewed the examination and initial interpretation  and I agree with the findings.    DIVINA CANTU MD             ASSESSMENT AND PLAN:   1.  Metastatic ER positive TX positive HER-2/gabriela negative breast cancer with a left breast primary and bone metastasis.    Plan    1.  She will continue on her of Verzenio and Arimidex.  We will continue with monthly blood work and see mid July with a follow-up PET scan prior.  She is due for her Zometa next August.  We did discuss that her CA 27-29 is decreased and this is a good sign.  She will otherwise continue her Verzenio at 100 mg a day along with her Arimidex.    Tiffany Hilton MD on 5/31/2021 at 9:31 AM

## 2021-05-28 NOTE — LETTER
5/28/2021         RE: Mary Olson  [address]  [facility] [address]        Dear Colleague,    Thank you for referring your patient, Mary Olson, to the [facility]. Please see a copy of my visit note below.    Visit Date:   5/28/21       ONCOLOGY DIAGNOSIS:  1. March 2020: Metastatic Breast Cancer ER+, DE+ Her2 Negative; with bone metastasis and a left breast primary. PI K3 CA mutation of note it is a pathologic variant which is outside the mutation of specific variants enrolled in the solar one trial.  TEJAS, TMB low     THERAPY TO DATE:  1. March 2020 to August 2020: Weekly Taxol.  2. August 2020 to March 2021: Anastrozole   3. 3/19/21 began abemeciclib with arimidex she has been dose reduced on her abemaciclib 200 mg a day      INTERVAL HISTORY:  The patient is a 65-year-old female diagnosed with metastatic breast cancer in 03/2020 after noticing a lesion on her breast.  She presents a face-to-face visit today.  We have changed her Zometa to every 3 months.      She started her abemaciclib on 3/14/2021     She is here for routine follow-up and is doing well.  She is currently tolerating her Verzenio quite well.  She has not had any untoward diarrhea nausea vomiting fevers chills or night sweats.  No pain.  She feels she is doing quite well and is pleased with this.     PAST MEDICAL HISTORY: (No new medical history since last visit per patient.)  1.  Metastatic breast cancer, see above.   2.  Hypertension.   3.  Steroid-induced diabetes.  According to the patient, since she has been off steroids, she was told that her blood sugars has normalized and she is off medication.   4.  Chemotherapy-induced neuropathy     FAMILY HISTORY:  Mother had breast cancer at 62; underwent lumpectomy and radiation, and arimidex  No new family history since last seen.     SOCIAL HISTORY:   to her , Dieter.    No current tobacco use.  Retired.  ALLERGIES:  LISINOPRIL.       PHYSICAL EXAM:  GENERAL: Comfortable, no acute distress.   HEAD: Atraumatic/Normocephalic.  EYES: Sclera non-icteric. Grossly normal.  RESPIRATORY: Normal breathing, non-labored. No audible wheezes/cough.  SKIN: No jaundice, discoloration or obvious lesions.  NERUO: No tremors visible. Normal speech.  PSYCH: Alert, oriented. Answered questions appropriately.    Labs  Recent Labs   Lab Test 05/19/21  0830 04/21/21  0840 03/24/21  0930 02/23/21  0930 01/20/21  0920 12/23/20  1210 09/02/20  0851 09/02/20  0851 08/12/20  0945    132* 138  --   --   --   --  137 136   POTASSIUM 3.6 3.9 3.3*  --   --  3.2*  --  3.1* 3.3*   CHLORIDE 102 97 102  --   --   --   --  100 100   CO2 28 29 29  --   --   --   --  30 27   ANIONGAP 6 6 7  --   --   --   --  7 9   BUN 11 9 10  --   --   --   --  7 6*   CR 0.90  0.91 0.84 0.96 0.73 0.75 0.67   < > 0.70  0.69 0.68   * 101* 121*  --   --   --   --  129* 135*   BUFFY 9.5  9.7 10.0 9.5 10.1 9.8 10.0   < > 9.6  9.6 8.9    < > = values in this interval not displayed.     No results for input(s): MAG, PHOS in the last 52999 hours.  Recent Labs   Lab Test 05/19/21  0830 04/21/21  0840 03/24/21  0930 11/25/20  1230 09/30/20  0825   WBC 5.2 3.9* 6.5 9.1 9.2   HGB 13.0 13.1 14.7 15.7 14.6    213 226 296 344   MCV 89 84 84 83 89   NEUTROPHIL 44.2 44.0 66.1 65.8 74.4     Recent Labs   Lab Test 05/19/21  0830 04/21/21  0840 03/24/21  0930   BILITOTAL 0.6 0.4 0.6   ALKPHOS 49 50 66   ALT 30 25 24   AST 22 21 15   ALBUMIN 3.8  3.9 3.7 3.6     TSH   Date Value Ref Range Status   10/28/2015 1.23 0.40 - 4.00 mU/L Final     No results for input(s): CEA in the last 07778 hours.  Results for orders placed or performed in visit on 02/19/21   PET Oncology Whole Body    Narrative    Combined Report of:    PET and CT on  2/19/2021 10:34 AM :    1. PET of the neck, chest, abdomen, and pelvis.  2. PET CT Fusion for Attenuation Correction and Anatomical  Localization:    3. Diagnostic CT  scan of the chest, abdomen, and pelvis with  intravenous contrast for interpretation.  3. CT of the chest, abdomen and pelvis obtained for diagnostic  interpretation.  4. 3D MIP and PET-CT fused images were processed on an independent  workstation and archived to PACS and reviewed by a radiologist.    Technique:    1. PET: The patient received 13.19 mCi of F-18-FDG; the serum glucose  was 131 prior to administration, body weight was 97.1 kg. Images were  evaluated in the axial, sagittal, and coronal planes as well as the  rotational whole body MIP. Images were acquired from the Vertex to the  Feet.    UPTAKE WAS MEASURED AT 60 MINUTES.     BACKGROUND:  Liver SUV max= 5.86,   Aorta Blood SUV Max: 3.60.     2. CT: Volumetric acquisition for clinical interpretation of the  chest, abdomen, and pelvis acquired at 3 mm sections . The chest,  abdomen, and pelvis were evaluated at 5 mm sections in bone, soft  tissue, and lung windows.      The patient received 131 cc of Isovue 370 intravenously for the  examination.      3. 3D MIP and PET-CT fused images were processed on an independent  workstation and archived to PACS and reviewed by a radiologist.    INDICATION: Bone metastasis (H); Malignant neoplasm of overlapping  sites of left breast in female, estrogen receptor positive (H);  Malignant neoplasm of overlapping sites of left breast in female,  estrogen receptor positive (H); Metastatic breast cancer (H)    ADDITIONAL INFORMATION OBTAINED FROM EMR: 65-year-old female with  history of left breast invasive ductal carcinoma currently on  chemotherapy.    COMPARISON: 11/6/2020, 8/10/2020, 3/23/2020.    FINDINGS:     HEAD/NECK:  There is no  suspicious FDG uptake in the neck.     The paranasal sinuses are clear. The mastoid air cells are clear. The  mucosal pharyngeal space, the , prevertebral and carotid  spaces are within normal limits. No masses, mass effect or  pathologically enlarged lymph nodes are evident.  The thyroid gland is  normal.    CHEST:  Redemonstration of the FDG avid subareolar left breast mass with  overlying skin thickening and internal calcifications (SUV max 17.30).  The mass measures approximately 3.8 x 4.5 cm (previously 5.7 x 4.4 cm  with SUV max of 11.3). There are multiple satellite lesions with FDG  uptake, most notably a 1.4 cm lesion in the lower outer quadrant of  the breast with SUV max of 9.0, previously 7.1. There are multiple  metastatic left axillary and left supraclavicular lymph nodes with SUV  max measuring up to 13.99 (previously 11.23) in a 1.4 cm left axillary  node (series 5, image 164). No suspicious FDG uptake in the right  breast.    Normal heart size, no pericardial effusion. Normal caliber thoracic  aorta and main pulmonary artery. Normal branching of the thoracic  great vessels. Normal thyroid. No suspicious lower cervical, axillary,  or mediastinal lymphadenopathy. Normal caliber esophagus.    Central tracheobronchial tree is widely patent. No pleural effusion or  pneumothorax. No focal pulmonary opacity or consolidation. No  suspicious pulmonary mass or nodule.    ABDOMEN AND PELVIS:  There is no suspicious FDG uptake in the abdomen or pelvis.    Unremarkable hepatic parenchyma. Portal vasculature is patent.  Gallbladder, pancreas, spleen, adrenals, and right kidney is normal.  Subcentimeter left renal hypodensity, likely simple cyst. Left  extrarenal pelvis. No radiodense genitourinary stones. Normal caliber  small bowel. Colon and appendix are normal. Intra-abdominal  vasculature is patent. No concerning abdominal pelvic lymphadenopathy.  No suspicious pelvic mass.    LOWER EXTREMITIES:   No suspicious FDG uptake in the extremities.    BONES:   Innumerable metastases throughout the axial and appendicular skeleton.  For example the L5 vertebral body (SUV max 6.17 , previously 8.77).  Destructive endplate changes of L1-2 from metastatic disease.  Hypermetabolic skeletal  metastases involving the bilateral femoral  necks with SUV max measuring up to 5.06 on the left.         Impression    IMPRESSION: In this patient with history of metastatic infiltrating  ductal carcinoma, currently on chemotherapy and now has evidence of  progressive disease:    1. Increased size and metabolism of the left breast subareolar mass  with satellite metastatic breast nodules. Increased size and  hypermetabolism of the metastatic left axillary and supraclavicular  nodes as described above. No suspicious FDG uptake in the right  breast.  2. No suspicious FDG uptake in the abdomen or pelvis.  3. Innumerable osseous metastases in the axial and appendicular  skeleton, some of which demonstrate increased and others decreased  metabolism as above.  4. Increased destructive endplate changes of L1-2 from metastatic  disease. There is increased risk of compression fractures at these  levels.     I have personally reviewed the examination and initial interpretation  and I agree with the findings.    DIVINA CANTU MD   CT Chest/Abdomen/Pelvis w Contrast    Narrative    Combined Report of:    PET and CT on  2/19/2021 10:34 AM :    1. PET of the neck, chest, abdomen, and pelvis.  2. PET CT Fusion for Attenuation Correction and Anatomical  Localization:    3. Diagnostic CT scan of the chest, abdomen, and pelvis with  intravenous contrast for interpretation.  3. CT of the chest, abdomen and pelvis obtained for diagnostic  interpretation.  4. 3D MIP and PET-CT fused images were processed on an independent  workstation and archived to PACS and reviewed by a radiologist.    Technique:    1. PET: The patient received 13.19 mCi of F-18-FDG; the serum glucose  was 131 prior to administration, body weight was 97.1 kg. Images were  evaluated in the axial, sagittal, and coronal planes as well as the  rotational whole body MIP. Images were acquired from the Vertex to the  Feet.    UPTAKE WAS MEASURED AT 60 MINUTES.      BACKGROUND:  Liver SUV max= 5.86,   Aorta Blood SUV Max: 3.60.     2. CT: Volumetric acquisition for clinical interpretation of the  chest, abdomen, and pelvis acquired at 3 mm sections . The chest,  abdomen, and pelvis were evaluated at 5 mm sections in bone, soft  tissue, and lung windows.      The patient received 131 cc of Isovue 370 intravenously for the  examination.      3. 3D MIP and PET-CT fused images were processed on an independent  workstation and archived to PACS and reviewed by a radiologist.    INDICATION: Bone metastasis (H); Malignant neoplasm of overlapping  sites of left breast in female, estrogen receptor positive (H);  Malignant neoplasm of overlapping sites of left breast in female,  estrogen receptor positive (H); Metastatic breast cancer (H)    ADDITIONAL INFORMATION OBTAINED FROM EMR: 65-year-old female with  history of left breast invasive ductal carcinoma currently on  chemotherapy.    COMPARISON: 11/6/2020, 8/10/2020, 3/23/2020.    FINDINGS:     HEAD/NECK:  There is no  suspicious FDG uptake in the neck.     The paranasal sinuses are clear. The mastoid air cells are clear. The  mucosal pharyngeal space, the , prevertebral and carotid  spaces are within normal limits. No masses, mass effect or  pathologically enlarged lymph nodes are evident. The thyroid gland is  normal.    CHEST:  Redemonstration of the FDG avid subareolar left breast mass with  overlying skin thickening and internal calcifications (SUV max 17.30).  The mass measures approximately 3.8 x 4.5 cm (previously 5.7 x 4.4 cm  with SUV max of 11.3). There are multiple satellite lesions with FDG  uptake, most notably a 1.4 cm lesion in the lower outer quadrant of  the breast with SUV max of 9.0, previously 7.1. There are multiple  metastatic left axillary and left supraclavicular lymph nodes with SUV  max measuring up to 13.99 (previously 11.23) in a 1.4 cm left axillary  node (series 5, image 164). No suspicious  FDG uptake in the right  breast.    Normal heart size, no pericardial effusion. Normal caliber thoracic  aorta and main pulmonary artery. Normal branching of the thoracic  great vessels. Normal thyroid. No suspicious lower cervical, axillary,  or mediastinal lymphadenopathy. Normal caliber esophagus.    Central tracheobronchial tree is widely patent. No pleural effusion or  pneumothorax. No focal pulmonary opacity or consolidation. No  suspicious pulmonary mass or nodule.    ABDOMEN AND PELVIS:  There is no suspicious FDG uptake in the abdomen or pelvis.    Unremarkable hepatic parenchyma. Portal vasculature is patent.  Gallbladder, pancreas, spleen, adrenals, and right kidney is normal.  Subcentimeter left renal hypodensity, likely simple cyst. Left  extrarenal pelvis. No radiodense genitourinary stones. Normal caliber  small bowel. Colon and appendix are normal. Intra-abdominal  vasculature is patent. No concerning abdominal pelvic lymphadenopathy.  No suspicious pelvic mass.    LOWER EXTREMITIES:   No suspicious FDG uptake in the extremities.    BONES:   Innumerable metastases throughout the axial and appendicular skeleton.  For example the L5 vertebral body (SUV max 6.17 , previously 8.77).  Destructive endplate changes of L1-2 from metastatic disease.  Hypermetabolic skeletal metastases involving the bilateral femoral  necks with SUV max measuring up to 5.06 on the left.         Impression    IMPRESSION: In this patient with history of metastatic infiltrating  ductal carcinoma, currently on chemotherapy and now has evidence of  progressive disease:    1. Increased size and metabolism of the left breast subareolar mass  with satellite metastatic breast nodules. Increased size and  hypermetabolism of the metastatic left axillary and supraclavicular  nodes as described above. No suspicious FDG uptake in the right  breast.  2. No suspicious FDG uptake in the abdomen or pelvis.  3. Innumerable osseous metastases in  the axial and appendicular  skeleton, some of which demonstrate increased and others decreased  metabolism as above.  4. Increased destructive endplate changes of L1-2 from metastatic  disease. There is increased risk of compression fractures at these  levels.     I have personally reviewed the examination and initial interpretation  and I agree with the findings.    DIVINA CANTU MD             ASSESSMENT AND PLAN:   1.  Metastatic ER positive SC positive HER-2/gabriela negative breast cancer with a left breast primary and bone metastasis.    Plan    1.  She will continue on her of Verzenio and Arimidex.  We will continue with monthly blood work and see mid July with a follow-up PET scan prior.  She is due for her Zometa next August.  We did discuss that her CA 27-29 is decreased and this is a good sign.  She will otherwise continue her Verzenio at 100 mg a day along with her Arimidex.    Tiffany Hilton MD on 5/31/2021 at 9:31 AM                Again, thank you for allowing me to participate in the care of your patient.        Sincerely,        Tiffany Hilton MD

## 2021-05-31 DIAGNOSIS — Z17.0 MALIGNANT NEOPLASM OF OVERLAPPING SITES OF LEFT BREAST IN FEMALE, ESTROGEN RECEPTOR POSITIVE (H): Primary | ICD-10-CM

## 2021-05-31 DIAGNOSIS — C50.812 MALIGNANT NEOPLASM OF OVERLAPPING SITES OF LEFT BREAST IN FEMALE, ESTROGEN RECEPTOR POSITIVE (H): Primary | ICD-10-CM

## 2021-06-01 DIAGNOSIS — C50.812 MALIGNANT NEOPLASM OF OVERLAPPING SITES OF LEFT BREAST IN FEMALE, ESTROGEN RECEPTOR POSITIVE (H): Primary | ICD-10-CM

## 2021-06-01 DIAGNOSIS — Z17.0 MALIGNANT NEOPLASM OF OVERLAPPING SITES OF LEFT BREAST IN FEMALE, ESTROGEN RECEPTOR POSITIVE (H): Primary | ICD-10-CM

## 2021-06-16 ENCOUNTER — TELEPHONE (OUTPATIENT)
Dept: PHARMACY | Facility: CLINIC | Age: 66
End: 2021-06-16

## 2021-06-16 DIAGNOSIS — C50.812 MALIGNANT NEOPLASM OF OVERLAPPING SITES OF LEFT BREAST IN FEMALE, ESTROGEN RECEPTOR POSITIVE (H): ICD-10-CM

## 2021-06-16 DIAGNOSIS — Z17.0 MALIGNANT NEOPLASM OF OVERLAPPING SITES OF LEFT BREAST IN FEMALE, ESTROGEN RECEPTOR POSITIVE (H): ICD-10-CM

## 2021-06-16 LAB
ALBUMIN SERPL-MCNC: 3.7 G/DL (ref 3.4–5)
ALP SERPL-CCNC: 57 U/L (ref 40–150)
ALT SERPL W P-5'-P-CCNC: 67 U/L (ref 0–50)
ANION GAP SERPL CALCULATED.3IONS-SCNC: 5 MMOL/L (ref 3–14)
AST SERPL W P-5'-P-CCNC: 45 U/L (ref 0–45)
BASOPHILS # BLD AUTO: 0.1 10E9/L (ref 0–0.2)
BASOPHILS NFR BLD AUTO: 2.4 %
BILIRUB SERPL-MCNC: 0.5 MG/DL (ref 0.2–1.3)
BUN SERPL-MCNC: 12 MG/DL (ref 7–30)
CALCIUM SERPL-MCNC: 9.4 MG/DL (ref 8.5–10.1)
CANCER AG27-29 SERPL-ACNC: 162 U/ML (ref 0–39)
CHLORIDE SERPL-SCNC: 102 MMOL/L (ref 94–109)
CO2 SERPL-SCNC: 30 MMOL/L (ref 20–32)
CREAT SERPL-MCNC: 0.9 MG/DL (ref 0.52–1.04)
DIFFERENTIAL METHOD BLD: ABNORMAL
EOSINOPHIL # BLD AUTO: 0.2 10E9/L (ref 0–0.7)
EOSINOPHIL NFR BLD AUTO: 3.1 %
ERYTHROCYTE [DISTWIDTH] IN BLOOD BY AUTOMATED COUNT: 19.6 % (ref 10–15)
GFR SERPL CREATININE-BSD FRML MDRD: 67 ML/MIN/{1.73_M2}
GLUCOSE SERPL-MCNC: 115 MG/DL (ref 70–99)
HCT VFR BLD AUTO: 37.1 % (ref 35–47)
HGB BLD-MCNC: 13 G/DL (ref 11.7–15.7)
IMM GRANULOCYTES # BLD: 0 10E9/L (ref 0–0.4)
IMM GRANULOCYTES NFR BLD: 0.2 %
LYMPHOCYTES # BLD AUTO: 2 10E9/L (ref 0.8–5.3)
LYMPHOCYTES NFR BLD AUTO: 35.5 %
MCH RBC QN AUTO: 33.3 PG (ref 26.5–33)
MCHC RBC AUTO-ENTMCNC: 35 G/DL (ref 31.5–36.5)
MCV RBC AUTO: 95 FL (ref 78–100)
MONOCYTES # BLD AUTO: 0.3 10E9/L (ref 0–1.3)
MONOCYTES NFR BLD AUTO: 6.2 %
NEUTROPHILS # BLD AUTO: 2.9 10E9/L (ref 1.6–8.3)
NEUTROPHILS NFR BLD AUTO: 52.6 %
PLATELET # BLD AUTO: 252 10E9/L (ref 150–450)
POTASSIUM SERPL-SCNC: 3.8 MMOL/L (ref 3.4–5.3)
PROT SERPL-MCNC: 8 G/DL (ref 6.8–8.8)
RBC # BLD AUTO: 3.9 10E12/L (ref 3.8–5.2)
SODIUM SERPL-SCNC: 137 MMOL/L (ref 133–144)
WBC # BLD AUTO: 5.5 10E9/L (ref 4–11)

## 2021-06-16 PROCEDURE — 36591 DRAW BLOOD OFF VENOUS DEVICE: CPT

## 2021-06-16 PROCEDURE — 80053 COMPREHEN METABOLIC PANEL: CPT | Performed by: NURSE PRACTITIONER

## 2021-06-16 PROCEDURE — 86300 IMMUNOASSAY TUMOR CA 15-3: CPT | Performed by: NURSE PRACTITIONER

## 2021-06-16 PROCEDURE — 85025 COMPLETE CBC W/AUTO DIFF WBC: CPT | Performed by: NURSE PRACTITIONER

## 2021-06-16 RX ORDER — HEPARIN SODIUM (PORCINE) LOCK FLUSH IV SOLN 100 UNIT/ML 100 UNIT/ML
500 SOLUTION INTRAVENOUS ONCE
Status: COMPLETED | OUTPATIENT
Start: 2021-06-16 | End: 2021-06-16

## 2021-06-16 RX ADMIN — HEPARIN SODIUM (PORCINE) LOCK FLUSH IV SOLN 100 UNIT/ML 500 UNITS: 100 SOLUTION at 09:54

## 2021-06-16 NOTE — TELEPHONE ENCOUNTER
Oral Chemotherapy Program- lab review from 6/16/2021    Reviewed labs from 6/16/2021 and no dosage adjustments are needed.  Of note, Desmond's ALT has increased to 67 from 30.  We will continue to monitor.    ORAL CHEMOTHERAPY 3/22/2021 3/24/2021 4/2/2021 4/21/2021 4/30/2021 5/19/2021 6/16/2021   Assessment Type Initial Follow up Lab Monitoring Refill Lab Monitoring Refill Lab Monitoring Lab Monitoring   Diagnosis Code Breast Cancer Breast Cancer Breast Cancer Breast Cancer Breast Cancer Breast Cancer Breast Cancer   Providers [facility]   Clinic Name/Location [facility]   Drug Name Verzenio (abemaciclib) Verzenio (abemaciclib) Verzenio (abemaciclib) Verzenio (abemaciclib) Verzenio (abemaciclib) Verzenio (abemaciclib) Verzenio (abemaciclib)   Dose 150 mg 150 mg 150 mg 150 mg 150 mg 150 mg 100 mg   Current Schedule BID BID BID BID BID BID BID   Cycle Details Continuous Continuous Continuous Continuous Continuous Continuous Continuous   Start Date of Last Cycle 3/14/2021 - - 4/11/2021 - - -   Planned next cycle start date 4/11/2021 - - 5/9/2021 - - -   Doses missed in last 2 weeks 0 - - - - - -   Adherence Assessment Adherent - - - - - -   Adverse Effects No AE identified during assessment - - No AE identified during assessment - - -   Home BPs Not applicable - - - - - -   Any new drug interactions? No - - - - - -   Is the dose as ordered appropriate for the patient? No - - - - - -   Pharmacist intervention for dose adjustment? No - - - - - -   Is the patient currently in pain? No - - - - - -   Has the patient been assessed within the past 6 months for depression? Yes - - - - - -   Has the patient missed any days of school, work, or other routine activity? No - - - - - -   Since the last time we talked, have you been hospitalized or used the emergency room? No - - - - - -       Vitals:  BP:   BP Readings  "from Last 1 Encounters:   05/28/21 (!) 175/87     Wt Readings from Last 1 Encounters:   05/28/21 96.5 kg (212 lb 11.2 oz)     Estimated body surface area is 2.05 meters squared as calculated from the following:    Height as of 5/28/21: 1.575 m (5' 2.01\").    Weight as of 5/28/21: 96.5 kg (212 lb 11.2 oz).    Labs:  _  Result Component Current Result Ref Range   Sodium 137 (6/16/2021) 133 - 144 mmol/L     _  Result Component Current Result Ref Range   Potassium 3.8 (6/16/2021) 3.4 - 5.3 mmol/L     _  Result Component Current Result Ref Range   Calcium 9.4 (6/16/2021) 8.5 - 10.1 mg/dL          No results found for Mag within last 30 days.     No results found for Phos within last 30 days.     _  Result Component Current Result Ref Range   Albumin 3.7 (6/16/2021) 3.4 - 5.0 g/dL          _  Result Component Current Result Ref Range   Urea Nitrogen 12 (6/16/2021) 7 - 30 mg/dL     _  Result Component Current Result Ref Range   Creatinine 0.90 (6/16/2021) 0.52 - 1.04 mg/dL            _  Result Component Current Result Ref Range   AST 45 (6/16/2021) 0 - 45 U/L     _  Result Component Current Result Ref Range   ALT 67 (H) (6/16/2021) 0 - 50 U/L     _  Result Component Current Result Ref Range   Bilirubin Total 0.5 (6/16/2021) 0.2 - 1.3 mg/dL       _  Result Component Current Result Ref Range   WBC 5.5 (6/16/2021) 4.0 - 11.0 10e9/L     _  Result Component Current Result Ref Range   Hemoglobin 13.0 (6/16/2021) 11.7 - 15.7 g/dL     _  Result Component Current Result Ref Range   Platelet Count 252 (6/16/2021) 150 - 450 10e9/L     _  Result Component Current Result Ref Range   Absolute Neutrophil 2.9 (6/16/2021) 1.6 - 8.3 10e9/L       Assessment/Plan:  Continue same dose as ordered    Follow-Up:  Labs 7/14    Aleksey Ocampo, PharmD, [facility]  June 16, 2021    "

## 2021-06-24 DIAGNOSIS — C50.812 MALIGNANT NEOPLASM OF OVERLAPPING SITES OF LEFT BREAST IN FEMALE, ESTROGEN RECEPTOR POSITIVE (H): Primary | ICD-10-CM

## 2021-06-24 DIAGNOSIS — Z17.0 MALIGNANT NEOPLASM OF OVERLAPPING SITES OF LEFT BREAST IN FEMALE, ESTROGEN RECEPTOR POSITIVE (H): Primary | ICD-10-CM

## 2021-06-29 DIAGNOSIS — C50.812 MALIGNANT NEOPLASM OF OVERLAPPING SITES OF LEFT BREAST IN FEMALE, ESTROGEN RECEPTOR POSITIVE (H): Primary | ICD-10-CM

## 2021-06-29 DIAGNOSIS — Z17.0 MALIGNANT NEOPLASM OF OVERLAPPING SITES OF LEFT BREAST IN FEMALE, ESTROGEN RECEPTOR POSITIVE (H): Primary | ICD-10-CM

## 2021-07-13 DIAGNOSIS — C50.812 MALIGNANT NEOPLASM OF OVERLAPPING SITES OF LEFT BREAST IN FEMALE, ESTROGEN RECEPTOR POSITIVE (H): Primary | ICD-10-CM

## 2021-07-13 DIAGNOSIS — Z17.0 MALIGNANT NEOPLASM OF OVERLAPPING SITES OF LEFT BREAST IN FEMALE, ESTROGEN RECEPTOR POSITIVE (H): Primary | ICD-10-CM

## 2021-07-13 RX ORDER — ANASTROZOLE 1 MG/1
1 TABLET ORAL DAILY
Qty: 28 TABLET | Refills: 0 | Status: SHIPPED | OUTPATIENT
Start: 2021-07-13 | End: 2021-08-10

## 2021-07-14 ENCOUNTER — LAB (OUTPATIENT)
Dept: ONCOLOGY | Facility: CLINIC | Age: 66
End: 2021-07-14
Payer: MEDICARE

## 2021-07-14 ENCOUNTER — TELEPHONE (OUTPATIENT)
Dept: PHARMACY | Facility: OTHER | Age: 66
End: 2021-07-14

## 2021-07-14 DIAGNOSIS — C50.812 MALIGNANT NEOPLASM OF OVERLAPPING SITES OF LEFT BREAST IN FEMALE, ESTROGEN RECEPTOR POSITIVE (H): Primary | ICD-10-CM

## 2021-07-14 DIAGNOSIS — Z17.0 MALIGNANT NEOPLASM OF OVERLAPPING SITES OF LEFT BREAST IN FEMALE, ESTROGEN RECEPTOR POSITIVE (H): Primary | ICD-10-CM

## 2021-07-14 LAB
ALBUMIN SERPL-MCNC: 3.5 G/DL (ref 3.4–5)
ALP SERPL-CCNC: 53 U/L (ref 40–150)
ALT SERPL W P-5'-P-CCNC: 123 U/L
ANION GAP SERPL CALCULATED.3IONS-SCNC: 7 MMOL/L (ref 3–14)
AST SERPL W P-5'-P-CCNC: 68 U/L (ref 0–45)
BASOPHILS # BLD AUTO: 0.1 10E3/UL (ref 0–0.2)
BASOPHILS NFR BLD AUTO: 2 %
BILIRUB SERPL-MCNC: 0.5 MG/DL (ref 0.2–1.3)
BUN SERPL-MCNC: 14 MG/DL (ref 7–30)
CALCIUM SERPL-MCNC: 9.8 MG/DL (ref 8.5–10.1)
CHLORIDE BLD-SCNC: 103 MMOL/L
CO2 SERPL-SCNC: 28 MMOL/L (ref 20–32)
CREAT SERPL-MCNC: 0.88 MG/DL
EOSINOPHIL # BLD AUTO: 0.1 10E3/UL (ref 0–0.7)
EOSINOPHIL NFR BLD AUTO: 3 %
ERYTHROCYTE [DISTWIDTH] IN BLOOD BY AUTOMATED COUNT: 12.7 % (ref 10–15)
GFR SERPL CREATININE-BSD FRML MDRD: 69 ML/MIN/1.73M2
GLUCOSE BLD-MCNC: 135 MG/DL (ref 70–99)
HCT VFR BLD AUTO: 38 % (ref 35–47)
HGB BLD-MCNC: 13 G/DL (ref 11.7–15.7)
HOLD SPECIMEN: NORMAL
LYMPHOCYTES # BLD AUTO: 1.7 10E3/UL (ref 0.8–5.3)
LYMPHOCYTES NFR BLD AUTO: 34 %
MCH RBC QN AUTO: 35.1 PG (ref 26.5–33)
MCHC RBC AUTO-ENTMCNC: 34.2 G/DL (ref 31.5–36.5)
MCV RBC AUTO: 103 FL (ref 78–100)
MONOCYTES # BLD AUTO: 0.3 10E3/UL (ref 0–1.3)
MONOCYTES NFR BLD AUTO: 6 %
NEUTROPHILS # BLD AUTO: 2.7 10E3/UL (ref 1.6–8.3)
NEUTROPHILS NFR BLD AUTO: 55 %
PLATELET # BLD AUTO: 237 10E3/UL (ref 150–450)
POTASSIUM BLD-SCNC: 3.7 MMOL/L (ref 3.4–5.3)
PROT SERPL-MCNC: 7.9 G/DL (ref 6.8–8.8)
RBC # BLD AUTO: 3.7 10E6/UL (ref 3.8–5.2)
SODIUM SERPL-SCNC: 138 MMOL/L (ref 133–144)
WBC # BLD AUTO: 5 10E3/UL (ref 4–11)

## 2021-07-14 PROCEDURE — 85025 COMPLETE CBC W/AUTO DIFF WBC: CPT | Performed by: INTERNAL MEDICINE

## 2021-07-14 PROCEDURE — 80053 COMPREHEN METABOLIC PANEL: CPT | Performed by: INTERNAL MEDICINE

## 2021-07-14 PROCEDURE — 36591 DRAW BLOOD OFF VENOUS DEVICE: CPT

## 2021-07-14 RX ORDER — HEPARIN SODIUM (PORCINE) LOCK FLUSH IV SOLN 100 UNIT/ML 100 UNIT/ML
500 SOLUTION INTRAVENOUS ONCE
Status: COMPLETED | OUTPATIENT
Start: 2021-07-14 | End: 2021-07-14

## 2021-07-14 RX ORDER — ANASTROZOLE 1 MG/1
1 TABLET ORAL DAILY
Qty: 28 TABLET | Refills: 0 | Status: SHIPPED | OUTPATIENT
Start: 2021-07-14 | End: 2021-08-11

## 2021-07-14 RX ORDER — ANASTROZOLE 1 MG/1
1 TABLET ORAL DAILY
Qty: 28 TABLET | Refills: 0 | Status: SHIPPED | OUTPATIENT
Start: 2021-07-14 | End: 2021-08-05

## 2021-07-14 RX ADMIN — HEPARIN SODIUM (PORCINE) LOCK FLUSH IV SOLN 100 UNIT/ML 500 UNITS: 100 SOLUTION at 09:36

## 2021-07-14 NOTE — NURSING NOTE
Free Drug Application approved  Medication: Verzenio  Sponsor: [employer] Patient assistance program  Phone # [phone]  Fax # [phone]  Additional Information : approved till end of year.

## 2021-07-14 NOTE — PROGRESS NOTES
"Patient's name and  were verified.  See Doc Flowsheet - IV assess for details.  IVAD accessed with 20G  3/4\" carorll gripper plus needle  blood return positive: YES  Site without redness, tenderness or swelling: YES  flushed with 30cc NS and 5cc 100u/ml heparin  Needle: De-accessed    Comments: Labs drawn.  Patient tolerated procedure without incident.    Emily Brandon  RN, BSN, OCN      "

## 2021-07-16 NOTE — NURSING NOTE
Forms faxed into [facility] Patient Assistance Program at [phone]    Spoke to Daylin, at [facility] they say they have the application it is processing to call back in one hour to see where it's at

## 2021-07-20 NOTE — PROGRESS NOTES
"Free Drug Application Approved  Effective Dates: 7/19/21-12/31/2021  Patient notified? Yes  Additional Information: Asked them to refax the approval letter. Free drug pharmacy is called \"[facility]\" Their phone # is [phone]    "

## 2021-08-01 ENCOUNTER — HEALTH MAINTENANCE LETTER (OUTPATIENT)
Age: 66
End: 2021-08-01

## 2021-08-05 DIAGNOSIS — Z17.0 MALIGNANT NEOPLASM OF OVERLAPPING SITES OF LEFT BREAST IN FEMALE, ESTROGEN RECEPTOR POSITIVE (H): ICD-10-CM

## 2021-08-05 DIAGNOSIS — C50.812 MALIGNANT NEOPLASM OF OVERLAPPING SITES OF LEFT BREAST IN FEMALE, ESTROGEN RECEPTOR POSITIVE (H): ICD-10-CM

## 2021-08-05 RX ORDER — ANASTROZOLE 1 MG/1
1 TABLET ORAL DAILY
Qty: 90 TABLET | Refills: 3 | Status: SHIPPED | OUTPATIENT
Start: 2021-08-05 | End: 2022-03-09

## 2021-08-10 DIAGNOSIS — Z17.0 MALIGNANT NEOPLASM OF OVERLAPPING SITES OF LEFT BREAST IN FEMALE, ESTROGEN RECEPTOR POSITIVE (H): Primary | ICD-10-CM

## 2021-08-10 DIAGNOSIS — C50.812 MALIGNANT NEOPLASM OF OVERLAPPING SITES OF LEFT BREAST IN FEMALE, ESTROGEN RECEPTOR POSITIVE (H): Primary | ICD-10-CM

## 2021-08-12 DIAGNOSIS — Z17.0 MALIGNANT NEOPLASM OF OVERLAPPING SITES OF LEFT BREAST IN FEMALE, ESTROGEN RECEPTOR POSITIVE (H): Primary | ICD-10-CM

## 2021-08-12 DIAGNOSIS — C79.51 BONE METASTASIS: Primary | ICD-10-CM

## 2021-08-12 DIAGNOSIS — C50.812 MALIGNANT NEOPLASM OF OVERLAPPING SITES OF LEFT BREAST IN FEMALE, ESTROGEN RECEPTOR POSITIVE (H): ICD-10-CM

## 2021-08-12 DIAGNOSIS — Z17.0 MALIGNANT NEOPLASM OF OVERLAPPING SITES OF LEFT BREAST IN FEMALE, ESTROGEN RECEPTOR POSITIVE (H): ICD-10-CM

## 2021-08-12 DIAGNOSIS — C50.812 MALIGNANT NEOPLASM OF OVERLAPPING SITES OF LEFT BREAST IN FEMALE, ESTROGEN RECEPTOR POSITIVE (H): Primary | ICD-10-CM

## 2021-08-12 RX ORDER — ZOLEDRONIC ACID 0.04 MG/ML
4 INJECTION, SOLUTION INTRAVENOUS ONCE
Status: CANCELLED | OUTPATIENT
Start: 2021-08-18 | End: 2021-08-17

## 2021-08-12 RX ORDER — HEPARIN SODIUM,PORCINE 10 UNIT/ML
5 VIAL (ML) INTRAVENOUS
Status: CANCELLED | OUTPATIENT
Start: 2021-08-18

## 2021-08-12 RX ORDER — HEPARIN SODIUM (PORCINE) LOCK FLUSH IV SOLN 100 UNIT/ML 100 UNIT/ML
5 SOLUTION INTRAVENOUS
Status: CANCELLED | OUTPATIENT
Start: 2021-08-18

## 2021-08-18 ENCOUNTER — INFUSION THERAPY VISIT (OUTPATIENT)
Dept: INFUSION THERAPY | Facility: CLINIC | Age: 66
End: 2021-08-18
Attending: NURSE PRACTITIONER
Payer: MEDICARE

## 2021-08-18 ENCOUNTER — LAB (OUTPATIENT)
Dept: ONCOLOGY | Facility: CLINIC | Age: 66
End: 2021-08-18
Payer: MEDICARE

## 2021-08-18 VITALS
TEMPERATURE: 98.5 F | HEART RATE: 111 BPM | OXYGEN SATURATION: 97 % | BODY MASS INDEX: 39.11 KG/M2 | SYSTOLIC BLOOD PRESSURE: 166 MMHG | RESPIRATION RATE: 18 BRPM | DIASTOLIC BLOOD PRESSURE: 89 MMHG | WEIGHT: 213.9 LBS

## 2021-08-18 DIAGNOSIS — C79.51 BONE METASTASIS: Primary | ICD-10-CM

## 2021-08-18 DIAGNOSIS — Z17.0 MALIGNANT NEOPLASM OF OVERLAPPING SITES OF LEFT BREAST IN FEMALE, ESTROGEN RECEPTOR POSITIVE (H): ICD-10-CM

## 2021-08-18 DIAGNOSIS — C50.812 MALIGNANT NEOPLASM OF OVERLAPPING SITES OF LEFT BREAST IN FEMALE, ESTROGEN RECEPTOR POSITIVE (H): ICD-10-CM

## 2021-08-18 LAB
ALBUMIN SERPL-MCNC: 3.7 G/DL (ref 3.4–5)
ALBUMIN SERPL-MCNC: 3.7 G/DL (ref 3.4–5)
ALP SERPL-CCNC: 52 U/L (ref 40–150)
ALT SERPL W P-5'-P-CCNC: 70 U/L (ref 0–50)
ANION GAP SERPL CALCULATED.3IONS-SCNC: 5 MMOL/L (ref 3–14)
AST SERPL W P-5'-P-CCNC: 50 U/L (ref 0–45)
BASOPHILS # BLD AUTO: 0.1 10E3/UL (ref 0–0.2)
BASOPHILS NFR BLD AUTO: 2 %
BILIRUB SERPL-MCNC: 0.4 MG/DL (ref 0.2–1.3)
BUN SERPL-MCNC: 12 MG/DL (ref 7–30)
CALCIUM SERPL-MCNC: 9.6 MG/DL (ref 8.5–10.1)
CALCIUM SERPL-MCNC: 9.9 MG/DL (ref 8.5–10.1)
CANCER AG27-29 SERPL-ACNC: 140 U/ML (ref 0–39)
CHLORIDE BLD-SCNC: 103 MMOL/L (ref 94–109)
CO2 SERPL-SCNC: 27 MMOL/L (ref 20–32)
CREAT SERPL-MCNC: 0.92 MG/DL (ref 0.52–1.04)
CREAT SERPL-MCNC: 0.92 MG/DL (ref 0.52–1.04)
EOSINOPHIL # BLD AUTO: 0.1 10E3/UL (ref 0–0.7)
EOSINOPHIL NFR BLD AUTO: 2 %
ERYTHROCYTE [DISTWIDTH] IN BLOOD BY AUTOMATED COUNT: 12.1 % (ref 10–15)
GFR SERPL CREATININE-BSD FRML MDRD: 66 ML/MIN/1.73M2
GFR SERPL CREATININE-BSD FRML MDRD: 66 ML/MIN/1.73M2
GLUCOSE BLD-MCNC: 140 MG/DL (ref 70–99)
HCT VFR BLD AUTO: 41.9 % (ref 35–47)
HGB BLD-MCNC: 14.5 G/DL (ref 11.7–15.7)
HOLD SPECIMEN: NORMAL
IMM GRANULOCYTES # BLD: 0 10E3/UL
IMM GRANULOCYTES NFR BLD: 0 %
LYMPHOCYTES # BLD AUTO: 2.2 10E3/UL (ref 0.8–5.3)
LYMPHOCYTES NFR BLD AUTO: 43 %
MCH RBC QN AUTO: 34.1 PG (ref 26.5–33)
MCHC RBC AUTO-ENTMCNC: 34.6 G/DL (ref 31.5–36.5)
MCV RBC AUTO: 99 FL (ref 78–100)
MONOCYTES # BLD AUTO: 0.3 10E3/UL (ref 0–1.3)
MONOCYTES NFR BLD AUTO: 6 %
NEUTROPHILS # BLD AUTO: 2.4 10E3/UL (ref 1.6–8.3)
NEUTROPHILS NFR BLD AUTO: 47 %
NRBC # BLD AUTO: 0 10E3/UL
NRBC BLD AUTO-RTO: 0 /100
PLATELET # BLD AUTO: 211 10E3/UL (ref 150–450)
POTASSIUM BLD-SCNC: 3.9 MMOL/L (ref 3.4–5.3)
PROT SERPL-MCNC: 8.2 G/DL (ref 6.8–8.8)
RBC # BLD AUTO: 4.25 10E6/UL (ref 3.8–5.2)
SODIUM SERPL-SCNC: 135 MMOL/L (ref 133–144)
WBC # BLD AUTO: 5.2 10E3/UL (ref 4–11)

## 2021-08-18 PROCEDURE — 99207 PR NO CHARGE LOS: CPT

## 2021-08-18 PROCEDURE — 86300 IMMUNOASSAY TUMOR CA 15-3: CPT | Performed by: INTERNAL MEDICINE

## 2021-08-18 PROCEDURE — 82565 ASSAY OF CREATININE: CPT | Mod: 59 | Performed by: INTERNAL MEDICINE

## 2021-08-18 PROCEDURE — 82310 ASSAY OF CALCIUM: CPT | Mod: 59 | Performed by: INTERNAL MEDICINE

## 2021-08-18 PROCEDURE — 96365 THER/PROPH/DIAG IV INF INIT: CPT | Performed by: NURSE PRACTITIONER

## 2021-08-18 PROCEDURE — 80053 COMPREHEN METABOLIC PANEL: CPT | Performed by: INTERNAL MEDICINE

## 2021-08-18 PROCEDURE — 85025 COMPLETE CBC W/AUTO DIFF WBC: CPT | Performed by: INTERNAL MEDICINE

## 2021-08-18 RX ORDER — HEPARIN SODIUM (PORCINE) LOCK FLUSH IV SOLN 100 UNIT/ML 100 UNIT/ML
500 SOLUTION INTRAVENOUS ONCE
Status: COMPLETED | OUTPATIENT
Start: 2021-08-18 | End: 2021-08-18

## 2021-08-18 RX ORDER — ZOLEDRONIC ACID 0.04 MG/ML
4 INJECTION, SOLUTION INTRAVENOUS ONCE
Status: COMPLETED | OUTPATIENT
Start: 2021-08-18 | End: 2021-08-18

## 2021-08-18 RX ORDER — HEPARIN SODIUM (PORCINE) LOCK FLUSH IV SOLN 100 UNIT/ML 100 UNIT/ML
5 SOLUTION INTRAVENOUS
Status: DISCONTINUED | OUTPATIENT
Start: 2021-08-18 | End: 2021-08-18 | Stop reason: HOSPADM

## 2021-08-18 RX ADMIN — ZOLEDRONIC ACID 4 MG: 0.04 INJECTION, SOLUTION INTRAVENOUS at 09:13

## 2021-08-18 RX ADMIN — HEPARIN SODIUM (PORCINE) LOCK FLUSH IV SOLN 100 UNIT/ML 5 ML: 100 SOLUTION at 09:36

## 2021-08-18 RX ADMIN — HEPARIN SODIUM (PORCINE) LOCK FLUSH IV SOLN 100 UNIT/ML 500 UNITS: 100 SOLUTION at 08:03

## 2021-08-18 RX ADMIN — Medication 250 ML: at 09:13

## 2021-08-18 ASSESSMENT — PAIN SCALES - GENERAL: PAINLEVEL: NO PAIN (0)

## 2021-08-18 NOTE — PROGRESS NOTES
"Infusion Nursing Note:  Mary Olson presents today for Zometa every 3 months.   Patient seen by provider today: No   present during visit today: Not Applicable.    Note: Pt states she is doing well, has had high BP's with her appointments, \"I have been told that I have white coat syndrome\", Took BP manually today - 166/89, pt states she does take her BP at home with lower results, on hypertension meds..  Denies any problems with her last Zometa infusion, Ca 9.6 today, creatinine 0.92 - will treat as ordered.      Intravenous Access:  Implanted Port.    Treatment Conditions:  Lab Results   Component Value Date     07/14/2021     06/16/2021                   Lab Results   Component Value Date    POTASSIUM 3.7 07/14/2021    POTASSIUM 3.8 06/16/2021           No results found for: MAG         Lab Results   Component Value Date    CR 0.92 08/18/2021    CR 0.90 06/16/2021                   Lab Results   Component Value Date    BUFFY 9.6 08/18/2021    BUFFY 9.4 06/16/2021                Lab Results   Component Value Date    BILITOTAL 0.5 07/14/2021    BILITOTAL 0.5 06/16/2021           Lab Results   Component Value Date    ALBUMIN 3.7 08/18/2021    ALBUMIN 3.7 06/16/2021                    Lab Results   Component Value Date     07/14/2021    ALT 67 06/16/2021           Lab Results   Component Value Date    AST 68 07/14/2021    AST 45 06/16/2021       Results reviewed, labs MET treatment parameters, ok to proceed with treatment.      Post Infusion Assessment:  Patient tolerated infusion without incident.  Blood return noted pre and post infusion.  Site patent and intact, free from redness, edema or discomfort.  No evidence of extravasations.  Access discontinued per protocol.       Discharge Plan:   Return with Dr Hilton on 08/27/2021 and Zometa on 11/17/2021  Discharge instructions reviewed with: Patient.  Patient and/or family verbalized understanding of discharge instructions and all " questions answered.  Patient discharged in stable condition accompanied by: self.  Departure Mode: Ambulatory.      Vaishali Zhu RN

## 2021-08-20 ENCOUNTER — ANCILLARY PROCEDURE (OUTPATIENT)
Dept: PET IMAGING | Facility: CLINIC | Age: 66
End: 2021-08-20
Attending: INTERNAL MEDICINE
Payer: MEDICARE

## 2021-08-20 DIAGNOSIS — Z17.0 MALIGNANT NEOPLASM OF OVERLAPPING SITES OF LEFT BREAST IN FEMALE, ESTROGEN RECEPTOR POSITIVE (H): ICD-10-CM

## 2021-08-20 DIAGNOSIS — C50.812 MALIGNANT NEOPLASM OF OVERLAPPING SITES OF LEFT BREAST IN FEMALE, ESTROGEN RECEPTOR POSITIVE (H): ICD-10-CM

## 2021-08-20 PROCEDURE — 78816 PET IMAGE W/CT FULL BODY: CPT | Mod: PS | Performed by: STUDENT IN AN ORGANIZED HEALTH CARE EDUCATION/TRAINING PROGRAM

## 2021-08-20 PROCEDURE — 71260 CT THORAX DX C+: CPT | Mod: 59 | Performed by: STUDENT IN AN ORGANIZED HEALTH CARE EDUCATION/TRAINING PROGRAM

## 2021-08-20 PROCEDURE — 74177 CT ABD & PELVIS W/CONTRAST: CPT | Mod: 59 | Performed by: STUDENT IN AN ORGANIZED HEALTH CARE EDUCATION/TRAINING PROGRAM

## 2021-08-20 PROCEDURE — G1004 CDSM NDSC: HCPCS | Performed by: STUDENT IN AN ORGANIZED HEALTH CARE EDUCATION/TRAINING PROGRAM

## 2021-08-20 PROCEDURE — A9552 F18 FDG: HCPCS | Performed by: STUDENT IN AN ORGANIZED HEALTH CARE EDUCATION/TRAINING PROGRAM

## 2021-08-20 RX ORDER — HEPARIN SODIUM (PORCINE) LOCK FLUSH IV SOLN 100 UNIT/ML 100 UNIT/ML
500 SOLUTION INTRAVENOUS ONCE
Status: COMPLETED | OUTPATIENT
Start: 2021-08-20 | End: 2021-08-20

## 2021-08-20 RX ORDER — IOPAMIDOL 755 MG/ML
50-135 INJECTION, SOLUTION INTRAVASCULAR ONCE
Status: COMPLETED | OUTPATIENT
Start: 2021-08-20 | End: 2021-08-20

## 2021-08-20 RX ADMIN — HEPARIN SODIUM (PORCINE) LOCK FLUSH IV SOLN 100 UNIT/ML 500 UNITS: 100 SOLUTION at 09:56

## 2021-08-20 RX ADMIN — IOPAMIDOL 130 ML: 755 INJECTION, SOLUTION INTRAVASCULAR at 08:41

## 2021-08-27 ENCOUNTER — VIRTUAL VISIT (OUTPATIENT)
Dept: ONCOLOGY | Facility: CLINIC | Age: 66
End: 2021-08-27
Attending: INTERNAL MEDICINE
Payer: MEDICARE

## 2021-08-27 VITALS — BODY MASS INDEX: 37.91 KG/M2 | HEIGHT: 62 IN | WEIGHT: 206 LBS

## 2021-08-27 DIAGNOSIS — C50.812 MALIGNANT NEOPLASM OF OVERLAPPING SITES OF LEFT BREAST IN FEMALE, ESTROGEN RECEPTOR POSITIVE (H): Primary | ICD-10-CM

## 2021-08-27 DIAGNOSIS — Z17.0 MALIGNANT NEOPLASM OF OVERLAPPING SITES OF LEFT BREAST IN FEMALE, ESTROGEN RECEPTOR POSITIVE (H): Primary | ICD-10-CM

## 2021-08-27 PROCEDURE — 99214 OFFICE O/P EST MOD 30 MIN: CPT | Mod: 95 | Performed by: INTERNAL MEDICINE

## 2021-08-27 ASSESSMENT — PAIN SCALES - GENERAL: PAINLEVEL: NO PAIN (0)

## 2021-08-27 ASSESSMENT — MIFFLIN-ST. JEOR: SCORE: 1432.79

## 2021-08-27 NOTE — PROGRESS NOTES
Desmond is a 66 year old who is being evaluated via a billable video visit.      20 min video     ONCOLOGY DIAGNOSIS:  1. March 2020: Metastatic Breast Cancer ER+, SD+ Her2 Negative; with bone metastasis and a left breast primary. PI K3 CA mutation of note it is a pathologic variant which is outside the mutation of specific variants enrolled in the solar one trial.  TEJAS, TMB low     THERAPY TO DATE:  1. March 2020 to August 2020: Weekly Taxol.  2. August 2020 to March 2021: Anastrozole   3. 3/19/21 began abemeciclib with arimidex she has been dose reduced on her abemaciclib 200 mg a day      INTERVAL HISTORY:  The patient is a 65-year-old female diagnosed with metastatic breast cancer in 03/2020 after noticing a lesion on her breast.  She presents a face-to-face visit today.  We have changed her Zometa to every 3 months.      She started her abemaciclib on 3/14/2021     Desmond is here for routine follow-up.  She continues to tolerate her abemaciclib and Arimidex pretty well.  She states her diarrhea is currently under well control and rarely ever has it.  She is eating well but has been trying to lose a little bit of weight and notes that she has lost about 8 pounds total since being on the Verzenio.  No other nausea vomiting fevers chills or night sweats.  She does not have any other complaints.    PAST MEDICAL HISTORY: (No new medical history since last visit per patient.)  1.  Metastatic breast cancer, see above.   2.  Hypertension.   3.  Steroid-induced diabetes.  According to the patient, since she has been off steroids, she was told that her blood sugars has normalized and she is off medication.   4.  Chemotherapy-induced neuropathy     FAMILY HISTORY:  Mother had breast cancer at 62; underwent lumpectomy and radiation, and arimidex  No new family history since last seen.     SOCIAL HISTORY:   to her , Dieter.    No current tobacco use.  Retired.  ALLERGIES:  LISINOPRIL.      PHYSICAL EXAM:  GENERAL:  Comfortable, no acute distress.   HEAD: Atraumatic/Normocephalic.  EYES: Sclera non-icteric. Grossly normal.  RESPIRATORY: Normal breathing, non-labored. No audible wheezes/cough.  SKIN: No jaundice, discoloration or obvious lesions.  NERUO: No tremors visible. Normal speech.  PSYCH: Alert, oriented. Answered questions appropriately.    Labs    Recent Labs   Lab Test 08/18/21  0802 07/14/21  0928 06/16/21  0945 05/19/21  0830 04/21/21  0840    138 137 136 132*   POTASSIUM 3.9 3.7 3.8 3.6 3.9   CHLORIDE 103 103 102 102 97   CO2 27 28 30 28 29   ANIONGAP 5 7 5 6 6   BUN 12 14 12 11 9   CR 0.92  0.92 0.88 0.90 0.90  0.91 0.84   * 135* 115* 137* 101*   BUFFY 9.9  9.6 9.8 9.4 9.5  9.7 10.0     No results for input(s): MAG, PHOS in the last 41026 hours.  Recent Labs   Lab Test 08/18/21  0802 07/14/21  0928 06/16/21  0945 05/19/21  0830 04/21/21  0840   WBC 5.2 5.0 5.5 5.2 3.9*   HGB 14.5 13.0 13.0 13.0 13.1    237 252 268 213   MCV 99 103* 95 89 84   NEUTROPHIL 47 55 52.6 44.2 44.0     Recent Labs   Lab Test 08/18/21  0802 07/14/21  0928 06/16/21  0945   BILITOTAL 0.4 0.5 0.5   ALKPHOS 52 53 57   ALT 70* 123 67*   AST 50* 68* 45   ALBUMIN 3.7  3.7 3.5 3.7     TSH   Date Value Ref Range Status   10/28/2015 1.23 0.40 - 4.00 mU/L Final     No results for input(s): CEA in the last 16123 hours.  Results for orders placed or performed in visit on 08/20/21   PET Oncology Whole Body    Narrative    Combined Report of:    PET and CT on  8/20/2021 10:35 AM :    1. PET of the neck, chest, abdomen, and pelvis.  2. PET CT Fusion for Attenuation Correction and Anatomical  Localization:    3. Diagnostic CT scan of the chest, abdomen, and pelvis with  intravenous contrast for interpretation.  3. CT of the chest, abdomen and pelvis obtained for diagnostic  interpretation.  4. 3D MIP and PET-CT fused images were processed on an independent  workstation and archived to PACS and reviewed by a  radiologist.    Technique:    1. PET: The patient received 10.61 mCi of F-18-FDG; the serum glucose  was 102 prior to administration, body weight was 96.5 kg. Images were  evaluated in the axial, sagittal, and coronal planes as well as the  rotational whole body MIP. Images were acquired from the Vertex to the  Feet.    UPTAKE WAS MEASURED AT 60 MINUTES.     BACKGROUND:  Liver SUV max= 4.4,   Aorta Blood SUV Max: 3.4.     2. CT: Volumetric acquisition for clinical interpretation of the  chest, abdomen, and pelvis acquired at 3 mm sections after the  uneventful administration of intravenous contrast. The chest, abdomen,  and pelvis were evaluated at 5 mm sections in bone, soft tissue, and  lung windows.      The patient received 130 cc of Isovue 370 intravenously and 500 mL  oral Breeza for the examination.      3. 3D MIP and PET-CT fused images were processed on an independent  workstation and archived to PACS and reviewed by a radiologist.    INDICATION: Invasive breast cancer, stage IV, follow up; Malignant  neoplasm of overlapping sites of left breast in female, estrogen  receptor positive (H); Malignant neoplasm of overlapping sites of left  breast in female, estrogen receptor positive (H)    ADDITIONAL INFORMATION OBTAINED FROM EMR: 66-year-old woman with a  history of metastatic ER positive [place] positive HER-2 negative breast  cancer initially presented in March 2020. Has undergone multiple  therapies, most recently abemaciclib with Arimidex since March 2021.    COMPARISON: PET-CT 2/19/2021, PET-CT 11/6/2020, PET-CT 8/10/2020    FINDINGS:     HEAD/NECK:  There is no suspicious FDG uptake in the neck.     The paranasal sinuses are clear. The mastoid air cells are clear. The  mucosal pharyngeal space, the , prevertebral and carotid  spaces are within normal limits. No masses, mass effect or  pathologically enlarged lymph nodes are evident. The thyroid gland is  unremarkable.    CHEST:  Decreased size and  uptake to the partially calcified subareolar mass  in the left breast measuring 3.9 x 2.7 cm with SUV max 13.4 (series 5,  image 186), previously 5.2 x 3.4 cm and SUV max 17.3. Decreased uptake  to the hypermetabolic tail/satellite metastasis extending  posterolaterally from the mass with SUV max 5.2, previously 9.0  (series 5, image 192). No hypermetabolic lesion in the right breast.    Decreased size and uptake to the hypermetabolic left axillary  adenopathy. For example left axillary lymph node measuring 1.5 x 1.1  cm with SUV max 10.9, previously 2.3 x 1.4 cm with SUV max 14.0  (series 5, image 162). No hypermetabolic lymph nodes in the right  axilla, darrian, or mediastinum.    Cardiomegaly. No pericardial effusion. Normal caliber of the thoracic  aorta and main pulmonary artery. Right chest wall Port-A-Cath catheter  tip terminates at the superior cavoatrial junction. Small hiatal  hernia. The central tracheobronchial tree is patent. No pleural  effusion or pneumothorax. No acute consolidation. Mild atelectasis.    ABDOMEN AND PELVIS:  There is no suspicious FDG uptake in the abdomen or pelvis.    Diffuse hepatic steatosis. The gallbladder, pancreas, spleen, and  adrenal glands are unremarkable. Small splenule. Symmetric  nephrographic enhancement of the kidneys. Multiple small hypodensities  within the renal cortices, too small to fully characterize by CT, may  represent small cysts. No hydronephrosis. Small enhancing nodule in  the inferior uterine wall partially effacing the bladder similar to  prior most suggestive of a fibroid. Unchanged hypodense ovarian cysts  bilaterally. No abnormally distended loops of small or large bowel. No  free air, free fluid, or fluid collection. No enlarged or  hypermetabolic lymph nodes in the abdomen or pelvis. Normal caliber of  the abdominal aorta.    LOWER EXTREMITIES:   No abnormal masses or hypermetabolic soft tissue lesions. Please see  below for discussion of osseous  findings.    BONES:   Numerous sclerotic lesions are again demonstrated throughout the  skeleton, including in the spine, sternum, pelvis, and proximal  femurs. These demonstrate variable FDG uptake, with multiple lesions  demonstrating decreased uptake since PET-CT 2/19/2021, as well as  lesions demonstrating stable to slightly increased uptake. Examples  include but are not limited to lesion in the T3 vertebral body with  SUV max 6.7, previously 5.7 (series 5, image 159), right sternal  lesion with SUV max 4.7, previously 5.8 (series 5, image 188), T12  lesion with SUV max 6.7, previously 6.9 (series 5, image 260), L4  lesion with SUV max 7.5, previously 7.3 (series 5, image 310), left  posterior iliac lesion with SUV max 4.4, previously 5.4 (series 5,  image 344) and left femoral neck lesion with SUV max 3.2, previously  5.8 (series 5, image 402). Multilevel degenerative changes in the  spine. No acute osseous abnormality.      Impression    IMPRESSION: In this patient with a history of metastatic breast cancer  undergoing chemotherapy, there is overall partial response since  2/19/2021:    1. Decreased size and uptake of the left breast carcinoma and  satellite lesions.  2. Decreased size and uptake of the left axillary metastatic  adenopathy.  3. Mixed response to the diffuse osseous metastases, numerous lesion  showing slightly decreased uptake, others stable to slightly increased  in uptake.    KAIN DOYLE MD         SYSTEM ID:  [id number]   CT Chest/Abdomen/Pelvis w Contrast    Narrative    Combined Report of:    PET and CT on  8/20/2021 10:35 AM :    1. PET of the neck, chest, abdomen, and pelvis.  2. PET CT Fusion for Attenuation Correction and Anatomical  Localization:    3. Diagnostic CT scan of the chest, abdomen, and pelvis with  intravenous contrast for interpretation.  3. CT of the chest, abdomen and pelvis obtained for diagnostic  interpretation.  4. 3D MIP and PET-CT fused images were processed  on an independent  workstation and archived to PACS and reviewed by a radiologist.    Technique:    1. PET: The patient received 10.61 mCi of F-18-FDG; the serum glucose  was 102 prior to administration, body weight was 96.5 kg. Images were  evaluated in the axial, sagittal, and coronal planes as well as the  rotational whole body MIP. Images were acquired from the Vertex to the  Feet.    UPTAKE WAS MEASURED AT 60 MINUTES.     BACKGROUND:  Liver SUV max= 4.4,   Aorta Blood SUV Max: 3.4.     2. CT: Volumetric acquisition for clinical interpretation of the  chest, abdomen, and pelvis acquired at 3 mm sections after the  uneventful administration of intravenous contrast. The chest, abdomen,  and pelvis were evaluated at 5 mm sections in bone, soft tissue, and  lung windows.      The patient received 130 cc of Isovue 370 intravenously and 500 mL  oral Breeza for the examination.      3. 3D MIP and PET-CT fused images were processed on an independent  workstation and archived to PACS and reviewed by a radiologist.    INDICATION: Invasive breast cancer, stage IV, follow up; Malignant  neoplasm of overlapping sites of left breast in female, estrogen  receptor positive (H); Malignant neoplasm of overlapping sites of left  breast in female, estrogen receptor positive (H)    ADDITIONAL INFORMATION OBTAINED FROM EMR: 66-year-old woman with a  history of metastatic ER positive [place] positive HER-2 negative breast  cancer initially presented in March 2020. Has undergone multiple  therapies, most recently abemaciclib with Arimidex since March 2021.    COMPARISON: PET-CT 2/19/2021, PET-CT 11/6/2020, PET-CT 8/10/2020    FINDINGS:     HEAD/NECK:  There is no suspicious FDG uptake in the neck.     The paranasal sinuses are clear. The mastoid air cells are clear. The  mucosal pharyngeal space, the , prevertebral and carotid  spaces are within normal limits. No masses, mass effect or  pathologically enlarged lymph nodes are  evident. The thyroid gland is  unremarkable.    CHEST:  Decreased size and uptake to the partially calcified subareolar mass  in the left breast measuring 3.9 x 2.7 cm with SUV max 13.4 (series 5,  image 186), previously 5.2 x 3.4 cm and SUV max 17.3. Decreased uptake  to the hypermetabolic tail/satellite metastasis extending  posterolaterally from the mass with SUV max 5.2, previously 9.0  (series 5, image 192). No hypermetabolic lesion in the right breast.    Decreased size and uptake to the hypermetabolic left axillary  adenopathy. For example left axillary lymph node measuring 1.5 x 1.1  cm with SUV max 10.9, previously 2.3 x 1.4 cm with SUV max 14.0  (series 5, image 162). No hypermetabolic lymph nodes in the right  axilla, darrian, or mediastinum.    Cardiomegaly. No pericardial effusion. Normal caliber of the thoracic  aorta and main pulmonary artery. Right chest wall Port-A-Cath catheter  tip terminates at the superior cavoatrial junction. Small hiatal  hernia. The central tracheobronchial tree is patent. No pleural  effusion or pneumothorax. No acute consolidation. Mild atelectasis.    ABDOMEN AND PELVIS:  There is no suspicious FDG uptake in the abdomen or pelvis.    Diffuse hepatic steatosis. The gallbladder, pancreas, spleen, and  adrenal glands are unremarkable. Small splenule. Symmetric  nephrographic enhancement of the kidneys. Multiple small hypodensities  within the renal cortices, too small to fully characterize by CT, may  represent small cysts. No hydronephrosis. Small enhancing nodule in  the inferior uterine wall partially effacing the bladder similar to  prior most suggestive of a fibroid. Unchanged hypodense ovarian cysts  bilaterally. No abnormally distended loops of small or large bowel. No  free air, free fluid, or fluid collection. No enlarged or  hypermetabolic lymph nodes in the abdomen or pelvis. Normal caliber of  the abdominal aorta.    LOWER EXTREMITIES:   No abnormal masses or  hypermetabolic soft tissue lesions. Please see  below for discussion of osseous findings.    BONES:   Numerous sclerotic lesions are again demonstrated throughout the  skeleton, including in the spine, sternum, pelvis, and proximal  femurs. These demonstrate variable FDG uptake, with multiple lesions  demonstrating decreased uptake since PET-CT 2/19/2021, as well as  lesions demonstrating stable to slightly increased uptake. Examples  include but are not limited to lesion in the T3 vertebral body with  SUV max 6.7, previously 5.7 (series 5, image 159), right sternal  lesion with SUV max 4.7, previously 5.8 (series 5, image 188), T12  lesion with SUV max 6.7, previously 6.9 (series 5, image 260), L4  lesion with SUV max 7.5, previously 7.3 (series 5, image 310), left  posterior iliac lesion with SUV max 4.4, previously 5.4 (series 5,  image 344) and left femoral neck lesion with SUV max 3.2, previously  5.8 (series 5, image 402). Multilevel degenerative changes in the  spine. No acute osseous abnormality.      Impression    IMPRESSION: In this patient with a history of metastatic breast cancer  undergoing chemotherapy, there is overall partial response since  2/19/2021:    1. Decreased size and uptake of the left breast carcinoma and  satellite lesions.  2. Decreased size and uptake of the left axillary metastatic  adenopathy.  3. Mixed response to the diffuse osseous metastases, numerous lesion  showing slightly decreased uptake, others stable to slightly increased  in uptake.    KAIN DOYLE MD         SYSTEM ID:  [id number]       ASSESSMENT AND PLAN:   1.  Metastatic ER positive SD positive HER-2/gabriela negative breast cancer with a left breast primary and bone metastasis.    Plan    1.  She continues to do well and will continue her anastrozole and Verzenio  2.  She will do labs monthly  3.  She should see me or the NP every other month and I would eventually like to see her in person periodically.  4.  I would  plan to do reimaging with CT scans in 3 to 4 months  5.  She is due for her Zometa in November 6.  We reviewed her CT scans and lab work.  She is tolerating therapy quite well, her CA 27-29 continues to decline and her CT scan shows a improvement in her primary breast mass along with axillary adenopathy.  She is having a nice response.    Tiffany Hilton MD on 8/27/2021 at 9:59 AM

## 2021-08-27 NOTE — LETTER
8/27/2021         RE: Mary Olson  [address]  [facility] [address]        Dear Colleague,    Thank you for referring your patient, Mary Olson, to the [facility]. Please see a copy of my visit note below.    Desmond is a 66 year old who is being evaluated via a billable video visit.      20 min video     ONCOLOGY DIAGNOSIS:  1. March 2020: Metastatic Breast Cancer ER+, [place]+ Her2 Negative; with bone metastasis and a left breast primary. PI K3 CA mutation of note it is a pathologic variant which is outside the mutation of specific variants enrolled in the solar one trial.  TEJAS, TMB low     THERAPY TO DATE:  1. March 2020 to August 2020: Weekly Taxol.  2. August 2020 to March 2021: Anastrozole   3. 3/19/21 began abemeciclib with arimidex she has been dose reduced on her abemaciclib 200 mg a day      INTERVAL HISTORY:  The patient is a 65-year-old female diagnosed with metastatic breast cancer in 03/2020 after noticing a lesion on her breast.  She presents a face-to-face visit today.  We have changed her Zometa to every 3 months.      She started her abemaciclib on 3/14/2021     Desmond is here for routine follow-up.  She continues to tolerate her abemaciclib and Arimidex pretty well.  She states her diarrhea is currently under well control and rarely ever has it.  She is eating well but has been trying to lose a little bit of weight and notes that she has lost about 8 pounds total since being on the Verzenio.  No other nausea vomiting fevers chills or night sweats.  She does not have any other complaints.    PAST MEDICAL HISTORY: (No new medical history since last visit per patient.)  1.  Metastatic breast cancer, see above.   2.  Hypertension.   3.  Steroid-induced diabetes.  According to the patient, since she has been off steroids, she was told that her blood sugars has normalized and she is off medication.   4.  Chemotherapy-induced neuropathy     FAMILY HISTORY:   Mother had breast cancer at 62; underwent lumpectomy and radiation, and arimidex  No new family history since last seen.     SOCIAL HISTORY:   to her , Dieter.    No current tobacco use.  Retired.  ALLERGIES:  LISINOPRIL.      PHYSICAL EXAM:  GENERAL: Comfortable, no acute distress.   HEAD: Atraumatic/Normocephalic.  EYES: Sclera non-icteric. Grossly normal.  RESPIRATORY: Normal breathing, non-labored. No audible wheezes/cough.  SKIN: No jaundice, discoloration or obvious lesions.  NERUO: No tremors visible. Normal speech.  PSYCH: Alert, oriented. Answered questions appropriately.    Labs    Recent Labs   Lab Test 08/18/21  0802 07/14/21  0928 06/16/21  0945 05/19/21  0830 04/21/21  0840    138 137 136 132*   POTASSIUM 3.9 3.7 3.8 3.6 3.9   CHLORIDE 103 103 102 102 97   CO2 27 28 30 28 29   ANIONGAP 5 7 5 6 6   BUN 12 14 12 11 9   CR 0.92  0.92 0.88 0.90 0.90  0.91 0.84   * 135* 115* 137* 101*   BUFFY 9.9  9.6 9.8 9.4 9.5  9.7 10.0     No results for input(s): MAG, PHOS in the last 31657 hours.  Recent Labs   Lab Test 08/18/21  0802 07/14/21  0928 06/16/21  0945 05/19/21  0830 04/21/21  0840   WBC 5.2 5.0 5.5 5.2 3.9*   HGB 14.5 13.0 13.0 13.0 13.1    237 252 268 213   MCV 99 103* 95 89 84   NEUTROPHIL 47 55 52.6 44.2 44.0     Recent Labs   Lab Test 08/18/21  0802 07/14/21  0928 06/16/21  0945   BILITOTAL 0.4 0.5 0.5   ALKPHOS 52 53 57   ALT 70* 123 67*   AST 50* 68* 45   ALBUMIN 3.7  3.7 3.5 3.7     TSH   Date Value Ref Range Status   10/28/2015 1.23 0.40 - 4.00 mU/L Final     No results for input(s): CEA in the last 44910 hours.  Results for orders placed or performed in visit on 08/20/21   PET Oncology Whole Body    Narrative    Combined Report of:    PET and CT on  8/20/2021 10:35 AM :    1. PET of the neck, chest, abdomen, and pelvis.  2. PET CT Fusion for Attenuation Correction and Anatomical  Localization:    3. Diagnostic CT scan of the chest, abdomen, and pelvis  with  intravenous contrast for interpretation.  3. CT of the chest, abdomen and pelvis obtained for diagnostic  interpretation.  4. 3D MIP and PET-CT fused images were processed on an independent  workstation and archived to PACS and reviewed by a radiologist.    Technique:    1. PET: The patient received 10.61 mCi of F-18-FDG; the serum glucose  was 102 prior to administration, body weight was 96.5 kg. Images were  evaluated in the axial, sagittal, and coronal planes as well as the  rotational whole body MIP. Images were acquired from the Vertex to the  Feet.    UPTAKE WAS MEASURED AT 60 MINUTES.     BACKGROUND:  Liver SUV max= 4.4,   Aorta Blood SUV Max: 3.4.     2. CT: Volumetric acquisition for clinical interpretation of the  chest, abdomen, and pelvis acquired at 3 mm sections after the  uneventful administration of intravenous contrast. The chest, abdomen,  and pelvis were evaluated at 5 mm sections in bone, soft tissue, and  lung windows.      The patient received 130 cc of Isovue 370 intravenously and 500 mL  oral Breeza for the examination.      3. 3D MIP and PET-CT fused images were processed on an independent  workstation and archived to PACS and reviewed by a radiologist.    INDICATION: Invasive breast cancer, stage IV, follow up; Malignant  neoplasm of overlapping sites of left breast in female, estrogen  receptor positive (H); Malignant neoplasm of overlapping sites of left  breast in female, estrogen receptor positive (H)    ADDITIONAL INFORMATION OBTAINED FROM EMR: 66-year-old woman with a  history of metastatic ER positive [place] positive HER-2 negative breast  cancer initially presented in March 2020. Has undergone multiple  therapies, most recently abemaciclib with Arimidex since March 2021.    COMPARISON: PET-CT 2/19/2021, PET-CT 11/6/2020, PET-CT 8/10/2020    FINDINGS:     HEAD/NECK:  There is no suspicious FDG uptake in the neck.     The paranasal sinuses are clear. The mastoid air cells are clear.  The  mucosal pharyngeal space, the , prevertebral and carotid  spaces are within normal limits. No masses, mass effect or  pathologically enlarged lymph nodes are evident. The thyroid gland is  unremarkable.    CHEST:  Decreased size and uptake to the partially calcified subareolar mass  in the left breast measuring 3.9 x 2.7 cm with SUV max 13.4 (series 5,  image 186), previously 5.2 x 3.4 cm and SUV max 17.3. Decreased uptake  to the hypermetabolic tail/satellite metastasis extending  posterolaterally from the mass with SUV max 5.2, previously 9.0  (series 5, image 192). No hypermetabolic lesion in the right breast.    Decreased size and uptake to the hypermetabolic left axillary  adenopathy. For example left axillary lymph node measuring 1.5 x 1.1  cm with SUV max 10.9, previously 2.3 x 1.4 cm with SUV max 14.0  (series 5, image 162). No hypermetabolic lymph nodes in the right  axilla, darrian, or mediastinum.    Cardiomegaly. No pericardial effusion. Normal caliber of the thoracic  aorta and main pulmonary artery. Right chest wall Port-A-Cath catheter  tip terminates at the superior cavoatrial junction. Small hiatal  hernia. The central tracheobronchial tree is patent. No pleural  effusion or pneumothorax. No acute consolidation. Mild atelectasis.    ABDOMEN AND PELVIS:  There is no suspicious FDG uptake in the abdomen or pelvis.    Diffuse hepatic steatosis. The gallbladder, pancreas, spleen, and  adrenal glands are unremarkable. Small splenule. Symmetric  nephrographic enhancement of the kidneys. Multiple small hypodensities  within the renal cortices, too small to fully characterize by CT, may  represent small cysts. No hydronephrosis. Small enhancing nodule in  the inferior uterine wall partially effacing the bladder similar to  prior most suggestive of a fibroid. Unchanged hypodense ovarian cysts  bilaterally. No abnormally distended loops of small or large bowel. No  free air, free fluid, or fluid  collection. No enlarged or  hypermetabolic lymph nodes in the abdomen or pelvis. Normal caliber of  the abdominal aorta.    LOWER EXTREMITIES:   No abnormal masses or hypermetabolic soft tissue lesions. Please see  below for discussion of osseous findings.    BONES:   Numerous sclerotic lesions are again demonstrated throughout the  skeleton, including in the spine, sternum, pelvis, and proximal  femurs. These demonstrate variable FDG uptake, with multiple lesions  demonstrating decreased uptake since PET-CT 2/19/2021, as well as  lesions demonstrating stable to slightly increased uptake. Examples  include but are not limited to lesion in the T3 vertebral body with  SUV max 6.7, previously 5.7 (series 5, image 159), right sternal  lesion with SUV max 4.7, previously 5.8 (series 5, image 188), T12  lesion with SUV max 6.7, previously 6.9 (series 5, image 260), L4  lesion with SUV max 7.5, previously 7.3 (series 5, image 310), left  posterior iliac lesion with SUV max 4.4, previously 5.4 (series 5,  image 344) and left femoral neck lesion with SUV max 3.2, previously  5.8 (series 5, image 402). Multilevel degenerative changes in the  spine. No acute osseous abnormality.      Impression    IMPRESSION: In this patient with a history of metastatic breast cancer  undergoing chemotherapy, there is overall partial response since  2/19/2021:    1. Decreased size and uptake of the left breast carcinoma and  satellite lesions.  2. Decreased size and uptake of the left axillary metastatic  adenopathy.  3. Mixed response to the diffuse osseous metastases, numerous lesion  showing slightly decreased uptake, others stable to slightly increased  in uptake.    KAIN DOYLE MD         SYSTEM ID:  [id number]   CT Chest/Abdomen/Pelvis w Contrast    Narrative    Combined Report of:    PET and CT on  8/20/2021 10:35 AM :    1. PET of the neck, chest, abdomen, and pelvis.  2. PET CT Fusion for Attenuation Correction and  Anatomical  Localization:    3. Diagnostic CT scan of the chest, abdomen, and pelvis with  intravenous contrast for interpretation.  3. CT of the chest, abdomen and pelvis obtained for diagnostic  interpretation.  4. 3D MIP and PET-CT fused images were processed on an independent  workstation and archived to PACS and reviewed by a radiologist.    Technique:    1. PET: The patient received 10.61 mCi of F-18-FDG; the serum glucose  was 102 prior to administration, body weight was 96.5 kg. Images were  evaluated in the axial, sagittal, and coronal planes as well as the  rotational whole body MIP. Images were acquired from the Vertex to the  Feet.    UPTAKE WAS MEASURED AT 60 MINUTES.     BACKGROUND:  Liver SUV max= 4.4,   Aorta Blood SUV Max: 3.4.     2. CT: Volumetric acquisition for clinical interpretation of the  chest, abdomen, and pelvis acquired at 3 mm sections after the  uneventful administration of intravenous contrast. The chest, abdomen,  and pelvis were evaluated at 5 mm sections in bone, soft tissue, and  lung windows.      The patient received 130 cc of Isovue 370 intravenously and 500 mL  oral Breeza for the examination.      3. 3D MIP and PET-CT fused images were processed on an independent  workstation and archived to PACS and reviewed by a radiologist.    INDICATION: Invasive breast cancer, stage IV, follow up; Malignant  neoplasm of overlapping sites of left breast in female, estrogen  receptor positive (H); Malignant neoplasm of overlapping sites of left  breast in female, estrogen receptor positive (H)    ADDITIONAL INFORMATION OBTAINED FROM EMR: 66-year-old woman with a  history of metastatic ER positive [place] positive HER-2 negative breast  cancer initially presented in March 2020. Has undergone multiple  therapies, most recently abemaciclib with Arimidex since March 2021.    COMPARISON: PET-CT 2/19/2021, PET-CT 11/6/2020, PET-CT 8/10/2020    FINDINGS:     HEAD/NECK:  There is no suspicious FDG  uptake in the neck.     The paranasal sinuses are clear. The mastoid air cells are clear. The  mucosal pharyngeal space, the , prevertebral and carotid  spaces are within normal limits. No masses, mass effect or  pathologically enlarged lymph nodes are evident. The thyroid gland is  unremarkable.    CHEST:  Decreased size and uptake to the partially calcified subareolar mass  in the left breast measuring 3.9 x 2.7 cm with SUV max 13.4 (series 5,  image 186), previously 5.2 x 3.4 cm and SUV max 17.3. Decreased uptake  to the hypermetabolic tail/satellite metastasis extending  posterolaterally from the mass with SUV max 5.2, previously 9.0  (series 5, image 192). No hypermetabolic lesion in the right breast.    Decreased size and uptake to the hypermetabolic left axillary  adenopathy. For example left axillary lymph node measuring 1.5 x 1.1  cm with SUV max 10.9, previously 2.3 x 1.4 cm with SUV max 14.0  (series 5, image 162). No hypermetabolic lymph nodes in the right  axilla, darrian, or mediastinum.    Cardiomegaly. No pericardial effusion. Normal caliber of the thoracic  aorta and main pulmonary artery. Right chest wall Port-A-Cath catheter  tip terminates at the superior cavoatrial junction. Small hiatal  hernia. The central tracheobronchial tree is patent. No pleural  effusion or pneumothorax. No acute consolidation. Mild atelectasis.    ABDOMEN AND PELVIS:  There is no suspicious FDG uptake in the abdomen or pelvis.    Diffuse hepatic steatosis. The gallbladder, pancreas, spleen, and  adrenal glands are unremarkable. Small splenule. Symmetric  nephrographic enhancement of the kidneys. Multiple small hypodensities  within the renal cortices, too small to fully characterize by CT, may  represent small cysts. No hydronephrosis. Small enhancing nodule in  the inferior uterine wall partially effacing the bladder similar to  prior most suggestive of a fibroid. Unchanged hypodense ovarian cysts  bilaterally.  No abnormally distended loops of small or large bowel. No  free air, free fluid, or fluid collection. No enlarged or  hypermetabolic lymph nodes in the abdomen or pelvis. Normal caliber of  the abdominal aorta.    LOWER EXTREMITIES:   No abnormal masses or hypermetabolic soft tissue lesions. Please see  below for discussion of osseous findings.    BONES:   Numerous sclerotic lesions are again demonstrated throughout the  skeleton, including in the spine, sternum, pelvis, and proximal  femurs. These demonstrate variable FDG uptake, with multiple lesions  demonstrating decreased uptake since PET-CT 2/19/2021, as well as  lesions demonstrating stable to slightly increased uptake. Examples  include but are not limited to lesion in the T3 vertebral body with  SUV max 6.7, previously 5.7 (series 5, image 159), right sternal  lesion with SUV max 4.7, previously 5.8 (series 5, image 188), T12  lesion with SUV max 6.7, previously 6.9 (series 5, image 260), L4  lesion with SUV max 7.5, previously 7.3 (series 5, image 310), left  posterior iliac lesion with SUV max 4.4, previously 5.4 (series 5,  image 344) and left femoral neck lesion with SUV max 3.2, previously  5.8 (series 5, image 402). Multilevel degenerative changes in the  spine. No acute osseous abnormality.      Impression    IMPRESSION: In this patient with a history of metastatic breast cancer  undergoing chemotherapy, there is overall partial response since  2/19/2021:    1. Decreased size and uptake of the left breast carcinoma and  satellite lesions.  2. Decreased size and uptake of the left axillary metastatic  adenopathy.  3. Mixed response to the diffuse osseous metastases, numerous lesion  showing slightly decreased uptake, others stable to slightly increased  in uptake.    KAIN DOYLE MD         SYSTEM ID:  [id number]       ASSESSMENT AND PLAN:   1.  Metastatic ER positive [place] positive HER-2/gabriela negative breast cancer with a left breast primary and bone  metastasis.    Plan    1.  She continues to do well and will continue her anastrozole and Verzenio  2.  She will do labs monthly  3.  She should see me or the NP every other month and I would eventually like to see her in person periodically.  4.  I would plan to do reimaging with CT scans in 3 to 4 months  5.  She is due for her Zometa in November 6.  We reviewed her CT scans and lab work.  She is tolerating therapy quite well, her CA 27-29 continues to decline and her CT scan shows a improvement in her primary breast mass along with axillary adenopathy.  She is having a nice response.    Tiffany Hilton MD on 8/27/2021 at 9:59 AM                    Again, thank you for allowing me to participate in the care of your patient.        Sincerely,        Tiffany Hilton MD

## 2021-09-01 ENCOUNTER — TELEPHONE (OUTPATIENT)
Dept: PEDIATRICS | Facility: CLINIC | Age: 66
End: 2021-09-01
Payer: MEDICARE

## 2021-09-01 NOTE — TELEPHONE ENCOUNTER
Hello,    Per my protocol, I would need an rx sent to us for prevnar, pneumovax, and shingrix if so desired. Our protocol doesn't cover them on the meds they're on.    Thanks!    Trae Grant Pharm. D.  [facility] Pharmacy Manager  [phone]  9/1/2021

## 2021-09-07 DIAGNOSIS — Z17.0 MALIGNANT NEOPLASM OF OVERLAPPING SITES OF LEFT BREAST IN FEMALE, ESTROGEN RECEPTOR POSITIVE (H): Primary | ICD-10-CM

## 2021-09-07 DIAGNOSIS — C50.812 MALIGNANT NEOPLASM OF OVERLAPPING SITES OF LEFT BREAST IN FEMALE, ESTROGEN RECEPTOR POSITIVE (H): Primary | ICD-10-CM

## 2021-09-22 ENCOUNTER — DOCUMENTATION ONLY (OUTPATIENT)
Dept: ONCOLOGY | Facility: CLINIC | Age: 66
End: 2021-09-22

## 2021-09-22 ENCOUNTER — LAB (OUTPATIENT)
Dept: ONCOLOGY | Facility: CLINIC | Age: 66
End: 2021-09-22
Payer: MEDICARE

## 2021-09-22 DIAGNOSIS — Z17.0 MALIGNANT NEOPLASM OF OVERLAPPING SITES OF LEFT BREAST IN FEMALE, ESTROGEN RECEPTOR POSITIVE (H): ICD-10-CM

## 2021-09-22 DIAGNOSIS — C50.812 MALIGNANT NEOPLASM OF OVERLAPPING SITES OF LEFT BREAST IN FEMALE, ESTROGEN RECEPTOR POSITIVE (H): ICD-10-CM

## 2021-09-22 LAB
ALBUMIN SERPL-MCNC: 3.7 G/DL (ref 3.4–5)
ALP SERPL-CCNC: 54 U/L (ref 40–150)
ALT SERPL W P-5'-P-CCNC: 54 U/L (ref 0–50)
ANION GAP SERPL CALCULATED.3IONS-SCNC: 4 MMOL/L (ref 3–14)
AST SERPL W P-5'-P-CCNC: 30 U/L (ref 0–45)
BASOPHILS # BLD AUTO: 0.1 10E3/UL (ref 0–0.2)
BASOPHILS NFR BLD AUTO: 2 %
BILIRUB SERPL-MCNC: 0.6 MG/DL (ref 0.2–1.3)
BUN SERPL-MCNC: 15 MG/DL (ref 7–30)
CALCIUM SERPL-MCNC: 10.2 MG/DL (ref 8.5–10.1)
CANCER AG27-29 SERPL-ACNC: 129 U/ML (ref 0–39)
CHLORIDE BLD-SCNC: 102 MMOL/L (ref 94–109)
CO2 SERPL-SCNC: 31 MMOL/L (ref 20–32)
CREAT SERPL-MCNC: 1.05 MG/DL (ref 0.52–1.04)
EOSINOPHIL # BLD AUTO: 0.1 10E3/UL (ref 0–0.7)
EOSINOPHIL NFR BLD AUTO: 3 %
ERYTHROCYTE [DISTWIDTH] IN BLOOD BY AUTOMATED COUNT: 12.8 % (ref 10–15)
GFR SERPL CREATININE-BSD FRML MDRD: 56 ML/MIN/1.73M2
GLUCOSE BLD-MCNC: 100 MG/DL (ref 70–99)
HCT VFR BLD AUTO: 39.1 % (ref 35–47)
HGB BLD-MCNC: 13.9 G/DL (ref 11.7–15.7)
IMM GRANULOCYTES # BLD: 0 10E3/UL
IMM GRANULOCYTES NFR BLD: 0 %
LYMPHOCYTES # BLD AUTO: 2.2 10E3/UL (ref 0.8–5.3)
LYMPHOCYTES NFR BLD AUTO: 46 %
MCH RBC QN AUTO: 33.3 PG (ref 26.5–33)
MCHC RBC AUTO-ENTMCNC: 35.5 G/DL (ref 31.5–36.5)
MCV RBC AUTO: 94 FL (ref 78–100)
MONOCYTES # BLD AUTO: 0.4 10E3/UL (ref 0–1.3)
MONOCYTES NFR BLD AUTO: 8 %
NEUTROPHILS # BLD AUTO: 2 10E3/UL (ref 1.6–8.3)
NEUTROPHILS NFR BLD AUTO: 41 %
NRBC # BLD AUTO: 0 10E3/UL
NRBC BLD AUTO-RTO: 0 /100
PLATELET # BLD AUTO: 176 10E3/UL (ref 150–450)
POTASSIUM BLD-SCNC: 3.5 MMOL/L (ref 3.4–5.3)
PROT SERPL-MCNC: 8.3 G/DL (ref 6.8–8.8)
RBC # BLD AUTO: 4.18 10E6/UL (ref 3.8–5.2)
SODIUM SERPL-SCNC: 137 MMOL/L (ref 133–144)
WBC # BLD AUTO: 4.9 10E3/UL (ref 4–11)

## 2021-09-22 PROCEDURE — 85025 COMPLETE CBC W/AUTO DIFF WBC: CPT | Performed by: INTERNAL MEDICINE

## 2021-09-22 PROCEDURE — 80053 COMPREHEN METABOLIC PANEL: CPT | Performed by: INTERNAL MEDICINE

## 2021-09-22 PROCEDURE — 86300 IMMUNOASSAY TUMOR CA 15-3: CPT | Performed by: INTERNAL MEDICINE

## 2021-09-22 PROCEDURE — 36591 DRAW BLOOD OFF VENOUS DEVICE: CPT

## 2021-09-22 RX ORDER — HEPARIN SODIUM (PORCINE) LOCK FLUSH IV SOLN 100 UNIT/ML 100 UNIT/ML
500 SOLUTION INTRAVENOUS EVERY 8 HOURS
Status: DISCONTINUED | OUTPATIENT
Start: 2021-09-22 | End: 2021-09-22 | Stop reason: HOSPADM

## 2021-09-22 RX ADMIN — HEPARIN SODIUM (PORCINE) LOCK FLUSH IV SOLN 100 UNIT/ML 500 UNITS: 100 SOLUTION at 08:28

## 2021-09-22 NOTE — PROGRESS NOTES
"Patient's name and  were verified.  See Doc Flowsheet - IV assess for details.  IVAD accessed with 20G 3/4\" carroll gripper plus needle  blood return positive: YES  Site without redness, tenderness or swelling: YES  flushed with 30cc NS and 5cc 100u/ml heparin  Needle: de-accessed  Comments: Labs drawn.  Patient tolerated procedure without incident.    Hernán Karimi  RN, BSN      "

## 2021-09-23 DIAGNOSIS — Z17.0 MALIGNANT NEOPLASM OF OVERLAPPING SITES OF LEFT BREAST IN FEMALE, ESTROGEN RECEPTOR POSITIVE (H): Primary | ICD-10-CM

## 2021-09-23 DIAGNOSIS — C50.812 MALIGNANT NEOPLASM OF OVERLAPPING SITES OF LEFT BREAST IN FEMALE, ESTROGEN RECEPTOR POSITIVE (H): Primary | ICD-10-CM

## 2021-09-26 ENCOUNTER — HEALTH MAINTENANCE LETTER (OUTPATIENT)
Age: 66
End: 2021-09-26

## 2021-09-30 DIAGNOSIS — Z17.0 MALIGNANT NEOPLASM OF OVERLAPPING SITES OF LEFT BREAST IN FEMALE, ESTROGEN RECEPTOR POSITIVE (H): Primary | ICD-10-CM

## 2021-09-30 DIAGNOSIS — C50.812 MALIGNANT NEOPLASM OF OVERLAPPING SITES OF LEFT BREAST IN FEMALE, ESTROGEN RECEPTOR POSITIVE (H): Primary | ICD-10-CM

## 2021-10-14 ENCOUNTER — TELEPHONE (OUTPATIENT)
Dept: ONCOLOGY | Facility: CLINIC | Age: 66
End: 2021-10-14

## 2021-10-14 NOTE — TELEPHONE ENCOUNTER
Free Drug Application Initiated  Medication: Verzenio 100mg  Sponsor: [employer]  Phone # [phone]  Fax # [phone]  Additional Information: next appt. 10/20 need patient to sign, date, and income

## 2021-10-20 ENCOUNTER — LAB (OUTPATIENT)
Dept: ONCOLOGY | Facility: CLINIC | Age: 66
End: 2021-10-20
Payer: MEDICARE

## 2021-10-20 ENCOUNTER — OFFICE VISIT (OUTPATIENT)
Dept: ONCOLOGY | Facility: CLINIC | Age: 66
End: 2021-10-20
Payer: MEDICARE

## 2021-10-20 VITALS
TEMPERATURE: 98.3 F | HEIGHT: 62 IN | HEART RATE: 107 BPM | OXYGEN SATURATION: 98 % | DIASTOLIC BLOOD PRESSURE: 96 MMHG | SYSTOLIC BLOOD PRESSURE: 164 MMHG | WEIGHT: 211 LBS | RESPIRATION RATE: 16 BRPM | BODY MASS INDEX: 38.83 KG/M2

## 2021-10-20 DIAGNOSIS — Z17.0 MALIGNANT NEOPLASM OF OVERLAPPING SITES OF LEFT BREAST IN FEMALE, ESTROGEN RECEPTOR POSITIVE (H): Primary | ICD-10-CM

## 2021-10-20 DIAGNOSIS — I10 BENIGN ESSENTIAL HYPERTENSION: ICD-10-CM

## 2021-10-20 DIAGNOSIS — C79.51 BONE METASTASIS: ICD-10-CM

## 2021-10-20 DIAGNOSIS — Z17.0 MALIGNANT NEOPLASM OF OVERLAPPING SITES OF LEFT BREAST IN FEMALE, ESTROGEN RECEPTOR POSITIVE (H): ICD-10-CM

## 2021-10-20 DIAGNOSIS — C50.812 MALIGNANT NEOPLASM OF OVERLAPPING SITES OF LEFT BREAST IN FEMALE, ESTROGEN RECEPTOR POSITIVE (H): Primary | ICD-10-CM

## 2021-10-20 DIAGNOSIS — C50.812 MALIGNANT NEOPLASM OF OVERLAPPING SITES OF LEFT BREAST IN FEMALE, ESTROGEN RECEPTOR POSITIVE (H): ICD-10-CM

## 2021-10-20 DIAGNOSIS — G62.9 NEUROPATHY: ICD-10-CM

## 2021-10-20 LAB
ALBUMIN SERPL-MCNC: 3.9 G/DL (ref 3.4–5)
ALP SERPL-CCNC: 49 U/L (ref 40–150)
ALT SERPL W P-5'-P-CCNC: 46 U/L (ref 0–50)
ANION GAP SERPL CALCULATED.3IONS-SCNC: 5 MMOL/L (ref 3–14)
AST SERPL W P-5'-P-CCNC: 32 U/L (ref 0–45)
BASOPHILS # BLD AUTO: 0.1 10E3/UL (ref 0–0.2)
BASOPHILS NFR BLD AUTO: 2 %
BILIRUB SERPL-MCNC: 0.8 MG/DL (ref 0.2–1.3)
BUN SERPL-MCNC: 15 MG/DL (ref 7–30)
CALCIUM SERPL-MCNC: 10.2 MG/DL (ref 8.5–10.1)
CANCER AG27-29 SERPL-ACNC: 134 U/ML (ref 0–39)
CHLORIDE BLD-SCNC: 101 MMOL/L (ref 94–109)
CO2 SERPL-SCNC: 30 MMOL/L (ref 20–32)
CREAT SERPL-MCNC: 0.93 MG/DL (ref 0.52–1.04)
EOSINOPHIL # BLD AUTO: 0.1 10E3/UL (ref 0–0.7)
EOSINOPHIL NFR BLD AUTO: 2 %
ERYTHROCYTE [DISTWIDTH] IN BLOOD BY AUTOMATED COUNT: 13.7 % (ref 10–15)
GFR SERPL CREATININE-BSD FRML MDRD: 65 ML/MIN/1.73M2
GLUCOSE BLD-MCNC: 101 MG/DL (ref 70–99)
HCT VFR BLD AUTO: 40.8 % (ref 35–47)
HGB BLD-MCNC: 14.3 G/DL (ref 11.7–15.7)
IMM GRANULOCYTES # BLD: 0 10E3/UL
IMM GRANULOCYTES NFR BLD: 0 %
LYMPHOCYTES # BLD AUTO: 2.2 10E3/UL (ref 0.8–5.3)
LYMPHOCYTES NFR BLD AUTO: 45 %
MCH RBC QN AUTO: 33.6 PG (ref 26.5–33)
MCHC RBC AUTO-ENTMCNC: 35 G/DL (ref 31.5–36.5)
MCV RBC AUTO: 96 FL (ref 78–100)
MONOCYTES # BLD AUTO: 0.4 10E3/UL (ref 0–1.3)
MONOCYTES NFR BLD AUTO: 8 %
NEUTROPHILS # BLD AUTO: 2.2 10E3/UL (ref 1.6–8.3)
NEUTROPHILS NFR BLD AUTO: 43 %
NRBC # BLD AUTO: 0 10E3/UL
NRBC BLD AUTO-RTO: 0 /100
PLATELET # BLD AUTO: 199 10E3/UL (ref 150–450)
POTASSIUM BLD-SCNC: 3.4 MMOL/L (ref 3.4–5.3)
PROT SERPL-MCNC: 8.5 G/DL (ref 6.8–8.8)
RBC # BLD AUTO: 4.26 10E6/UL (ref 3.8–5.2)
SODIUM SERPL-SCNC: 136 MMOL/L (ref 133–144)
WBC # BLD AUTO: 5 10E3/UL (ref 4–11)

## 2021-10-20 PROCEDURE — 85025 COMPLETE CBC W/AUTO DIFF WBC: CPT | Performed by: INTERNAL MEDICINE

## 2021-10-20 PROCEDURE — 86300 IMMUNOASSAY TUMOR CA 15-3: CPT | Performed by: INTERNAL MEDICINE

## 2021-10-20 PROCEDURE — 80053 COMPREHEN METABOLIC PANEL: CPT | Performed by: INTERNAL MEDICINE

## 2021-10-20 PROCEDURE — 99214 OFFICE O/P EST MOD 30 MIN: CPT | Performed by: NURSE PRACTITIONER

## 2021-10-20 PROCEDURE — 99207 PR NO CHARGE LOS: CPT

## 2021-10-20 RX ORDER — AMLODIPINE BESYLATE 10 MG/1
10 TABLET ORAL DAILY
Qty: 90 TABLET | Refills: 1 | Status: SHIPPED | OUTPATIENT
Start: 2021-10-20 | End: 2022-03-09

## 2021-10-20 RX ORDER — CHLORTHALIDONE 25 MG/1
25 TABLET ORAL DAILY
Qty: 90 TABLET | Refills: 1 | Status: SHIPPED | OUTPATIENT
Start: 2021-10-20 | End: 2022-03-08

## 2021-10-20 RX ORDER — HEPARIN SODIUM (PORCINE) LOCK FLUSH IV SOLN 100 UNIT/ML 100 UNIT/ML
500 SOLUTION INTRAVENOUS ONCE
Status: COMPLETED | OUTPATIENT
Start: 2021-10-20 | End: 2021-10-20

## 2021-10-20 RX ORDER — GABAPENTIN 250 MG/5ML
SOLUTION ORAL
Qty: 470 ML | Refills: 6 | Status: SHIPPED | OUTPATIENT
Start: 2021-10-20 | End: 2022-11-11

## 2021-10-20 RX ADMIN — HEPARIN SODIUM (PORCINE) LOCK FLUSH IV SOLN 100 UNIT/ML 500 UNITS: 100 SOLUTION at 08:38

## 2021-10-20 ASSESSMENT — MIFFLIN-ST. JEOR: SCORE: 1455.47

## 2021-10-20 ASSESSMENT — PAIN SCALES - GENERAL: PAINLEVEL: NO PAIN (0)

## 2021-10-20 NOTE — LETTER
10/20/2021         RE: Mary Olson  [address]  [facility] [address]        Dear Colleague,    Thank you for referring your patient, Mary Olson, to the [facility]. Please see a copy of my visit note below.    Oncology Follow Up Visit: October 20, 2021     Oncologist: Dr Tiffany Hilton  PCP: Clinic, [facility]    Diagnosis:Metastatic Breast Cancer-  Mary Olson is a 65 yo female who noticed what she believed to be a bug bite on her left breast in 10/2019.  She initially noted 3 red spots- treated at home. She underwent imaging with a diagnostic mammogram on March 4, 2020, as well as ultrasound where they found an ill-defined margins measuring at least 6.2 cm to center of the left nipple.  Other masses are seen posterior to this including a 2.1 cm mass slightly medial on mammography.  Diffuse left breast skin thickening.  There are at least 3 abnormal enlarged lymph nodes in the left axilla measuring 2.5 x 1.3 x 2.4 cm.  The right breast at the 12:30 position 7 cm from the nipple, there is a spiculated mass corresponding mammographic finding measuring approximately 1.3 x 0.9 x 1.0 cm.  A smaller spiculated hypoechoic mass is at the 12 o'clock position 2 cm from the nipple, measuring 0.9 x 0.5 x 0.7 cm.  Normal lymph nodes in the right axilla.  Biopsy of the left breast mass confirmed invasive mammary carcinoma, no special type, invasive ductal, grade 2, estrogen receptor positive, progesterone receptor positive, HER-2 negative.  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  The second right breast mass at the 12 o'clock position showed benign breast tissue, composed predominantly of uncolonized stroma with rare ducts.  No discrete masses identified.  No atypical or malignant  findings.  3/12/2020 MUGA showed normal heart function with LVEF of 58% and no other abnormalities.  Left Axillary biopsy was positive for metastatic mammary cancer with Negative Her2  PET scan showing metastasis to left axilla, retropectoral lymph nodes and diffuse bone metastasis  Treatment:   3/25/2020 to 8/2020 Taxol x 12weeks followed by ddAC x 4 cycles  3/25/2020 Begin zometa  8/2020 to 3/2021: Arimidex   3/19/21 began abemeciclib with Arimidex she has been dose reduced on her abemaciclib 200 mg a day    Interval History: Ms. Olson comes to clinic for review of use of abemeciclib with Arimidex. Pt shares she is tolerating treatment with veryfew side effects =noting only some fatigue and occasional loose stool. She denies nausea or fevers, illness, or SOB or bladder issues.   Rest of comprehensive and complete ROS is reviewed and is negative.   Past Medical History:   Diagnosis Date     ASCUS favoring benign 10/28/15    neg HPV     Cancer (H)      Diabetes mellitus, type 2 (H) 7/14/2020     Hypertension      Obese      Current Outpatient Medications   Medication     abemaciclib (VERZENIO) 100 MG tablet     alcohol swab prep pads     amLODIPine (NORVASC) 10 MG tablet     anastrozole (ARIMIDEX) 1 MG tablet     Ascorbic Acid (VITAMIN C PO)     aspirin 81 MG tablet     biotin 1000 MCG TABS tablet     blood glucose (NO BRAND SPECIFIED) test strip     blood glucose calibration (NO BRAND SPECIFIED) solution     blood glucose monitoring (NO BRAND SPECIFIED) meter device kit     Blood Pressure KIT     calcium-vitamin D-vitamin K (VIACTIV) 500-500-40 MG-UNT-MCG CHEW     chlorthalidone (HYGROTON) 25 MG tablet     Cholecalciferol (VITAMIN D3 PO)     Coenzyme Q10 (COQ-10) 200 MG CAPS     Cyanocobalamin (B-12) 2500 MCG TABS     docusate sodium (COLACE) 100 MG capsule     gabapentin (NEURONTIN) 250 MG/5ML solution     hydrocortisone 2.5 % cream     lidocaine-prilocaine (EMLA) 2.5-2.5 % external cream     Multiple  "Vitamins-Minerals (CENTRUM ADULTS PO)     potassium chloride (KLOR-CON) 20 MEQ packet     thin (NO BRAND SPECIFIED) lancets     UNABLE TO FIND     VITAMIN E NATURAL PO     No current facility-administered medications for this visit.     Facility-Administered Medications Ordered in Other Visits   Medication     heparin 100 UNIT/ML injection 500 Units     Allergies   Allergen Reactions     Lisinopril Cough       Physical Exam:BP (!) 164/96 (BP Location: Left arm)   Pulse 107   Temp 98.3  F (36.8  C) (Oral)   Resp 16   Ht 1.575 m (5' 2.01\")   Wt 95.7 kg (211 lb)   SpO2 98%   BMI 38.58 kg/m    Constitutional: Alert, cooperative, and in no distress.   Cardiac: Heart rate and rhythm is regular and strong without murmur  Respiratory: Breathing easy-no cough  Port in place and without redness or swelling today   GI: Abdomen is rounded  MS: Muscle tone normal, extremities normal with no edema.   Skin: No suspicious lesions or rashes  Neuro: Sensory grossly WNL  Psych: Mentation appears normal and affect mildly anxious but with good conversation    Laboratory Results:   Results for orders placed or performed in visit on 10/20/21   Comprehensive metabolic panel     Status: Abnormal   Result Value Ref Range    Sodium 136 133 - 144 mmol/L    Potassium 3.4 3.4 - 5.3 mmol/L    Chloride 101 94 - 109 mmol/L    Carbon Dioxide (CO2) 30 20 - 32 mmol/L    Anion Gap 5 3 - 14 mmol/L    Urea Nitrogen 15 7 - 30 mg/dL    Creatinine 0.93 0.52 - 1.04 mg/dL    Calcium 10.2 (H) 8.5 - 10.1 mg/dL    Glucose 101 (H) 70 - 99 mg/dL    Alkaline Phosphatase 49 40 - 150 U/L    AST 32 0 - 45 U/L    ALT 46 0 - 50 U/L    Protein Total 8.5 6.8 - 8.8 g/dL    Albumin 3.9 3.4 - 5.0 g/dL    Bilirubin Total 0.8 0.2 - 1.3 mg/dL    GFR Estimate 65 >60 mL/min/1.73m2   Ca27.29  breast tumor marker     Status: Abnormal   Result Value Ref Range    CA 27-29 134 (H) 0 - 39 U/mL    Narrative    Assay Method:  Chemiluminescence using Siemens Centaur  XP   CBC with " platelets and differential     Status: Abnormal   Result Value Ref Range    WBC Count 5.0 4.0 - 11.0 10e3/uL    RBC Count 4.26 3.80 - 5.20 10e6/uL    Hemoglobin 14.3 11.7 - 15.7 g/dL    Hematocrit 40.8 35.0 - 47.0 %    MCV 96 78 - 100 fL    MCH 33.6 (H) 26.5 - 33.0 pg    MCHC 35.0 31.5 - 36.5 g/dL    RDW 13.7 10.0 - 15.0 %    Platelet Count 199 150 - 450 10e3/uL    % Neutrophils 43 %    % Lymphocytes 45 %    % Monocytes 8 %    % Eosinophils 2 %    % Basophils 2 %    % Immature Granulocytes 0 %    NRBCs per 100 WBC 0 <1 /100    Absolute Neutrophils 2.2 1.6 - 8.3 10e3/uL    Absolute Lymphocytes 2.2 0.8 - 5.3 10e3/uL    Absolute Monocytes 0.4 0.0 - 1.3 10e3/uL    Absolute Eosinophils 0.1 0.0 - 0.7 10e3/uL    Absolute Basophils 0.1 0.0 - 0.2 10e3/uL    Absolute Immature Granulocytes 0.0 <=0.0 10e3/uL    Absolute NRBCs 0.0 10e3/uL   CBC with platelets differential     Status: Abnormal    Narrative    The following orders were created for panel order CBC with platelets differential.  Procedure                               Abnormality         Status                     ---------                               -----------         ------                     CBC with platelets and d...[[id number]]  Abnormal            Final result                 Please view results for these tests on the individual orders.     Assessment and Plan:    Metastatic Breast Cancer-patient is currently being treated with Abemiciclib and anastrozole and is tolerating well with current dosing of abemiciclib at 100 mg bid.   Cancer marker remaining stable.    Ref. Range 8/18/2021 08:05 8/20/2021 10:35 9/22/2021 08:17 10/20/2021 08:26   CA 27-29 Latest Ref Range: 0 - 39 U/mL 140 (H)  129 (H) 134 (H)   She will continue monthly labs and will have clinic review every other visit.   Next scan to be completed  In 2 months when she will see Dr Hilton for review.   Bone metastasis - now using Zometa in plan and is on daily calcium plus vitamin D.Calcium  level has been mildly elevated last 2 visits so will decrease calcium supplement.   Elevated blood pressure- Reports she is always elevated when in clinic. Currently on Amlodipine 10 mg daily and chlorthalidone 25mg daily. Asked pt to take BPs at home for next week and if remaining elevated over 140/90 should see PCP and educated on side effects of HTN.   The total time of this encounter amounted to 35 minutes. This time included emanuel to face time spent with the patient, prep work, ordering tests, and performing post visit documentation.  Swathi Page Cnp          Again, thank you for allowing me to participate in the care of your patient.        Sincerely,        Swathi Page, LONNY, APRN CNP

## 2021-10-20 NOTE — PROGRESS NOTES
Patient's port accessed using sterile procedure. Blood return noted. Lab tubes drawn, labeled and sent to lab. Port flushed with saline and heparin. Patient tolerated lab draw well.   Port de-accessed.    Ifeoma Barron RN

## 2021-10-20 NOTE — PROGRESS NOTES
Oncology Follow Up Visit: October 20, 2021     Oncologist: Dr Tiffany Hilton  PCP: [facility]    Diagnosis:Metastatic Breast Cancer-  Mary Olson is a 67 yo female who noticed what she believed to be a bug bite on her left breast in 10/2019.  She initially noted 3 red spots- treated at home. She underwent imaging with a diagnostic mammogram on March 4, 2020, as well as ultrasound where they found an ill-defined margins measuring at least 6.2 cm to center of the left nipple.  Other masses are seen posterior to this including a 2.1 cm mass slightly medial on mammography.  Diffuse left breast skin thickening.  There are at least 3 abnormal enlarged lymph nodes in the left axilla measuring 2.5 x 1.3 x 2.4 cm.  The right breast at the 12:30 position 7 cm from the nipple, there is a spiculated mass corresponding mammographic finding measuring approximately 1.3 x 0.9 x 1.0 cm.  A smaller spiculated hypoechoic mass is at the 12 o'clock position 2 cm from the nipple, measuring 0.9 x 0.5 x 0.7 cm.  Normal lymph nodes in the right axilla.  Biopsy of the left breast mass confirmed invasive mammary carcinoma, no special type, invasive ductal, grade 2, estrogen receptor positive, progesterone receptor positive, HER-2 negative.  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  The second right breast mass at the 12 o'clock position showed benign breast tissue, composed predominantly of uncolonized stroma with rare ducts.  No discrete masses identified.  No atypical or malignant findings.  3/12/2020 MUGA showed normal heart function with LVEF of 58% and no other abnormalities.  Left Axillary biopsy was positive for metastatic mammary cancer with Negative Her2  PET scan showing metastasis to left axilla, retropectoral lymph nodes and diffuse bone  metastasis  Treatment:   3/25/2020 to 8/2020 Taxol x 12weeks followed by ddAC x 4 cycles  3/25/2020 Begin zometa  8/2020 to 3/2021: Arimidex   3/19/21 began abemeciclib with Arimidex she has been dose reduced on her abemaciclib 200 mg a day    Interval History: Ms. Olson comes to clinic for review of use of abemeciclib with Arimidex. Pt shares she is tolerating treatment with veryfew side effects =noting only some fatigue and occasional loose stool. She denies nausea or fevers, illness, or SOB or bladder issues.   Rest of comprehensive and complete ROS is reviewed and is negative.   Past Medical History:   Diagnosis Date     ASCUS favoring benign 10/28/15    neg HPV     Cancer (H)      Diabetes mellitus, type 2 (H) 7/14/2020     Hypertension      Obese      Current Outpatient Medications   Medication     abemaciclib (VERZENIO) 100 MG tablet     alcohol swab prep pads     amLODIPine (NORVASC) 10 MG tablet     anastrozole (ARIMIDEX) 1 MG tablet     Ascorbic Acid (VITAMIN C PO)     aspirin 81 MG tablet     biotin 1000 MCG TABS tablet     blood glucose (NO BRAND SPECIFIED) test strip     blood glucose calibration (NO BRAND SPECIFIED) solution     blood glucose monitoring (NO BRAND SPECIFIED) meter device kit     Blood Pressure KIT     calcium-vitamin D-vitamin K (VIACTIV) 500-500-40 MG-UNT-MCG CHEW     chlorthalidone (HYGROTON) 25 MG tablet     Cholecalciferol (VITAMIN D3 PO)     Coenzyme Q10 (COQ-10) 200 MG CAPS     Cyanocobalamin (B-12) 2500 MCG TABS     docusate sodium (COLACE) 100 MG capsule     gabapentin (NEURONTIN) 250 MG/5ML solution     hydrocortisone 2.5 % cream     lidocaine-prilocaine (EMLA) 2.5-2.5 % external cream     Multiple Vitamins-Minerals (CENTRUM ADULTS PO)     potassium chloride (KLOR-CON) 20 MEQ packet     thin (NO BRAND SPECIFIED) lancets     UNABLE TO FIND     VITAMIN E NATURAL PO     No current facility-administered medications for this visit.     Facility-Administered Medications Ordered in  "Other Visits   Medication     heparin 100 UNIT/ML injection 500 Units     Allergies   Allergen Reactions     Lisinopril Cough       Physical Exam:BP (!) 164/96 (BP Location: Left arm)   Pulse 107   Temp 98.3  F (36.8  C) (Oral)   Resp 16   Ht 1.575 m (5' 2.01\")   Wt 95.7 kg (211 lb)   SpO2 98%   BMI 38.58 kg/m    Constitutional: Alert, cooperative, and in no distress.   Cardiac: Heart rate and rhythm is regular and strong without murmur  Respiratory: Breathing easy-no cough  Port in place and without redness or swelling today   GI: Abdomen is rounded  MS: Muscle tone normal, extremities normal with no edema.   Skin: No suspicious lesions or rashes  Neuro: Sensory grossly WNL  Psych: Mentation appears normal and affect mildly anxious but with good conversation    Laboratory Results:   Results for orders placed or performed in visit on 10/20/21   Comprehensive metabolic panel     Status: Abnormal   Result Value Ref Range    Sodium 136 133 - 144 mmol/L    Potassium 3.4 3.4 - 5.3 mmol/L    Chloride 101 94 - 109 mmol/L    Carbon Dioxide (CO2) 30 20 - 32 mmol/L    Anion Gap 5 3 - 14 mmol/L    Urea Nitrogen 15 7 - 30 mg/dL    Creatinine 0.93 0.52 - 1.04 mg/dL    Calcium 10.2 (H) 8.5 - 10.1 mg/dL    Glucose 101 (H) 70 - 99 mg/dL    Alkaline Phosphatase 49 40 - 150 U/L    AST 32 0 - 45 U/L    ALT 46 0 - 50 U/L    Protein Total 8.5 6.8 - 8.8 g/dL    Albumin 3.9 3.4 - 5.0 g/dL    Bilirubin Total 0.8 0.2 - 1.3 mg/dL    GFR Estimate 65 >60 mL/min/1.73m2   Ca27.29  breast tumor marker     Status: Abnormal   Result Value Ref Range    CA 27-29 134 (H) 0 - 39 U/mL    Narrative    Assay Method:  Chemiluminescence using Siemens Centaur  XP   CBC with platelets and differential     Status: Abnormal   Result Value Ref Range    WBC Count 5.0 4.0 - 11.0 10e3/uL    RBC Count 4.26 3.80 - 5.20 10e6/uL    Hemoglobin 14.3 11.7 - 15.7 g/dL    Hematocrit 40.8 35.0 - 47.0 %    MCV 96 78 - 100 fL    MCH 33.6 (H) 26.5 - 33.0 pg    MCHC 35.0 " 31.5 - 36.5 g/dL    RDW 13.7 10.0 - 15.0 %    Platelet Count 199 150 - 450 10e3/uL    % Neutrophils 43 %    % Lymphocytes 45 %    % Monocytes 8 %    % Eosinophils 2 %    % Basophils 2 %    % Immature Granulocytes 0 %    NRBCs per 100 WBC 0 <1 /100    Absolute Neutrophils 2.2 1.6 - 8.3 10e3/uL    Absolute Lymphocytes 2.2 0.8 - 5.3 10e3/uL    Absolute Monocytes 0.4 0.0 - 1.3 10e3/uL    Absolute Eosinophils 0.1 0.0 - 0.7 10e3/uL    Absolute Basophils 0.1 0.0 - 0.2 10e3/uL    Absolute Immature Granulocytes 0.0 <=0.0 10e3/uL    Absolute NRBCs 0.0 10e3/uL   CBC with platelets differential     Status: Abnormal    Narrative    The following orders were created for panel order CBC with platelets differential.  Procedure                               Abnormality         Status                     ---------                               -----------         ------                     CBC with platelets and d...[[id number]]  Abnormal            Final result                 Please view results for these tests on the individual orders.     Assessment and Plan:    Metastatic Breast Cancer-patient is currently being treated with Abemiciclib and anastrozole and is tolerating well with current dosing of abemiciclib at 100 mg bid.   Cancer marker remaining stable.    Ref. Range 8/18/2021 08:05 8/20/2021 10:35 9/22/2021 08:17 10/20/2021 08:26   CA 27-29 Latest Ref Range: 0 - 39 U/mL 140 (H)  129 (H) 134 (H)   She will continue monthly labs and will have clinic review every other visit.   Next scan to be completed  In 2 months when she will see Dr Hilton for review.   Bone metastasis - now using Zometa in plan and is on daily calcium plus vitamin D.Calcium level has been mildly elevated last 2 visits so will decrease calcium supplement.   Elevated blood pressure- Reports she is always elevated when in clinic. Currently on Amlodipine 10 mg daily and chlorthalidone 25mg daily. Asked pt to take BPs at home for next week and if remaining  elevated over 140/90 should see PCP and educated on side effects of HTN.   The total time of this encounter amounted to 35 minutes. This time included emanuel to face time spent with the patient, prep work, ordering tests, and performing post visit documentation.  Swathi Page,Cnp

## 2021-10-20 NOTE — NURSING NOTE
"Oncology Rooming Note    October 20, 2021 8:44 AM   Mary Olson is a 66 year old female who presents for:    Chief Complaint   Patient presents with     Oncology Clinic Visit     Follow up     Initial Vitals: BP (!) 164/96 (BP Location: Left arm)   Pulse 107   Temp 98.3  F (36.8  C) (Oral)   Resp 16   Ht 1.575 m (5' 2.01\")   Wt 95.7 kg (211 lb)   SpO2 98%   BMI 38.58 kg/m   Estimated body mass index is 38.58 kg/m  as calculated from the following:    Height as of this encounter: 1.575 m (5' 2.01\").    Weight as of this encounter: 95.7 kg (211 lb). Body surface area is 2.05 meters squared.  No Pain (0) Comment: Data Unavailable   No LMP recorded. Patient is postmenopausal.  Allergies reviewed: Yes  Medications reviewed: Yes    Medications: Medication refills not needed today.  Pharmacy name entered into EPIC:    [facility] - [place], [place] - [address] [place] [address]  [facility] - [place], [place] - [address] [place] [address] [facility] [place], [place] - [address]  [facility] [place], [place] - [address] [facility] - [place], [place] - [facility] [address]  [facility] - [place], [place] - [address] [facility] [address]    Clinical concerns: No new concerns.       Kailee Jarvis LPN              "

## 2021-10-26 NOTE — TELEPHONE ENCOUNTER
Sunil from [facility] is calling wanting a verbal script for Verzenio. Sunil can be reached at [phone].    Thanks,    KAYLEY Guy

## 2021-10-27 DIAGNOSIS — Z17.0 MALIGNANT NEOPLASM OF OVERLAPPING SITES OF LEFT BREAST IN FEMALE, ESTROGEN RECEPTOR POSITIVE (H): Primary | ICD-10-CM

## 2021-10-27 DIAGNOSIS — C50.812 MALIGNANT NEOPLASM OF OVERLAPPING SITES OF LEFT BREAST IN FEMALE, ESTROGEN RECEPTOR POSITIVE (H): Primary | ICD-10-CM

## 2021-11-02 NOTE — TELEPHONE ENCOUNTER
Free Drug Application Approved  Effective Dates: 01/01/2022-12/31/2022  Patient notified? yes  Additional Information: Can renew again Nov. 1 2022    Pharmacy: [facility] ph: [phone]

## 2021-11-14 DIAGNOSIS — C79.51 BONE METASTASIS: Primary | ICD-10-CM

## 2021-11-14 DIAGNOSIS — Z17.0 MALIGNANT NEOPLASM OF OVERLAPPING SITES OF LEFT BREAST IN FEMALE, ESTROGEN RECEPTOR POSITIVE (H): ICD-10-CM

## 2021-11-14 DIAGNOSIS — C50.812 MALIGNANT NEOPLASM OF OVERLAPPING SITES OF LEFT BREAST IN FEMALE, ESTROGEN RECEPTOR POSITIVE (H): ICD-10-CM

## 2021-11-14 RX ORDER — HEPARIN SODIUM,PORCINE 10 UNIT/ML
5 VIAL (ML) INTRAVENOUS
Status: CANCELLED | OUTPATIENT
Start: 2021-11-17

## 2021-11-14 RX ORDER — HEPARIN SODIUM (PORCINE) LOCK FLUSH IV SOLN 100 UNIT/ML 100 UNIT/ML
5 SOLUTION INTRAVENOUS
Status: CANCELLED | OUTPATIENT
Start: 2021-11-17

## 2021-11-14 RX ORDER — ZOLEDRONIC ACID 0.04 MG/ML
4 INJECTION, SOLUTION INTRAVENOUS ONCE
Status: CANCELLED | OUTPATIENT
Start: 2021-11-17 | End: 2021-11-16

## 2021-11-15 DIAGNOSIS — Z17.0 MALIGNANT NEOPLASM OF OVERLAPPING SITES OF LEFT BREAST IN FEMALE, ESTROGEN RECEPTOR POSITIVE (H): Primary | ICD-10-CM

## 2021-11-15 DIAGNOSIS — C50.812 MALIGNANT NEOPLASM OF OVERLAPPING SITES OF LEFT BREAST IN FEMALE, ESTROGEN RECEPTOR POSITIVE (H): Primary | ICD-10-CM

## 2021-11-17 ENCOUNTER — INFUSION THERAPY VISIT (OUTPATIENT)
Dept: INFUSION THERAPY | Facility: CLINIC | Age: 66
End: 2021-11-17
Payer: MEDICARE

## 2021-11-17 ENCOUNTER — LAB (OUTPATIENT)
Dept: ONCOLOGY | Facility: CLINIC | Age: 66
End: 2021-11-17
Payer: MEDICARE

## 2021-11-17 VITALS
WEIGHT: 208 LBS | DIASTOLIC BLOOD PRESSURE: 93 MMHG | BODY MASS INDEX: 38.03 KG/M2 | HEART RATE: 115 BPM | TEMPERATURE: 98.3 F | RESPIRATION RATE: 18 BRPM | OXYGEN SATURATION: 96 % | SYSTOLIC BLOOD PRESSURE: 166 MMHG

## 2021-11-17 DIAGNOSIS — Z17.0 MALIGNANT NEOPLASM OF OVERLAPPING SITES OF LEFT BREAST IN FEMALE, ESTROGEN RECEPTOR POSITIVE (H): ICD-10-CM

## 2021-11-17 DIAGNOSIS — C50.812 MALIGNANT NEOPLASM OF OVERLAPPING SITES OF LEFT BREAST IN FEMALE, ESTROGEN RECEPTOR POSITIVE (H): ICD-10-CM

## 2021-11-17 DIAGNOSIS — C79.51 BONE METASTASIS: Primary | ICD-10-CM

## 2021-11-17 DIAGNOSIS — C50.812 MALIGNANT NEOPLASM OF OVERLAPPING SITES OF LEFT BREAST IN FEMALE, ESTROGEN RECEPTOR POSITIVE (H): Primary | ICD-10-CM

## 2021-11-17 DIAGNOSIS — Z17.0 MALIGNANT NEOPLASM OF OVERLAPPING SITES OF LEFT BREAST IN FEMALE, ESTROGEN RECEPTOR POSITIVE (H): Primary | ICD-10-CM

## 2021-11-17 LAB
ALBUMIN SERPL-MCNC: 3.6 G/DL (ref 3.4–5)
ALP SERPL-CCNC: 52 U/L (ref 40–150)
ALT SERPL W P-5'-P-CCNC: 48 U/L (ref 0–50)
ANION GAP SERPL CALCULATED.3IONS-SCNC: 5 MMOL/L (ref 3–14)
AST SERPL W P-5'-P-CCNC: 35 U/L (ref 0–45)
BASOPHILS # BLD AUTO: 0.1 10E3/UL (ref 0–0.2)
BASOPHILS NFR BLD AUTO: 2 %
BILIRUB SERPL-MCNC: 0.6 MG/DL (ref 0.2–1.3)
BUN SERPL-MCNC: 12 MG/DL (ref 7–30)
CALCIUM SERPL-MCNC: 9.7 MG/DL (ref 8.5–10.1)
CANCER AG27-29 SERPL-ACNC: 111 U/ML (ref 0–39)
CHLORIDE BLD-SCNC: 102 MMOL/L (ref 94–109)
CO2 SERPL-SCNC: 28 MMOL/L (ref 20–32)
CREAT SERPL-MCNC: 0.92 MG/DL (ref 0.52–1.04)
EOSINOPHIL # BLD AUTO: 0.1 10E3/UL (ref 0–0.7)
EOSINOPHIL NFR BLD AUTO: 2 %
ERYTHROCYTE [DISTWIDTH] IN BLOOD BY AUTOMATED COUNT: 13.4 % (ref 10–15)
GFR SERPL CREATININE-BSD FRML MDRD: 66 ML/MIN/1.73M2
GLUCOSE BLD-MCNC: 111 MG/DL (ref 70–99)
HCT VFR BLD AUTO: 39.1 % (ref 35–47)
HGB BLD-MCNC: 14 G/DL (ref 11.7–15.7)
IMM GRANULOCYTES # BLD: 0 10E3/UL
IMM GRANULOCYTES NFR BLD: 0 %
LYMPHOCYTES # BLD AUTO: 2 10E3/UL (ref 0.8–5.3)
LYMPHOCYTES NFR BLD AUTO: 45 %
MCH RBC QN AUTO: 34.3 PG (ref 26.5–33)
MCHC RBC AUTO-ENTMCNC: 35.8 G/DL (ref 31.5–36.5)
MCV RBC AUTO: 96 FL (ref 78–100)
MONOCYTES # BLD AUTO: 0.4 10E3/UL (ref 0–1.3)
MONOCYTES NFR BLD AUTO: 9 %
NEUTROPHILS # BLD AUTO: 1.9 10E3/UL (ref 1.6–8.3)
NEUTROPHILS NFR BLD AUTO: 42 %
NRBC # BLD AUTO: 0 10E3/UL
NRBC BLD AUTO-RTO: 0 /100
PLATELET # BLD AUTO: 196 10E3/UL (ref 150–450)
POTASSIUM BLD-SCNC: 3.6 MMOL/L (ref 3.4–5.3)
PROT SERPL-MCNC: 8 G/DL (ref 6.8–8.8)
RBC # BLD AUTO: 4.08 10E6/UL (ref 3.8–5.2)
SODIUM SERPL-SCNC: 135 MMOL/L (ref 133–144)
WBC # BLD AUTO: 4.5 10E3/UL (ref 4–11)

## 2021-11-17 PROCEDURE — 85025 COMPLETE CBC W/AUTO DIFF WBC: CPT | Performed by: NURSE PRACTITIONER

## 2021-11-17 PROCEDURE — 86300 IMMUNOASSAY TUMOR CA 15-3: CPT | Performed by: NURSE PRACTITIONER

## 2021-11-17 PROCEDURE — 80053 COMPREHEN METABOLIC PANEL: CPT | Performed by: NURSE PRACTITIONER

## 2021-11-17 PROCEDURE — 99207 PR NO CHARGE LOS: CPT

## 2021-11-17 PROCEDURE — 96365 THER/PROPH/DIAG IV INF INIT: CPT | Performed by: NURSE PRACTITIONER

## 2021-11-17 RX ORDER — HEPARIN SODIUM (PORCINE) LOCK FLUSH IV SOLN 100 UNIT/ML 100 UNIT/ML
500 SOLUTION INTRAVENOUS ONCE
Status: COMPLETED | OUTPATIENT
Start: 2021-11-17 | End: 2021-11-17

## 2021-11-17 RX ORDER — HEPARIN SODIUM (PORCINE) LOCK FLUSH IV SOLN 100 UNIT/ML 100 UNIT/ML
5 SOLUTION INTRAVENOUS
Status: DISCONTINUED | OUTPATIENT
Start: 2021-11-17 | End: 2021-11-17 | Stop reason: HOSPADM

## 2021-11-17 RX ORDER — ZOLEDRONIC ACID 0.04 MG/ML
4 INJECTION, SOLUTION INTRAVENOUS ONCE
Status: COMPLETED | OUTPATIENT
Start: 2021-11-17 | End: 2021-11-17

## 2021-11-17 RX ADMIN — HEPARIN SODIUM (PORCINE) LOCK FLUSH IV SOLN 100 UNIT/ML 500 UNITS: 100 SOLUTION at 08:26

## 2021-11-17 RX ADMIN — ZOLEDRONIC ACID 4 MG: 0.04 INJECTION, SOLUTION INTRAVENOUS at 09:26

## 2021-11-17 RX ADMIN — Medication 250 ML: at 09:25

## 2021-11-17 RX ADMIN — HEPARIN SODIUM (PORCINE) LOCK FLUSH IV SOLN 100 UNIT/ML 5 ML: 100 SOLUTION at 09:44

## 2021-11-17 ASSESSMENT — PAIN SCALES - GENERAL: PAINLEVEL: NO PAIN (0)

## 2021-11-17 NOTE — PROGRESS NOTES
Infusion Nursing Note:  Mary Olson presents today for Zometa every 3 months.  Patient seen by provider today: No   present during visit today: Not Applicable.    Note: Pt states she is doing well, denies any problems, due for Zometa - labs WNL for therapy today.  Will treat as ordered.      Intravenous Access:  Implanted Port.    Treatment Conditions:  Lab Results   Component Value Date     11/17/2021    POTASSIUM 3.6 11/17/2021    CR 0.92 11/17/2021    BUFFY 9.7 11/17/2021    BILITOTAL 0.6 11/17/2021    ALBUMIN 3.6 11/17/2021    ALT 48 11/17/2021    AST 35 11/17/2021     Results reviewed, labs MET treatment parameters, ok to proceed with treatment.      Post Infusion Assessment:  Patient tolerated infusion without incident.  Blood return noted pre and post infusion.  Site patent and intact, free from redness, edema or discomfort.  No evidence of extravasations.  Access discontinued per protocol.       Discharge Plan:   Dr Hilton 12/17/2021 and Infusion 02/09/2022.  Discharge instructions reviewed with: Patient.  Patient and/or family verbalized understanding of discharge instructions and all questions answered.  Patient discharged in stable condition accompanied by: self.  Departure Mode: Ambulatory.      Vaishali Zhu RN

## 2021-11-17 NOTE — PROGRESS NOTES
See IV flow sheet for details of port access. Labs sent: cbc, cmp, ca 27.29. Port needle left in place for zometa infusion to follow.    Katharina Enamorado RN

## 2021-11-18 DIAGNOSIS — C50.812 MALIGNANT NEOPLASM OF OVERLAPPING SITES OF LEFT BREAST IN FEMALE, ESTROGEN RECEPTOR POSITIVE (H): ICD-10-CM

## 2021-11-18 DIAGNOSIS — Z17.0 MALIGNANT NEOPLASM OF OVERLAPPING SITES OF LEFT BREAST IN FEMALE, ESTROGEN RECEPTOR POSITIVE (H): ICD-10-CM

## 2021-11-21 ENCOUNTER — HEALTH MAINTENANCE LETTER (OUTPATIENT)
Age: 66
End: 2021-11-21

## 2021-11-24 DIAGNOSIS — C50.812 MALIGNANT NEOPLASM OF OVERLAPPING SITES OF LEFT BREAST IN FEMALE, ESTROGEN RECEPTOR POSITIVE (H): Primary | ICD-10-CM

## 2021-11-24 DIAGNOSIS — Z17.0 MALIGNANT NEOPLASM OF OVERLAPPING SITES OF LEFT BREAST IN FEMALE, ESTROGEN RECEPTOR POSITIVE (H): Primary | ICD-10-CM

## 2021-12-15 ENCOUNTER — ANCILLARY PROCEDURE (OUTPATIENT)
Dept: GENERAL RADIOLOGY | Facility: CLINIC | Age: 66
End: 2021-12-15
Payer: MEDICARE

## 2021-12-15 ENCOUNTER — ANCILLARY PROCEDURE (OUTPATIENT)
Dept: CT IMAGING | Facility: CLINIC | Age: 66
End: 2021-12-15
Attending: NURSE PRACTITIONER
Payer: MEDICARE

## 2021-12-15 DIAGNOSIS — Z17.0 MALIGNANT NEOPLASM OF OVERLAPPING SITES OF LEFT BREAST IN FEMALE, ESTROGEN RECEPTOR POSITIVE (H): ICD-10-CM

## 2021-12-15 DIAGNOSIS — E11.9 DIABETES MELLITUS, TYPE 2 (H): ICD-10-CM

## 2021-12-15 DIAGNOSIS — C50.812 MALIGNANT NEOPLASM OF OVERLAPPING SITES OF LEFT BREAST IN FEMALE, ESTROGEN RECEPTOR POSITIVE (H): ICD-10-CM

## 2021-12-15 DIAGNOSIS — E66.01 MORBID OBESITY (H): Primary | ICD-10-CM

## 2021-12-15 LAB
ALBUMIN SERPL-MCNC: 3.6 G/DL (ref 3.4–5)
ALP SERPL-CCNC: 50 U/L (ref 40–150)
ALT SERPL W P-5'-P-CCNC: 31 U/L (ref 0–50)
ANION GAP SERPL CALCULATED.3IONS-SCNC: 6 MMOL/L (ref 3–14)
AST SERPL W P-5'-P-CCNC: 23 U/L (ref 0–45)
BASOPHILS # BLD MANUAL: 0 10E3/UL (ref 0–0.2)
BASOPHILS NFR BLD MANUAL: 1 %
BILIRUB SERPL-MCNC: 0.6 MG/DL (ref 0.2–1.3)
BUN SERPL-MCNC: 13 MG/DL (ref 7–30)
CALCIUM SERPL-MCNC: 9.9 MG/DL (ref 8.5–10.1)
CANCER AG27-29 SERPL-ACNC: 105 U/ML (ref 0–39)
CHLORIDE BLD-SCNC: 100 MMOL/L (ref 94–109)
CO2 SERPL-SCNC: 30 MMOL/L (ref 20–32)
CREAT SERPL-MCNC: 1.06 MG/DL (ref 0.52–1.04)
EOSINOPHIL # BLD MANUAL: 0 10E3/UL (ref 0–0.7)
EOSINOPHIL NFR BLD MANUAL: 1 %
ERYTHROCYTE [DISTWIDTH] IN BLOOD BY AUTOMATED COUNT: 13.2 % (ref 10–15)
GFR SERPL CREATININE-BSD FRML MDRD: 55 ML/MIN/1.73M2
GLUCOSE BLD-MCNC: 94 MG/DL (ref 70–99)
HBA1C MFR BLD: 5.3 % (ref 0–5.6)
HCT VFR BLD AUTO: 38 % (ref 35–47)
HGB BLD-MCNC: 13.1 G/DL (ref 11.7–15.7)
LYMPHOCYTES # BLD MANUAL: 1.7 10E3/UL (ref 0.8–5.3)
LYMPHOCYTES NFR BLD MANUAL: 36 %
MCH RBC QN AUTO: 33.5 PG (ref 26.5–33)
MCHC RBC AUTO-ENTMCNC: 34.5 G/DL (ref 31.5–36.5)
MCV RBC AUTO: 97 FL (ref 78–100)
MONOCYTES # BLD MANUAL: 0.4 10E3/UL (ref 0–1.3)
MONOCYTES NFR BLD MANUAL: 8 %
NEUTROPHILS # BLD MANUAL: 2.5 10E3/UL (ref 1.6–8.3)
NEUTROPHILS NFR BLD MANUAL: 54 %
PLAT MORPH BLD: NORMAL
PLATELET # BLD AUTO: 188 10E3/UL (ref 150–450)
POTASSIUM BLD-SCNC: 3.4 MMOL/L (ref 3.4–5.3)
PROT SERPL-MCNC: 7.9 G/DL (ref 6.8–8.8)
RBC # BLD AUTO: 3.91 10E6/UL (ref 3.8–5.2)
RBC MORPH BLD: NORMAL
SODIUM SERPL-SCNC: 136 MMOL/L (ref 133–144)
WBC # BLD AUTO: 4.6 10E3/UL (ref 4–11)

## 2021-12-15 PROCEDURE — 74177 CT ABD & PELVIS W/CONTRAST: CPT | Mod: ME | Performed by: RADIOLOGY

## 2021-12-15 PROCEDURE — 36591 DRAW BLOOD OFF VENOUS DEVICE: CPT | Performed by: RADIOLOGY

## 2021-12-15 PROCEDURE — 71260 CT THORAX DX C+: CPT | Mod: ME | Performed by: RADIOLOGY

## 2021-12-15 PROCEDURE — 85027 COMPLETE CBC AUTOMATED: CPT | Performed by: RADIOLOGY

## 2021-12-15 PROCEDURE — 83036 HEMOGLOBIN GLYCOSYLATED A1C: CPT | Performed by: RADIOLOGY

## 2021-12-15 PROCEDURE — 80053 COMPREHEN METABOLIC PANEL: CPT | Performed by: RADIOLOGY

## 2021-12-15 PROCEDURE — G1004 CDSM NDSC: HCPCS | Performed by: RADIOLOGY

## 2021-12-15 PROCEDURE — 86300 IMMUNOASSAY TUMOR CA 15-3: CPT | Performed by: RADIOLOGY

## 2021-12-15 RX ORDER — IOPAMIDOL 755 MG/ML
127 INJECTION, SOLUTION INTRAVASCULAR ONCE
Status: COMPLETED | OUTPATIENT
Start: 2021-12-15 | End: 2021-12-15

## 2021-12-15 RX ORDER — HEPARIN SODIUM (PORCINE) LOCK FLUSH IV SOLN 100 UNIT/ML 100 UNIT/ML
5 SOLUTION INTRAVENOUS ONCE
Status: COMPLETED | OUTPATIENT
Start: 2021-12-15 | End: 2021-12-15

## 2021-12-15 RX ADMIN — HEPARIN SODIUM (PORCINE) LOCK FLUSH IV SOLN 100 UNIT/ML 5 ML: 100 SOLUTION at 10:10

## 2021-12-15 RX ADMIN — IOPAMIDOL 127 ML: 755 INJECTION, SOLUTION INTRAVASCULAR at 09:39

## 2021-12-17 DIAGNOSIS — Z17.0 MALIGNANT NEOPLASM OF OVERLAPPING SITES OF LEFT BREAST IN FEMALE, ESTROGEN RECEPTOR POSITIVE (H): Primary | ICD-10-CM

## 2021-12-17 DIAGNOSIS — C50.812 MALIGNANT NEOPLASM OF OVERLAPPING SITES OF LEFT BREAST IN FEMALE, ESTROGEN RECEPTOR POSITIVE (H): Primary | ICD-10-CM

## 2021-12-22 DIAGNOSIS — Z17.0 MALIGNANT NEOPLASM OF OVERLAPPING SITES OF LEFT BREAST IN FEMALE, ESTROGEN RECEPTOR POSITIVE (H): Primary | ICD-10-CM

## 2021-12-22 DIAGNOSIS — C50.812 MALIGNANT NEOPLASM OF OVERLAPPING SITES OF LEFT BREAST IN FEMALE, ESTROGEN RECEPTOR POSITIVE (H): Primary | ICD-10-CM

## 2022-01-07 ENCOUNTER — VIRTUAL VISIT (OUTPATIENT)
Dept: ONCOLOGY | Facility: CLINIC | Age: 67
End: 2022-01-07
Attending: INTERNAL MEDICINE
Payer: MEDICARE

## 2022-01-07 DIAGNOSIS — Z17.0 MALIGNANT NEOPLASM OF OVERLAPPING SITES OF LEFT BREAST IN FEMALE, ESTROGEN RECEPTOR POSITIVE (H): Primary | ICD-10-CM

## 2022-01-07 DIAGNOSIS — I10 BENIGN ESSENTIAL HYPERTENSION: ICD-10-CM

## 2022-01-07 DIAGNOSIS — G62.9 NEUROPATHY: ICD-10-CM

## 2022-01-07 DIAGNOSIS — C79.51 BONE METASTASIS: ICD-10-CM

## 2022-01-07 DIAGNOSIS — C50.812 MALIGNANT NEOPLASM OF OVERLAPPING SITES OF LEFT BREAST IN FEMALE, ESTROGEN RECEPTOR POSITIVE (H): Primary | ICD-10-CM

## 2022-01-07 PROCEDURE — 99214 OFFICE O/P EST MOD 30 MIN: CPT | Mod: 95 | Performed by: INTERNAL MEDICINE

## 2022-01-07 RX ORDER — HEPARIN SODIUM (PORCINE) LOCK FLUSH IV SOLN 100 UNIT/ML 100 UNIT/ML
5 SOLUTION INTRAVENOUS
Status: CANCELLED | OUTPATIENT
Start: 2022-02-09

## 2022-01-07 RX ORDER — ZOLEDRONIC ACID 0.04 MG/ML
4 INJECTION, SOLUTION INTRAVENOUS ONCE
Status: CANCELLED | OUTPATIENT
Start: 2022-02-09 | End: 2022-02-16

## 2022-01-07 RX ORDER — HEPARIN SODIUM,PORCINE 10 UNIT/ML
5 VIAL (ML) INTRAVENOUS
Status: CANCELLED | OUTPATIENT
Start: 2022-02-09

## 2022-01-07 NOTE — PROGRESS NOTES
Desmond is a 66 year old who is being evaluated via a billable video visit.  Currently in [facility].     How would you like to obtain your AVS? MyChart  If the video visit is dropped, the invitation should be resent by: Text to cell phone: [phone]  Will anyone else be joining your video visit? No      Gracy Bain    28 min video     [facility]  TODAY 01/07/22    ONCOLOGY DIAGNOSIS:  1. March 2020: Metastatic Breast Cancer ER+, [place]+ Her2 Negative; with bone metastasis and a left breast primary. PI K3 CA mutation of note it is a pathologic variant which is outside the mutation of specific variants enrolled in the solar one trial.  TEJAS, TMB low     THERAPY TO DATE:  1. March 2020 to August 2020: Weekly Taxol.  2. August 2020 to March 2021: Anastrozole   3. 3/19/21 began abemeciclib with arimidex she has been dose reduced on her abemaciclib 200 mg a day      INTERVAL HISTORY:  The patient is a 65-year-old female diagnosed with metastatic breast cancer in 03/2020 after noticing a lesion on her breast.  She was orginially treated by Dr Birch and changed to me February 2021    She started her abemaciclib on 3/14/2021     Desmond is here for routine follow-up.  She is doing well. So no longer has any issues with diarrhea from the verzenio. Appetite and weight are stable. She has noted some hair loss but is coping. NO new aches or pains. No n/v/d. She tolerates her zometa well. She is wondering about going on a vacation to [place] in the next year or maybe spending the padilla in [place]. She has had her Covid vaccinations.      PAST MEDICAL HISTORY: (No new medical history since last visit per patient.)  1.  Metastatic breast cancer, see above.   2.  Hypertension.   3.  Steroid-induced diabetes.  According to the patient, since she has been off steroids, she was told that her blood sugars has normalized and she is off medication.   4.  Chemotherapy-induced neuropathy     FAMILY HISTORY:  Mother had breast cancer  at 62; underwent lumpectomy and radiation, and arimidex  No new family history since last seen.     SOCIAL HISTORY:   to her , Dieter.    No current tobacco use.  Retired.  ALLERGIES:  LISINOPRIL.      PHYSICAL EXAM:  GENERAL: Comfortable, no acute distress.   HEAD: Atraumatic/Normocephalic.  EYES: Sclera non-icteric. Grossly normal.  RESPIRATORY: Normal breathing, non-labored. No audible wheezes/cough.  SKIN: No jaundice, discoloration or obvious lesions.  NERUO: No tremors visible. Normal speech.  PSYCH: Alert, oriented. Answered questions appropriately.    Labs    Recent Labs   Lab Test 12/15/21  0941 11/17/21  0823 10/20/21  0826 09/22/21  0817 08/18/21  0802    135 136 137 135   POTASSIUM 3.4 3.6 3.4 3.5 3.9   CHLORIDE 100 102 101 102 103   CO2 30 28 30 31 27   ANIONGAP 6 5 5 4 5   BUN 13 12 15 15 12   CR 1.06* 0.92 0.93 1.05* 0.92  0.92   GLC 94 111* 101* 100* 140*   BUFFY 9.9 9.7 10.2* 10.2* 9.9  9.6     No results for input(s): MAG, PHOS in the last 36440 hours.  Recent Labs   Lab Test 12/15/21  0941 11/17/21  0823 10/20/21  0826 09/22/21  0817 08/18/21  0802   WBC 4.6 4.5 5.0 4.9 5.2   HGB 13.1 14.0 14.3 13.9 14.5    196 199 176 211   MCV 97 96 96 94 99   NEUTROPHIL 54 42 43 41 47     Recent Labs   Lab Test 12/15/21  0941 11/17/21  0823 10/20/21  0826   BILITOTAL 0.6 0.6 0.8   ALKPHOS 50 52 49   ALT 31 48 46   AST 23 35 32   ALBUMIN 3.6 3.6 3.9     TSH   Date Value Ref Range Status   10/28/2015 1.23 0.40 - 4.00 mU/L Final     No results for input(s): CEA in the last 17281 hours.  Results for orders placed or performed in visit on 12/15/21   CT Chest/Abdomen/Pelvis w Contrast    Narrative    EXAMINATION: CT CHEST/ABDOMEN/PELVIS W CONTRAST, 12/15/2021 10:03 AM    TECHNIQUE: Helical CT images from the thoracic inlet through the  symphysis pubis were obtained with intravenous contrast. Contrast  dose: 127 cc Isovue-370    COMPARISON: PET/CT 8/20/2021 and 2/19/2021    HISTORY: Invasive  breast cancer, stage IV, assess treatment response;  Malignant neoplasm of overlapping sites of left breast in female,  estrogen receptor positive (H); Malignant neoplasm of overlapping  sites of left breast in female, estrogen receptor positive (H)    FINDINGS:    Chest:    Heart/ Mediastinum: Heart is within normal limits. No evidence of  central pulmonary embolism. No bulky lymphadenopathy.  Esophagus  appears normal. Right chest wall Port-A-Cath with tip at the  cavoatrial junction.    Lungs/pleura: The central tracheobronchial tree is patent.  No focal  mass or consolidation.  No suspicious nodules. No pneumothorax or  pleural effusion.     Chest wall/axilla: Heterogeneous partially calcified 4 cm retroareolar  mass in the left breast with associated skin thickening, compatible  with breast cancer. Enlarged left axillary lymph nodes measuring up to  8 mm in short axis. These are not significantly changed.    Abdomen and Pelvis:    Liver: Unremarkable. No suspicious masses. No hepatic steatosis.     Gallbladder/biliary tree: No gallstones. No biliary dilatation.    Spleen: Unremarkable.    Pancreas: Unremarkable. No mass or ductal dilatation.    Adrenal glands: Unremarkable.    Kidneys: Unremarkable. No hydronephrosis.    Bowel: Unremarkable. No obstruction. Normal appendix.    Retroperitoneum: Vasculature is grossly unremarkable..  No bulky  lymphadenopathy.    Pelvis: Urinary bladder is unremarkable.  Uterus and adnexa are within  normal limits.    Bones: Scattered sclerotic lesions throughout the bony structures are  not significantly changed and remain compatible with diffuse osseous  metastasis. No acute fractures.    Soft Tissues: Unremarkable.      Impression    IMPRESSION:    1.  Heterogeneous partially calcified retroareolar mass in the left  breast with associated skin thickening is not significantly changed  from 8/20/2021, but slightly decreased from 2/19/2021 and remains  compatible with known  breast cancer.  2.  Enlarged left axillary lymph nodes are unchanged from 8/20/2021,  and slightly decreased from 2/19/2021 and remain compatible with dianne  metastasis.  3.  Diffuse osseous metastasis are not significantly changed.    I have personally reviewed the examination and initial interpretation  and I agree with the findings.    EDWIN AVILES MD         SYSTEM ID:  [id number]         ASSESSMENT AND PLAN:   1.  Metastatic ER positive ND positive HER-2/gabriela negative breast cancer with a left breast primary and bone metastasis.    Plan    1.  She continues to do well and will continue her anastrozole and Verzenio  2.  She will do labs monthly  3.  She should see me or the NP every other month   4. We reviewed her imaging that was all stable, we will repeat scans prior to seeing me in May  5. zometa q 3 months  6. She asked if she will be on verzenio/arimidex forever. I reminded her that at this time  Metastatic breast cancer is not curable. She will be on her current regimen so long as her disease is stable or better - however at some point she will likely progress and we will need to change treatment options.  7. We discussed that travel in the context of her breast cancer is fine and reviewed covid precautions.    Tiffany Hilton MD on 1/7/2022 at 12:02 PM

## 2022-01-07 NOTE — LETTER
1/7/2022         RE: Mary Olson  [address]  [facility] [address]        Dear Colleague,    Thank you for referring your patient, Mary Olson, to the [facility]. Please see a copy of my visit note below.    Desmond is a 66 year old who is being evaluated via a billable video visit.  [place].     How would you like to obtain your AVS? MyChart  If the video visit is dropped, the invitation should be resent by: Text to cell phone: [phone]  Will anyone else be joining your video visit? No      Gracy Bain    28 min video     [facility] ONCOLOGY  TODAY 01/07/22    ONCOLOGY DIAGNOSIS:  1. March 2020: Metastatic Breast Cancer ER+, [place]+ Her2 Negative; with bone metastasis and a left breast primary. PI K3 CA mutation of note it is a pathologic variant which is outside the mutation of specific variants enrolled in the solar one trial.  TEJAS, TMB low     THERAPY TO DATE:  1. March 2020 to August 2020: Weekly Taxol.  2. August 2020 to March 2021: Anastrozole   3. 3/19/21 began abemeciclib with arimidex she has been dose reduced on her abemaciclib 200 mg a day      INTERVAL HISTORY:  The patient is a 65-year-old female diagnosed with metastatic breast cancer in 03/2020 after noticing a lesion on her breast.  She was orginially treated by Dr Birch and changed to me February 2021    She started her abemaciclib on 3/14/2021     Desmond is here for routine follow-up.  She is doing well. So no longer has any issues with diarrhea from the verzenio. Appetite and weight are stable. She has noted some hair loss but is coping. NO new aches or pains. No n/v/d. She tolerates her zometa well. She is wondering about going on a vacation to [place] in the next year or maybe spending the padilla in [place]. She has had her Covid vaccinations.      PAST MEDICAL HISTORY: (No new medical history since last visit per patient.)  1.  Metastatic breast cancer, see above.   2.   Hypertension.   3.  Steroid-induced diabetes.  According to the patient, since she has been off steroids, she was told that her blood sugars has normalized and she is off medication.   4.  Chemotherapy-induced neuropathy     FAMILY HISTORY:  Mother had breast cancer at 62; underwent lumpectomy and radiation, and arimidex  No new family history since last seen.     SOCIAL HISTORY:   to her , Dieter.    No current tobacco use.  Retired.  ALLERGIES:  LISINOPRIL.      PHYSICAL EXAM:  GENERAL: Comfortable, no acute distress.   HEAD: Atraumatic/Normocephalic.  EYES: Sclera non-icteric. Grossly normal.  RESPIRATORY: Normal breathing, non-labored. No audible wheezes/cough.  SKIN: No jaundice, discoloration or obvious lesions.  NERUO: No tremors visible. Normal speech.  PSYCH: Alert, oriented. Answered questions appropriately.    Labs    Recent Labs   Lab Test 12/15/21  0941 11/17/21  0823 10/20/21  0826 09/22/21  0817 08/18/21  0802    135 136 137 135   POTASSIUM 3.4 3.6 3.4 3.5 3.9   CHLORIDE 100 102 101 102 103   CO2 30 28 30 31 27   ANIONGAP 6 5 5 4 5   BUN 13 12 15 15 12   CR 1.06* 0.92 0.93 1.05* 0.92  0.92   GLC 94 111* 101* 100* 140*   BUFFY 9.9 9.7 10.2* 10.2* 9.9  9.6     No results for input(s): MAG, PHOS in the last 35210 hours.  Recent Labs   Lab Test 12/15/21  0941 11/17/21  0823 10/20/21  0826 09/22/21  0817 08/18/21  0802   WBC 4.6 4.5 5.0 4.9 5.2   HGB 13.1 14.0 14.3 13.9 14.5    196 199 176 211   MCV 97 96 96 94 99   NEUTROPHIL 54 42 43 41 47     Recent Labs   Lab Test 12/15/21  0941 11/17/21  0823 10/20/21  0826   BILITOTAL 0.6 0.6 0.8   ALKPHOS 50 52 49   ALT 31 48 46   AST 23 35 32   ALBUMIN 3.6 3.6 3.9     TSH   Date Value Ref Range Status   10/28/2015 1.23 0.40 - 4.00 mU/L Final     No results for input(s): CEA in the last 38985 hours.  Results for orders placed or performed in visit on 12/15/21   CT Chest/Abdomen/Pelvis w Contrast    Narrative    EXAMINATION: CT  CHEST/ABDOMEN/PELVIS W CONTRAST, 12/15/2021 10:03 AM    TECHNIQUE: Helical CT images from the thoracic inlet through the  symphysis pubis were obtained with intravenous contrast. Contrast  dose: 127 cc Isovue-370    COMPARISON: PET/CT 8/20/2021 and 2/19/2021    HISTORY: Invasive breast cancer, stage IV, assess treatment response;  Malignant neoplasm of overlapping sites of left breast in female,  estrogen receptor positive (H); Malignant neoplasm of overlapping  sites of left breast in female, estrogen receptor positive (H)    FINDINGS:    Chest:    Heart/ Mediastinum: Heart is within normal limits. No evidence of  central pulmonary embolism. No bulky lymphadenopathy.  Esophagus  appears normal. Right chest wall Port-A-Cath with tip at the  cavoatrial junction.    Lungs/pleura: The central tracheobronchial tree is patent.  No focal  mass or consolidation.  No suspicious nodules. No pneumothorax or  pleural effusion.     Chest wall/axilla: Heterogeneous partially calcified 4 cm retroareolar  mass in the left breast with associated skin thickening, compatible  with breast cancer. Enlarged left axillary lymph nodes measuring up to  8 mm in short axis. These are not significantly changed.    Abdomen and Pelvis:    Liver: Unremarkable. No suspicious masses. No hepatic steatosis.     Gallbladder/biliary tree: No gallstones. No biliary dilatation.    Spleen: Unremarkable.    Pancreas: Unremarkable. No mass or ductal dilatation.    Adrenal glands: Unremarkable.    Kidneys: Unremarkable. No hydronephrosis.    Bowel: Unremarkable. No obstruction. Normal appendix.    Retroperitoneum: Vasculature is grossly unremarkable..  No bulky  lymphadenopathy.    Pelvis: Urinary bladder is unremarkable.  Uterus and adnexa are within  normal limits.    Bones: Scattered sclerotic lesions throughout the bony structures are  not significantly changed and remain compatible with diffuse osseous  metastasis. No acute fractures.    Soft Tissues:  Unremarkable.      Impression    IMPRESSION:    1.  Heterogeneous partially calcified retroareolar mass in the left  breast with associated skin thickening is not significantly changed  from 8/20/2021, but slightly decreased from 2/19/2021 and remains  compatible with known breast cancer.  2.  Enlarged left axillary lymph nodes are unchanged from 8/20/2021,  and slightly decreased from 2/19/2021 and remain compatible with dianne  metastasis.  3.  Diffuse osseous metastasis are not significantly changed.    I have personally reviewed the examination and initial interpretation  and I agree with the findings.    EDWIN AVILES MD         SYSTEM ID:  [id number]         ASSESSMENT AND PLAN:   1.  Metastatic ER positive SD positive HER-2/gabriela negative breast cancer with a left breast primary and bone metastasis.    Plan    1.  She continues to do well and will continue her anastrozole and Verzenio  2.  She will do labs monthly  3.  She should see me or the NP every other month   4. We reviewed her imaging that was all stable, we will repeat scans prior to seeing me in May  5. zometa q 3 months  6. She asked if she will be on verzenio/arimidex forever. I reminded her that at this time  Metastatic breast cancer is not curable. She will be on her current regimen so long as her disease is stable or better - however at some point she will likely progress and we will need to change treatment options.  7. We discussed that travel in the context of her breast cancer is fine and reviewed covid precautions.    Tiffany Hilton MD on 1/7/2022 at 12:02 PM                      Again, thank you for allowing me to participate in the care of your patient.        Sincerely,        Tiffany Hilton MD

## 2022-01-12 ENCOUNTER — LAB (OUTPATIENT)
Dept: ONCOLOGY | Facility: CLINIC | Age: 67
End: 2022-01-12
Payer: MEDICARE

## 2022-01-12 DIAGNOSIS — C50.812 MALIGNANT NEOPLASM OF OVERLAPPING SITES OF LEFT BREAST IN FEMALE, ESTROGEN RECEPTOR POSITIVE (H): ICD-10-CM

## 2022-01-12 DIAGNOSIS — Z17.0 MALIGNANT NEOPLASM OF OVERLAPPING SITES OF LEFT BREAST IN FEMALE, ESTROGEN RECEPTOR POSITIVE (H): ICD-10-CM

## 2022-01-12 LAB
ALBUMIN SERPL-MCNC: 3.5 G/DL (ref 3.4–5)
ALP SERPL-CCNC: 65 U/L (ref 40–150)
ALT SERPL W P-5'-P-CCNC: 32 U/L (ref 0–50)
ANION GAP SERPL CALCULATED.3IONS-SCNC: 6 MMOL/L (ref 3–14)
AST SERPL W P-5'-P-CCNC: 20 U/L (ref 0–45)
BASOPHILS # BLD AUTO: 0.1 10E3/UL (ref 0–0.2)
BASOPHILS NFR BLD AUTO: 3 %
BILIRUB SERPL-MCNC: 0.6 MG/DL (ref 0.2–1.3)
BUN SERPL-MCNC: 15 MG/DL (ref 7–30)
CALCIUM SERPL-MCNC: 9.2 MG/DL (ref 8.5–10.1)
CANCER AG27-29 SERPL-ACNC: 107 U/ML (ref 0–39)
CHLORIDE BLD-SCNC: 103 MMOL/L (ref 94–109)
CO2 SERPL-SCNC: 30 MMOL/L (ref 20–32)
CREAT SERPL-MCNC: 0.84 MG/DL (ref 0.52–1.04)
EOSINOPHIL # BLD AUTO: 0.2 10E3/UL (ref 0–0.7)
EOSINOPHIL NFR BLD AUTO: 3 %
ERYTHROCYTE [DISTWIDTH] IN BLOOD BY AUTOMATED COUNT: 13.2 % (ref 10–15)
GFR SERPL CREATININE-BSD FRML MDRD: 77 ML/MIN/1.73M2
GLUCOSE BLD-MCNC: 106 MG/DL (ref 70–99)
HCT VFR BLD AUTO: 39.2 % (ref 35–47)
HGB BLD-MCNC: 13.4 G/DL (ref 11.7–15.7)
IMM GRANULOCYTES # BLD: 0 10E3/UL
IMM GRANULOCYTES NFR BLD: 0 %
LYMPHOCYTES # BLD AUTO: 2.1 10E3/UL (ref 0.8–5.3)
LYMPHOCYTES NFR BLD AUTO: 41 %
MCH RBC QN AUTO: 34 PG (ref 26.5–33)
MCHC RBC AUTO-ENTMCNC: 34.2 G/DL (ref 31.5–36.5)
MCV RBC AUTO: 100 FL (ref 78–100)
MONOCYTES # BLD AUTO: 0.4 10E3/UL (ref 0–1.3)
MONOCYTES NFR BLD AUTO: 9 %
NEUTROPHILS # BLD AUTO: 2.3 10E3/UL (ref 1.6–8.3)
NEUTROPHILS NFR BLD AUTO: 44 %
NRBC # BLD AUTO: 0 10E3/UL
NRBC BLD AUTO-RTO: 0 /100
PLATELET # BLD AUTO: 184 10E3/UL (ref 150–450)
POTASSIUM BLD-SCNC: 3.8 MMOL/L (ref 3.4–5.3)
PROT SERPL-MCNC: 7.7 G/DL (ref 6.8–8.8)
RBC # BLD AUTO: 3.94 10E6/UL (ref 3.8–5.2)
SODIUM SERPL-SCNC: 139 MMOL/L (ref 133–144)
WBC # BLD AUTO: 5 10E3/UL (ref 4–11)

## 2022-01-12 PROCEDURE — 85025 COMPLETE CBC W/AUTO DIFF WBC: CPT | Performed by: INTERNAL MEDICINE

## 2022-01-12 PROCEDURE — 86300 IMMUNOASSAY TUMOR CA 15-3: CPT | Performed by: INTERNAL MEDICINE

## 2022-01-12 PROCEDURE — 36591 DRAW BLOOD OFF VENOUS DEVICE: CPT | Performed by: INTERNAL MEDICINE

## 2022-01-12 PROCEDURE — 80053 COMPREHEN METABOLIC PANEL: CPT | Performed by: INTERNAL MEDICINE

## 2022-01-12 RX ORDER — HEPARIN SODIUM (PORCINE) LOCK FLUSH IV SOLN 100 UNIT/ML 100 UNIT/ML
500 SOLUTION INTRAVENOUS ONCE
Status: COMPLETED | OUTPATIENT
Start: 2022-01-12 | End: 2022-01-12

## 2022-01-12 RX ADMIN — HEPARIN SODIUM (PORCINE) LOCK FLUSH IV SOLN 100 UNIT/ML 500 UNITS: 100 SOLUTION at 08:26

## 2022-01-12 NOTE — PROGRESS NOTES
See IV flow sheet for details of port access. Labs sent: cbc, cmp, ca27.29. Port needle removed per protocol.    Katharina Enamorado RN

## 2022-01-19 DIAGNOSIS — C50.812 MALIGNANT NEOPLASM OF OVERLAPPING SITES OF LEFT BREAST IN FEMALE, ESTROGEN RECEPTOR POSITIVE (H): Primary | ICD-10-CM

## 2022-01-19 DIAGNOSIS — Z17.0 MALIGNANT NEOPLASM OF OVERLAPPING SITES OF LEFT BREAST IN FEMALE, ESTROGEN RECEPTOR POSITIVE (H): Primary | ICD-10-CM

## 2022-02-09 ENCOUNTER — INFUSION THERAPY VISIT (OUTPATIENT)
Dept: INFUSION THERAPY | Facility: CLINIC | Age: 67
End: 2022-02-09
Attending: INTERNAL MEDICINE
Payer: MEDICARE

## 2022-02-09 ENCOUNTER — LAB (OUTPATIENT)
Dept: ONCOLOGY | Facility: CLINIC | Age: 67
End: 2022-02-09
Payer: MEDICARE

## 2022-02-09 VITALS
BODY MASS INDEX: 39.55 KG/M2 | WEIGHT: 216.3 LBS | TEMPERATURE: 98.1 F | DIASTOLIC BLOOD PRESSURE: 77 MMHG | RESPIRATION RATE: 18 BRPM | HEART RATE: 105 BPM | OXYGEN SATURATION: 95 % | SYSTOLIC BLOOD PRESSURE: 156 MMHG

## 2022-02-09 DIAGNOSIS — Z17.0 MALIGNANT NEOPLASM OF OVERLAPPING SITES OF LEFT BREAST IN FEMALE, ESTROGEN RECEPTOR POSITIVE (H): Primary | ICD-10-CM

## 2022-02-09 DIAGNOSIS — C79.51 BONE METASTASIS: ICD-10-CM

## 2022-02-09 DIAGNOSIS — C50.812 MALIGNANT NEOPLASM OF OVERLAPPING SITES OF LEFT BREAST IN FEMALE, ESTROGEN RECEPTOR POSITIVE (H): ICD-10-CM

## 2022-02-09 DIAGNOSIS — C50.812 MALIGNANT NEOPLASM OF OVERLAPPING SITES OF LEFT BREAST IN FEMALE, ESTROGEN RECEPTOR POSITIVE (H): Primary | ICD-10-CM

## 2022-02-09 DIAGNOSIS — C79.51 BONE METASTASIS: Primary | ICD-10-CM

## 2022-02-09 DIAGNOSIS — Z17.0 MALIGNANT NEOPLASM OF OVERLAPPING SITES OF LEFT BREAST IN FEMALE, ESTROGEN RECEPTOR POSITIVE (H): ICD-10-CM

## 2022-02-09 LAB
ALBUMIN SERPL-MCNC: 3.8 G/DL (ref 3.4–5)
CALCIUM SERPL-MCNC: 9.8 MG/DL (ref 8.5–10.1)
CREAT SERPL-MCNC: 0.98 MG/DL (ref 0.52–1.04)
GFR SERPL CREATININE-BSD FRML MDRD: 64 ML/MIN/1.73M2

## 2022-02-09 PROCEDURE — 99207 PR NO CHARGE LOS: CPT

## 2022-02-09 PROCEDURE — 82040 ASSAY OF SERUM ALBUMIN: CPT | Performed by: INTERNAL MEDICINE

## 2022-02-09 PROCEDURE — 82310 ASSAY OF CALCIUM: CPT | Performed by: INTERNAL MEDICINE

## 2022-02-09 PROCEDURE — 82565 ASSAY OF CREATININE: CPT | Performed by: INTERNAL MEDICINE

## 2022-02-09 PROCEDURE — 96365 THER/PROPH/DIAG IV INF INIT: CPT | Performed by: INTERNAL MEDICINE

## 2022-02-09 RX ORDER — HEPARIN SODIUM (PORCINE) LOCK FLUSH IV SOLN 100 UNIT/ML 100 UNIT/ML
5 SOLUTION INTRAVENOUS
Status: DISCONTINUED | OUTPATIENT
Start: 2022-02-09 | End: 2022-02-09 | Stop reason: HOSPADM

## 2022-02-09 RX ORDER — ZOLEDRONIC ACID 0.04 MG/ML
4 INJECTION, SOLUTION INTRAVENOUS ONCE
Status: COMPLETED | OUTPATIENT
Start: 2022-02-09 | End: 2022-02-09

## 2022-02-09 RX ORDER — HEPARIN SODIUM (PORCINE) LOCK FLUSH IV SOLN 100 UNIT/ML 100 UNIT/ML
5 SOLUTION INTRAVENOUS ONCE
Status: COMPLETED | OUTPATIENT
Start: 2022-02-09 | End: 2022-02-09

## 2022-02-09 RX ADMIN — ZOLEDRONIC ACID 4 MG: 0.04 INJECTION, SOLUTION INTRAVENOUS at 09:12

## 2022-02-09 RX ADMIN — HEPARIN SODIUM (PORCINE) LOCK FLUSH IV SOLN 100 UNIT/ML 5 ML: 100 SOLUTION at 09:31

## 2022-02-09 RX ADMIN — Medication 250 ML: at 09:12

## 2022-02-09 RX ADMIN — HEPARIN SODIUM (PORCINE) LOCK FLUSH IV SOLN 100 UNIT/ML 5 ML: 100 SOLUTION at 08:38

## 2022-02-09 ASSESSMENT — PAIN SCALES - GENERAL: PAINLEVEL: NO PAIN (0)

## 2022-02-09 NOTE — PROGRESS NOTES
Infusion Nursing Note:  Mary Olson presents today for Zometa.    Patient seen by provider today: No   present during visit today: Not Applicable.    Note: denies any side effects to previous Zometa infusions.      Intravenous Access:  Implanted Port.     Treatment Conditions:  Results reviewed, labs MET treatment parameters, ok to proceed with treatment.      Post Infusion Assessment:  Patient tolerated infusion without incident.       Discharge Plan:   RTC NP 3/9 for follow up and labs.  May appts already scheduled .      ABBY WHITLEY RN

## 2022-02-11 DIAGNOSIS — Z17.0 MALIGNANT NEOPLASM OF OVERLAPPING SITES OF LEFT BREAST IN FEMALE, ESTROGEN RECEPTOR POSITIVE (H): Primary | ICD-10-CM

## 2022-02-11 DIAGNOSIS — C50.812 MALIGNANT NEOPLASM OF OVERLAPPING SITES OF LEFT BREAST IN FEMALE, ESTROGEN RECEPTOR POSITIVE (H): Primary | ICD-10-CM

## 2022-02-16 ENCOUNTER — TELEPHONE (OUTPATIENT)
Dept: PHARMACY | Facility: CLINIC | Age: 67
End: 2022-02-16
Payer: MEDICARE

## 2022-02-16 DIAGNOSIS — E87.6 HYPOKALEMIA: Primary | ICD-10-CM

## 2022-02-16 DIAGNOSIS — Z17.0 MALIGNANT NEOPLASM OF OVERLAPPING SITES OF LEFT BREAST IN FEMALE, ESTROGEN RECEPTOR POSITIVE (H): ICD-10-CM

## 2022-02-16 DIAGNOSIS — C50.812 MALIGNANT NEOPLASM OF OVERLAPPING SITES OF LEFT BREAST IN FEMALE, ESTROGEN RECEPTOR POSITIVE (H): ICD-10-CM

## 2022-03-06 DIAGNOSIS — I10 BENIGN ESSENTIAL HYPERTENSION: ICD-10-CM

## 2022-03-08 RX ORDER — CHLORTHALIDONE 25 MG/1
TABLET ORAL
Qty: 60 TABLET | Refills: 5 | Status: SHIPPED | OUTPATIENT
Start: 2022-03-08 | End: 2023-03-13

## 2022-03-08 NOTE — TELEPHONE ENCOUNTER
Routing refill request to provider for review/approval because:  BP  Appointment needed (has upcoming appointment on 3/9 scheduled)    BP Readings from Last 3 Encounters:   02/09/22 (!) 156/77   11/17/21 (!) 166/93   10/20/21 (!) 164/96                Pending Prescriptions:                       Disp   Refills    chlorthalidone (HYGROTON) 25 MG tablet [Ph*60 tab*5        Sig: TAKE 1 TABLET DAILY        Jerald Benjamin RN

## 2022-03-09 ENCOUNTER — LAB (OUTPATIENT)
Dept: ONCOLOGY | Facility: CLINIC | Age: 67
End: 2022-03-09
Payer: MEDICARE

## 2022-03-09 ENCOUNTER — ONCOLOGY VISIT (OUTPATIENT)
Dept: ONCOLOGY | Facility: CLINIC | Age: 67
End: 2022-03-09
Payer: MEDICARE

## 2022-03-09 VITALS
DIASTOLIC BLOOD PRESSURE: 94 MMHG | SYSTOLIC BLOOD PRESSURE: 170 MMHG | HEIGHT: 62 IN | WEIGHT: 217 LBS | OXYGEN SATURATION: 97 % | HEART RATE: 116 BPM | BODY MASS INDEX: 39.93 KG/M2

## 2022-03-09 DIAGNOSIS — C50.812 MALIGNANT NEOPLASM OF OVERLAPPING SITES OF LEFT BREAST IN FEMALE, ESTROGEN RECEPTOR POSITIVE (H): Primary | ICD-10-CM

## 2022-03-09 DIAGNOSIS — E87.6 HYPOKALEMIA: ICD-10-CM

## 2022-03-09 DIAGNOSIS — Z17.0 MALIGNANT NEOPLASM OF OVERLAPPING SITES OF LEFT BREAST IN FEMALE, ESTROGEN RECEPTOR POSITIVE (H): ICD-10-CM

## 2022-03-09 DIAGNOSIS — I10 BENIGN ESSENTIAL HYPERTENSION: ICD-10-CM

## 2022-03-09 DIAGNOSIS — C50.812 MALIGNANT NEOPLASM OF OVERLAPPING SITES OF LEFT BREAST IN FEMALE, ESTROGEN RECEPTOR POSITIVE (H): ICD-10-CM

## 2022-03-09 DIAGNOSIS — Z17.0 MALIGNANT NEOPLASM OF OVERLAPPING SITES OF LEFT BREAST IN FEMALE, ESTROGEN RECEPTOR POSITIVE (H): Primary | ICD-10-CM

## 2022-03-09 LAB
ALBUMIN SERPL-MCNC: 3.7 G/DL (ref 3.4–5)
ALP SERPL-CCNC: 69 U/L (ref 40–150)
ALT SERPL W P-5'-P-CCNC: 42 U/L (ref 0–50)
ANION GAP SERPL CALCULATED.3IONS-SCNC: 8 MMOL/L (ref 3–14)
AST SERPL W P-5'-P-CCNC: 29 U/L (ref 0–45)
BASOPHILS # BLD AUTO: 0.1 10E3/UL (ref 0–0.2)
BASOPHILS NFR BLD AUTO: 2 %
BILIRUB SERPL-MCNC: 0.4 MG/DL (ref 0.2–1.3)
BUN SERPL-MCNC: 13 MG/DL (ref 7–30)
CALCIUM SERPL-MCNC: 9.4 MG/DL (ref 8.5–10.1)
CANCER AG27-29 SERPL-ACNC: 101 U/ML (ref 0–39)
CHLORIDE BLD-SCNC: 103 MMOL/L (ref 94–109)
CO2 SERPL-SCNC: 27 MMOL/L (ref 20–32)
CREAT SERPL-MCNC: 0.89 MG/DL (ref 0.52–1.04)
EOSINOPHIL # BLD AUTO: 0.1 10E3/UL (ref 0–0.7)
EOSINOPHIL NFR BLD AUTO: 4 %
ERYTHROCYTE [DISTWIDTH] IN BLOOD BY AUTOMATED COUNT: 13.3 % (ref 10–15)
GFR SERPL CREATININE-BSD FRML MDRD: 72 ML/MIN/1.73M2
GLUCOSE BLD-MCNC: 116 MG/DL (ref 70–99)
HCT VFR BLD AUTO: 39.4 % (ref 35–47)
HGB BLD-MCNC: 13.6 G/DL (ref 11.7–15.7)
IMM GRANULOCYTES # BLD: 0 10E3/UL
IMM GRANULOCYTES NFR BLD: 0 %
LYMPHOCYTES # BLD AUTO: 1.7 10E3/UL (ref 0.8–5.3)
LYMPHOCYTES NFR BLD AUTO: 43 %
MCH RBC QN AUTO: 33.7 PG (ref 26.5–33)
MCHC RBC AUTO-ENTMCNC: 34.5 G/DL (ref 31.5–36.5)
MCV RBC AUTO: 98 FL (ref 78–100)
MONOCYTES # BLD AUTO: 0.3 10E3/UL (ref 0–1.3)
MONOCYTES NFR BLD AUTO: 9 %
NEUTROPHILS # BLD AUTO: 1.6 10E3/UL (ref 1.6–8.3)
NEUTROPHILS NFR BLD AUTO: 42 %
NRBC # BLD AUTO: 0 10E3/UL
NRBC BLD AUTO-RTO: 0 /100
PLATELET # BLD AUTO: 187 10E3/UL (ref 150–450)
POTASSIUM BLD-SCNC: 3.7 MMOL/L (ref 3.4–5.3)
PROT SERPL-MCNC: 8.2 G/DL (ref 6.8–8.8)
RBC # BLD AUTO: 4.04 10E6/UL (ref 3.8–5.2)
SODIUM SERPL-SCNC: 138 MMOL/L (ref 133–144)
WBC # BLD AUTO: 3.8 10E3/UL (ref 4–11)

## 2022-03-09 PROCEDURE — 85025 COMPLETE CBC W/AUTO DIFF WBC: CPT | Performed by: NURSE PRACTITIONER

## 2022-03-09 PROCEDURE — 86300 IMMUNOASSAY TUMOR CA 15-3: CPT | Performed by: NURSE PRACTITIONER

## 2022-03-09 PROCEDURE — 99207 PR NO CHARGE NURSE ONLY: CPT

## 2022-03-09 PROCEDURE — 80053 COMPREHEN METABOLIC PANEL: CPT | Performed by: NURSE PRACTITIONER

## 2022-03-09 PROCEDURE — 99214 OFFICE O/P EST MOD 30 MIN: CPT | Performed by: NURSE PRACTITIONER

## 2022-03-09 RX ORDER — ANASTROZOLE 1 MG/1
1 TABLET ORAL DAILY
Qty: 90 TABLET | Refills: 3 | Status: SHIPPED | OUTPATIENT
Start: 2022-03-09 | End: 2022-08-05

## 2022-03-09 RX ORDER — HEPARIN SODIUM (PORCINE) LOCK FLUSH IV SOLN 100 UNIT/ML 100 UNIT/ML
500 SOLUTION INTRAVENOUS
Status: DISCONTINUED | OUTPATIENT
Start: 2022-03-09 | End: 2022-03-09 | Stop reason: HOSPADM

## 2022-03-09 RX ORDER — POTASSIUM CHLORIDE 1.5 G/1.58G
60 POWDER, FOR SOLUTION ORAL DAILY
Qty: 90 PACKET | Refills: 3 | Status: SHIPPED | OUTPATIENT
Start: 2022-03-09 | End: 2022-05-06

## 2022-03-09 RX ORDER — POTASSIUM CHLORIDE 1.5 G/1.58G
POWDER, FOR SOLUTION ORAL
Qty: 90 PACKET | Refills: 6 | Status: CANCELLED | OUTPATIENT
Start: 2022-03-09

## 2022-03-09 RX ORDER — AMLODIPINE BESYLATE 10 MG/1
10 TABLET ORAL DAILY
Qty: 90 TABLET | Refills: 1 | Status: SHIPPED | OUTPATIENT
Start: 2022-03-09 | End: 2022-10-05

## 2022-03-09 RX ADMIN — HEPARIN SODIUM (PORCINE) LOCK FLUSH IV SOLN 100 UNIT/ML 500 UNITS: 100 SOLUTION at 09:21

## 2022-03-09 ASSESSMENT — PAIN SCALES - GENERAL: PAINLEVEL: NO PAIN (0)

## 2022-03-09 NOTE — PROGRESS NOTES
"Patient's name and  were verified.  See Doc Flowsheet - IV assess for details.  IVAD accessed with 20G 3/\" carroll gripper plus needle  blood return positive: YES  Site without redness, tenderness or swelling: YES  flushed with 20cc NS and 5cc 100u/ml heparin  Needle: deaccessed  Comments: Patient's port accessed using sterile procedure. Blood return noted. Lab tubes drawn, labeled and sent to lab. Patient tolerated lab draw well.     Giana Zapata RN, BSN  "

## 2022-03-09 NOTE — NURSING NOTE
"Oncology Rooming Note    March 9, 2022 9:50 AM   Mary Olson is a 66 year old female who presents for:    Chief Complaint   Patient presents with     Oncology Clinic Visit     follow up     Initial Vitals: BP (!) 185/118 (BP Location: Left arm, Patient Position: Chair, Cuff Size: Adult Regular)   Pulse 116   Ht 1.575 m (5' 2\")   Wt 98.4 kg (217 lb)   SpO2 97%   BMI 39.69 kg/m   Estimated body mass index is 39.69 kg/m  as calculated from the following:    Height as of this encounter: 1.575 m (5' 2\").    Weight as of this encounter: 98.4 kg (217 lb). Body surface area is 2.07 meters squared.  No Pain (0) Comment: Data Unavailable   No LMP recorded. Patient is postmenopausal.  Allergies reviewed: Yes  Medications reviewed: Yes    Medications: Medication refills not needed today.  Pharmacy name entered into EPIC:    [facility] - [place], [place] - [address] [place] [address]  [facility] - [place], [place] - [address] [place] [address] [facility] - [place], [place] - [address] [place] [address].  [facility] - [place], [place] - [address] [place] [address] [facility] - [place], [place] - [address]  [facility] - [place], [place] - [address] [facility] [address]    Clinical concerns: NO     Swathi was notified.      Sonia Vigil CMA              "

## 2022-03-09 NOTE — LETTER
3/9/2022         RE: Mary Olson  [address]  [facility] [address]        Dear Colleague,    Thank you for referring your patient, Mary Olson, to the [facility]. Please see a copy of my visit note below.    Oncology Follow Up Visit: March 9, 2022     Oncologist: Dr Tiffany Hilton  PCP: Clinic, [facility]    Diagnosis:Metastatic Breast Cancer-  Mary Olson is a 65 yo female who noticed what she believed to be a bug bite on her left breast in 10/2019.  She initially noted 3 red spots- treated at home. She underwent imaging with a diagnostic mammogram on March 4, 2020, as well as ultrasound where they found an ill-defined margins measuring at least 6.2 cm to center of the left nipple.  Other masses are seen posterior to this including a 2.1 cm mass slightly medial on mammography.  Diffuse left breast skin thickening.  There are at least 3 abnormal enlarged lymph nodes in the left axilla measuring 2.5 x 1.3 x 2.4 cm.  The right breast at the 12:30 position 7 cm from the nipple, there is a spiculated mass corresponding mammographic finding measuring approximately 1.3 x 0.9 x 1.0 cm.  A smaller spiculated hypoechoic mass is at the 12 o'clock position 2 cm from the nipple, measuring 0.9 x 0.5 x 0.7 cm.  Normal lymph nodes in the right axilla.  Biopsy of the left breast mass confirmed invasive mammary carcinoma, no special type, invasive ductal, grade 2, estrogen receptor positive, progesterone receptor positive, HER-2 negative.  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  The second right breast mass at the 12 o'clock position showed benign breast tissue, composed predominantly of uncolonized stroma with rare ducts.  No discrete masses identified.  No atypical or malignant  findings.  3/12/2020 MUGA showed normal heart function with LVEF of 58% and no other abnormalities.  Left Axillary biopsy was positive for metastatic mammary cancer with Negative Her2  PET scan showing metastasis to left axilla, retropectoral lymph nodes and diffuse bone metastasis  Treatment:   3/25/2020 to 8/2020 Taxol x 12weeks followed by ddAC x 4 cycles  3/25/2020 Begin zometa  8/2020 to 3/2021: Arimidex   3/19/21 began abemeciclib with Arimidex she has been dose reduced on her abemaciclib 200 mg a day    Interval History: Ms. Olson comes to clinic for review of use of abemeciclib with Arimidex.Pt reports she is doing well with plan and having no side effects of concern with the medication. She has some clear discharge over nipple area to left breast and does keep covered and reports no significant change to the area noted- better or worse. She denies pain, weight loss, increased fatigue or trouble sleeping. She has been otherwise healthy and getting back to an exercise program with Tia chi or yoga and walking.  She is eating well and denies bowel or bladder changes, fevers, chest pain or SOB.   Rest of comprehensive and complete ROS is reviewed and is negative.   Past Medical History:   Diagnosis Date     ASCUS favoring benign 10/28/15    neg HPV     Cancer (H)      Diabetes mellitus, type 2 (H) 7/14/2020     Hypertension      Obese      Current Outpatient Medications   Medication     abemaciclib (VERZENIO) 100 MG tablet     alcohol swab prep pads     amLODIPine (NORVASC) 10 MG tablet     anastrozole (ARIMIDEX) 1 MG tablet     Ascorbic Acid (VITAMIN C PO)     aspirin 81 MG tablet     biotin 1000 MCG TABS tablet     blood glucose (NO BRAND SPECIFIED) test strip     blood glucose calibration (NO BRAND SPECIFIED) solution     blood glucose monitoring (NO BRAND SPECIFIED) meter device kit     Blood Pressure KIT     calcium-vitamin D-vitamin K (VIACTIV) 500-500-40 MG-UNT-MCG CHEW     chlorthalidone (HYGROTON) 25 MG  "tablet     Cholecalciferol (VITAMIN D3 PO)     Coenzyme Q10 (COQ-10) 200 MG CAPS     Cyanocobalamin (B-12) 2500 MCG TABS     gabapentin (NEURONTIN) 250 MG/5ML solution     hydrocortisone 2.5 % cream     lidocaine-prilocaine (EMLA) 2.5-2.5 % external cream     Multiple Vitamins-Minerals (CENTRUM ADULTS PO)     potassium chloride (KLOR-CON) 20 MEQ packet     potassium chloride (KLOR-CON) 20 MEQ packet     thin (NO BRAND SPECIFIED) lancets     UNABLE TO FIND     UNABLE TO FIND     VITAMIN E NATURAL PO     No current facility-administered medications for this visit.     Facility-Administered Medications Ordered in Other Visits   Medication     heparin 100 UNIT/ML injection 500 Units     heparin 100 UNIT/ML injection 500 Units     sodium chloride (PF) 0.9% PF flush 10 mL     Allergies   Allergen Reactions     Lisinopril Cough       Physical Exam:BP (!) 170/94   Pulse 116   Ht 1.575 m (5' 2\")   Wt 98.4 kg (217 lb)   SpO2 97%   BMI 39.69 kg/m    Constitutional: Alert, cooperative, and in no distress. Obese.  Cardiac: Heart rate and rhythm is regular and strong without murmur  Respiratory: Breathing easy-no cough  Port in place and without redness or swelling today   Breasts: left breast with open dark pink wound with slight clear drainage- keeping gauze over nipple area- no odor  GI: Abdomen is soft, non tender and rounded  MS: Muscle tone normal, extremities normal with no edema.   Skin: No suspicious lesions or rashes  Neuro: Sensory grossly WNL- gait normal. Noted mild tremor to hands today.   Psych: Mentation appears normal and affect mildly anxious but with good conversation    Laboratory Results:   Results for orders placed or performed in visit on 03/09/22   Comprehensive metabolic panel     Status: Abnormal   Result Value Ref Range    Sodium 138 133 - 144 mmol/L    Potassium 3.7 3.4 - 5.3 mmol/L    Chloride 103 94 - 109 mmol/L    Carbon Dioxide (CO2) 27 20 - 32 mmol/L    Anion Gap 8 3 - 14 mmol/L    Urea " Nitrogen 13 7 - 30 mg/dL    Creatinine 0.89 0.52 - 1.04 mg/dL    Calcium 9.4 8.5 - 10.1 mg/dL    Glucose 116 (H) 70 - 99 mg/dL    Alkaline Phosphatase 69 40 - 150 U/L    AST 29 0 - 45 U/L    ALT 42 0 - 50 U/L    Protein Total 8.2 6.8 - 8.8 g/dL    Albumin 3.7 3.4 - 5.0 g/dL    Bilirubin Total 0.4 0.2 - 1.3 mg/dL    GFR Estimate 72 >60 mL/min/1.73m2   CBC with platelets and differential     Status: Abnormal   Result Value Ref Range    WBC Count 3.8 (L) 4.0 - 11.0 10e3/uL    RBC Count 4.04 3.80 - 5.20 10e6/uL    Hemoglobin 13.6 11.7 - 15.7 g/dL    Hematocrit 39.4 35.0 - 47.0 %    MCV 98 78 - 100 fL    MCH 33.7 (H) 26.5 - 33.0 pg    MCHC 34.5 31.5 - 36.5 g/dL    RDW 13.3 10.0 - 15.0 %    Platelet Count 187 150 - 450 10e3/uL    % Neutrophils 42 %    % Lymphocytes 43 %    % Monocytes 9 %    % Eosinophils 4 %    % Basophils 2 %    % Immature Granulocytes 0 %    NRBCs per 100 WBC 0 <1 /100    Absolute Neutrophils 1.6 1.6 - 8.3 10e3/uL    Absolute Lymphocytes 1.7 0.8 - 5.3 10e3/uL    Absolute Monocytes 0.3 0.0 - 1.3 10e3/uL    Absolute Eosinophils 0.1 0.0 - 0.7 10e3/uL    Absolute Basophils 0.1 0.0 - 0.2 10e3/uL    Absolute Immature Granulocytes 0.0 <=0.4 10e3/uL    Absolute NRBCs 0.0 10e3/uL   CBC with platelets differential     Status: Abnormal    Narrative    The following orders were created for panel order CBC with platelets differential.  Procedure                               Abnormality         Status                     ---------                               -----------         ------                     CBC with platelets and d...[[id number]]  Abnormal            Final result                 Please view results for these tests on the individual orders.     Assessment and Plan:    Metastatic Breast Cancer-patient continues with Abemiciclib and anastrozole treatment and is tolerating well with current dosing of abemiciclib at 100 mg bid.   Cancer marker noting mild improvement:    Ref. Range 10/20/2021 08:26  11/17/2021 08:23 12/15/2021 09:41 1/12/2022 08:15   CA 27-29 Latest Ref Range: 0 - 39 U/mL 134 (H) 111 (H) 105 (H) 107 (H)   She will continue monthly labs and will have clinic review every other visit.   Next scan set for 5/4 with review with Dr Hilton on 5/6/2022.   Bone metastasis - Using Zometa every 3 month- next due 5/2022. comfirms using daily calcium plus vitamin D.Calcium level now improved from elevation with only daily use.    Elevated blood pressures continue- Reports she is always elevated when in clinic. Currently on Amlodipine 10 mg daily and chlorthalidone 25mg daily. Asked pt again to take BPs at home for next week and if remaining elevated over 140/90 should see PCP for review and change of medications.   norvasc and potassium replacement renewed for pt.   BP Readings from Last 6 Encounters:   03/09/22 (!) 170/94   02/09/22 (!) 156/77   11/17/21 (!) 166/93   10/20/21 (!) 164/96   08/18/21 (!) 166/89   05/28/21 (!) 175/87   The total time of this encounter amounted to 30 minutes. This time included face to face time spent with the patient, prep work, ordering tests, and performing post visit documentation.  Swathi Page Cnp          Again, thank you for allowing me to participate in the care of your patient.        Sincerely,        Swathi Page NP, APRN CNP

## 2022-03-09 NOTE — PROGRESS NOTES
Oncology Follow Up Visit: March 9, 2022     Oncologist: Dr Tiffany Hilton  PCP: [facility], [facility]    Diagnosis:Metastatic Breast Cancer-  Mary Olson is a 65 yo female who noticed what she believed to be a bug bite on her left breast in 10/2019.  She initially noted 3 red spots- treated at home. She underwent imaging with a diagnostic mammogram on March 4, 2020, as well as ultrasound where they found an ill-defined margins measuring at least 6.2 cm to center of the left nipple.  Other masses are seen posterior to this including a 2.1 cm mass slightly medial on mammography.  Diffuse left breast skin thickening.  There are at least 3 abnormal enlarged lymph nodes in the left axilla measuring 2.5 x 1.3 x 2.4 cm.  The right breast at the 12:30 position 7 cm from the nipple, there is a spiculated mass corresponding mammographic finding measuring approximately 1.3 x 0.9 x 1.0 cm.  A smaller spiculated hypoechoic mass is at the 12 o'clock position 2 cm from the nipple, measuring 0.9 x 0.5 x 0.7 cm.  Normal lymph nodes in the right axilla.  Biopsy of the left breast mass confirmed invasive mammary carcinoma, no special type, invasive ductal, grade 2, estrogen receptor positive, progesterone receptor positive, HER-2 negative.  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  The second right breast mass at the 12 o'clock position showed benign breast tissue, composed predominantly of uncolonized stroma with rare ducts.  No discrete masses identified.  No atypical or malignant findings.  3/12/2020 MUGA showed normal heart function with LVEF of 58% and no other abnormalities.  Left Axillary biopsy was positive for metastatic mammary cancer with Negative Her2  PET scan showing metastasis to left axilla, retropectoral lymph nodes and diffuse bone  metastasis  Treatment:   3/25/2020 to 8/2020 Taxol x 12weeks followed by ddAC x 4 cycles  3/25/2020 Begin zometa  8/2020 to 3/2021: Arimidex   3/19/21 began abemeciclib with Arimidex she has been dose reduced on her abemaciclib 200 mg a day    Interval History: Ms. Olson comes to clinic for review of use of abemeciclib with Arimidex.Pt reports she is doing well with plan and having no side effects of concern with the medication. She has some clear discharge over nipple area to left breast and does keep covered and reports no significant change to the area noted- better or worse. She denies pain, weight loss, increased fatigue or trouble sleeping. She has been otherwise healthy and getting back to an exercise program with Tia chi or yoga and walking.  She is eating well and denies bowel or bladder changes, fevers, chest pain or SOB.   Rest of comprehensive and complete ROS is reviewed and is negative.   Past Medical History:   Diagnosis Date     ASCUS favoring benign 10/28/15    neg HPV     Cancer (H)      Diabetes mellitus, type 2 (H) 7/14/2020     Hypertension      Obese      Current Outpatient Medications   Medication     abemaciclib (VERZENIO) 100 MG tablet     alcohol swab prep pads     amLODIPine (NORVASC) 10 MG tablet     anastrozole (ARIMIDEX) 1 MG tablet     Ascorbic Acid (VITAMIN C PO)     aspirin 81 MG tablet     biotin 1000 MCG TABS tablet     blood glucose (NO BRAND SPECIFIED) test strip     blood glucose calibration (NO BRAND SPECIFIED) solution     blood glucose monitoring (NO BRAND SPECIFIED) meter device kit     Blood Pressure KIT     calcium-vitamin D-vitamin K (VIACTIV) 500-500-40 MG-UNT-MCG CHEW     chlorthalidone (HYGROTON) 25 MG tablet     Cholecalciferol (VITAMIN D3 PO)     Coenzyme Q10 (COQ-10) 200 MG CAPS     Cyanocobalamin (B-12) 2500 MCG TABS     gabapentin (NEURONTIN) 250 MG/5ML solution     hydrocortisone 2.5 % cream     lidocaine-prilocaine (EMLA) 2.5-2.5 % external cream     Multiple  "Vitamins-Minerals (CENTRUM ADULTS PO)     potassium chloride (KLOR-CON) 20 MEQ packet     potassium chloride (KLOR-CON) 20 MEQ packet     thin (NO BRAND SPECIFIED) lancets     UNABLE TO FIND     UNABLE TO FIND     VITAMIN E NATURAL PO     No current facility-administered medications for this visit.     Facility-Administered Medications Ordered in Other Visits   Medication     heparin 100 UNIT/ML injection 500 Units     heparin 100 UNIT/ML injection 500 Units     sodium chloride (PF) 0.9% PF flush 10 mL     Allergies   Allergen Reactions     Lisinopril Cough       Physical Exam:BP (!) 170/94   Pulse 116   Ht 1.575 m (5' 2\")   Wt 98.4 kg (217 lb)   SpO2 97%   BMI 39.69 kg/m    Constitutional: Alert, cooperative, and in no distress. Obese.  Cardiac: Heart rate and rhythm is regular and strong without murmur  Respiratory: Breathing easy-no cough  Port in place and without redness or swelling today   Breasts: left breast with open dark pink wound with slight clear drainage- keeping gauze over nipple area- no odor  GI: Abdomen is soft, non tender and rounded  MS: Muscle tone normal, extremities normal with no edema.   Skin: No suspicious lesions or rashes  Neuro: Sensory grossly WNL- gait normal. Noted mild tremor to hands today.   Psych: Mentation appears normal and affect mildly anxious but with good conversation    Laboratory Results:   Results for orders placed or performed in visit on 03/09/22   Comprehensive metabolic panel     Status: Abnormal   Result Value Ref Range    Sodium 138 133 - 144 mmol/L    Potassium 3.7 3.4 - 5.3 mmol/L    Chloride 103 94 - 109 mmol/L    Carbon Dioxide (CO2) 27 20 - 32 mmol/L    Anion Gap 8 3 - 14 mmol/L    Urea Nitrogen 13 7 - 30 mg/dL    Creatinine 0.89 0.52 - 1.04 mg/dL    Calcium 9.4 8.5 - 10.1 mg/dL    Glucose 116 (H) 70 - 99 mg/dL    Alkaline Phosphatase 69 40 - 150 U/L    AST 29 0 - 45 U/L    ALT 42 0 - 50 U/L    Protein Total 8.2 6.8 - 8.8 g/dL    Albumin 3.7 3.4 - 5.0 g/dL "    Bilirubin Total 0.4 0.2 - 1.3 mg/dL    GFR Estimate 72 >60 mL/min/1.73m2   CBC with platelets and differential     Status: Abnormal   Result Value Ref Range    WBC Count 3.8 (L) 4.0 - 11.0 10e3/uL    RBC Count 4.04 3.80 - 5.20 10e6/uL    Hemoglobin 13.6 11.7 - 15.7 g/dL    Hematocrit 39.4 35.0 - 47.0 %    MCV 98 78 - 100 fL    MCH 33.7 (H) 26.5 - 33.0 pg    MCHC 34.5 31.5 - 36.5 g/dL    RDW 13.3 10.0 - 15.0 %    Platelet Count 187 150 - 450 10e3/uL    % Neutrophils 42 %    % Lymphocytes 43 %    % Monocytes 9 %    % Eosinophils 4 %    % Basophils 2 %    % Immature Granulocytes 0 %    NRBCs per 100 WBC 0 <1 /100    Absolute Neutrophils 1.6 1.6 - 8.3 10e3/uL    Absolute Lymphocytes 1.7 0.8 - 5.3 10e3/uL    Absolute Monocytes 0.3 0.0 - 1.3 10e3/uL    Absolute Eosinophils 0.1 0.0 - 0.7 10e3/uL    Absolute Basophils 0.1 0.0 - 0.2 10e3/uL    Absolute Immature Granulocytes 0.0 <=0.4 10e3/uL    Absolute NRBCs 0.0 10e3/uL   CBC with platelets differential     Status: Abnormal    Narrative    The following orders were created for panel order CBC with platelets differential.  Procedure                               Abnormality         Status                     ---------                               -----------         ------                     CBC with platelets and d...[[id number]]  Abnormal            Final result                 Please view results for these tests on the individual orders.     Assessment and Plan:    Metastatic Breast Cancer-patient continues with Abemiciclib and anastrozole treatment and is tolerating well with current dosing of abemiciclib at 100 mg bid.   Cancer marker noting mild improvement:    Ref. Range 10/20/2021 08:26 11/17/2021 08:23 12/15/2021 09:41 1/12/2022 08:15   CA 27-29 Latest Ref Range: 0 - 39 U/mL 134 (H) 111 (H) 105 (H) 107 (H)   She will continue monthly labs and will have clinic review every other visit.   Next scan set for 5/4 with review with Dr Hilton on 5/6/2022.   Bone  metastasis - Using Zometa every 3 month- next due 5/2022. comfirms using daily calcium plus vitamin D.Calcium level now improved from elevation with only daily use.    Elevated blood pressures continue- Reports she is always elevated when in clinic. Currently on Amlodipine 10 mg daily and chlorthalidone 25mg daily. Asked pt again to take BPs at home for next week and if remaining elevated over 140/90 should see PCP for review and change of medications.   norvasc and potassium replacement renewed for pt.   BP Readings from Last 6 Encounters:   03/09/22 (!) 170/94   02/09/22 (!) 156/77   11/17/21 (!) 166/93   10/20/21 (!) 164/96   08/18/21 (!) 166/89   05/28/21 (!) 175/87   The total time of this encounter amounted to 30 minutes. This time included face to face time spent with the patient, prep work, ordering tests, and performing post visit documentation.  Swathi Page,Cnp

## 2022-03-16 DIAGNOSIS — C50.812 MALIGNANT NEOPLASM OF OVERLAPPING SITES OF LEFT BREAST IN FEMALE, ESTROGEN RECEPTOR POSITIVE (H): Primary | ICD-10-CM

## 2022-03-16 DIAGNOSIS — Z17.0 MALIGNANT NEOPLASM OF OVERLAPPING SITES OF LEFT BREAST IN FEMALE, ESTROGEN RECEPTOR POSITIVE (H): Primary | ICD-10-CM

## 2022-04-05 DIAGNOSIS — C50.812 MALIGNANT NEOPLASM OF OVERLAPPING SITES OF LEFT BREAST IN FEMALE, ESTROGEN RECEPTOR POSITIVE (H): Primary | ICD-10-CM

## 2022-04-05 DIAGNOSIS — Z17.0 MALIGNANT NEOPLASM OF OVERLAPPING SITES OF LEFT BREAST IN FEMALE, ESTROGEN RECEPTOR POSITIVE (H): Primary | ICD-10-CM

## 2022-04-06 ENCOUNTER — LAB (OUTPATIENT)
Dept: ONCOLOGY | Facility: CLINIC | Age: 67
End: 2022-04-06
Payer: MEDICARE

## 2022-04-06 DIAGNOSIS — Z17.0 MALIGNANT NEOPLASM OF OVERLAPPING SITES OF LEFT BREAST IN FEMALE, ESTROGEN RECEPTOR POSITIVE (H): ICD-10-CM

## 2022-04-06 DIAGNOSIS — C50.812 MALIGNANT NEOPLASM OF OVERLAPPING SITES OF LEFT BREAST IN FEMALE, ESTROGEN RECEPTOR POSITIVE (H): ICD-10-CM

## 2022-04-06 LAB
ALBUMIN SERPL-MCNC: 3.6 G/DL (ref 3.4–5)
ALP SERPL-CCNC: 52 U/L (ref 40–150)
ALT SERPL W P-5'-P-CCNC: 40 U/L (ref 0–50)
ANION GAP SERPL CALCULATED.3IONS-SCNC: 7 MMOL/L (ref 3–14)
AST SERPL W P-5'-P-CCNC: 29 U/L (ref 0–45)
BASOPHILS # BLD AUTO: 0.1 10E3/UL (ref 0–0.2)
BASOPHILS NFR BLD AUTO: 2 %
BILIRUB SERPL-MCNC: 0.6 MG/DL (ref 0.2–1.3)
BUN SERPL-MCNC: 12 MG/DL (ref 7–30)
CALCIUM SERPL-MCNC: 10 MG/DL (ref 8.5–10.1)
CANCER AG27-29 SERPL-ACNC: 90 U/ML (ref 0–39)
CHLORIDE BLD-SCNC: 100 MMOL/L (ref 94–109)
CO2 SERPL-SCNC: 28 MMOL/L (ref 20–32)
CREAT SERPL-MCNC: 0.91 MG/DL (ref 0.52–1.04)
EOSINOPHIL # BLD AUTO: 0.1 10E3/UL (ref 0–0.7)
EOSINOPHIL NFR BLD AUTO: 3 %
ERYTHROCYTE [DISTWIDTH] IN BLOOD BY AUTOMATED COUNT: 13.2 % (ref 10–15)
GFR SERPL CREATININE-BSD FRML MDRD: 70 ML/MIN/1.73M2
GLUCOSE BLD-MCNC: 151 MG/DL (ref 70–99)
HCT VFR BLD AUTO: 39.7 % (ref 35–47)
HGB BLD-MCNC: 13.6 G/DL (ref 11.7–15.7)
IMM GRANULOCYTES # BLD: 0 10E3/UL
IMM GRANULOCYTES NFR BLD: 0 %
LYMPHOCYTES # BLD AUTO: 1.8 10E3/UL (ref 0.8–5.3)
LYMPHOCYTES NFR BLD AUTO: 40 %
MCH RBC QN AUTO: 33.3 PG (ref 26.5–33)
MCHC RBC AUTO-ENTMCNC: 34.3 G/DL (ref 31.5–36.5)
MCV RBC AUTO: 97 FL (ref 78–100)
MONOCYTES # BLD AUTO: 0.4 10E3/UL (ref 0–1.3)
MONOCYTES NFR BLD AUTO: 8 %
NEUTROPHILS # BLD AUTO: 2.1 10E3/UL (ref 1.6–8.3)
NEUTROPHILS NFR BLD AUTO: 47 %
NRBC # BLD AUTO: 0 10E3/UL
NRBC BLD AUTO-RTO: 0 /100
PLATELET # BLD AUTO: 189 10E3/UL (ref 150–450)
POTASSIUM BLD-SCNC: 3.8 MMOL/L (ref 3.4–5.3)
PROT SERPL-MCNC: 7.9 G/DL (ref 6.8–8.8)
RBC # BLD AUTO: 4.08 10E6/UL (ref 3.8–5.2)
SODIUM SERPL-SCNC: 135 MMOL/L (ref 133–144)
WBC # BLD AUTO: 4.5 10E3/UL (ref 4–11)

## 2022-04-06 PROCEDURE — 36591 DRAW BLOOD OFF VENOUS DEVICE: CPT

## 2022-04-06 PROCEDURE — 85025 COMPLETE CBC W/AUTO DIFF WBC: CPT | Performed by: INTERNAL MEDICINE

## 2022-04-06 PROCEDURE — 80053 COMPREHEN METABOLIC PANEL: CPT | Performed by: INTERNAL MEDICINE

## 2022-04-06 PROCEDURE — 86300 IMMUNOASSAY TUMOR CA 15-3: CPT | Performed by: INTERNAL MEDICINE

## 2022-04-06 RX ORDER — HEPARIN SODIUM (PORCINE) LOCK FLUSH IV SOLN 100 UNIT/ML 100 UNIT/ML
5 SOLUTION INTRAVENOUS ONCE
Status: COMPLETED | OUTPATIENT
Start: 2022-04-06 | End: 2022-04-06

## 2022-04-06 RX ADMIN — HEPARIN SODIUM (PORCINE) LOCK FLUSH IV SOLN 100 UNIT/ML 5 ML: 100 SOLUTION at 08:25

## 2022-04-06 NOTE — PROGRESS NOTES
Port accessed with no incidient. Blood return noted. Labs drawn, tubes labeled and double signed. Port flushed with saline and heparin. Patient tolerated port draw well.   Melissa Pacheco RN

## 2022-04-13 DIAGNOSIS — Z17.0 MALIGNANT NEOPLASM OF OVERLAPPING SITES OF LEFT BREAST IN FEMALE, ESTROGEN RECEPTOR POSITIVE (H): Primary | ICD-10-CM

## 2022-04-13 DIAGNOSIS — C50.812 MALIGNANT NEOPLASM OF OVERLAPPING SITES OF LEFT BREAST IN FEMALE, ESTROGEN RECEPTOR POSITIVE (H): Primary | ICD-10-CM

## 2022-05-04 ENCOUNTER — ANCILLARY PROCEDURE (OUTPATIENT)
Dept: CT IMAGING | Facility: CLINIC | Age: 67
End: 2022-05-04
Attending: INTERNAL MEDICINE
Payer: MEDICARE

## 2022-05-04 ENCOUNTER — ANCILLARY PROCEDURE (OUTPATIENT)
Dept: GENERAL RADIOLOGY | Facility: CLINIC | Age: 67
End: 2022-05-04
Payer: MEDICARE

## 2022-05-04 DIAGNOSIS — Z17.0 MALIGNANT NEOPLASM OF OVERLAPPING SITES OF LEFT BREAST IN FEMALE, ESTROGEN RECEPTOR POSITIVE (H): Primary | ICD-10-CM

## 2022-05-04 DIAGNOSIS — C50.812 MALIGNANT NEOPLASM OF OVERLAPPING SITES OF LEFT BREAST IN FEMALE, ESTROGEN RECEPTOR POSITIVE (H): Primary | ICD-10-CM

## 2022-05-04 DIAGNOSIS — C50.812 MALIGNANT NEOPLASM OF OVERLAPPING SITES OF LEFT BREAST IN FEMALE, ESTROGEN RECEPTOR POSITIVE (H): ICD-10-CM

## 2022-05-04 DIAGNOSIS — Z17.0 MALIGNANT NEOPLASM OF OVERLAPPING SITES OF LEFT BREAST IN FEMALE, ESTROGEN RECEPTOR POSITIVE (H): ICD-10-CM

## 2022-05-04 PROCEDURE — 74177 CT ABD & PELVIS W/CONTRAST: CPT | Mod: MG | Performed by: RADIOLOGY

## 2022-05-04 PROCEDURE — 71260 CT THORAX DX C+: CPT | Mod: MG | Performed by: RADIOLOGY

## 2022-05-04 PROCEDURE — G1004 CDSM NDSC: HCPCS | Performed by: RADIOLOGY

## 2022-05-04 RX ORDER — IOPAMIDOL 755 MG/ML
132 INJECTION, SOLUTION INTRAVASCULAR ONCE
Status: COMPLETED | OUTPATIENT
Start: 2022-05-04 | End: 2022-05-04

## 2022-05-04 RX ORDER — HEPARIN SODIUM (PORCINE) LOCK FLUSH IV SOLN 100 UNIT/ML 100 UNIT/ML
5 SOLUTION INTRAVENOUS
Status: DISCONTINUED | OUTPATIENT
Start: 2022-05-04 | End: 2022-05-04 | Stop reason: HOSPADM

## 2022-05-04 RX ADMIN — IOPAMIDOL 132 ML: 755 INJECTION, SOLUTION INTRAVASCULAR at 08:46

## 2022-05-04 RX ADMIN — HEPARIN SODIUM (PORCINE) LOCK FLUSH IV SOLN 100 UNIT/ML 5 ML: 100 SOLUTION at 09:23

## 2022-05-06 ENCOUNTER — VIRTUAL VISIT (OUTPATIENT)
Dept: ONCOLOGY | Facility: CLINIC | Age: 67
End: 2022-05-06
Payer: MEDICARE

## 2022-05-06 DIAGNOSIS — C50.812 MALIGNANT NEOPLASM OF OVERLAPPING SITES OF LEFT BREAST IN FEMALE, ESTROGEN RECEPTOR POSITIVE (H): ICD-10-CM

## 2022-05-06 DIAGNOSIS — Z17.0 MALIGNANT NEOPLASM OF OVERLAPPING SITES OF LEFT BREAST IN FEMALE, ESTROGEN RECEPTOR POSITIVE (H): ICD-10-CM

## 2022-05-06 DIAGNOSIS — C79.51 BONE METASTASIS: Primary | ICD-10-CM

## 2022-05-06 DIAGNOSIS — I10 BENIGN ESSENTIAL HYPERTENSION: ICD-10-CM

## 2022-05-06 DIAGNOSIS — E87.6 HYPOKALEMIA: ICD-10-CM

## 2022-05-06 PROCEDURE — 99214 OFFICE O/P EST MOD 30 MIN: CPT | Mod: 95 | Performed by: INTERNAL MEDICINE

## 2022-05-06 RX ORDER — ZOLEDRONIC ACID 0.04 MG/ML
4 INJECTION, SOLUTION INTRAVENOUS ONCE
Status: CANCELLED | OUTPATIENT
Start: 2022-05-11 | End: 2022-05-11

## 2022-05-06 RX ORDER — POTASSIUM CHLORIDE 1.5 G/1.58G
60 POWDER, FOR SOLUTION ORAL DAILY
Qty: 180 PACKET | Refills: 0 | Status: SHIPPED | OUTPATIENT
Start: 2022-05-06 | End: 2022-08-05

## 2022-05-06 RX ORDER — HEPARIN SODIUM (PORCINE) LOCK FLUSH IV SOLN 100 UNIT/ML 100 UNIT/ML
5 SOLUTION INTRAVENOUS
Status: CANCELLED | OUTPATIENT
Start: 2022-05-11

## 2022-05-06 RX ORDER — HEPARIN SODIUM,PORCINE 10 UNIT/ML
5 VIAL (ML) INTRAVENOUS
Status: CANCELLED | OUTPATIENT
Start: 2022-05-11

## 2022-05-06 NOTE — PROGRESS NOTES
Desmond is a 66 year old who is being evaluated via a billable video visit.      How would you like to obtain your AVS? MyChart  If the video visit is dropped, the invitation should be resent by: Text to cell phone: [phone]  Will anyone else be joining your video visit? No      26 min video     [facility]  TODAY 05/06/22    ONCOLOGY DIAGNOSIS:  1. March 2020: Metastatic Breast Cancer ER+, NM+ Her2 Negative; with bone metastasis and a left breast primary. PI K3 CA mutation of note it is a pathologic variant which is outside the mutation of specific variants enrolled in the solar one trial.  TEJAS, TMB low     THERAPY TO DATE:  1. March 2020 to August 2020: Weekly Taxol.  2. August 2020 to March 2021: Anastrozole   3. 3/19/21 began abemeciclib with arimidex she has been dose reduced on her abemaciclib 200 mg a day   4. zometa q 3 months     INTERVAL HISTORY:  The patient is a 66-year-old female diagnosed with metastatic breast cancer in 03/2020 after noticing a lesion on her breast.  She was orginially treated by Dr Birch and changed to me February 2021    She started her abemaciclib on 3/14/2021     Desmond is here for routine follow-up.  She continues to do well She has a little diarrhea from the verzenio but it is tolerable. No n/v. No new pains. Appetite and weight are stable. No other new complaints or problems.She still hasnt noted her hair growing in much and this is frustrating.       PAST MEDICAL HISTORY:   1.  Metastatic breast cancer, see above.   2.  Hypertension.   3.  Steroid-induced diabetes.  According to the patient, since she has been off steroids, she was told that her blood sugars has normalized and she is off medication.   4.  Chemotherapy-induced neuropathy     FAMILY HISTORY:  Mother had breast cancer at 62; underwent lumpectomy and radiation, and arimidex  No new family history since last seen.     SOCIAL HISTORY:   to her , Dieter.    No current tobacco use.  Retired.   ALLERGIES:  LISINOPRIL.      PHYSICAL EXAM:  Physical Exam  Vitals reviewed.   Constitutional:       Appearance: Normal appearance. She is normal weight.   HENT:      Head: Normocephalic and atraumatic.   Eyes:      Extraocular Movements: Extraocular movements intact.      Conjunctiva/sclera: Conjunctivae normal.      Pupils: Pupils are equal, round, and reactive to light.   Pulmonary:      Effort: Pulmonary effort is normal.   Neurological:      General: No focal deficit present.      Mental Status: She is alert and oriented to person, place, and time. Mental status is at baseline.   Psychiatric:         Mood and Affect: Mood normal.         Behavior: Behavior normal.         Thought Content: Thought content normal.         Judgment: Judgment normal.         Labs    Recent Labs   Lab Test 04/06/22  0824 03/09/22  0922 02/09/22  0828 01/12/22  0815 12/15/21  0941 11/17/21  0823    138  --  139 136 135   POTASSIUM 3.8 3.7  --  3.8 3.4 3.6   CHLORIDE 100 103  --  103 100 102   CO2 28 27  --  30 30 28   ANIONGAP 7 8  --  6 6 5   BUN 12 13  --  15 13 12   CR 0.91 0.89 0.98 0.84 1.06* 0.92   * 116*  --  106* 94 111*   BUFFY 10.0 9.4 9.8 9.2 9.9 9.7     No results for input(s): MAG, PHOS in the last 93335 hours.  Recent Labs   Lab Test 04/06/22  0824 03/09/22  0922 01/12/22  0816 12/15/21  0941 11/17/21  0823   WBC 4.5 3.8* 5.0 4.6 4.5   HGB 13.6 13.6 13.4 13.1 14.0    187 184 188 196   MCV 97 98 100 97 96   NEUTROPHIL 47 42 44 54 42     Recent Labs   Lab Test 04/06/22  0824 03/09/22  0922 02/09/22  0828 01/12/22  0815   BILITOTAL 0.6 0.4  --  0.6   ALKPHOS 52 69  --  65   ALT 40 42  --  32   AST 29 29  --  20   ALBUMIN 3.6 3.7 3.8 3.5     TSH   Date Value Ref Range Status   10/28/2015 1.23 0.40 - 4.00 mU/L Final     No results for input(s): CEA in the last 41558 hours.  Results for orders placed or performed in visit on 05/04/22   CT Chest/Abdomen/Pelvis w Contrast    Narrative    EXAM: CT  CHEST/ABDOMEN/PELVIS W CONTRAST  LOCATION: [facility]  DATE/TIME: 5/4/2022 8:25 AM    INDICATION: Malignant neoplasm of overlapping sites of left breast in female, estrogen receptor positive (H).  COMPARISON: CT CAP 12/15/2021 and the same day 8/20/2021 and 2/19/2021 CTs CAP and PET/CTs. Baseline 03/18/2020 breast MRI also reviewed.  TECHNIQUE: CT scan of the chest, abdomen, and pelvis was performed following injection of IV contrast. Multiplanar reformats were obtained. Dose reduction techniques were used.   CONTRAST: Isovue-370, 132 mL IV.    FINDINGS:   LUNGS AND PLEURA: Lungs are clear. No pleural effusion. Patent trachea and bronchi.    MEDIASTINUM: Normal mediastinal, hilar and supraclavicular lymph nodes. Port anterior right chest wall with right IJ central venous catheter tip low SVC. Heart size normal with no pericardial effusion. Normal caliber thoracic aorta and central pulmonary   arteries.    CHEST WALL AND AXILLAE: Retroareolar left breast mass with associated dermal and trabecular thickening has decreased slightly. For example, a discrete nodular component beneath the upper outer margin of the areola has decreased from 14 x 9 mm to 12 x 7   mm (image 61 series 3).    Level I and level II left axillary lymphadenopathy with mixed, although mostly favorable response as follows. A lymph node node abutting the lateral margin of the pectoralis minor muscle has increased from 10 x 8 mm to 14 x 10 mm (image 62 of series 3)   whereas just superior to this a lymph node has decreased from 12 mm to 9 mm (image 54), a left subpectoral lymph node has decreased from 10 x 8 mm to 9 x 7 mm today (image 54 of series 3), the two level I left axillary lymph nodes that contain postbiopsy   tissue markers have decreased in short axis from 8-9 mm to 7 mm today (images 52 and 60).    CORONARY ARTERY CALCIFICATION: None.    HEPATOBILIARY: Moderate diffuse hepatic steatosis with focal sparing near the  gallbladder fossa hepatic segment IVb unchanged. No suspicious liver lesions. No calcified gallstones or bile duct dilatation.    PANCREAS: Normal.    SPLEEN: Normal.    ADRENAL GLANDS: Normal.    KIDNEYS/BLADDER: Several diminutive benign renal cysts require no followup. Kidneys, ureters and bladder otherwise normal.    BOWEL: No bowel obstruction or inflammatory change. No evidence of peritoneal disease. No ascites.    LYMPH NODES: No lymphadenopathy.    VASCULATURE: Unremarkable.    PELVIC ORGANS: Unremarkable with no pelvic mass or fluid.    MUSCULOSKELETAL: Innumerable sclerotic bone metastases are unchanged in size and number.      Impression    IMPRESSION:  1.  Slight further decrease in size of the retroareolar left breast mass.  2.  Level I and level II left axillary lymphadenopathy with mixed, although mostly favorable response as detailed above.  3.  Innumerable sclerotic bone metastases are unchanged in size and number.  4.  No new disease.           ASSESSMENT AND PLAN:   1.  Metastatic ER positive SD positive HER-2/gabriela negative breast cancer with a left breast primary and bone metastasis.    Plan    1.  She continues to do well and will continue her anastrozole and Verzenio  2.  Change labs to q 3 months  3.  She should see me or the NP every  3 months  4. We reviewed her imaging that was all stable, we will repeat imaging in 6 months  5. zometa q 3 months  6. She again asked if she would be on verzenio only 2 years. I dicussed that given her metastatic disease, therapy is palliative and not curative and therefore as long as she continues to have a respon we will continue therapy. At the point that she is progressing, we would change to something else.    Tiffany Hilton MD on 5/6/2022 at 12:17 PM

## 2022-05-06 NOTE — LETTER
5/6/2022         RE: Mary Olson  [address]  [place] [address]        Dear Colleague,    Thank you for referring your patient, Mary Olson, to the [facility]. Please see a copy of my visit note below.    Desmond is a 66 year old who is being evaluated via a billable video visit.      How would you like to obtain your AVS? MyChart  If the video visit is dropped, the invitation should be resent by: Text to cell phone: [phone]  Will anyone else be joining your video visit? No      26 min video     [facility] ONCOLOGY  TODAY 05/06/22    ONCOLOGY DIAGNOSIS:  1. March 2020: Metastatic Breast Cancer ER+, PA+ Her2 Negative; with bone metastasis and a left breast primary. PI K3 CA mutation of note it is a pathologic variant which is outside the mutation of specific variants enrolled in the solar one trial.  TEJAS, TMB low     THERAPY TO DATE:  1. March 2020 to August 2020: Weekly Taxol.  2. August 2020 to March 2021: Anastrozole   3. 3/19/21 began abemeciclib with arimidex she has been dose reduced on her abemaciclib 200 mg a day   4. zometa q 3 months     INTERVAL HISTORY:  The patient is a 66-year-old female diagnosed with metastatic breast cancer in 03/2020 after noticing a lesion on her breast.  She was orginially treated by Dr Birch and changed to me February 2021    She started her abemaciclib on 3/14/2021     Desmond is here for routine follow-up.  She continues to do well She has a little diarrhea from the verzenio but it is tolerable. No n/v. No new pains. Appetite and weight are stable. No other new complaints or problems.She still hasnt noted her hair growing in much and this is frustrating.       PAST MEDICAL HISTORY:   1.  Metastatic breast cancer, see above.   2.  Hypertension.   3.  Steroid-induced diabetes.  According to the patient, since she has been off steroids, she was told that her blood sugars has normalized and she is off medication.   4.   Chemotherapy-induced neuropathy     FAMILY HISTORY:  Mother had breast cancer at 62; underwent lumpectomy and radiation, and arimidex  No new family history since last seen.     SOCIAL HISTORY:   to her , Dieter.    No current tobacco use.  Retired.  ALLERGIES:  LISINOPRIL.      PHYSICAL EXAM:  Physical Exam  Vitals reviewed.   Constitutional:       Appearance: Normal appearance. She is normal weight.   HENT:      Head: Normocephalic and atraumatic.   Eyes:      Extraocular Movements: Extraocular movements intact.      Conjunctiva/sclera: Conjunctivae normal.      Pupils: Pupils are equal, round, and reactive to light.   Pulmonary:      Effort: Pulmonary effort is normal.   Neurological:      General: No focal deficit present.      Mental Status: She is alert and oriented to person, place, and time. Mental status is at baseline.   Psychiatric:         Mood and Affect: Mood normal.         Behavior: Behavior normal.         Thought Content: Thought content normal.         Judgment: Judgment normal.         Labs    Recent Labs   Lab Test 04/06/22  0824 03/09/22  0922 02/09/22  0828 01/12/22  0815 12/15/21  0941 11/17/21  0823    138  --  139 136 135   POTASSIUM 3.8 3.7  --  3.8 3.4 3.6   CHLORIDE 100 103  --  103 100 102   CO2 28 27  --  30 30 28   ANIONGAP 7 8  --  6 6 5   BUN 12 13  --  15 13 12   CR 0.91 0.89 0.98 0.84 1.06* 0.92   * 116*  --  106* 94 111*   BUFFY 10.0 9.4 9.8 9.2 9.9 9.7     No results for input(s): MAG, PHOS in the last 75386 hours.  Recent Labs   Lab Test 04/06/22  0824 03/09/22  0922 01/12/22  0816 12/15/21  0941 11/17/21  0823   WBC 4.5 3.8* 5.0 4.6 4.5   HGB 13.6 13.6 13.4 13.1 14.0    187 184 188 196   MCV 97 98 100 97 96   NEUTROPHIL 47 42 44 54 42     Recent Labs   Lab Test 04/06/22  0824 03/09/22  0922 02/09/22  0828 01/12/22  0815   BILITOTAL 0.6 0.4  --  0.6   ALKPHOS 52 69  --  65   ALT 40 42  --  32   AST 29 29  --  20   ALBUMIN 3.6 3.7 3.8 3.5     TSH    Date Value Ref Range Status   10/28/2015 1.23 0.40 - 4.00 mU/L Final     No results for input(s): CEA in the last 11124 hours.  Results for orders placed or performed in visit on 05/04/22   CT Chest/Abdomen/Pelvis w Contrast    Narrative    EXAM: CT CHEST/ABDOMEN/PELVIS W CONTRAST  LOCATION: [facility]  DATE/TIME: 5/4/2022 8:25 AM    INDICATION: Malignant neoplasm of overlapping sites of left breast in female, estrogen receptor positive (H).  COMPARISON: CT CAP 12/15/2021 and the same day 8/20/2021 and 2/19/2021 CTs CAP and PET/CTs. Baseline 03/18/2020 breast MRI also reviewed.  TECHNIQUE: CT scan of the chest, abdomen, and pelvis was performed following injection of IV contrast. Multiplanar reformats were obtained. Dose reduction techniques were used.   CONTRAST: Isovue-370, 132 mL IV.    FINDINGS:   LUNGS AND PLEURA: Lungs are clear. No pleural effusion. Patent trachea and bronchi.    MEDIASTINUM: Normal mediastinal, hilar and supraclavicular lymph nodes. Port anterior right chest wall with right IJ central venous catheter tip low SVC. Heart size normal with no pericardial effusion. Normal caliber thoracic aorta and central pulmonary   arteries.    CHEST WALL AND AXILLAE: Retroareolar left breast mass with associated dermal and trabecular thickening has decreased slightly. For example, a discrete nodular component beneath the upper outer margin of the areola has decreased from 14 x 9 mm to 12 x 7   mm (image 61 series 3).    Level I and level II left axillary lymphadenopathy with mixed, although mostly favorable response as follows. A lymph node node abutting the lateral margin of the pectoralis minor muscle has increased from 10 x 8 mm to 14 x 10 mm (image 62 of series 3)   whereas just superior to this a lymph node has decreased from 12 mm to 9 mm (image 54), a left subpectoral lymph node has decreased from 10 x 8 mm to 9 x 7 mm today (image 54 of series 3), the two level I left  axillary lymph nodes that contain postbiopsy   tissue markers have decreased in short axis from 8-9 mm to 7 mm today (images 52 and 60).    CORONARY ARTERY CALCIFICATION: None.    HEPATOBILIARY: Moderate diffuse hepatic steatosis with focal sparing near the gallbladder fossa hepatic segment IVb unchanged. No suspicious liver lesions. No calcified gallstones or bile duct dilatation.    PANCREAS: Normal.    SPLEEN: Normal.    ADRENAL GLANDS: Normal.    KIDNEYS/BLADDER: Several diminutive benign renal cysts require no followup. Kidneys, ureters and bladder otherwise normal.    BOWEL: No bowel obstruction or inflammatory change. No evidence of peritoneal disease. No ascites.    LYMPH NODES: No lymphadenopathy.    VASCULATURE: Unremarkable.    PELVIC ORGANS: Unremarkable with no pelvic mass or fluid.    MUSCULOSKELETAL: Innumerable sclerotic bone metastases are unchanged in size and number.      Impression    IMPRESSION:  1.  Slight further decrease in size of the retroareolar left breast mass.  2.  Level I and level II left axillary lymphadenopathy with mixed, although mostly favorable response as detailed above.  3.  Innumerable sclerotic bone metastases are unchanged in size and number.  4.  No new disease.           ASSESSMENT AND PLAN:   1.  Metastatic ER positive [place] positive HER-2/gabriela negative breast cancer with a left breast primary and bone metastasis.    Plan    1.  She continues to do well and will continue her anastrozole and Verzenio  2.  Change labs to q 3 months  3.  She should see me or the NP every  3 months  4. We reviewed her imaging that was all stable, we will repeat imaging in 6 months  5. zometa q 3 months  6. She again asked if she would be on verzenio only 2 years. I dicussed that given her metastatic disease, therapy is palliative and not curative and therefore as long as she continues to have a respon we will continue therapy. At the point that she is progressing, we would change to something  else.    Tiffany Hilton MD on 5/6/2022 at 12:17 PM                      Again, thank you for allowing me to participate in the care of your patient.        Sincerely,        Tiffany Hilton MD

## 2022-05-08 ENCOUNTER — HEALTH MAINTENANCE LETTER (OUTPATIENT)
Age: 67
End: 2022-05-08

## 2022-05-11 ENCOUNTER — LAB (OUTPATIENT)
Dept: ONCOLOGY | Facility: CLINIC | Age: 67
End: 2022-05-11
Payer: MEDICARE

## 2022-05-11 ENCOUNTER — INFUSION THERAPY VISIT (OUTPATIENT)
Dept: INFUSION THERAPY | Facility: CLINIC | Age: 67
End: 2022-05-11
Payer: MEDICARE

## 2022-05-11 DIAGNOSIS — C50.812 MALIGNANT NEOPLASM OF OVERLAPPING SITES OF LEFT BREAST IN FEMALE, ESTROGEN RECEPTOR POSITIVE (H): Primary | ICD-10-CM

## 2022-05-11 DIAGNOSIS — Z17.0 MALIGNANT NEOPLASM OF OVERLAPPING SITES OF LEFT BREAST IN FEMALE, ESTROGEN RECEPTOR POSITIVE (H): Primary | ICD-10-CM

## 2022-05-11 DIAGNOSIS — Z17.0 MALIGNANT NEOPLASM OF OVERLAPPING SITES OF LEFT BREAST IN FEMALE, ESTROGEN RECEPTOR POSITIVE (H): ICD-10-CM

## 2022-05-11 DIAGNOSIS — C79.51 BONE METASTASIS: ICD-10-CM

## 2022-05-11 DIAGNOSIS — C79.51 BONE METASTASIS: Primary | ICD-10-CM

## 2022-05-11 DIAGNOSIS — C50.812 MALIGNANT NEOPLASM OF OVERLAPPING SITES OF LEFT BREAST IN FEMALE, ESTROGEN RECEPTOR POSITIVE (H): ICD-10-CM

## 2022-05-11 LAB
ALBUMIN SERPL-MCNC: 3.5 G/DL (ref 3.4–5)
ALP SERPL-CCNC: 58 U/L (ref 40–150)
ALT SERPL W P-5'-P-CCNC: 37 U/L (ref 0–50)
ANION GAP SERPL CALCULATED.3IONS-SCNC: 8 MMOL/L (ref 3–14)
AST SERPL W P-5'-P-CCNC: 25 U/L (ref 0–45)
BASOPHILS # BLD AUTO: 0.1 10E3/UL (ref 0–0.2)
BASOPHILS NFR BLD AUTO: 2 %
BILIRUB SERPL-MCNC: 0.6 MG/DL (ref 0.2–1.3)
BUN SERPL-MCNC: 12 MG/DL (ref 7–30)
CALCIUM SERPL-MCNC: 9.8 MG/DL (ref 8.5–10.1)
CALCIUM SERPL-MCNC: 9.8 MG/DL (ref 8.5–10.1)
CANCER AG27-29 SERPL-ACNC: 90 U/ML (ref 0–39)
CHLORIDE BLD-SCNC: 101 MMOL/L (ref 94–109)
CO2 SERPL-SCNC: 27 MMOL/L (ref 20–32)
CREAT SERPL-MCNC: 0.91 MG/DL (ref 0.52–1.04)
EOSINOPHIL # BLD AUTO: 0.1 10E3/UL (ref 0–0.7)
EOSINOPHIL NFR BLD AUTO: 2 %
ERYTHROCYTE [DISTWIDTH] IN BLOOD BY AUTOMATED COUNT: 13.2 % (ref 10–15)
GFR SERPL CREATININE-BSD FRML MDRD: 70 ML/MIN/1.73M2
GLUCOSE BLD-MCNC: 142 MG/DL (ref 70–99)
HCT VFR BLD AUTO: 38.1 % (ref 35–47)
HGB BLD-MCNC: 13.3 G/DL (ref 11.7–15.7)
IMM GRANULOCYTES # BLD: 0 10E3/UL
IMM GRANULOCYTES NFR BLD: 0 %
LYMPHOCYTES # BLD AUTO: 1.6 10E3/UL (ref 0.8–5.3)
LYMPHOCYTES NFR BLD AUTO: 36 %
MCH RBC QN AUTO: 34 PG (ref 26.5–33)
MCHC RBC AUTO-ENTMCNC: 34.9 G/DL (ref 31.5–36.5)
MCV RBC AUTO: 97 FL (ref 78–100)
MONOCYTES # BLD AUTO: 0.4 10E3/UL (ref 0–1.3)
MONOCYTES NFR BLD AUTO: 9 %
NEUTROPHILS # BLD AUTO: 2.4 10E3/UL (ref 1.6–8.3)
NEUTROPHILS NFR BLD AUTO: 51 %
NRBC # BLD AUTO: 0 10E3/UL
NRBC BLD AUTO-RTO: 0 /100
PLATELET # BLD AUTO: 179 10E3/UL (ref 150–450)
POTASSIUM BLD-SCNC: 3.4 MMOL/L (ref 3.4–5.3)
PROT SERPL-MCNC: 7.8 G/DL (ref 6.8–8.8)
RBC # BLD AUTO: 3.91 10E6/UL (ref 3.8–5.2)
SODIUM SERPL-SCNC: 136 MMOL/L (ref 133–144)
WBC # BLD AUTO: 4.6 10E3/UL (ref 4–11)

## 2022-05-11 PROCEDURE — 96365 THER/PROPH/DIAG IV INF INIT: CPT | Performed by: NURSE PRACTITIONER

## 2022-05-11 PROCEDURE — 80053 COMPREHEN METABOLIC PANEL: CPT | Performed by: NURSE PRACTITIONER

## 2022-05-11 PROCEDURE — 86300 IMMUNOASSAY TUMOR CA 15-3: CPT | Performed by: NURSE PRACTITIONER

## 2022-05-11 PROCEDURE — 99207 PR NO CHARGE LOS: CPT

## 2022-05-11 PROCEDURE — 99207 PR NO CHARGE NURSE ONLY: CPT

## 2022-05-11 PROCEDURE — 85025 COMPLETE CBC W/AUTO DIFF WBC: CPT | Performed by: NURSE PRACTITIONER

## 2022-05-11 RX ORDER — HEPARIN SODIUM (PORCINE) LOCK FLUSH IV SOLN 100 UNIT/ML 100 UNIT/ML
500 SOLUTION INTRAVENOUS
Status: DISCONTINUED | OUTPATIENT
Start: 2022-05-11 | End: 2022-05-11 | Stop reason: HOSPADM

## 2022-05-11 RX ORDER — ZOLEDRONIC ACID 0.04 MG/ML
4 INJECTION, SOLUTION INTRAVENOUS ONCE
Status: COMPLETED | OUTPATIENT
Start: 2022-05-11 | End: 2022-05-11

## 2022-05-11 RX ORDER — HEPARIN SODIUM (PORCINE) LOCK FLUSH IV SOLN 100 UNIT/ML 100 UNIT/ML
5 SOLUTION INTRAVENOUS
Status: DISCONTINUED | OUTPATIENT
Start: 2022-05-11 | End: 2022-05-11 | Stop reason: HOSPADM

## 2022-05-11 RX ADMIN — ZOLEDRONIC ACID 4 MG: 0.04 INJECTION, SOLUTION INTRAVENOUS at 09:20

## 2022-05-11 RX ADMIN — HEPARIN SODIUM (PORCINE) LOCK FLUSH IV SOLN 100 UNIT/ML 500 UNITS: 100 SOLUTION at 08:42

## 2022-05-11 RX ADMIN — Medication 250 ML: at 09:20

## 2022-05-11 RX ADMIN — HEPARIN SODIUM (PORCINE) LOCK FLUSH IV SOLN 100 UNIT/ML 5 ML: 100 SOLUTION at 09:40

## 2022-05-11 NOTE — PROGRESS NOTES
Port needle left for access for treatment. Sterile technique performed and maintained. Patient tolerated procedure well. Tubes drawn in rainbow order. Tubes labeled and double signed. Transparent dressing placed with use of tegaderm.     Lashaun Cornejo RN  [facility] Oncology/Infusion [place]

## 2022-05-11 NOTE — PROGRESS NOTES
Infusion Nursing Note:  Mary Olson presents today for Zometa.    Patient seen by provider today: No   present during visit today: Not Applicable.    Note: Patient reports feeling well overall today, no new medical concerns reported.      Intravenous Access:  Implanted Port.    Treatment Conditions:  Calcium 9.8.      Post Infusion Assessment:  Patient tolerated infusion without incident.  Blood return noted pre and post infusion.  Site patent and intact, free from redness, edema or discomfort.  No evidence of extravasations.  Access discontinued per protocol.       Discharge Plan:   AVS to patient via MYCHART.  Patient will call for next appointment.   Patient discharged in stable condition accompanied by: self.  drove patient to/from appointment today.  Departure Mode: Ambulatory.      Giana Zapata RN

## 2022-06-01 NOTE — NURSING NOTE
"Mary Olson is a 64 year old female who is being evaluated via a billable telephone visit.      The patient has been notified of following:     \"This telephone visit will be conducted via a call between you and your physician/provider. We have found that certain health care needs can be provided without the need for a physical exam.  This service lets us provide the care you need with a short phone conversation.  If a prescription is necessary we can send it directly to your pharmacy.  If lab work is needed we can place an order for that and you can then stop by our lab to have the test done at a later time.    Telephone visits are billed at different rates depending on your insurance coverage. During this emergency period, for some insurers they may be billed the same as an in-person visit.  Please reach out to your insurance provider with any questions.    If during the course of the call the physician/provider feels a telephone visit is not appropriate, you will not be charged for this service.\"    Patient has given verbal consent for Telephone visit?  Yes    Mary Olson complains of    Chief Complaint   Patient presents with     Oncology Clinic Visit     prior to treatment 4/8/20 - no concerns       I have reviewed and updated the patient's Past Medical History, Social History, Family History and Medication List.    ALLERGIES  Lisinopril    Snoia Vigil CMA    "
Stable

## 2022-06-02 DIAGNOSIS — Z17.0 MALIGNANT NEOPLASM OF OVERLAPPING SITES OF LEFT BREAST IN FEMALE, ESTROGEN RECEPTOR POSITIVE (H): Primary | ICD-10-CM

## 2022-06-02 DIAGNOSIS — C50.812 MALIGNANT NEOPLASM OF OVERLAPPING SITES OF LEFT BREAST IN FEMALE, ESTROGEN RECEPTOR POSITIVE (H): Primary | ICD-10-CM

## 2022-06-08 DIAGNOSIS — Z17.0 MALIGNANT NEOPLASM OF OVERLAPPING SITES OF LEFT BREAST IN FEMALE, ESTROGEN RECEPTOR POSITIVE (H): Primary | ICD-10-CM

## 2022-06-08 DIAGNOSIS — C50.812 MALIGNANT NEOPLASM OF OVERLAPPING SITES OF LEFT BREAST IN FEMALE, ESTROGEN RECEPTOR POSITIVE (H): Primary | ICD-10-CM

## 2022-06-29 DIAGNOSIS — Z17.0 MALIGNANT NEOPLASM OF OVERLAPPING SITES OF LEFT BREAST IN FEMALE, ESTROGEN RECEPTOR POSITIVE (H): Primary | ICD-10-CM

## 2022-06-29 DIAGNOSIS — C50.812 MALIGNANT NEOPLASM OF OVERLAPPING SITES OF LEFT BREAST IN FEMALE, ESTROGEN RECEPTOR POSITIVE (H): Primary | ICD-10-CM

## 2022-07-06 DIAGNOSIS — C50.812 MALIGNANT NEOPLASM OF OVERLAPPING SITES OF LEFT BREAST IN FEMALE, ESTROGEN RECEPTOR POSITIVE (H): Primary | ICD-10-CM

## 2022-07-06 DIAGNOSIS — Z17.0 MALIGNANT NEOPLASM OF OVERLAPPING SITES OF LEFT BREAST IN FEMALE, ESTROGEN RECEPTOR POSITIVE (H): Primary | ICD-10-CM

## 2022-07-06 RX ORDER — ANASTROZOLE 1 MG/1
1 TABLET ORAL DAILY
Qty: 28 TABLET | Refills: 11 | Status: SHIPPED | OUTPATIENT
Start: 2022-07-06 | End: 2023-10-03

## 2022-07-18 NOTE — PROGRESS NOTES
TC with pt. She is working on blood sugars with PCP and is now on insulin with blood sugars slowly coming down as well as her blood pressure slowly coming down but still not at normal.  She is to contact Imaging when her blood sugars are down to 130 and we will reschedule the PET scan.  In the meantime she needs to get back on infusion therapy and will start tomorrow with weekly Taxol.Pt appreciated call.   Swathi Page, CNP  
40 yo male requesting information for shelter referral     also feels generally fatigued and tired     The patient denies the following symptoms:  headache, dizziness/lightheadedness, neck pain/neck stiffness, numbness/tingling, photophobia, change in vision/hearing/gait/mental status/speech,difficulty swallowing, focal weakness, rash, fever, chills, chest/neck/jaw/arm or back pain, palpitations, shortness of breath, diaphoresis, swelling to arm and/or legs, N/V/D, abdominal pain, abdominal distention, back pain, flank pain,

## 2022-07-29 DIAGNOSIS — Z17.0 MALIGNANT NEOPLASM OF OVERLAPPING SITES OF LEFT BREAST IN FEMALE, ESTROGEN RECEPTOR POSITIVE (H): Primary | ICD-10-CM

## 2022-07-29 DIAGNOSIS — C50.812 MALIGNANT NEOPLASM OF OVERLAPPING SITES OF LEFT BREAST IN FEMALE, ESTROGEN RECEPTOR POSITIVE (H): Primary | ICD-10-CM

## 2022-08-03 ENCOUNTER — TELEPHONE (OUTPATIENT)
Dept: ONCOLOGY | Facility: CLINIC | Age: 67
End: 2022-08-03

## 2022-08-03 DIAGNOSIS — Z17.0 MALIGNANT NEOPLASM OF OVERLAPPING SITES OF LEFT BREAST IN FEMALE, ESTROGEN RECEPTOR POSITIVE (H): Primary | ICD-10-CM

## 2022-08-03 DIAGNOSIS — C50.812 MALIGNANT NEOPLASM OF OVERLAPPING SITES OF LEFT BREAST IN FEMALE, ESTROGEN RECEPTOR POSITIVE (H): Primary | ICD-10-CM

## 2022-08-03 RX ORDER — ANASTROZOLE 1 MG/1
1 TABLET ORAL DAILY
Qty: 28 TABLET | Refills: 0 | Status: SHIPPED | OUTPATIENT
Start: 2022-08-03 | End: 2023-02-10

## 2022-08-04 NOTE — PROGRESS NOTES
Hematology/Oncology Visit    Oncologist: Dr. Hilton  Diagnosis: HR+ metastatic (bone) breast cancer  Reason for visit: 3 month follow up on abemeciclib + anastrozole    Cancer and Treatment Hx:  Mar 2020: presented with a left breast lesion. Diagnostic mammogram and ultrasound with skin thickening, an ill-defined 6.2cm mass, other masses seen posterior to it, and enlarged axillary lymph nodes. Biopsy of left breast mass revealed invasive ductal carcinoma, grade 2, ER/PA+, HER2-. L axillary lymph node biopsy positive for involvement. PET scan with metastasis to L axilla, retropectoral lymph nodes, and diffuse bone mets.  Mar- Aug 2020: weekly taxol x12 followed by AC x4 cycles  Aug- 2020- Mar 2021: anastrozole  Mar 2021: abemeciclib + anastrozole with zometa every 3months    Interval History:  Desmond has been doing well on abemeciclib and anastrozole. She feels hair loss is starting to improve some. Has infrequent diarrhea that is manageable. No other major side effects. Left nipple has clear drainage infrequently, around twice per month. She thinks sometimes secondary to bumping it or laying on her chest a certain way. No new masses, bone pain, or other acute symptomology. 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 She has a family member that is a pharmacist and she is wondering if she should switch her BP med and potassium supplements to save money. No other questions today.    Medications, imaging, and labs:  Reviewed pertinent medications, no refills needed today.    Reviewed labs from today:   08/05/22   Creatinine 0.97   GFR Estimate 64   Calcium 10.6 (H)   Alkaline Phosphatase 65   ALT 38   AST 31   Bilirubin Total 0.4   WBC 5.0   Hemoglobin 13.3   Platelet Count 186   Absolute Neutrophils 2.3     Reviewed read of CT CAP from 5/4/11:  IMPRESSION:  1.  Slight further decrease in size of the retroareolar left breast mass.  2.  Level I and  "level II left axillary lymphadenopathy with mixed, although   mostly favorable response as detailed above.  3.  Innumerable sclerotic bone metastases are unchanged in size and number.  4.  No new disease    Physical Exam:  GEN: pleasantly conversant female no acute distress, hair thinning noted  SKIN: generally intact, no visible rash, bruising, or sores   ENT: eyes non-icteric, no notable oral sores  LEFT BREAST: left firm nipple/breast lesion with skin retraction and bandage covering, no drainage noted  RESP: clear to auscultation bilaterally, on room air  CARDS: regular rate and rhythm, no notable murmurs   GI: limited due to body habitus but abd soft without notable hepatosplenomegaly, non-tender to palpation  MSK: trace edema bilateral ankles  NEURO: alert and oriented without obvious focal deficit, gait stable  BP (!) 174/75 (BP Location: Left arm)   Pulse 115   Temp 98.4  F (36.9  C) (Oral)   Resp 16   Ht 1.575 m (5' 2.01\")   Wt 97.3 kg (214 lb 8 oz)   SpO2 94%   BMI 39.22 kg/m       Wt Readings from Last 4 Encounters:   08/05/22 97.3 kg (214 lb 8 oz)   03/09/22 98.4 kg (217 lb)   02/09/22 98.1 kg (216 lb 4.8 oz)   11/17/21 94.3 kg (208 lb)     Assessment and Plan:  Metastatic HR+ breast cancer  Bone metastasis  - CT imaging in May with interval improvement  - Tumor marker has been stable, switch over to CA 15-3 with today's labs, pending  - Continues on abemaciclib twice daily + anastrozole daily  - Tolerating well aside from mild diarrhea  - Interval CT CAP every 6 months, due in Nov- ordered  - Labs and Oncology provider visit every 3 months    Hypercalcemia  - Ca 10.6 without any new bony pains, less likely disease progression given most recent scan with ongoing response  - Recommended decreasing calcium supplementation to every other day  - Zometa will be given later today, scheduled every 3 months  - Recheck labs in 3 months, sooner if new pains develop    Hypertension and associated " hypokalemia  - Patient currently on chlorthalidone and KCl packets with high expense  - SBP remains elevated 170s  - Recommended establishing with a PCP for management/adjustment of BP meds given financial concerns with current management plan      The total time of this encounter amounted to 40 minutes today. This time includes face-to-face time spent with the patient, prep work, ordering tests, and performing post-visit documentation.  - Margaret Arellano, CNP

## 2022-08-05 ENCOUNTER — INFUSION THERAPY VISIT (OUTPATIENT)
Dept: INFUSION THERAPY | Facility: CLINIC | Age: 67
End: 2022-08-05

## 2022-08-05 ENCOUNTER — ONCOLOGY VISIT (OUTPATIENT)
Dept: ONCOLOGY | Facility: CLINIC | Age: 67
End: 2022-08-05

## 2022-08-05 ENCOUNTER — LAB (OUTPATIENT)
Dept: ONCOLOGY | Facility: CLINIC | Age: 67
End: 2022-08-05
Payer: MEDICARE

## 2022-08-05 VITALS
SYSTOLIC BLOOD PRESSURE: 174 MMHG | OXYGEN SATURATION: 94 % | WEIGHT: 214.5 LBS | TEMPERATURE: 98.4 F | RESPIRATION RATE: 16 BRPM | HEIGHT: 62 IN | DIASTOLIC BLOOD PRESSURE: 75 MMHG | BODY MASS INDEX: 39.47 KG/M2 | HEART RATE: 115 BPM

## 2022-08-05 DIAGNOSIS — C50.812 MALIGNANT NEOPLASM OF OVERLAPPING SITES OF LEFT BREAST IN FEMALE, ESTROGEN RECEPTOR POSITIVE (H): ICD-10-CM

## 2022-08-05 DIAGNOSIS — Z17.0 MALIGNANT NEOPLASM OF OVERLAPPING SITES OF LEFT BREAST IN FEMALE, ESTROGEN RECEPTOR POSITIVE (H): ICD-10-CM

## 2022-08-05 DIAGNOSIS — C50.919 METASTATIC BREAST CANCER: ICD-10-CM

## 2022-08-05 DIAGNOSIS — I10 BENIGN ESSENTIAL HYPERTENSION: ICD-10-CM

## 2022-08-05 DIAGNOSIS — C50.919 METASTATIC BREAST CANCER: Primary | ICD-10-CM

## 2022-08-05 DIAGNOSIS — C79.51 BONE METASTASIS: Primary | ICD-10-CM

## 2022-08-05 DIAGNOSIS — E87.6 HYPOKALEMIA: ICD-10-CM

## 2022-08-05 DIAGNOSIS — C79.51 BONE METASTASIS: ICD-10-CM

## 2022-08-05 LAB
ALBUMIN SERPL-MCNC: 3.5 G/DL (ref 3.4–5)
ALP SERPL-CCNC: 65 U/L (ref 40–150)
ALT SERPL W P-5'-P-CCNC: 38 U/L (ref 0–50)
ANION GAP SERPL CALCULATED.3IONS-SCNC: 8 MMOL/L (ref 3–14)
AST SERPL W P-5'-P-CCNC: 31 U/L (ref 0–45)
BASOPHILS # BLD AUTO: 0.1 10E3/UL (ref 0–0.2)
BASOPHILS NFR BLD AUTO: 1 %
BILIRUB SERPL-MCNC: 0.4 MG/DL (ref 0.2–1.3)
BUN SERPL-MCNC: 14 MG/DL (ref 7–30)
CALCIUM SERPL-MCNC: 10.6 MG/DL (ref 8.5–10.1)
CHLORIDE BLD-SCNC: 100 MMOL/L (ref 94–109)
CO2 SERPL-SCNC: 30 MMOL/L (ref 20–32)
CREAT SERPL-MCNC: 0.97 MG/DL (ref 0.52–1.04)
EOSINOPHIL # BLD AUTO: 0.1 10E3/UL (ref 0–0.7)
EOSINOPHIL NFR BLD AUTO: 1 %
ERYTHROCYTE [DISTWIDTH] IN BLOOD BY AUTOMATED COUNT: 13.5 % (ref 10–15)
GFR SERPL CREATININE-BSD FRML MDRD: 64 ML/MIN/1.73M2
GLUCOSE BLD-MCNC: 151 MG/DL (ref 70–99)
HCT VFR BLD AUTO: 37.7 % (ref 35–47)
HGB BLD-MCNC: 13.3 G/DL (ref 11.7–15.7)
IMM GRANULOCYTES # BLD: 0 10E3/UL
IMM GRANULOCYTES NFR BLD: 0 %
LYMPHOCYTES # BLD AUTO: 2.1 10E3/UL (ref 0.8–5.3)
LYMPHOCYTES NFR BLD AUTO: 43 %
MCH RBC QN AUTO: 35.1 PG (ref 26.5–33)
MCHC RBC AUTO-ENTMCNC: 35.3 G/DL (ref 31.5–36.5)
MCV RBC AUTO: 100 FL (ref 78–100)
MONOCYTES # BLD AUTO: 0.5 10E3/UL (ref 0–1.3)
MONOCYTES NFR BLD AUTO: 9 %
NEUTROPHILS # BLD AUTO: 2.3 10E3/UL (ref 1.6–8.3)
NEUTROPHILS NFR BLD AUTO: 46 %
NRBC # BLD AUTO: 0 10E3/UL
NRBC BLD AUTO-RTO: 0 /100
PLATELET # BLD AUTO: 186 10E3/UL (ref 150–450)
POTASSIUM BLD-SCNC: 3.6 MMOL/L (ref 3.4–5.3)
PROT SERPL-MCNC: 8.1 G/DL (ref 6.8–8.8)
RBC # BLD AUTO: 3.79 10E6/UL (ref 3.8–5.2)
SODIUM SERPL-SCNC: 138 MMOL/L (ref 133–144)
WBC # BLD AUTO: 5 10E3/UL (ref 4–11)

## 2022-08-05 PROCEDURE — 99207 PR NO CHARGE LOS: CPT

## 2022-08-05 PROCEDURE — 86300 IMMUNOASSAY TUMOR CA 15-3: CPT | Performed by: NURSE PRACTITIONER

## 2022-08-05 PROCEDURE — 96365 THER/PROPH/DIAG IV INF INIT: CPT | Performed by: NURSE PRACTITIONER

## 2022-08-05 PROCEDURE — 99207 PR NO CHARGE NURSE ONLY: CPT

## 2022-08-05 PROCEDURE — 99215 OFFICE O/P EST HI 40 MIN: CPT | Mod: 25 | Performed by: NURSE PRACTITIONER

## 2022-08-05 PROCEDURE — 80053 COMPREHEN METABOLIC PANEL: CPT | Performed by: NURSE PRACTITIONER

## 2022-08-05 PROCEDURE — 85025 COMPLETE CBC W/AUTO DIFF WBC: CPT | Performed by: NURSE PRACTITIONER

## 2022-08-05 RX ORDER — POTASSIUM CHLORIDE 1.5 G/1.58G
60 POWDER, FOR SOLUTION ORAL DAILY
Qty: 180 PACKET | Refills: 0 | Status: SHIPPED | OUTPATIENT
Start: 2022-08-05 | End: 2022-11-11

## 2022-08-05 RX ORDER — HEPARIN SODIUM (PORCINE) LOCK FLUSH IV SOLN 100 UNIT/ML 100 UNIT/ML
5 SOLUTION INTRAVENOUS
Status: DISCONTINUED | OUTPATIENT
Start: 2022-08-05 | End: 2022-08-05 | Stop reason: HOSPADM

## 2022-08-05 RX ORDER — ZOLEDRONIC ACID 0.04 MG/ML
4 INJECTION, SOLUTION INTRAVENOUS ONCE
Status: CANCELLED | OUTPATIENT
Start: 2022-11-04 | End: 2022-11-04

## 2022-08-05 RX ORDER — HEPARIN SODIUM (PORCINE) LOCK FLUSH IV SOLN 100 UNIT/ML 100 UNIT/ML
500 SOLUTION INTRAVENOUS DAILY PRN
Status: DISCONTINUED | OUTPATIENT
Start: 2022-08-05 | End: 2022-08-05 | Stop reason: HOSPADM

## 2022-08-05 RX ORDER — ZOLEDRONIC ACID 0.04 MG/ML
4 INJECTION, SOLUTION INTRAVENOUS ONCE
Status: COMPLETED | OUTPATIENT
Start: 2022-08-05 | End: 2022-08-05

## 2022-08-05 RX ORDER — HEPARIN SODIUM,PORCINE 10 UNIT/ML
5 VIAL (ML) INTRAVENOUS
Status: CANCELLED | OUTPATIENT
Start: 2022-11-04

## 2022-08-05 RX ORDER — ANASTROZOLE 1 MG/1
1 TABLET ORAL DAILY
Qty: 90 TABLET | Refills: 3 | Status: SHIPPED | OUTPATIENT
Start: 2022-08-05 | End: 2022-11-16

## 2022-08-05 RX ORDER — HEPARIN SODIUM (PORCINE) LOCK FLUSH IV SOLN 100 UNIT/ML 100 UNIT/ML
5 SOLUTION INTRAVENOUS
Status: CANCELLED | OUTPATIENT
Start: 2022-11-04

## 2022-08-05 RX ORDER — HEPARIN SODIUM,PORCINE 10 UNIT/ML
5 VIAL (ML) INTRAVENOUS
Status: CANCELLED | OUTPATIENT
Start: 2022-08-05

## 2022-08-05 RX ORDER — LIDOCAINE/PRILOCAINE 2.5 %-2.5%
CREAM (GRAM) TOPICAL
Qty: 30 G | Refills: 1 | Status: SHIPPED | OUTPATIENT
Start: 2022-08-05 | End: 2023-10-31

## 2022-08-05 RX ORDER — HEPARIN SODIUM (PORCINE) LOCK FLUSH IV SOLN 100 UNIT/ML 100 UNIT/ML
5 SOLUTION INTRAVENOUS
Status: CANCELLED | OUTPATIENT
Start: 2022-08-05

## 2022-08-05 RX ORDER — ZOLEDRONIC ACID 0.04 MG/ML
4 INJECTION, SOLUTION INTRAVENOUS ONCE
Status: CANCELLED | OUTPATIENT
Start: 2022-08-05 | End: 2022-08-05

## 2022-08-05 RX ADMIN — HEPARIN SODIUM (PORCINE) LOCK FLUSH IV SOLN 100 UNIT/ML 500 UNITS: 100 SOLUTION at 13:44

## 2022-08-05 RX ADMIN — HEPARIN SODIUM (PORCINE) LOCK FLUSH IV SOLN 100 UNIT/ML 5 ML: 100 SOLUTION at 15:26

## 2022-08-05 RX ADMIN — Medication 250 ML: at 15:03

## 2022-08-05 RX ADMIN — ZOLEDRONIC ACID 4 MG: 0.04 INJECTION, SOLUTION INTRAVENOUS at 15:03

## 2022-08-05 ASSESSMENT — PAIN SCALES - GENERAL: PAINLEVEL: NO PAIN (0)

## 2022-08-05 NOTE — PROGRESS NOTES
Infusion Nursing Note:  Mary Olson presents today for every 3 month Zometa.    Patient seen by provider today: Yes: Margaret   present during visit today: Not Applicable.    Note: Pt with no new concerns today and has been tolerating zometa infusions uneventfully.    Patient denies dental issues at this time.  States last dental exam was within 6 months.  Patient will inform dentist that she got Zometa infusion, prior to any invasive dental work in the future.  Reminded patient to drink 6-8 glasses of water today, and tomorrow, to protect kidneys.     Intravenous Access:  Implanted Port.    Treatment Conditions:  Lab Results   Component Value Date     08/05/2022    POTASSIUM 3.6 08/05/2022    CR 0.97 08/05/2022    BUFFY 10.6 (H) 08/05/2022    BILITOTAL 0.4 08/05/2022    ALBUMIN 3.5 08/05/2022    ALT 38 08/05/2022    AST 31 08/05/2022     Results reviewed, labs MET treatment parameters, ok to proceed with treatment.    Post Infusion Assessment:  Patient tolerated infusion without incident.  Blood return noted pre and post infusion.  Site patent and intact, free from redness, edema or discomfort.  No evidence of extravasations.  Access discontinued per protocol.     Discharge Plan:   Discharge instructions reviewed with: Patient.  Patient and/or family verbalized understanding of discharge instructions and all questions answered.  AVS to patient via [employer].  Patient will return in 3 months for next appointment.   Patient discharged in stable condition accompanied by: self.  Departure Mode: Ambulatory.      Yazmin Peng RN

## 2022-08-05 NOTE — PROGRESS NOTES
Port accessed using 20 G 3/4 inch needle.  Labs collected from port.  Line flushed with NS and Heparin.  Pt tolerated procedure.  Port left accessed for infusion.    Lucila Rocha RN-BSN, PHN, OCN  [facility]

## 2022-08-08 LAB — CANCER AG15-3 SERPL-ACNC: 56 U/ML

## 2022-08-28 ENCOUNTER — HEALTH MAINTENANCE LETTER (OUTPATIENT)
Age: 67
End: 2022-08-28

## 2022-08-31 DIAGNOSIS — C50.812 MALIGNANT NEOPLASM OF OVERLAPPING SITES OF LEFT BREAST IN FEMALE, ESTROGEN RECEPTOR POSITIVE (H): Primary | ICD-10-CM

## 2022-08-31 DIAGNOSIS — Z17.0 MALIGNANT NEOPLASM OF OVERLAPPING SITES OF LEFT BREAST IN FEMALE, ESTROGEN RECEPTOR POSITIVE (H): Primary | ICD-10-CM

## 2022-09-11 DIAGNOSIS — I10 BENIGN ESSENTIAL HYPERTENSION: ICD-10-CM

## 2022-09-12 RX ORDER — CHLORTHALIDONE 25 MG/1
TABLET ORAL
Qty: 90 TABLET | Refills: 3 | OUTPATIENT
Start: 2022-09-12

## 2022-09-28 DIAGNOSIS — C50.812 MALIGNANT NEOPLASM OF OVERLAPPING SITES OF LEFT BREAST IN FEMALE, ESTROGEN RECEPTOR POSITIVE (H): ICD-10-CM

## 2022-09-28 DIAGNOSIS — C50.812 MALIGNANT NEOPLASM OF OVERLAPPING SITES OF LEFT BREAST IN FEMALE, ESTROGEN RECEPTOR POSITIVE (H): Primary | ICD-10-CM

## 2022-09-28 DIAGNOSIS — Z17.0 MALIGNANT NEOPLASM OF OVERLAPPING SITES OF LEFT BREAST IN FEMALE, ESTROGEN RECEPTOR POSITIVE (H): ICD-10-CM

## 2022-09-28 DIAGNOSIS — Z17.0 MALIGNANT NEOPLASM OF OVERLAPPING SITES OF LEFT BREAST IN FEMALE, ESTROGEN RECEPTOR POSITIVE (H): Primary | ICD-10-CM

## 2022-10-04 DIAGNOSIS — I10 BENIGN ESSENTIAL HYPERTENSION: ICD-10-CM

## 2022-10-05 RX ORDER — AMLODIPINE BESYLATE 10 MG/1
TABLET ORAL
Qty: 90 TABLET | Refills: 3 | Status: SHIPPED | OUTPATIENT
Start: 2022-10-05 | End: 2023-10-02

## 2022-10-25 DIAGNOSIS — Z17.0 MALIGNANT NEOPLASM OF OVERLAPPING SITES OF LEFT BREAST IN FEMALE, ESTROGEN RECEPTOR POSITIVE (H): Primary | ICD-10-CM

## 2022-10-25 DIAGNOSIS — C50.812 MALIGNANT NEOPLASM OF OVERLAPPING SITES OF LEFT BREAST IN FEMALE, ESTROGEN RECEPTOR POSITIVE (H): Primary | ICD-10-CM

## 2022-11-02 ENCOUNTER — ANCILLARY PROCEDURE (OUTPATIENT)
Dept: CT IMAGING | Facility: CLINIC | Age: 67
End: 2022-11-02
Attending: NURSE PRACTITIONER
Payer: MEDICARE

## 2022-11-02 ENCOUNTER — TELEPHONE (OUTPATIENT)
Dept: ONCOLOGY | Facility: CLINIC | Age: 67
End: 2022-11-02

## 2022-11-02 ENCOUNTER — ANCILLARY PROCEDURE (OUTPATIENT)
Dept: GENERAL RADIOLOGY | Facility: CLINIC | Age: 67
End: 2022-11-02
Payer: MEDICARE

## 2022-11-02 DIAGNOSIS — C50.812 MALIGNANT NEOPLASM OF OVERLAPPING SITES OF LEFT BREAST IN FEMALE, ESTROGEN RECEPTOR POSITIVE (H): ICD-10-CM

## 2022-11-02 DIAGNOSIS — C79.51 BONE METASTASIS: ICD-10-CM

## 2022-11-02 DIAGNOSIS — C50.919 METASTATIC BREAST CANCER: ICD-10-CM

## 2022-11-02 DIAGNOSIS — Z17.0 MALIGNANT NEOPLASM OF OVERLAPPING SITES OF LEFT BREAST IN FEMALE, ESTROGEN RECEPTOR POSITIVE (H): ICD-10-CM

## 2022-11-02 LAB
ALBUMIN SERPL-MCNC: 3.6 G/DL (ref 3.4–5)
ALP SERPL-CCNC: 66 U/L (ref 40–150)
ALT SERPL W P-5'-P-CCNC: 33 U/L (ref 0–50)
ANION GAP SERPL CALCULATED.3IONS-SCNC: 10 MMOL/L (ref 3–14)
AST SERPL W P-5'-P-CCNC: 29 U/L (ref 0–45)
BASOPHILS # BLD AUTO: 0.1 10E3/UL (ref 0–0.2)
BASOPHILS NFR BLD AUTO: 2 %
BILIRUB SERPL-MCNC: 0.5 MG/DL (ref 0.2–1.3)
BUN SERPL-MCNC: 13 MG/DL (ref 7–30)
CALCIUM SERPL-MCNC: 10.3 MG/DL (ref 8.5–10.1)
CANCER AG15-3 SERPL-ACNC: 62 U/ML
CHLORIDE BLD-SCNC: 98 MMOL/L (ref 94–109)
CO2 SERPL-SCNC: 27 MMOL/L (ref 20–32)
CREAT SERPL-MCNC: 0.94 MG/DL (ref 0.52–1.04)
EOSINOPHIL # BLD AUTO: 0.1 10E3/UL (ref 0–0.7)
EOSINOPHIL NFR BLD AUTO: 2 %
ERYTHROCYTE [DISTWIDTH] IN BLOOD BY AUTOMATED COUNT: 13.2 % (ref 10–15)
GFR SERPL CREATININE-BSD FRML MDRD: 67 ML/MIN/1.73M2
GLUCOSE BLD-MCNC: 166 MG/DL (ref 70–99)
HCT VFR BLD AUTO: 38.8 % (ref 35–47)
HGB BLD-MCNC: 13.6 G/DL (ref 11.7–15.7)
IMM GRANULOCYTES # BLD: 0 10E3/UL
IMM GRANULOCYTES NFR BLD: 1 %
LYMPHOCYTES # BLD AUTO: 1.4 10E3/UL (ref 0.8–5.3)
LYMPHOCYTES NFR BLD AUTO: 37 %
MCH RBC QN AUTO: 34.8 PG (ref 26.5–33)
MCHC RBC AUTO-ENTMCNC: 35.1 G/DL (ref 31.5–36.5)
MCV RBC AUTO: 99 FL (ref 78–100)
MONOCYTES # BLD AUTO: 0.3 10E3/UL (ref 0–1.3)
MONOCYTES NFR BLD AUTO: 7 %
NEUTROPHILS # BLD AUTO: 2 10E3/UL (ref 1.6–8.3)
NEUTROPHILS NFR BLD AUTO: 51 %
NRBC # BLD AUTO: 0 10E3/UL
NRBC BLD AUTO-RTO: 0 /100
PLATELET # BLD AUTO: 160 10E3/UL (ref 150–450)
POTASSIUM BLD-SCNC: 3 MMOL/L (ref 3.4–5.3)
PROT SERPL-MCNC: 8.2 G/DL (ref 6.8–8.8)
RBC # BLD AUTO: 3.91 10E6/UL (ref 3.8–5.2)
SODIUM SERPL-SCNC: 135 MMOL/L (ref 133–144)
WBC # BLD AUTO: 3.9 10E3/UL (ref 4–11)

## 2022-11-02 PROCEDURE — 86300 IMMUNOASSAY TUMOR CA 15-3: CPT

## 2022-11-02 PROCEDURE — 36591 DRAW BLOOD OFF VENOUS DEVICE: CPT

## 2022-11-02 PROCEDURE — 74177 CT ABD & PELVIS W/CONTRAST: CPT | Mod: MG | Performed by: RADIOLOGY

## 2022-11-02 PROCEDURE — 71260 CT THORAX DX C+: CPT | Mod: MG | Performed by: RADIOLOGY

## 2022-11-02 PROCEDURE — G1010 CDSM STANSON: HCPCS | Performed by: RADIOLOGY

## 2022-11-02 PROCEDURE — 80053 COMPREHEN METABOLIC PANEL: CPT

## 2022-11-02 PROCEDURE — 85025 COMPLETE CBC W/AUTO DIFF WBC: CPT

## 2022-11-02 RX ORDER — IOPAMIDOL 755 MG/ML
131 INJECTION, SOLUTION INTRAVASCULAR ONCE
Status: COMPLETED | OUTPATIENT
Start: 2022-11-02 | End: 2022-11-02

## 2022-11-02 RX ADMIN — IOPAMIDOL 131 ML: 755 INJECTION, SOLUTION INTRAVASCULAR at 08:39

## 2022-11-02 NOTE — TELEPHONE ENCOUNTER
Free Drug Application Initiated  Medication: Verzenio  Sponsor: [employer]  Phone #: [phone]  Fax #: [phone]  Additional Information: see below                            [facility] to [facility]

## 2022-11-07 NOTE — TELEPHONE ENCOUNTER
Spoke to Mere at [facility]. Application has been denied due to income on application. phone is [phone] opt 4  Income has to be under $91,550 and patient said it was $117,375    Call and check with Desmond if income changed from last year

## 2022-11-08 NOTE — TELEPHONE ENCOUNTER
Talked with Desmond, the income was reported correct. So we can't send in income documents to correct it. Desmond africas will [employer] ph: [phone] so will check with them on price   Called and spoke to Angela at [facility] and was given a price quote of $11,153.39 it give them this amount but we have to send the script to see what the real cost is.

## 2022-11-11 ENCOUNTER — ONCOLOGY VISIT (OUTPATIENT)
Dept: ONCOLOGY | Facility: CLINIC | Age: 67
End: 2022-11-11
Attending: NURSE PRACTITIONER
Payer: MEDICARE

## 2022-11-11 ENCOUNTER — LAB (OUTPATIENT)
Dept: ONCOLOGY | Facility: CLINIC | Age: 67
End: 2022-11-11
Payer: MEDICARE

## 2022-11-11 ENCOUNTER — INFUSION THERAPY VISIT (OUTPATIENT)
Dept: INFUSION THERAPY | Facility: CLINIC | Age: 67
End: 2022-11-11
Payer: MEDICARE

## 2022-11-11 VITALS
RESPIRATION RATE: 16 BRPM | DIASTOLIC BLOOD PRESSURE: 87 MMHG | OXYGEN SATURATION: 97 % | WEIGHT: 208.7 LBS | HEIGHT: 62 IN | HEART RATE: 99 BPM | BODY MASS INDEX: 38.4 KG/M2 | SYSTOLIC BLOOD PRESSURE: 159 MMHG | TEMPERATURE: 98.1 F

## 2022-11-11 DIAGNOSIS — Z17.0 MALIGNANT NEOPLASM OF OVERLAPPING SITES OF LEFT BREAST IN FEMALE, ESTROGEN RECEPTOR POSITIVE (H): Primary | ICD-10-CM

## 2022-11-11 DIAGNOSIS — E87.6 HYPOKALEMIA: ICD-10-CM

## 2022-11-11 DIAGNOSIS — C50.812 MALIGNANT NEOPLASM OF OVERLAPPING SITES OF LEFT BREAST IN FEMALE, ESTROGEN RECEPTOR POSITIVE (H): Primary | ICD-10-CM

## 2022-11-11 DIAGNOSIS — Z17.0 MALIGNANT NEOPLASM OF OVERLAPPING SITES OF LEFT BREAST IN FEMALE, ESTROGEN RECEPTOR POSITIVE (H): ICD-10-CM

## 2022-11-11 DIAGNOSIS — G62.9 NEUROPATHY: ICD-10-CM

## 2022-11-11 DIAGNOSIS — C50.812 MALIGNANT NEOPLASM OF OVERLAPPING SITES OF LEFT BREAST IN FEMALE, ESTROGEN RECEPTOR POSITIVE (H): ICD-10-CM

## 2022-11-11 DIAGNOSIS — C79.51 BONE METASTASIS: Primary | ICD-10-CM

## 2022-11-11 DIAGNOSIS — I10 BENIGN ESSENTIAL HYPERTENSION: ICD-10-CM

## 2022-11-11 DIAGNOSIS — C79.51 BONE METASTASIS: ICD-10-CM

## 2022-11-11 LAB
ALBUMIN SERPL-MCNC: 3.4 G/DL (ref 3.4–5)
CALCIUM SERPL-MCNC: 9.1 MG/DL (ref 8.5–10.1)
CREAT SERPL-MCNC: 0.92 MG/DL (ref 0.52–1.04)
GFR SERPL CREATININE-BSD FRML MDRD: 68 ML/MIN/1.73M2

## 2022-11-11 PROCEDURE — 90662 IIV NO PRSV INCREASED AG IM: CPT | Performed by: INTERNAL MEDICINE

## 2022-11-11 PROCEDURE — 82565 ASSAY OF CREATININE: CPT | Performed by: NURSE PRACTITIONER

## 2022-11-11 PROCEDURE — 91312 PR COVID VAC PFIZER BIVAL 30 MCG/0.3 ML IM: CPT | Performed by: INTERNAL MEDICINE

## 2022-11-11 PROCEDURE — 99207 PR NO CHARGE NURSE ONLY: CPT

## 2022-11-11 PROCEDURE — 99214 OFFICE O/P EST MOD 30 MIN: CPT | Mod: 25 | Performed by: INTERNAL MEDICINE

## 2022-11-11 PROCEDURE — 99207 PR NO CHARGE LOS: CPT

## 2022-11-11 PROCEDURE — 82040 ASSAY OF SERUM ALBUMIN: CPT | Performed by: NURSE PRACTITIONER

## 2022-11-11 PROCEDURE — 0124A PR ADMIN COVID VAC PFIZER 12+ BIVAL ADDITIONAL: CPT | Performed by: INTERNAL MEDICINE

## 2022-11-11 PROCEDURE — 82310 ASSAY OF CALCIUM: CPT | Performed by: NURSE PRACTITIONER

## 2022-11-11 PROCEDURE — G0008 ADMIN INFLUENZA VIRUS VAC: HCPCS | Performed by: INTERNAL MEDICINE

## 2022-11-11 PROCEDURE — 96365 THER/PROPH/DIAG IV INF INIT: CPT | Performed by: INTERNAL MEDICINE

## 2022-11-11 RX ORDER — GABAPENTIN 250 MG/5ML
SOLUTION ORAL
Qty: 470 ML | Refills: 6 | Status: SHIPPED | OUTPATIENT
Start: 2022-11-11 | End: 2023-12-26

## 2022-11-11 RX ORDER — HEPARIN SODIUM (PORCINE) LOCK FLUSH IV SOLN 100 UNIT/ML 100 UNIT/ML
500 SOLUTION INTRAVENOUS ONCE
Status: COMPLETED | OUTPATIENT
Start: 2022-11-11 | End: 2022-11-11

## 2022-11-11 RX ORDER — HEPARIN SODIUM (PORCINE) LOCK FLUSH IV SOLN 100 UNIT/ML 100 UNIT/ML
5 SOLUTION INTRAVENOUS
Status: DISCONTINUED | OUTPATIENT
Start: 2022-11-11 | End: 2022-11-11 | Stop reason: HOSPADM

## 2022-11-11 RX ORDER — ZOLEDRONIC ACID 0.04 MG/ML
4 INJECTION, SOLUTION INTRAVENOUS ONCE
Status: COMPLETED | OUTPATIENT
Start: 2022-11-11 | End: 2022-11-11

## 2022-11-11 RX ORDER — POTASSIUM CHLORIDE 1.5 G/1.58G
60 POWDER, FOR SOLUTION ORAL DAILY
Qty: 180 PACKET | Refills: 0 | Status: SHIPPED | OUTPATIENT
Start: 2022-11-11 | End: 2023-02-10

## 2022-11-11 RX ADMIN — HEPARIN SODIUM (PORCINE) LOCK FLUSH IV SOLN 100 UNIT/ML 5 ML: 100 SOLUTION at 09:26

## 2022-11-11 RX ADMIN — ZOLEDRONIC ACID 4 MG: 0.04 INJECTION, SOLUTION INTRAVENOUS at 09:00

## 2022-11-11 RX ADMIN — Medication 250 ML: at 09:00

## 2022-11-11 RX ADMIN — HEPARIN SODIUM (PORCINE) LOCK FLUSH IV SOLN 100 UNIT/ML 500 UNITS: 100 SOLUTION at 08:00

## 2022-11-11 ASSESSMENT — PAIN SCALES - GENERAL: PAINLEVEL: NO PAIN (0)

## 2022-11-11 NOTE — LETTER
11/11/2022         RE: Mary Olson  [address]  [place] [address]        Dear Colleague,    Thank you for referring your patient, Mary Olson, to the [facility]. Please see a copy of my visit note below.     [facility] ONCOLOGY  11/11/22    ONCOLOGY DIAGNOSIS:  1. March 2020: Metastatic Breast Cancer ER+, [place]+ Her2 Negative; with bone metastasis and a left breast primary. PI K3 CA mutation of note it is a pathologic variant which is outside the mutation of specific variants enrolled in the solar one trial.  TEJAS, TMB low     THERAPY TO DATE:  1. March 2020 to August 2020: Weekly Taxol.  2. August 2020 to March 2021: Anastrozole   3. 3/19/21 began abemeciclib with arimidex she has been dose reduced on her abemaciclib 200 mg a day   4. zometa q 3 months     INTERVAL HISTORY:  The patient is a 67-year-old female diagnosed with metastatic breast cancer in 03/2020 after noticing a lesion on her breast.  She was orginially treated by Dr Birch and changed to me February 2021    She started her abemaciclib on 3/14/2021     Desmond is here for routine follow-up. Her issues today are on reauthorization of her verzenio. She has been getting drug through the drug company and is having some issues with her reauthorization of the program she would like to speak with the pharmacy team today. She has otherwise been well and without complaints.    PAST MEDICAL HISTORY:   1.  Metastatic breast cancer, see above.   2.  Hypertension.   3.  Steroid-induced diabetes.  According to the patient, since she has been off steroids, she was told that her blood sugars has normalized and she is off medication.   4.  Chemotherapy-induced neuropathy     FAMILY HISTORY:  Mother had breast cancer at 62; underwent lumpectomy and radiation, and arimidex  No new family history since last seen.     SOCIAL HISTORY:   to her , Dieter.    No current tobacco use.  Retired.  ALLERGIES:   LISINOPRIL.      PHYSICAL EXAM:  Physical Exam  Vitals reviewed.   Constitutional:       Appearance: Normal appearance. She is normal weight.   HENT:      Head: Normocephalic and atraumatic.   Eyes:      Extraocular Movements: Extraocular movements intact.      Conjunctiva/sclera: Conjunctivae normal.      Pupils: Pupils are equal, round, and reactive to light.   Pulmonary:      Effort: Pulmonary effort is normal.   Neurological:      General: No focal deficit present.      Mental Status: She is alert and oriented to person, place, and time. Mental status is at baseline.   Psychiatric:         Mood and Affect: Mood normal.         Behavior: Behavior normal.         Thought Content: Thought content normal.         Judgment: Judgment normal.         Labs    Recent Labs   Lab Test 11/02/22  0832 08/05/22  1343 05/11/22  0831 04/06/22  0824 03/09/22  0922    138 136 135 138   POTASSIUM 3.0* 3.6 3.4 3.8 3.7   CHLORIDE 98 100 101 100 103   CO2 27 30 27 28 27   ANIONGAP 10 8 8 7 8   BUN 13 14 12 12 13   CR 0.94 0.97 0.91 0.91 0.89   * 151* 142* 151* 116*   BUFFY 10.3* 10.6* 9.8  9.8 10.0 9.4     No results for input(s): MAG, PHOS in the last 43614 hours.  Recent Labs   Lab Test 11/02/22  0832 08/05/22  1343 05/11/22  0831 04/06/22  0824 03/09/22  0922   WBC 3.9* 5.0 4.6 4.5 3.8*   HGB 13.6 13.3 13.3 13.6 13.6    186 179 189 187   MCV 99 100 97 97 98   NEUTROPHIL 51 46 51 47 42     Recent Labs   Lab Test 11/02/22  0832 08/05/22  1343 05/11/22  0831   BILITOTAL 0.5 0.4 0.6   ALKPHOS 66 65 58   ALT 33 38 37   AST 29 31 25   ALBUMIN 3.6 3.5 3.5     TSH   Date Value Ref Range Status   10/28/2015 1.23 0.40 - 4.00 mU/L Final     No results for input(s): CEA in the last 61718 hours.  Results for orders placed or performed in visit on 11/02/22   CT Chest/Abdomen/Pelvis w Contrast    Narrative    EXAM: CT CHEST/ABDOMEN/PELVIS W CONTRAST  LOCATION: [facility]  DATE/TIME: 11/2/2022 8:56  AM    INDICATION: metastatic breast cancer on treatment, interval imaging  COMPARISON: 05/04/2022   TECHNIQUE: CT scan of the chest, abdomen, and pelvis was performed following injection of IV contrast. Multiplanar reformats were obtained. Dose reduction techniques were used.   CONTRAST: 131 ml isovue 370    FINDINGS:   LUNGS AND PLEURA: Normal.    MEDIASTINUM/AXILLAE: Chest wall Port-A-Cath. An 18 mm x 15 mm left axillary lymph node was previously 10 mm x 14 mm.     CORONARY ARTERY CALCIFICATION: None.    HEPATOBILIARY: Normal.    PANCREAS: Normal.    SPLEEN: Normal.    ADRENAL GLANDS: Normal.    KIDNEYS/BLADDER: A simple left renal cyst does not require follow-up.     BOWEL: Normal.    LYMPH NODES: Normal.    VASCULATURE: Unremarkable.    PELVIC ORGANS: Normal.    MUSCULOSKELETAL:  A left breast mass is minimally smaller. An adjacent 7 mm x 5 mm lymph node was previously 12 mm x 7 mm. Skin thickening is again seen. Numerous sclerotic bone lesions have not changed.       Impression    IMPRESSION:  1.  A left breast mass is minimally smaller.  2.  A left axillary lymph node is larger than on the prior study.  3.  Bone metastases again seen.          ASSESSMENT AND PLAN:   1.  Metastatic ER positive [place] positive HER-2/gabriela negative breast cancer with a left breast primary and bone metastasis.    Plan    1.  She continues to do well and will continue her anastrozole and Verzenio  2.   labs to q 3 months  3.  She should see me or the NP every  3 months  4. CT was stable without progression and we will re do in 6 months.  5. zometa q 3 months  6. Flu vaccine today    Tiffany Hilton MD on 11/12/2022 at 12:20 PM                        Again, thank you for allowing me to participate in the care of your patient.        Sincerely,        Tiffany Hilton MD

## 2022-11-11 NOTE — NURSING NOTE
"Oncology Rooming Note    November 11, 2022 8:26 AM   Mary Olson is a 67 year old female who presents for:    Chief Complaint   Patient presents with     Oncology Clinic Visit     Follow up- results     Initial Vitals: BP (!) 159/87 (BP Location: Left arm)   Pulse 99   Temp 98.1  F (36.7  C) (Oral)   Resp 16   Ht 1.575 m (5' 2.01\")   Wt 94.7 kg (208 lb 11.2 oz)   SpO2 97%   BMI 38.16 kg/m   Estimated body mass index is 38.16 kg/m  as calculated from the following:    Height as of this encounter: 1.575 m (5' 2.01\").    Weight as of this encounter: 94.7 kg (208 lb 11.2 oz). Body surface area is 2.04 meters squared.  No Pain (0) Comment: Data Unavailable   No LMP recorded. Patient is postmenopausal.  Allergies reviewed: Yes  Medications reviewed: Yes    Medications: MEDICATION REFILLS NEEDED TODAY. Provider was notified.  Pharmacy name entered into EPIC:    [facility] - [place], [place] - [address] [place] [address]  [facility] - [place], [place] - [address] [place] [address] PH[facility]ACY [facility] [place] [address] [place] [address] RD.  [facility] [place] - [address] [place] [address] [facility] - [place], [place] - [address]   [facility] - [place], [place] - [address] [facility] [address]    Clinical concerns: results       Kailee Jarvis LPN            "

## 2022-11-11 NOTE — TELEPHONE ENCOUNTER
Spoke to Samm at [facility]. Script is there but not all the way in, gave new insurance info and was told to call back Monday or Tuesday to see what the cost really is.

## 2022-11-11 NOTE — PROGRESS NOTES
Port accessed with no incident. Blood return noted. Port flushed with saline and heparin. Patient tolerated port flush well. Please refer to flow sheets for more information.  Malena Holman RN on 11/11/2022 at 8:11 AM

## 2022-11-11 NOTE — PROGRESS NOTES
[facility] ONCOLOGY  11/11/22    ONCOLOGY DIAGNOSIS:  1. March 2020: Metastatic Breast Cancer ER+, [place]+ Her2 Negative; with bone metastasis and a left breast primary. PI K3 CA mutation of note it is a pathologic variant which is outside the mutation of specific variants enrolled in the solar one trial.  TEJAS, TMB low     THERAPY TO DATE:  1. March 2020 to August 2020: Weekly Taxol.  2. August 2020 to March 2021: Anastrozole   3. 3/19/21 began abemeciclib with arimidex she has been dose reduced on her abemaciclib 200 mg a day   4. zometa q 3 months     INTERVAL HISTORY:  The patient is a 67-year-old female diagnosed with metastatic breast cancer in 03/2020 after noticing a lesion on her breast.  She was orginially treated by Dr Birch and changed to me February 2021    She started her abemaciclib on 3/14/2021     Desmond is here for routine follow-up. Her issues today are on reauthorization of her verzenio. She has been getting drug through the drug company and is having some issues with her reauthorization of the program she would like to speak with the pharmacy team today. She has otherwise been well and without complaints.    PAST MEDICAL HISTORY:   1.  Metastatic breast cancer, see above.   2.  Hypertension.   3.  Steroid-induced diabetes.  According to the patient, since she has been off steroids, she was told that her blood sugars has normalized and she is off medication.   4.  Chemotherapy-induced neuropathy     FAMILY HISTORY:  Mother had breast cancer at 62; underwent lumpectomy and radiation, and arimidex  No new family history since last seen.     SOCIAL HISTORY:   to her , Dieter.    No current tobacco use.  Retired.  ALLERGIES:  LISINOPRIL.      PHYSICAL EXAM:  Physical Exam  Vitals reviewed.   Constitutional:       Appearance: Normal appearance. She is normal weight.   HENT:      Head: Normocephalic and atraumatic.   Eyes:      Extraocular Movements: Extraocular movements intact.       Conjunctiva/sclera: Conjunctivae normal.      Pupils: Pupils are equal, round, and reactive to light.   Pulmonary:      Effort: Pulmonary effort is normal.   Neurological:      General: No focal deficit present.      Mental Status: She is alert and oriented to person, place, and time. Mental status is at baseline.   Psychiatric:         Mood and Affect: Mood normal.         Behavior: Behavior normal.         Thought Content: Thought content normal.         Judgment: Judgment normal.         Labs    Recent Labs   Lab Test 11/02/22  0832 08/05/22  1343 05/11/22  0831 04/06/22  0824 03/09/22  0922    138 136 135 138   POTASSIUM 3.0* 3.6 3.4 3.8 3.7   CHLORIDE 98 100 101 100 103   CO2 27 30 27 28 27   ANIONGAP 10 8 8 7 8   BUN 13 14 12 12 13   CR 0.94 0.97 0.91 0.91 0.89   * 151* 142* 151* 116*   BUFFY 10.3* 10.6* 9.8  9.8 10.0 9.4     No results for input(s): MAG, PHOS in the last 07108 hours.  Recent Labs   Lab Test 11/02/22  0832 08/05/22  1343 05/11/22  0831 04/06/22  0824 03/09/22  0922   WBC 3.9* 5.0 4.6 4.5 3.8*   HGB 13.6 13.3 13.3 13.6 13.6    186 179 189 187   MCV 99 100 97 97 98   NEUTROPHIL 51 46 51 47 42     Recent Labs   Lab Test 11/02/22  0832 08/05/22  1343 05/11/22  0831   BILITOTAL 0.5 0.4 0.6   ALKPHOS 66 65 58   ALT 33 38 37   AST 29 31 25   ALBUMIN 3.6 3.5 3.5     TSH   Date Value Ref Range Status   10/28/2015 1.23 0.40 - 4.00 mU/L Final     No results for input(s): CEA in the last 22128 hours.  Results for orders placed or performed in visit on 11/02/22   CT Chest/Abdomen/Pelvis w Contrast    Narrative    EXAM: CT CHEST/ABDOMEN/PELVIS W CONTRAST  LOCATION: [facility]  DATE/TIME: 11/2/2022 8:56 AM    INDICATION: metastatic breast cancer on treatment, interval imaging  COMPARISON: 05/04/2022   TECHNIQUE: CT scan of the chest, abdomen, and pelvis was performed following injection of IV contrast. Multiplanar reformats were obtained. Dose reduction techniques  were used.   CONTRAST: 131 ml isovue 370    FINDINGS:   LUNGS AND PLEURA: Normal.    MEDIASTINUM/AXILLAE: Chest wall Port-A-Cath. An 18 mm x 15 mm left axillary lymph node was previously 10 mm x 14 mm.     CORONARY ARTERY CALCIFICATION: None.    HEPATOBILIARY: Normal.    PANCREAS: Normal.    SPLEEN: Normal.    ADRENAL GLANDS: Normal.    KIDNEYS/BLADDER: A simple left renal cyst does not require follow-up.     BOWEL: Normal.    LYMPH NODES: Normal.    VASCULATURE: Unremarkable.    PELVIC ORGANS: Normal.    MUSCULOSKELETAL:  A left breast mass is minimally smaller. An adjacent 7 mm x 5 mm lymph node was previously 12 mm x 7 mm. Skin thickening is again seen. Numerous sclerotic bone lesions have not changed.       Impression    IMPRESSION:  1.  A left breast mass is minimally smaller.  2.  A left axillary lymph node is larger than on the prior study.  3.  Bone metastases again seen.          ASSESSMENT AND PLAN:   1.  Metastatic ER positive [place] positive HER-2/gabriela negative breast cancer with a left breast primary and bone metastasis.    Plan    1.  She continues to do well and will continue her anastrozole and Verzenio  2.   labs to q 3 months  3.  She should see me or the NP every  3 months  4. CT was stable without progression and we will re do in 6 months.  5. zometa q 3 months  6. Flu vaccine today    Tiffany Hilton MD on 11/12/2022 at 12:20 PM

## 2022-11-11 NOTE — PROGRESS NOTES
Infusion Nursing Note:  Mary Olson presents today for Zometa.    Patient seen by provider today: Yes: Dr. Hilton   present during visit today: Not Applicable.    Note: N/A.    Intravenous Access:  Implanted Port.    Treatment Conditions:  Lab Results   Component Value Date     11/02/2022    POTASSIUM 3.0 (L) 11/02/2022    CR 0.92 11/11/2022    BUFFY 9.1 11/11/2022    BILITOTAL 0.5 11/02/2022    ALBUMIN 3.4 11/11/2022    ALT 33 11/02/2022    AST 29 11/02/2022     Results reviewed, labs MET treatment parameters, ok to proceed with treatment.    Post Infusion Assessment:  Patient tolerated infusion without incident.  Blood return noted pre and post infusion.  Site patent and intact, free from redness, edema or discomfort.  No evidence of extravasations.  Access discontinued per protocol.     Discharge Plan:   Patient will return in 3 months for next appointment.   Patient was directed to scheduling to make next appointments  Patient discharged in stable condition accompanied by: self.  Departure Mode: Ambulatory.    Lucila Rocha RN-BSN, PHN, OCN  [facility]

## 2022-11-11 NOTE — TELEPHONE ENCOUNTER
Asked pharmacy to get a script ready for [facility] to see what copay will be, and than go from there.

## 2022-11-14 ENCOUNTER — TELEPHONE (OUTPATIENT)
Dept: ONCOLOGY | Facility: CLINIC | Age: 67
End: 2022-11-14

## 2022-11-14 NOTE — TELEPHONE ENCOUNTER
Prior Authorization Approval    Authorization Effective Date: 10/15/2022  Authorization Expiration Date: 11/13/2025  Medication: Verzenio 100mg, [place] approved  Approved Dose/Quantity: 56/28 days  Reference #: [id number]   Insurance Company: [employer] - Phone [phone] Fax [phone]  Expected CoPay:     $1,652.57  CoPay Card Available: No    Foundation Assistance Needed:    Which Pharmacy is filling the prescription (Not needed for infusion/clinic administered): [address]  Pharmacy Notified: Yes  Patient Notified: Yes

## 2022-11-14 NOTE — TELEPHONE ENCOUNTER
PA Initiation    Medication: Verzenio 100mg, PA pending   Ref.#[id number]  Insurance Company: [employer] - Phone [phone] Fax [phone]  Pharmacy Filling the Rx: [facility] [address]  Filling Pharmacy Phone:    Filling Pharmacy Fax:    Start Date: 11/14/2022

## 2022-11-15 NOTE — TELEPHONE ENCOUNTER
Spoke to Ellie at [facility]. Script is done and ready to send out, but copay is $1,652.57 and there is no in-house help. I will send back a message to Dr. Hilton to see if there is another drug that we could try that might be cheaper.

## 2022-11-16 ENCOUNTER — TELEPHONE (OUTPATIENT)
Dept: ONCOLOGY | Facility: CLINIC | Age: 67
End: 2022-11-16

## 2022-11-16 DIAGNOSIS — Z17.0 MALIGNANT NEOPLASM OF OVERLAPPING SITES OF LEFT BREAST IN FEMALE, ESTROGEN RECEPTOR POSITIVE (H): Primary | ICD-10-CM

## 2022-11-16 DIAGNOSIS — C50.812 MALIGNANT NEOPLASM OF OVERLAPPING SITES OF LEFT BREAST IN FEMALE, ESTROGEN RECEPTOR POSITIVE (H): Primary | ICD-10-CM

## 2022-11-16 NOTE — TELEPHONE ENCOUNTER
PA Initiation    Medication: Ibrance 125mg, pa pending   Insurance Company: [employer] - Phone [phone] Fax [phone]  Pharmacy Filling the Rx: [facility] [place] - [address] [place] [address]  Filling Pharmacy Phone:    Filling Pharmacy Fax:    Start Date: 11/16/2022

## 2022-11-16 NOTE — TELEPHONE ENCOUNTER
Prior Authorization Approval    Authorization Effective Date: 10/17/2022  Authorization Expiration Date: 11/15/2025  Medication: Ibrance 125mg, pa approved  Approved Dose/Quantity: 21/28 days  Reference #: [id number]   Insurance Company: [employer] - Phone [phone] Fax [phone]  Expected CoPay:       CoPay Card Available: No    Foundation Assistance Needed:    Which Pharmacy is filling the prescription (Not needed for infusion/clinic administered): [facility] [address]  Pharmacy Notified: Yes  Patient Notified: Yes

## 2022-11-17 DIAGNOSIS — C50.812 MALIGNANT NEOPLASM OF OVERLAPPING SITES OF LEFT BREAST IN FEMALE, ESTROGEN RECEPTOR POSITIVE (H): Primary | ICD-10-CM

## 2022-11-17 DIAGNOSIS — Z17.0 MALIGNANT NEOPLASM OF OVERLAPPING SITES OF LEFT BREAST IN FEMALE, ESTROGEN RECEPTOR POSITIVE (H): Primary | ICD-10-CM

## 2022-11-17 RX ORDER — PROCHLORPERAZINE MALEATE 10 MG
5 TABLET ORAL EVERY 6 HOURS PRN
Qty: 30 TABLET | Refills: 2 | Status: SHIPPED | OUTPATIENT
Start: 2022-11-17 | End: 2022-11-29

## 2022-11-17 NOTE — TELEPHONE ENCOUNTER
Spoke to Emily at [facility], canceled out Verzenio script so we can see what copay will be on Ibrance

## 2022-11-21 DIAGNOSIS — Z17.0 MALIGNANT NEOPLASM OF OVERLAPPING SITES OF LEFT BREAST IN FEMALE, ESTROGEN RECEPTOR POSITIVE (H): ICD-10-CM

## 2022-11-21 DIAGNOSIS — C50.812 MALIGNANT NEOPLASM OF OVERLAPPING SITES OF LEFT BREAST IN FEMALE, ESTROGEN RECEPTOR POSITIVE (H): ICD-10-CM

## 2022-11-21 NOTE — TELEPHONE ENCOUNTER
Talked to Viviane at [facility] and was told that Ibrance is out of network for them and sent the RX out to [facility] in [place] to be filled. Sent message to get a new script sent over to [email or web address]  [place] ph: [phone]

## 2022-11-23 DIAGNOSIS — Z17.0 MALIGNANT NEOPLASM OF OVERLAPPING SITES OF LEFT BREAST IN FEMALE, ESTROGEN RECEPTOR POSITIVE (H): ICD-10-CM

## 2022-11-23 DIAGNOSIS — C50.812 MALIGNANT NEOPLASM OF OVERLAPPING SITES OF LEFT BREAST IN FEMALE, ESTROGEN RECEPTOR POSITIVE (H): ICD-10-CM

## 2022-11-25 NOTE — TELEPHONE ENCOUNTER
Application is all filled out but still waiting for Desmond to call back with income to send over

## 2022-11-25 NOTE — TELEPHONE ENCOUNTER
Spoke to Tina at [facility] since [facility] said they couldn't fill and sent back to [facility]. Copay is $ 1652.57 per month. Will check with patient and Dr. Hilton to see what steps are needed. There is a free drug option if income falls in their guidelines.     Free Drug Application Initiated  Medication: Ibrance 125mg  Sponsor: [employer]  Phone #: [phone] 8a-8p EST  Fax #: [phone]  Additional Information: application filled out and sent over for Dr. Hilton to sign/date and will need to get a hold of Desmond to get tax return to send in with this

## 2022-11-28 NOTE — TELEPHONE ENCOUNTER
Desmond called back and said she would like to stay on Verzenio and not try to Ibrance since she is doing so well on Verzenio.     Will send message to the team.

## 2022-11-29 DIAGNOSIS — C50.812 MALIGNANT NEOPLASM OF OVERLAPPING SITES OF LEFT BREAST IN FEMALE, ESTROGEN RECEPTOR POSITIVE (H): Primary | ICD-10-CM

## 2022-11-29 DIAGNOSIS — Z17.0 MALIGNANT NEOPLASM OF OVERLAPPING SITES OF LEFT BREAST IN FEMALE, ESTROGEN RECEPTOR POSITIVE (H): Primary | ICD-10-CM

## 2022-12-06 DIAGNOSIS — C50.812 MALIGNANT NEOPLASM OF OVERLAPPING SITES OF LEFT BREAST IN FEMALE, ESTROGEN RECEPTOR POSITIVE (H): Primary | ICD-10-CM

## 2022-12-06 DIAGNOSIS — Z17.0 MALIGNANT NEOPLASM OF OVERLAPPING SITES OF LEFT BREAST IN FEMALE, ESTROGEN RECEPTOR POSITIVE (H): Primary | ICD-10-CM

## 2022-12-06 RX ORDER — ANASTROZOLE 1 MG/1
1 TABLET ORAL DAILY
Qty: 28 TABLET | Refills: 11 | Status: SHIPPED | OUTPATIENT
Start: 2022-12-06 | End: 2023-10-03

## 2022-12-13 DIAGNOSIS — Z17.0 MALIGNANT NEOPLASM OF OVERLAPPING SITES OF LEFT BREAST IN FEMALE, ESTROGEN RECEPTOR POSITIVE (H): Primary | ICD-10-CM

## 2022-12-13 DIAGNOSIS — C50.812 MALIGNANT NEOPLASM OF OVERLAPPING SITES OF LEFT BREAST IN FEMALE, ESTROGEN RECEPTOR POSITIVE (H): Primary | ICD-10-CM

## 2022-12-19 DIAGNOSIS — C50.812 MALIGNANT NEOPLASM OF OVERLAPPING SITES OF LEFT BREAST IN FEMALE, ESTROGEN RECEPTOR POSITIVE (H): Primary | ICD-10-CM

## 2022-12-19 DIAGNOSIS — Z17.0 MALIGNANT NEOPLASM OF OVERLAPPING SITES OF LEFT BREAST IN FEMALE, ESTROGEN RECEPTOR POSITIVE (H): Primary | ICD-10-CM

## 2022-12-27 RX ORDER — ABEMACICLIB 100 MG/1
TABLET ORAL
Refills: 0 | OUTPATIENT
Start: 2022-12-27

## 2023-01-06 DIAGNOSIS — Z17.0 MALIGNANT NEOPLASM OF OVERLAPPING SITES OF LEFT BREAST IN FEMALE, ESTROGEN RECEPTOR POSITIVE (H): Primary | ICD-10-CM

## 2023-01-06 DIAGNOSIS — C50.812 MALIGNANT NEOPLASM OF OVERLAPPING SITES OF LEFT BREAST IN FEMALE, ESTROGEN RECEPTOR POSITIVE (H): Primary | ICD-10-CM

## 2023-01-08 ENCOUNTER — HEALTH MAINTENANCE LETTER (OUTPATIENT)
Age: 68
End: 2023-01-08

## 2023-01-18 NOTE — PROGRESS NOTES
"Mary Olson is a 64 year old female who is being evaluated via a billable telephone visit.      The patient has been notified of following:     \"This telephone visit will be conducted via a call between you and your physician/provider. We have found that certain health care needs can be provided without the need for a physical exam.  This service lets us provide the care you need with a short phone conversation.  If a prescription is necessary we can send it directly to your pharmacy.  If lab work is needed we can place an order for that and you can then stop by our lab to have the test done at a later time.    If during the course of the call the physician/provider feels a telephone visit is not appropriate, you will not be charged for this service.\"     Patient has given verbal consent for Telephone visit?  Yes    Mary Olson complains of    Chief Complaint   Patient presents with     Oncology Clinic Visit     Prior ot treatment on 4/1       I have reviewed and updated the patient's Past Medical History, Social History, Family History and Medication List.    ALLERGIES  Lisinopril    Kailee Jarvis LPN on 3/31/2020 at 8:50 AM      Additional provider notes:   ONCOLOGY DIAGNOSIS:  1. March 2020: Metastatic Breast Cancer    THERAPY TO DATE:  1. March 2020 to Present: Weekly Taxol.    INTERVAL HISTORY:  64 yof diagnosed with metastatic breast cancer in March 2020 after noticing lesion on her breast. Telephone visit with patient today to review PET scans and toxicity assessment. Day 5 after treatment had diarrhea. Took ativan and compazine. Did not try imodium. Otherwise tolerated therapy. Walking outside and hoping to do low impact [place] today. Denies fevers/chilss/nausea/emesis. No chest pain/SOB.    PAST MEDICAL HISTORY:   1.  Bilateral breast cancer, see above.   2.  Hypertension.      FAMILY HISTORY:  Mother had breast cancer at 62, underwent lumpectomy and radiation and then had \"pills.\"      SOCIAL " HISTORY:  , comes in with her , Dieter today.  Has a son and daughter, 2 children. No tobacco use. 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    Labs and Imaging reviewed in Epic.  Assessment/Plan:  1. Metastatic Breast Cancer. Reviewed PET results with patient. Explained she has disease to the bone which makes her disease stage IV/metastatic. Based on her large primary tumor would continue with taxol to maximize response to primary tumor. Will reassess with PET after completing 12 weeks.  2. Port,. Functioning.  3. Nausea. No issues last week. But patient was taking ativan/compazine thinking this was for diarrhea.  4. Diarrhea. Explained ativan and compazine is for nausea. Recommend trial of imodium for diarrhea.  5. Bone Mets. Started Zometa.  5. COVID-19 Precautions.  Discussed the importance of good hand washing techniques, avoiding crowds, social distancing.    Telephone Visit: 8:54 AM to 9:08 AM    Addendum: 4/1/2020: Patient seen in person. Asked by RN to see patient in person for redness at port site. Patient states the redness started after our telephone visit. Not itchy, painful. Slight warmth. Denies fevers/chills.  Exam: Erythema over port site. Slight warmth.     A/P:   1. Cellulitis versus port infection. Patient given prescription for keflex. Area of erythema demarcated by ink. Advised patient to continue to observe. If erythema worsens or develops new or concerning symptoms is to contact the clinic. Telephone visit in one week. Proceed with treatment. Use peripheral access. Blood cultures from peripheral sites.    Keysha Birch MD     Additional Debulk Details (Optional): 1.3 x 1.3

## 2023-02-03 DIAGNOSIS — C50.812 MALIGNANT NEOPLASM OF OVERLAPPING SITES OF LEFT BREAST IN FEMALE, ESTROGEN RECEPTOR POSITIVE (H): Primary | ICD-10-CM

## 2023-02-03 DIAGNOSIS — Z17.0 MALIGNANT NEOPLASM OF OVERLAPPING SITES OF LEFT BREAST IN FEMALE, ESTROGEN RECEPTOR POSITIVE (H): Primary | ICD-10-CM

## 2023-02-06 DIAGNOSIS — Z17.0 MALIGNANT NEOPLASM OF OVERLAPPING SITES OF LEFT BREAST IN FEMALE, ESTROGEN RECEPTOR POSITIVE (H): Primary | ICD-10-CM

## 2023-02-06 DIAGNOSIS — C50.812 MALIGNANT NEOPLASM OF OVERLAPPING SITES OF LEFT BREAST IN FEMALE, ESTROGEN RECEPTOR POSITIVE (H): Primary | ICD-10-CM

## 2023-02-10 ENCOUNTER — ONCOLOGY VISIT (OUTPATIENT)
Dept: ONCOLOGY | Facility: CLINIC | Age: 68
End: 2023-02-10
Attending: INTERNAL MEDICINE
Payer: MEDICARE

## 2023-02-10 ENCOUNTER — LAB (OUTPATIENT)
Dept: ONCOLOGY | Facility: CLINIC | Age: 68
End: 2023-02-10
Payer: MEDICARE

## 2023-02-10 ENCOUNTER — INFUSION THERAPY VISIT (OUTPATIENT)
Dept: INFUSION THERAPY | Facility: CLINIC | Age: 68
End: 2023-02-10
Payer: MEDICARE

## 2023-02-10 VITALS — SYSTOLIC BLOOD PRESSURE: 157 MMHG | DIASTOLIC BLOOD PRESSURE: 89 MMHG

## 2023-02-10 VITALS
DIASTOLIC BLOOD PRESSURE: 105 MMHG | WEIGHT: 215.9 LBS | BODY MASS INDEX: 39.48 KG/M2 | OXYGEN SATURATION: 96 % | RESPIRATION RATE: 18 BRPM | SYSTOLIC BLOOD PRESSURE: 184 MMHG | HEART RATE: 114 BPM | TEMPERATURE: 98 F

## 2023-02-10 DIAGNOSIS — C50.812 MALIGNANT NEOPLASM OF OVERLAPPING SITES OF LEFT BREAST IN FEMALE, ESTROGEN RECEPTOR POSITIVE (H): ICD-10-CM

## 2023-02-10 DIAGNOSIS — I10 BENIGN ESSENTIAL HYPERTENSION: ICD-10-CM

## 2023-02-10 DIAGNOSIS — C79.51 BONE METASTASIS: Primary | ICD-10-CM

## 2023-02-10 DIAGNOSIS — Z17.0 MALIGNANT NEOPLASM OF OVERLAPPING SITES OF LEFT BREAST IN FEMALE, ESTROGEN RECEPTOR POSITIVE (H): Primary | ICD-10-CM

## 2023-02-10 DIAGNOSIS — Z17.0 MALIGNANT NEOPLASM OF OVERLAPPING SITES OF LEFT BREAST IN FEMALE, ESTROGEN RECEPTOR POSITIVE (H): ICD-10-CM

## 2023-02-10 DIAGNOSIS — C79.51 BONE METASTASIS: ICD-10-CM

## 2023-02-10 DIAGNOSIS — C50.812 MALIGNANT NEOPLASM OF OVERLAPPING SITES OF LEFT BREAST IN FEMALE, ESTROGEN RECEPTOR POSITIVE (H): Primary | ICD-10-CM

## 2023-02-10 DIAGNOSIS — E87.6 HYPOKALEMIA: ICD-10-CM

## 2023-02-10 LAB
ALBUMIN SERPL-MCNC: 3.8 G/DL (ref 3.4–5)
ALP SERPL-CCNC: 58 U/L (ref 40–150)
ALT SERPL W P-5'-P-CCNC: 35 U/L (ref 0–50)
ANION GAP SERPL CALCULATED.3IONS-SCNC: 7 MMOL/L (ref 3–14)
AST SERPL W P-5'-P-CCNC: 28 U/L (ref 0–45)
BASOPHILS # BLD AUTO: 0.1 10E3/UL (ref 0–0.2)
BASOPHILS NFR BLD AUTO: 2 %
BILIRUB SERPL-MCNC: 0.6 MG/DL (ref 0.2–1.3)
BUN SERPL-MCNC: 12 MG/DL (ref 7–30)
CALCIUM SERPL-MCNC: 10.1 MG/DL (ref 8.5–10.1)
CHLORIDE BLD-SCNC: 103 MMOL/L (ref 94–109)
CO2 SERPL-SCNC: 29 MMOL/L (ref 20–32)
CREAT SERPL-MCNC: 0.95 MG/DL (ref 0.52–1.04)
EOSINOPHIL # BLD AUTO: 0 10E3/UL (ref 0–0.7)
EOSINOPHIL NFR BLD AUTO: 1 %
ERYTHROCYTE [DISTWIDTH] IN BLOOD BY AUTOMATED COUNT: 12.9 % (ref 10–15)
GFR SERPL CREATININE-BSD FRML MDRD: 66 ML/MIN/1.73M2
GLUCOSE BLD-MCNC: 108 MG/DL (ref 70–99)
HCT VFR BLD AUTO: 40 % (ref 35–47)
HGB BLD-MCNC: 13.8 G/DL (ref 11.7–15.7)
IMM GRANULOCYTES # BLD: 0 10E3/UL
IMM GRANULOCYTES NFR BLD: 0 %
LYMPHOCYTES # BLD AUTO: 1.4 10E3/UL (ref 0.8–5.3)
LYMPHOCYTES NFR BLD AUTO: 34 %
MCH RBC QN AUTO: 34.2 PG (ref 26.5–33)
MCHC RBC AUTO-ENTMCNC: 34.5 G/DL (ref 31.5–36.5)
MCV RBC AUTO: 99 FL (ref 78–100)
MONOCYTES # BLD AUTO: 0.4 10E3/UL (ref 0–1.3)
MONOCYTES NFR BLD AUTO: 10 %
NEUTROPHILS # BLD AUTO: 2.1 10E3/UL (ref 1.6–8.3)
NEUTROPHILS NFR BLD AUTO: 53 %
NRBC # BLD AUTO: 0 10E3/UL
NRBC BLD AUTO-RTO: 0 /100
PLATELET # BLD AUTO: 190 10E3/UL (ref 150–450)
POTASSIUM BLD-SCNC: 3.3 MMOL/L (ref 3.4–5.3)
PROT SERPL-MCNC: 8.1 G/DL (ref 6.8–8.8)
RBC # BLD AUTO: 4.03 10E6/UL (ref 3.8–5.2)
SODIUM SERPL-SCNC: 139 MMOL/L (ref 133–144)
WBC # BLD AUTO: 4 10E3/UL (ref 4–11)

## 2023-02-10 PROCEDURE — 80053 COMPREHEN METABOLIC PANEL: CPT | Performed by: INTERNAL MEDICINE

## 2023-02-10 PROCEDURE — 99207 PR NO CHARGE LOS: CPT

## 2023-02-10 PROCEDURE — 96365 THER/PROPH/DIAG IV INF INIT: CPT | Performed by: NURSE PRACTITIONER

## 2023-02-10 PROCEDURE — 99214 OFFICE O/P EST MOD 30 MIN: CPT | Performed by: INTERNAL MEDICINE

## 2023-02-10 PROCEDURE — 85025 COMPLETE CBC W/AUTO DIFF WBC: CPT | Performed by: INTERNAL MEDICINE

## 2023-02-10 RX ORDER — HEPARIN SODIUM,PORCINE 10 UNIT/ML
5 VIAL (ML) INTRAVENOUS
Status: CANCELLED | OUTPATIENT
Start: 2023-02-10

## 2023-02-10 RX ORDER — ANASTROZOLE 1 MG/1
1 TABLET ORAL DAILY
Qty: 90 TABLET | Refills: 3 | Status: SHIPPED | OUTPATIENT
Start: 2023-02-10 | End: 2023-10-31

## 2023-02-10 RX ORDER — HEPARIN SODIUM (PORCINE) LOCK FLUSH IV SOLN 100 UNIT/ML 100 UNIT/ML
5 SOLUTION INTRAVENOUS
Status: DISCONTINUED | OUTPATIENT
Start: 2023-02-10 | End: 2023-02-10 | Stop reason: HOSPADM

## 2023-02-10 RX ORDER — HEPARIN SODIUM (PORCINE) LOCK FLUSH IV SOLN 100 UNIT/ML 100 UNIT/ML
5 SOLUTION INTRAVENOUS
Status: CANCELLED | OUTPATIENT
Start: 2023-02-10

## 2023-02-10 RX ORDER — ZOLEDRONIC ACID 0.04 MG/ML
4 INJECTION, SOLUTION INTRAVENOUS ONCE
Status: CANCELLED | OUTPATIENT
Start: 2023-02-10 | End: 2023-02-10

## 2023-02-10 RX ORDER — HEPARIN SODIUM (PORCINE) LOCK FLUSH IV SOLN 100 UNIT/ML 100 UNIT/ML
500 SOLUTION INTRAVENOUS
Status: DISCONTINUED | OUTPATIENT
Start: 2023-02-10 | End: 2023-02-10 | Stop reason: HOSPADM

## 2023-02-10 RX ORDER — ZOLEDRONIC ACID 0.04 MG/ML
4 INJECTION, SOLUTION INTRAVENOUS ONCE
Status: COMPLETED | OUTPATIENT
Start: 2023-02-10 | End: 2023-02-10

## 2023-02-10 RX ORDER — POTASSIUM CHLORIDE 1.5 G/1.58G
60 POWDER, FOR SOLUTION ORAL DAILY
Qty: 270 PACKET | Refills: 3 | Status: SHIPPED | OUTPATIENT
Start: 2023-02-10 | End: 2023-11-07

## 2023-02-10 RX ADMIN — HEPARIN SODIUM (PORCINE) LOCK FLUSH IV SOLN 100 UNIT/ML 5 ML: 100 SOLUTION at 16:23

## 2023-02-10 RX ADMIN — ZOLEDRONIC ACID 4 MG: 0.04 INJECTION, SOLUTION INTRAVENOUS at 16:02

## 2023-02-10 RX ADMIN — Medication 250 ML: at 16:02

## 2023-02-10 NOTE — LETTER
2/10/2023         RE: Mary Olson  [address]  [place] [address]        Dear Colleague,    Thank you for referring your patient, Mary Olson, to the [facility]. Please see a copy of my visit note below.     [facility] ONCOLOGY  02/10/23      ONCOLOGY DIAGNOSIS:  1. March 2020: Metastatic Breast Cancer ER+, ID+ Her2 Negative; with bone metastasis and a left breast primary. PI K3 CA mutation of note it is a pathologic variant which is outside the mutation of specific variants enrolled in the solar one trial.  TEJAS, TMB low     THERAPY TO DATE:  1. March 2020 to August 2020: Weekly Taxol.  2. August 2020 to March 2021: Anastrozole   3. 3/19/21 began abemeciclib with arimidex she has been dose reduced on her abemaciclib 200 mg a day   4. zometa q 3 months     INTERVAL HISTORY:  The patient is a 67-year-old female diagnosed with metastatic breast cancer in 03/2020 after noticing a lesion on her breast.  She was orginially treated by Dr Birch and changed to me February 2021    She started her abemaciclib on 3/14/2021     Desmond is here for routine follow-up. She continues to do well She does not have any new symptoms. No pains. She has occasional diarrhea which is well controlled with prn imodium. She needs some refills today of her arimidex, and potassium.     PAST MEDICAL HISTORY:   1.  Metastatic breast cancer, see above.   2.  Hypertension.   3.  Steroid-induced diabetes.  According to the patient, since she has been off steroids, she was told that her blood sugars has normalized and she is off medication.   4.  Chemotherapy-induced neuropathy     FAMILY HISTORY:  Mother had breast cancer at 62; underwent lumpectomy and radiation, and arimidex  No new family history since last seen.     SOCIAL HISTORY:   to her , Dieter.    No current tobacco use.  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     ALLERGIES:  LISINOPRIL.      PHYSICAL  EXAM:  Physical Exam  Vitals reviewed.   Constitutional:       Appearance: Normal appearance.   HENT:      Head: Normocephalic and atraumatic.   Eyes:      Extraocular Movements: Extraocular movements intact.      Pupils: Pupils are equal, round, and reactive to light.   Cardiovascular:      Rate and Rhythm: Normal rate and regular rhythm.   Pulmonary:      Effort: Pulmonary effort is normal.      Breath sounds: Normal breath sounds.   Abdominal:      General: Abdomen is flat.      Palpations: Abdomen is soft.   Musculoskeletal:      Right lower leg: No edema.      Left lower leg: No edema.   Neurological:      General: No focal deficit present.      Mental Status: She is alert and oriented to person, place, and time. Mental status is at baseline.   Psychiatric:         Mood and Affect: Mood normal.         Behavior: Behavior normal.         Thought Content: Thought content normal.         Judgment: Judgment normal.       Labs    Recent Labs   Lab Test 11/11/22  0759 11/02/22  0832 08/05/22  1343 05/11/22  0831 04/06/22  0824 03/09/22  0922   NA  --  135 138 136 135 138   POTASSIUM  --  3.0* 3.6 3.4 3.8 3.7   CHLORIDE  --  98 100 101 100 103   CO2  --  27 30 27 28 27   ANIONGAP  --  10 8 8 7 8   BUN  --  13 14 12 12 13   CR 0.92 0.94 0.97 0.91 0.91 0.89   GLC  --  166* 151* 142* 151* 116*   BUFFY 9.1 10.3* 10.6* 9.8  9.8 10.0 9.4     No results for input(s): MAG, PHOS in the last 66349 hours.  Recent Labs   Lab Test 11/02/22  0832 08/05/22  1343 05/11/22  0831 04/06/22  0824 03/09/22  0922   WBC 3.9* 5.0 4.6 4.5 3.8*   HGB 13.6 13.3 13.3 13.6 13.6    186 179 189 187   MCV 99 100 97 97 98   NEUTROPHIL 51 46 51 47 42     Recent Labs   Lab Test 11/11/22  0759 11/02/22  0832 08/05/22  1343 05/11/22  0831   BILITOTAL  --  0.5 0.4 0.6   ALKPHOS  --  66 65 58   ALT  --  33 38 37   AST  --  29 31 25   ALBUMIN 3.4 3.6 3.5 3.5     TSH   Date Value Ref Range Status   10/28/2015 1.23 0.40 - 4.00 mU/L Final     No results  for input(s): CEA in the last 94109 hours.  Results for orders placed or performed in visit on 11/02/22   CT Chest/Abdomen/Pelvis w Contrast    Narrative    EXAM: CT CHEST/ABDOMEN/PELVIS W CONTRAST  LOCATION: [facility]  DATE/TIME: 11/2/2022 8:56 AM    INDICATION: metastatic breast cancer on treatment, interval imaging  COMPARISON: 05/04/2022   TECHNIQUE: CT scan of the chest, abdomen, and pelvis was performed following injection of IV contrast. Multiplanar reformats were obtained. Dose reduction techniques were used.   CONTRAST: 131 ml isovue 370    FINDINGS:   LUNGS AND PLEURA: Normal.    MEDIASTINUM/AXILLAE: Chest wall Port-A-Cath. An 18 mm x 15 mm left axillary lymph node was previously 10 mm x 14 mm.     CORONARY ARTERY CALCIFICATION: None.    HEPATOBILIARY: Normal.    PANCREAS: Normal.    SPLEEN: Normal.    ADRENAL GLANDS: Normal.    KIDNEYS/BLADDER: A simple left renal cyst does not require follow-up.     BOWEL: Normal.    LYMPH NODES: Normal.    VASCULATURE: Unremarkable.    PELVIC ORGANS: Normal.    MUSCULOSKELETAL:  A left breast mass is minimally smaller. An adjacent 7 mm x 5 mm lymph node was previously 12 mm x 7 mm. Skin thickening is again seen. Numerous sclerotic bone lesions have not changed.       Impression    IMPRESSION:  1.  A left breast mass is minimally smaller.  2.  A left axillary lymph node is larger than on the prior study.  3.  Bone metastases again seen.        ASSESSMENT AND PLAN:   1.  Metastatic ER positive SD positive HER-2/gabriela negative breast cancer with a left breast primary and bone metastasis.    Plan    1.  She continues to do well and will continue her anastrozole and Verzenio  2.   labs to q 3 months  3.  Labs pending from today  4, zometa today and q 3 months  5. CT prior to return in 3 months.  6. Refilled kcl and changed arimidex to 90 day supply    Tiffany Hilton MD on 2/10/2023 at 3:07 PM                          Again, thank you for  allowing me to participate in the care of your patient.        Sincerely,        Tiffany Hilton MD

## 2023-02-10 NOTE — PROGRESS NOTES
Infusion Nursing Note:  Mary Olson presents today for Zometa.    Patient seen by provider today: Yes: Dr. Hilton   present during visit today: Not Applicable.    Note: Patient denies new dental concerns. Reports taking calcium and vitamin D at home. BP elevated for appointment with Dr. Hilton-recheck in infusion also elevated but improved. See flowsheets.    Potassium level 3.3 today-patient refused pills and we did not have time for IV replacement during infusion appointment. She takes 60 mEq of the powder at home daily. An ZALDIVAR reached out to care coordinator for Terrie Garcia RN and she stated she would follow up with the patient about how much potassium to take at home.     Intravenous Access:  Implanted Port.    Treatment Conditions:  Lab Results   Component Value Date     02/10/2023    POTASSIUM 3.3 (L) 02/10/2023    CR 0.95 02/10/2023    BUFFY 10.1 02/10/2023    BILITOTAL 0.6 02/10/2023    ALBUMIN 3.8 02/10/2023    ALT 35 02/10/2023    AST 28 02/10/2023     Results reviewed, labs MET treatment parameters, ok to proceed with treatment.    Post Infusion Assessment:  Patient tolerated infusion without incident.  Blood return noted pre and post infusion.  Site patent and intact, free from redness, edema or discomfort.  No evidence of extravasations.  Access discontinued per protocol.     Discharge Plan:   Future appts have been reviewed and crosschecked with appt note and plan.  AVS to patient via [employer].  Patient will return in 3 months for next appointment. Patient states she will stop at scheduling on the way out.  Patient discharged in stable condition accompanied by: self.  Departure Mode: Ambulatory.      Giana Zapata RN

## 2023-02-10 NOTE — NURSING NOTE
"Oncology Rooming Note    February 10, 2023 2:49 PM   Mary Olson is a 67 year old female who presents for:    Chief Complaint   Patient presents with     Oncology Clinic Visit     Bone metastasis (H)-3 month follow up     Initial Vitals: BP (!) 184/105 (BP Location: Left arm, Patient Position: Sitting, Cuff Size: Adult Large)   Resp 18   Wt 97.9 kg (215 lb 14.4 oz)   BMI 39.48 kg/m   Estimated body mass index is 39.48 kg/m  as calculated from the following:    Height as of 11/11/22: 1.575 m (5' 2.01\").    Weight as of this encounter: 97.9 kg (215 lb 14.4 oz). Body surface area is 2.07 meters squared.  Data Unavailable Comment: Data Unavailable   No LMP recorded. Patient is postmenopausal.  Allergies reviewed: Yes  Medications reviewed: Yes    Medications: Medication refills not needed today.  Pharmacy name entered into EPIC:    [facility] - [place], [place] - [address] [place] [address]  [facility] - [place], [place] - [address] [place] [address] PH[facility]Y [place] - [place], [place] - [address] [facility].  [facility] - [place], [place] - [address] [facility] - [place], [place] - [address] [facility] - [place], [place] - [address] [facility] [address]  [facility] - [place], [place] - [address] AT [place] [address]    Clinical concerns: Patient anxious in clinic /[id number] and infusion RN aware.       Maria E Flores LPN February 10, 2023 2:50 PM              "

## 2023-02-10 NOTE — PROGRESS NOTES
"Patient's name and  were verified.  See Doc Flowsheet - IV assess for details.  IVAD accessed with 20G 3/\" carroll gripper plus needle  blood return positive: YES  Site without redness, tenderness or swelling: YES  flushed with 20cc NS and 5cc 100u/ml heparin  Needle: left accessed for infusion appointment  Comments: Patient's port accessed using sterile procedure. Blood return noted. Lab tubes drawn, labeled and sent to lab. Patient tolerated lab draw well.     Giana Zapata RN, BSN  "

## 2023-02-10 NOTE — PROGRESS NOTES
[facility] ONCOLOGY  02/10/23      ONCOLOGY DIAGNOSIS:  1. March 2020: Metastatic Breast Cancer ER+, AR+ Her2 Negative; with bone metastasis and a left breast primary. PI K3 CA mutation of note it is a pathologic variant which is outside the mutation of specific variants enrolled in the solar one trial.  TEJAS, TMB low     THERAPY TO DATE:  1. March 2020 to August 2020: Weekly Taxol.  2. August 2020 to March 2021: Anastrozole   3. 3/19/21 began abemeciclib with arimidex she has been dose reduced on her abemaciclib 200 mg a day   4. zometa q 3 months     INTERVAL HISTORY:  The patient is a 67-year-old female diagnosed with metastatic breast cancer in 03/2020 after noticing a lesion on her breast.  She was orginially treated by Dr Birch and changed to me February 2021    She started her abemaciclib on 3/14/2021     Desmond is here for routine follow-up. She continues to do well She does not have any new symptoms. No pains. She has occasional diarrhea which is well controlled with prn imodium. She needs some refills today of her arimidex, and potassium.     PAST MEDICAL HISTORY:   1.  Metastatic breast cancer, see above.   2.  Hypertension.   3.  Steroid-induced diabetes.  According to the patient, since she has been off steroids, she was told that her blood sugars has normalized and she is off medication.   4.  Chemotherapy-induced neuropathy     FAMILY HISTORY:  Mother had breast cancer at 62; underwent lumpectomy and radiation, and arimidex  No new family history since last seen.     SOCIAL HISTORY:   to her , Dieter.    No current tobacco use.  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     ALLERGIES:  LISINOPRIL.      PHYSICAL EXAM:  Physical Exam  Vitals reviewed.   Constitutional:       Appearance: Normal appearance.   HENT:      Head: Normocephalic and atraumatic.   Eyes:      Extraocular Movements: Extraocular movements intact.      Pupils: Pupils are equal, round, and reactive to light.    Cardiovascular:      Rate and Rhythm: Normal rate and regular rhythm.   Pulmonary:      Effort: Pulmonary effort is normal.      Breath sounds: Normal breath sounds.   Abdominal:      General: Abdomen is flat.      Palpations: Abdomen is soft.   Musculoskeletal:      Right lower leg: No edema.      Left lower leg: No edema.   Neurological:      General: No focal deficit present.      Mental Status: She is alert and oriented to person, place, and time. Mental status is at baseline.   Psychiatric:         Mood and Affect: Mood normal.         Behavior: Behavior normal.         Thought Content: Thought content normal.         Judgment: Judgment normal.       Labs    Recent Labs   Lab Test 11/11/22  0759 11/02/22  0832 08/05/22  1343 05/11/22  0831 04/06/22  0824 03/09/22  0922   NA  --  135 138 136 135 138   POTASSIUM  --  3.0* 3.6 3.4 3.8 3.7   CHLORIDE  --  98 100 101 100 103   CO2  --  27 30 27 28 27   ANIONGAP  --  10 8 8 7 8   BUN  --  13 14 12 12 13   CR 0.92 0.94 0.97 0.91 0.91 0.89   GLC  --  166* 151* 142* 151* 116*   BUFFY 9.1 10.3* 10.6* 9.8  9.8 10.0 9.4     No results for input(s): MAG, PHOS in the last 89531 hours.  Recent Labs   Lab Test 11/02/22  0832 08/05/22  1343 05/11/22  0831 04/06/22  0824 03/09/22  0922   WBC 3.9* 5.0 4.6 4.5 3.8*   HGB 13.6 13.3 13.3 13.6 13.6    186 179 189 187   MCV 99 100 97 97 98   NEUTROPHIL 51 46 51 47 42     Recent Labs   Lab Test 11/11/22  0759 11/02/22  0832 08/05/22  1343 05/11/22  0831   BILITOTAL  --  0.5 0.4 0.6   ALKPHOS  --  66 65 58   ALT  --  33 38 37   AST  --  29 31 25   ALBUMIN 3.4 3.6 3.5 3.5     TSH   Date Value Ref Range Status   10/28/2015 1.23 0.40 - 4.00 mU/L Final     No results for input(s): CEA in the last 75877 hours.  Results for orders placed or performed in visit on 11/02/22   CT Chest/Abdomen/Pelvis w Contrast    Narrative    EXAM: CT CHEST/ABDOMEN/PELVIS W CONTRAST  LOCATION: [facility]  DATE/TIME: 11/2/2022  8:56 AM    INDICATION: metastatic breast cancer on treatment, interval imaging  COMPARISON: 05/04/2022   TECHNIQUE: CT scan of the chest, abdomen, and pelvis was performed following injection of IV contrast. Multiplanar reformats were obtained. Dose reduction techniques were used.   CONTRAST: 131 ml isovue 370    FINDINGS:   LUNGS AND PLEURA: Normal.    MEDIASTINUM/AXILLAE: Chest wall Port-A-Cath. An 18 mm x 15 mm left axillary lymph node was previously 10 mm x 14 mm.     CORONARY ARTERY CALCIFICATION: None.    HEPATOBILIARY: Normal.    PANCREAS: Normal.    SPLEEN: Normal.    ADRENAL GLANDS: Normal.    KIDNEYS/BLADDER: A simple left renal cyst does not require follow-up.     BOWEL: Normal.    LYMPH NODES: Normal.    VASCULATURE: Unremarkable.    PELVIC ORGANS: Normal.    MUSCULOSKELETAL:  A left breast mass is minimally smaller. An adjacent 7 mm x 5 mm lymph node was previously 12 mm x 7 mm. Skin thickening is again seen. Numerous sclerotic bone lesions have not changed.       Impression    IMPRESSION:  1.  A left breast mass is minimally smaller.  2.  A left axillary lymph node is larger than on the prior study.  3.  Bone metastases again seen.        ASSESSMENT AND PLAN:   1.  Metastatic ER positive AK positive HER-2/gabriela negative breast cancer with a left breast primary and bone metastasis.    Plan    1.  She continues to do well and will continue her anastrozole and Verzenio  2.   labs to q 3 months  3.  Labs pending from today  4, zometa today and q 3 months  5. CT prior to return in 3 months.  6. Refilled kcl and changed arimidex to 90 day supply    Tiffany Hilton MD on 2/10/2023 at 3:07 PM

## 2023-02-28 DIAGNOSIS — C50.812 MALIGNANT NEOPLASM OF OVERLAPPING SITES OF LEFT BREAST IN FEMALE, ESTROGEN RECEPTOR POSITIVE (H): Primary | ICD-10-CM

## 2023-02-28 DIAGNOSIS — Z17.0 MALIGNANT NEOPLASM OF OVERLAPPING SITES OF LEFT BREAST IN FEMALE, ESTROGEN RECEPTOR POSITIVE (H): Primary | ICD-10-CM

## 2023-03-06 DIAGNOSIS — Z17.0 MALIGNANT NEOPLASM OF OVERLAPPING SITES OF LEFT BREAST IN FEMALE, ESTROGEN RECEPTOR POSITIVE (H): Primary | ICD-10-CM

## 2023-03-06 DIAGNOSIS — C50.812 MALIGNANT NEOPLASM OF OVERLAPPING SITES OF LEFT BREAST IN FEMALE, ESTROGEN RECEPTOR POSITIVE (H): Primary | ICD-10-CM

## 2023-03-29 DIAGNOSIS — C50.812 MALIGNANT NEOPLASM OF OVERLAPPING SITES OF LEFT BREAST IN FEMALE, ESTROGEN RECEPTOR POSITIVE (H): Primary | ICD-10-CM

## 2023-03-29 DIAGNOSIS — Z17.0 MALIGNANT NEOPLASM OF OVERLAPPING SITES OF LEFT BREAST IN FEMALE, ESTROGEN RECEPTOR POSITIVE (H): Primary | ICD-10-CM

## 2023-04-03 DIAGNOSIS — Z17.0 MALIGNANT NEOPLASM OF OVERLAPPING SITES OF LEFT BREAST IN FEMALE, ESTROGEN RECEPTOR POSITIVE (H): Primary | ICD-10-CM

## 2023-04-03 DIAGNOSIS — C50.812 MALIGNANT NEOPLASM OF OVERLAPPING SITES OF LEFT BREAST IN FEMALE, ESTROGEN RECEPTOR POSITIVE (H): Primary | ICD-10-CM

## 2023-04-23 ENCOUNTER — HEALTH MAINTENANCE LETTER (OUTPATIENT)
Age: 68
End: 2023-04-23

## 2023-04-25 DIAGNOSIS — Z17.0 MALIGNANT NEOPLASM OF OVERLAPPING SITES OF LEFT BREAST IN FEMALE, ESTROGEN RECEPTOR POSITIVE (H): Primary | ICD-10-CM

## 2023-04-25 DIAGNOSIS — C50.812 MALIGNANT NEOPLASM OF OVERLAPPING SITES OF LEFT BREAST IN FEMALE, ESTROGEN RECEPTOR POSITIVE (H): Primary | ICD-10-CM

## 2023-04-25 DIAGNOSIS — C79.51 MALIGNANT NEOPLASM METASTATIC TO BONE (H): ICD-10-CM

## 2023-04-25 RX ORDER — ZOLEDRONIC ACID 0.04 MG/ML
4 INJECTION, SOLUTION INTRAVENOUS ONCE
Status: CANCELLED | OUTPATIENT
Start: 2023-05-05 | End: 2023-05-05

## 2023-04-25 RX ORDER — HEPARIN SODIUM (PORCINE) LOCK FLUSH IV SOLN 100 UNIT/ML 100 UNIT/ML
5 SOLUTION INTRAVENOUS
Status: CANCELLED | OUTPATIENT
Start: 2023-05-05

## 2023-04-25 RX ORDER — HEPARIN SODIUM,PORCINE 10 UNIT/ML
5 VIAL (ML) INTRAVENOUS
Status: CANCELLED | OUTPATIENT
Start: 2023-05-05

## 2023-05-01 DIAGNOSIS — C50.812 MALIGNANT NEOPLASM OF OVERLAPPING SITES OF LEFT BREAST IN FEMALE, ESTROGEN RECEPTOR POSITIVE (H): Primary | ICD-10-CM

## 2023-05-01 DIAGNOSIS — Z17.0 MALIGNANT NEOPLASM OF OVERLAPPING SITES OF LEFT BREAST IN FEMALE, ESTROGEN RECEPTOR POSITIVE (H): Primary | ICD-10-CM

## 2023-05-09 DIAGNOSIS — I10 BENIGN ESSENTIAL HYPERTENSION: ICD-10-CM

## 2023-05-10 RX ORDER — CHLORTHALIDONE 25 MG/1
TABLET ORAL
Qty: 60 TABLET | Refills: 5 | Status: SHIPPED | OUTPATIENT
Start: 2023-05-10 | End: 2023-11-07

## 2023-05-18 ENCOUNTER — ANCILLARY PROCEDURE (OUTPATIENT)
Dept: GENERAL RADIOLOGY | Facility: CLINIC | Age: 68
End: 2023-05-18
Payer: MEDICARE

## 2023-05-18 ENCOUNTER — ANCILLARY PROCEDURE (OUTPATIENT)
Dept: CT IMAGING | Facility: CLINIC | Age: 68
End: 2023-05-18
Attending: INTERNAL MEDICINE
Payer: MEDICARE

## 2023-05-18 DIAGNOSIS — Z17.0 MALIGNANT NEOPLASM OF OVERLAPPING SITES OF LEFT BREAST IN FEMALE, ESTROGEN RECEPTOR POSITIVE (H): ICD-10-CM

## 2023-05-18 DIAGNOSIS — C50.812 MALIGNANT NEOPLASM OF OVERLAPPING SITES OF LEFT BREAST IN FEMALE, ESTROGEN RECEPTOR POSITIVE (H): ICD-10-CM

## 2023-05-18 LAB
ALBUMIN SERPL-MCNC: 3.7 G/DL (ref 3.4–5)
ALP SERPL-CCNC: 67 U/L (ref 40–150)
ALT SERPL W P-5'-P-CCNC: 45 U/L (ref 0–50)
ANION GAP SERPL CALCULATED.3IONS-SCNC: 8 MMOL/L (ref 3–14)
AST SERPL W P-5'-P-CCNC: 39 U/L (ref 0–45)
BASOPHILS # BLD AUTO: 0.1 10E3/UL (ref 0–0.2)
BASOPHILS NFR BLD AUTO: 2 %
BILIRUB SERPL-MCNC: 0.7 MG/DL (ref 0.2–1.3)
BUN SERPL-MCNC: 18 MG/DL (ref 7–30)
CALCIUM SERPL-MCNC: 9.5 MG/DL (ref 8.5–10.1)
CANCER AG15-3 SERPL-ACNC: 180 U/ML
CHLORIDE BLD-SCNC: 101 MMOL/L (ref 94–109)
CO2 SERPL-SCNC: 29 MMOL/L (ref 20–32)
CREAT SERPL-MCNC: 0.97 MG/DL (ref 0.52–1.04)
EOSINOPHIL # BLD AUTO: 0.1 10E3/UL (ref 0–0.7)
EOSINOPHIL NFR BLD AUTO: 1 %
ERYTHROCYTE [DISTWIDTH] IN BLOOD BY AUTOMATED COUNT: 13.1 % (ref 10–15)
GFR SERPL CREATININE-BSD FRML MDRD: 64 ML/MIN/1.73M2
GLUCOSE BLD-MCNC: 117 MG/DL (ref 70–99)
HCT VFR BLD AUTO: 38.7 % (ref 35–47)
HGB BLD-MCNC: 13.7 G/DL (ref 11.7–15.7)
IMM GRANULOCYTES # BLD: 0 10E3/UL
IMM GRANULOCYTES NFR BLD: 0 %
LYMPHOCYTES # BLD AUTO: 1.7 10E3/UL (ref 0.8–5.3)
LYMPHOCYTES NFR BLD AUTO: 40 %
MCH RBC QN AUTO: 34.8 PG (ref 26.5–33)
MCHC RBC AUTO-ENTMCNC: 35.4 G/DL (ref 31.5–36.5)
MCV RBC AUTO: 98 FL (ref 78–100)
MONOCYTES # BLD AUTO: 0.5 10E3/UL (ref 0–1.3)
MONOCYTES NFR BLD AUTO: 11 %
NEUTROPHILS # BLD AUTO: 1.9 10E3/UL (ref 1.6–8.3)
NEUTROPHILS NFR BLD AUTO: 46 %
NRBC # BLD AUTO: 0 10E3/UL
NRBC BLD AUTO-RTO: 0 /100
PLATELET # BLD AUTO: 194 10E3/UL (ref 150–450)
POTASSIUM BLD-SCNC: 3 MMOL/L (ref 3.4–5.3)
PROT SERPL-MCNC: 8.2 G/DL (ref 6.8–8.8)
RBC # BLD AUTO: 3.94 10E6/UL (ref 3.8–5.2)
SODIUM SERPL-SCNC: 138 MMOL/L (ref 133–144)
WBC # BLD AUTO: 4.2 10E3/UL (ref 4–11)

## 2023-05-18 PROCEDURE — 86300 IMMUNOASSAY TUMOR CA 15-3: CPT

## 2023-05-18 PROCEDURE — 71260 CT THORAX DX C+: CPT | Mod: MG | Performed by: RADIOLOGY

## 2023-05-18 PROCEDURE — 85025 COMPLETE CBC W/AUTO DIFF WBC: CPT

## 2023-05-18 PROCEDURE — 74177 CT ABD & PELVIS W/CONTRAST: CPT | Mod: MG | Performed by: RADIOLOGY

## 2023-05-18 PROCEDURE — G1010 CDSM STANSON: HCPCS | Performed by: RADIOLOGY

## 2023-05-18 PROCEDURE — 80053 COMPREHEN METABOLIC PANEL: CPT

## 2023-05-18 PROCEDURE — 36591 DRAW BLOOD OFF VENOUS DEVICE: CPT

## 2023-05-18 PROCEDURE — 99207 PR NO CHARGE LOS: CPT

## 2023-05-18 RX ORDER — HEPARIN SODIUM (PORCINE) LOCK FLUSH IV SOLN 100 UNIT/ML 100 UNIT/ML
5 SOLUTION INTRAVENOUS ONCE
Status: COMPLETED | OUTPATIENT
Start: 2023-05-18 | End: 2023-05-18

## 2023-05-18 RX ORDER — IOPAMIDOL 755 MG/ML
118 INJECTION, SOLUTION INTRAVASCULAR ONCE
Status: COMPLETED | OUTPATIENT
Start: 2023-05-18 | End: 2023-05-18

## 2023-05-18 RX ADMIN — IOPAMIDOL 118 ML: 755 INJECTION, SOLUTION INTRAVASCULAR at 08:12

## 2023-05-18 RX ADMIN — HEPARIN SODIUM (PORCINE) LOCK FLUSH IV SOLN 100 UNIT/ML 5 ML: 100 SOLUTION at 11:14

## 2023-05-26 ENCOUNTER — INFUSION THERAPY VISIT (OUTPATIENT)
Dept: INFUSION THERAPY | Facility: CLINIC | Age: 68
End: 2023-05-26
Payer: MEDICARE

## 2023-05-26 ENCOUNTER — ONCOLOGY VISIT (OUTPATIENT)
Dept: ONCOLOGY | Facility: CLINIC | Age: 68
End: 2023-05-26
Attending: INTERNAL MEDICINE
Payer: MEDICARE

## 2023-05-26 VITALS
SYSTOLIC BLOOD PRESSURE: 167 MMHG | WEIGHT: 214.9 LBS | RESPIRATION RATE: 18 BRPM | HEIGHT: 62 IN | BODY MASS INDEX: 39.55 KG/M2 | DIASTOLIC BLOOD PRESSURE: 100 MMHG | HEART RATE: 107 BPM | OXYGEN SATURATION: 99 %

## 2023-05-26 DIAGNOSIS — C79.51 MALIGNANT NEOPLASM METASTATIC TO BONE (H): Primary | ICD-10-CM

## 2023-05-26 DIAGNOSIS — Z17.0 MALIGNANT NEOPLASM OF OVERLAPPING SITES OF LEFT BREAST IN FEMALE, ESTROGEN RECEPTOR POSITIVE (H): Primary | ICD-10-CM

## 2023-05-26 DIAGNOSIS — Z17.0 MALIGNANT NEOPLASM OF OVERLAPPING SITES OF LEFT BREAST IN FEMALE, ESTROGEN RECEPTOR POSITIVE (H): ICD-10-CM

## 2023-05-26 DIAGNOSIS — C50.812 MALIGNANT NEOPLASM OF OVERLAPPING SITES OF LEFT BREAST IN FEMALE, ESTROGEN RECEPTOR POSITIVE (H): ICD-10-CM

## 2023-05-26 DIAGNOSIS — C50.812 MALIGNANT NEOPLASM OF OVERLAPPING SITES OF LEFT BREAST IN FEMALE, ESTROGEN RECEPTOR POSITIVE (H): Primary | ICD-10-CM

## 2023-05-26 PROCEDURE — 96365 THER/PROPH/DIAG IV INF INIT: CPT | Performed by: INTERNAL MEDICINE

## 2023-05-26 PROCEDURE — 99214 OFFICE O/P EST MOD 30 MIN: CPT | Mod: 25 | Performed by: INTERNAL MEDICINE

## 2023-05-26 PROCEDURE — 99207 PR NO CHARGE LOS: CPT

## 2023-05-26 RX ORDER — HEPARIN SODIUM (PORCINE) LOCK FLUSH IV SOLN 100 UNIT/ML 100 UNIT/ML
5 SOLUTION INTRAVENOUS
Status: DISCONTINUED | OUTPATIENT
Start: 2023-05-26 | End: 2023-05-26 | Stop reason: HOSPADM

## 2023-05-26 RX ORDER — ZOLEDRONIC ACID 0.04 MG/ML
4 INJECTION, SOLUTION INTRAVENOUS ONCE
Status: COMPLETED | OUTPATIENT
Start: 2023-05-26 | End: 2023-05-26

## 2023-05-26 RX ADMIN — ZOLEDRONIC ACID 4 MG: 0.04 INJECTION, SOLUTION INTRAVENOUS at 08:55

## 2023-05-26 RX ADMIN — HEPARIN SODIUM (PORCINE) LOCK FLUSH IV SOLN 100 UNIT/ML 5 ML: 100 SOLUTION at 09:22

## 2023-05-26 RX ADMIN — Medication 250 ML: at 08:54

## 2023-05-26 ASSESSMENT — PAIN SCALES - GENERAL: PAINLEVEL: NO PAIN (0)

## 2023-05-26 NOTE — PROGRESS NOTES
[facility] ONCOLOGY  05/26/23    ONCOLOGY DIAGNOSIS:  1. March 2020: Metastatic Breast Cancer ER+, [place]+ Her2 Negative; with bone metastasis and a left breast primary. PI K3 CA mutation of note it is a pathologic variant which is outside the mutation of specific variants enrolled in the solar one trial.  TEJAS, TMB low     THERAPY TO DATE:  1. March 2020 to August 2020: Weekly Taxol.  2. August 2020 to March 2021: Anastrozole   3. 3/19/21 began abemeciclib with arimidex she has been dose reduced on her abemaciclib 200 mg a day   4. zometa q 3 months     INTERVAL HISTORY:  The patient is a 67-year-old female diagnosed with metastatic breast cancer in 03/2020 after noticing a lesion on her breast.  She was orginially treated by Dr Birch and changed to me February 2021    She started her abemaciclib on 3/14/2021     Desmond is here for routine follow-up. She is doing well with the arimidex and verzenio. She continues to have some loose stools but it is manageable. She has an inverted left nipple but it is unchanged. No new pains or other complaints.    PAST MEDICAL HISTORY:   1.  Metastatic breast cancer, see above.   2.  Hypertension.   3.  Steroid-induced diabetes.  According to the patient, since she has been off steroids, she was told that her blood sugars has normalized and she is off medication.   4.  Chemotherapy-induced neuropathy     FAMILY HISTORY:  Mother had breast cancer at 62; underwent lumpectomy and radiation, and arimidex  No new family history since last seen.     SOCIAL HISTORY:   to her , Dieter.    No current tobacco use.  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     ALLERGIES:  LISINOPRIL.      PHYSICAL EXAM:  Physical Exam  Vitals reviewed.   Constitutional:       Appearance: Normal appearance.   HENT:      Head: Normocephalic and atraumatic.   Eyes:      Extraocular Movements: Extraocular movements intact.      Pupils: Pupils are equal, round, and reactive to light.    Cardiovascular:      Rate and Rhythm: Normal rate and regular rhythm.   Pulmonary:      Effort: Pulmonary effort is normal.      Breath sounds: Normal breath sounds.   Abdominal:      General: Abdomen is flat.      Palpations: Abdomen is soft.   Musculoskeletal:      Right lower leg: No edema.      Left lower leg: No edema.   Neurological:      General: No focal deficit present.      Mental Status: She is alert and oriented to person, place, and time. Mental status is at baseline.   Psychiatric:         Mood and Affect: Mood normal.         Behavior: Behavior normal.         Thought Content: Thought content normal.         Judgment: Judgment normal.     right breast: no masses no nipple discharge and axilla benign.  Left breast: inverted nipple, clear drainage is noted, mass effect in the breast is noted no open skin lesion. Non tender.     Labs    Recent Labs   Lab Test 05/18/23  0757 02/10/23  1503 11/11/22  0759 11/02/22  0832 08/05/22  1343 05/11/22  0831    139  --  135 138 136   POTASSIUM 3.0* 3.3*  --  3.0* 3.6 3.4   CHLORIDE 101 103  --  98 100 101   CO2 29 29  --  27 30 27   ANIONGAP 8 7  --  10 8 8   BUN 18 12  --  13 14 12   CR 0.97 0.95 0.92 0.94 0.97 0.91   * 108*  --  166* 151* 142*   BUFFY 9.5 10.1 9.1 10.3* 10.6* 9.8  9.8     No results for input(s): MAG, PHOS in the last 52601 hours.  Recent Labs   Lab Test 05/18/23  0757 02/10/23  1503 11/02/22  0832 08/05/22  1343 05/11/22  0831   WBC 4.2 4.0 3.9* 5.0 4.6   HGB 13.7 13.8 13.6 13.3 13.3    190 160 186 179   MCV 98 99 99 100 97   NEUTROPHIL 46 53 51 46 51     Recent Labs   Lab Test 05/18/23  0757 02/10/23  1503 11/11/22  0759 11/02/22  0832   BILITOTAL 0.7 0.6  --  0.5   ALKPHOS 67 58  --  66   ALT 45 35  --  33   AST 39 28  --  29   ALBUMIN 3.7 3.8 3.4 3.6     TSH   Date Value Ref Range Status   10/28/2015 1.23 0.40 - 4.00 mU/L Final     No results for input(s): CEA in the last 06860 hours.  Results for orders placed or  performed in visit on 05/18/23   CT Chest/Abdomen/Pelvis w Contrast    Narrative    Chest abdomen pelvis CT with contrast    INDICATION: Left breast neoplasm.    Contrast: 118 mL intravenous Isovue-370    COMPARISON: 11/2/2022    FINDINGS Arterial phase imaging through the lower chest and upper  abdomen obtained along with standard portal venous phase imaging  through the chest, abdomen and pelvis.    CHEST: No suspicious hyperenhancing arterial phase lesion in the lower  chest.  Detail of the lungs shows no discrete pulmonary nodule of suspicion.  Calcified granuloma measuring 2 mm in the left lower lobe noted. Major  airways are nicely patent. No consolidations.  No pleural or pericardial effusion. The included thyroid appears  unremarkable. There are enhancing lesions at the lateral margin of the  pectoralis minor muscle which measures 20 mm in greatest dimension,  not significantly changed. Another mass slightly more medial in  relation is more conspicuous measuring 12 mm not obviously  identifiable on previous. There is skin thickening and nipple  retraction on the left with spiculated soft tissue and apparent  surgical changes. Please correlate with dedicated  mammography/ultrasound for complete evaluation. Patient was reportedly  originally diagnosed March 2020 and started chemotherapy at that time  and started other hormone therapy August 2020 NB and different  chemotherapy 3/19/2021. Reportedly, there has been no history of  lumpectomy or mastectomy on the left. The retroareolar mass with  spiculation appears grossly unchanged from the November 2022 CT.  No enlarged left axillary nodes currently otherwise present. Apparent  biopsy clip in axillary nodes on the left. No axillary adenopathy on  the right. No other retropectoral or internal mammary adenopathy on  either side. No enlarged mediastinal or hilar lymph nodes. Heart size  normal. Aorta size normal. Pulmonary artery size normal. Normal aortic  arch  branch pattern.  Bone detail in the chest shows multiple sclerotic densities consistent  with osteoblastic disease throughout much of the thoracic spine as  well as both scapulae and the right clavicle and sternum. There are  some posterior elements involved in several thoracic vertebral  positions.  In the interval from November 2022, the bony lesions appear grossly  similar.    ABDOMEN/PELVIS: Arterial phase imaging shows no hyperenhancing masses  in the upper abdomen on this phase.  Standard portal venous phase imaging also obtained. Liver appears  normal. Portal vein patent. Spleen and accessory splenule normal.  Bilateral adrenals normal. Pancreas grossly normal. Mild extrarenal  pelvis prominent variant again noted on the left. Right kidney appears  entirely normal as well. Decreased conspicuity of the medial cortical  left renal hypoenhancing area (series 8 image 143) suggestive of small  renal cyst.  IVC grossly normal. Abdominal aortic calcification without aneurysm.  No enlarged mesenteric or retroperitoneal lymph nodes. No free fluid.  No free air. Urinary bladder incompletely distended. No adnexal mass.  Bone detail in the abdomen and pelvis again shows diffuse lumbar and  bony pelvic as well as bilateral femoral osteoblastic foci consistent  with metastatic disease from breast cancer. Relative large lucent  defect at the inferior endplate of L2 appears similar to previous as  well. Overall, the bony changes appears similar      Impression    IMPRESSION: Grossly stable retroareolar left breast mass with nipple  retraction skin thickening consistent with breast cancer. Not  significantly changed from November 2022. Enhancing masses at lateral  margin adjacent to lateral margin of left pectoralis minor muscle with  the largest more lateral mass unchanged. There is a new slightly  hyperenhancing mass appearing within or immediately adjacent to the  left pectoralis minor muscle lateral aspect concerning for  possible  new metastasis at this site. No other increased axillary or other  supradiaphragmatic lymph nodes. Unchanged osteoblastic disease  consistent with metastatic breast cancer. Unchanged lucent defect at  the inferior endplate of L2 as well.  Slight decreased conspicuity of apparent small left renal cyst. No  abdominopelvic metastases. No pulmonary nodules of suspicion. Mild  abdominal aortic atherosclerotic calcification.    CHRISTINA FELTON MD         SYSTEM ID:  [id number]         ASSESSMENT AND PLAN:   1.  Metastatic ER positive SC positive HER-2/gabriela negative breast cancer with a left breast primary and bone metastasis.    Plan    1.  Continue potassium tid  2. zometa today and q 3 months  3. We reviewed her imaging and the images. There is progression within the breast and her ca 15-3 has tripled since 3 months ago. I think both of these are consistent with progression. We discussed options for therapy and I offered her 1. faslodex as a single agent 2. everolimus + exemestane 3. A clinical trial randomizing to either everolimus + exemestane or everolimus (afinitor)+  giredestrant (which is an investigational selective estrogen receptor down regulator).      She is a little interested in the trial and the clinical trial RN will contact her to discuss further.    Tiffany Hilton MD on 5/26/2023 at 10:01 AM

## 2023-05-26 NOTE — PROGRESS NOTES
Infusion Nursing Note:  Mary Olson presents today for Zometa.    Patient seen by provider today: Yes: Dr. Hilton   present during visit today: Not Applicable.    Note: No new medical concerns today.      Intravenous Access:  Implanted Port.    Treatment Conditions:  Lab Results   Component Value Date     05/18/2023    POTASSIUM 3.0 (L) 05/18/2023    CR 0.97 05/18/2023    BUFFY 9.5 05/18/2023    BILITOTAL 0.7 05/18/2023    ALBUMIN 3.7 05/18/2023    ALT 45 05/18/2023    AST 39 05/18/2023     Results reviewed, labs MET treatment parameters, ok to proceed with treatment.      Post Infusion Assessment:  Patient tolerated infusion without incident.  Blood return noted pre and post infusion.  Site patent and intact, free from redness, edema or discomfort.  No evidence of extravasations.  Access discontinued per protocol.       Discharge Plan:   Discharge instructions reviewed with: Patient.  Patient and/or family verbalized understanding of discharge instructions and all questions answered.  Patient discharged in stable condition accompanied by: self.  Departure Mode: Ambulatory.      Lara Lafleur RN

## 2023-05-26 NOTE — NURSING NOTE
"Oncology Rooming Note    May 26, 2023 8:03 AM   Mary Olson is a 67 year old female who presents for:    Chief Complaint   Patient presents with     Oncology Clinic Visit     3 month follow up     Initial Vitals: BP (!) 167/100 (BP Location: Left arm)   Pulse 107   Resp 18   Ht 1.575 m (5' 2.01\")   Wt 97.5 kg (214 lb 14.4 oz)   SpO2 99%   BMI 39.30 kg/m   Estimated body mass index is 39.3 kg/m  as calculated from the following:    Height as of this encounter: 1.575 m (5' 2.01\").    Weight as of this encounter: 97.5 kg (214 lb 14.4 oz). Body surface area is 2.07 meters squared.  No Pain (0) Comment: Data Unavailable   No LMP recorded. Patient is postmenopausal.  Allergies reviewed: Yes  Medications reviewed: Yes    Medications: Medication refills not needed today.  Pharmacy name entered into EPIC:    [facility] - [place], [place] - [address] [place] [address]  [facility] - [place], [place] - [address] [place] [address] [facility] [place], [place] - [address]  [facility] [place], [place] - [address] [facility] - [place], [place] - [address]  [facility] - [place], [place] - [address] [facility] [address] [facility] [address]  [facility] - [place], [place] - [address] AT [address]    Clinical concerns: No new concerns         Kailee Jarvis LPN            "

## 2023-05-26 NOTE — LETTER
5/26/2023         RE: Mary Olson  [address]  [place] [address]        Dear Colleague,    Thank you for referring your patient, Mary Olson, to the [facility]. Please see a copy of my visit note below.     [facility] ONCOLOGY  05/26/23    ONCOLOGY DIAGNOSIS:  1. March 2020: Metastatic Breast Cancer ER+, [place]+ Her2 Negative; with bone metastasis and a left breast primary. PI K3 CA mutation of note it is a pathologic variant which is outside the mutation of specific variants enrolled in the solar one trial.  TEJAS, TMB low     THERAPY TO DATE:  1. March 2020 to August 2020: Weekly Taxol.  2. August 2020 to March 2021: Anastrozole   3. 3/19/21 began abemeciclib with arimidex she has been dose reduced on her abemaciclib 200 mg a day   4. zometa q 3 months     INTERVAL HISTORY:  The patient is a 67-year-old female diagnosed with metastatic breast cancer in 03/2020 after noticing a lesion on her breast.  She was orginially treated by Dr Birch and changed to me February 2021    She started her abemaciclib on 3/14/2021     Desmond is here for routine follow-up. She is doing well with the arimidex and verzenio. She continues to have some loose stools but it is manageable. She has an inverted left nipple but it is unchanged. No new pains or other complaints.    PAST MEDICAL HISTORY:   1.  Metastatic breast cancer, see above.   2.  Hypertension.   3.  Steroid-induced diabetes.  According to the patient, since she has been off steroids, she was told that her blood sugars has normalized and she is off medication.   4.  Chemotherapy-induced neuropathy     FAMILY HISTORY:  Mother had breast cancer at 62; underwent lumpectomy and radiation, and arimidex  No new family history since last seen.     SOCIAL HISTORY:   to her , Dieter.    No current tobacco use.  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     ALLERGIES:  LISINOPRIL.      PHYSICAL  EXAM:  Physical Exam  Vitals reviewed.   Constitutional:       Appearance: Normal appearance.   HENT:      Head: Normocephalic and atraumatic.   Eyes:      Extraocular Movements: Extraocular movements intact.      Pupils: Pupils are equal, round, and reactive to light.   Cardiovascular:      Rate and Rhythm: Normal rate and regular rhythm.   Pulmonary:      Effort: Pulmonary effort is normal.      Breath sounds: Normal breath sounds.   Abdominal:      General: Abdomen is flat.      Palpations: Abdomen is soft.   Musculoskeletal:      Right lower leg: No edema.      Left lower leg: No edema.   Neurological:      General: No focal deficit present.      Mental Status: She is alert and oriented to person, place, and time. Mental status is at baseline.   Psychiatric:         Mood and Affect: Mood normal.         Behavior: Behavior normal.         Thought Content: Thought content normal.         Judgment: Judgment normal.     right breast: no masses no nipple discharge and axilla benign.  Left breast: inverted nipple, clear drainage is noted, mass effect in the breast is noted no open skin lesion. Non tender.     Labs    Recent Labs   Lab Test 05/18/23  0757 02/10/23  1503 11/11/22  0759 11/02/22  0832 08/05/22  1343 05/11/22  0831    139  --  135 138 136   POTASSIUM 3.0* 3.3*  --  3.0* 3.6 3.4   CHLORIDE 101 103  --  98 100 101   CO2 29 29  --  27 30 27   ANIONGAP 8 7  --  10 8 8   BUN 18 12  --  13 14 12   CR 0.97 0.95 0.92 0.94 0.97 0.91   * 108*  --  166* 151* 142*   BUFFY 9.5 10.1 9.1 10.3* 10.6* 9.8  9.8     No results for input(s): MAG, PHOS in the last 03446 hours.  Recent Labs   Lab Test 05/18/23  0757 02/10/23  1503 11/02/22  0832 08/05/22  1343 05/11/22  0831   WBC 4.2 4.0 3.9* 5.0 4.6   HGB 13.7 13.8 13.6 13.3 13.3    190 160 186 179   MCV 98 99 99 100 97   NEUTROPHIL 46 53 51 46 51     Recent Labs   Lab Test 05/18/23  0757 02/10/23  1503 11/11/22  0759 11/02/22  0832   BILITOTAL 0.7 0.6  --   0.5   ALKPHOS 67 58  --  66   ALT 45 35  --  33   AST 39 28  --  29   ALBUMIN 3.7 3.8 3.4 3.6     TSH   Date Value Ref Range Status   10/28/2015 1.23 0.40 - 4.00 mU/L Final     No results for input(s): CEA in the last 80196 hours.  Results for orders placed or performed in visit on 05/18/23   CT Chest/Abdomen/Pelvis w Contrast    Narrative    Chest abdomen pelvis CT with contrast    INDICATION: Left breast neoplasm.    Contrast: 118 mL intravenous Isovue-370    COMPARISON: 11/2/2022    FINDINGS Arterial phase imaging through the lower chest and upper  abdomen obtained along with standard portal venous phase imaging  through the chest, abdomen and pelvis.    CHEST: No suspicious hyperenhancing arterial phase lesion in the lower  chest.  Detail of the lungs shows no discrete pulmonary nodule of suspicion.  Calcified granuloma measuring 2 mm in the left lower lobe noted. Major  airways are nicely patent. No consolidations.  No pleural or pericardial effusion. The included thyroid appears  unremarkable. There are enhancing lesions at the lateral margin of the  pectoralis minor muscle which measures 20 mm in greatest dimension,  not significantly changed. Another mass slightly more medial in  relation is more conspicuous measuring 12 mm not obviously  identifiable on previous. There is skin thickening and nipple  retraction on the left with spiculated soft tissue and apparent  surgical changes. Please correlate with dedicated  mammography/ultrasound for complete evaluation. Patient was reportedly  originally diagnosed March 2020 and started chemotherapy at that time  and started other hormone therapy August 2020 NB and different  chemotherapy 3/19/2021. Reportedly, there has been no history of  lumpectomy or mastectomy on the left. The retroareolar mass with  spiculation appears grossly unchanged from the November 2022 CT.  No enlarged left axillary nodes currently otherwise present. Apparent  biopsy clip in axillary  nodes on the left. No axillary adenopathy on  the right. No other retropectoral or internal mammary adenopathy on  either side. No enlarged mediastinal or hilar lymph nodes. Heart size  normal. Aorta size normal. Pulmonary artery size normal. Normal aortic  arch branch pattern.  Bone detail in the chest shows multiple sclerotic densities consistent  with osteoblastic disease throughout much of the thoracic spine as  well as both scapulae and the right clavicle and sternum. There are  some posterior elements involved in several thoracic vertebral  positions.  In the interval from November 2022, the bony lesions appear grossly  similar.    ABDOMEN/PELVIS: Arterial phase imaging shows no hyperenhancing masses  in the upper abdomen on this phase.  Standard portal venous phase imaging also obtained. Liver appears  normal. Portal vein patent. Spleen and accessory splenule normal.  Bilateral adrenals normal. Pancreas grossly normal. Mild extrarenal  pelvis prominent variant again noted on the left. Right kidney appears  entirely normal as well. Decreased conspicuity of the medial cortical  left renal hypoenhancing area (series 8 image 143) suggestive of small  renal cyst.  IVC grossly normal. Abdominal aortic calcification without aneurysm.  No enlarged mesenteric or retroperitoneal lymph nodes. No free fluid.  No free air. Urinary bladder incompletely distended. No adnexal mass.  Bone detail in the abdomen and pelvis again shows diffuse lumbar and  bony pelvic as well as bilateral femoral osteoblastic foci consistent  with metastatic disease from breast cancer. Relative large lucent  defect at the inferior endplate of L2 appears similar to previous as  well. Overall, the bony changes appears similar      Impression    IMPRESSION: Grossly stable retroareolar left breast mass with nipple  retraction skin thickening consistent with breast cancer. Not  significantly changed from November 2022. Enhancing masses at  lateral  margin adjacent to lateral margin of left pectoralis minor muscle with  the largest more lateral mass unchanged. There is a new slightly  hyperenhancing mass appearing within or immediately adjacent to the  left pectoralis minor muscle lateral aspect concerning for possible  new metastasis at this site. No other increased axillary or other  supradiaphragmatic lymph nodes. Unchanged osteoblastic disease  consistent with metastatic breast cancer. Unchanged lucent defect at  the inferior endplate of L2 as well.  Slight decreased conspicuity of apparent small left renal cyst. No  abdominopelvic metastases. No pulmonary nodules of suspicion. Mild  abdominal aortic atherosclerotic calcification.    CHRISTINA FELTON MD         SYSTEM ID:  [id number]         ASSESSMENT AND PLAN:   1.  Metastatic ER positive [place] positive HER-2/gabriela negative breast cancer with a left breast primary and bone metastasis.    Plan    1.  Continue potassium tid  2. zometa today and q 3 months  3. We reviewed her imaging and the images. There is progression within the breast and her ca 15-3 has tripled since 3 months ago. I think both of these are consistent with progression. We discussed options for therapy and I offered her 1. faslodex as a single agent 2. everolimus + exemestane 3. A clinical trial randomizing to either everolimus + exemestane or everolimus (afinitor)+  giredestrant (which is an investigational selective estrogen receptor down regulator).      She is a little interested in the trial and the clinical trial RN will contact her to discuss further.    Tiffany Hilton MD on 5/26/2023 at 10:01 AM                          Again, thank you for allowing me to participate in the care of your patient.        Sincerely,        Tiffany Hilton MD

## 2023-05-30 ENCOUNTER — TELEPHONE (OUTPATIENT)
Dept: ONCOLOGY | Facility: CLINIC | Age: 68
End: 2023-05-30
Payer: MEDICARE

## 2023-05-30 DIAGNOSIS — Z17.0 MALIGNANT NEOPLASM OF OVERLAPPING SITES OF LEFT BREAST IN FEMALE, ESTROGEN RECEPTOR POSITIVE (H): Primary | ICD-10-CM

## 2023-05-30 DIAGNOSIS — C50.812 MALIGNANT NEOPLASM OF OVERLAPPING SITES OF LEFT BREAST IN FEMALE, ESTROGEN RECEPTOR POSITIVE (H): Primary | ICD-10-CM

## 2023-05-30 NOTE — TELEPHONE ENCOUNTER
I called Desmond today per Dr. Hilton request to give her some information on an [facility] clinical trial for which she might be interested in enrolling. If she chooses the trial, she would have to go to another oncology site and doctor as [facility] does not have this trial open. I will be sending her the consent just for more information via mail along with my contact information.  I will help her connect with another research nurse if she chooses this option.

## 2023-06-30 NOTE — PROGRESS NOTES
Thank you for the opportunity to be a part in the care of this patient's oral chemotherapy. The oncology pharmacy will no longer be following this patient for oral chemotherapy. If there are any questions or the plan changes, feel free to contact us.    Siva Vega, PharmD, [facility]  Oncology Pharmacy Manager  [facility]  [phone]

## 2023-08-08 DIAGNOSIS — Z17.0 MALIGNANT NEOPLASM OF OVERLAPPING SITES OF LEFT BREAST IN FEMALE, ESTROGEN RECEPTOR POSITIVE (H): Primary | ICD-10-CM

## 2023-08-08 DIAGNOSIS — C50.812 MALIGNANT NEOPLASM OF OVERLAPPING SITES OF LEFT BREAST IN FEMALE, ESTROGEN RECEPTOR POSITIVE (H): Primary | ICD-10-CM

## 2023-08-09 ENCOUNTER — TELEPHONE (OUTPATIENT)
Dept: ONCOLOGY | Facility: CLINIC | Age: 68
End: 2023-08-09
Payer: MEDICARE

## 2023-08-09 DIAGNOSIS — C50.812 MALIGNANT NEOPLASM OF OVERLAPPING SITES OF LEFT BREAST IN FEMALE, ESTROGEN RECEPTOR POSITIVE (H): Primary | ICD-10-CM

## 2023-08-09 DIAGNOSIS — Z17.0 MALIGNANT NEOPLASM OF OVERLAPPING SITES OF LEFT BREAST IN FEMALE, ESTROGEN RECEPTOR POSITIVE (H): Primary | ICD-10-CM

## 2023-08-09 NOTE — TELEPHONE ENCOUNTER
Prior Authorization Approval    Medication: EVEROLIMUS 10 MG PO TABS  Authorization Effective Date: 8/9/2023  Authorization Expiration Date: 8/9/2026  Approved Dose/Quantity: 28/28 days  Reference #: [id number]   Insurance Company: [employer] - Phone [phone] Fax [phone]  Expected CoPay:       CoPay Card Available:      Financial Assistance Needed: will check once the script is sent over  Which Pharmacy is filling the prescription:  [facility]  Pharmacy Notified: Yes  Patient Notified: Yes

## 2023-08-10 RX ORDER — PROCHLORPERAZINE MALEATE 10 MG
5 TABLET ORAL EVERY 6 HOURS PRN
Qty: 30 TABLET | Refills: 2 | Status: SHIPPED | OUTPATIENT
Start: 2023-08-10 | End: 2023-12-05

## 2023-08-10 RX ORDER — EXEMESTANE 25 MG/1
25 TABLET ORAL DAILY
Qty: 28 TABLET | Refills: 11 | Status: SHIPPED | OUTPATIENT
Start: 2023-08-10 | End: 2023-10-31

## 2023-08-10 RX ORDER — DEXAMETHASONE 0.5 MG/5ML
SOLUTION ORAL
Qty: 1200 ML | Refills: 1 | Status: SHIPPED | OUTPATIENT
Start: 2023-08-10 | End: 2023-12-05

## 2023-08-10 RX ORDER — EVEROLIMUS 10 MG/1
10 TABLET ORAL DAILY
Qty: 28 TABLET | Refills: 0 | Status: SHIPPED | OUTPATIENT
Start: 2023-08-10 | End: 2023-10-03

## 2023-08-10 NOTE — ORAL ONC MGMT
"Oral Chemotherapy Monitoring Program    Lab Monitoring Plan  CBC/CMP monthly  Subjective/Objective:  Mary Olson is a 68 year old female contacted by phone for an initial visit for oral chemotherapy education.         6/8/2022     8:00 AM 7/6/2022     7:00 AM 11/17/2022     7:00 AM 1/6/2023     4:00 PM 6/30/2023     8:00 AM 8/9/2023     9:00 AM 8/10/2023    11:00 AM   ORAL CHEMOTHERAPY   Assessment Type Refill Refill Refill Refill Discontinuation Initial Work up;Left Voicemail New Teach   Reason for Discontinuation     Disease progression     Diagnosis Code Breast Cancer Breast Cancer Breast Cancer Breast Cancer Breast Cancer Breast Cancer Breast Cancer   Providers [facility]   Clinic Name/Location [facility]   Drug Name Verzenio (abemaciclib) Verzenio (abemaciclib) Ibrance (palbociclib) Verzenio (abemaciclib) Verzenio (abemaciclib) Afinitor (everolimus) Afinitor (everolimus)   Dose 100 mg 100 mg 125 mg 100 mg 100 mg 10 mg 10 mg   Current Schedule BID BID Daily BID BID Daily Daily   Cycle Details Continuous Continuous 3 weeks on, 1 week off Continuous Continuous Continuous Continuous   Any new drug interactions?      No    Is the dose as ordered appropriate for the patient?      Yes      Vitals:  BP:   BP Readings from Last 1 Encounters:   05/26/23 (!) 167/100     Wt Readings from Last 1 Encounters:   05/26/23 97.5 kg (214 lb 14.4 oz)     Estimated body surface area is 2.07 meters squared as calculated from the following:    Height as of 5/26/23: 1.575 m (5' 2.01\").    Weight as of 5/26/23: 97.5 kg (214 lb 14.4 oz).    Labs:  No results found for NA within last 30 days.     No results found for K within last 30 days.     No results found for CA within last 30 days.     No results found for Mag within last 30 days.     No results found for Phos within last 30 days.     No results found for " ALBUMIN within last 30 days.     No results found for BUN within last 30 days.     No results found for CR within last 30 days.     No results found for AST within last 30 days.     No results found for ALT within last 30 days.     No results found for BILITOTAL within last 30 days.     No results found for WBC within last 30 days.     No results found for HGB within last 30 days.     No results found for PLT within last 30 days.     No results found for ANC within last 30 days.     No results found for ANC within last 30 days.        Assessment:  Patient is appropriate to start therapy once everolimus is received. Desmond knows to stop anastrozole when she starts exemestane.     Reviewed instructions for dexamethasone swish & spit; this was sent to her local pharmacy along with prochlorperazine and exemestane.    Plan:  Basic chemotherapy teaching was reviewed with the patient including indication, start date of therapy, dose, administration, adverse effects, missed doses, food and drug interactions, monitoring, side effect management, office contact information, and safe handling. Written materials were provided and all questions answered.    Follow-Up:  Initial assessment ~1 week after starting everolimus/exemestane  Yobani appt ~2 weeks after starting      Tommy Roche, Angel  Hematology/Oncology Clinical Pharmacist  [facility]  [phone]

## 2023-08-11 NOTE — TELEPHONE ENCOUNTER
Spoke to Gracy at [facility] pharmacy. Script is in but PA is not showing that its done. They asked me to call in at [phone] to see why it is showing it needs an auth- It is approved and got Auth # to give to [facility]  Ph: [phone] option 1 for [facility] line once approved    Called [facility] to give auth # for them to put in their system. Copay will be $678.24 and has [employer] on file and they will use that to bring down to $0 a fill

## 2023-08-31 ENCOUNTER — TELEPHONE (OUTPATIENT)
Dept: ONCOLOGY | Facility: CLINIC | Age: 68
End: 2023-08-31
Payer: MEDICARE

## 2023-08-31 NOTE — ORAL ONC MGMT
Oral Chemotherapy Monitoring Program    Primary Oncologist: Yobani  Drug: everolimus  Start Date: 8/25/23    Subjective/Objective:  Mary Olson is a 68 year old female contacted by phone for a follow-up visit for oral chemotherapy. Desmond confirms that she is taking her everolimus and exemestane daily. She has also been using her dexamethasone mouth rinse 4 times daily. She reports no side effects at this time.        7/6/2022     7:00 AM 11/17/2022     7:00 AM 1/6/2023     4:00 PM 6/30/2023     8:00 AM 8/9/2023     9:00 AM 8/10/2023    11:00 AM 8/31/2023     4:00 PM   ORAL CHEMOTHERAPY   Assessment Type Refill Refill Refill Discontinuation Initial Work up;Left Voicemail New Teach Initial Follow up   Reason for Discontinuation    Disease progression      Diagnosis Code Breast Cancer Breast Cancer Breast Cancer Breast Cancer Breast Cancer Breast Cancer Breast Cancer   Providers Yobani Hilton [facility]   Clinic Name/Location [facility]   Drug Name Verzenio (abemaciclib) Ibrance (palbociclib) Verzenio (abemaciclib) Verzenio (abemaciclib) Afinitor (everolimus) Afinitor (everolimus) Afinitor (everolimus)   Dose 100 mg 125 mg 100 mg 100 mg 10 mg 10 mg 10 mg   Current Schedule BID Daily BID BID Daily Daily Daily   Cycle Details Continuous 3 weeks on, 1 week off Continuous Continuous Continuous Continuous Continuous   Start Date of Last Cycle       8/25/2023   Doses missed in last 2 weeks       0   Adherence Assessment       Adherent   Adverse Effects       No AE identified during assessment   Any new drug interactions?     No     Is the dose as ordered appropriate for the patient?     Yes         Vitals:  BP:   BP Readings from Last 1 Encounters:   05/26/23 (!) 167/100     Wt Readings from Last 1 Encounters:   05/26/23 97.5 kg (214 lb 14.4 oz)     Estimated body surface area is 2.07 meters squared as calculated  "from the following:    Height as of 5/26/23: 1.575 m (5' 2.01\").    Weight as of 5/26/23: 97.5 kg (214 lb 14.4 oz).    Labs:  No results found for NA within last 30 days.     No results found for K within last 30 days.     No results found for CA within last 30 days.     No results found for Mag within last 30 days.     No results found for Phos within last 30 days.     No results found for ALBUMIN within last 30 days.     No results found for BUN within last 30 days.     No results found for CR within last 30 days.       No results found for AST within last 30 days.     No results found for ALT within last 30 days.     No results found for BILITOTAL within last 30 days.       No results found for WBC within last 30 days.     No results found for HGB within last 30 days.     No results found for PLT within last 30 days.     No results found for ANC within last 30 days.     Medication Reconciliation:  No changes    Assessment/Plan:  Desmond is tolerating the initiation of therapy well, continue with current treatment plan.    Follow-Up:  9/5 - labs + appt      Carson Vasquez PharmD  [facility] Pharmacist  [phone]  August 31, 2023    "

## 2023-09-05 ENCOUNTER — INFUSION THERAPY VISIT (OUTPATIENT)
Dept: INFUSION THERAPY | Facility: CLINIC | Age: 68
End: 2023-09-05
Payer: MEDICARE

## 2023-09-05 ENCOUNTER — LAB (OUTPATIENT)
Dept: ONCOLOGY | Facility: CLINIC | Age: 68
End: 2023-09-05
Payer: MEDICARE

## 2023-09-05 ENCOUNTER — ONCOLOGY VISIT (OUTPATIENT)
Dept: ONCOLOGY | Facility: CLINIC | Age: 68
End: 2023-09-05
Payer: MEDICARE

## 2023-09-05 VITALS
RESPIRATION RATE: 18 BRPM | SYSTOLIC BLOOD PRESSURE: 157 MMHG | DIASTOLIC BLOOD PRESSURE: 100 MMHG | HEART RATE: 110 BPM | OXYGEN SATURATION: 95 % | WEIGHT: 209.6 LBS | BODY MASS INDEX: 38.57 KG/M2 | HEIGHT: 62 IN

## 2023-09-05 DIAGNOSIS — Z17.0 MALIGNANT NEOPLASM OF OVERLAPPING SITES OF LEFT BREAST IN FEMALE, ESTROGEN RECEPTOR POSITIVE (H): Primary | ICD-10-CM

## 2023-09-05 DIAGNOSIS — C79.51 MALIGNANT NEOPLASM METASTATIC TO BONE (H): ICD-10-CM

## 2023-09-05 DIAGNOSIS — C50.812 MALIGNANT NEOPLASM OF OVERLAPPING SITES OF LEFT BREAST IN FEMALE, ESTROGEN RECEPTOR POSITIVE (H): Primary | ICD-10-CM

## 2023-09-05 DIAGNOSIS — E28.39 ESTROGEN DEFICIENCY: ICD-10-CM

## 2023-09-05 DIAGNOSIS — C50.812 MALIGNANT NEOPLASM OF OVERLAPPING SITES OF LEFT BREAST IN FEMALE, ESTROGEN RECEPTOR POSITIVE (H): ICD-10-CM

## 2023-09-05 DIAGNOSIS — Z17.0 MALIGNANT NEOPLASM OF OVERLAPPING SITES OF LEFT BREAST IN FEMALE, ESTROGEN RECEPTOR POSITIVE (H): ICD-10-CM

## 2023-09-05 LAB
ALBUMIN SERPL BCG-MCNC: 4.2 G/DL (ref 3.5–5.2)
ALP SERPL-CCNC: 122 U/L (ref 35–104)
ALT SERPL W P-5'-P-CCNC: 17 U/L (ref 0–50)
ANION GAP SERPL CALCULATED.3IONS-SCNC: 14 MMOL/L (ref 7–15)
AST SERPL W P-5'-P-CCNC: 29 U/L (ref 0–45)
BASOPHILS # BLD AUTO: 0.1 10E3/UL (ref 0–0.2)
BASOPHILS NFR BLD AUTO: 1 %
BILIRUB SERPL-MCNC: 0.3 MG/DL
BUN SERPL-MCNC: 15.3 MG/DL (ref 8–23)
CALCIUM SERPL-MCNC: 9.8 MG/DL (ref 8.8–10.2)
CHLORIDE SERPL-SCNC: 98 MMOL/L (ref 98–107)
CHOLEST SERPL-MCNC: 279 MG/DL
CREAT SERPL-MCNC: 0.73 MG/DL (ref 0.51–0.95)
DEPRECATED HCO3 PLAS-SCNC: 24 MMOL/L (ref 22–29)
EOSINOPHIL # BLD AUTO: 0.2 10E3/UL (ref 0–0.7)
EOSINOPHIL NFR BLD AUTO: 3 %
ERYTHROCYTE [DISTWIDTH] IN BLOOD BY AUTOMATED COUNT: 13.2 % (ref 10–15)
GFR SERPL CREATININE-BSD FRML MDRD: 90 ML/MIN/1.73M2
GLUCOSE SERPL-MCNC: 242 MG/DL (ref 70–99)
HCT VFR BLD AUTO: 41.4 % (ref 35–47)
HDLC SERPL-MCNC: 82 MG/DL
HGB BLD-MCNC: 13.8 G/DL (ref 11.7–15.7)
IMM GRANULOCYTES # BLD: 0 10E3/UL
IMM GRANULOCYTES NFR BLD: 0 %
LDLC SERPL CALC-MCNC: 167 MG/DL
LYMPHOCYTES # BLD AUTO: 1.9 10E3/UL (ref 0.8–5.3)
LYMPHOCYTES NFR BLD AUTO: 34 %
MCH RBC QN AUTO: 28.5 PG (ref 26.5–33)
MCHC RBC AUTO-ENTMCNC: 33.3 G/DL (ref 31.5–36.5)
MCV RBC AUTO: 85 FL (ref 78–100)
MONOCYTES # BLD AUTO: 0.4 10E3/UL (ref 0–1.3)
MONOCYTES NFR BLD AUTO: 7 %
NEUTROPHILS # BLD AUTO: 3.1 10E3/UL (ref 1.6–8.3)
NEUTROPHILS NFR BLD AUTO: 55 %
NONHDLC SERPL-MCNC: 197 MG/DL
NRBC # BLD AUTO: 0 10E3/UL
NRBC BLD AUTO-RTO: 0 /100
PLATELET # BLD AUTO: 184 10E3/UL (ref 150–450)
POTASSIUM SERPL-SCNC: 3.7 MMOL/L (ref 3.4–5.3)
PROT SERPL-MCNC: 8.3 G/DL (ref 6.4–8.3)
RBC # BLD AUTO: 4.85 10E6/UL (ref 3.8–5.2)
SODIUM SERPL-SCNC: 136 MMOL/L (ref 136–145)
TRIGL SERPL-MCNC: 149 MG/DL
WBC # BLD AUTO: 5.7 10E3/UL (ref 4–11)

## 2023-09-05 PROCEDURE — 80061 LIPID PANEL: CPT | Performed by: INTERNAL MEDICINE

## 2023-09-05 PROCEDURE — 80053 COMPREHEN METABOLIC PANEL: CPT | Performed by: INTERNAL MEDICINE

## 2023-09-05 PROCEDURE — 96365 THER/PROPH/DIAG IV INF INIT: CPT | Performed by: INTERNAL MEDICINE

## 2023-09-05 PROCEDURE — 85025 COMPLETE CBC W/AUTO DIFF WBC: CPT | Performed by: INTERNAL MEDICINE

## 2023-09-05 PROCEDURE — 99207 PR NO CHARGE LOS: CPT

## 2023-09-05 PROCEDURE — 99213 OFFICE O/P EST LOW 20 MIN: CPT | Mod: 25 | Performed by: INTERNAL MEDICINE

## 2023-09-05 RX ORDER — HEPARIN SODIUM (PORCINE) LOCK FLUSH IV SOLN 100 UNIT/ML 100 UNIT/ML
5 SOLUTION INTRAVENOUS
Status: CANCELLED | OUTPATIENT
Start: 2023-09-06

## 2023-09-05 RX ORDER — HEPARIN SODIUM (PORCINE) LOCK FLUSH IV SOLN 100 UNIT/ML 100 UNIT/ML
5 SOLUTION INTRAVENOUS ONCE
Status: COMPLETED | OUTPATIENT
Start: 2023-09-05 | End: 2023-09-05

## 2023-09-05 RX ORDER — HEPARIN SODIUM (PORCINE) LOCK FLUSH IV SOLN 100 UNIT/ML 100 UNIT/ML
5 SOLUTION INTRAVENOUS
Status: DISCONTINUED | OUTPATIENT
Start: 2023-09-05 | End: 2023-10-09 | Stop reason: HOSPADM

## 2023-09-05 RX ORDER — HEPARIN SODIUM,PORCINE 10 UNIT/ML
5 VIAL (ML) INTRAVENOUS
Status: CANCELLED | OUTPATIENT
Start: 2023-09-06

## 2023-09-05 RX ORDER — ZOLEDRONIC ACID 0.04 MG/ML
4 INJECTION, SOLUTION INTRAVENOUS ONCE
Status: CANCELLED | OUTPATIENT
Start: 2023-09-06 | End: 2023-09-05

## 2023-09-05 RX ORDER — ZOLEDRONIC ACID 0.04 MG/ML
4 INJECTION, SOLUTION INTRAVENOUS ONCE
Status: COMPLETED | OUTPATIENT
Start: 2023-09-05 | End: 2023-09-05

## 2023-09-05 RX ADMIN — HEPARIN SODIUM (PORCINE) LOCK FLUSH IV SOLN 100 UNIT/ML 5 ML: 100 SOLUTION at 07:58

## 2023-09-05 RX ADMIN — HEPARIN SODIUM (PORCINE) LOCK FLUSH IV SOLN 100 UNIT/ML 5 ML: 100 SOLUTION at 09:27

## 2023-09-05 RX ADMIN — Medication 250 ML: at 09:05

## 2023-09-05 RX ADMIN — ZOLEDRONIC ACID 4 MG: 0.04 INJECTION, SOLUTION INTRAVENOUS at 09:07

## 2023-09-05 ASSESSMENT — PAIN SCALES - GENERAL: PAINLEVEL: NO PAIN (0)

## 2023-09-05 NOTE — PROGRESS NOTES
Port accessed with no incidient. Site cleaned with chloraprep for 30 seconds. Site dry and accessed with appropriate port needle. Sterile dressing applied. Blood return noted and lab tubes drawn. Port flushed with saline and heparin. Patient tolerated port draw well.   Melissa Pacheco RN

## 2023-09-05 NOTE — NURSING NOTE
"Oncology Rooming Note    September 5, 2023 8:30 AM   Mary Olson is a 68 year old female who presents for:    Chief Complaint   Patient presents with    Oncology Clinic Visit     Follow up     Initial Vitals: BP (!) 157/100 (BP Location: Left arm)   Pulse 110   Resp 18   Ht 1.575 m (5' 2.01\")   Wt 95.1 kg (209 lb 9.6 oz)   SpO2 95%   BMI 38.33 kg/m   Estimated body mass index is 38.33 kg/m  as calculated from the following:    Height as of this encounter: 1.575 m (5' 2.01\").    Weight as of this encounter: 95.1 kg (209 lb 9.6 oz). Body surface area is 2.04 meters squared.  No Pain (0) Comment: Data Unavailable   No LMP recorded. Patient is postmenopausal.  Allergies reviewed: Yes  Medications reviewed: Yes    Medications: Medication refills not needed today.  Pharmacy name entered into EPIC:    [facility] - [place], [place] - [address] [place] [address]  [facility] - [place], [place] - [address] [place] [address] [facility] [place], [place] - [address] [facility]  [facility] - [place], [place] - [address] [facility] - [place], [place] - [address]  [facility] - [place], [place] - [address] [facility] [address]  [facility] - [place], [place] - [address]    Clinical concerns: No new concerns         Kailee Jarvis LPN              "

## 2023-09-05 NOTE — PROGRESS NOTES
Infusion Nursing Note:  Mary Olson presents today for Zometa.    Patient seen by provider today: Yes: Dr Hernandez   present during visit today: Not Applicable.    Note: N/A.      Intravenous Access:  Implanted Port.    Treatment Conditions:  Lab Results   Component Value Date     (L) 10/03/2023    POTASSIUM 3.9 10/03/2023    CR 0.69 10/03/2023    BUFFY 9.6 10/03/2023    BILITOTAL 0.4 10/03/2023    ALBUMIN 4.3 10/03/2023    ALT 32 10/03/2023    AST 35 10/03/2023         Post Infusion Assessment:  Patient tolerated infusion without incident.       Discharge Plan:   [facility]  Patient will return 10/31/2023 for next appointment.   Patient discharged in stable condition accompanied by: jose Pacheco RN

## 2023-09-05 NOTE — LETTER
9/5/2023         RE: Mary Olson  [address]  [place] [address]        Dear Colleague,    Thank you for referring your patient, Mary Olson, to the [facility]. Please see a copy of my visit note below.     [facility] ONCOLOGY  05/26/23    ONCOLOGY DIAGNOSIS:  1. March 2020: Metastatic Breast Cancer ER+, [place]+ Her2 Negative; with bone metastasis and a left breast primary. PI K3 CA mutation of note it is a pathologic variant which is outside the mutation of specific variants enrolled in the solar one trial.  TEJAS, TMB low     THERAPY TO DATE:  1. March 2020 to August 2020: Weekly Taxol.  2. August 2020 to March 2021: Anastrozole   3. 3/19/21 began abemeciclib with arimidex she has been dose reduced on her abemaciclib 200 mg a day   4. zometa q 3 months  5. Changed to aromasin + everolimus due to progression end of August 2023.     INTERVAL HISTORY:  The patient is a 68-year-old female diagnosed with metastatic breast cancer in 03/2020 after noticing a lesion on her breast.  She was orginially treated by Dr Birch and changed to me February 2021    She is here for followup She has been on everolimus and exemestane for about 1.5 weeks. So far it is going well. She has had no significant side effect.No mouth sores, cough or shortness of breath. No n/v/d/c. No other new complaints. No new pains.    PAST MEDICAL HISTORY:   1.  Metastatic breast cancer, see above.   2.  Hypertension.   3.  Steroid-induced diabetes.  According to the patient, since she has been off steroids, she was told that her blood sugars has normalized and she is off medication.   4.  Chemotherapy-induced neuropathy     FAMILY HISTORY:  Mother had breast cancer at 62; underwent lumpectomy and radiation, and arimidex  No new family history since last seen.     SOCIAL HISTORY:   to her , Dieter.    No current tobacco use.  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      ALLERGIES:  LISINOPRIL.      PHYSICAL EXAM:  Physical Exam  Vitals reviewed.   Constitutional:       Appearance: Normal appearance.   HENT:      Head: Normocephalic and atraumatic.   Eyes:      Extraocular Movements: Extraocular movements intact.      Pupils: Pupils are equal, round, and reactive to light.   Pulmonary:      Effort: Pulmonary effort is normal.   Musculoskeletal:      Right lower leg: No edema.      Left lower leg: No edema.   Neurological:      General: No focal deficit present.      Mental Status: She is alert and oriented to person, place, and time. Mental status is at baseline.   Psychiatric:         Mood and Affect: Mood normal.         Behavior: Behavior normal.         Thought Content: Thought content normal.         Judgment: Judgment normal.         Labs    Recent Labs   Lab Test 09/05/23  0746 05/18/23  0757 02/10/23  1503 11/11/22  0759 11/02/22  0832 08/05/22  1343    138 139  --  135 138   POTASSIUM 3.7 3.0* 3.3*  --  3.0* 3.6   CHLORIDE 98 101 103  --  98 100   CO2 24 29 29  --  27 30   ANIONGAP 14 8 7  --  10 8   BUN 15.3 18 12  --  13 14   CR 0.73 0.97 0.95 0.92 0.94 0.97   * 117* 108*  --  166* 151*   BUFFY 9.8 9.5 10.1 9.1 10.3* 10.6*     No results for input(s): MAG, PHOS in the last 64783 hours.  Recent Labs   Lab Test 09/05/23  0746 05/18/23  0757 02/10/23  1503 11/02/22  0832 08/05/22  1343   WBC 5.7 4.2 4.0 3.9* 5.0   HGB 13.8 13.7 13.8 13.6 13.3    194 190 160 186   MCV 85 98 99 99 100   NEUTROPHIL 55 46 53 51 46     Recent Labs   Lab Test 09/05/23  0746 05/18/23  0757 02/10/23  1503   BILITOTAL 0.3 0.7 0.6   ALKPHOS 122* 67 58   ALT 17 45 35   AST 29 39 28   ALBUMIN 4.2 3.7 3.8     TSH   Date Value Ref Range Status   10/28/2015 1.23 0.40 - 4.00 mU/L Final     No results for input(s): CEA in the last 25969 hours.  Results for orders placed or performed in visit on 05/18/23   CT Chest/Abdomen/Pelvis w Contrast    Narrative    Chest abdomen pelvis CT with  contrast    INDICATION: Left breast neoplasm.    Contrast: 118 mL intravenous [id number]    COMPARISON: 11/2/2022    FINDINGS Arterial phase imaging through the lower chest and upper  abdomen obtained along with standard portal venous phase imaging  through the chest, abdomen and pelvis.    CHEST: No suspicious hyperenhancing arterial phase lesion in the lower  chest.  Detail of the lungs shows no discrete pulmonary nodule of suspicion.  Calcified granuloma measuring 2 mm in the left lower lobe noted. Major  airways are nicely patent. No consolidations.  No pleural or pericardial effusion. The included thyroid appears  unremarkable. There are enhancing lesions at the lateral margin of the  pectoralis minor muscle which measures 20 mm in greatest dimension,  not significantly changed. Another mass slightly more medial in  relation is more conspicuous measuring 12 mm not obviously  identifiable on previous. There is skin thickening and nipple  retraction on the left with spiculated soft tissue and apparent  surgical changes. Please correlate with dedicated  mammography/ultrasound for complete evaluation. Patient was reportedly  originally diagnosed March 2020 and started chemotherapy at that time  and started other hormone therapy August 2020 NB and different  chemotherapy 3/19/2021. Reportedly, there has been no history of  lumpectomy or mastectomy on the left. The retroareolar mass with  spiculation appears grossly unchanged from the November 2022 CT.  No enlarged left axillary nodes currently otherwise present. Apparent  biopsy clip in axillary nodes on the left. No axillary adenopathy on  the right. No other retropectoral or internal mammary adenopathy on  either side. No enlarged mediastinal or hilar lymph nodes. Heart size  normal. Aorta size normal. Pulmonary artery size normal. Normal aortic  arch branch pattern.  Bone detail in the chest shows multiple sclerotic densities consistent  with osteoblastic disease  throughout much of the thoracic spine as  well as both scapulae and the right clavicle and sternum. There are  some posterior elements involved in several thoracic vertebral  positions.  In the interval from November 2022, the bony lesions appear grossly  similar.    ABDOMEN/PELVIS: Arterial phase imaging shows no hyperenhancing masses  in the upper abdomen on this phase.  Standard portal venous phase imaging also obtained. Liver appears  normal. Portal vein patent. Spleen and accessory splenule normal.  Bilateral adrenals normal. Pancreas grossly normal. Mild extrarenal  pelvis prominent variant again noted on the left. Right kidney appears  entirely normal as well. Decreased conspicuity of the medial cortical  left renal hypoenhancing area (series 8 image 143) suggestive of small  renal cyst.  IVC grossly normal. Abdominal aortic calcification without aneurysm.  No enlarged mesenteric or retroperitoneal lymph nodes. No free fluid.  No free air. Urinary bladder incompletely distended. No adnexal mass.  Bone detail in the abdomen and pelvis again shows diffuse lumbar and  bony pelvic as well as bilateral femoral osteoblastic foci consistent  with metastatic disease from breast cancer. Relative large lucent  defect at the inferior endplate of L2 appears similar to previous as  well. Overall, the bony changes appears similar      Impression    IMPRESSION: Grossly stable retroareolar left breast mass with nipple  retraction skin thickening consistent with breast cancer. Not  significantly changed from November 2022. Enhancing masses at lateral  margin adjacent to lateral margin of left pectoralis minor muscle with  the largest more lateral mass unchanged. There is a new slightly  hyperenhancing mass appearing within or immediately adjacent to the  left pectoralis minor muscle lateral aspect concerning for possible  new metastasis at this site. No other increased axillary or other  supradiaphragmatic lymph nodes.  Unchanged osteoblastic disease  consistent with metastatic breast cancer. Unchanged lucent defect at  the inferior endplate of L2 as well.  Slight decreased conspicuity of apparent small left renal cyst. No  abdominopelvic metastases. No pulmonary nodules of suspicion. Mild  abdominal aortic atherosclerotic calcification.    CHRISTINA FELTON MD         SYSTEM ID:  [id number]       ASSESSMENT AND PLAN:   1.  Metastatic ER positive DC positive HER-2/gabriela negative breast cancer with a left breast primary and bone metastasis.    Plan    1.  Continue potassium tid  2. zometa today and q 3 months  3. She declined the clinical trial and start aromasin and everolimus. This is going well. She will continue therapy and see me monthly with scans in 3 months.  4. She will need ESR1 testing and we will discuss doing this next visit.    Tiffany Hilton MD on 9/5/2023 at 8:53 AM                          Again, thank you for allowing me to participate in the care of your patient.        Sincerely,        Tiffany Hilton MD

## 2023-09-05 NOTE — PROGRESS NOTES
[facility] ONCOLOGY  05/26/23    ONCOLOGY DIAGNOSIS:  1. March 2020: Metastatic Breast Cancer ER+, ME+ Her2 Negative; with bone metastasis and a left breast primary. PI K3 CA mutation of note it is a pathologic variant which is outside the mutation of specific variants enrolled in the solar one trial.  TEJAS, TMB low     THERAPY TO DATE:  1. March 2020 to August 2020: Weekly Taxol.  2. August 2020 to March 2021: Anastrozole   3. 3/19/21 began abemeciclib with arimidex she has been dose reduced on her abemaciclib 200 mg a day   4. zometa q 3 months  5. Changed to aromasin + everolimus due to progression end of August 2023.     INTERVAL HISTORY:  The patient is a 68-year-old female diagnosed with metastatic breast cancer in 03/2020 after noticing a lesion on her breast.  She was orginially treated by Dr Birch and changed to me February 2021    She is here for followup She has been on everolimus and exemestane for about 1.5 weeks. So far it is going well. She has had no significant side effect.No mouth sores, cough or shortness of breath. No n/v/d/c. No other new complaints. No new pains.    PAST MEDICAL HISTORY:   1.  Metastatic breast cancer, see above.   2.  Hypertension.   3.  Steroid-induced diabetes.  According to the patient, since she has been off steroids, she was told that her blood sugars has normalized and she is off medication.   4.  Chemotherapy-induced neuropathy     FAMILY HISTORY:  Mother had breast cancer at 62; underwent lumpectomy and radiation, and arimidex  No new family history since last seen.     SOCIAL HISTORY:   to her , Dieter.    No current tobacco use.  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     ALLERGIES:  LISINOPRIL.      PHYSICAL EXAM:  Physical Exam  Vitals reviewed.   Constitutional:       Appearance: Normal appearance.   HENT:      Head: Normocephalic and atraumatic.   Eyes:      Extraocular Movements: Extraocular movements intact.      Pupils: Pupils are  equal, round, and reactive to light.   Pulmonary:      Effort: Pulmonary effort is normal.   Musculoskeletal:      Right lower leg: No edema.      Left lower leg: No edema.   Neurological:      General: No focal deficit present.      Mental Status: She is alert and oriented to person, place, and time. Mental status is at baseline.   Psychiatric:         Mood and Affect: Mood normal.         Behavior: Behavior normal.         Thought Content: Thought content normal.         Judgment: Judgment normal.         Labs    Recent Labs   Lab Test 09/05/23  0746 05/18/23  0757 02/10/23  1503 11/11/22  0759 11/02/22  0832 08/05/22  1343    138 139  --  135 138   POTASSIUM 3.7 3.0* 3.3*  --  3.0* 3.6   CHLORIDE 98 101 103  --  98 100   CO2 24 29 29  --  27 30   ANIONGAP 14 8 7  --  10 8   BUN 15.3 18 12  --  13 14   CR 0.73 0.97 0.95 0.92 0.94 0.97   * 117* 108*  --  166* 151*   BUFFY 9.8 9.5 10.1 9.1 10.3* 10.6*     No results for input(s): MAG, PHOS in the last 52370 hours.  Recent Labs   Lab Test 09/05/23  0746 05/18/23  0757 02/10/23  1503 11/02/22  0832 08/05/22  1343   WBC 5.7 4.2 4.0 3.9* 5.0   HGB 13.8 13.7 13.8 13.6 13.3    194 190 160 186   MCV 85 98 99 99 100   NEUTROPHIL 55 46 53 51 46     Recent Labs   Lab Test 09/05/23  0746 05/18/23  0757 02/10/23  1503   BILITOTAL 0.3 0.7 0.6   ALKPHOS 122* 67 58   ALT 17 45 35   AST 29 39 28   ALBUMIN 4.2 3.7 3.8     TSH   Date Value Ref Range Status   10/28/2015 1.23 0.40 - 4.00 mU/L Final     No results for input(s): CEA in the last 05930 hours.  Results for orders placed or performed in visit on 05/18/23   CT Chest/Abdomen/Pelvis w Contrast    Narrative    Chest abdomen pelvis CT with contrast    INDICATION: Left breast neoplasm.    Contrast: 118 mL intravenous [id number]    COMPARISON: 11/2/2022    FINDINGS Arterial phase imaging through the lower chest and upper  abdomen obtained along with standard portal venous phase imaging  through the chest, abdomen  and pelvis.    CHEST: No suspicious hyperenhancing arterial phase lesion in the lower  chest.  Detail of the lungs shows no discrete pulmonary nodule of suspicion.  Calcified granuloma measuring 2 mm in the left lower lobe noted. Major  airways are nicely patent. No consolidations.  No pleural or pericardial effusion. The included thyroid appears  unremarkable. There are enhancing lesions at the lateral margin of the  pectoralis minor muscle which measures 20 mm in greatest dimension,  not significantly changed. Another mass slightly more medial in  relation is more conspicuous measuring 12 mm not obviously  identifiable on previous. There is skin thickening and nipple  retraction on the left with spiculated soft tissue and apparent  surgical changes. Please correlate with dedicated  mammography/ultrasound for complete evaluation. Patient was reportedly  originally diagnosed March 2020 and started chemotherapy at that time  and started other hormone therapy August 2020 NB and different  chemotherapy 3/19/2021. Reportedly, there has been no history of  lumpectomy or mastectomy on the left. The retroareolar mass with  spiculation appears grossly unchanged from the November 2022 CT.  No enlarged left axillary nodes currently otherwise present. Apparent  biopsy clip in axillary nodes on the left. No axillary adenopathy on  the right. No other retropectoral or internal mammary adenopathy on  either side. No enlarged mediastinal or hilar lymph nodes. Heart size  normal. Aorta size normal. Pulmonary artery size normal. Normal aortic  arch branch pattern.  Bone detail in the chest shows multiple sclerotic densities consistent  with osteoblastic disease throughout much of the thoracic spine as  well as both scapulae and the right clavicle and sternum. There are  some posterior elements involved in several thoracic vertebral  positions.  In the interval from November 2022, the bony lesions appear  grossly  similar.    ABDOMEN/PELVIS: Arterial phase imaging shows no hyperenhancing masses  in the upper abdomen on this phase.  Standard portal venous phase imaging also obtained. Liver appears  normal. Portal vein patent. Spleen and accessory splenule normal.  Bilateral adrenals normal. Pancreas grossly normal. Mild extrarenal  pelvis prominent variant again noted on the left. Right kidney appears  entirely normal as well. Decreased conspicuity of the medial cortical  left renal hypoenhancing area (series 8 image 143) suggestive of small  renal cyst.  IVC grossly normal. Abdominal aortic calcification without aneurysm.  No enlarged mesenteric or retroperitoneal lymph nodes. No free fluid.  No free air. Urinary bladder incompletely distended. No adnexal mass.  Bone detail in the abdomen and pelvis again shows diffuse lumbar and  bony pelvic as well as bilateral femoral osteoblastic foci consistent  with metastatic disease from breast cancer. Relative large lucent  defect at the inferior endplate of L2 appears similar to previous as  well. Overall, the bony changes appears similar      Impression    IMPRESSION: Grossly stable retroareolar left breast mass with nipple  retraction skin thickening consistent with breast cancer. Not  significantly changed from November 2022. Enhancing masses at lateral  margin adjacent to lateral margin of left pectoralis minor muscle with  the largest more lateral mass unchanged. There is a new slightly  hyperenhancing mass appearing within or immediately adjacent to the  left pectoralis minor muscle lateral aspect concerning for possible  new metastasis at this site. No other increased axillary or other  supradiaphragmatic lymph nodes. Unchanged osteoblastic disease  consistent with metastatic breast cancer. Unchanged lucent defect at  the inferior endplate of L2 as well.  Slight decreased conspicuity of apparent small left renal cyst. No  abdominopelvic metastases. No pulmonary  nodules of suspicion. Mild  abdominal aortic atherosclerotic calcification.    CHRISTINA FELTON MD         SYSTEM ID:  [id number]       ASSESSMENT AND PLAN:   1.  Metastatic ER positive MA positive HER-2/gabriela negative breast cancer with a left breast primary and bone metastasis.    Plan    1.  Continue potassium tid  2. zometa today and q 3 months  3. She declined the clinical trial and start aromasin and everolimus. This is going well. She will continue therapy and see me monthly with scans in 3 months.  4. She will need ESR1 testing and we will discuss doing this next visit.    Tiffany Hilton MD on 9/5/2023 at 8:53 AM

## 2023-09-12 DIAGNOSIS — Z17.0 MALIGNANT NEOPLASM OF OVERLAPPING SITES OF LEFT BREAST IN FEMALE, ESTROGEN RECEPTOR POSITIVE (H): Primary | ICD-10-CM

## 2023-09-12 DIAGNOSIS — C50.812 MALIGNANT NEOPLASM OF OVERLAPPING SITES OF LEFT BREAST IN FEMALE, ESTROGEN RECEPTOR POSITIVE (H): Primary | ICD-10-CM

## 2023-09-12 RX ORDER — EVEROLIMUS 10 MG/1
10 TABLET ORAL DAILY
Qty: 28 TABLET | Refills: 0 | Status: SHIPPED | OUTPATIENT
Start: 2023-09-12 | End: 2023-12-06

## 2023-09-22 RX ORDER — EXEMESTANE 25 MG/1
25 TABLET ORAL DAILY
Qty: 7 TABLET | Refills: 0 | Status: SHIPPED | OUTPATIENT
Start: 2023-09-22 | End: 2023-09-24

## 2023-09-24 ENCOUNTER — HEALTH MAINTENANCE LETTER (OUTPATIENT)
Age: 68
End: 2023-09-24

## 2023-09-24 DIAGNOSIS — C50.812 MALIGNANT NEOPLASM OF OVERLAPPING SITES OF LEFT BREAST IN FEMALE, ESTROGEN RECEPTOR POSITIVE (H): ICD-10-CM

## 2023-09-24 DIAGNOSIS — Z17.0 MALIGNANT NEOPLASM OF OVERLAPPING SITES OF LEFT BREAST IN FEMALE, ESTROGEN RECEPTOR POSITIVE (H): ICD-10-CM

## 2023-09-24 RX ORDER — EXEMESTANE 25 MG/1
25 TABLET ORAL DAILY
Qty: 7 TABLET | Refills: 0 | Status: SHIPPED | OUTPATIENT
Start: 2023-09-24 | End: 2023-10-03

## 2023-10-01 DIAGNOSIS — I10 BENIGN ESSENTIAL HYPERTENSION: ICD-10-CM

## 2023-10-02 RX ORDER — AMLODIPINE BESYLATE 10 MG/1
TABLET ORAL
Qty: 90 TABLET | Refills: 3 | Status: SHIPPED | OUTPATIENT
Start: 2023-10-02 | End: 2024-03-05

## 2023-10-02 NOTE — TELEPHONE ENCOUNTER
[facility] - Refill Request    Situation/Background:                                                      Refill request received for: amLODIPine (NORVASC) 10 MG tablet      Date of last refill, if known: 7/5/23    Assessment:                                                      Last office visit: 9/5/23    Future appointment scheduled? Yes: 10/3/23     Recent Lab Results and Vital Signs:    CBC RESULTS:   Recent Labs   Lab Test 09/05/23  0746   WBC 5.7   RBC 4.85   HGB 13.8   HCT 41.4   MCV 85   MCH 28.5   MCHC 33.3   RDW 13.2         BP Readings from Last 4 Encounters:   09/05/23 (!) 157/100   05/26/23 (!) 167/100   02/10/23 (!) 157/89   02/10/23 (!) 184/105     Plan:                                                      Refill request sent to provider for review.    Dmitri Figueredo, RN, BSN, OCN  RN Care Coordinator - Oncology  [facility]

## 2023-10-02 NOTE — PROGRESS NOTES
Oncology Follow Up Visit: October 3, 2023     Oncologist: Dr Tiffany Hilton  PCP: [facility], [facility]    Diagnosis:Metastatic Breast Cancer-  Mary Olson is a 67 yo female who noticed what she believed to be a bug bite on her left breast in 10/2019.  She initially noted 3 red spots- treated at home. She underwent imaging with a diagnostic mammogram on March 4, 2020, as well as ultrasound where they found an ill-defined margins measuring at least 6.2 cm to center of the left nipple.  Other masses are seen posterior to this including a 2.1 cm mass slightly medial on mammography.  Diffuse left breast skin thickening.  There are at least 3 abnormal enlarged lymph nodes in the left axilla measuring 2.5 x 1.3 x 2.4 cm.  The right breast at the 12:30 position 7 cm from the nipple, there is a spiculated mass corresponding mammographic finding measuring approximately 1.3 x 0.9 x 1.0 cm.  A smaller spiculated hypoechoic mass is at the 12 o'clock position 2 cm from the nipple, measuring 0.9 x 0.5 x 0.7 cm.  Normal lymph nodes in the right axilla.  Biopsy of the left breast mass confirmed invasive mammary carcinoma, no special type, invasive ductal, grade 2, estrogen receptor positive, progesterone receptor positive, HER-2 negative.  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  The second right breast mass at the 12 o'clock position showed benign breast tissue, composed predominantly of uncolonized stroma with rare ducts.  No discrete masses identified.  No atypical or malignant findings.  3/12/2020 MUGA showed normal heart function with LVEF of 58% and no other abnormalities.  Left Axillary biopsy was positive for metastatic mammary cancer with Negative Her2  PET scan showing metastasis to left axilla, retropectoral lymph nodes and diffuse bone  metastasis  Treatment:   3/25/2020 to 8/2020 Taxol x 12weeks followed by ddAC x 4 cycles  3/25/2020 Begin zometa  8/2020 to 3/2021: Arimidex   3/19/21 began abemeciclib with Arimidex she has been dose reduced on her abemaciclib 200 mg a day  8/2023 began Aromasin + Everolimus    Interval History: Ms. Olson comes to clinic for review of use of Affinitor 10 mg po daily and Bfarymit75 mg daily. Pt shares she is having more hot flashes now since start of Aromasin but tolerable.   No diarrhea but BPs up again in clinic. Blood sugars up and is not diabetic but  is. Has good energy and was eating sugary treat today and feels this level of sugars is not normal for her- checks blood sugar every 2 weeks or so- not on medication for diabetes. She admits she is not exercising. Breast now has decreased discharge and is crusted- not painful.   Rest of comprehensive and complete ROS is reviewed and is negative.   Past Medical History:   Diagnosis Date    ASCUS favoring benign 10/28/15    neg HPV    Cancer (H)     Diabetes mellitus, type 2 (H) 7/14/2020    Hypertension     Obese      Current Outpatient Medications   Medication    alcohol swab prep pads    amLODIPine (NORVASC) 10 MG tablet    anastrozole (ARIMIDEX) 1 MG tablet    Ascorbic Acid (VITAMIN C PO)    aspirin 81 MG tablet    biotin 1000 MCG TABS tablet    blood glucose (NO BRAND SPECIFIED) test strip    blood glucose calibration (NO BRAND SPECIFIED) solution    blood glucose monitoring (NO BRAND SPECIFIED) meter device kit    Blood Pressure KIT    calcium-vitamin D-vitamin K (VIACTIV) 500-500-40 MG-UNT-MCG CHEW    chlorthalidone (HYGROTON) 25 MG tablet    Cholecalciferol (VITAMIN D3 PO)    Coenzyme Q10 (COQ-10) 200 MG CAPS    Cyanocobalamin (B-12) 2500 MCG TABS    dexAMETHasone alcohol-free (DECADRON) 0.1 MG/ML solution    everolimus (AFINITOR) 10 MG tablet    exemestane (AROMASIN) 25 MG tablet    gabapentin (NEURONTIN) 250 MG/5ML solution    hydrocortisone 2.5 %  "cream    lidocaine-prilocaine (EMLA) 2.5-2.5 % external cream    Multiple Vitamins-Minerals (CENTRUM ADULTS PO)    potassium chloride (KLOR-CON) 20 MEQ packet    prochlorperazine (COMPAZINE) 10 MG tablet    thin (NO BRAND SPECIFIED) lancets    UNABLE TO FIND    UNABLE TO FIND    VITAMIN E NATURAL PO     No current facility-administered medications for this visit.     Facility-Administered Medications Ordered in Other Visits   Medication    heparin 100 unit/mL injection 5 mL    heparin 100 UNIT/ML injection 500 Units    sodium chloride (PF) 0.9% PF flush 3-20 mL     Allergies   Allergen Reactions    Lisinopril Cough       Physical Exam:BP (!) 160/105   Pulse (!) 129   Ht 1.575 m (5' 2.01\")   Wt 92.5 kg (204 lb)   SpO2 98%   BMI 37.30 kg/m    Constitutional: Alert, cooperative, and in no distress. Obese.  Cardiac: Heart rate and rhythm is regular and strong without murmur  Respiratory: Breathing easy, Lung sounds clear to auscultation-no cough  Breasts: left breast with crusted pink wound with slight clear drainage- keeping gauze over nipple area- no odor.  GI: Abdomen is rounded, soft, non tender. BS normal.   MS: Muscle tone normal, extremities normal with no pitting edema. Moving well to and from exam table.   Skin: No suspicious lesions or rashes- see breast above.  Neuro: Sensory grossly WNL- gait normal.   Psych: Mentation appears normal with good conversation    Laboratory Results:   Results for orders placed or performed in visit on 10/03/23   Comprehensive metabolic panel     Status: Abnormal   Result Value Ref Range    Sodium 131 (L) 135 - 145 mmol/L    Potassium 3.9 3.4 - 5.3 mmol/L    Carbon Dioxide (CO2) 24 22 - 29 mmol/L    Anion Gap 15 7 - 15 mmol/L    Urea Nitrogen 13.6 8.0 - 23.0 mg/dL    Creatinine 0.69 0.51 - 0.95 mg/dL    GFR Estimate >90 >60 mL/min/1.73m2    Calcium 9.6 8.8 - 10.2 mg/dL    Chloride 92 (L) 98 - 107 mmol/L    Glucose 435 (H) 70 - 99 mg/dL    Alkaline Phosphatase 139 (H) 35 - " 104 U/L    AST 35 0 - 45 U/L    ALT 32 0 - 50 U/L    Protein Total 8.0 6.4 - 8.3 g/dL    Albumin 4.3 3.5 - 5.2 g/dL    Bilirubin Total 0.4 <=1.2 mg/dL   CBC with platelets and differential     Status: None   Result Value Ref Range    WBC Count 6.2 4.0 - 11.0 10e3/uL    RBC Count 5.04 3.80 - 5.20 10e6/uL    Hemoglobin 13.4 11.7 - 15.7 g/dL    Hematocrit 40.4 35.0 - 47.0 %    MCV 80 78 - 100 fL    MCH 26.6 26.5 - 33.0 pg    MCHC 33.2 31.5 - 36.5 g/dL    RDW 13.7 10.0 - 15.0 %    Platelet Count 183 150 - 450 10e3/uL    % Neutrophils 66 %    % Lymphocytes 24 %    % Monocytes 8 %    Mids % (Monos, Eos, Basos)      % Eosinophils 1 %    % Basophils 1 %    % Immature Granulocytes 0 %    NRBCs per 100 WBC 0 <1 /100    Absolute Neutrophils 4.1 1.6 - 8.3 10e3/uL    Absolute Lymphocytes 1.5 0.8 - 5.3 10e3/uL    Absolute Monocytes 0.5 0.0 - 1.3 10e3/uL    Mids Abs (Monos, Eos, Basos)      Absolute Eosinophils 0.1 0.0 - 0.7 10e3/uL    Absolute Basophils 0.1 0.0 - 0.2 10e3/uL    Absolute Immature Granulocytes 0.0 <=0.4 10e3/uL    Absolute NRBCs 0.0 10e3/uL   CBC with platelets differential     Status: None    Narrative    The following orders were created for panel order CBC with platelets differential.  Procedure                               Abnormality         Status                     ---------                               -----------         ------                     CBC with platelets and d...[[id number]]                      Final result                 Please view results for these tests on the individual orders.       Assessment and Plan:    Metastatic Breast Cancer-patient is now using exemestane 25 mg and Everolimus 10 mg daily. She is seeing elevated blood pressures and blood sugars.   - though home BPs are normal per pt, she is asked to report BPs if >140/90 with home BPs and continue Hypertension meds of amlodipine 10 mg po nightly and chlorthalidone 25 mg.   - hyponatremia- new today- pt has been pushing fluids  this morning but unlikely to be reason for the change. Will need recheck   - to take blood sugar levels 2 x daily varying times for 3-5 days. She is to send us results. Hgb A1c ordered=9.7. Asked her to see PCP as soon as able.   WIll repeat labs next week- BMP.   CA 15- 3 has risen from 5/2023 =180 to oobnm=184 but is on new program x 2 months.   Review with Dr Hilton on 10/31/2023 Next scan set for 11/27.  Bone metastasis - Using Zometa every 3 month- next due 12/2023.  Pt confirms using calcium plus vitamin D supplement daily.   The total time of this encounter amounted to 45 minutes. This time included face to face time spent with the patient, prep work, ordering tests, and performing post visit documentation.  Swathi Page,Cnp

## 2023-10-03 ENCOUNTER — LAB (OUTPATIENT)
Dept: ONCOLOGY | Facility: CLINIC | Age: 68
End: 2023-10-03
Payer: MEDICARE

## 2023-10-03 ENCOUNTER — ONCOLOGY VISIT (OUTPATIENT)
Dept: ONCOLOGY | Facility: CLINIC | Age: 68
End: 2023-10-03
Payer: MEDICARE

## 2023-10-03 VITALS
WEIGHT: 204 LBS | SYSTOLIC BLOOD PRESSURE: 160 MMHG | BODY MASS INDEX: 37.54 KG/M2 | HEIGHT: 62 IN | HEART RATE: 129 BPM | OXYGEN SATURATION: 98 % | DIASTOLIC BLOOD PRESSURE: 105 MMHG

## 2023-10-03 DIAGNOSIS — C50.812 MALIGNANT NEOPLASM OF OVERLAPPING SITES OF LEFT BREAST IN FEMALE, ESTROGEN RECEPTOR POSITIVE (H): Primary | ICD-10-CM

## 2023-10-03 DIAGNOSIS — Z17.0 MALIGNANT NEOPLASM OF OVERLAPPING SITES OF LEFT BREAST IN FEMALE, ESTROGEN RECEPTOR POSITIVE (H): Primary | ICD-10-CM

## 2023-10-03 DIAGNOSIS — R73.09 ELEVATED GLUCOSE: Primary | ICD-10-CM

## 2023-10-03 DIAGNOSIS — R73.09 ELEVATED GLUCOSE: ICD-10-CM

## 2023-10-03 DIAGNOSIS — E87.1 HYPONATREMIA: ICD-10-CM

## 2023-10-03 DIAGNOSIS — C50.812 MALIGNANT NEOPLASM OF OVERLAPPING SITES OF LEFT BREAST IN FEMALE, ESTROGEN RECEPTOR POSITIVE (H): ICD-10-CM

## 2023-10-03 DIAGNOSIS — Z17.0 MALIGNANT NEOPLASM OF OVERLAPPING SITES OF LEFT BREAST IN FEMALE, ESTROGEN RECEPTOR POSITIVE (H): ICD-10-CM

## 2023-10-03 DIAGNOSIS — I10 BENIGN ESSENTIAL HYPERTENSION: ICD-10-CM

## 2023-10-03 LAB
ALBUMIN SERPL BCG-MCNC: 4.3 G/DL (ref 3.5–5.2)
ALP SERPL-CCNC: 139 U/L (ref 35–104)
ALT SERPL W P-5'-P-CCNC: 32 U/L (ref 0–50)
ANION GAP SERPL CALCULATED.3IONS-SCNC: 15 MMOL/L (ref 7–15)
AST SERPL W P-5'-P-CCNC: 35 U/L (ref 0–45)
BASO+EOS+MONOS # BLD AUTO: NORMAL 10*3/UL
BASO+EOS+MONOS NFR BLD AUTO: NORMAL %
BASOPHILS # BLD AUTO: 0.1 10E3/UL (ref 0–0.2)
BASOPHILS NFR BLD AUTO: 1 %
BILIRUB SERPL-MCNC: 0.4 MG/DL
BUN SERPL-MCNC: 13.6 MG/DL (ref 8–23)
CALCIUM SERPL-MCNC: 9.6 MG/DL (ref 8.8–10.2)
CHLORIDE SERPL-SCNC: 92 MMOL/L (ref 98–107)
CREAT SERPL-MCNC: 0.69 MG/DL (ref 0.51–0.95)
DEPRECATED HCO3 PLAS-SCNC: 24 MMOL/L (ref 22–29)
EGFRCR SERPLBLD CKD-EPI 2021: >90 ML/MIN/1.73M2
EOSINOPHIL # BLD AUTO: 0.1 10E3/UL (ref 0–0.7)
EOSINOPHIL NFR BLD AUTO: 1 %
ERYTHROCYTE [DISTWIDTH] IN BLOOD BY AUTOMATED COUNT: 13.7 % (ref 10–15)
GLUCOSE SERPL-MCNC: 435 MG/DL (ref 70–99)
HCT VFR BLD AUTO: 40.4 % (ref 35–47)
HGB BLD-MCNC: 13.4 G/DL (ref 11.7–15.7)
IMM GRANULOCYTES # BLD: 0 10E3/UL
IMM GRANULOCYTES NFR BLD: 0 %
LYMPHOCYTES # BLD AUTO: 1.5 10E3/UL (ref 0.8–5.3)
LYMPHOCYTES NFR BLD AUTO: 24 %
MCH RBC QN AUTO: 26.6 PG (ref 26.5–33)
MCHC RBC AUTO-ENTMCNC: 33.2 G/DL (ref 31.5–36.5)
MCV RBC AUTO: 80 FL (ref 78–100)
MONOCYTES # BLD AUTO: 0.5 10E3/UL (ref 0–1.3)
MONOCYTES NFR BLD AUTO: 8 %
NEUTROPHILS # BLD AUTO: 4.1 10E3/UL (ref 1.6–8.3)
NEUTROPHILS NFR BLD AUTO: 66 %
NRBC # BLD AUTO: 0 10E3/UL
NRBC BLD AUTO-RTO: 0 /100
PLATELET # BLD AUTO: 183 10E3/UL (ref 150–450)
POTASSIUM SERPL-SCNC: 3.9 MMOL/L (ref 3.4–5.3)
PROT SERPL-MCNC: 8 G/DL (ref 6.4–8.3)
RBC # BLD AUTO: 5.04 10E6/UL (ref 3.8–5.2)
SODIUM SERPL-SCNC: 131 MMOL/L (ref 135–145)
WBC # BLD AUTO: 6.2 10E3/UL (ref 4–11)

## 2023-10-03 PROCEDURE — 91320 SARSCV2 VAC 30MCG TRS-SUC IM: CPT | Performed by: NURSE PRACTITIONER

## 2023-10-03 PROCEDURE — G0008 ADMIN INFLUENZA VIRUS VAC: HCPCS | Performed by: NURSE PRACTITIONER

## 2023-10-03 PROCEDURE — 99215 OFFICE O/P EST HI 40 MIN: CPT | Mod: 25 | Performed by: NURSE PRACTITIONER

## 2023-10-03 PROCEDURE — 80053 COMPREHEN METABOLIC PANEL: CPT | Performed by: INTERNAL MEDICINE

## 2023-10-03 PROCEDURE — 90662 IIV NO PRSV INCREASED AG IM: CPT | Performed by: NURSE PRACTITIONER

## 2023-10-03 PROCEDURE — 83036 HEMOGLOBIN GLYCOSYLATED A1C: CPT | Performed by: INTERNAL MEDICINE

## 2023-10-03 PROCEDURE — 86300 IMMUNOASSAY TUMOR CA 15-3: CPT | Performed by: INTERNAL MEDICINE

## 2023-10-03 PROCEDURE — 90480 ADMN SARSCOV2 VAC 1/ONLY CMP: CPT | Performed by: NURSE PRACTITIONER

## 2023-10-03 PROCEDURE — 85025 COMPLETE CBC W/AUTO DIFF WBC: CPT | Performed by: INTERNAL MEDICINE

## 2023-10-03 RX ORDER — HEPARIN SODIUM (PORCINE) LOCK FLUSH IV SOLN 100 UNIT/ML 100 UNIT/ML
500 SOLUTION INTRAVENOUS
Status: DISCONTINUED | OUTPATIENT
Start: 2023-10-03 | End: 2023-10-03 | Stop reason: HOSPADM

## 2023-10-03 RX ADMIN — HEPARIN SODIUM (PORCINE) LOCK FLUSH IV SOLN 100 UNIT/ML 500 UNITS: 100 SOLUTION at 14:14

## 2023-10-03 ASSESSMENT — PAIN SCALES - GENERAL: PAINLEVEL: NO PAIN (0)

## 2023-10-03 NOTE — NURSING NOTE
"Oncology Rooming Note    October 3, 2023 2:43 PM   Mary Olson is a 68 year old female who presents for:    Chief Complaint   Patient presents with    Oncology Clinic Visit     Follow up     Initial Vitals: BP (!) 187/113 (BP Location: Left arm, Patient Position: Chair, Cuff Size: Adult Regular)   Pulse (!) 129   Ht 1.575 m (5' 2.01\")   Wt 92.5 kg (204 lb)   SpO2 98%   BMI 37.30 kg/m   Estimated body mass index is 37.3 kg/m  as calculated from the following:    Height as of this encounter: 1.575 m (5' 2.01\").    Weight as of this encounter: 92.5 kg (204 lb). Body surface area is 2.01 meters squared.  No Pain (0) Comment: Data Unavailable   No LMP recorded. Patient is postmenopausal.  Allergies reviewed: Yes  Medications reviewed: Yes    Medications: Medication refills not needed today.  Pharmacy name entered into EPIC:    [facility] - [place], [place] - [address] [place] [address]  [facility] [place], [place] - [address] [place] [address] [facility] [place], [place] - [address]  [facility] [place] - [address] [facility] - [place] - [address]  [facility] - [place], [place] - [address] [facility] [address]  [facility] - [place], [place] - [address]    Clinical concerns: NO  Swathi was notified.      Sonia Vigil CMA              "

## 2023-10-03 NOTE — PROGRESS NOTES
Did not draw fasting lipid panel today as patient states she has eaten today.    Port site scrubbed with Chloraprep for 30 seconds. Accessed port using sterile technique. Green and purple tubes drawn. See documentation flowsheet. Port needle deaccessed. Tolerated port access and draw without complaint. An Harding RN on 10/3/2023 at 2:25 PM

## 2023-10-03 NOTE — LETTER
10/3/2023         RE: Mary Olson  [address]  [facility] [address]        Dear Colleague,    Thank you for referring your patient, Mary Olson, to the [facility]. Please see a copy of my visit note below.    Oncology Follow Up Visit: October 3, 2023     Oncologist: Dr Tiffany Hilton  PCP: Clinic, [facility]    Diagnosis:Metastatic Breast Cancer-  Mary Olson is a 69 yo female who noticed what she believed to be a bug bite on her left breast in 10/2019.  She initially noted 3 red spots- treated at home. She underwent imaging with a diagnostic mammogram on March 4, 2020, as well as ultrasound where they found an ill-defined margins measuring at least 6.2 cm to center of the left nipple.  Other masses are seen posterior to this including a 2.1 cm mass slightly medial on mammography.  Diffuse left breast skin thickening.  There are at least 3 abnormal enlarged lymph nodes in the left axilla measuring 2.5 x 1.3 x 2.4 cm.  The right breast at the 12:30 position 7 cm from the nipple, there is a spiculated mass corresponding mammographic finding measuring approximately 1.3 x 0.9 x 1.0 cm.  A smaller spiculated hypoechoic mass is at the 12 o'clock position 2 cm from the nipple, measuring 0.9 x 0.5 x 0.7 cm.  Normal lymph nodes in the right axilla.  Biopsy of the left breast mass confirmed invasive mammary carcinoma, no special type, invasive ductal, grade 2, estrogen receptor positive, progesterone receptor positive, HER-2 negative.  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  The second right breast mass at the 12 o'clock position showed benign breast tissue, composed predominantly of uncolonized stroma with rare ducts.  No discrete masses identified.  No atypical or malignant  findings.  3/12/2020 MUGA showed normal heart function with LVEF of 58% and no other abnormalities.  Left Axillary biopsy was positive for metastatic mammary cancer with Negative Her2  PET scan showing metastasis to left axilla, retropectoral lymph nodes and diffuse bone metastasis  Treatment:   3/25/2020 to 8/2020 Taxol x 12weeks followed by ddAC x 4 cycles  3/25/2020 Begin zometa  8/2020 to 3/2021: Arimidex   3/19/21 began abemeciclib with Arimidex she has been dose reduced on her abemaciclib 200 mg a day  8/2023 began Aromasin + Everolimus    Interval History: Ms. Olson comes to clinic for review of use of Affinitor 10 mg po daily and [id number] mg daily. Pt shares she is having more hot flashes now since start of Aromasin but tolerable.   No diarrhea but BPs up again in clinic. Blood sugars up and is not diabetic but  is. Has good energy and was eating sugary treat today and feels this level of sugars is not normal for her- checks blood sugar every 2 weeks or so- not on medication for diabetes. She admits she is not exercising. Breast now has decreased discharge and is crusted- not painful.   Rest of comprehensive and complete ROS is reviewed and is negative.   Past Medical History:   Diagnosis Date     ASCUS favoring benign 10/28/15    neg HPV     Cancer (H)      Diabetes mellitus, type 2 (H) 7/14/2020     Hypertension      Obese      Current Outpatient Medications   Medication     alcohol swab prep pads     amLODIPine (NORVASC) 10 MG tablet     anastrozole (ARIMIDEX) 1 MG tablet     Ascorbic Acid (VITAMIN C PO)     aspirin 81 MG tablet     biotin 1000 MCG TABS tablet     blood glucose (NO BRAND SPECIFIED) test strip     blood glucose calibration (NO BRAND SPECIFIED) solution     blood glucose monitoring (NO BRAND SPECIFIED) meter device kit     Blood Pressure KIT     calcium-vitamin D-vitamin K (VIACTIV) 500-500-40 MG-UNT-MCG CHEW     chlorthalidone (HYGROTON) 25 MG tablet     Cholecalciferol (VITAMIN  "D3 PO)     Coenzyme Q10 (COQ-10) 200 MG CAPS     Cyanocobalamin (B-12) 2500 MCG TABS     dexAMETHasone alcohol-free (DECADRON) 0.1 MG/ML solution     everolimus (AFINITOR) 10 MG tablet     exemestane (AROMASIN) 25 MG tablet     gabapentin (NEURONTIN) 250 MG/5ML solution     hydrocortisone 2.5 % cream     lidocaine-prilocaine (EMLA) 2.5-2.5 % external cream     Multiple Vitamins-Minerals (CENTRUM ADULTS PO)     potassium chloride (KLOR-CON) 20 MEQ packet     prochlorperazine (COMPAZINE) 10 MG tablet     thin (NO BRAND SPECIFIED) lancets     UNABLE TO FIND     UNABLE TO FIND     VITAMIN E NATURAL PO     No current facility-administered medications for this visit.     Facility-Administered Medications Ordered in Other Visits   Medication     heparin 100 unit/mL injection 5 mL     heparin 100 UNIT/ML injection 500 Units     sodium chloride (PF) 0.9% PF flush 3-20 mL     Allergies   Allergen Reactions     Lisinopril Cough       Physical Exam:BP (!) 160/105   Pulse (!) 129   Ht 1.575 m (5' 2.01\")   Wt 92.5 kg (204 lb)   SpO2 98%   BMI 37.30 kg/m    Constitutional: Alert, cooperative, and in no distress. Obese.  Cardiac: Heart rate and rhythm is regular and strong without murmur  Respiratory: Breathing easy, Lung sounds clear to auscultation-no cough  Breasts: left breast with crusted pink wound with slight clear drainage- keeping gauze over nipple area- no odor.  GI: Abdomen is rounded, soft, non tender. BS normal.   MS: Muscle tone normal, extremities normal with no pitting edema. Moving well to and from exam table.   Skin: No suspicious lesions or rashes- see breast above.  Neuro: Sensory grossly WNL- gait normal.   Psych: Mentation appears normal with good conversation    Laboratory Results:   Results for orders placed or performed in visit on 10/03/23   Comprehensive metabolic panel     Status: Abnormal   Result Value Ref Range    Sodium 131 (L) 135 - 145 mmol/L    Potassium 3.9 3.4 - 5.3 mmol/L    Carbon Dioxide " (CO2) 24 22 - 29 mmol/L    Anion Gap 15 7 - 15 mmol/L    Urea Nitrogen 13.6 8.0 - 23.0 mg/dL    Creatinine 0.69 0.51 - 0.95 mg/dL    GFR Estimate >90 >60 mL/min/1.73m2    Calcium 9.6 8.8 - 10.2 mg/dL    Chloride 92 (L) 98 - 107 mmol/L    Glucose 435 (H) 70 - 99 mg/dL    Alkaline Phosphatase 139 (H) 35 - 104 U/L    AST 35 0 - 45 U/L    ALT 32 0 - 50 U/L    Protein Total 8.0 6.4 - 8.3 g/dL    Albumin 4.3 3.5 - 5.2 g/dL    Bilirubin Total 0.4 <=1.2 mg/dL   CBC with platelets and differential     Status: None   Result Value Ref Range    WBC Count 6.2 4.0 - 11.0 10e3/uL    RBC Count 5.04 3.80 - 5.20 10e6/uL    Hemoglobin 13.4 11.7 - 15.7 g/dL    Hematocrit 40.4 35.0 - 47.0 %    MCV 80 78 - 100 fL    MCH 26.6 26.5 - 33.0 pg    MCHC 33.2 31.5 - 36.5 g/dL    RDW 13.7 10.0 - 15.0 %    Platelet Count 183 150 - 450 10e3/uL    % Neutrophils 66 %    % Lymphocytes 24 %    % Monocytes 8 %    Mids % (Monos, Eos, Basos)      % Eosinophils 1 %    % Basophils 1 %    % Immature Granulocytes 0 %    NRBCs per 100 WBC 0 <1 /100    Absolute Neutrophils 4.1 1.6 - 8.3 10e3/uL    Absolute Lymphocytes 1.5 0.8 - 5.3 10e3/uL    Absolute Monocytes 0.5 0.0 - 1.3 10e3/uL    Mids Abs (Monos, Eos, Basos)      Absolute Eosinophils 0.1 0.0 - 0.7 10e3/uL    Absolute Basophils 0.1 0.0 - 0.2 10e3/uL    Absolute Immature Granulocytes 0.0 <=0.4 10e3/uL    Absolute NRBCs 0.0 10e3/uL   CBC with platelets differential     Status: None    Narrative    The following orders were created for panel order CBC with platelets differential.  Procedure                               Abnormality         Status                     ---------                               -----------         ------                     CBC with platelets and d...[[id number]]                      Final result                 Please view results for these tests on the individual orders.       Assessment and Plan:    Metastatic Breast Cancer-patient is now using exemestane 25 mg and Everolimus 10  mg daily. She is seeing elevated blood pressures and blood sugars.   - though home BPs are normal per pt, she is asked to report BPs if >140/90 with home BPs and continue Hypertension meds of amlodipine 10 mg po nightly and chlorthalidone 25 mg.   - hyponatremia- new today- pt has been pushing fluids this morning but unlikely to be reason for the change. Will need recheck   - to take blood sugar levels 2 x daily varying times for 3-5 days. She is to send us results. Hgb A1c ordered=9.7. Asked her to see PCP as soon as able.   WIll repeat labs next week- BMP.   CA 15- 3 has risen from 5/2023 =180 to chpkf=067 but is on new program x 2 months.   Review with Dr Hilton on 10/31/2023 Next scan set for 11/27.  Bone metastasis - Using Zometa every 3 month- next due 12/2023.  Pt confirms using calcium plus vitamin D supplement daily.   The total time of this encounter amounted to 45 minutes. This time included face to face time spent with the patient, prep work, ordering tests, and performing post visit documentation.  Swathi Page Cnp      Again, thank you for allowing me to participate in the care of your patient.        Sincerely,        Swathi Page, LONNY, APRN CNP

## 2023-10-04 LAB
CANCER AG15-3 SERPL-ACNC: 823 U/ML
HBA1C MFR BLD: 9.7 % (ref 0–5.6)

## 2023-10-06 DIAGNOSIS — C50.812 MALIGNANT NEOPLASM OF OVERLAPPING SITES OF LEFT BREAST IN FEMALE, ESTROGEN RECEPTOR POSITIVE (H): Primary | ICD-10-CM

## 2023-10-06 DIAGNOSIS — Z17.0 MALIGNANT NEOPLASM OF OVERLAPPING SITES OF LEFT BREAST IN FEMALE, ESTROGEN RECEPTOR POSITIVE (H): Primary | ICD-10-CM

## 2023-10-06 RX ORDER — EVEROLIMUS 10 MG/1
10 TABLET ORAL DAILY
Qty: 28 TABLET | Refills: 0 | Status: SHIPPED | OUTPATIENT
Start: 2023-10-06 | End: 2023-12-06

## 2023-10-23 ENCOUNTER — TELEPHONE (OUTPATIENT)
Dept: ONCOLOGY | Facility: CLINIC | Age: 68
End: 2023-10-23
Payer: MEDICARE

## 2023-10-26 ENCOUNTER — TELEPHONE (OUTPATIENT)
Dept: ONCOLOGY | Facility: CLINIC | Age: 68
End: 2023-10-26
Payer: MEDICARE

## 2023-10-26 NOTE — ORAL ONC MGMT
Oral Chemotherapy Monitoring Program    Called [facility] to clarify refill request received from [facility] - noted potential therapeutic duplication with anastrozole and exemestane. Desmond confirmed that she is NOT taking anastrozole in addition to exemestane and thinks there was an issue with her pharmacy, thus the erroneous refill request.     Tommy Roche, PharmD  Hematology/Oncology Clinical Pharmacist  [facility]  [phone]

## 2023-10-31 ENCOUNTER — LAB (OUTPATIENT)
Dept: ONCOLOGY | Facility: CLINIC | Age: 68
End: 2023-10-31
Payer: MEDICARE

## 2023-10-31 ENCOUNTER — ONCOLOGY VISIT (OUTPATIENT)
Dept: ONCOLOGY | Facility: CLINIC | Age: 68
End: 2023-10-31
Payer: MEDICARE

## 2023-10-31 VITALS
BODY MASS INDEX: 35.18 KG/M2 | TEMPERATURE: 98.5 F | RESPIRATION RATE: 18 BRPM | WEIGHT: 192.4 LBS | OXYGEN SATURATION: 97 %

## 2023-10-31 DIAGNOSIS — C50.812 MALIGNANT NEOPLASM OF OVERLAPPING SITES OF LEFT BREAST IN FEMALE, ESTROGEN RECEPTOR POSITIVE (H): ICD-10-CM

## 2023-10-31 DIAGNOSIS — E87.1 HYPONATREMIA: ICD-10-CM

## 2023-10-31 DIAGNOSIS — Z17.0 MALIGNANT NEOPLASM OF OVERLAPPING SITES OF LEFT BREAST IN FEMALE, ESTROGEN RECEPTOR POSITIVE (H): ICD-10-CM

## 2023-10-31 DIAGNOSIS — Z17.0 MALIGNANT NEOPLASM OF OVERLAPPING SITES OF LEFT BREAST IN FEMALE, ESTROGEN RECEPTOR POSITIVE (H): Primary | ICD-10-CM

## 2023-10-31 DIAGNOSIS — R73.09 ELEVATED GLUCOSE: ICD-10-CM

## 2023-10-31 DIAGNOSIS — C50.812 MALIGNANT NEOPLASM OF OVERLAPPING SITES OF LEFT BREAST IN FEMALE, ESTROGEN RECEPTOR POSITIVE (H): Primary | ICD-10-CM

## 2023-10-31 DIAGNOSIS — I10 BENIGN ESSENTIAL HYPERTENSION: ICD-10-CM

## 2023-10-31 LAB
ALBUMIN SERPL BCG-MCNC: 4.3 G/DL (ref 3.5–5.2)
ALP SERPL-CCNC: 128 U/L (ref 35–104)
ALT SERPL W P-5'-P-CCNC: 27 U/L (ref 0–50)
ANION GAP SERPL CALCULATED.3IONS-SCNC: 16 MMOL/L (ref 7–15)
AST SERPL W P-5'-P-CCNC: 28 U/L (ref 0–45)
BASOPHILS # BLD AUTO: 0.1 10E3/UL (ref 0–0.2)
BASOPHILS NFR BLD AUTO: 1 %
BILIRUB SERPL-MCNC: 0.5 MG/DL
BUN SERPL-MCNC: 16.4 MG/DL (ref 8–23)
CALCIUM SERPL-MCNC: 9.8 MG/DL (ref 8.8–10.2)
CANCER AG15-3 SERPL-ACNC: 876 U/ML
CHLORIDE SERPL-SCNC: 92 MMOL/L (ref 98–107)
CHOLEST SERPL-MCNC: 260 MG/DL
CREAT SERPL-MCNC: 0.74 MG/DL (ref 0.51–0.95)
DEPRECATED HCO3 PLAS-SCNC: 26 MMOL/L (ref 22–29)
EGFRCR SERPLBLD CKD-EPI 2021: 88 ML/MIN/1.73M2
EOSINOPHIL # BLD AUTO: 0.1 10E3/UL (ref 0–0.7)
EOSINOPHIL NFR BLD AUTO: 1 %
ERYTHROCYTE [DISTWIDTH] IN BLOOD BY AUTOMATED COUNT: 14.7 % (ref 10–15)
GLUCOSE SERPL-MCNC: 317 MG/DL (ref 70–99)
HCT VFR BLD AUTO: 39.9 % (ref 35–47)
HDLC SERPL-MCNC: 81 MG/DL
HGB BLD-MCNC: 13.3 G/DL (ref 11.7–15.7)
IMM GRANULOCYTES # BLD: 0 10E3/UL
IMM GRANULOCYTES NFR BLD: 0 %
LDLC SERPL CALC-MCNC: 146 MG/DL
LYMPHOCYTES # BLD AUTO: 1.3 10E3/UL (ref 0.8–5.3)
LYMPHOCYTES NFR BLD AUTO: 25 %
MCH RBC QN AUTO: 25.8 PG (ref 26.5–33)
MCHC RBC AUTO-ENTMCNC: 33.3 G/DL (ref 31.5–36.5)
MCV RBC AUTO: 78 FL (ref 78–100)
MONOCYTES # BLD AUTO: 0.5 10E3/UL (ref 0–1.3)
MONOCYTES NFR BLD AUTO: 9 %
NEUTROPHILS # BLD AUTO: 3.2 10E3/UL (ref 1.6–8.3)
NEUTROPHILS NFR BLD AUTO: 64 %
NONHDLC SERPL-MCNC: 179 MG/DL
NRBC # BLD AUTO: 0 10E3/UL
NRBC BLD AUTO-RTO: 0 /100
PLATELET # BLD AUTO: 152 10E3/UL (ref 150–450)
POTASSIUM SERPL-SCNC: 3.7 MMOL/L (ref 3.4–5.3)
PROT SERPL-MCNC: 8.1 G/DL (ref 6.4–8.3)
RBC # BLD AUTO: 5.15 10E6/UL (ref 3.8–5.2)
SODIUM SERPL-SCNC: 134 MMOL/L (ref 135–145)
TRIGL SERPL-MCNC: 166 MG/DL
WBC # BLD AUTO: 5.1 10E3/UL (ref 4–11)

## 2023-10-31 PROCEDURE — 86300 IMMUNOASSAY TUMOR CA 15-3: CPT | Performed by: INTERNAL MEDICINE

## 2023-10-31 PROCEDURE — 80061 LIPID PANEL: CPT | Performed by: INTERNAL MEDICINE

## 2023-10-31 PROCEDURE — 99213 OFFICE O/P EST LOW 20 MIN: CPT | Performed by: INTERNAL MEDICINE

## 2023-10-31 PROCEDURE — 85025 COMPLETE CBC W/AUTO DIFF WBC: CPT | Performed by: INTERNAL MEDICINE

## 2023-10-31 PROCEDURE — 80053 COMPREHEN METABOLIC PANEL: CPT | Performed by: INTERNAL MEDICINE

## 2023-10-31 RX ORDER — EXEMESTANE 25 MG/1
25 TABLET ORAL DAILY
Qty: 90 TABLET | Refills: 3 | Status: SHIPPED | OUTPATIENT
Start: 2023-10-31 | End: 2023-12-05

## 2023-10-31 RX ORDER — LIDOCAINE/PRILOCAINE 2.5 %-2.5%
CREAM (GRAM) TOPICAL
Qty: 30 G | Refills: 1 | Status: SHIPPED | OUTPATIENT
Start: 2023-10-31

## 2023-10-31 RX ORDER — HEPARIN SODIUM (PORCINE) LOCK FLUSH IV SOLN 100 UNIT/ML 100 UNIT/ML
500 SOLUTION INTRAVENOUS
Status: DISCONTINUED | OUTPATIENT
Start: 2023-10-31 | End: 2023-10-31 | Stop reason: HOSPADM

## 2023-10-31 RX ADMIN — HEPARIN SODIUM (PORCINE) LOCK FLUSH IV SOLN 100 UNIT/ML 500 UNITS: 100 SOLUTION at 07:22

## 2023-10-31 NOTE — PROGRESS NOTES
"Oncology Rooming Note    October 31, 2023 8:07 AM   Mary Olson is a 68 year old female who presents for:    Chief Complaint   Patient presents with    Oncology Clinic Visit     Initial Vitals: There were no vitals taken for this visit. Estimated body mass index is 37.3 kg/m  as calculated from the following:    Height as of 10/3/23: 1.575 m (5' 2.01\").    Weight as of 10/3/23: 92.5 kg (204 lb). There is no height or weight on file to calculate BSA.  Data Unavailable Comment: Data Unavailable   No LMP recorded. Patient is postmenopausal.  Allergies reviewed: Yes  Medications reviewed: Yes    Medications: Medication refills not needed today.  Pharmacy name entered into EPIC:    [facility]  [facility] - [place], [place] - [address] [place] [address]  [email or web address] - [place], [place] - [address] [place] [address] [facility] - [place], [place] - [address] [facility].  [facility] - [place], [place] - [address] [facility] - [place], [place] [facility] [address] [facility] [place], [place] - [address] [facility] [address]  [facility] - [place], [place] - [address] AT [facility] ONCOLOGY  10/31/23      ONCOLOGY DIAGNOSIS:  1. March 2020: Metastatic Breast Cancer ER+, TN+ Her2 Negative; with bone metastasis and a left breast primary. PI K3 CA mutation of note it is a pathologic variant which is outside the mutation of specific variants enrolled in the solar one trial.  TEJAS, TMB low     THERAPY TO DATE:  1. March 2020 to August 2020: Weekly Taxol.  2. August 2020 to March 2021: Anastrozole   3. 3/19/21 began abemeciclib with arimidex she has been dose reduced on her abemaciclib 200 mg a day   4. zometa q 3 months  5. Changed to aromasin + everolimus due to progression end of August 2023.     INTERVAL HISTORY:  The patient is a 68-year-old female diagnosed with metastatic breast cancer in 03/2020 " after noticing a lesion on her breast.  She was orginially treated by Dr Birch and changed to me February 2021    She is here for followup . She is tolerating medication well. She gets occasional mouth sores but is using the steroid mouthwash. She has no new pains. Her breast is about the same. No n/v/d/c. She is not taking anything for her blood sugars in the 3-400s despite us asking her to see her PCP.    PAST MEDICAL HISTORY:   1.  Metastatic breast cancer, see above.   2.  Hypertension.   3.  Steroid-induced diabetes.  According to the patient, since she has been off steroids, she was told that her blood sugars has normalized and she is off medication.   4.  Chemotherapy-induced neuropathy     FAMILY HISTORY:  Mother had breast cancer at 62; underwent lumpectomy and radiation, and arimidex  No new family history since last seen.     SOCIAL HISTORY:   to her , Dieter.    No current tobacco use.  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     ALLERGIES:  LISINOPRIL.      PHYSICAL EXAM:  Physical Exam  Vitals reviewed.   Constitutional:       Appearance: Normal appearance.   HENT:      Head: Normocephalic and atraumatic.   Eyes:      Extraocular Movements: Extraocular movements intact.      Pupils: Pupils are equal, round, and reactive to light.   Pulmonary:      Effort: Pulmonary effort is normal.   Musculoskeletal:      Right lower leg: No edema.      Left lower leg: No edema.   Neurological:      General: No focal deficit present.      Mental Status: She is alert and oriented to person, place, and time. Mental status is at baseline.   Psychiatric:         Mood and Affect: Mood normal.         Behavior: Behavior normal.         Thought Content: Thought content normal.         Judgment: Judgment normal.     Breast Left - mid breast nipple retraction unchanged.    Labs    Recent Labs   Lab Test 10/31/23  0723 10/03/23  1415 09/05/23  0746 05/18/23  0757 02/10/23  1503   * 131* 136  "138 139   POTASSIUM 3.7 3.9 3.7 3.0* 3.3*   CHLORIDE 92* 92* 98 101 103   CO2 26 24 24 29 29   ANIONGAP 16* 15 14 8 7   BUN 16.4 13.6 15.3 18 12   CR 0.74 0.69 0.73 0.97 0.95   * 435* 242* 117* 108*   BUFFY 9.8 9.6 9.8 9.5 10.1     No results for input(s): \"MAG\", \"PHOS\" in the last 61234 hours.  Recent Labs   Lab Test 10/31/23  0723 10/03/23  1415 09/05/23  0746 05/18/23  0757 02/10/23  1503   WBC 5.1 6.2 5.7 4.2 4.0   HGB 13.3 13.4 13.8 13.7 13.8    183 184 194 190   MCV 78 80 85 98 99   NEUTROPHIL 64 66 55 46 53     Recent Labs   Lab Test 10/31/23  0723 10/03/23  1415 09/05/23  0746   BILITOTAL 0.5 0.4 0.3   ALKPHOS 128* 139* 122*   ALT 27 32 17   AST 28 35 29   ALBUMIN 4.3 4.3 4.2     TSH   Date Value Ref Range Status   10/28/2015 1.23 0.40 - 4.00 mU/L Final     No results for input(s): \"CEA\" in the last 42223 hours.  Results for orders placed or performed in visit on 05/18/23   CT Chest/Abdomen/Pelvis w Contrast    Narrative    Chest abdomen pelvis CT with contrast    INDICATION: Left breast neoplasm.    Contrast: 118 mL intravenous [id number]    COMPARISON: 11/2/2022    FINDINGS Arterial phase imaging through the lower chest and upper  abdomen obtained along with standard portal venous phase imaging  through the chest, abdomen and pelvis.    CHEST: No suspicious hyperenhancing arterial phase lesion in the lower  chest.  Detail of the lungs shows no discrete pulmonary nodule of suspicion.  Calcified granuloma measuring 2 mm in the left lower lobe noted. Major  airways are nicely patent. No consolidations.  No pleural or pericardial effusion. The included thyroid appears  unremarkable. There are enhancing lesions at the lateral margin of the  pectoralis minor muscle which measures 20 mm in greatest dimension,  not significantly changed. Another mass slightly more medial in  relation is more conspicuous measuring 12 mm not obviously  identifiable on previous. There is skin thickening and " nipple  retraction on the left with spiculated soft tissue and apparent  surgical changes. Please correlate with dedicated  mammography/ultrasound for complete evaluation. Patient was reportedly  originally diagnosed March 2020 and started chemotherapy at that time  and started other hormone therapy August 2020 NB and different  chemotherapy 3/19/2021. Reportedly, there has been no history of  lumpectomy or mastectomy on the left. The retroareolar mass with  spiculation appears grossly unchanged from the November 2022 CT.  No enlarged left axillary nodes currently otherwise present. Apparent  biopsy clip in axillary nodes on the left. No axillary adenopathy on  the right. No other retropectoral or internal mammary adenopathy on  either side. No enlarged mediastinal or hilar lymph nodes. Heart size  normal. Aorta size normal. Pulmonary artery size normal. Normal aortic  arch branch pattern.  Bone detail in the chest shows multiple sclerotic densities consistent  with osteoblastic disease throughout much of the thoracic spine as  well as both scapulae and the right clavicle and sternum. There are  some posterior elements involved in several thoracic vertebral  positions.  In the interval from November 2022, the bony lesions appear grossly  similar.    ABDOMEN/PELVIS: Arterial phase imaging shows no hyperenhancing masses  in the upper abdomen on this phase.  Standard portal venous phase imaging also obtained. Liver appears  normal. Portal vein patent. Spleen and accessory splenule normal.  Bilateral adrenals normal. Pancreas grossly normal. Mild extrarenal  pelvis prominent variant again noted on the left. Right kidney appears  entirely normal as well. Decreased conspicuity of the medial cortical  left renal hypoenhancing area (series 8 image 143) suggestive of small  renal cyst.  IVC grossly normal. Abdominal aortic calcification without aneurysm.  No enlarged mesenteric or retroperitoneal lymph nodes. No free  fluid.  No free air. Urinary bladder incompletely distended. No adnexal mass.  Bone detail in the abdomen and pelvis again shows diffuse lumbar and  bony pelvic as well as bilateral femoral osteoblastic foci consistent  with metastatic disease from breast cancer. Relative large lucent  defect at the inferior endplate of L2 appears similar to previous as  well. Overall, the bony changes appears similar      Impression    IMPRESSION: Grossly stable retroareolar left breast mass with nipple  retraction skin thickening consistent with breast cancer. Not  significantly changed from November 2022. Enhancing masses at lateral  margin adjacent to lateral margin of left pectoralis minor muscle with  the largest more lateral mass unchanged. There is a new slightly  hyperenhancing mass appearing within or immediately adjacent to the  left pectoralis minor muscle lateral aspect concerning for possible  new metastasis at this site. No other increased axillary or other  supradiaphragmatic lymph nodes. Unchanged osteoblastic disease  consistent with metastatic breast cancer. Unchanged lucent defect at  the inferior endplate of L2 as well.  Slight decreased conspicuity of apparent small left renal cyst. No  abdominopelvic metastases. No pulmonary nodules of suspicion. Mild  abdominal aortic atherosclerotic calcification.    CHRISTINA FELTON MD         SYSTEM ID:  [id number]       ASSESSMENT AND PLAN:   1.  Metastatic ER positive RI positive HER-2/gabriela negative breast cancer with a left breast primary and bone metastasis.    Plan    1.  Continue potassium tid  2. zometa on return  3. Imaging and zometa in early December with her apt.  4. She will need ESR1 testing and we will discuss doing this next visit.    Tiffany Hilton MD on 10/31/2023 at 9:16 AM

## 2023-10-31 NOTE — PROGRESS NOTES
Port site scrubbed with Chloraprep for 30 seconds. Accessed port using sterile technique. Two green and purple tubes drawn. See documentation flowsheet. Port needle deaccessed. Tolerated port access and draw without complaint. An Harding RN on 10/31/2023 at 7:38 AM

## 2023-10-31 NOTE — LETTER
"    10/31/2023         RE: Mary Olson  [address]  [facility] [address]        Dear Colleague,    Thank you for referring your patient, Mary Olson, to the [facility]. Please see a copy of my visit note below.    Oncology Rooming Note    October 31, 2023 8:07 AM   aMry Olson is a 68 year old female who presents for:    Chief Complaint   Patient presents with     Oncology Clinic Visit     Initial Vitals: There were no vitals taken for this visit. Estimated body mass index is 37.3 kg/m  as calculated from the following:    Height as of 10/3/23: 1.575 m (5' 2.01\").    Weight as of 10/3/23: 92.5 kg (204 lb). There is no height or weight on file to calculate BSA.  Data Unavailable Comment: Data Unavailable   No LMP recorded. Patient is postmenopausal.  Allergies reviewed: Yes  Medications reviewed: Yes    Medications: Medication refills not needed today.  Pharmacy name entered into EPIC:    [facility] - [place], [place] - [address] [place] [address]  [facility] - [place], [place] - [address] [place] [address] PHARMACY [place] - [place], [place] - [address] [facility].  [facility] - [place], [place] - [address] [facility] - [place], [place] - [address] [facility] - [place], [place] - [address] [facility] [address]  [facility] - [place], [place] - [address] AT [facility] ONCOLOGY  10/31/23      ONCOLOGY DIAGNOSIS:  1. March 2020: Metastatic Breast Cancer ER+, MD+ Her2 Negative; with bone metastasis and a left breast primary. PI K3 CA mutation of note it is a pathologic variant which is outside the mutation of specific variants enrolled in the solar one trial.  TEJAS, TMB low     THERAPY TO DATE:  1. March 2020 to August 2020: Weekly Taxol.  2. August 2020 to March 2021: Anastrozole   3. 3/19/21 began abemeciclib with arimidex " she has been dose reduced on her abemaciclib 200 mg a day   4. zometa q 3 months  5. Changed to aromasin + everolimus due to progression end of August 2023.     INTERVAL HISTORY:  The patient is a 68-year-old female diagnosed with metastatic breast cancer in 03/2020 after noticing a lesion on her breast.  She was orginially treated by Dr Birch and changed to me February 2021    She is here for followup . She is tolerating medication well. She gets occasional mouth sores but is using the steroid mouthwash. She has no new pains. Her breast is about the same. No n/v/d/c. She is not taking anything for her blood sugars in the 3-400s despite us asking her to see her PCP.    PAST MEDICAL HISTORY:   1.  Metastatic breast cancer, see above.   2.  Hypertension.   3.  Steroid-induced diabetes.  According to the patient, since she has been off steroids, she was told that her blood sugars has normalized and she is off medication.   4.  Chemotherapy-induced neuropathy     FAMILY HISTORY:  Mother had breast cancer at 62; underwent lumpectomy and radiation, and arimidex  No new family history since last seen.     SOCIAL HISTORY:   to her , Dieter.    No current tobacco use.  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     ALLERGIES:  LISINOPRIL.      PHYSICAL EXAM:  Physical Exam  Vitals reviewed.   Constitutional:       Appearance: Normal appearance.   HENT:      Head: Normocephalic and atraumatic.   Eyes:      Extraocular Movements: Extraocular movements intact.      Pupils: Pupils are equal, round, and reactive to light.   Pulmonary:      Effort: Pulmonary effort is normal.   Musculoskeletal:      Right lower leg: No edema.      Left lower leg: No edema.   Neurological:      General: No focal deficit present.      Mental Status: She is alert and oriented to person, place, and time. Mental status is at baseline.   Psychiatric:         Mood and Affect: Mood normal.         Behavior: Behavior normal.        "  Thought Content: Thought content normal.         Judgment: Judgment normal.     Breast Left - mid breast nipple retraction unchanged.    Labs    Recent Labs   Lab Test 10/31/23  0723 10/03/23  1415 09/05/23  0746 05/18/23  0757 02/10/23  1503   * 131* 136 138 139   POTASSIUM 3.7 3.9 3.7 3.0* 3.3*   CHLORIDE 92* 92* 98 101 103   CO2 26 24 24 29 29   ANIONGAP 16* 15 14 8 7   BUN 16.4 13.6 15.3 18 12   CR 0.74 0.69 0.73 0.97 0.95   * 435* 242* 117* 108*   BUFFY 9.8 9.6 9.8 9.5 10.1     No results for input(s): \"MAG\", \"PHOS\" in the last 07418 hours.  Recent Labs   Lab Test 10/31/23  0723 10/03/23  1415 09/05/23  0746 05/18/23  0757 02/10/23  1503   WBC 5.1 6.2 5.7 4.2 4.0   HGB 13.3 13.4 13.8 13.7 13.8    183 184 194 190   MCV 78 80 85 98 99   NEUTROPHIL 64 66 55 46 53     Recent Labs   Lab Test 10/31/23  0723 10/03/23  1415 09/05/23  0746   BILITOTAL 0.5 0.4 0.3   ALKPHOS 128* 139* 122*   ALT 27 32 17   AST 28 35 29   ALBUMIN 4.3 4.3 4.2     TSH   Date Value Ref Range Status   10/28/2015 1.23 0.40 - 4.00 mU/L Final     No results for input(s): \"CEA\" in the last 88213 hours.  Results for orders placed or performed in visit on 05/18/23   CT Chest/Abdomen/Pelvis w Contrast    Narrative    Chest abdomen pelvis CT with contrast    INDICATION: Left breast neoplasm.    Contrast: 118 mL intravenous [id number]    COMPARISON: 11/2/2022    FINDINGS Arterial phase imaging through the lower chest and upper  abdomen obtained along with standard portal venous phase imaging  through the chest, abdomen and pelvis.    CHEST: No suspicious hyperenhancing arterial phase lesion in the lower  chest.  Detail of the lungs shows no discrete pulmonary nodule of suspicion.  Calcified granuloma measuring 2 mm in the left lower lobe noted. Major  airways are nicely patent. No consolidations.  No pleural or pericardial effusion. The included thyroid appears  unremarkable. There are enhancing lesions at the lateral margin of " the  pectoralis minor muscle which measures 20 mm in greatest dimension,  not significantly changed. Another mass slightly more medial in  relation is more conspicuous measuring 12 mm not obviously  identifiable on previous. There is skin thickening and nipple  retraction on the left with spiculated soft tissue and apparent  surgical changes. Please correlate with dedicated  mammography/ultrasound for complete evaluation. Patient was reportedly  originally diagnosed March 2020 and started chemotherapy at that time  and started other hormone therapy August 2020 NB and different  chemotherapy 3/19/2021. Reportedly, there has been no history of  lumpectomy or mastectomy on the left. The retroareolar mass with  spiculation appears grossly unchanged from the November 2022 CT.  No enlarged left axillary nodes currently otherwise present. Apparent  biopsy clip in axillary nodes on the left. No axillary adenopathy on  the right. No other retropectoral or internal mammary adenopathy on  either side. No enlarged mediastinal or hilar lymph nodes. Heart size  normal. Aorta size normal. Pulmonary artery size normal. Normal aortic  arch branch pattern.  Bone detail in the chest shows multiple sclerotic densities consistent  with osteoblastic disease throughout much of the thoracic spine as  well as both scapulae and the right clavicle and sternum. There are  some posterior elements involved in several thoracic vertebral  positions.  In the interval from November 2022, the bony lesions appear grossly  similar.    ABDOMEN/PELVIS: Arterial phase imaging shows no hyperenhancing masses  in the upper abdomen on this phase.  Standard portal venous phase imaging also obtained. Liver appears  normal. Portal vein patent. Spleen and accessory splenule normal.  Bilateral adrenals normal. Pancreas grossly normal. Mild extrarenal  pelvis prominent variant again noted on the left. Right kidney appears  entirely normal as well. Decreased  conspicuity of the medial cortical  left renal hypoenhancing area (series 8 image 143) suggestive of small  renal cyst.  IVC grossly normal. Abdominal aortic calcification without aneurysm.  No enlarged mesenteric or retroperitoneal lymph nodes. No free fluid.  No free air. Urinary bladder incompletely distended. No adnexal mass.  Bone detail in the abdomen and pelvis again shows diffuse lumbar and  bony pelvic as well as bilateral femoral osteoblastic foci consistent  with metastatic disease from breast cancer. Relative large lucent  defect at the inferior endplate of L2 appears similar to previous as  well. Overall, the bony changes appears similar      Impression    IMPRESSION: Grossly stable retroareolar left breast mass with nipple  retraction skin thickening consistent with breast cancer. Not  significantly changed from November 2022. Enhancing masses at lateral  margin adjacent to lateral margin of left pectoralis minor muscle with  the largest more lateral mass unchanged. There is a new slightly  hyperenhancing mass appearing within or immediately adjacent to the  left pectoralis minor muscle lateral aspect concerning for possible  new metastasis at this site. No other increased axillary or other  supradiaphragmatic lymph nodes. Unchanged osteoblastic disease  consistent with metastatic breast cancer. Unchanged lucent defect at  the inferior endplate of L2 as well.  Slight decreased conspicuity of apparent small left renal cyst. No  abdominopelvic metastases. No pulmonary nodules of suspicion. Mild  abdominal aortic atherosclerotic calcification.    CHRISTINA FELTON MD         SYSTEM ID:  [id number]       ASSESSMENT AND PLAN:   1.  Metastatic ER positive WA positive HER-2/gabriela negative breast cancer with a left breast primary and bone metastasis.    Plan    1.  Continue potassium tid  2. zometa on return  3. Imaging and zometa in early December with her apt.  4. She will need ESR1 testing and we will discuss  doing this next visit.    Tiffany Hilton MD on 10/31/2023 at 9:16 AM                                  Again, thank you for allowing me to participate in the care of your patient.        Sincerely,        Tiffany Hilton MD

## 2023-11-03 DIAGNOSIS — Z17.0 MALIGNANT NEOPLASM OF OVERLAPPING SITES OF LEFT BREAST IN FEMALE, ESTROGEN RECEPTOR POSITIVE (H): Primary | ICD-10-CM

## 2023-11-03 DIAGNOSIS — C50.812 MALIGNANT NEOPLASM OF OVERLAPPING SITES OF LEFT BREAST IN FEMALE, ESTROGEN RECEPTOR POSITIVE (H): Primary | ICD-10-CM

## 2023-11-03 RX ORDER — EVEROLIMUS 10 MG/1
10 TABLET ORAL DAILY
Qty: 28 TABLET | Refills: 0 | Status: SHIPPED | OUTPATIENT
Start: 2023-11-03 | End: 2023-12-06

## 2023-11-07 ENCOUNTER — OFFICE VISIT (OUTPATIENT)
Dept: FAMILY MEDICINE | Facility: CLINIC | Age: 68
End: 2023-11-07
Payer: MEDICARE

## 2023-11-07 VITALS
HEIGHT: 62 IN | BODY MASS INDEX: 35.15 KG/M2 | WEIGHT: 191 LBS | OXYGEN SATURATION: 97 % | HEART RATE: 104 BPM | DIASTOLIC BLOOD PRESSURE: 86 MMHG | TEMPERATURE: 97.4 F | RESPIRATION RATE: 12 BRPM | SYSTOLIC BLOOD PRESSURE: 137 MMHG

## 2023-11-07 DIAGNOSIS — E09.9 STEROID-INDUCED DIABETES MELLITUS, SUBSEQUENT ENCOUNTER (H): Primary | ICD-10-CM

## 2023-11-07 DIAGNOSIS — Z79.4 TYPE 2 DIABETES MELLITUS WITH HYPERGLYCEMIA, WITH LONG-TERM CURRENT USE OF INSULIN (H): ICD-10-CM

## 2023-11-07 DIAGNOSIS — Z17.0 MALIGNANT NEOPLASM OF OVERLAPPING SITES OF LEFT BREAST IN FEMALE, ESTROGEN RECEPTOR POSITIVE (H): ICD-10-CM

## 2023-11-07 DIAGNOSIS — I10 BENIGN ESSENTIAL HYPERTENSION: ICD-10-CM

## 2023-11-07 DIAGNOSIS — I10 ESSENTIAL HYPERTENSION: ICD-10-CM

## 2023-11-07 DIAGNOSIS — E87.6 HYPOKALEMIA: ICD-10-CM

## 2023-11-07 DIAGNOSIS — C50.812 MALIGNANT NEOPLASM OF OVERLAPPING SITES OF LEFT BREAST IN FEMALE, ESTROGEN RECEPTOR POSITIVE (H): ICD-10-CM

## 2023-11-07 DIAGNOSIS — E11.65 TYPE 2 DIABETES MELLITUS WITH HYPERGLYCEMIA, WITH LONG-TERM CURRENT USE OF INSULIN (H): ICD-10-CM

## 2023-11-07 DIAGNOSIS — T38.0X5D STEROID-INDUCED DIABETES MELLITUS, SUBSEQUENT ENCOUNTER (H): Primary | ICD-10-CM

## 2023-11-07 PROCEDURE — 99214 OFFICE O/P EST MOD 30 MIN: CPT | Performed by: FAMILY MEDICINE

## 2023-11-07 RX ORDER — TRIAMTERENE/HYDROCHLOROTHIAZID 37.5-25 MG
1 TABLET ORAL DAILY
Qty: 90 TABLET | Refills: 1 | Status: SHIPPED | OUTPATIENT
Start: 2023-11-07 | End: 2023-11-08

## 2023-11-07 RX ORDER — LANCETS
3 EACH MISCELLANEOUS DAILY
Qty: 300 EACH | Refills: 6 | Status: SHIPPED | OUTPATIENT
Start: 2023-11-07

## 2023-11-07 RX ORDER — RESPIRATORY SYNCYTIAL VIRUS VACCINE 120MCG/0.5
0.5 KIT INTRAMUSCULAR ONCE
Qty: 1 EACH | Refills: 0 | Status: CANCELLED | OUTPATIENT
Start: 2023-11-07 | End: 2023-11-07

## 2023-11-07 RX ORDER — POTASSIUM CHLORIDE 1.5 G/1.58G
20 POWDER, FOR SOLUTION ORAL DAILY
COMMUNITY
Start: 2023-11-07 | End: 2023-11-07

## 2023-11-07 RX ORDER — POTASSIUM CHLORIDE 1.5 G/1.58G
20 POWDER, FOR SOLUTION ORAL 2 TIMES DAILY
COMMUNITY
Start: 2023-11-07 | End: 2024-01-18

## 2023-11-07 RX ORDER — METFORMIN HYDROCHLORIDE 500 MG/5ML
500 SOLUTION ORAL
Qty: 473 ML | Refills: 3 | Status: SHIPPED | OUTPATIENT
Start: 2023-11-07 | End: 2024-09-26

## 2023-11-07 ASSESSMENT — PAIN SCALES - GENERAL: PAINLEVEL: NO PAIN (1)

## 2023-11-07 NOTE — PROGRESS NOTES
ASSESSMENT / PLAN:  (E09.9,  T38.0X5D) Steroid-induced diabetes mellitus, subsequent encounter   (primary encounter diagnosis)  Comment: needs help  Plan: metFORMIN (GLUCOPHAGE) 500 MG/5ML SOLN         solution, Hemoglobin A1c        Patient would like to try liquid metformin. Reveiwed risks and side effects of medication  Recheck in 2-3months. Will do lantus insulin if can't tolerate/not close to goal. Diet/exercise. Call/email with questions/concerns  Follow-up eye exam.     (I10) Essential hypertension  Comment: needs help  Plan: triamterene-HCTZ (MAXZIDE-25) 37.5-25 MG         tablet, Renal panel        Continue norvasc. Patient would like to go back to maxzide with potassium issues. Stop hygroten. Reveiwed risks and side effects of medication  Recheck 2-3months. Continue self-monitor. Consider low dosage metoprolol too with high pulse. Chest pain or shortness of breath to er.     (C50.812,  Z17.0) Malignant neoplasm of overlapping sites of left breast in female, estrogen receptor positive (H)    Plan: potassium chloride (KLOR-CON) 20 MEQ packet        Per oncology    (I10) Benign essential hypertension    Plan: potassium chloride (KLOR-CON) 20 MEQ packet           (E87.6) Hypokalemia  Comment: stable  Plan: potassium chloride (KLOR-CON) 20 MEQ packet        Will lower potassium dosage 60 to 40  with changes diuretic to potassium sparring.   Increase fruits/veggies. Recheck 2 months.         Subjective   Desmond is a 68 year old, presenting for the following health issues:  Patient new to myself. History steroid induced dm, metastatic breast cancer -seeing oncology, chemotherapy induced neuropathy, obesity and htn.  Difficulty shallowing pills. Don't shots. Outside blood pressure reading ok.   No acute feet changes noted. Eye exam next week.   No chest pain or shortness of breath.   No cardiac issues in past.   Breast cancer since 2020. On chemo and steroid long term.   Emotionally doing ok. Good support system  "friends/family.    with dm - machine at home.  Establish Care (Pt currently under Cancer treatment plan) and Diabetes      11/7/2023    10:09 AM   Additional Questions   Roomed by Gracy   Accompanied by n/a       History of Present Illness       Diabetes:   She presents for follow up of diabetes.  She is checking home blood glucose a few times a month.   She checks blood glucose after meals.  Blood glucose is sometimes over 200 and never under 70. She is aware of hypoglycemia symptoms including shakiness.   She is concerned about blood sugar frequently over 200.    She is not experiencing numbness or burning in feet, excessive thirst, blurry vision, weight changes or redness, sores or blisters on feet. The patient has not had a diabetic eye exam in the last 12 months.          She eats 2-3 servings of fruits and vegetables daily.She consumes 0 sweetened beverage(s) daily.She exercises with enough effort to increase her heart rate 20 to 29 minutes per day.  She exercises with enough effort to increase her heart rate 4 days per week.        Objective    /86   Pulse 104   Temp 97.4  F (36.3  C) (Tympanic)   Resp 12   Ht 1.575 m (5' 2\")   Wt 86.6 kg (191 lb)   SpO2 97%   BMI 34.93 kg/m     Physical Exam   GENERAL: healthy, alert and no distress  EYES: Eyes grossly normal to inspection, PERRL and conjunctivae and sclerae normal  NECK: no adenopathy, no asymmetry, masses, or scars and thyroid normal to palpation  RESP: lungs clear to auscultation - no rales, rhonchi or wheezes  CV: regular rate and rhythm, normal S1 S2, no S3 or S4, no murmur, click or rub, no peripheral edema and peripheral pulses strong  CV:   MS: no gross musculoskeletal defects noted, no edema  PSYCH: mentation appears normal, affect normal/bright  PSYCH: mildly anxious        "

## 2023-11-08 ENCOUNTER — TELEPHONE (OUTPATIENT)
Dept: ONCOLOGY | Facility: CLINIC | Age: 68
End: 2023-11-08
Payer: MEDICARE

## 2023-11-08 ENCOUNTER — TELEPHONE (OUTPATIENT)
Dept: FAMILY MEDICINE | Facility: CLINIC | Age: 68
End: 2023-11-08
Payer: MEDICARE

## 2023-11-08 DIAGNOSIS — E11.65 TYPE 2 DIABETES MELLITUS WITH HYPERGLYCEMIA, WITH LONG-TERM CURRENT USE OF INSULIN (H): ICD-10-CM

## 2023-11-08 DIAGNOSIS — E09.9 STEROID-INDUCED DIABETES MELLITUS, SUBSEQUENT ENCOUNTER (H): ICD-10-CM

## 2023-11-08 DIAGNOSIS — I10 ESSENTIAL HYPERTENSION: ICD-10-CM

## 2023-11-08 DIAGNOSIS — Z79.4 TYPE 2 DIABETES MELLITUS WITH HYPERGLYCEMIA, WITH LONG-TERM CURRENT USE OF INSULIN (H): ICD-10-CM

## 2023-11-08 DIAGNOSIS — T38.0X5D STEROID-INDUCED DIABETES MELLITUS, SUBSEQUENT ENCOUNTER (H): ICD-10-CM

## 2023-11-08 RX ORDER — TRIAMTERENE/HYDROCHLOROTHIAZID 37.5-25 MG
1 TABLET ORAL DAILY
Qty: 90 TABLET | Refills: 1 | Status: SHIPPED | OUTPATIENT
Start: 2023-11-08 | End: 2024-03-05

## 2023-11-08 NOTE — TELEPHONE ENCOUNTER
[facility] APPROVED    Medication: EVEROLIMUS 10 MG PO TABS  Amount: $ 6,000  Foundation Name: [facility]  [facility] Phone: [phone]  [facility] Fax:   Member ID: [id number]  BIN: [id number]  PCN: [id number]  Group[id number] Effective Date: 11/8/2023  Foundation Expiration Date: 11/8/2024  Additional Information: yes  Patient Notified: yes

## 2023-11-08 NOTE — TELEPHONE ENCOUNTER
Pt saw Dr. Enamorado yesterday.    1)    She was given metformin liquid because she has trouble swallowing pills.  The liquid is very expensive.  She is asking if she got the pills could she crush them and take them for cost savings?  If so can you send prescription to pended pharmacy and let pt know.    2)  pt needs test strips and triamterene-hydrochlorothiazide prescribed yesterday sent to [facility] pharmacy.  Prescription's resent.    Shruthi TEIXEIRAN, RN

## 2023-11-10 NOTE — TELEPHONE ENCOUNTER
Desmond will let me know when she needs to add this one to [facility], she thinks in Dec it will be needed.

## 2023-11-14 ENCOUNTER — MEDICAL CORRESPONDENCE (OUTPATIENT)
Dept: HEALTH INFORMATION MANAGEMENT | Facility: CLINIC | Age: 68
End: 2023-11-14
Payer: MEDICARE

## 2023-11-25 ASSESSMENT — ENCOUNTER SYMPTOMS
SHORTNESS OF BREATH: 0
COUGH: 0
JOINT SWELLING: 0
PALPITATIONS: 0
FEVER: 0
FREQUENCY: 0
BREAST MASS: 0
HEADACHES: 0
DIZZINESS: 0
HEARTBURN: 0
WEAKNESS: 0
ARTHRALGIAS: 0
MYALGIAS: 0
DYSURIA: 0
ABDOMINAL PAIN: 0
EYE PAIN: 0
HEMATOCHEZIA: 0
CHILLS: 0
NAUSEA: 0
DIARRHEA: 0
CONSTIPATION: 0
PARESTHESIAS: 0
SORE THROAT: 0
HEMATURIA: 0
NERVOUS/ANXIOUS: 0

## 2023-11-25 ASSESSMENT — ACTIVITIES OF DAILY LIVING (ADL): CURRENT_FUNCTION: NO ASSISTANCE NEEDED

## 2023-11-27 ENCOUNTER — ANCILLARY PROCEDURE (OUTPATIENT)
Dept: GENERAL RADIOLOGY | Facility: CLINIC | Age: 68
End: 2023-11-27
Payer: MEDICARE

## 2023-11-27 ENCOUNTER — ANCILLARY PROCEDURE (OUTPATIENT)
Dept: BONE DENSITY | Facility: CLINIC | Age: 68
End: 2023-11-27
Attending: INTERNAL MEDICINE
Payer: MEDICARE

## 2023-11-27 ENCOUNTER — ANCILLARY PROCEDURE (OUTPATIENT)
Dept: CT IMAGING | Facility: CLINIC | Age: 68
End: 2023-11-27
Attending: INTERNAL MEDICINE
Payer: MEDICARE

## 2023-11-27 DIAGNOSIS — E28.39 ESTROGEN DEFICIENCY: ICD-10-CM

## 2023-11-27 DIAGNOSIS — C50.812 MALIGNANT NEOPLASM OF OVERLAPPING SITES OF LEFT BREAST IN FEMALE, ESTROGEN RECEPTOR POSITIVE (H): ICD-10-CM

## 2023-11-27 DIAGNOSIS — Z17.0 MALIGNANT NEOPLASM OF OVERLAPPING SITES OF LEFT BREAST IN FEMALE, ESTROGEN RECEPTOR POSITIVE (H): Primary | ICD-10-CM

## 2023-11-27 DIAGNOSIS — Z17.0 MALIGNANT NEOPLASM OF OVERLAPPING SITES OF LEFT BREAST IN FEMALE, ESTROGEN RECEPTOR POSITIVE (H): ICD-10-CM

## 2023-11-27 DIAGNOSIS — C50.812 MALIGNANT NEOPLASM OF OVERLAPPING SITES OF LEFT BREAST IN FEMALE, ESTROGEN RECEPTOR POSITIVE (H): Primary | ICD-10-CM

## 2023-11-27 PROCEDURE — 74177 CT ABD & PELVIS W/CONTRAST: CPT | Mod: MG | Performed by: STUDENT IN AN ORGANIZED HEALTH CARE EDUCATION/TRAINING PROGRAM

## 2023-11-27 PROCEDURE — G1010 CDSM STANSON: HCPCS | Performed by: STUDENT IN AN ORGANIZED HEALTH CARE EDUCATION/TRAINING PROGRAM

## 2023-11-27 PROCEDURE — 71260 CT THORAX DX C+: CPT | Mod: MG | Performed by: STUDENT IN AN ORGANIZED HEALTH CARE EDUCATION/TRAINING PROGRAM

## 2023-11-27 PROCEDURE — 77080 DXA BONE DENSITY AXIAL: CPT | Performed by: RADIOLOGY

## 2023-11-27 RX ORDER — IOPAMIDOL 755 MG/ML
105 INJECTION, SOLUTION INTRAVASCULAR ONCE
Status: COMPLETED | OUTPATIENT
Start: 2023-11-27 | End: 2023-11-27

## 2023-11-27 RX ADMIN — IOPAMIDOL 105 ML: 755 INJECTION, SOLUTION INTRAVASCULAR at 09:28

## 2023-11-28 DIAGNOSIS — C50.812 MALIGNANT NEOPLASM OF OVERLAPPING SITES OF LEFT BREAST IN FEMALE, ESTROGEN RECEPTOR POSITIVE (H): Primary | ICD-10-CM

## 2023-11-28 DIAGNOSIS — Z17.0 MALIGNANT NEOPLASM OF OVERLAPPING SITES OF LEFT BREAST IN FEMALE, ESTROGEN RECEPTOR POSITIVE (H): Primary | ICD-10-CM

## 2023-11-28 RX ORDER — HEPARIN SODIUM (PORCINE) LOCK FLUSH IV SOLN 100 UNIT/ML 100 UNIT/ML
5 SOLUTION INTRAVENOUS ONCE
Status: COMPLETED | OUTPATIENT
Start: 2023-11-27 | End: 2023-11-28

## 2023-11-28 RX ADMIN — HEPARIN SODIUM (PORCINE) LOCK FLUSH IV SOLN 100 UNIT/ML 5 ML: 100 SOLUTION at 07:22

## 2023-11-29 ENCOUNTER — OFFICE VISIT (OUTPATIENT)
Dept: FAMILY MEDICINE | Facility: CLINIC | Age: 68
End: 2023-11-29
Payer: MEDICARE

## 2023-11-29 VITALS
SYSTOLIC BLOOD PRESSURE: 140 MMHG | HEIGHT: 62 IN | BODY MASS INDEX: 33.93 KG/M2 | RESPIRATION RATE: 20 BRPM | WEIGHT: 184.4 LBS | TEMPERATURE: 98.6 F | OXYGEN SATURATION: 96 % | DIASTOLIC BLOOD PRESSURE: 90 MMHG | HEART RATE: 120 BPM

## 2023-11-29 DIAGNOSIS — Z17.0 MALIGNANT NEOPLASM OF OVERLAPPING SITES OF LEFT BREAST IN FEMALE, ESTROGEN RECEPTOR POSITIVE (H): ICD-10-CM

## 2023-11-29 DIAGNOSIS — Z00.00 ENCOUNTER FOR MEDICARE ANNUAL WELLNESS EXAM: Primary | ICD-10-CM

## 2023-11-29 DIAGNOSIS — R04.0 EPISTAXIS: ICD-10-CM

## 2023-11-29 DIAGNOSIS — C50.812 MALIGNANT NEOPLASM OF OVERLAPPING SITES OF LEFT BREAST IN FEMALE, ESTROGEN RECEPTOR POSITIVE (H): ICD-10-CM

## 2023-11-29 DIAGNOSIS — I10 ESSENTIAL HYPERTENSION: ICD-10-CM

## 2023-11-29 DIAGNOSIS — E11.9 TYPE 2 DIABETES MELLITUS WITHOUT COMPLICATION, WITHOUT LONG-TERM CURRENT USE OF INSULIN (H): ICD-10-CM

## 2023-11-29 PROCEDURE — 90677 PCV20 VACCINE IM: CPT | Performed by: FAMILY MEDICINE

## 2023-11-29 PROCEDURE — G0009 ADMIN PNEUMOCOCCAL VACCINE: HCPCS | Performed by: FAMILY MEDICINE

## 2023-11-29 PROCEDURE — G0438 PPPS, INITIAL VISIT: HCPCS | Performed by: FAMILY MEDICINE

## 2023-11-29 RX ORDER — RESPIRATORY SYNCYTIAL VIRUS VACCINE 120MCG/0.5
0.5 KIT INTRAMUSCULAR ONCE
Qty: 1 EACH | Refills: 0 | Status: CANCELLED | OUTPATIENT
Start: 2023-11-29 | End: 2023-11-29

## 2023-11-29 ASSESSMENT — ENCOUNTER SYMPTOMS
FREQUENCY: 0
WEAKNESS: 0
SHORTNESS OF BREATH: 0
NERVOUS/ANXIOUS: 0
EYE PAIN: 0
HEMATURIA: 0
ARTHRALGIAS: 0
CHILLS: 0
BREAST MASS: 0
ABDOMINAL PAIN: 0
DYSURIA: 0
HEMATOCHEZIA: 0
FEVER: 0
COUGH: 0
HEARTBURN: 0
MYALGIAS: 0
JOINT SWELLING: 0
DIARRHEA: 0
DIZZINESS: 0
CONSTIPATION: 0
PARESTHESIAS: 0
SORE THROAT: 0
PALPITATIONS: 0
NAUSEA: 0
HEADACHES: 0

## 2023-11-29 ASSESSMENT — ACTIVITIES OF DAILY LIVING (ADL): CURRENT_FUNCTION: NO ASSISTANCE NEEDED

## 2023-11-29 ASSESSMENT — PAIN SCALES - GENERAL: PAINLEVEL: NO PAIN (0)

## 2023-11-29 NOTE — PROGRESS NOTES
"SUBJECTIVE:   Desmond is a 68 year old, presenting for the following:  Physical  Comes for annual exam.  History of diabetes, steroid-induced secondary to her breast cancer, use of steroid.  History of hypertension.  History of breast cancer currently she is on chemotherapy, 2nd round she has follow-up with oncology.    She has lost weight.    Nose bleeding, left nostril, mostly in winter. No headache.        11/29/2023     6:43 AM   Additional Questions   Roomed by Susannah BINGHAM   Accompanied by self         11/29/2023     6:43 AM   Patient Reported Additional Medications   Patient reports taking the following new medications none       Are you in the first 12 months of your Medicare coverage?  No    Healthy Habits:     In general, how would you rate your overall health?  Good    Frequency of exercise:  2-3 days/week    Duration of exercise:  15-30 minutes    Do you usually eat at least 4 servings of fruit and vegetables a day, include whole grains    & fiber and avoid regularly eating high fat or \"junk\" foods?  Yes    Taking medications regularly:  Yes    Medication side effects:  Other    Ability to successfully perform activities of daily living:  No assistance needed    Home Safety:  No safety concerns identified    Hearing Impairment:  No hearing concerns    In the past 6 months, have you been bothered by leaking of urine?  No    In general, how would you rate your overall mental or emotional health?  Excellent    Additional concerns today:  No      -- Frequent nose bleeds around this time of year, wondering what she can do to help with this       Today's PHQ-2 Score:       11/28/2023    10:36 AM   PHQ-2 ( 1999 [employer])   Q1: Little interest or pleasure in doing things 0   Q2: Feeling down, depressed or hopeless 0   PHQ-2 Score 0   Q1: Little interest or pleasure in doing things Not at all   Q2: Feeling down, depressed or hopeless Not at all   PHQ-2 Score 0       Have you ever done Advance Care Planning? (For example, " a Health Directive, POLST, or a discussion with a medical provider or your loved ones about your wishes): No, advance care planning information given to patient to review.  Patient plans to discuss their wishes with loved ones or provider.         Fall risk  Fallen 2 or more times in the past year?: No  Any fall with injury in the past year?: No      Cognitive Screening   1) Repeat 3 items (Leader, Season, Table)    2) Clock draw: NORMAL  3) 3 item recall: Recalls 3 objects  Results: 3 items recalled: COGNITIVE IMPAIRMENT LESS LIKELY    Mini-CogTM Copyright S Moe. Licensed by the author for use in [facility]; reprinted with permission ([email or web address]). All rights reserved.      Do you have sleep apnea, excessive snoring or daytime drowsiness? : no    Reviewed and updated as needed this visit by clinical staff   Tobacco  Allergies  Meds              Reviewed and updated as needed this visit by Provider                 Social History     Tobacco Use    Smoking status: Never     Passive exposure: Never    Smokeless tobacco: Never   Substance Use Topics    Alcohol use: Not Currently     Comment: 2-3 drinks a month              11/25/2023    12:42 PM   Alcohol Use   Prescreen: >3 drinks/day or >7 drinks/week? Not Applicable          No data to display              Do you have a current opioid prescription? No  Do you use any other controlled substances or medications that are not prescribed by a provider? None    Current providers sharing in care for this patient include:   Patient Care Team:  No Ref-Primary, Physician as PCP - General  Jayna Joshi MD as MD (Surgery)  Tiffany Hilton MD as MD (Medical Oncology)  Tiffany Hilton MD as Assigned Cancer Care Provider  Viviane Forrest RN as Specialty Care Coordinator (Hematology & Oncology)  Davidson Enamorado MD as Assigned PCP    The following health maintenance items are reviewed in Epic and correct as of today:  Health  Maintenance   Topic Date Due    DIABETIC FOOT EXAM  Never done    ANNUAL REVIEW OF HM ORDERS  Never done    ADVANCE CARE PLANNING  Never done    EYE EXAM  Never done    ZOSTER IMMUNIZATION (1 of 2) Never done    RSV VACCINE (Pregnancy & 60+) (1 - 1-dose 60+ series) Never done    MEDICARE ANNUAL WELLNESS VISIT  07/04/2021    Pneumococcal Vaccine: 65+ Years (2 - PPSV23 or PCV20) 12/21/2021    MICROALBUMIN  12/23/2021    MAMMO SCREENING  03/04/2022    COVID-19 Vaccine (7 - 2023-24 season) 11/28/2023    A1C  01/03/2024    BMP  10/31/2024    LIPID  10/31/2024    FALL RISK ASSESSMENT  11/29/2024    COLORECTAL CANCER SCREENING  10/14/2025    DTAP/TDAP/TD IMMUNIZATION (2 - Td or Tdap) 03/04/2030    DEXA  11/27/2038    HEPATITIS C SCREENING  Completed    PHQ-2 (once per calendar year)  Completed    INFLUENZA VACCINE  Completed    IPV IMMUNIZATION  Aged Out    HPV IMMUNIZATION  Aged Out    MENINGITIS IMMUNIZATION  Aged Out    RSV MONOCLONAL ANTIBODY  Aged Out    PAP  Discontinued     Lab work is in process  Labs reviewed in EPIC    FHS-7:        No data to display                Review of Systems   Constitutional:  Negative for chills and fever.   HENT:  Negative for congestion, ear pain, hearing loss and sore throat.    Eyes:  Negative for pain and visual disturbance.   Respiratory:  Negative for cough and shortness of breath.    Cardiovascular:  Negative for chest pain, palpitations and peripheral edema.   Gastrointestinal:  Negative for abdominal pain, constipation, diarrhea, heartburn, hematochezia and nausea.   Breasts:  Negative for tenderness, breast mass and discharge.   Genitourinary:  Negative for dysuria, frequency, genital sores, hematuria, pelvic pain, urgency, vaginal bleeding and vaginal discharge.   Musculoskeletal:  Negative for arthralgias, joint swelling and myalgias.   Skin:  Negative for rash.   Neurological:  Negative for dizziness, weakness, headaches and paresthesias.   Psychiatric/Behavioral:   "Negative for mood changes. The patient is not nervous/anxious.      Constitutional, HEENT, cardiovascular, pulmonary, GI, , musculoskeletal, neuro, skin, endocrine and psych systems are negative, except as otherwise noted.    OBJECTIVE:   BP (!) 160/90 (BP Location: Left arm, Patient Position: Sitting, Cuff Size: Adult Large)   Pulse (!) 129   Temp 98.6  F (37  C) (Oral)   Resp 20   Ht 1.575 m (5' 2\")   Wt 83.6 kg (184 lb 6.4 oz)   SpO2 96%   BMI 33.73 kg/m   Estimated body mass index is 33.73 kg/m  as calculated from the following:    Height as of this encounter: 1.575 m (5' 2\").    Weight as of this encounter: 83.6 kg (184 lb 6.4 oz).  Physical Exam  GENERAL: healthy, alert and no distress  EYES: Eyes grossly normal to inspection, PERRL and conjunctivae and sclerae normal  HENT: ear canals and TM's normal, nose and mouth without ulcers or lesions  NECK: no adenopathy, no asymmetry, masses, or scars and thyroid normal to palpation  RESP: lungs clear to auscultation - no rales, rhonchi or wheezes  CV: regular rate and rhythm, normal S1 S2, no S3 or S4, no murmur, click or rub, no peripheral edema and peripheral pulses strong  ABDOMEN: soft, nontender, no hepatosplenomegaly, no masses and bowel sounds normal  MS: no gross musculoskeletal defects noted, no edema  SKIN: no suspicious lesions or rashes  NEURO: Normal strength and tone, mentation intact and speech normal  PSYCH: mentation appears normal, affect normal/bright    Diagnostic Test Results:  Labs reviewed in Epic  Orders Placed This Encounter   Procedures    REVIEW OF HEALTH MAINTENANCE PROTOCOL ORDERS    Pneumococcal 20 Valent Conjugate (PCV20)        ASSESSMENT / PLAN:   (Z00.00) Encounter for Medicare annual wellness exam  (primary encounter diagnosis)  Comment: normal exam  Plan:  Discussed diet, exercise, wellness and other preventive recommendations related to health maintenance.   Follow up as needed for acute issues.   Fall risk " "precautions.   Physical exam recommended in one year.     Labs been ordered.    She has plane to follow up with [facility].    (E11.9) Type 2 diabetes mellitus without complication, without long-term current use of insulin (H)  Comment:   Plan: continue with Metformin  Continue with work on diet and weight loss.    (I10) Essential hypertension  Comment:   Plan: stable, continue  with current med    (C50.812,  Z17.0) Malignant neoplasm of overlapping sites of left breast in female, estrogen receptor positive (H)  Comment:   Plan: follow up with Oncology.    (R04.0) Epistaxis  Comment:   Plan: apply lubricants in nostril, use humidifier,  May use Afrin as needed .    Patient has been advised of split billing requirements and indicates understanding: Yes      COUNSELING:  Reviewed preventive health counseling, as reflected in patient instructions       Regular exercise       Healthy diet/nutrition       Fall risk prevention       Immunizations  Vaccinated for: Pneumococcal        BMI:   Estimated body mass index is 33.73 kg/m  as calculated from the following:    Height as of this encounter: 1.575 m (5' 2\").    Weight as of this encounter: 83.6 kg (184 lb 6.4 oz).   Weight management plan: Discussed healthy diet and exercise guidelines      She reports that she has never smoked. She has never been exposed to tobacco smoke. She has never used smokeless tobacco.      Appropriate preventive services were discussed with this patient, including applicable screening as appropriate for fall prevention, nutrition, physical activity, Tobacco-use cessation, weight loss and cognition.  Checklist reviewing preventive services available has been given to the patient.    Reviewed patients plan of care and provided an AVS. The Basic Care Plan (routine screening as documented in Health Maintenance) for Mary meets the Care Plan requirement. This Care Plan has been established and reviewed with the Patient.          Vanesa " HERBERT Claros MD  [facility]    Identified Health Risks:  I have reviewed Opioid Use Disorder and Substance Use Disorder risk factors and made any needed referrals.

## 2023-11-29 NOTE — NURSING NOTE
Prior to immunization administration, verified patients identity using patient s name and date of birth. Please see Immunization Activity for additional information.     Screening Questionnaire for Adult Immunization    Are you sick today?   No   Do you have allergies to medications, food, a vaccine component or latex?   Yes   Have you ever had a serious reaction after receiving a vaccination?   No   Do you have a long-term health problem with heart, lung, kidney, or metabolic disease (e.g., diabetes), asthma, a blood disorder, no spleen, complement component deficiency, a cochlear implant, or a spinal fluid leak?  Are you on long-term aspirin therapy?   Yes   Do you have cancer, leukemia, HIV/AIDS, or any other immune system problem?   Yes   Do you have a parent, brother, or sister with an immune system problem?   No   In the past 3 months, have you taken medications that affect  your immune system, such as prednisone, other steroids, or anticancer drugs; drugs for the treatment of rheumatoid arthritis, Crohn s disease, or psoriasis; or have you had radiation treatments?   No   Have you had a seizure, or a brain or other nervous system problem?   No   During the past year, have you received a transfusion of blood or blood    products, or been given immune (gamma) globulin or antiviral drug?   No   For women: Are you pregnant or is there a chance you could become       pregnant during the next month?   No   Have you received any vaccinations in the past 4 weeks?   No     Immunization questionnaire was positive for at least one answer.  Notified Dr. Claros .      Patient instructed to remain in clinic for 15 minutes afterwards, and to report any adverse reactions.     Screening performed by Susannah Barron CMA on 11/29/2023 at 7:45 AM.

## 2023-11-29 NOTE — PATIENT INSTRUCTIONS
Patient Education   Personalized Prevention Plan  You are due for the preventive services outlined below.  Your care team is available to assist you in scheduling these services.  If you have already completed any of these items, please share that information with your care team to update in your medical record.  Health Maintenance Due   Topic Date Due     Diabetic Foot Exam  Never done     ANNUAL REVIEW OF HM ORDERS  Never done     Discuss Advance Care Planning  Never done     Eye Exam  Never done     Zoster (Shingles) Vaccine (1 of 2) Never done     RSV VACCINE (Pregnancy & 60+) (1 - 1-dose 60+ series) Never done     Annual Wellness Visit  07/04/2021     Pneumococcal Vaccine (2 - PPSV23 or PCV20) 12/21/2021     Kidney Microalbumin Urine Test  12/23/2021     Mammogram  03/04/2022     COVID-19 Vaccine (7 - 2023-24 season) 11/28/2023     Preventive Health Recommendations    See your health care provider every year to  Review health changes.   Discuss preventive care.    Review your medicines if your doctor has prescribed any.  You no longer need a yearly Pap test unless you've had an abnormal Pap test in the past 10 years. If you have vaginal symptoms, such as bleeding or discharge, be sure to talk with your provider about a Pap test.  Every 1 to 2 years, have a mammogram.  If you are over 69, talk with your health care provider about whether or not you want to continue having screening mammograms.  Every 10 years, have a colonoscopy. Or, have a yearly FIT test (stool test). These exams will check for colon cancer.   Have a cholesterol test every 5 years, or more often if your doctor advises it.   Have a diabetes test (fasting glucose) every three years. If you are at risk for diabetes, you should have this test more often.   At age 65, have a bone density scan (DEXA) to check for osteoporosis (brittle bone disease).    Shots:  Get a flu shot each year.  Get a tetanus shot every 10 years.  Talk to your doctor about  your pneumonia vaccines. There are now two you should receive - Pneumovax (PPSV 23) and Prevnar (PCV 13).  Talk to your pharmacist about the shingles vaccine.  Talk to your doctor about the hepatitis B vaccine.    Nutrition:   Eat at least 5 servings of fruits and vegetables each day.  Eat whole-grain bread, whole-wheat pasta and brown rice instead of white grains and rice.  Get adequate Calcium and Vitamin D.     Lifestyle  Exercise at least 150 minutes a week (30 minutes a day, 5 days a week). This will help you control your weight and prevent disease.  Limit alcohol to one drink per day.  No smoking.   Wear sunscreen to prevent skin cancer.   See your dentist twice a year for an exam and cleaning.  See your eye doctor every 1 to 2 years to screen for conditions such as glaucoma, macular degeneration and cataracts.    Personalized Prevention Plan  You are due for the preventive services outlined below.  Your care team is available to assist you in scheduling these services.  If you have already completed any of these items, please share that information with your care team to update in your medical record.  Health Maintenance   Topic Date Due     DIABETIC FOOT EXAM  Never done     ANNUAL REVIEW OF HM ORDERS  Never done     ADVANCE CARE PLANNING  Never done     EYE EXAM  Never done     ZOSTER IMMUNIZATION (1 of 2) Never done     RSV VACCINE (Pregnancy & 60+) (1 - 1-dose 60+ series) Never done     MEDICARE ANNUAL WELLNESS VISIT  07/04/2021     Pneumococcal Vaccine: 65+ Years (2 - PPSV23 or PCV20) 12/21/2021     MICROALBUMIN  12/23/2021     MAMMO SCREENING  03/04/2022     COVID-19 Vaccine (7 - 2023-24 season) 11/28/2023     A1C  01/03/2024     BMP  10/31/2024     LIPID  10/31/2024     FALL RISK ASSESSMENT  11/29/2024     COLORECTAL CANCER SCREENING  10/14/2025     DTAP/TDAP/TD IMMUNIZATION (2 - Td or Tdap) 03/04/2030     DEXA  11/27/2038     HEPATITIS C SCREENING  Completed     PHQ-2 (once per calendar year)   Completed     INFLUENZA VACCINE  Completed     IPV IMMUNIZATION  Aged Out     HPV IMMUNIZATION  Aged Out     MENINGITIS IMMUNIZATION  Aged Out     RSV MONOCLONAL ANTIBODY  Aged Out     PAP  Discontinued

## 2023-11-30 DIAGNOSIS — C50.812 MALIGNANT NEOPLASM OF OVERLAPPING SITES OF LEFT BREAST IN FEMALE, ESTROGEN RECEPTOR POSITIVE (H): Primary | ICD-10-CM

## 2023-11-30 DIAGNOSIS — Z17.0 MALIGNANT NEOPLASM OF OVERLAPPING SITES OF LEFT BREAST IN FEMALE, ESTROGEN RECEPTOR POSITIVE (H): Primary | ICD-10-CM

## 2023-11-30 DIAGNOSIS — C79.51 MALIGNANT NEOPLASM METASTATIC TO BONE (H): ICD-10-CM

## 2023-11-30 RX ORDER — HEPARIN SODIUM (PORCINE) LOCK FLUSH IV SOLN 100 UNIT/ML 100 UNIT/ML
5 SOLUTION INTRAVENOUS
Status: CANCELLED | OUTPATIENT
Start: 2023-12-05

## 2023-11-30 RX ORDER — ZOLEDRONIC ACID 0.04 MG/ML
4 INJECTION, SOLUTION INTRAVENOUS ONCE
Status: CANCELLED | OUTPATIENT
Start: 2023-12-05 | End: 2023-12-05

## 2023-11-30 RX ORDER — HEPARIN SODIUM,PORCINE 10 UNIT/ML
5 VIAL (ML) INTRAVENOUS
Status: CANCELLED | OUTPATIENT
Start: 2023-12-05

## 2023-12-01 DIAGNOSIS — Z17.0 MALIGNANT NEOPLASM OF OVERLAPPING SITES OF LEFT BREAST IN FEMALE, ESTROGEN RECEPTOR POSITIVE (H): Primary | ICD-10-CM

## 2023-12-01 DIAGNOSIS — C50.812 MALIGNANT NEOPLASM OF OVERLAPPING SITES OF LEFT BREAST IN FEMALE, ESTROGEN RECEPTOR POSITIVE (H): Primary | ICD-10-CM

## 2023-12-01 RX ORDER — EVEROLIMUS 10 MG/1
10 TABLET ORAL DAILY
Qty: 28 TABLET | Refills: 0 | Status: SHIPPED | OUTPATIENT
Start: 2023-12-01 | End: 2023-12-05

## 2023-12-05 ENCOUNTER — LAB (OUTPATIENT)
Dept: ONCOLOGY | Facility: CLINIC | Age: 68
End: 2023-12-05
Payer: MEDICARE

## 2023-12-05 ENCOUNTER — VIRTUAL VISIT (OUTPATIENT)
Dept: ONCOLOGY | Facility: CLINIC | Age: 68
End: 2023-12-05
Attending: INTERNAL MEDICINE
Payer: MEDICARE

## 2023-12-05 ENCOUNTER — INFUSION THERAPY VISIT (OUTPATIENT)
Dept: INFUSION THERAPY | Facility: CLINIC | Age: 68
End: 2023-12-05
Payer: MEDICARE

## 2023-12-05 ENCOUNTER — TELEPHONE (OUTPATIENT)
Dept: ONCOLOGY | Facility: CLINIC | Age: 68
End: 2023-12-05

## 2023-12-05 VITALS
SYSTOLIC BLOOD PRESSURE: 131 MMHG | OXYGEN SATURATION: 96 % | TEMPERATURE: 97.9 F | RESPIRATION RATE: 16 BRPM | WEIGHT: 185.3 LBS | BODY MASS INDEX: 33.89 KG/M2 | DIASTOLIC BLOOD PRESSURE: 64 MMHG | HEART RATE: 116 BPM

## 2023-12-05 VITALS
TEMPERATURE: 97.9 F | OXYGEN SATURATION: 96 % | HEART RATE: 116 BPM | HEIGHT: 62 IN | BODY MASS INDEX: 34.12 KG/M2 | DIASTOLIC BLOOD PRESSURE: 64 MMHG | WEIGHT: 185.41 LBS | SYSTOLIC BLOOD PRESSURE: 131 MMHG | RESPIRATION RATE: 16 BRPM

## 2023-12-05 DIAGNOSIS — C50.812 MALIGNANT NEOPLASM OF OVERLAPPING SITES OF LEFT BREAST IN FEMALE, ESTROGEN RECEPTOR POSITIVE (H): Primary | ICD-10-CM

## 2023-12-05 DIAGNOSIS — Z17.0 MALIGNANT NEOPLASM OF OVERLAPPING SITES OF LEFT BREAST IN FEMALE, ESTROGEN RECEPTOR POSITIVE (H): ICD-10-CM

## 2023-12-05 DIAGNOSIS — Z17.0 MALIGNANT NEOPLASM OF OVERLAPPING SITES OF LEFT BREAST IN FEMALE, ESTROGEN RECEPTOR POSITIVE (H): Primary | ICD-10-CM

## 2023-12-05 DIAGNOSIS — C79.51 MALIGNANT NEOPLASM METASTATIC TO BONE (H): ICD-10-CM

## 2023-12-05 DIAGNOSIS — C50.812 MALIGNANT NEOPLASM OF OVERLAPPING SITES OF LEFT BREAST IN FEMALE, ESTROGEN RECEPTOR POSITIVE (H): ICD-10-CM

## 2023-12-05 LAB
ALBUMIN SERPL BCG-MCNC: 3.9 G/DL (ref 3.5–5.2)
ALP SERPL-CCNC: 137 U/L (ref 40–150)
ALT SERPL W P-5'-P-CCNC: 24 U/L (ref 0–50)
ANION GAP SERPL CALCULATED.3IONS-SCNC: 15 MMOL/L (ref 7–15)
AST SERPL W P-5'-P-CCNC: 32 U/L (ref 0–45)
BASOPHILS # BLD AUTO: 0.1 10E3/UL (ref 0–0.2)
BASOPHILS NFR BLD AUTO: 1 %
BILIRUB SERPL-MCNC: 0.4 MG/DL
BUN SERPL-MCNC: 16.4 MG/DL (ref 8–23)
CALCIUM SERPL-MCNC: 9.6 MG/DL (ref 8.8–10.2)
CANCER AG15-3 SERPL-ACNC: 889 U/ML
CHLORIDE SERPL-SCNC: 93 MMOL/L (ref 98–107)
CHOLEST SERPL-MCNC: 254 MG/DL
CREAT SERPL-MCNC: 0.87 MG/DL (ref 0.51–0.95)
DEPRECATED HCO3 PLAS-SCNC: 25 MMOL/L (ref 22–29)
EGFRCR SERPLBLD CKD-EPI 2021: 73 ML/MIN/1.73M2
EOSINOPHIL # BLD AUTO: 0.1 10E3/UL (ref 0–0.7)
EOSINOPHIL NFR BLD AUTO: 2 %
ERYTHROCYTE [DISTWIDTH] IN BLOOD BY AUTOMATED COUNT: 15.8 % (ref 10–15)
GLUCOSE SERPL-MCNC: 345 MG/DL (ref 70–99)
HCT VFR BLD AUTO: 37.4 % (ref 35–47)
HDLC SERPL-MCNC: 70 MG/DL
HGB BLD-MCNC: 12.2 G/DL (ref 11.7–15.7)
IMM GRANULOCYTES # BLD: 0 10E3/UL
IMM GRANULOCYTES NFR BLD: 1 %
LDLC SERPL CALC-MCNC: 139 MG/DL
LYMPHOCYTES # BLD AUTO: 1.3 10E3/UL (ref 0.8–5.3)
LYMPHOCYTES NFR BLD AUTO: 25 %
MCH RBC QN AUTO: 25.1 PG (ref 26.5–33)
MCHC RBC AUTO-ENTMCNC: 32.6 G/DL (ref 31.5–36.5)
MCV RBC AUTO: 77 FL (ref 78–100)
MONOCYTES # BLD AUTO: 0.5 10E3/UL (ref 0–1.3)
MONOCYTES NFR BLD AUTO: 9 %
NEUTROPHILS # BLD AUTO: 3.1 10E3/UL (ref 1.6–8.3)
NEUTROPHILS NFR BLD AUTO: 62 %
NONHDLC SERPL-MCNC: 184 MG/DL
NRBC # BLD AUTO: 0 10E3/UL
NRBC BLD AUTO-RTO: 0 /100
PLATELET # BLD AUTO: 198 10E3/UL (ref 150–450)
POTASSIUM SERPL-SCNC: 3.7 MMOL/L (ref 3.4–5.3)
PROT SERPL-MCNC: 7.8 G/DL (ref 6.4–8.3)
RBC # BLD AUTO: 4.87 10E6/UL (ref 3.8–5.2)
SODIUM SERPL-SCNC: 133 MMOL/L (ref 135–145)
TRIGL SERPL-MCNC: 225 MG/DL
WBC # BLD AUTO: 5 10E3/UL (ref 4–11)

## 2023-12-05 PROCEDURE — 80061 LIPID PANEL: CPT | Performed by: INTERNAL MEDICINE

## 2023-12-05 PROCEDURE — 80053 COMPREHEN METABOLIC PANEL: CPT | Performed by: INTERNAL MEDICINE

## 2023-12-05 PROCEDURE — 85025 COMPLETE CBC W/AUTO DIFF WBC: CPT | Performed by: INTERNAL MEDICINE

## 2023-12-05 PROCEDURE — 96365 THER/PROPH/DIAG IV INF INIT: CPT | Performed by: INTERNAL MEDICINE

## 2023-12-05 PROCEDURE — 86300 IMMUNOASSAY TUMOR CA 15-3: CPT | Performed by: INTERNAL MEDICINE

## 2023-12-05 PROCEDURE — 99207 PR NO CHARGE LOS: CPT

## 2023-12-05 PROCEDURE — 99214 OFFICE O/P EST MOD 30 MIN: CPT | Mod: VID | Performed by: INTERNAL MEDICINE

## 2023-12-05 RX ORDER — HEPARIN SODIUM (PORCINE) LOCK FLUSH IV SOLN 100 UNIT/ML 100 UNIT/ML
500 SOLUTION INTRAVENOUS
Status: DISCONTINUED | OUTPATIENT
Start: 2023-12-05 | End: 2023-12-05 | Stop reason: HOSPADM

## 2023-12-05 RX ORDER — HEPARIN SODIUM (PORCINE) LOCK FLUSH IV SOLN 100 UNIT/ML 100 UNIT/ML
5 SOLUTION INTRAVENOUS
Status: DISCONTINUED | OUTPATIENT
Start: 2023-12-05 | End: 2023-12-05 | Stop reason: HOSPADM

## 2023-12-05 RX ORDER — ZOLEDRONIC ACID 0.04 MG/ML
4 INJECTION, SOLUTION INTRAVENOUS ONCE
Status: COMPLETED | OUTPATIENT
Start: 2023-12-05 | End: 2023-12-05

## 2023-12-05 RX ADMIN — HEPARIN SODIUM (PORCINE) LOCK FLUSH IV SOLN 100 UNIT/ML 5 ML: 100 SOLUTION at 08:42

## 2023-12-05 RX ADMIN — ZOLEDRONIC ACID 4 MG: 0.04 INJECTION, SOLUTION INTRAVENOUS at 08:21

## 2023-12-05 RX ADMIN — Medication 250 ML: at 08:18

## 2023-12-05 RX ADMIN — HEPARIN SODIUM (PORCINE) LOCK FLUSH IV SOLN 100 UNIT/ML 500 UNITS: 100 SOLUTION at 07:52

## 2023-12-05 ASSESSMENT — PAIN SCALES - GENERAL
PAINLEVEL: NO PAIN (0)
PAINLEVEL: NO PAIN (0)

## 2023-12-05 NOTE — TELEPHONE ENCOUNTER
Prior Authorization Not Needed per Insurance    Medication: XELODA 500 MG PO TABS  Insurance Company: Medicare   Expected CoPay: $  $33.76  Pharmacy Filling the Rx:    Pharmacy Notified: Yes  Patient Notified: Yes     PA not needed med will be billed through straight Medicare

## 2023-12-05 NOTE — PROGRESS NOTES
Infusion Nursing Note:  Mary Olson presents today for Zometa.    Patient seen by provider today: No   present during visit today: Not Applicable.    Note: The patient reports feeling well today and denies any concerns at this time.    The patient confirms she is taking her Ca+/Vitamin D as prescribed.    Patient denies dental issues at this time.  Patient will inform dentist that she got Zometa infusion, prior to any invasive dental work in the future.    Reminded patient to drink 6-8 glasses of water today, and tomorrow, to protect kidneys.       Intravenous Access:  Implanted Port.    Treatment Conditions:  Lab Results   Component Value Date    HGB 12.2 12/05/2023    WBC 5.0 12/05/2023    ANEU 2.5 12/15/2021    ANEUTAUTO 3.1 12/05/2023     12/05/2023        Lab Results   Component Value Date     (L) 12/05/2023    POTASSIUM 3.7 12/05/2023    CR 0.87 12/05/2023    BUFFY 9.6 12/05/2023    BILITOTAL 0.4 12/05/2023    ALBUMIN 3.9 12/05/2023    ALT 24 12/05/2023    AST 32 12/05/2023       Results reviewed, labs MET treatment parameters, ok to proceed with treatment.      Post Infusion Assessment:  Patient tolerated infusion without incident.  Blood return noted pre and post infusion.  Site patent and intact, free from redness, edema or discomfort.  No evidence of extravasations.  Access discontinued per protocol.       Discharge Plan:   AVS to patient via MYCHART.  Patient notes she is traveling to [place] for the winter and will receive her next Zometa infusion there.   Patient discharged in stable condition accompanied by: self.  Departure Mode: Ambulatory.      Lashaun Cornejo RN

## 2023-12-05 NOTE — PROGRESS NOTES
30 min virtual from clinic apt.    [facility] ONCOLOGY  12/05/23    ONCOLOGY DIAGNOSIS:  1. March 2020: Metastatic Breast Cancer ER+, MI+ Her2 Negative; with bone metastasis and a left breast primary. PI K3 CA mutation of note it is a pathologic variant which is outside the mutation of specific variants enrolled in the solar one trial.  TEJAS, TMB low, ESR1 no mutation.     THERAPY TO DATE:  1. March 2020 to August 2020: Weekly Taxol.  2. August 2020 to March 2021: Anastrozole   3. 3/19/21 began abemeciclib with arimidex she has been dose reduced on her abemaciclib 200 mg a day   4. zometa q 3 months  5. Changed to aromasin + everolimus due to progression end of August 2023.     INTERVAL HISTORY:  The patient is a 68-year-old female diagnosed with metastatic breast cancer in 03/2020 after noticing a lesion on her breast.  She was orginially treated by Dr Birch and changed to me February 2021    She is here for followup .  She has been tolerating her everolimus and aromasin without significant side effects or problems. She has no new pains. No cough or shortness of breath. No other new issues or complaints.     PAST MEDICAL HISTORY:   1.  Metastatic breast cancer, see above.   2.  Hypertension.   3.  Steroid-induced diabetes.  According to the patient, since she has been off steroids, she was told that her blood sugars has normalized and she is off medication.   4.  Chemotherapy-induced neuropathy     FAMILY HISTORY:  Mother had breast cancer at 62; underwent lumpectomy and radiation, and arimidex  No new family history since last seen.     SOCIAL HISTORY:   to her , Dieter.    No current tobacco use.  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     ALLERGIES:  LISINOPRIL.      PHYSICAL EXAM:  Physical Exam  Vitals reviewed.   Constitutional:       Appearance: Normal appearance.   HENT:      Head: Normocephalic and atraumatic.   Eyes:      Extraocular Movements: Extraocular movements intact.      " Pupils: Pupils are equal, round, and reactive to light.   Pulmonary:      Effort: Pulmonary effort is normal.   Neurological:      General: No focal deficit present.      Mental Status: She is alert and oriented to person, place, and time. Mental status is at baseline.   Psychiatric:         Mood and Affect: Mood normal.         Behavior: Behavior normal.         Thought Content: Thought content normal.         Judgment: Judgment normal.         Labs    Recent Labs   Lab Test 12/05/23  0740 10/31/23  0723 10/03/23  1415 09/05/23  0746 05/18/23  0757   * 134* 131* 136 138   POTASSIUM 3.7 3.7 3.9 3.7 3.0*   CHLORIDE 93* 92* 92* 98 101   CO2 25 26 24 24 29   ANIONGAP 15 16* 15 14 8   BUN 16.4 16.4 13.6 15.3 18   CR 0.87 0.74 0.69 0.73 0.97   * 317* 435* 242* 117*   BUFFY 9.6 9.8 9.6 9.8 9.5     No results for input(s): \"MAG\", \"PHOS\" in the last 77881 hours.  Recent Labs   Lab Test 12/05/23  0740 10/31/23  0723 10/03/23  1415 09/05/23  0746 05/18/23  0757   WBC 5.0 5.1 6.2 5.7 4.2   HGB 12.2 13.3 13.4 13.8 13.7    152 183 184 194   MCV 77* 78 80 85 98   NEUTROPHIL 62 64 66 55 46     Recent Labs   Lab Test 12/05/23  0740 10/31/23  0723 10/03/23  1415   BILITOTAL 0.4 0.5 0.4   ALKPHOS 137 128* 139*   ALT 24 27 32   AST 32 28 35   ALBUMIN 3.9 4.3 4.3     TSH   Date Value Ref Range Status   10/28/2015 1.23 0.40 - 4.00 mU/L Final     No results for input(s): \"CEA\" in the last 66890 hours.  Results for orders placed or performed in visit on 11/27/23   CT Chest/Abdomen/Pelvis w Contrast    Narrative    EXAM: CT CHEST/ABDOMEN/PELVIS W CONTRAST  LOCATION: [facility]  DATE: 11/27/2023    INDICATION: metastatic breast cancer  COMPARISON: CT chest, abdomen and pelvis 05/18/2023  TECHNIQUE: CT scan of the chest, abdomen, and pelvis was performed following injection of IV contrast. Multiplanar reformats were obtained. Dose reduction techniques were used.   CONTRAST: 105 ml isovue " 370    FINDINGS:   LUNGS AND PLEURA: A few scattered intrapulmonary lymph nodes are seen (for instance the left upper lobe series 9 image #80 and in the right middle lobe series 9 image #159). No worrisome lung nodules. Subtle pleural reticulation/nodularity in the   anterior left upper lobe/lingula (for instance series 9 image #61, #119). New trace left effusion. No right effusion.    MEDIASTINUM/AXILLAE: Right chest port catheter tip terminates in the lower SVC. Normal heart size. No pericardial effusion. Thoracic aorta is nonaneurysmal.    The infiltrative left breast mass has significantly increased in size with new diffuse nodularity. For instance, nodular soft tissue anterior to the pectoralis muscle measures up to 1.8 x 8.0 cm (series 8 image #97). Numerous nodules radiating from the   posterior chest wall mass can be seen on series 8 image #124. Additional heterogeneously enhancing masses in the posterior 3:00 and 2:00 positions measure 1.1 x 1.7 cm (series 8 image #84) and 2.9 x 3.8 cm (series 8 image #69) respectively. Multiple   new/enlarging right axillary lymph nodes (for instance 1.2 cm node series 8 image #19). A 1 cm pericardial nodule (series 8 image #116) is also new as well as numerous smaller nodules tracking along the superior mediastinum (for instance series 8 image   #76)    CORONARY ARTERY CALCIFICATION: None.    HEPATOBILIARY: Normal liver morphology and enhancement. No worrisome liver lesions. Gallbladder is contracted. No biliary ductal dilation.    PANCREAS: Normal.    SPLEEN: Normal.    ADRENAL GLANDS: Normal.    KIDNEYS/BLADDER: Normal.    BOWEL: No bowel obstruction. No free fluid, free air, or peritoneal nodularity.    LYMPH NODES: No lymphadenopathy.    VASCULATURE: Abdominal aorta is nonaneurysmal.    PELVIC ORGANS: Uterus is present. No worrisome adnexal mass.    MUSCULOSKELETAL: Increased sclerosis within numerous osseous metastases. For instance, the T6 vertebral body, the left  sternal and manubrial metastases, the right and left scapular metastases, numerous bilateral rib metastases, and the left pubic   body/inferior pubic ramus.      Impression    IMPRESSION:  1.  Significant interval increase in size of an infiltrative mass along the left chest wall adjacent to the pectoralis with numerous additional left breast/subpectoral masses, as described.  2.  New left pleural nodularity and trace left effusion, suspicious for metastatic disease involvement.  3.  Enlarging left axillary lymph nodes as well as lymph nodes/soft tissue deposits in the upper pericardium/mediastinum.  4.  More extensive/increased sclerosis of widespread osseous metastases in the visualized axial/appendicular skeleton.       ASSESSMENT AND PLAN:   1.  Metastatic ER positive RI positive HER-2/gabriela negative breast cancer with a left breast primary and bone metastasis.    Plan    1.  Zometa today was done and q 3 months.  2.  We discussed her imaging. She has significant progression of the mass in her left breast.  Bone metastasis are stable.  Never the less I think need to change her treatment and I would recommend either taxol or xeloda. We discussed these and she would like to start xeloda. I will place orders.    Tfifany Hilton MD on 12/5/2023 at 11:23 AM

## 2023-12-05 NOTE — PROGRESS NOTES
Port needle left for access for treatment. Sterile technique performed and maintained. Patient tolerated procedure well. Tubes drawn in rainbow order. Tubes labeled and double signed. Transparent dressing placed with use of tegarderm.     Lashaun Cornejo RN  [facility] Oncology/Infusion [place]

## 2023-12-05 NOTE — LETTER
12/5/2023         RE: Mary Olson  [address]  [place] [address]        Dear Colleague,    Thank you for referring your patient, Mary Olson, to the [facility]CER [facility]. Please see a copy of my visit note below.     30 min virtual from clinic apt.    [facility] ONCOLOGY  12/05/23    ONCOLOGY DIAGNOSIS:  1. March 2020: Metastatic Breast Cancer ER+, [place]+ Her2 Negative; with bone metastasis and a left breast primary. PI K3 CA mutation of note it is a pathologic variant which is outside the mutation of specific variants enrolled in the solar one trial.  TEJAS, TMB low, ESR1 no mutation.     THERAPY TO DATE:  1. March 2020 to August 2020: Weekly Taxol.  2. August 2020 to March 2021: Anastrozole   3. 3/19/21 began abemeciclib with arimidex she has been dose reduced on her abemaciclib 200 mg a day   4. zometa q 3 months  5. Changed to aromasin + everolimus due to progression end of August 2023.     INTERVAL HISTORY:  The patient is a 68-year-old female diagnosed with metastatic breast cancer in 03/2020 after noticing a lesion on her breast.  She was orginially treated by Dr Birch and changed to me February 2021    She is here for followup .  She has been tolerating her everolimus and aromasin without significant side effects or problems. She has no new pains. No cough or shortness of breath. No other new issues or complaints.     PAST MEDICAL HISTORY:   1.  Metastatic breast cancer, see above.   2.  Hypertension.   3.  Steroid-induced diabetes.  According to the patient, since she has been off steroids, she was told that her blood sugars has normalized and she is off medication.   4.  Chemotherapy-induced neuropathy     FAMILY HISTORY:  Mother had breast cancer at 62; underwent lumpectomy and radiation, and arimidex  No new family history since last seen.     SOCIAL HISTORY:   to her , Dieter.    No current tobacco use.  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     ALLERGIES:  LISINOPRIL.      PHYSICAL EXAM:  Physical Exam  Vitals reviewed.   Constitutional:       Appearance: Normal appearance.   HENT:      Head: Normocephalic and atraumatic.   Eyes:      Extraocular Movements: Extraocular movements intact.      Pupils: Pupils are equal, round, and reactive to light.   Pulmonary:      Effort: Pulmonary effort is normal.   Neurological:      General: No focal deficit present.      Mental Status: She is alert and oriented to person, place, and time. Mental status is at baseline.   Psychiatric:         Mood and Affect: Mood normal.         Behavior: Behavior normal.         Thought Content: Thought content normal.         Judgment: Judgment normal.         Labs    Recent Labs   Lab Test 12/05/23  0740 10/31/23  0723 10/03/23  1415 09/05/23  0746 05/18/23  0757   * 134* 131* 136 138   POTASSIUM 3.7 3.7 3.9 3.7 3.0*   CHLORIDE 93* 92* 92* 98 101   CO2 25 26 24 24 29   ANIONGAP 15 16* 15 14 8   BUN 16.4 16.4 13.6 15.3 18   CR 0.87 0.74 0.69 0.73 0.97   * 317* 435* 242* 117*   BUFFY 9.6 9.8 9.6 9.8 9.5     No results for input(s): \"MAG\", \"PHOS\" in the last 51596 hours.  Recent Labs   Lab Test 12/05/23  0740 10/31/23  0723 10/03/23  1415 09/05/23  0746 05/18/23  0757   WBC 5.0 5.1 6.2 5.7 4.2   HGB 12.2 13.3 13.4 13.8 13.7    152 183 184 194   MCV 77* 78 80 85 98   NEUTROPHIL 62 64 66 55 46     Recent Labs   Lab Test 12/05/23  0740 10/31/23  0723 10/03/23  1415   BILITOTAL 0.4 0.5 0.4   ALKPHOS 137 128* 139*   ALT 24 27 32   AST 32 28 35   ALBUMIN 3.9 4.3 4.3     TSH   Date Value Ref Range Status   10/28/2015 1.23 0.40 - 4.00 mU/L Final     No results for input(s): \"CEA\" in the last 46549 hours.  Results for orders placed or performed in visit on 11/27/23   CT Chest/Abdomen/Pelvis w Contrast    Narrative    EXAM: CT CHEST/ABDOMEN/PELVIS W CONTRAST  LOCATION: [facility]  DATE: 11/27/2023    INDICATION: metastatic breast " cancer  COMPARISON: CT chest, abdomen and pelvis 05/18/2023  TECHNIQUE: CT scan of the chest, abdomen, and pelvis was performed following injection of IV contrast. Multiplanar reformats were obtained. Dose reduction techniques were used.   CONTRAST: 105 ml isovue 370    FINDINGS:   LUNGS AND PLEURA: A few scattered intrapulmonary lymph nodes are seen (for instance the left upper lobe series 9 image #80 and in the right middle lobe series 9 image #159). No worrisome lung nodules. Subtle pleural reticulation/nodularity in the   anterior left upper lobe/lingula (for instance series 9 image #61, #119). New trace left effusion. No right effusion.    MEDIASTINUM/AXILLAE: Right chest port catheter tip terminates in the lower SVC. Normal heart size. No pericardial effusion. Thoracic aorta is nonaneurysmal.    The infiltrative left breast mass has significantly increased in size with new diffuse nodularity. For instance, nodular soft tissue anterior to the pectoralis muscle measures up to 1.8 x 8.0 cm (series 8 image #97). Numerous nodules radiating from the   posterior chest wall mass can be seen on series 8 image #124. Additional heterogeneously enhancing masses in the posterior 3:00 and 2:00 positions measure 1.1 x 1.7 cm (series 8 image #84) and 2.9 x 3.8 cm (series 8 image #69) respectively. Multiple   new/enlarging right axillary lymph nodes (for instance 1.2 cm node series 8 image #19). A 1 cm pericardial nodule (series 8 image #116) is also new as well as numerous smaller nodules tracking along the superior mediastinum (for instance series 8 image   #76)    CORONARY ARTERY CALCIFICATION: None.    HEPATOBILIARY: Normal liver morphology and enhancement. No worrisome liver lesions. Gallbladder is contracted. No biliary ductal dilation.    PANCREAS: Normal.    SPLEEN: Normal.    ADRENAL GLANDS: Normal.    KIDNEYS/BLADDER: Normal.    BOWEL: No bowel obstruction. No free fluid, free air, or peritoneal nodularity.    LYMPH  NODES: No lymphadenopathy.    VASCULATURE: Abdominal aorta is nonaneurysmal.    PELVIC ORGANS: Uterus is present. No worrisome adnexal mass.    MUSCULOSKELETAL: Increased sclerosis within numerous osseous metastases. For instance, the T6 vertebral body, the left sternal and manubrial metastases, the right and left scapular metastases, numerous bilateral rib metastases, and the left pubic   body/inferior pubic ramus.      Impression    IMPRESSION:  1.  Significant interval increase in size of an infiltrative mass along the left chest wall adjacent to the pectoralis with numerous additional left breast/subpectoral masses, as described.  2.  New left pleural nodularity and trace left effusion, suspicious for metastatic disease involvement.  3.  Enlarging left axillary lymph nodes as well as lymph nodes/soft tissue deposits in the upper pericardium/mediastinum.  4.  More extensive/increased sclerosis of widespread osseous metastases in the visualized axial/appendicular skeleton.       ASSESSMENT AND PLAN:   1.  Metastatic ER positive [place] positive HER-2/gabriela negative breast cancer with a left breast primary and bone metastasis.    Plan    1.  Zometa today was done and q 3 months.  2.  We discussed her imaging. She has significant progression of the mass in her left breast.  Bone metastasis are stable.  Never the less I think need to change her treatment and I would recommend either taxol or xeloda. We discussed these and she would like to start xeloda. I will place orders.    Tiffany Hilton MD on 12/5/2023 at 11:23 AM                                    Again, thank you for allowing me to participate in the care of your patient.        Sincerely,        Tiffany Hilton MD

## 2023-12-06 ENCOUNTER — TELEPHONE (OUTPATIENT)
Dept: ONCOLOGY | Facility: CLINIC | Age: 68
End: 2023-12-06
Payer: MEDICARE

## 2023-12-06 DIAGNOSIS — C50.812 MALIGNANT NEOPLASM OF OVERLAPPING SITES OF LEFT BREAST IN FEMALE, ESTROGEN RECEPTOR POSITIVE (H): Primary | ICD-10-CM

## 2023-12-06 DIAGNOSIS — Z17.0 MALIGNANT NEOPLASM OF OVERLAPPING SITES OF LEFT BREAST IN FEMALE, ESTROGEN RECEPTOR POSITIVE (H): Primary | ICD-10-CM

## 2023-12-06 RX ORDER — CAPECITABINE 500 MG/1
1000 TABLET, FILM COATED ORAL 2 TIMES DAILY
Qty: 112 TABLET | Refills: 0 | Status: SHIPPED | OUTPATIENT
Start: 2023-12-08 | End: 2024-01-02

## 2023-12-06 NOTE — TELEPHONE ENCOUNTER
Oral Chemotherapy Monitoring Program    Primary Oncologist: Dr. Hilton  Primary Oncology Clinic: [facility] Diagnosis: Breast Cancer    Drug: Xeloda (capecitabine)  Start Date: TBD  Expected duration of therapy: Until disease progression or unacceptable toxicity    Drug Interaction Assessment: None    Lab Monitoring Plan  q3 weeks per treatment plan    Subjective/Objective:  Mary Olson is a 68 year old female contacted by phone for an initial visit for oral chemotherapy education.          11/17/2022     7:00 AM 1/6/2023     4:00 PM 6/30/2023     8:00 AM 8/9/2023     9:00 AM 8/10/2023    11:00 AM 8/31/2023     4:00 PM 12/6/2023    11:00 AM   ORAL CHEMOTHERAPY   Assessment Type Refill Refill Discontinuation Initial Work up;Left Voicemail New Teach Initial Follow up New Teach   Reason for Discontinuation   Disease progression       Diagnosis Code Breast Cancer Breast Cancer Breast Cancer Breast Cancer Breast Cancer Breast Cancer Breast Cancer   Providers [facility]   Clinic Name/Location [facility]   Drug Name Ibrance (palbociclib) Verzenio (abemaciclib) Verzenio (abemaciclib) Afinitor (everolimus) Afinitor (everolimus) Afinitor (everolimus) Xeloda (capecitabine)   Dose 125 mg 100 mg 100 mg 10 mg 10 mg 10 mg 2,000 mg   Current Schedule Daily BID BID Daily Daily Daily BID   Cycle Details 3 weeks on, 1 week off Continuous Continuous Continuous Continuous Continuous 2 weeks on, 1 week off   Start Date of Last Cycle      8/25/2023    Doses missed in last 2 weeks      0    Adherence Assessment      Adherent    Adverse Effects      No AE identified during assessment    Any new drug interactions?    No      Is the dose as ordered appropriate for the patient?    Yes          Vitals:  BP:   BP Readings from Last 1 Encounters:   12/05/23 131/64     Wt Readings from Last 1 Encounters:   12/05/23  "84.1 kg (185 lb 6.5 oz)     Estimated body surface area is 1.92 meters squared as calculated from the following:    Height as of 12/5/23: 1.575 m (5' 2.01\").    Weight as of 12/5/23: 84.1 kg (185 lb 6.5 oz).    Labs:  _  Result Component Current Result Ref Range   Sodium 133 (L) (12/5/2023) 135 - 145 mmol/L     _  Result Component Current Result Ref Range   Potassium 3.7 (12/5/2023) 3.4 - 5.3 mmol/L     _  Result Component Current Result Ref Range   Calcium 9.6 (12/5/2023) 8.8 - 10.2 mg/dL     No results found for Mag within last 30 days.     No results found for Phos within last 30 days.     _  Result Component Current Result Ref Range   Albumin 3.9 (12/5/2023) 3.5 - 5.2 g/dL     _  Result Component Current Result Ref Range   Urea Nitrogen 16.4 (12/5/2023) 8.0 - 23.0 mg/dL     _  Result Component Current Result Ref Range   Creatinine 0.87 (12/5/2023) 0.51 - 0.95 mg/dL       _  Result Component Current Result Ref Range   AST 32 (12/5/2023) 0 - 45 U/L     _  Result Component Current Result Ref Range   ALT 24 (12/5/2023) 0 - 50 U/L     _  Result Component Current Result Ref Range   Bilirubin Total 0.4 (12/5/2023) <=1.2 mg/dL       _  Result Component Current Result Ref Range   WBC Count 5.0 (12/5/2023) 4.0 - 11.0 10e3/uL     _  Result Component Current Result Ref Range   Hemoglobin 12.2 (12/5/2023) 11.7 - 15.7 g/dL     _  Result Component Current Result Ref Range   Platelet Count 198 (12/5/2023) 150 - 450 10e3/uL     No results found for ANC within last 30 days.         Assessment:  Patient is appropriate to start therapy.    Plan:  Basic chemotherapy teaching was reviewed with the patient including indication, start date of therapy, dose, administration, adverse effects, missed doses, food and drug interactions, monitoring, side effect management, office contact information, and safe handling. Written materials were provided and all questions answered.    Follow-Up:  12/18 1 week f/u call after starting Xeloda   "   Siva Vega, PharmD, BCOP  Oncology Pharmacy Manager  [facility]  [phone]

## 2023-12-18 ENCOUNTER — TELEPHONE (OUTPATIENT)
Dept: ONCOLOGY | Facility: CLINIC | Age: 68
End: 2023-12-18
Payer: MEDICARE

## 2023-12-18 NOTE — ORAL ONC MGMT
Oral Chemotherapy Monitoring Program    Primary Oncologist: Dr. Hilton  Drug: Capecitabine (Xeloda) 2000 mg BID, days 1-14 of 21  Start Date: 12/10/23    Subjective/Objective:  Mary Olson is a 68 year old female contacted by phone for a follow-up visit for oral chemotherapy. She feels well overall with minimal side effects attributable to capecitabine. She has some fatigue and soreness, also reports a sore throat but no mucositis. No GI side effects. She is eating small meals and resting as needed.         1/6/2023     4:00 PM 6/30/2023     8:00 AM 8/9/2023     9:00 AM 8/10/2023    11:00 AM 8/31/2023     4:00 PM 12/6/2023    11:00 AM 12/18/2023    12:00 PM   ORAL CHEMOTHERAPY   Assessment Type Refill Discontinuation Initial Work up;Left Voicemail New Teach Initial Follow up New Teach Initial Follow up   Reason for Discontinuation  Disease progression        Diagnosis Code Breast Cancer Breast Cancer Breast Cancer Breast Cancer Breast Cancer Breast Cancer Breast Cancer   [facility]iders [facility]   Clinic Name/Location [facility]   Drug Name Verzenio (abemaciclib) Verzenio (abemaciclib) Afinitor (everolimus) Afinitor (everolimus) Afinitor (everolimus) Xeloda (capecitabine) Xeloda (capecitabine)   Dose 100 mg 100 mg 10 mg 10 mg 10 mg 2,000 mg 2,000 mg   Current Schedule BID BID Daily Daily Daily BID BID   Cycle Details Continuous Continuous Continuous Continuous Continuous 2 weeks on, 1 week off 2 weeks on, 1 week off   Start Date of Last Cycle     8/25/2023  12/10/2023   Planned next cycle start date       12/31/2023   Doses missed in last 2 weeks     0  0   Adherence Assessment     Adherent  Adherent   Adverse Effects     No AE identified during assessment  No AE identified during assessment   Any new drug interactions?   No       Is the dose as ordered appropriate for the patient?   Yes      "      Vitals:  BP:   BP Readings from Last 1 Encounters:   12/05/23 131/64     Wt Readings from Last 1 Encounters:   12/05/23 84.1 kg (185 lb 6.5 oz)     Estimated body surface area is 1.92 meters squared as calculated from the following:    Height as of 12/5/23: 1.575 m (5' 2.01\").    Weight as of 12/5/23: 84.1 kg (185 lb 6.5 oz).    Labs:  _  Result Component Current Result Ref Range   Sodium 133 (L) (12/5/2023) 135 - 145 mmol/L     _  Result Component Current Result Ref Range   Potassium 3.7 (12/5/2023) 3.4 - 5.3 mmol/L     _  Result Component Current Result Ref Range   Calcium 9.6 (12/5/2023) 8.8 - 10.2 mg/dL     No results found for Mag within last 30 days.     No results found for Phos within last 30 days.     _  Result Component Current Result Ref Range   Albumin 3.9 (12/5/2023) 3.5 - 5.2 g/dL     _  Result Component Current Result Ref Range   Urea Nitrogen 16.4 (12/5/2023) 8.0 - 23.0 mg/dL     _  Result Component Current Result Ref Range   Creatinine 0.87 (12/5/2023) 0.51 - 0.95 mg/dL       _  Result Component Current Result Ref Range   AST 32 (12/5/2023) 0 - 45 U/L     _  Result Component Current Result Ref Range   ALT 24 (12/5/2023) 0 - 50 U/L     _  Result Component Current Result Ref Range   Bilirubin Total 0.4 (12/5/2023) <=1.2 mg/dL       _  Result Component Current Result Ref Range   WBC Count 5.0 (12/5/2023) 4.0 - 11.0 10e3/uL     _  Result Component Current Result Ref Range   Hemoglobin 12.2 (12/5/2023) 11.7 - 15.7 g/dL     _  Result Component Current Result Ref Range   Platelet Count 198 (12/5/2023) 150 - 450 10e3/uL     No results found for ANC within last 30 days.       Assessment/Plan:  Tolerating capecitabine well. Continue at current dose and schedule.     Will be due for labs prior to cycle 2 - pt already sent [employer] message to coordinate.     Follow-Up:  Labs week of 12/26    Tommy Roche, PharmD  Hematology/Oncology Clinical Pharmacist  [facility] [place] - " [place]  [phone]

## 2023-12-18 NOTE — ORAL ONC MGMT
Oral Chemotherapy Monitoring Program     Placed call to patient in follow up of capecitabine therapy. Left message requesting call back. Attempting to contact patient regarding initial follow-up. No drug names were mentioned.     Will update when response received.     Tommy Roche, PharmD  Hematology/Oncology Clinical Pharmacist  [facility]  [phone]

## 2023-12-21 ENCOUNTER — TELEPHONE (OUTPATIENT)
Dept: ONCOLOGY | Facility: CLINIC | Age: 68
End: 2023-12-21
Payer: MEDICARE

## 2023-12-21 DIAGNOSIS — Z17.0 MALIGNANT NEOPLASM OF OVERLAPPING SITES OF LEFT BREAST IN FEMALE, ESTROGEN RECEPTOR POSITIVE (H): Primary | ICD-10-CM

## 2023-12-21 DIAGNOSIS — C50.812 MALIGNANT NEOPLASM OF OVERLAPPING SITES OF LEFT BREAST IN FEMALE, ESTROGEN RECEPTOR POSITIVE (H): Primary | ICD-10-CM

## 2023-12-21 NOTE — ORAL ONC MGMT
"Oral Chemotherapy Monitoring Program    Received call from Desmond regarding side effects on capecitabine. She started to get mouth sores today around her upper and lower lips, making it uncomfortable to eat/drink normally and take her pills. She can get small, soft foods down and drink through a straw. Nutrition status does not seem to be affected at this point, it's just uncomfortable. Also reporting sore throat that has lasted the past few days.      She has two days remaining in this cycle before her week \"off,\" and she feels she can tolerate the last two days of capecitabine. Discussed that mucositis should improve during her off week but dose reduction or extending her cycles may be necessary. She has labs on 12/26/23, so we will review those results and connect with Desmond for a status check.     Will make Dr. Hilton aware of the above.     Tommy Roche, PharmD  Hematology/Oncology Clinical Pharmacist  [facility]  [phone]      "

## 2023-12-26 ENCOUNTER — LAB (OUTPATIENT)
Dept: INFUSION THERAPY | Facility: CLINIC | Age: 68
End: 2023-12-26
Attending: INTERNAL MEDICINE
Payer: MEDICARE

## 2023-12-26 ENCOUNTER — ONCOLOGY VISIT (OUTPATIENT)
Dept: ONCOLOGY | Facility: CLINIC | Age: 68
End: 2023-12-26
Attending: INTERNAL MEDICINE
Payer: MEDICARE

## 2023-12-26 VITALS
HEIGHT: 63 IN | BODY MASS INDEX: 30.3 KG/M2 | SYSTOLIC BLOOD PRESSURE: 134 MMHG | DIASTOLIC BLOOD PRESSURE: 90 MMHG | WEIGHT: 171 LBS | OXYGEN SATURATION: 96 % | TEMPERATURE: 98.3 F | HEART RATE: 124 BPM

## 2023-12-26 DIAGNOSIS — C50.812 MALIGNANT NEOPLASM OF OVERLAPPING SITES OF LEFT BREAST IN FEMALE, ESTROGEN RECEPTOR POSITIVE (H): Primary | ICD-10-CM

## 2023-12-26 DIAGNOSIS — Z17.0 MALIGNANT NEOPLASM OF OVERLAPPING SITES OF LEFT BREAST IN FEMALE, ESTROGEN RECEPTOR POSITIVE (H): ICD-10-CM

## 2023-12-26 DIAGNOSIS — G62.9 NEUROPATHY: ICD-10-CM

## 2023-12-26 DIAGNOSIS — Z17.0 MALIGNANT NEOPLASM OF OVERLAPPING SITES OF LEFT BREAST IN FEMALE, ESTROGEN RECEPTOR POSITIVE (H): Primary | ICD-10-CM

## 2023-12-26 DIAGNOSIS — C50.812 MALIGNANT NEOPLASM OF OVERLAPPING SITES OF LEFT BREAST IN FEMALE, ESTROGEN RECEPTOR POSITIVE (H): ICD-10-CM

## 2023-12-26 DIAGNOSIS — C79.51 MALIGNANT NEOPLASM METASTATIC TO BONE (H): Primary | ICD-10-CM

## 2023-12-26 LAB
ALBUMIN SERPL BCG-MCNC: 4.2 G/DL (ref 3.5–5.2)
ALP SERPL-CCNC: 134 U/L (ref 40–150)
ALT SERPL W P-5'-P-CCNC: 12 U/L (ref 0–50)
ANION GAP SERPL CALCULATED.3IONS-SCNC: 17 MMOL/L (ref 7–15)
AST SERPL W P-5'-P-CCNC: 30 U/L (ref 0–45)
BASOPHILS # BLD AUTO: 0 10E3/UL (ref 0–0.2)
BASOPHILS NFR BLD AUTO: 0 %
BILIRUB SERPL-MCNC: 0.9 MG/DL
BUN SERPL-MCNC: 18.5 MG/DL (ref 8–23)
CALCIUM SERPL-MCNC: 9.4 MG/DL (ref 8.8–10.2)
CHLORIDE SERPL-SCNC: 88 MMOL/L (ref 98–107)
CREAT SERPL-MCNC: 1.09 MG/DL (ref 0.51–0.95)
DEPRECATED HCO3 PLAS-SCNC: 26 MMOL/L (ref 22–29)
EGFRCR SERPLBLD CKD-EPI 2021: 55 ML/MIN/1.73M2
EOSINOPHIL # BLD AUTO: 0.2 10E3/UL (ref 0–0.7)
EOSINOPHIL NFR BLD AUTO: 2 %
ERYTHROCYTE [DISTWIDTH] IN BLOOD BY AUTOMATED COUNT: 17.8 % (ref 10–15)
GLUCOSE SERPL-MCNC: 200 MG/DL (ref 70–99)
HCT VFR BLD AUTO: 35 % (ref 35–47)
HGB BLD-MCNC: 11.6 G/DL (ref 11.7–15.7)
IMM GRANULOCYTES # BLD: 0.1 10E3/UL
IMM GRANULOCYTES NFR BLD: 1 %
LYMPHOCYTES # BLD AUTO: 1.7 10E3/UL (ref 0.8–5.3)
LYMPHOCYTES NFR BLD AUTO: 18 %
MCH RBC QN AUTO: 25.4 PG (ref 26.5–33)
MCHC RBC AUTO-ENTMCNC: 33.1 G/DL (ref 31.5–36.5)
MCV RBC AUTO: 77 FL (ref 78–100)
MONOCYTES # BLD AUTO: 0.8 10E3/UL (ref 0–1.3)
MONOCYTES NFR BLD AUTO: 8 %
NEUTROPHILS # BLD AUTO: 6.3 10E3/UL (ref 1.6–8.3)
NEUTROPHILS NFR BLD AUTO: 71 %
NRBC # BLD AUTO: 0.1 10E3/UL
NRBC BLD AUTO-RTO: 1 /100
PLATELET # BLD AUTO: 365 10E3/UL (ref 150–450)
POTASSIUM SERPL-SCNC: 2.9 MMOL/L (ref 3.4–5.3)
PROT SERPL-MCNC: 7.7 G/DL (ref 6.4–8.3)
RBC # BLD AUTO: 4.57 10E6/UL (ref 3.8–5.2)
SODIUM SERPL-SCNC: 131 MMOL/L (ref 135–145)
WBC # BLD AUTO: 9 10E3/UL (ref 4–11)

## 2023-12-26 PROCEDURE — 99207 PR NO CHARGE LOS: CPT

## 2023-12-26 PROCEDURE — 86300 IMMUNOASSAY TUMOR CA 15-3: CPT

## 2023-12-26 PROCEDURE — 36591 DRAW BLOOD OFF VENOUS DEVICE: CPT

## 2023-12-26 PROCEDURE — 85025 COMPLETE CBC W/AUTO DIFF WBC: CPT

## 2023-12-26 PROCEDURE — 258N000003 HC RX IP 258 OP 636: Performed by: INTERNAL MEDICINE

## 2023-12-26 PROCEDURE — 250N000011 HC RX IP 250 OP 636: Mod: JZ | Performed by: INTERNAL MEDICINE

## 2023-12-26 PROCEDURE — G0463 HOSPITAL OUTPT CLINIC VISIT: HCPCS | Performed by: INTERNAL MEDICINE

## 2023-12-26 PROCEDURE — 99213 OFFICE O/P EST LOW 20 MIN: CPT | Performed by: INTERNAL MEDICINE

## 2023-12-26 PROCEDURE — 80053 COMPREHEN METABOLIC PANEL: CPT

## 2023-12-26 RX ORDER — HEPARIN SODIUM,PORCINE 10 UNIT/ML
5-20 VIAL (ML) INTRAVENOUS DAILY PRN
OUTPATIENT
Start: 2023-12-26

## 2023-12-26 RX ORDER — ALBUTEROL SULFATE 90 UG/1
1-2 AEROSOL, METERED RESPIRATORY (INHALATION)
Start: 2023-12-26

## 2023-12-26 RX ORDER — ALBUTEROL SULFATE 0.83 MG/ML
2.5 SOLUTION RESPIRATORY (INHALATION)
OUTPATIENT
Start: 2023-12-26

## 2023-12-26 RX ORDER — DIPHENHYDRAMINE HYDROCHLORIDE 50 MG/ML
50 INJECTION INTRAMUSCULAR; INTRAVENOUS
Start: 2023-12-26

## 2023-12-26 RX ORDER — MEPERIDINE HYDROCHLORIDE 25 MG/ML
25 INJECTION INTRAMUSCULAR; INTRAVENOUS; SUBCUTANEOUS EVERY 30 MIN PRN
OUTPATIENT
Start: 2023-12-26

## 2023-12-26 RX ORDER — HEPARIN SODIUM (PORCINE) LOCK FLUSH IV SOLN 100 UNIT/ML 100 UNIT/ML
500 SOLUTION INTRAVENOUS
Status: DISCONTINUED | OUTPATIENT
Start: 2023-12-26 | End: 2023-12-26 | Stop reason: HOSPADM

## 2023-12-26 RX ORDER — CAPECITABINE 500 MG/1
1500 TABLET, FILM COATED ORAL 2 TIMES DAILY
Qty: 84 TABLET | Refills: 0 | Status: SHIPPED | OUTPATIENT
Start: 2024-01-07 | End: 2024-01-02

## 2023-12-26 RX ORDER — METHYLPREDNISOLONE SODIUM SUCCINATE 125 MG/2ML
125 INJECTION, POWDER, LYOPHILIZED, FOR SOLUTION INTRAMUSCULAR; INTRAVENOUS
Start: 2023-12-26

## 2023-12-26 RX ORDER — HEPARIN SODIUM (PORCINE) LOCK FLUSH IV SOLN 100 UNIT/ML 100 UNIT/ML
5 SOLUTION INTRAVENOUS
Status: CANCELLED | OUTPATIENT
Start: 2023-12-26

## 2023-12-26 RX ORDER — HEPARIN SODIUM (PORCINE) LOCK FLUSH IV SOLN 100 UNIT/ML 100 UNIT/ML
5 SOLUTION INTRAVENOUS
OUTPATIENT
Start: 2023-12-26

## 2023-12-26 RX ORDER — GABAPENTIN 250 MG/5ML
SOLUTION ORAL
Qty: 470 ML | Refills: 6 | Status: CANCELLED | OUTPATIENT
Start: 2023-12-26

## 2023-12-26 RX ORDER — GABAPENTIN 250 MG/5ML
SOLUTION ORAL
Qty: 470 ML | Refills: 6 | Status: SHIPPED | OUTPATIENT
Start: 2023-12-26 | End: 2024-06-18

## 2023-12-26 RX ORDER — HEPARIN SODIUM (PORCINE) LOCK FLUSH IV SOLN 100 UNIT/ML 100 UNIT/ML
5 SOLUTION INTRAVENOUS
Status: DISCONTINUED | OUTPATIENT
Start: 2023-12-26 | End: 2023-12-26 | Stop reason: HOSPADM

## 2023-12-26 RX ORDER — EPINEPHRINE 1 MG/ML
0.3 INJECTION, SOLUTION INTRAMUSCULAR; SUBCUTANEOUS EVERY 5 MIN PRN
OUTPATIENT
Start: 2023-12-26

## 2023-12-26 RX ADMIN — POTASSIUM CHLORIDE: 2 INJECTION, SOLUTION, CONCENTRATE INTRAVENOUS at 10:38

## 2023-12-26 RX ADMIN — Medication 5 ML: at 13:52

## 2023-12-26 RX ADMIN — Medication 500 UNITS: at 09:31

## 2023-12-26 ASSESSMENT — PAIN SCALES - GENERAL: PAINLEVEL: NO PAIN (0)

## 2023-12-26 NOTE — PROGRESS NOTES
Infusion Nursing Note:  Mary Olson presents today for IV Fluids/Potassium replacement.    Patient seen by provider today: Yes: Dr Hilton   present during visit today: Not Applicable.    Note:Desmond requests all IV potassium replacement today.        Intravenous Access:  Implanted Port.    Treatment Conditions:  Lab Results   Component Value Date     (L) 12/26/2023    POTASSIUM 2.9 (L) 12/26/2023    CR 1.09 (H) 12/26/2023    BUFFY 9.4 12/26/2023    BILITOTAL 0.9 12/26/2023    ALBUMIN 4.2 12/26/2023    ALT 12 12/26/2023    AST 30 12/26/2023         Post Infusion Assessment:  Patient tolerated infusion without incident.       Discharge Plan:   Patient discharged in stable condition accompanied by: .  Departure Mode: Ambulatory.      Melissa Pacheco RN

## 2023-12-26 NOTE — LETTER
12/26/2023         RE: Mary Olson  [address]  [place] [address]        Dear Colleague,    Thank you for referring your patient, Mary Olson, to the [facility]. Please see a copy of my visit note below.    [facility] ONCOLOGY  12/26/23    ONCOLOGY DIAGNOSIS:  1. March 2020: Metastatic Breast Cancer ER+, DC+ Her2 Negative; with bone metastasis and a left breast primary. PI K3 CA mutation of note it is a pathologic variant which is outside the mutation of specific variants enrolled in the solar one trial.  TEJAS, TMB low, ESR1 no mutation.     THERAPY TO DATE:  1. March 2020 to August 2020: Weekly Taxol.  2. August 2020 to March 2021: Anastrozole   3. 3/19/21 began abemeciclib with arimidex she has been dose reduced on her abemaciclib 200 mg a day   4. zometa q 3 months  5. Changed to aromasin + everolimus due to progression end of August 2023.  6. 12/10/23 changed to xeloda dose reduced after one cycle to 1500mg bid due to mucositis.     INTERVAL HISTORY:  The patient is a 68-year-old female diagnosed with metastatic breast cancer in 03/2020 after noticing a lesion on her breast.   She started her xeloda 2000 mg bid 12/10 and held it starting 12/21 due to mucositis. She also had some diarrhea yesterday.  She has been trying baking soda mouth rises with some soothing but the mouth sores are still uncomfortable. No n/v. No other new issues.    PAST MEDICAL HISTORY:   1.  Metastatic breast cancer, see above.   2.  Hypertension.   3.  Steroid-induced diabetes.  According to the patient, since she has been off steroids, she was told that her blood sugars has normalized and she is off medication.   4.  Chemotherapy-induced neuropathy     FAMILY HISTORY:  Mother had breast cancer at 62; underwent lumpectomy and radiation, and arimidex  No new family history since last seen.     SOCIAL HISTORY:   to her , Dieter.    No current tobacco use.  Retired. Former  "dancer teacher of ethnic polish dances.     ALLERGIES:  LISINOPRIL.      PHYSICAL EXAM:  Physical Exam  Vitals reviewed.   Constitutional:       Appearance: Normal appearance.   HENT:      Head: Normocephalic and atraumatic.   Eyes:      Extraocular Movements: Extraocular movements intact.      Pupils: Pupils are equal, round, and reactive to light.   Pulmonary:      Effort: Pulmonary effort is normal.   Neurological:      General: No focal deficit present.      Mental Status: She is alert and oriented to person, place, and time. Mental status is at baseline.   Psychiatric:         Mood and Affect: Mood normal.         Behavior: Behavior normal.         Thought Content: Thought content normal.         Judgment: Judgment normal.       Grade 2 mucosits in mouth. Grade 1 hand foot changes.  Labs    Recent Labs   Lab Test 12/26/23  0921 12/05/23  0740 10/31/23  0723 10/03/23  1415 09/05/23  0746   * 133* 134* 131* 136   POTASSIUM 2.9* 3.7 3.7 3.9 3.7   CHLORIDE 88* 93* 92* 92* 98   CO2 26 25 26 24 24   ANIONGAP 17* 15 16* 15 14   BUN 18.5 16.4 16.4 13.6 15.3   CR 1.09* 0.87 0.74 0.69 0.73   * 345* 317* 435* 242*   BUFFY 9.4 9.6 9.8 9.6 9.8     No results for input(s): \"MAG\", \"PHOS\" in the last 61440 hours.  Recent Labs   Lab Test 12/26/23  0921 12/05/23  0740 10/31/23  0723 10/03/23  1415 09/05/23  0746   WBC 9.0 5.0 5.1 6.2 5.7   HGB 11.6* 12.2 13.3 13.4 13.8    198 152 183 184   MCV 77* 77* 78 80 85   NEUTROPHIL 71 62 64 66 55     Recent Labs   Lab Test 12/26/23 0921 12/05/23 0740 10/31/23  0723   BILITOTAL 0.9 0.4 0.5   ALKPHOS 134 137 128*   ALT 12 24 27   AST 30 32 28   ALBUMIN 4.2 3.9 4.3     TSH   Date Value Ref Range Status   10/28/2015 1.23 0.40 - 4.00 mU/L Final     No results for input(s): \"CEA\" in the last 83722 hours.  Results for orders placed or performed in visit on 11/27/23   CT Chest/Abdomen/Pelvis w Contrast    Narrative    EXAM: CT CHEST/ABDOMEN/PELVIS W CONTRAST  LOCATION: [facility]  DATE: 11/27/2023    INDICATION: metastatic breast cancer  COMPARISON: CT chest, abdomen and pelvis 05/18/2023  TECHNIQUE: CT scan of the chest, abdomen, and pelvis was performed following injection of IV contrast. Multiplanar reformats were obtained. Dose reduction techniques were used.   CONTRAST: 105 ml isovue 370    FINDINGS:   LUNGS AND PLEURA: A few scattered intrapulmonary lymph nodes are seen (for instance the left upper lobe series 9 image #80 and in the right middle lobe series 9 image #159). No worrisome lung nodules. Subtle pleural reticulation/nodularity in the   anterior left upper lobe/lingula (for instance series 9 image #61, #119). New trace left effusion. No right effusion.    MEDIASTINUM/AXILLAE: Right chest port catheter tip terminates in the lower SVC. Normal heart size. No pericardial effusion. Thoracic aorta is nonaneurysmal.    The infiltrative left breast mass has significantly increased in size with new diffuse nodularity. For instance, nodular soft tissue anterior to the pectoralis muscle measures up to 1.8 x 8.0 cm (series 8 image #97). Numerous nodules radiating from the   posterior chest wall mass can be seen on series 8 image #124. Additional heterogeneously enhancing masses in the posterior 3:00 and 2:00 positions measure 1.1 x 1.7 cm (series 8 image #84) and 2.9 x 3.8 cm (series 8 image #69) respectively. Multiple   new/enlarging right axillary lymph nodes (for instance 1.2 cm node series 8 image #19). A 1 cm pericardial nodule (series 8 image #116) is also new as well as numerous smaller nodules tracking along the superior mediastinum (for instance series 8 image   #76)    CORONARY ARTERY CALCIFICATION: None.    HEPATOBILIARY: Normal liver morphology and enhancement. No worrisome liver lesions. Gallbladder is contracted. No biliary ductal dilation.    PANCREAS: Normal.    SPLEEN: Normal.    ADRENAL GLANDS: Normal.    KIDNEYS/BLADDER:  Normal.    BOWEL: No bowel obstruction. No free fluid, free air, or peritoneal nodularity.    LYMPH NODES: No lymphadenopathy.    VASCULATURE: Abdominal aorta is nonaneurysmal.    PELVIC ORGANS: Uterus is present. No worrisome adnexal mass.    MUSCULOSKELETAL: Increased sclerosis within numerous osseous metastases. For instance, the T6 vertebral body, the left sternal and manubrial metastases, the right and left scapular metastases, numerous bilateral rib metastases, and the left pubic   body/inferior pubic ramus.      Impression    IMPRESSION:  1.  Significant interval increase in size of an infiltrative mass along the left chest wall adjacent to the pectoralis with numerous additional left breast/subpectoral masses, as described.  2.  New left pleural nodularity and trace left effusion, suspicious for metastatic disease involvement.  3.  Enlarging left axillary lymph nodes as well as lymph nodes/soft tissue deposits in the upper pericardium/mediastinum.  4.  More extensive/increased sclerosis of widespread osseous metastases in the visualized axial/appendicular skeleton.           ASSESSMENT AND PLAN:   1.  Metastatic ER positive NC positive HER-2/gabriela negative breast cancer with a left breast primary and bone metastasis.  2. Grade 2 mucositis.    Plan    1.  Zometa q 3 months next due in march  2. IVF with potassium replacement today, she has been unable to take her potassium at home due to the large size of the pill  3. Hold xeloda until mouth sores resolve, plan to tenetively restart the next cycle 1/7/24 and we will dose reduce her to 1500mg bid x 14 days  4. Rx sent for magic mouthwash.    Tiffany Hilton MD on 12/26/2023 at 11:28 AM                                      Again, thank you for allowing me to participate in the care of your patient.        Sincerely,        Tiffany Hilton MD

## 2023-12-26 NOTE — NURSING NOTE
"Oncology Rooming Note    December 26, 2023 9:34 AM   Mary Olson is a 68 year old female who presents for:    Chief Complaint   Patient presents with    Oncology Clinic Visit     Symptoms     Initial Vitals: BP (!) 134/90 (BP Location: Left arm, Patient Position: Chair, Cuff Size: Adult Regular)   Pulse (!) 124   Temp 98.3  F (36.8  C) (Oral)   Ht 1.6 m (5' 3.01\")   Wt 77.6 kg (171 lb)   SpO2 96%   BMI 30.28 kg/m   Estimated body mass index is 30.28 kg/m  as calculated from the following:    Height as of this encounter: 1.6 m (5' 3.01\").    Weight as of this encounter: 77.6 kg (171 lb). Body surface area is 1.86 meters squared.  No Pain (0) Comment: Data Unavailable   No LMP recorded. Patient is postmenopausal.  Allergies reviewed: Yes  Medications reviewed: Yes    Medications: Medication refills not needed today.  Pharmacy name entered into EPIC:    [facility]  [facility] - [place], [place] - [address] [place] [address]  [facility] - [place], [place] - [address] [place] [address] PHARMACY [place] - [place], [place] - [address] [facility].  [facility] - [place], [place] - [address] [facility] MAIL/SPECIALTY PHARMACY - [place], [place] - [address] [facility] SPECIALTY PHARMACY - [place], [place] - [address] [facility] [address]  [facility]) [facility] PHARMAC[facility] - [place], [place] - [address] AT [id number] - [place], [place] - [address] [facility]    Frailty Screening:   Is the patient here for a new oncology consult visit in cancer care? 2. No      Clinical concerns: Yes       Sonia Vigil CMA              "

## 2023-12-26 NOTE — PROGRESS NOTES
Port needle left for access for treatment. Sterile technique performed and maintained. Patient tolerated procedure well. Tubes drawn in rainbow order. Tubes labeled and double signed. Transparent dressing placed with use of tegaderm.     Lashaun Cornejo RN  [facility] Oncology/Infusion [place]

## 2023-12-26 NOTE — PROGRESS NOTES
[facility] ONCOLOGY  12/26/23    ONCOLOGY DIAGNOSIS:  1. March 2020: Metastatic Breast Cancer ER+, CO+ Her2 Negative; with bone metastasis and a left breast primary. PI K3 CA mutation of note it is a pathologic variant which is outside the mutation of specific variants enrolled in the solar one trial.  TEJAS, TMB low, ESR1 no mutation.     THERAPY TO DATE:  1. March 2020 to August 2020: Weekly Taxol.  2. August 2020 to March 2021: Anastrozole   3. 3/19/21 began abemeciclib with arimidex she has been dose reduced on her abemaciclib 200 mg a day   4. zometa q 3 months  5. Changed to aromasin + everolimus due to progression end of August 2023.  6. 12/10/23 changed to xeloda dose reduced after one cycle to 1500mg bid due to mucositis.     INTERVAL HISTORY:  The patient is a 68-year-old female diagnosed with metastatic breast cancer in 03/2020 after noticing a lesion on her breast.   She started her xeloda 2000 mg bid 12/10 and held it starting 12/21 due to mucositis. She also had some diarrhea yesterday.  She has been trying baking soda mouth rises with some soothing but the mouth sores are still uncomfortable. No n/v. No other new issues.    PAST MEDICAL HISTORY:   1.  Metastatic breast cancer, see above.   2.  Hypertension.   3.  Steroid-induced diabetes.  According to the patient, since she has been off steroids, she was told that her blood sugars has normalized and she is off medication.   4.  Chemotherapy-induced neuropathy     FAMILY HISTORY:  Mother had breast cancer at 62; underwent lumpectomy and radiation, and arimidex  No new family history since last seen.     SOCIAL HISTORY:   to her , Dieter.    No current tobacco use.  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     ALLERGIES:  LISINOPRIL.      PHYSICAL EXAM:  Physical Exam  Vitals reviewed.   Constitutional:       Appearance: Normal appearance.   HENT:      Head: Normocephalic and atraumatic.   Eyes:      Extraocular Movements:  "Extraocular movements intact.      Pupils: Pupils are equal, round, and reactive to light.   Pulmonary:      Effort: Pulmonary effort is normal.   Neurological:      General: No focal deficit present.      Mental Status: She is alert and oriented to person, place, and time. Mental status is at baseline.   Psychiatric:         Mood and Affect: Mood normal.         Behavior: Behavior normal.         Thought Content: Thought content normal.         Judgment: Judgment normal.       Grade 2 mucosits in mouth. Grade 1 hand foot changes.  Labs    Recent Labs   Lab Test 12/26/23  0921 12/05/23  0740 10/31/23  0723 10/03/23  1415 09/05/23  0746   * 133* 134* 131* 136   POTASSIUM 2.9* 3.7 3.7 3.9 3.7   CHLORIDE 88* 93* 92* 92* 98   CO2 26 25 26 24 24   ANIONGAP 17* 15 16* 15 14   BUN 18.5 16.4 16.4 13.6 15.3   CR 1.09* 0.87 0.74 0.69 0.73   * 345* 317* 435* 242*   BUFFY 9.4 9.6 9.8 9.6 9.8     No results for input(s): \"MAG\", \"PHOS\" in the last 44926 hours.  Recent Labs   Lab Test 12/26/23  0921 12/05/23  0740 10/31/23  0723 10/03/23  1415 09/05/23  0746   WBC 9.0 5.0 5.1 6.2 5.7   HGB 11.6* 12.2 13.3 13.4 13.8    198 152 183 184   MCV 77* 77* 78 80 85   NEUTROPHIL 71 62 64 66 55     Recent Labs   Lab Test 12/26/23  0921 12/05/23  0740 10/31/23  0723   BILITOTAL 0.9 0.4 0.5   ALKPHOS 134 137 128*   ALT 12 24 27   AST 30 32 28   ALBUMIN 4.2 3.9 4.3     TSH   Date Value Ref Range Status   10/28/2015 1.23 0.40 - 4.00 mU/L Final     No results for input(s): \"CEA\" in the last 77401 hours.  Results for orders placed or performed in visit on 11/27/23   CT Chest/Abdomen/Pelvis w Contrast    Narrative    EXAM: CT CHEST/ABDOMEN/PELVIS W CONTRAST  LOCATION: [facility]  DATE: 11/27/2023    INDICATION: metastatic breast cancer  COMPARISON: CT chest, abdomen and pelvis 05/18/2023  TECHNIQUE: CT scan of the chest, abdomen, and pelvis was performed following injection of IV contrast. Multiplanar " reformats were obtained. Dose reduction techniques were used.   CONTRAST: 105 ml isovue 370    FINDINGS:   LUNGS AND PLEURA: A few scattered intrapulmonary lymph nodes are seen (for instance the left upper lobe series 9 image #80 and in the right middle lobe series 9 image #159). No worrisome lung nodules. Subtle pleural reticulation/nodularity in the   anterior left upper lobe/lingula (for instance series 9 image #61, #119). New trace left effusion. No right effusion.    MEDIASTINUM/AXILLAE: Right chest port catheter tip terminates in the lower SVC. Normal heart size. No pericardial effusion. Thoracic aorta is nonaneurysmal.    The infiltrative left breast mass has significantly increased in size with new diffuse nodularity. For instance, nodular soft tissue anterior to the pectoralis muscle measures up to 1.8 x 8.0 cm (series 8 image #97). Numerous nodules radiating from the   posterior chest wall mass can be seen on series 8 image #124. Additional heterogeneously enhancing masses in the posterior 3:00 and 2:00 positions measure 1.1 x 1.7 cm (series 8 image #84) and 2.9 x 3.8 cm (series 8 image #69) respectively. Multiple   new/enlarging right axillary lymph nodes (for instance 1.2 cm node series 8 image #19). A 1 cm pericardial nodule (series 8 image #116) is also new as well as numerous smaller nodules tracking along the superior mediastinum (for instance series 8 image   #76)    CORONARY ARTERY CALCIFICATION: None.    HEPATOBILIARY: Normal liver morphology and enhancement. No worrisome liver lesions. Gallbladder is contracted. No biliary ductal dilation.    PANCREAS: Normal.    SPLEEN: Normal.    ADRENAL GLANDS: Normal.    KIDNEYS/BLADDER: Normal.    BOWEL: No bowel obstruction. No free fluid, free air, or peritoneal nodularity.    LYMPH NODES: No lymphadenopathy.    VASCULATURE: Abdominal aorta is nonaneurysmal.    PELVIC ORGANS: Uterus is present. No worrisome adnexal mass.    MUSCULOSKELETAL: Increased  sclerosis within numerous osseous metastases. For instance, the T6 vertebral body, the left sternal and manubrial metastases, the right and left scapular metastases, numerous bilateral rib metastases, and the left pubic   body/inferior pubic ramus.      Impression    IMPRESSION:  1.  Significant interval increase in size of an infiltrative mass along the left chest wall adjacent to the pectoralis with numerous additional left breast/subpectoral masses, as described.  2.  New left pleural nodularity and trace left effusion, suspicious for metastatic disease involvement.  3.  Enlarging left axillary lymph nodes as well as lymph nodes/soft tissue deposits in the upper pericardium/mediastinum.  4.  More extensive/increased sclerosis of widespread osseous metastases in the visualized axial/appendicular skeleton.           ASSESSMENT AND PLAN:   1.  Metastatic ER positive LA positive HER-2/gabriela negative breast cancer with a left breast primary and bone metastasis.  2. Grade 2 mucositis.    Plan    1.  Zometa q 3 months next due in march  2. IVF with potassium replacement today, she has been unable to take her potassium at home due to the large size of the pill  3. Hold xeloda until mouth sores resolve, plan to tenetively restart the next cycle 1/7/24 and we will dose reduce her to 1500mg bid x 14 days  4. Rx sent for magic mouthwash.    Tiffany Hilton MD on 12/26/2023 at 11:28 AM

## 2023-12-27 LAB — CANCER AG15-3 SERPL-ACNC: 945 U/ML

## 2024-01-02 DIAGNOSIS — C50.812 MALIGNANT NEOPLASM OF OVERLAPPING SITES OF LEFT BREAST IN FEMALE, ESTROGEN RECEPTOR POSITIVE (H): Primary | ICD-10-CM

## 2024-01-02 DIAGNOSIS — Z17.0 MALIGNANT NEOPLASM OF OVERLAPPING SITES OF LEFT BREAST IN FEMALE, ESTROGEN RECEPTOR POSITIVE (H): Primary | ICD-10-CM

## 2024-01-02 RX ORDER — CAPECITABINE 150 MG/1
1500 TABLET, FILM COATED ORAL 2 TIMES DAILY
Qty: 280 TABLET | Refills: 0 | Status: SHIPPED | OUTPATIENT
Start: 2024-01-07 | End: 2024-01-23

## 2024-01-03 ENCOUNTER — TRANSFERRED RECORDS (OUTPATIENT)
Dept: MULTI SPECIALTY CLINIC | Facility: CLINIC | Age: 69
End: 2024-01-03

## 2024-01-03 LAB — RETINOPATHY: NORMAL

## 2024-01-16 DIAGNOSIS — Z17.0 MALIGNANT NEOPLASM OF OVERLAPPING SITES OF LEFT BREAST IN FEMALE, ESTROGEN RECEPTOR POSITIVE (H): Primary | ICD-10-CM

## 2024-01-16 DIAGNOSIS — C50.812 MALIGNANT NEOPLASM OF OVERLAPPING SITES OF LEFT BREAST IN FEMALE, ESTROGEN RECEPTOR POSITIVE (H): Primary | ICD-10-CM

## 2024-01-18 DIAGNOSIS — Z17.0 MALIGNANT NEOPLASM OF OVERLAPPING SITES OF LEFT BREAST IN FEMALE, ESTROGEN RECEPTOR POSITIVE (H): ICD-10-CM

## 2024-01-18 DIAGNOSIS — C50.812 MALIGNANT NEOPLASM OF OVERLAPPING SITES OF LEFT BREAST IN FEMALE, ESTROGEN RECEPTOR POSITIVE (H): ICD-10-CM

## 2024-01-18 DIAGNOSIS — I10 BENIGN ESSENTIAL HYPERTENSION: ICD-10-CM

## 2024-01-18 DIAGNOSIS — E87.6 HYPOKALEMIA: ICD-10-CM

## 2024-01-18 RX ORDER — POTASSIUM CHLORIDE 1.5 G/1.58G
20 POWDER, FOR SOLUTION ORAL 2 TIMES DAILY
Qty: 60 PACKET | Refills: 11 | Status: SHIPPED | OUTPATIENT
Start: 2024-01-18

## 2024-01-23 ENCOUNTER — ONCOLOGY VISIT (OUTPATIENT)
Dept: ONCOLOGY | Facility: CLINIC | Age: 69
End: 2024-01-23
Attending: INTERNAL MEDICINE
Payer: MEDICARE

## 2024-01-23 ENCOUNTER — LAB (OUTPATIENT)
Dept: INFUSION THERAPY | Facility: CLINIC | Age: 69
End: 2024-01-23
Attending: INTERNAL MEDICINE
Payer: MEDICARE

## 2024-01-23 VITALS
BODY MASS INDEX: 32.6 KG/M2 | WEIGHT: 184 LBS | SYSTOLIC BLOOD PRESSURE: 120 MMHG | HEART RATE: 125 BPM | RESPIRATION RATE: 18 BRPM | DIASTOLIC BLOOD PRESSURE: 89 MMHG | OXYGEN SATURATION: 97 % | HEIGHT: 63 IN

## 2024-01-23 DIAGNOSIS — C50.812 MALIGNANT NEOPLASM OF OVERLAPPING SITES OF LEFT BREAST IN FEMALE, ESTROGEN RECEPTOR POSITIVE (H): Primary | ICD-10-CM

## 2024-01-23 DIAGNOSIS — Z17.0 MALIGNANT NEOPLASM OF OVERLAPPING SITES OF LEFT BREAST IN FEMALE, ESTROGEN RECEPTOR POSITIVE (H): Primary | ICD-10-CM

## 2024-01-23 LAB
ALBUMIN SERPL BCG-MCNC: 4.5 G/DL (ref 3.5–5.2)
ALP SERPL-CCNC: 103 U/L (ref 40–150)
ALT SERPL W P-5'-P-CCNC: 16 U/L (ref 0–50)
ANION GAP SERPL CALCULATED.3IONS-SCNC: 12 MMOL/L (ref 7–15)
AST SERPL W P-5'-P-CCNC: 26 U/L (ref 0–45)
BASOPHILS # BLD AUTO: 0.1 10E3/UL (ref 0–0.2)
BASOPHILS NFR BLD AUTO: 1 %
BILIRUB SERPL-MCNC: 0.5 MG/DL
BUN SERPL-MCNC: 13.8 MG/DL (ref 8–23)
CALCIUM SERPL-MCNC: 9.4 MG/DL (ref 8.8–10.2)
CANCER AG15-3 SERPL-ACNC: 412 U/ML
CHLORIDE SERPL-SCNC: 98 MMOL/L (ref 98–107)
CREAT SERPL-MCNC: 1 MG/DL (ref 0.51–0.95)
DEPRECATED HCO3 PLAS-SCNC: 26 MMOL/L (ref 22–29)
EGFRCR SERPLBLD CKD-EPI 2021: 61 ML/MIN/1.73M2
EOSINOPHIL # BLD AUTO: 0.3 10E3/UL (ref 0–0.7)
EOSINOPHIL NFR BLD AUTO: 3 %
ERYTHROCYTE [DISTWIDTH] IN BLOOD BY AUTOMATED COUNT: 27.1 % (ref 10–15)
GLUCOSE SERPL-MCNC: 143 MG/DL (ref 70–99)
HCT VFR BLD AUTO: 34.1 % (ref 35–47)
HGB BLD-MCNC: 11.3 G/DL (ref 11.7–15.7)
IMM GRANULOCYTES # BLD: 0.1 10E3/UL
IMM GRANULOCYTES NFR BLD: 1 %
LYMPHOCYTES # BLD AUTO: 1.7 10E3/UL (ref 0.8–5.3)
LYMPHOCYTES NFR BLD AUTO: 18 %
MCH RBC QN AUTO: 28.8 PG (ref 26.5–33)
MCHC RBC AUTO-ENTMCNC: 33.1 G/DL (ref 31.5–36.5)
MCV RBC AUTO: 87 FL (ref 78–100)
MONOCYTES # BLD AUTO: 0.5 10E3/UL (ref 0–1.3)
MONOCYTES NFR BLD AUTO: 6 %
NEUTROPHILS # BLD AUTO: 6.8 10E3/UL (ref 1.6–8.3)
NEUTROPHILS NFR BLD AUTO: 71 %
NRBC # BLD AUTO: 0 10E3/UL
NRBC BLD AUTO-RTO: 0 /100
PLATELET # BLD AUTO: 312 10E3/UL (ref 150–450)
POTASSIUM SERPL-SCNC: 4.2 MMOL/L (ref 3.4–5.3)
PROT SERPL-MCNC: 7.5 G/DL (ref 6.4–8.3)
RBC # BLD AUTO: 3.93 10E6/UL (ref 3.8–5.2)
SODIUM SERPL-SCNC: 136 MMOL/L (ref 135–145)
WBC # BLD AUTO: 9.5 10E3/UL (ref 4–11)

## 2024-01-23 PROCEDURE — 250N000011 HC RX IP 250 OP 636: Performed by: INTERNAL MEDICINE

## 2024-01-23 PROCEDURE — 99207 PR NO CHARGE LOS: CPT

## 2024-01-23 PROCEDURE — G0463 HOSPITAL OUTPT CLINIC VISIT: HCPCS | Performed by: INTERNAL MEDICINE

## 2024-01-23 PROCEDURE — 85025 COMPLETE CBC W/AUTO DIFF WBC: CPT

## 2024-01-23 PROCEDURE — 86300 IMMUNOASSAY TUMOR CA 15-3: CPT

## 2024-01-23 PROCEDURE — 80053 COMPREHEN METABOLIC PANEL: CPT

## 2024-01-23 PROCEDURE — 36591 DRAW BLOOD OFF VENOUS DEVICE: CPT

## 2024-01-23 PROCEDURE — 99214 OFFICE O/P EST MOD 30 MIN: CPT | Performed by: INTERNAL MEDICINE

## 2024-01-23 RX ORDER — HEPARIN SODIUM (PORCINE) LOCK FLUSH IV SOLN 100 UNIT/ML 100 UNIT/ML
500 SOLUTION INTRAVENOUS
Status: DISCONTINUED | OUTPATIENT
Start: 2024-01-23 | End: 2024-01-23 | Stop reason: HOSPADM

## 2024-01-23 RX ORDER — CAPECITABINE 150 MG/1
1500 TABLET, FILM COATED ORAL 2 TIMES DAILY
Qty: 280 TABLET | Refills: 0 | Status: SHIPPED | OUTPATIENT
Start: 2024-01-28 | End: 2024-07-22

## 2024-01-23 RX ADMIN — Medication 500 UNITS: at 12:04

## 2024-01-23 ASSESSMENT — PAIN SCALES - GENERAL: PAINLEVEL: NO PAIN (1)

## 2024-01-23 NOTE — NURSING NOTE
"Oncology Rooming Note    January 23, 2024 12:59 PM   Mary Olson is a 68 year old female who presents for:    Chief Complaint   Patient presents with    Oncology Clinic Visit     1 month follow up     Initial Vitals: /89 (BP Location: Left arm)   Pulse (!) 125   Resp 18   Ht 1.6 m (5' 2.99\")   Wt 83.5 kg (184 lb)   SpO2 97%   BMI 32.60 kg/m   Estimated body mass index is 32.6 kg/m  as calculated from the following:    Height as of this encounter: 1.6 m (5' 2.99\").    Weight as of this encounter: 83.5 kg (184 lb). Body surface area is 1.93 meters squared.  No Pain (1) Comment: Data Unavailable   No LMP recorded. Patient is postmenopausal.  Allergies reviewed: Yes  Medications reviewed: Yes    Medications: Medication refills not needed today.  Pharmacy name entered into EPIC:    [facility]  [facility] - [place], [place] - [address] [place] [address]  [facility] - [place], [place] - [address] [place] [address] PHARMACY [place] - [place], [place] - [address] [facility].  [facility] - [place], [place] - [address] [facility] MAIL/SPECIALTY PHARMACY - [place], [place] - [address]  [facility] SPECIALTY PHARMAC[facility] - [place], [place] - [address] [facility] [address]  [facility]) [facility] PHARMACY - [place], [place] - [address] AT [facility] [id number] - [place], [place] - [address] [facility] ONCOLOGY PHARMACY    Frailty Screening:   Is the patient here for a new oncology consult visit in cancer care? 2. No      Clinical concerns: hands peeling and sides of feet peeling as well.       Kailee Jarvis LPN              "

## 2024-01-23 NOTE — PROGRESS NOTES
Port site scrubbed with Chloraprep for 30 seconds. Accessed port using sterile technique. Two green and purple tubes drawn. See documentation flowsheet. Port needle deaccessed. Tolerated port access and draw without complaint.    Offered to draw Dr Enamorado labs that are due 1/7/24.  Patient declined.  States she sees Dr Enamorado in early March and will have A1c and renal panel drawn closer to that time.  An Harding RN on 1/23/2024 at 12:06 PM

## 2024-01-23 NOTE — LETTER
1/23/2024         RE: Mary Olson  [address]  [facility] [address]        Dear Colleague,    Thank you for referring your patient, Mary Olson, to the [facility] CANCER [facility]. Please see a copy of my visit note below.    [facility] ONCOLOGY  01/23/24    ONCOLOGY DIAGNOSIS:  1. March 2020: Metastatic Breast Cancer ER+, [place]+ Her2 Negative; with bone metastasis and a left breast primary. PI K3 CA mutation of note it is a pathologic variant which is outside the mutation of specific variants enrolled in the solar one trial.  TEJAS, TMB low, ESR1 no mutation.     THERAPY TO DATE:  1. March 2020 to August 2020: Weekly Taxol.  2. August 2020 to March 2021: Anastrozole   3. 3/19/21 began abemeciclib with arimidex she has been dose reduced on her abemaciclib 200 mg a day   4. zometa q 3 months  5. Changed to aromasin + everolimus due to progression end of August 2023.  6. 12/10/23 changed to xeloda dose reduced after one cycle to 1500mg bid due to mucositis.     INTERVAL HISTORY:  The patient is a 68-year-old female diagnosed with metastatic breast cancer in 03/2020 after noticing a lesion on her breast.   She has completed cycle 2 of therapy with a dose reduction to 1500mg bid x 14 days (she takes 150mg tablets as they are easier to swallow)  She has done better this cycle. She has not had any mouth sores. No n/v/d/c. She is eating well and has regained some of the weight she lost with the first cycle. Her hands are still irritated but about the same. It is at a grade 2. She is able to function and do her ADLs.    PAST MEDICAL HISTORY:   1.  Metastatic breast cancer, see above.   2.  Hypertension.   3.  Steroid-induced diabetes.  According to the patient, since she has been off steroids, she was told that her blood sugars has normalized and she is off medication.   4.  Chemotherapy-induced neuropathy     FAMILY HISTORY:  Mother had breast cancer at 62; underwent lumpectomy and radiation,  "and arimidex  No new family history since last seen.     SOCIAL HISTORY:   to her , Dieter.    No current tobacco use.  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    ALLERGIES:  LISINOPRIL.        Physical Exam  Vitals reviewed.   Constitutional:       Appearance: Normal appearance.   HENT:      Head: Normocephalic and atraumatic.   Eyes:      Extraocular Movements: Extraocular movements intact.      Pupils: Pupils are equal, round, and reactive to light.   Pulmonary:      Effort: Pulmonary effort is normal.   Neurological:      General: No focal deficit present.      Mental Status: She is alert and oriented to person, place, and time. Mental status is at baseline.   Psychiatric:         Mood and Affect: Mood normal.         Behavior: Behavior normal.         Thought Content: Thought content normal.         Judgment: Judgment normal.       Grade 1-2  hand foot changes.  Labs  Recent Labs   Lab Test 01/23/24  1208 12/26/23  0921 12/05/23  0740 10/31/23  0723 10/03/23  1415    131* 133* 134* 131*   POTASSIUM 4.2 2.9* 3.7 3.7 3.9   CHLORIDE 98 88* 93* 92* 92*   CO2 26 26 25 26 24   ANIONGAP 12 17* 15 16* 15   BUN 13.8 18.5 16.4 16.4 13.6   CR 1.00* 1.09* 0.87 0.74 0.69   * 200* 345* 317* 435*   BUFFY 9.4 9.4 9.6 9.8 9.6     No results for input(s): \"MAG\", \"PHOS\" in the last 88287 hours.  Recent Labs   Lab Test 01/23/24  1208 12/26/23  0921 12/05/23  0740 10/31/23  0723 10/03/23  1415   WBC 9.5 9.0 5.0 5.1 6.2   HGB 11.3* 11.6* 12.2 13.3 13.4    365 198 152 183   MCV 87 77* 77* 78 80   NEUTROPHIL 71 71 62 64 66     Recent Labs   Lab Test 01/23/24  1208 12/26/23  0921 12/05/23  0740   BILITOTAL 0.5 0.9 0.4   ALKPHOS 103 134 137   ALT 16 12 24   AST 26 30 32   ALBUMIN 4.5 4.2 3.9     TSH   Date Value Ref Range Status   10/28/2015 1.23 0.40 - 4.00 mU/L Final     No results for input(s): \"CEA\" in the last 52992 hours.  Results for orders placed or performed in visit on 11/27/23 "   CT Chest/Abdomen/Pelvis w Contrast    Narrative    EXAM: CT CHEST/ABDOMEN/PELVIS W CONTRAST  LOCATION: [facility]  DATE: 11/27/2023    INDICATION: metastatic breast cancer  COMPARISON: CT chest, abdomen and pelvis 05/18/2023  TECHNIQUE: CT scan of the chest, abdomen, and pelvis was performed following injection of IV contrast. Multiplanar reformats were obtained. Dose reduction techniques were used.   CONTRAST: 105 ml isovue 370    FINDINGS:   LUNGS AND PLEURA: A few scattered intrapulmonary lymph nodes are seen (for instance the left upper lobe series 9 image #80 and in the right middle lobe series 9 image #159). No worrisome lung nodules. Subtle pleural reticulation/nodularity in the   anterior left upper lobe/lingula (for instance series 9 image #61, #119). New trace left effusion. No right effusion.    MEDIASTINUM/AXILLAE: Right chest port catheter tip terminates in the lower SVC. Normal heart size. No pericardial effusion. Thoracic aorta is nonaneurysmal.    The infiltrative left breast mass has significantly increased in size with new diffuse nodularity. For instance, nodular soft tissue anterior to the pectoralis muscle measures up to 1.8 x 8.0 cm (series 8 image #97). Numerous nodules radiating from the   posterior chest wall mass can be seen on series 8 image #124. Additional heterogeneously enhancing masses in the posterior 3:00 and 2:00 positions measure 1.1 x 1.7 cm (series 8 image #84) and 2.9 x 3.8 cm (series 8 image #69) respectively. Multiple   new/enlarging right axillary lymph nodes (for instance 1.2 cm node series 8 image #19). A 1 cm pericardial nodule (series 8 image #116) is also new as well as numerous smaller nodules tracking along the superior mediastinum (for instance series 8 image   #76)    CORONARY ARTERY CALCIFICATION: None.    HEPATOBILIARY: Normal liver morphology and enhancement. No worrisome liver lesions. Gallbladder is contracted. No biliary ductal  dilation.    PANCREAS: Normal.    SPLEEN: Normal.    ADRENAL GLANDS: Normal.    KIDNEYS/BLADDER: Normal.    BOWEL: No bowel obstruction. No free fluid, free air, or peritoneal nodularity.    LYMPH NODES: No lymphadenopathy.    VASCULATURE: Abdominal aorta is nonaneurysmal.    PELVIC ORGANS: Uterus is present. No worrisome adnexal mass.    MUSCULOSKELETAL: Increased sclerosis within numerous osseous metastases. For instance, the T6 vertebral body, the left sternal and manubrial metastases, the right and left scapular metastases, numerous bilateral rib metastases, and the left pubic   body/inferior pubic ramus.      Impression    IMPRESSION:  1.  Significant interval increase in size of an infiltrative mass along the left chest wall adjacent to the pectoralis with numerous additional left breast/subpectoral masses, as described.  2.  New left pleural nodularity and trace left effusion, suspicious for metastatic disease involvement.  3.  Enlarging left axillary lymph nodes as well as lymph nodes/soft tissue deposits in the upper pericardium/mediastinum.  4.  More extensive/increased sclerosis of widespread osseous metastases in the visualized axial/appendicular skeleton.           ASSESSMENT AND PLAN:   1.  Metastatic ER positive MI positive HER-2/gabriela negative breast cancer with a left breast primary and bone metastasis.  2. Grade 2 hand/foot syndrome.    Plan    1. Zometa q 3 months next due in march  2. Ok to go to next cycle of xeloda and continue dose of 1500mg bid 14 days on 7 days off.  3. She is going to [place] around early February and will see the [facility] in [place] prior to her next cycle  4. Plan reimaging after cycle 4.  5. She will call to scheduled follow-up when she knows she is returning to [place] in May.    Tiffany Hilton MD on 1/23/2024 at 2:07 PM                                        Again, thank you for allowing me to participate in the care of your patient.         Sincerely,        Tiffany Hilton MD

## 2024-01-23 NOTE — PROGRESS NOTES
[facility] ONCOLOGY  01/23/24    ONCOLOGY DIAGNOSIS:  1. March 2020: Metastatic Breast Cancer ER+, [place]+ Her2 Negative; with bone metastasis and a left breast primary. PI K3 CA mutation of note it is a pathologic variant which is outside the mutation of specific variants enrolled in the solar one trial.  TEJAS, TMB low, ESR1 no mutation.     THERAPY TO DATE:  1. March 2020 to August 2020: Weekly Taxol.  2. August 2020 to March 2021: Anastrozole   3. 3/19/21 began abemeciclib with arimidex she has been dose reduced on her abemaciclib 200 mg a day   4. zometa q 3 months  5. Changed to aromasin + everolimus due to progression end of August 2023.  6. 12/10/23 changed to xeloda dose reduced after one cycle to 1500mg bid due to mucositis.     INTERVAL HISTORY:  The patient is a 68-year-old female diagnosed with metastatic breast cancer in 03/2020 after noticing a lesion on her breast.   She has completed cycle 2 of therapy with a dose reduction to 1500mg bid x 14 days (she takes 150mg tablets as they are easier to swallow)  She has done better this cycle. She has not had any mouth sores. No n/v/d/c. She is eating well and has regained some of the weight she lost with the first cycle. Her hands are still irritated but about the same. It is at a grade 2. She is able to function and do her ADLs.    PAST MEDICAL HISTORY:   1.  Metastatic breast cancer, see above.   2.  Hypertension.   3.  Steroid-induced diabetes.  According to the patient, since she has been off steroids, she was told that her blood sugars has normalized and she is off medication.   4.  Chemotherapy-induced neuropathy     FAMILY HISTORY:  Mother had breast cancer at 62; underwent lumpectomy and radiation, and arimidex  No new family history since last seen.     SOCIAL HISTORY:   to her , Dieter.    No current tobacco use.  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    ALLERGIES:  LISINOPRIL.        Physical Exam  Vitals reviewed.  "  Constitutional:       Appearance: Normal appearance.   HENT:      Head: Normocephalic and atraumatic.   Eyes:      Extraocular Movements: Extraocular movements intact.      Pupils: Pupils are equal, round, and reactive to light.   Pulmonary:      Effort: Pulmonary effort is normal.   Neurological:      General: No focal deficit present.      Mental Status: She is alert and oriented to person, place, and time. Mental status is at baseline.   Psychiatric:         Mood and Affect: Mood normal.         Behavior: Behavior normal.         Thought Content: Thought content normal.         Judgment: Judgment normal.       Grade 1-2  hand foot changes.  Labs  Recent Labs   Lab Test 01/23/24  1208 12/26/23  0921 12/05/23  0740 10/31/23  0723 10/03/23  1415    131* 133* 134* 131*   POTASSIUM 4.2 2.9* 3.7 3.7 3.9   CHLORIDE 98 88* 93* 92* 92*   CO2 26 26 25 26 24   ANIONGAP 12 17* 15 16* 15   BUN 13.8 18.5 16.4 16.4 13.6   CR 1.00* 1.09* 0.87 0.74 0.69   * 200* 345* 317* 435*   BUFFY 9.4 9.4 9.6 9.8 9.6     No results for input(s): \"MAG\", \"PHOS\" in the last 28687 hours.  Recent Labs   Lab Test 01/23/24  1208 12/26/23  0921 12/05/23  0740 10/31/23  0723 10/03/23  1415   WBC 9.5 9.0 5.0 5.1 6.2   HGB 11.3* 11.6* 12.2 13.3 13.4    365 198 152 183   MCV 87 77* 77* 78 80   NEUTROPHIL 71 71 62 64 66     Recent Labs   Lab Test 01/23/24  1208 12/26/23  0921 12/05/23  0740   BILITOTAL 0.5 0.9 0.4   ALKPHOS 103 134 137   ALT 16 12 24   AST 26 30 32   ALBUMIN 4.5 4.2 3.9     TSH   Date Value Ref Range Status   10/28/2015 1.23 0.40 - 4.00 mU/L Final     No results for input(s): \"CEA\" in the last 60840 hours.  Results for orders placed or performed in visit on 11/27/23   CT Chest/Abdomen/Pelvis w Contrast    Narrative    EXAM: CT CHEST/ABDOMEN/PELVIS W CONTRAST  LOCATION: [facility]  DATE: 11/27/2023    INDICATION: metastatic breast cancer  COMPARISON: CT chest, abdomen and pelvis " 05/18/2023  TECHNIQUE: CT scan of the chest, abdomen, and pelvis was performed following injection of IV contrast. Multiplanar reformats were obtained. Dose reduction techniques were used.   CONTRAST: 105 ml isovue 370    FINDINGS:   LUNGS AND PLEURA: A few scattered intrapulmonary lymph nodes are seen (for instance the left upper lobe series 9 image #80 and in the right middle lobe series 9 image #159). No worrisome lung nodules. Subtle pleural reticulation/nodularity in the   anterior left upper lobe/lingula (for instance series 9 image #61, #119). New trace left effusion. No right effusion.    MEDIASTINUM/AXILLAE: Right chest port catheter tip terminates in the lower SVC. Normal heart size. No pericardial effusion. Thoracic aorta is nonaneurysmal.    The infiltrative left breast mass has significantly increased in size with new diffuse nodularity. For instance, nodular soft tissue anterior to the pectoralis muscle measures up to 1.8 x 8.0 cm (series 8 image #97). Numerous nodules radiating from the   posterior chest wall mass can be seen on series 8 image #124. Additional heterogeneously enhancing masses in the posterior 3:00 and 2:00 positions measure 1.1 x 1.7 cm (series 8 image #84) and 2.9 x 3.8 cm (series 8 image #69) respectively. Multiple   new/enlarging right axillary lymph nodes (for instance 1.2 cm node series 8 image #19). A 1 cm pericardial nodule (series 8 image #116) is also new as well as numerous smaller nodules tracking along the superior mediastinum (for instance series 8 image   #76)    CORONARY ARTERY CALCIFICATION: None.    HEPATOBILIARY: Normal liver morphology and enhancement. No worrisome liver lesions. Gallbladder is contracted. No biliary ductal dilation.    PANCREAS: Normal.    SPLEEN: Normal.    ADRENAL GLANDS: Normal.    KIDNEYS/BLADDER: Normal.    BOWEL: No bowel obstruction. No free fluid, free air, or peritoneal nodularity.    LYMPH NODES: No lymphadenopathy.    VASCULATURE:  Abdominal aorta is nonaneurysmal.    PELVIC ORGANS: Uterus is present. No worrisome adnexal mass.    MUSCULOSKELETAL: Increased sclerosis within numerous osseous metastases. For instance, the T6 vertebral body, the left sternal and manubrial metastases, the right and left scapular metastases, numerous bilateral rib metastases, and the left pubic   body/inferior pubic ramus.      Impression    IMPRESSION:  1.  Significant interval increase in size of an infiltrative mass along the left chest wall adjacent to the pectoralis with numerous additional left breast/subpectoral masses, as described.  2.  New left pleural nodularity and trace left effusion, suspicious for metastatic disease involvement.  3.  Enlarging left axillary lymph nodes as well as lymph nodes/soft tissue deposits in the upper pericardium/mediastinum.  4.  More extensive/increased sclerosis of widespread osseous metastases in the visualized axial/appendicular skeleton.           ASSESSMENT AND PLAN:   1.  Metastatic ER positive SC positive HER-2/gabriela negative breast cancer with a left breast primary and bone metastasis.  2. Grade 2 hand/foot syndrome.    Plan    1. Zometa q 3 months next due in march  2. Ok to go to next cycle of xeloda and continue dose of 1500mg bid 14 days on 7 days off.  3. She is going to [place] around early February and will see the [facility] in [place] prior to her next cycle  4. Plan reimaging after cycle 4.  5. She will call to scheduled follow-up when she knows she is returning to [place] in May.    Tiffany Hilton MD on 1/23/2024 at 2:07 PM

## 2024-01-26 DIAGNOSIS — E11.65 TYPE 2 DIABETES MELLITUS WITH HYPERGLYCEMIA, WITH LONG-TERM CURRENT USE OF INSULIN (H): ICD-10-CM

## 2024-01-26 DIAGNOSIS — E09.9 STEROID-INDUCED DIABETES MELLITUS, SUBSEQUENT ENCOUNTER (H): ICD-10-CM

## 2024-01-26 DIAGNOSIS — Z79.4 TYPE 2 DIABETES MELLITUS WITH HYPERGLYCEMIA, WITH LONG-TERM CURRENT USE OF INSULIN (H): ICD-10-CM

## 2024-01-26 DIAGNOSIS — T38.0X5D STEROID-INDUCED DIABETES MELLITUS, SUBSEQUENT ENCOUNTER (H): ICD-10-CM

## 2024-01-26 NOTE — TELEPHONE ENCOUNTER
Patient called to see if she can get refill for metformin. She is leaving for [place] on 2/6/24.   RN notes she is due for A1C. She states she will be getting it done in March at [facility].   Her BG this morning was 120.    Princess Stevenson, RN, BSN, PHN  [facility]  Nurse Triage, Family Practice

## 2024-02-10 ENCOUNTER — HEALTH MAINTENANCE LETTER (OUTPATIENT)
Age: 69
End: 2024-02-10

## 2024-02-27 ENCOUNTER — PATIENT OUTREACH (OUTPATIENT)
Dept: ONCOLOGY | Facility: CLINIC | Age: 69
End: 2024-02-27
Payer: MEDICARE

## 2024-02-27 ENCOUNTER — TELEPHONE (OUTPATIENT)
Dept: ONCOLOGY | Facility: CLINIC | Age: 69
End: 2024-02-27
Payer: MEDICARE

## 2024-02-27 NOTE — TELEPHONE ENCOUNTER
Patient is calling requesting a call back from the care team. She is currently snow birding out of state, was seen at [facility] for follow up, and has updates and recommendations she wants to share. Please call the patient back at [phone].    Malena Holman RN, BSN, PHN, OCN  [facility]

## 2024-02-27 NOTE — PROGRESS NOTES
[facility]: Cancer Care                                                                                          Call placed to patient to follow up on oncology visit in [place]. Visit notes can be viewed in Care Everywhere. Patient states Dr. Medellin asked if Dr. Hilton can order the Xeloda since patient will only be in [place] for a few months. Discussed with Dr. Hilton. Informed patient that Dr. Hilton said she doesn't order chemo for patients that she isn't seeing. Patient will contact Dr. Medellin to order the Xeloda.     Signature:  Viviane Forrest RN

## 2024-03-05 ENCOUNTER — VIRTUAL VISIT (OUTPATIENT)
Dept: FAMILY MEDICINE | Facility: CLINIC | Age: 69
End: 2024-03-05
Payer: MEDICARE

## 2024-03-05 DIAGNOSIS — E09.9 STEROID-INDUCED DIABETES MELLITUS, SUBSEQUENT ENCOUNTER (H): ICD-10-CM

## 2024-03-05 DIAGNOSIS — E11.65 TYPE 2 DIABETES MELLITUS WITH HYPERGLYCEMIA, WITH LONG-TERM CURRENT USE OF INSULIN (H): Primary | ICD-10-CM

## 2024-03-05 DIAGNOSIS — Z79.4 TYPE 2 DIABETES MELLITUS WITH HYPERGLYCEMIA, WITH LONG-TERM CURRENT USE OF INSULIN (H): Primary | ICD-10-CM

## 2024-03-05 DIAGNOSIS — I10 ESSENTIAL HYPERTENSION: ICD-10-CM

## 2024-03-05 DIAGNOSIS — T38.0X5D STEROID-INDUCED DIABETES MELLITUS, SUBSEQUENT ENCOUNTER (H): ICD-10-CM

## 2024-03-05 DIAGNOSIS — I10 BENIGN ESSENTIAL HYPERTENSION: ICD-10-CM

## 2024-03-05 PROCEDURE — 99214 OFFICE O/P EST MOD 30 MIN: CPT | Mod: 95 | Performed by: FAMILY MEDICINE

## 2024-03-05 RX ORDER — TRIAMTERENE/HYDROCHLOROTHIAZID 37.5-25 MG
1 TABLET ORAL DAILY
Qty: 90 TABLET | Refills: 1 | Status: SHIPPED | OUTPATIENT
Start: 2024-03-05 | End: 2024-07-28

## 2024-03-05 RX ORDER — AMLODIPINE BESYLATE 10 MG/1
10 TABLET ORAL DAILY
Qty: 90 TABLET | Refills: 3 | Status: SHIPPED | OUTPATIENT
Start: 2024-03-05 | End: 2024-07-28

## 2024-03-05 NOTE — PROGRESS NOTES
"    Instructions Relayed to Patient by Virtual Roomer:     Patient is active on [employer]:   Relayed following to patient: \"It looks like you are active on [employer], are you able to join the visit this way? If not, do you need us to send you a link now or would you like your provider to send a link via text or email when they are ready to initiate the visit?\"    Reminded patient to ensure they were logged on to virtual visit by arrival time listed. Documented in appointment notes if patient had flexibility to initiate visit sooner than arrival time. If pediatric virtual visit, ensured pediatric patient along with parent/guardian will be present for video visit.     Patient offered the website [email or web address] and/or phone number to [employer] Help line: [phone]    Desmond is a 68 year old who is being evaluated via a billable video visit.      How would you like to obtain your AVS? Restaro  If the video visit is dropped, the invitation should be resent by: Text to cell phone: [phone]  Will anyone else be joining your video visit? No      ASSESSMENT / PLAN:      (E09.9,  T38.0X5D) Steroid-induced diabetes mellitus, subsequent encounter  (H24)  Comment: improving  Plan: metFORMIN (GLUCOPHAGE) 500 MG tablet        Patient will get an hgba1c at [facility] next month. Continue diet/exercise and monitor. Recheck in 6 months  Office appointment. Call/email with questions/concerns      (I10) Essential hypertension  Comment: stalbe. Normal cmp  Plan: triamterene-HCTZ (MAXZIDE-25) 37.5-25 MG tablet        Plan: amLODIPine (NORVASC) 10 MG tablet        Continue meds and monitor. Chest pain or shortness of breath to er. Consider statin in future but would like to hold off with chemo.       Subjective   History steroid induced dm, metastatic breast cancer -seeing oncology, chemotherapy induced neuropathy, obesity and htn   Seeing [facility]. Normal cmp 2 weeks ago.  Started on metformin 4months ago and maxzide " restarted too.   Blood pressure running good 119/86.  Not currently on steroids -not since 2months.   Eye exam in December - cataracts. No acute feet changes.   No shortness of breath. No chest pain.  No diarrhea or loose stools.   Eating fiber/veggies. Water intake doing ok.   Will ask [facility] to do hgba1c next month.  Will be doing to [facility] and would like prescriptions sent there.  rigoberto Logan is a 68 year old, presenting for the following health issues:  Recheck Medication and Diabetes  - Low numbers now with the chemo treatment. Yesterday she was at 113. 94 When she was at the [facility] last Tuesday. The highest it has ever been was 144. Decreasing numbers.   - Talks about taking her off blood pressure pill and putting her on a statin  - Water pill that cub gave her did not look like what she usually has.   - Tumor count      3/5/2024     7:16 AM   Additional Questions   Roomed by Cayetano SANCHEZ   Accompanied by Self     Via the Health Maintenance questionnaire, the patient has reported the following services have been completed -Eye Exam, this information has been sent to the abstraction team.  History of Present Illness       Diabetes:   She presents for follow up of diabetes.  She is checking home blood glucose two times daily.   She checks blood glucose before meals.  Blood glucose is never over 200 and never under 70. She is aware of hypoglycemia symptoms including weakness.   She is concerned about other.   She is having redness, sores, or blisters on feet.  The patient has had a diabetic eye exam in the last 12 months. Eye exam performed on January 2024. Location of last [facility] exam [facility].        She eats 2-3 servings of fruits and vegetables daily.She consumes 0 sweetened beverage(s) daily.She exercises with enough effort to increase her heart rate 10 to 19 minutes per day.  She exercises with enough effort to increase her heart rate 3 or less days per week.   She is taking medications regularly.            Objective           Vitals:  No vitals were obtained today due to virtual visit.    Physical Exam   GENERAL: alert and no distress  EYES: Eyes grossly normal to inspection.  No discharge or erythema, or obvious scleral/conjunctival abnormalities.  RESP: No audible wheeze, cough, or visible cyanosis.    SKIN: Visible skin clear. No significant rash, abnormal pigmentation or lesions.  NEURO: Cranial nerves grossly intact.  Mentation and speech appropriate for age.  PSYCH: Appropriate affect, tone, and pace of words        Video-Visit Details    Type of service:  Video Visit   Video Start Time: 7:52 AM  Video End Time:8:09 AM    Originating Location (pt. Location): Home    Distant Location (provider location):  On-site  Platform used for Video Visit: Marquez  Signed Electronically by: Davidson Enamorado MD

## 2024-05-17 ENCOUNTER — ONCOLOGY VISIT (OUTPATIENT)
Dept: ONCOLOGY | Facility: CLINIC | Age: 69
End: 2024-05-17
Attending: INTERNAL MEDICINE
Payer: MEDICARE

## 2024-05-17 ENCOUNTER — LAB (OUTPATIENT)
Dept: INFUSION THERAPY | Facility: CLINIC | Age: 69
End: 2024-05-17
Attending: INTERNAL MEDICINE
Payer: MEDICARE

## 2024-05-17 VITALS
DIASTOLIC BLOOD PRESSURE: 83 MMHG | RESPIRATION RATE: 18 BRPM | WEIGHT: 177 LBS | SYSTOLIC BLOOD PRESSURE: 137 MMHG | HEIGHT: 63 IN | HEART RATE: 112 BPM | OXYGEN SATURATION: 99 % | BODY MASS INDEX: 31.36 KG/M2

## 2024-05-17 DIAGNOSIS — Z17.0 MALIGNANT NEOPLASM OF OVERLAPPING SITES OF LEFT BREAST IN FEMALE, ESTROGEN RECEPTOR POSITIVE (H): Primary | ICD-10-CM

## 2024-05-17 DIAGNOSIS — C50.812 MALIGNANT NEOPLASM OF OVERLAPPING SITES OF LEFT BREAST IN FEMALE, ESTROGEN RECEPTOR POSITIVE (H): ICD-10-CM

## 2024-05-17 DIAGNOSIS — Z17.0 MALIGNANT NEOPLASM OF OVERLAPPING SITES OF LEFT BREAST IN FEMALE, ESTROGEN RECEPTOR POSITIVE (H): ICD-10-CM

## 2024-05-17 DIAGNOSIS — C50.812 MALIGNANT NEOPLASM OF OVERLAPPING SITES OF LEFT BREAST IN FEMALE, ESTROGEN RECEPTOR POSITIVE (H): Primary | ICD-10-CM

## 2024-05-17 DIAGNOSIS — E09.9 STEROID-INDUCED DIABETES MELLITUS, SUBSEQUENT ENCOUNTER (H): ICD-10-CM

## 2024-05-17 DIAGNOSIS — T38.0X5D STEROID-INDUCED DIABETES MELLITUS, SUBSEQUENT ENCOUNTER (H): ICD-10-CM

## 2024-05-17 LAB
ALBUMIN SERPL BCG-MCNC: 4.6 G/DL (ref 3.5–5.2)
ALP SERPL-CCNC: 69 U/L (ref 40–150)
ALT SERPL W P-5'-P-CCNC: 9 U/L (ref 0–50)
ANION GAP SERPL CALCULATED.3IONS-SCNC: 14 MMOL/L (ref 7–15)
AST SERPL W P-5'-P-CCNC: 19 U/L (ref 0–45)
BASOPHILS # BLD AUTO: 0.1 10E3/UL (ref 0–0.2)
BASOPHILS NFR BLD AUTO: 1 %
BILIRUB SERPL-MCNC: 0.4 MG/DL
BUN SERPL-MCNC: 22 MG/DL (ref 8–23)
CALCIUM SERPL-MCNC: 10.4 MG/DL (ref 8.8–10.2)
CHLORIDE SERPL-SCNC: 98 MMOL/L (ref 98–107)
CREAT SERPL-MCNC: 0.96 MG/DL (ref 0.51–0.95)
DEPRECATED HCO3 PLAS-SCNC: 26 MMOL/L (ref 22–29)
EGFRCR SERPLBLD CKD-EPI 2021: 65 ML/MIN/1.73M2
EOSINOPHIL # BLD AUTO: 0.1 10E3/UL (ref 0–0.7)
EOSINOPHIL NFR BLD AUTO: 2 %
ERYTHROCYTE [DISTWIDTH] IN BLOOD BY AUTOMATED COUNT: 15.8 % (ref 10–15)
GLUCOSE SERPL-MCNC: 93 MG/DL (ref 70–99)
HBA1C MFR BLD: 5.2 % (ref 0–5.6)
HCT VFR BLD AUTO: 40.2 % (ref 35–47)
HGB BLD-MCNC: 13.3 G/DL (ref 11.7–15.7)
IMM GRANULOCYTES # BLD: 0 10E3/UL
IMM GRANULOCYTES NFR BLD: 0 %
LYMPHOCYTES # BLD AUTO: 1.4 10E3/UL (ref 0.8–5.3)
LYMPHOCYTES NFR BLD AUTO: 21 %
MCH RBC QN AUTO: 31.3 PG (ref 26.5–33)
MCHC RBC AUTO-ENTMCNC: 33.1 G/DL (ref 31.5–36.5)
MCV RBC AUTO: 95 FL (ref 78–100)
MONOCYTES # BLD AUTO: 0.6 10E3/UL (ref 0–1.3)
MONOCYTES NFR BLD AUTO: 9 %
NEUTROPHILS # BLD AUTO: 4.3 10E3/UL (ref 1.6–8.3)
NEUTROPHILS NFR BLD AUTO: 67 %
NRBC # BLD AUTO: 0 10E3/UL
NRBC BLD AUTO-RTO: 0 /100
PLATELET # BLD AUTO: 243 10E3/UL (ref 150–450)
POTASSIUM SERPL-SCNC: 4.1 MMOL/L (ref 3.4–5.3)
PROT SERPL-MCNC: 7.9 G/DL (ref 6.4–8.3)
RBC # BLD AUTO: 4.25 10E6/UL (ref 3.8–5.2)
SODIUM SERPL-SCNC: 138 MMOL/L (ref 135–145)
WBC # BLD AUTO: 6.4 10E3/UL (ref 4–11)

## 2024-05-17 PROCEDURE — 250N000011 HC RX IP 250 OP 636: Performed by: INTERNAL MEDICINE

## 2024-05-17 PROCEDURE — 99213 OFFICE O/P EST LOW 20 MIN: CPT | Performed by: INTERNAL MEDICINE

## 2024-05-17 PROCEDURE — 85025 COMPLETE CBC W/AUTO DIFF WBC: CPT

## 2024-05-17 PROCEDURE — 36591 DRAW BLOOD OFF VENOUS DEVICE: CPT

## 2024-05-17 PROCEDURE — 83036 HEMOGLOBIN GLYCOSYLATED A1C: CPT

## 2024-05-17 PROCEDURE — 99207 PR NO CHARGE LOS: CPT

## 2024-05-17 PROCEDURE — 84460 ALANINE AMINO (ALT) (SGPT): CPT

## 2024-05-17 PROCEDURE — G0463 HOSPITAL OUTPT CLINIC VISIT: HCPCS | Performed by: INTERNAL MEDICINE

## 2024-05-17 PROCEDURE — 86300 IMMUNOASSAY TUMOR CA 15-3: CPT

## 2024-05-17 RX ORDER — HEPARIN SODIUM (PORCINE) LOCK FLUSH IV SOLN 100 UNIT/ML 100 UNIT/ML
5 SOLUTION INTRAVENOUS
Status: DISCONTINUED | OUTPATIENT
Start: 2024-05-17 | End: 2024-05-17 | Stop reason: HOSPADM

## 2024-05-17 RX ADMIN — Medication 5 ML: at 08:30

## 2024-05-17 ASSESSMENT — PAIN SCALES - GENERAL: PAINLEVEL: MILD PAIN (2)

## 2024-05-17 NOTE — LETTER
5/17/2024         RE: Mary Olson  [address]  [facility] [address]        Dear Colleague,    Thank you for referring your patient, Mary Olson, to the [facility]. Please see a copy of my visit note below.    [facility] ONCOLOGY  05/17/24     ONCOLOGY DIAGNOSIS:  1. March 2020: Metastatic Breast Cancer ER+, [place]+ Her2 Negative; with bone metastasis and a left breast primary. PI K3 CA mutation of note it is a pathologic variant which is outside the mutation of specific variants enrolled in the solar one trial.  TEJAS, TMB low, ESR1 no mutation.     THERAPY TO DATE:  1. March 2020 to August 2020: Weekly Taxol.  2. August 2020 to March 2021: Anastrozole   3. 3/19/21 began abemeciclib with arimidex she has been dose reduced on her abemaciclib 200 mg a day   4. zometa q 3 months  5. Changed to aromasin + everolimus due to progression end of August 2023.  6. 12/10/23 changed to xeloda dose reduced after one cycle to 1500mg bid due to mucositis.2/24 dose adjusted to 750mg po bid 7 days on /7 days off due to hand foot syndrome..     INTERVAL HISTORY:      The patient is a 68-year-old female diagnosed with metastatic breast cancer in 03/2020 after noticing a lesion on her breast.     She returned to [place] after spending the summer in [place]. She has had her dose adjusted to 750mg 7 days on/7 days off. She has been tolerating this well she does have grade 2 hand/foot syndrome and is managing this. She saw dermatology in [place]. Her ca 15-3 has been nicely declining from 415 to 112. She has not had reimaging since November. She feels her breast lesion is stable. No new complaints.      PAST MEDICAL HISTORY:   1.  Metastatic breast cancer, see above.   2.  Hypertension.   3.  Steroid-induced diabetes.  According to the patient, since she has been off steroids, she was told that her blood sugars has normalized and she is off medication.   4.  Chemotherapy-induced  neuropathy     FAMILY HISTORY:  Mother had breast cancer at 62; underwent lumpectomy and radiation, and arimidex  No new family history since last seen.     SOCIAL HISTORY:   to her , Dieter.    No current tobacco use.  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    ALLERGIES:  LISINOPRIL.        Physical Exam  Vitals reviewed.   Constitutional:       Appearance: Normal appearance.   HENT:      Head: Normocephalic and atraumatic.   Eyes:      Extraocular Movements: Extraocular movements intact.      Pupils: Pupils are equal, round, and reactive to light.   Cardiovascular:      Rate and Rhythm: Normal rate and regular rhythm.   Pulmonary:      Effort: Pulmonary effort is normal.      Breath sounds: Normal breath sounds.   Abdominal:      General: Abdomen is flat.      Palpations: Abdomen is soft.   Musculoskeletal:      Cervical back: Normal range of motion.   Skin:     General: Skin is warm.   Neurological:      General: No focal deficit present.      Mental Status: She is alert and oriented to person, place, and time. Mental status is at baseline.   Psychiatric:         Mood and Affect: Mood normal.         Behavior: Behavior normal.         Thought Content: Thought content normal.         Judgment: Judgment normal.       Left breast : retraction around nipple area with some splitting of skin. No overt masses noted.    Grade 1-2  hand foot changes.  LABS  4/29/24 labs reviewed in care everywhere from [place].    ASSESSMENT AND PLAN:   1.  Metastatic ER positive MD positive HER-2/gabriela negative breast cancer with a left breast primary and bone metastasis.  2. Grade 2 hand/foot syndrome.    Plan    1. Zometa q 3 months next due in mid June.  2.continue xeloda at 750mg bid 7 days on and 7 days off. Her ca 15-3 is declining nicely and she is tolerating despite her grade 2 hand/foot.  3. She does not need another refilled processed for about 5 weeks.  4. Given her imaging has not been since November I  would like her to have a CT CAP prior to return    Tiffany Hilton MD on 5/17/2024 at 8:43 AM                                          Again, thank you for allowing me to participate in the care of your patient.        Sincerely,        Tiffany Hilton MD

## 2024-05-17 NOTE — NURSING NOTE
"Oncology Rooming Note    May 17, 2024 7:53 AM   Mary Olson is a 68 year old female who presents for:    Chief Complaint   Patient presents with    Oncology Clinic Visit     Follow up     Initial Vitals: /83 (BP Location: Left arm)   Pulse 112   Resp 18   Ht 1.6 m (5' 2.99\")   Wt 80.3 kg (177 lb)   SpO2 99%   BMI 31.36 kg/m   Estimated body mass index is 31.36 kg/m  as calculated from the following:    Height as of this encounter: 1.6 m (5' 2.99\").    Weight as of this encounter: 80.3 kg (177 lb). Body surface area is 1.89 meters squared.  Mild Pain (2) Comment: Data Unavailable   No LMP recorded. Patient is postmenopausal.  Allergies reviewed: Yes  Medications reviewed: Yes    Medications: Medication refills not needed today.  Pharmacy name entered into EPIC:    [facility] - [place], [place] - [address] [place] [address]  [facility] - [place], [place] - [address] [place] [address] PHARMACY [facility] - [place], [place] - [address] [facility].  [facility] - [place], [place] - [address] [facility] - [place], [place] - [address] [facility] - [place], [place] - [address] [facility] [address]  [facility] - [place], [place] - [address] [id number] - [place], [place] - [address] [facility] ONCOLOGY [facility]  [id number] - [place], [place] - [address] AT [facility]    Frailty Screening:   Is the patient here for a new oncology consult visit in cancer care? 2. No      Clinical concerns: redness and cracking skin on hands       Kailee Jarvis LPN              "

## 2024-05-17 NOTE — PROGRESS NOTES
[facility] ONCOLOGY  05/17/24     ONCOLOGY DIAGNOSIS:  1. March 2020: Metastatic Breast Cancer ER+, [place]+ Her2 Negative; with bone metastasis and a left breast primary. PI K3 CA mutation of note it is a pathologic variant which is outside the mutation of specific variants enrolled in the solar one trial.  TEJAS, TMB low, ESR1 no mutation.     THERAPY TO DATE:  1. March 2020 to August 2020: Weekly Taxol.  2. August 2020 to March 2021: Anastrozole   3. 3/19/21 began abemeciclib with arimidex she has been dose reduced on her abemaciclib 200 mg a day   4. zometa q 3 months  5. Changed to aromasin + everolimus due to progression end of August 2023.  6. 12/10/23 changed to xeloda dose reduced after one cycle to 1500mg bid due to mucositis.2/24 dose adjusted to 750mg po bid 7 days on /7 days off due to hand foot syndrome..     INTERVAL HISTORY:      The patient is a 68-year-old female diagnosed with metastatic breast cancer in 03/2020 after noticing a lesion on her breast.     [place] returned to [place] after spending the summer in [place]. She has had her dose adjusted to 750mg 7 days on/7 days off. She has been tolerating this well she does have grade 2 hand/foot syndrome and is managing this. She saw dermatology in [place]. Her ca 15-3 has been nicely declining from 415 to 112. She has not had reimaging since November. She feels her breast lesion is stable. No new complaints.      PAST MEDICAL HISTORY:   1.  Metastatic breast cancer, see above.   2.  Hypertension.   3.  Steroid-induced diabetes.  According to the patient, since she has been off steroids, she was told that her blood sugars has normalized and she is off medication.   4.  Chemotherapy-induced neuropathy     FAMILY HISTORY:  Mother had breast cancer at 62; underwent lumpectomy and radiation, and arimidex  No new family history since last seen.     SOCIAL HISTORY:   to her , Dieter.    No current tobacco use.  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    ALLERGIES:  LISINOPRIL.        Physical Exam  Vitals reviewed.   Constitutional:       Appearance: Normal appearance.   HENT:      Head: Normocephalic and atraumatic.   Eyes:      Extraocular Movements: Extraocular movements intact.      Pupils: Pupils are equal, round, and reactive to light.   Cardiovascular:      Rate and Rhythm: Normal rate and regular rhythm.   Pulmonary:      Effort: Pulmonary effort is normal.      Breath sounds: Normal breath sounds.   Abdominal:      General: Abdomen is flat.      Palpations: Abdomen is soft.   Musculoskeletal:      Cervical back: Normal range of motion.   Skin:     General: Skin is warm.   Neurological:      General: No focal deficit present.      Mental Status: She is alert and oriented to person, place, and time. Mental status is at baseline.   Psychiatric:         Mood and Affect: Mood normal.         Behavior: Behavior normal.         Thought Content: Thought content normal.         Judgment: Judgment normal.       Left breast : retraction around nipple area with some splitting of skin. No overt masses noted.    Grade 1-2  hand foot changes.  LABS  4/29/24 labs reviewed in care everywhere from [place].    ASSESSMENT AND PLAN:   1.  Metastatic ER positive RI positive HER-2/gabriela negative breast cancer with a left breast primary and bone metastasis.  2. Grade 2 hand/foot syndrome.    Plan    1. Zometa q 3 months next due in mid June.  2.continue xeloda at 750mg bid 7 days on and 7 days off. Her ca 15-3 is declining nicely and she is tolerating despite her grade 2 hand/foot.  3. She does not need another refilled processed for about 5 weeks.  4. Given her imaging has not been since November I would like her to have a CT CAP prior to return    Tiffany Hilton MD on 5/17/2024 at 8:43 AM

## 2024-05-17 NOTE — PROGRESS NOTES
Port site scrubbed with Chloraprep for 30 seconds. Accessed port using sterile technique.  See documentation flowsheet. Tolerated port access and draw without complaint.     Lara Lafleur RN

## 2024-05-18 LAB — CANCER AG15-3 SERPL-ACNC: 107 U/ML

## 2024-06-11 DIAGNOSIS — Z17.0 MALIGNANT NEOPLASM OF OVERLAPPING SITES OF LEFT BREAST IN FEMALE, ESTROGEN RECEPTOR POSITIVE (H): Primary | ICD-10-CM

## 2024-06-11 DIAGNOSIS — C79.51 MALIGNANT NEOPLASM METASTATIC TO BONE (H): ICD-10-CM

## 2024-06-11 DIAGNOSIS — C50.812 MALIGNANT NEOPLASM OF OVERLAPPING SITES OF LEFT BREAST IN FEMALE, ESTROGEN RECEPTOR POSITIVE (H): Primary | ICD-10-CM

## 2024-06-11 DIAGNOSIS — C50.912 MALIGNANT NEOPLASM OF UNSPECIFIED SITE OF LEFT FEMALE BREAST (H): ICD-10-CM

## 2024-06-11 RX ORDER — ZOLEDRONIC ACID 0.04 MG/ML
4 INJECTION, SOLUTION INTRAVENOUS ONCE
Status: CANCELLED | OUTPATIENT
Start: 2024-06-18 | End: 2024-06-14

## 2024-06-11 RX ORDER — HEPARIN SODIUM (PORCINE) LOCK FLUSH IV SOLN 100 UNIT/ML 100 UNIT/ML
5 SOLUTION INTRAVENOUS
Status: CANCELLED | OUTPATIENT
Start: 2024-10-02

## 2024-06-11 RX ORDER — HEPARIN SODIUM,PORCINE 10 UNIT/ML
5 VIAL (ML) INTRAVENOUS
Status: CANCELLED | OUTPATIENT
Start: 2024-10-02

## 2024-06-11 RX ORDER — HEPARIN SODIUM (PORCINE) LOCK FLUSH IV SOLN 100 UNIT/ML 100 UNIT/ML
5 SOLUTION INTRAVENOUS
Status: CANCELLED | OUTPATIENT
Start: 2024-06-18

## 2024-06-11 RX ORDER — HEPARIN SODIUM,PORCINE 10 UNIT/ML
5 VIAL (ML) INTRAVENOUS
Status: CANCELLED | OUTPATIENT
Start: 2024-06-18

## 2024-06-11 RX ORDER — ZOLEDRONIC ACID 0.04 MG/ML
4 INJECTION, SOLUTION INTRAVENOUS ONCE
Status: CANCELLED | OUTPATIENT
Start: 2024-10-02 | End: 2024-09-12

## 2024-06-13 DIAGNOSIS — Z17.0 MALIGNANT NEOPLASM OF OVERLAPPING SITES OF LEFT BREAST IN FEMALE, ESTROGEN RECEPTOR POSITIVE (H): Primary | ICD-10-CM

## 2024-06-13 DIAGNOSIS — C50.812 MALIGNANT NEOPLASM OF OVERLAPPING SITES OF LEFT BREAST IN FEMALE, ESTROGEN RECEPTOR POSITIVE (H): Primary | ICD-10-CM

## 2024-06-13 RX ORDER — CAPECITABINE 150 MG/1
750 TABLET, FILM COATED ORAL 2 TIMES DAILY
Qty: 140 TABLET | Refills: 0 | Status: SHIPPED | OUTPATIENT
Start: 2024-06-13 | End: 2024-07-22

## 2024-06-14 ENCOUNTER — LAB (OUTPATIENT)
Dept: INFUSION THERAPY | Facility: CLINIC | Age: 69
End: 2024-06-14
Attending: INTERNAL MEDICINE
Payer: MEDICARE

## 2024-06-14 DIAGNOSIS — Z17.0 MALIGNANT NEOPLASM OF OVERLAPPING SITES OF LEFT BREAST IN FEMALE, ESTROGEN RECEPTOR POSITIVE (H): Primary | ICD-10-CM

## 2024-06-14 DIAGNOSIS — C50.812 MALIGNANT NEOPLASM OF OVERLAPPING SITES OF LEFT BREAST IN FEMALE, ESTROGEN RECEPTOR POSITIVE (H): Primary | ICD-10-CM

## 2024-06-14 LAB
ALBUMIN SERPL BCG-MCNC: 4.4 G/DL (ref 3.5–5.2)
ALP SERPL-CCNC: 74 U/L (ref 40–150)
ALT SERPL W P-5'-P-CCNC: 8 U/L (ref 0–50)
ANION GAP SERPL CALCULATED.3IONS-SCNC: 10 MMOL/L (ref 7–15)
AST SERPL W P-5'-P-CCNC: 22 U/L (ref 0–45)
BASOPHILS # BLD AUTO: 0.1 10E3/UL (ref 0–0.2)
BASOPHILS NFR BLD AUTO: 1 %
BILIRUB SERPL-MCNC: 0.5 MG/DL
BUN SERPL-MCNC: 23.8 MG/DL (ref 8–23)
CALCIUM SERPL-MCNC: 10.1 MG/DL (ref 8.8–10.2)
CANCER AG15-3 SERPL-ACNC: 109 U/ML
CHLORIDE SERPL-SCNC: 100 MMOL/L (ref 98–107)
CREAT SERPL-MCNC: 0.97 MG/DL (ref 0.51–0.95)
DEPRECATED HCO3 PLAS-SCNC: 27 MMOL/L (ref 22–29)
EGFRCR SERPLBLD CKD-EPI 2021: 64 ML/MIN/1.73M2
EOSINOPHIL # BLD AUTO: 0.2 10E3/UL (ref 0–0.7)
EOSINOPHIL NFR BLD AUTO: 3 %
ERYTHROCYTE [DISTWIDTH] IN BLOOD BY AUTOMATED COUNT: 16.6 % (ref 10–15)
GLUCOSE SERPL-MCNC: 90 MG/DL (ref 70–99)
HCT VFR BLD AUTO: 39.6 % (ref 35–47)
HGB BLD-MCNC: 13.1 G/DL (ref 11.7–15.7)
IMM GRANULOCYTES # BLD: 0 10E3/UL
IMM GRANULOCYTES NFR BLD: 1 %
LYMPHOCYTES # BLD AUTO: 1.9 10E3/UL (ref 0.8–5.3)
LYMPHOCYTES NFR BLD AUTO: 31 %
MCH RBC QN AUTO: 31.3 PG (ref 26.5–33)
MCHC RBC AUTO-ENTMCNC: 33.1 G/DL (ref 31.5–36.5)
MCV RBC AUTO: 95 FL (ref 78–100)
MONOCYTES # BLD AUTO: 0.5 10E3/UL (ref 0–1.3)
MONOCYTES NFR BLD AUTO: 9 %
NEUTROPHILS # BLD AUTO: 3.5 10E3/UL (ref 1.6–8.3)
NEUTROPHILS NFR BLD AUTO: 56 %
NRBC # BLD AUTO: 0 10E3/UL
NRBC BLD AUTO-RTO: 0 /100
PLATELET # BLD AUTO: 252 10E3/UL (ref 150–450)
POTASSIUM SERPL-SCNC: 4.2 MMOL/L (ref 3.4–5.3)
PROT SERPL-MCNC: 8 G/DL (ref 6.4–8.3)
RBC # BLD AUTO: 4.19 10E6/UL (ref 3.8–5.2)
SODIUM SERPL-SCNC: 137 MMOL/L (ref 135–145)
WBC # BLD AUTO: 6.1 10E3/UL (ref 4–11)

## 2024-06-14 PROCEDURE — 84075 ASSAY ALKALINE PHOSPHATASE: CPT

## 2024-06-14 PROCEDURE — 36591 DRAW BLOOD OFF VENOUS DEVICE: CPT

## 2024-06-14 PROCEDURE — 99207 PR NO CHARGE LOS: CPT

## 2024-06-14 PROCEDURE — 84450 TRANSFERASE (AST) (SGOT): CPT

## 2024-06-14 PROCEDURE — 250N000011 HC RX IP 250 OP 636: Mod: JZ | Performed by: INTERNAL MEDICINE

## 2024-06-14 PROCEDURE — 85025 COMPLETE CBC W/AUTO DIFF WBC: CPT

## 2024-06-14 PROCEDURE — 86300 IMMUNOASSAY TUMOR CA 15-3: CPT

## 2024-06-14 RX ORDER — HEPARIN SODIUM (PORCINE) LOCK FLUSH IV SOLN 100 UNIT/ML 100 UNIT/ML
500 SOLUTION INTRAVENOUS EVERY 8 HOURS
Status: DISCONTINUED | OUTPATIENT
Start: 2024-06-14 | End: 2024-06-14 | Stop reason: HOSPADM

## 2024-06-14 RX ADMIN — Medication 500 UNITS: at 08:43

## 2024-06-14 NOTE — PROGRESS NOTES
"Patient's name and  were verified.  See Doc Flowsheet - IV assess for details.  IVAD accessed with 20G 3\" power needle  blood return positive: YES  Site without redness, tenderness or swelling: YES  flushed with 20cc NS and 5cc 100u/ml heparin  Needle: De-accessed  Comments: Labs drawn.  Patient tolerated procedure without incident.    Viviane Forrest RN, BSN, OCN          "

## 2024-06-17 ENCOUNTER — APPOINTMENT (OUTPATIENT)
Dept: GENERAL RADIOLOGY | Facility: CLINIC | Age: 69
End: 2024-06-17
Payer: MEDICARE

## 2024-06-17 ENCOUNTER — ANCILLARY PROCEDURE (OUTPATIENT)
Dept: CT IMAGING | Facility: CLINIC | Age: 69
End: 2024-06-17
Attending: INTERNAL MEDICINE
Payer: MEDICARE

## 2024-06-17 DIAGNOSIS — Z17.0 MALIGNANT NEOPLASM OF OVERLAPPING SITES OF LEFT BREAST IN FEMALE, ESTROGEN RECEPTOR POSITIVE (H): ICD-10-CM

## 2024-06-17 DIAGNOSIS — C50.812 MALIGNANT NEOPLASM OF OVERLAPPING SITES OF LEFT BREAST IN FEMALE, ESTROGEN RECEPTOR POSITIVE (H): ICD-10-CM

## 2024-06-17 PROCEDURE — 71260 CT THORAX DX C+: CPT | Mod: MG | Performed by: RADIOLOGY

## 2024-06-17 PROCEDURE — 74177 CT ABD & PELVIS W/CONTRAST: CPT | Mod: MG | Performed by: RADIOLOGY

## 2024-06-17 PROCEDURE — G1010 CDSM STANSON: HCPCS | Performed by: RADIOLOGY

## 2024-06-17 RX ORDER — IOPAMIDOL 755 MG/ML
97 INJECTION, SOLUTION INTRAVASCULAR ONCE
Status: COMPLETED | OUTPATIENT
Start: 2024-06-17 | End: 2024-06-17

## 2024-06-17 RX ADMIN — IOPAMIDOL 97 ML: 755 INJECTION, SOLUTION INTRAVASCULAR at 09:10

## 2024-06-18 ENCOUNTER — INFUSION THERAPY VISIT (OUTPATIENT)
Dept: INFUSION THERAPY | Facility: CLINIC | Age: 69
End: 2024-06-18
Attending: INTERNAL MEDICINE
Payer: MEDICARE

## 2024-06-18 ENCOUNTER — VIRTUAL VISIT (OUTPATIENT)
Dept: ONCOLOGY | Facility: CLINIC | Age: 69
End: 2024-06-18
Attending: INTERNAL MEDICINE
Payer: MEDICARE

## 2024-06-18 VITALS
WEIGHT: 183 LBS | HEART RATE: 98 BPM | RESPIRATION RATE: 16 BRPM | OXYGEN SATURATION: 99 % | SYSTOLIC BLOOD PRESSURE: 172 MMHG | BODY MASS INDEX: 34.55 KG/M2 | DIASTOLIC BLOOD PRESSURE: 90 MMHG | HEIGHT: 61 IN | TEMPERATURE: 97.8 F

## 2024-06-18 VITALS — HEIGHT: 62 IN | WEIGHT: 176 LBS | BODY MASS INDEX: 32.39 KG/M2

## 2024-06-18 DIAGNOSIS — C50.812 MALIGNANT NEOPLASM OF OVERLAPPING SITES OF LEFT BREAST IN FEMALE, ESTROGEN RECEPTOR POSITIVE (H): ICD-10-CM

## 2024-06-18 DIAGNOSIS — G62.9 NEUROPATHY: ICD-10-CM

## 2024-06-18 DIAGNOSIS — C50.812 MALIGNANT NEOPLASM OF OVERLAPPING SITES OF LEFT BREAST IN FEMALE, ESTROGEN RECEPTOR POSITIVE (H): Primary | ICD-10-CM

## 2024-06-18 DIAGNOSIS — Z17.0 MALIGNANT NEOPLASM OF OVERLAPPING SITES OF LEFT BREAST IN FEMALE, ESTROGEN RECEPTOR POSITIVE (H): ICD-10-CM

## 2024-06-18 DIAGNOSIS — C79.51 MALIGNANT NEOPLASM METASTATIC TO BONE (H): ICD-10-CM

## 2024-06-18 DIAGNOSIS — Z17.0 MALIGNANT NEOPLASM OF OVERLAPPING SITES OF LEFT BREAST IN FEMALE, ESTROGEN RECEPTOR POSITIVE (H): Primary | ICD-10-CM

## 2024-06-18 PROCEDURE — 99215 OFFICE O/P EST HI 40 MIN: CPT | Mod: 95 | Performed by: INTERNAL MEDICINE

## 2024-06-18 PROCEDURE — 96365 THER/PROPH/DIAG IV INF INIT: CPT

## 2024-06-18 PROCEDURE — 250N000011 HC RX IP 250 OP 636: Mod: JZ | Performed by: NURSE PRACTITIONER

## 2024-06-18 PROCEDURE — 258N000003 HC RX IP 258 OP 636: Mod: JZ | Performed by: NURSE PRACTITIONER

## 2024-06-18 PROCEDURE — 99207 PR NO CHARGE LOS: CPT

## 2024-06-18 RX ORDER — HEPARIN SODIUM (PORCINE) LOCK FLUSH IV SOLN 100 UNIT/ML 100 UNIT/ML
5 SOLUTION INTRAVENOUS
Status: DISCONTINUED | OUTPATIENT
Start: 2024-06-18 | End: 2024-06-18 | Stop reason: HOSPADM

## 2024-06-18 RX ORDER — GABAPENTIN 250 MG/5ML
SOLUTION ORAL
Qty: 470 ML | Refills: 6 | Status: SHIPPED | OUTPATIENT
Start: 2024-06-18 | End: 2024-08-01

## 2024-06-18 RX ADMIN — ZOLEDRONIC ACID 3.5 MG: 4 INJECTION, SOLUTION, CONCENTRATE INTRAVENOUS at 14:26

## 2024-06-18 RX ADMIN — SODIUM CHLORIDE 250 ML: 9 INJECTION, SOLUTION INTRAVENOUS at 14:26

## 2024-06-18 RX ADMIN — Medication 5 ML: at 14:45

## 2024-06-18 ASSESSMENT — PAIN SCALES - GENERAL: PAINLEVEL: NO PAIN (0)

## 2024-06-18 NOTE — PROGRESS NOTES
Virtual Visit Details    Type of service:  Video Visit   Video Start Time: 8:11 AM  Video End Time:8:31 AM    Originating Location (pt. Location): Home    Distant Location (provider location):  Off-site  Platform used for Video Visit: [employer]

## 2024-06-18 NOTE — LETTER
6/18/2024      Mary Olson  [address]  [facility] [address]      Dear Colleague,    Thank you for referring your patient, Mary Olson, to the [facility]. Please see a copy of my visit note below.    Virtual Visit Details    Type of service:  Video Visit   Video Start Time: 8:11 AM  Video End Time:8:31 AM    Originating Location (pt. Location): Home    Distant Location (provider location):  Off-site  Platform used for Video Visit: [facility]    Hematology oncology follow-up note.    6/18/24      Interval history.    I am seeing her in the absence of Dr. Hilton.  I have reviewed notes from Dr. Hilton.  Patient has metastatic left-sided breast cancer and currently she is on Xeloda which was started in December 2023 requiring dose reductions.  Currently taking 750 mg twice a day since February 2024.  Breast cancer is ER+, [place]+ Her2 Negative; with bone metastasis. PIK3CA mutation.  TEJAS, TMB low, ESR1 no mutation.    Overall she is tolerating the lower dose better.  She still has hand-foot syndrome with mild peeling of the skin of the hands and the soles.  She is applying udder cream 2-3 times a day.  She denies any nausea vomiting diarrhea constipation.  Denies any significant pain.  She has mild fatigue with exertion but remains decently functional.  No fevers infection shortness of breath.  She thinks that the left breast abnormality and left chest abnormality where she has known malignancy, feels better after starting Xeloda.    Physical examination  Wt Readings from Last 4 Encounters:   06/18/24 79.8 kg (176 lb)   05/17/24 80.3 kg (177 lb)   01/23/24 83.5 kg (184 lb)   12/26/23 77.6 kg (171 lb)         Constitutional.  Does not seem to be in any acute distress.  Eyes.  No redness or discharge noted.  Respiratory.  Speaking in full sentences.  Breathing seems comfortable without any accessory use of muscles.    Skin.  Visualized skin does not show any obvious  rashes.  Musculoskeletal.  Range of motion for visualized areas is intact.  Neurological.  Alert and oriented x3.  Psychiatric.  Mood, mentation and affect are normal.  Decision making capacity is intact.    Labs.  Reviewed  6/14/2024  Chemistry shows BUN 23.8.  Creatinine 0.97  CBC unremarkable  CA 15-3 was 109.    Imaging reviewed by myself.  She had a CT chest abdomen and pelvis on 6/17/2024 which showed good response to treatment with decrease in size of the left breast nodule and left chest wall abnormality as well as decrease in the nodularity involving the left pleura.    FINDINGS:      CHEST WALL: The previously seen enhancing soft tissue nodules involving the left chest wall and breast appear to have markedly improved with decreased nodularity in the breast query small residual focus in the pectoralis minor muscle measuring 1.7 x 1.4   cm compared to 3.8 x 2.7 cm.     LUNGS AND PLEURA: Resolution of previously seen left pleural nodularity. No suspicious pulmonary nodules. No pneumothorax or pleural effusion.     MEDIASTINUM/AXILLAE: Right-sided port with tip at right atrial caval junction. Normal size heart. No pericardial effusion. No hilar or mediastinal lymphadenopathy.     CORONARY ARTERY CALCIFICATION: Mild.     HEPATOBILIARY: Normal.     PANCREAS: Normal.     SPLEEN: Normal.     ADRENAL GLANDS: Normal.     KIDNEYS/BLADDER: Normal.     BOWEL: Normal.     LYMPH NODES: Normal.     VASCULATURE: Mild atherosclerotic vascular disease.     PELVIC ORGANS: Normal.     MUSCULOSKELETAL: Diffuse sclerotic bony metastases with increased overall sclerosis as an example and T6 and T11.                                                                      IMPRESSION:  1.  Marked reduction in size and conspicuity of infiltrated left chest wall mass and resolution of previously seen left pleural nodules.  2.  Progressive sclerosis of widespread osseous metastases in the axial/appendicular cyst skeleton    Assessment and  plan.    Metastatic breast cancer.  She is showing good response to treatment and I reviewed the CT scan myself which showed improvement in the left breast nodularity, left chest wall metastasis as well as improvement in the left pleural nodularity.  Continue Xeloda 750 mg twice daily.  Continue to monitor CA 15-3 serially.    Hand-foot syndrome.  From Xeloda.  She is using udder cream 2-3 times a day.  Currently hand-foot syndrome is mild.  Advised her to use the udder cream more often throughout the day.      Bone metastasis.  Continue Zometa every 3 months.  Continue calcium and vitamin D.    Follow-up with nurse practitioner in about 1 month.    All questions answered to her satisfaction.  She is agreeable and comfortable with the plan.    Felicia Ashraf MD             Again, thank you for allowing me to participate in the care of your patient.        Sincerely,        Felicia Ashraf MD

## 2024-06-18 NOTE — NURSING NOTE
Is the patient currently in the state of [place]? YES    Visit mode:VIDEO    If the visit is dropped, the patient can be reconnected by: VIDEO VISIT: Send to e-mail at: [email or web address]    Will anyone else be joining the visit? NO  (If patient encounters technical issues they should call [phone]    How would you like to obtain your AVS? MyChart    Are changes needed to the allergy or medication list? No    Are refills needed on medications prescribed by this physician? NO    Reason for visit: ASHISH BALLARD

## 2024-06-18 NOTE — PROGRESS NOTES
Hematology oncology follow-up note.    6/18/24      Interval history.    I am seeing her in the absence of Dr. Hilton.  I have reviewed notes from Dr. Hilton.  Patient has metastatic left-sided breast cancer and currently she is on Xeloda which was started in December 2023 requiring dose reductions.  Currently taking 750 mg twice a day since February 2024.  Breast cancer is ER+, RI+ Her2 Negative; with bone metastasis. PIK3CA mutation.  TEJAS, TMB low, ESR1 no mutation.    Overall she is tolerating the lower dose better.  She still has hand-foot syndrome with mild peeling of the skin of the hands and the soles.  She is applying udder cream 2-3 times a day.  She denies any nausea vomiting diarrhea constipation.  Denies any significant pain.  She has mild fatigue with exertion but remains decently functional.  No fevers infection shortness of breath.  She thinks that the left breast abnormality and left chest abnormality where she has known malignancy, feels better after starting Xeloda.    Physical examination  Wt Readings from Last 4 Encounters:   06/18/24 79.8 kg (176 lb)   05/17/24 80.3 kg (177 lb)   01/23/24 83.5 kg (184 lb)   12/26/23 77.6 kg (171 lb)         Constitutional.  Does not seem to be in any acute distress.  Eyes.  No redness or discharge noted.  Respiratory.  Speaking in full sentences.  Breathing seems comfortable without any accessory use of muscles.    Skin.  Visualized skin does not show any obvious rashes.  Musculoskeletal.  Range of motion for visualized areas is intact.  Neurological.  Alert and oriented x3.  Psychiatric.  Mood, mentation and affect are normal.  Decision making capacity is intact.    Labs.  Reviewed  6/14/2024  Chemistry shows BUN 23.8.  Creatinine 0.97  CBC unremarkable  CA 15-3 was 109.    Imaging reviewed by myself.  She had a CT chest abdomen and pelvis on 6/17/2024 which showed good response to treatment with decrease in size of the left breast nodule and left chest wall  abnormality as well as decrease in the nodularity involving the left pleura.    FINDINGS:      CHEST WALL: The previously seen enhancing soft tissue nodules involving the left chest wall and breast appear to have markedly improved with decreased nodularity in the breast query small residual focus in the pectoralis minor muscle measuring 1.7 x 1.4   cm compared to 3.8 x 2.7 cm.     LUNGS AND PLEURA: Resolution of previously seen left pleural nodularity. No suspicious pulmonary nodules. No pneumothorax or pleural effusion.     MEDIASTINUM/AXILLAE: Right-sided port with tip at right atrial caval junction. Normal size heart. No pericardial effusion. No hilar or mediastinal lymphadenopathy.     CORONARY ARTERY CALCIFICATION: Mild.     HEPATOBILIARY: Normal.     PANCREAS: Normal.     SPLEEN: Normal.     ADRENAL GLANDS: Normal.     KIDNEYS/BLADDER: Normal.     BOWEL: Normal.     LYMPH NODES: Normal.     VASCULATURE: Mild atherosclerotic vascular disease.     PELVIC ORGANS: Normal.     MUSCULOSKELETAL: Diffuse sclerotic bony metastases with increased overall sclerosis as an example and T6 and T11.                                                                      IMPRESSION:  1.  Marked reduction in size and conspicuity of infiltrated left chest wall mass and resolution of previously seen left pleural nodules.  2.  Progressive sclerosis of widespread osseous metastases in the axial/appendicular cyst skeleton    Assessment and plan.    Metastatic breast cancer.  She is showing good response to treatment and I reviewed the CT scan myself which showed improvement in the left breast nodularity, left chest wall metastasis as well as improvement in the left pleural nodularity.  Continue Xeloda 750 mg twice daily.  Continue to monitor CA 15-3 serially.    Hand-foot syndrome.  From Xeloda.  She is using udder cream 2-3 times a day.  Currently hand-foot syndrome is mild.  Advised her to use the udder cream more often throughout  the day.      Bone metastasis.  Continue Zometa every 3 months.  Continue calcium and vitamin D.    Follow-up with nurse practitioner in about 1 month.    All questions answered to her satisfaction.  She is agreeable and comfortable with the plan.    Felicia Ashraf MD

## 2024-06-18 NOTE — PROGRESS NOTES
"Infusion Nursing Note:  Mary Olson presents today for Zometa.    Patient seen by provider today: Yes: VV with Dr. Ashraf   present during visit today: Not Applicable.    Note: Patient denies changes since her Dr. Ashraf visit this morning. She states she is feeling at her baseline today and has tolerated previous Zometa infusions well. HTN noted today, see flowsheets. Pt reports she commonly has HTN as medical visits d/t \"white coat syndrome.\" She routinely checks her BP at home and it is usually in the 120/80 range. She did take her BP meds today. Denies chest pain, vision changes, dizziness, SOB. Educated to check BP when she gets home and to notify her provider if it continues to be elevated.     Patient denies dental issues at this time.  States last dental exam was within the last 6 months.  Patient will inform dentist that she got Zometa infusion, prior to any invasive dental work in the future.    Reminded patient to drink 6-8 glasses of water today, and tomorrow, to protect kidneys.     Intravenous Access:  Implanted Port.    Treatment Conditions:  Lab Results   Component Value Date     06/14/2024    POTASSIUM 4.2 06/14/2024    CR 0.97 (H) 06/14/2024    BUFFY 10.1 06/14/2024    BILITOTAL 0.5 06/14/2024    ALBUMIN 4.4 06/14/2024    ALT 8 06/14/2024    AST 22 06/14/2024         Post Infusion Assessment:  Patient tolerated infusion without incident.  Blood return noted pre and post infusion.  Site patent and intact, free from redness, edema or discomfort.  No evidence of extravasations.  Access discontinued per protocol.       Discharge Plan:   Patient and/or family verbalized understanding of discharge instructions and all questions answered.  AVS to patient via [employer].  Patient will return in 3 months for next appointment. IB sent to scheduling to reach out to patient.   Patient discharged in stable condition accompanied by: self.  Departure Mode: Ambulatory.      Aliza Wynne RN  "

## 2024-06-19 DIAGNOSIS — Z17.0 MALIGNANT NEOPLASM OF OVERLAPPING SITES OF LEFT BREAST IN FEMALE, ESTROGEN RECEPTOR POSITIVE (H): Primary | ICD-10-CM

## 2024-06-19 DIAGNOSIS — C50.812 MALIGNANT NEOPLASM OF OVERLAPPING SITES OF LEFT BREAST IN FEMALE, ESTROGEN RECEPTOR POSITIVE (H): Primary | ICD-10-CM

## 2024-06-21 DIAGNOSIS — Z17.0 MALIGNANT NEOPLASM OF OVERLAPPING SITES OF LEFT BREAST IN FEMALE, ESTROGEN RECEPTOR POSITIVE (H): Primary | ICD-10-CM

## 2024-06-21 DIAGNOSIS — C50.812 MALIGNANT NEOPLASM OF OVERLAPPING SITES OF LEFT BREAST IN FEMALE, ESTROGEN RECEPTOR POSITIVE (H): Primary | ICD-10-CM

## 2024-06-21 RX ORDER — CAPECITABINE 150 MG/1
750 TABLET, FILM COATED ORAL 2 TIMES DAILY
Qty: 140 TABLET | Refills: 0 | Status: SHIPPED | OUTPATIENT
Start: 2024-06-21 | End: 2024-07-22

## 2024-07-12 NOTE — PROGRESS NOTES
"Oncology Follow Up Visit: July 15, 2024    Oncologist: Dr. Hilton / Dr. Ashraf (in Dr. Hilton's absence)  PCP: No Ref-Primary, Physician    Reason for Visit: Follow-up breast cancer     Diagnosis:   Patient has metastatic left-sided breast cancer and currently she is on Xeloda which was started in December 2023 requiring dose reductions.  Currently taking 750 mg twice a day since February 2024.  Breast cancer is ER+, SD+ Her2 Negative; with bone metastasis. PIK3CA mutation.  TEJAS, TMB low, ESR1 no mutation.    Interval History:  Patient is seen in clinic today for review of Xeloda. She is tolerating this dosage much better and hands and feet are continuing to improve. Mild peeling and redness only. She is using Udder cream several times a day. HFS is tolerable and not impacting QOL. Otherwise has been in good health with no new or recent concerns.     Patient denies any of the following except if noted above: fevers, chills, difficulty with energy, vision or hearing changes, chest pain, dyspnea, abdominal pain, nausea, vomiting, diarrhea, constipation, urinary concerns, headaches, numbness, tingling, issues with sleep or mood. She also denies lumps, bumps, rashes or skin lesions, bleeding or bruising issues.    Physical Exam:  BP (!) 170/92 (BP Location: Left arm)   Pulse 99   Ht 1.6 m (5' 2.99\")   Wt 83.6 kg (184 lb 3.2 oz)   SpO2 98%   BMI 32.64 kg/m     BP Readings from Last 6 Encounters:   07/15/24 (!) 170/92   06/18/24 (!) 172/90   05/17/24 137/83   01/23/24 120/89   12/26/23 (!) 134/90   12/05/23 131/64     Wt Readings from Last 6 Encounters:   07/15/24 83.6 kg (184 lb 3.2 oz)   06/18/24 83 kg (183 lb)   06/18/24 79.8 kg (176 lb)   05/17/24 80.3 kg (177 lb)   01/23/24 83.5 kg (184 lb)   12/26/23 77.6 kg (171 lb)      Constitutional: No acute distress, pleasant, appropriately groomed.   ENT: PERRLA, sclera without erythema.   Neck: Trachea midline, no adenopathy.   Resp: No respiratory distress, adequate " depth and rate of respirations.   Cardiac: No cyanosis, JVD, or peripheral edema.   MS:  5/5 muscle strength, adequate ROM.   Skin: Mild redness and peeling to bilateral palms, no open blisters or sores.   Neuro: A/O x 4, sensation intact.   Psych: Appropriate mentation and affect.    Laboratory Results:   Results for orders placed or performed in visit on 07/15/24   CBC with platelets and differential     Status: Abnormal   Result Value Ref Range    WBC Count 6.3 4.0 - 11.0 10e3/uL    RBC Count 4.26 3.80 - 5.20 10e6/uL    Hemoglobin 13.3 11.7 - 15.7 g/dL    Hematocrit 39.8 35.0 - 47.0 %    MCV 93 78 - 100 fL    MCH 31.2 26.5 - 33.0 pg    MCHC 33.4 31.5 - 36.5 g/dL    RDW 16.6 (H) 10.0 - 15.0 %    Platelet Count 241 150 - 450 10e3/uL    % Neutrophils 58 %    % Lymphocytes 31 %    % Monocytes 6 %    % Eosinophils 4 %    % Basophils 1 %    % Immature Granulocytes 0 %    NRBCs per 100 WBC 0 <1 /100    Absolute Neutrophils 3.6 1.6 - 8.3 10e3/uL    Absolute Lymphocytes 1.9 0.8 - 5.3 10e3/uL    Absolute Monocytes 0.4 0.0 - 1.3 10e3/uL    Absolute Eosinophils 0.3 0.0 - 0.7 10e3/uL    Absolute Basophils 0.1 0.0 - 0.2 10e3/uL    Absolute Immature Granulocytes 0.0 <=0.4 10e3/uL    Absolute NRBCs 0.0 10e3/uL   Extra Tube     Status: None (In process)    Narrative    The following orders were created for panel order Extra Tube.  Procedure                               Abnormality         Status                     ---------                               -----------         ------                     Extra Serum Separator Tu...[[id number]]                      In process                   Please view results for these tests on the individual orders.   CBC with platelets differential     Status: Abnormal    Narrative    The following orders were created for panel order CBC with platelets differential.  Procedure                               Abnormality         Status                     ---------                                -----------         ------                     CBC with platelets and d...[[id number]]  Abnormal            Final result                 Please view results for these tests on the individual orders.     I reviewed the above labs today.    Imaging:  CT Chest/Abdomen/Pelvis w Contrast  Narrative: EXAM: CT CHEST/ABDOMEN/PELVIS W CONTRAST  LOCATION: [facility]  DATE: 6/17/2024    INDICATION: metastatic breast cancer  COMPARISON: 11/27/2023  TECHNIQUE: CT scan of the chest, abdomen, and pelvis was performed following injection of IV contrast. Multiplanar reformats were obtained. Dose reduction techniques were used.   CONTRAST: 97 ML ISOVIEW 370    FINDINGS:     CHEST WALL: The previously seen enhancing soft tissue nodules involving the left chest wall and breast appear to have markedly improved with decreased nodularity in the breast query small residual focus in the pectoralis minor muscle measuring 1.7 x 1.4   cm compared to 3.8 x 2.7 cm.    LUNGS AND PLEURA: Resolution of previously seen left pleural nodularity. No suspicious pulmonary nodules. No pneumothorax or pleural effusion.    MEDIASTINUM/AXILLAE: Right-sided port with tip at right atrial caval junction. Normal size heart. No pericardial effusion. No hilar or mediastinal lymphadenopathy.    CORONARY ARTERY CALCIFICATION: Mild.    HEPATOBILIARY: Normal.    PANCREAS: Normal.    SPLEEN: Normal.    ADRENAL GLANDS: Normal.    KIDNEYS/BLADDER: Normal.    BOWEL: Normal.    LYMPH NODES: Normal.    VASCULATURE: Mild atherosclerotic vascular disease.    PELVIC ORGANS: Normal.    MUSCULOSKELETAL: Diffuse sclerotic bony metastases with increased overall sclerosis as an example and T6 and T11.  Impression: IMPRESSION:  1.  Marked reduction in size and conspicuity of infiltrated left chest wall mass and resolution of previously seen left pleural nodules.  2.  Progressive sclerosis of widespread osseous metastases in the axial/appendicular cyst  skeleton  I reviewed the above imaging report today.        Assessment and Plan:   Breast cancer is ER+, CO+ Her2 Negative; with bone metastasis   - On treatment with Xeloda 750 mg twice daily and tolerating dosage regimen better than previously  - Labs reviewed, okay to continue with current treatment without changes. Patient is agreeable.   - CT CAP due in ~12/2024  - Continue monthly appointments with provider for review, labs completed prior     Bone metastasis  - DEXA 11/2023 with normal bone density  - On Zometa q3 months, next dose due ~9/2024  - Continue calcium and vitamin D  - No new bone pains    HFS  - Mild peeling of skin of hands and feet, much improved compared to previous  - Using udder cream 2-3 daily  - Still able to perform ADLs/IADLs  - Continue to monitor    HTN   - BP elevated in clinic today, patient monitors at home and BP trending 120's/80's  - On Norvasc 10 mg daily  - No concerning cardiac s/s today    DM  - On Metformin 500 mg solution BID  - A1C in 5/2024 decreased to 5.2%  - Monitors BS at home and aware of s/s of hypoglycemia  - Working with PCP       Joanna Headley - NICOLE, CNP

## 2024-07-15 ENCOUNTER — LAB (OUTPATIENT)
Dept: INFUSION THERAPY | Facility: CLINIC | Age: 69
End: 2024-07-15
Payer: MEDICARE

## 2024-07-15 ENCOUNTER — ONCOLOGY VISIT (OUTPATIENT)
Dept: ONCOLOGY | Facility: CLINIC | Age: 69
End: 2024-07-15
Payer: MEDICARE

## 2024-07-15 VITALS
SYSTOLIC BLOOD PRESSURE: 170 MMHG | HEIGHT: 63 IN | DIASTOLIC BLOOD PRESSURE: 92 MMHG | OXYGEN SATURATION: 98 % | WEIGHT: 184.2 LBS | HEART RATE: 99 BPM | BODY MASS INDEX: 32.64 KG/M2

## 2024-07-15 DIAGNOSIS — R73.09 ELEVATED GLUCOSE: ICD-10-CM

## 2024-07-15 DIAGNOSIS — C50.812 MALIGNANT NEOPLASM OF OVERLAPPING SITES OF LEFT BREAST IN FEMALE, ESTROGEN RECEPTOR POSITIVE (H): Primary | ICD-10-CM

## 2024-07-15 DIAGNOSIS — Z17.0 MALIGNANT NEOPLASM OF OVERLAPPING SITES OF LEFT BREAST IN FEMALE, ESTROGEN RECEPTOR POSITIVE (H): Primary | ICD-10-CM

## 2024-07-15 DIAGNOSIS — I10 BENIGN ESSENTIAL HYPERTENSION: ICD-10-CM

## 2024-07-15 DIAGNOSIS — C79.51 MALIGNANT NEOPLASM METASTATIC TO BONE (H): ICD-10-CM

## 2024-07-15 PROBLEM — C50.912 MALIGNANT NEOPLASM OF UNSPECIFIED SITE OF LEFT FEMALE BREAST (H): Status: ACTIVE | Noted: 2020-03-16

## 2024-07-15 PROCEDURE — G0463 HOSPITAL OUTPT CLINIC VISIT: HCPCS

## 2024-07-15 PROCEDURE — 250N000011 HC RX IP 250 OP 636

## 2024-07-15 PROCEDURE — 99214 OFFICE O/P EST MOD 30 MIN: CPT

## 2024-07-15 PROCEDURE — 99207 PR NO CHARGE LOS: CPT

## 2024-07-15 RX ORDER — HEPARIN SODIUM (PORCINE) LOCK FLUSH IV SOLN 100 UNIT/ML 100 UNIT/ML
500 SOLUTION INTRAVENOUS
Status: DISCONTINUED | OUTPATIENT
Start: 2024-07-15 | End: 2024-07-15 | Stop reason: HOSPADM

## 2024-07-15 RX ADMIN — Medication 500 UNITS: at 07:31

## 2024-07-15 ASSESSMENT — PAIN SCALES - GENERAL: PAINLEVEL: NO PAIN (0)

## 2024-07-15 NOTE — LETTER
"7/15/2024      Mary Olson  [address]  [place] [address]      Dear Colleague,    Thank you for referring your patient, Mary Olson, to the [facility]. Please see a copy of my visit note below.    Oncology Follow Up Visit: July 15, 2024    Oncologist: Dr. Hilton / Dr. Ashraf (in Dr. Hilton's absence)  PCP: No Ref-Primary, Physician    Reason for Visit: Follow-up breast cancer     Diagnosis:   Patient has metastatic left-sided breast cancer and currently she is on Xeloda which was started in December 2023 requiring dose reductions.  Currently taking 750 mg twice a day since February 2024.  Breast cancer is ER+, CO+ Her2 Negative; with bone metastasis. PIK3CA mutation.  TEJAS, TMB low, ESR1 no mutation.    Interval History:  Patient is seen in clinic today for review of Xeloda. She is tolerating this dosage much better and hands and feet are continuing to improve. Mild peeling and redness only. She is using Udder cream several times a day. HFS is tolerable and not impacting QOL. Otherwise has been in good health with no new or recent concerns.     Patient denies any of the following except if noted above: fevers, chills, difficulty with energy, vision or hearing changes, chest pain, dyspnea, abdominal pain, nausea, vomiting, diarrhea, constipation, urinary concerns, headaches, numbness, tingling, issues with sleep or mood. She also denies lumps, bumps, rashes or skin lesions, bleeding or bruising issues.    Physical Exam:  BP (!) 170/92 (BP Location: Left arm)   Pulse 99   Ht 1.6 m (5' 2.99\")   Wt 83.6 kg (184 lb 3.2 oz)   SpO2 98%   BMI 32.64 kg/m     BP Readings from Last 6 Encounters:   07/15/24 (!) 170/92   06/18/24 (!) 172/90   05/17/24 137/83   01/23/24 120/89   12/26/23 (!) 134/90   12/05/23 131/64     Wt Readings from Last 6 Encounters:   07/15/24 83.6 kg (184 lb 3.2 oz)   06/18/24 83 kg (183 lb)   06/18/24 79.8 kg (176 lb)   05/17/24 80.3 kg (177 lb) "   01/23/24 83.5 kg (184 lb)   12/26/23 77.6 kg (171 lb)      Constitutional: No acute distress, pleasant, appropriately groomed.   ENT: PERRLA, sclera without erythema.   Neck: Trachea midline, no adenopathy.   Resp: No respiratory distress, adequate depth and rate of respirations.   Cardiac: No cyanosis, JVD, or peripheral edema.   MS:  5/5 muscle strength, adequate ROM.   Skin: Mild redness and peeling to bilateral palms, no open blisters or sores.   Neuro: A/O x 4, sensation intact.   Psych: Appropriate mentation and affect.    Laboratory Results:   Results for orders placed or performed in visit on 07/15/24   CBC with platelets and differential     Status: Abnormal   Result Value Ref Range    WBC Count 6.3 4.0 - 11.0 10e3/uL    RBC Count 4.26 3.80 - 5.20 10e6/uL    Hemoglobin 13.3 11.7 - 15.7 g/dL    Hematocrit 39.8 35.0 - 47.0 %    MCV 93 78 - 100 fL    MCH 31.2 26.5 - 33.0 pg    MCHC 33.4 31.5 - 36.5 g/dL    RDW 16.6 (H) 10.0 - 15.0 %    Platelet Count 241 150 - 450 10e3/uL    % Neutrophils 58 %    % Lymphocytes 31 %    % Monocytes 6 %    % Eosinophils 4 %    % Basophils 1 %    % Immature Granulocytes 0 %    NRBCs per 100 WBC 0 <1 /100    Absolute Neutrophils 3.6 1.6 - 8.3 10e3/uL    Absolute Lymphocytes 1.9 0.8 - 5.3 10e3/uL    Absolute Monocytes 0.4 0.0 - 1.3 10e3/uL    Absolute Eosinophils 0.3 0.0 - 0.7 10e3/uL    Absolute Basophils 0.1 0.0 - 0.2 10e3/uL    Absolute Immature Granulocytes 0.0 <=0.4 10e3/uL    Absolute NRBCs 0.0 10e3/uL   Extra Tube     Status: None (In process)    Narrative    The following orders were created for panel order Extra Tube.  Procedure                               Abnormality         Status                     ---------                               -----------         ------                     Extra Serum Separator Tu...[[id number]]                      In process                   Please view results for these tests on the individual orders.   CBC with platelets differential      Status: Abnormal    Narrative    The following orders were created for panel order CBC with platelets differential.  Procedure                               Abnormality         Status                     ---------                               -----------         ------                     CBC with platelets and d...[[id number]]  Abnormal            Final result                 Please view results for these tests on the individual orders.     I reviewed the above labs today.    Imaging:  CT Chest/Abdomen/Pelvis w Contrast  Narrative: EXAM: CT CHEST/ABDOMEN/PELVIS W CONTRAST  LOCATION: [facility]  DATE: 6/17/2024    INDICATION: metastatic breast cancer  COMPARISON: 11/27/2023  TECHNIQUE: CT scan of the chest, abdomen, and pelvis was performed following injection of IV contrast. Multiplanar reformats were obtained. Dose reduction techniques were used.   CONTRAST: 97 ML ISOVIEW 370    FINDINGS:     CHEST WALL: The previously seen enhancing soft tissue nodules involving the left chest wall and breast appear to have markedly improved with decreased nodularity in the breast query small residual focus in the pectoralis minor muscle measuring 1.7 x 1.4   cm compared to 3.8 x 2.7 cm.    LUNGS AND PLEURA: Resolution of previously seen left pleural nodularity. No suspicious pulmonary nodules. No pneumothorax or pleural effusion.    MEDIASTINUM/AXILLAE: Right-sided port with tip at right atrial caval junction. Normal size heart. No pericardial effusion. No hilar or mediastinal lymphadenopathy.    CORONARY ARTERY CALCIFICATION: Mild.    HEPATOBILIARY: Normal.    PANCREAS: Normal.    SPLEEN: Normal.    ADRENAL GLANDS: Normal.    KIDNEYS/BLADDER: Normal.    BOWEL: Normal.    LYMPH NODES: Normal.    VASCULATURE: Mild atherosclerotic vascular disease.    PELVIC ORGANS: Normal.    MUSCULOSKELETAL: Diffuse sclerotic bony metastases with increased overall sclerosis as an example and T6 and T11.  Impression:  IMPRESSION:  1.  Marked reduction in size and conspicuity of infiltrated left chest wall mass and resolution of previously seen left pleural nodules.  2.  Progressive sclerosis of widespread osseous metastases in the axial/appendicular cyst skeleton  I reviewed the above imaging report today.        Assessment and Plan:   Breast cancer is ER+, SC+ Her2 Negative; with bone metastasis   - On treatment with Xeloda 750 mg twice daily and tolerating dosage regimen better than previously  - Labs reviewed, okay to continue with current treatment without changes. Patient is agreeable.   - CT CAP due in ~12/2024  - Continue monthly appointments with provider for review, labs completed prior     Bone metastasis  - DEXA 11/2023 with normal bone density  - On Zometa q3 months, next dose due ~9/2024  - Continue calcium and vitamin D  - No new bone pains    HFS  - Mild peeling of skin of hands and feet, much improved compared to previous  - Using udder cream 2-3 daily  - Still able to perform ADLs/IADLs  - Continue to monitor    HTN   - BP elevated in clinic today, patient monitors at home and BP trending 120's/80's  - On Norvasc 10 mg daily  - No concerning cardiac s/s today    DM  - On Metformin 500 mg solution BID  - A1C in 5/2024 decreased to 5.2%  - Monitors BS at home and aware of s/s of hypoglycemia  - Working with PCP       Joanna RIVERA CNP      Again, thank you for allowing me to participate in the care of your patient.        Sincerely,        NICOLE Avila CNP

## 2024-07-15 NOTE — NURSING NOTE
"Oncology Rooming Note    July 15, 2024 7:53 AM   Mary Olson is a 69 year old female who presents for:    Chief Complaint   Patient presents with    Oncology Clinic Visit     Initial Vitals: BP (!) 170/92 (BP Location: Left arm)   Pulse 99   Ht 1.6 m (5' 2.99\")   Wt 83.6 kg (184 lb 3.2 oz)   SpO2 98%   BMI 32.64 kg/m   Estimated body mass index is 32.64 kg/m  as calculated from the following:    Height as of this encounter: 1.6 m (5' 2.99\").    Weight as of this encounter: 83.6 kg (184 lb 3.2 oz). Body surface area is 1.93 meters squared.  No Pain (0) Comment: Data Unavailable   No LMP recorded. Patient is postmenopausal.  Allergies reviewed: Yes  Medications reviewed: Yes    Medications: Medication refills not needed today.  Pharmacy name entered into EPIC:    [facility] - [place], [place] - [address] [place] [address]  [facility] - [place], [place] - [address] [place] [address] [facility] [place], [place] - [address] [facility]  [facility] [place] - [address] [place] [address] [facility] [place] - [address]  [facility] - [place] - [address] [facility] [address]  [facility] - [place] - [address] [facility] [id number] - [place], [place] - [address] [facility] - ONCOLOGY PHARMACY  [id number] - [place], [place] - [address] AT [facility]    Frailty Screening:   Is the patient here for a new oncology consult visit in cancer care? 2. No      Clinical concerns: No Concerns        Teena Osborne MA            "

## 2024-07-15 NOTE — PROGRESS NOTES
Port needle placed for lab draw access, Sterile technique performed and maintained. Patient tolerated procedure well. Tubes drawn in rainbow order: red, green, purple. Gauze dressing placed with use of paper tape.    RN advised patient to leave gauze in place for 20 mins.     Patient demonstrated verbal understanding.     Lashaun Cornejo RN  [facility]

## 2024-07-22 DIAGNOSIS — C50.912 MALIGNANT NEOPLASM OF UNSPECIFIED SITE OF LEFT FEMALE BREAST (H): Primary | ICD-10-CM

## 2024-07-22 RX ORDER — CAPECITABINE 150 MG/1
750 TABLET, FILM COATED ORAL 2 TIMES DAILY
Qty: 140 TABLET | Refills: 0 | Status: SHIPPED | OUTPATIENT
Start: 2024-07-22 | End: 2024-08-19

## 2024-07-26 DIAGNOSIS — I10 BENIGN ESSENTIAL HYPERTENSION: ICD-10-CM

## 2024-07-26 DIAGNOSIS — I10 ESSENTIAL HYPERTENSION: ICD-10-CM

## 2024-07-26 DIAGNOSIS — Z17.0 MALIGNANT NEOPLASM OF OVERLAPPING SITES OF LEFT BREAST IN FEMALE, ESTROGEN RECEPTOR POSITIVE (H): ICD-10-CM

## 2024-07-26 DIAGNOSIS — C50.812 MALIGNANT NEOPLASM OF OVERLAPPING SITES OF LEFT BREAST IN FEMALE, ESTROGEN RECEPTOR POSITIVE (H): ICD-10-CM

## 2024-07-26 DIAGNOSIS — T38.0X5D STEROID-INDUCED DIABETES MELLITUS, SUBSEQUENT ENCOUNTER (H): ICD-10-CM

## 2024-07-26 DIAGNOSIS — E11.65 TYPE 2 DIABETES MELLITUS WITH HYPERGLYCEMIA, WITH LONG-TERM CURRENT USE OF INSULIN (H): ICD-10-CM

## 2024-07-26 DIAGNOSIS — G62.9 NEUROPATHY: Primary | ICD-10-CM

## 2024-07-26 DIAGNOSIS — Z79.4 TYPE 2 DIABETES MELLITUS WITH HYPERGLYCEMIA, WITH LONG-TERM CURRENT USE OF INSULIN (H): ICD-10-CM

## 2024-07-26 DIAGNOSIS — E09.9 STEROID-INDUCED DIABETES MELLITUS, SUBSEQUENT ENCOUNTER (H): ICD-10-CM

## 2024-07-26 NOTE — LETTER
July 29, 2024    Mary Olson  [address]  [place] [address]    Dear Mary,       We recently received a refill request for metFORMIN (GLUCOPHAGE) 500 MG tablet .  We have refilled this for a one time 90 day supply only because you are due for a:    Diabetes office visit and fasting lab appointment-needed in the next 2 months per Dr Enamorado.      Please schedule an office visit with your provider and a lab appointment 4-5 days prior to the office visit.     Please call at your earliest convenience so that there will not be a delay with your future refills.          Thank you,   Your [facility] Team/  [phone]

## 2024-07-28 RX ORDER — AMLODIPINE BESYLATE 10 MG/1
10 TABLET ORAL DAILY
Qty: 90 TABLET | Refills: 3 | Status: SHIPPED | OUTPATIENT
Start: 2024-07-28 | End: 2024-09-26

## 2024-07-28 RX ORDER — TRIAMTERENE/HYDROCHLOROTHIAZID 37.5-25 MG
1 TABLET ORAL DAILY
Qty: 90 TABLET | Refills: 1 | Status: SHIPPED | OUTPATIENT
Start: 2024-07-28 | End: 2024-09-26

## 2024-08-01 RX ORDER — GABAPENTIN 250 MG/5ML
250 SOLUTION ORAL AT BEDTIME
Qty: 470 ML | Refills: 0 | Status: SHIPPED | OUTPATIENT
Start: 2024-08-01 | End: 2024-08-21

## 2024-08-16 RX ORDER — HEPARIN SODIUM (PORCINE) LOCK FLUSH IV SOLN 100 UNIT/ML 100 UNIT/ML
5 SOLUTION INTRAVENOUS
OUTPATIENT
Start: 2025-01-01

## 2024-08-16 RX ORDER — HEPARIN SODIUM,PORCINE 10 UNIT/ML
5 VIAL (ML) INTRAVENOUS
OUTPATIENT
Start: 2025-01-01

## 2024-08-16 RX ORDER — ZOLEDRONIC ACID 0.04 MG/ML
4 INJECTION, SOLUTION INTRAVENOUS ONCE
OUTPATIENT
Start: 2025-01-01 | End: 2024-12-16

## 2024-08-19 DIAGNOSIS — C50.912 MALIGNANT NEOPLASM OF LEFT FEMALE BREAST, UNSPECIFIED ESTROGEN RECEPTOR STATUS, UNSPECIFIED SITE OF BREAST (H): Primary | ICD-10-CM

## 2024-08-19 RX ORDER — CAPECITABINE 150 MG/1
750 TABLET, FILM COATED ORAL 2 TIMES DAILY
Qty: 140 TABLET | Refills: 0 | Status: SHIPPED | OUTPATIENT
Start: 2024-08-19

## 2024-08-20 NOTE — PROGRESS NOTES
"Oncology Follow Up Visit: August 21, 2024    Oncologist: Dr. Hilton / Dr. Ashraf (in Dr. Hilton's absence)  PCP: No Ref-Primary, Physician    Reason for Visit: Follow-up breast cancer     Diagnosis:   Patient has metastatic left-sided breast cancer and currently she is on Xeloda which was started in December 2023 requiring dose reductions.  Currently taking 750 mg twice a day since February 2024.  Breast cancer is ER+, [place]+ Her2 Negative; with bone metastasis. PIK3CA mutation.  TEJAS, TMB low, ESR1 no mutation.    Interval History:  Patient is seen in clinic today for review of Xeloda. She is tolerating this dosage much better and hands and feet are continuing to improve. Mild peeling and redness only. She is using Udder cream several times a day. HFS is tolerable and not impacting QOL. Approved for cataract surgery, planning sometime in Sept or Oct. Planning on vacationing in [place] from January to May. Having some landscaping done and looking forward to it being completed. Otherwise has been in good health with no new or recent concerns.     Patient denies any of the following except if noted above: fevers, chills, difficulty with energy, vision or hearing changes, chest pain, dyspnea, abdominal pain, nausea, vomiting, diarrhea, constipation, urinary concerns, headaches, numbness, tingling, issues with sleep or mood. She also denies lumps, bumps, rashes or skin lesions, bleeding or bruising issues.    Physical Exam:  BP (!) 167/112 (BP Location: Left arm)   Pulse 107   Resp 16   Ht 1.6 m (5' 2.99\")   Wt 85.7 kg (188 lb 14.4 oz)   SpO2 98%   BMI 33.47 kg/m     BP Readings from Last 6 Encounters:   08/21/24 (!) 167/112   07/15/24 (!) 170/92   06/18/24 (!) 172/90   05/17/24 137/83   01/23/24 120/89   12/26/23 (!) 134/90     Wt Readings from Last 6 Encounters:   08/21/24 85.7 kg (188 lb 14.4 oz)   07/15/24 83.6 kg (184 lb 3.2 oz)   06/18/24 83 kg (183 lb)   06/18/24 79.8 kg (176 lb)   05/17/24 80.3 kg (177 lb) "   01/23/24 83.5 kg (184 lb)      Constitutional: No acute distress, pleasant, appropriately groomed.   ENT: PERRLA, sclera without erythema. Wearing mask.  Neck: Trachea midline, no adenopathy.   Resp: No respiratory distress, adequate depth and rate of respirations.   Cardiac: No cyanosis, JVD, or peripheral edema.   MS:  5/5 muscle strength, adequate ROM.   Skin: Mild redness and peeling to bilateral palms, no open blisters or sores. Wearing protective cloth gloves.  Neuro: A/O x 4, sensation intact.   Psych: Appropriate mentation and affect.    Laboratory Results:   Results for orders placed or performed in visit on 08/21/24   Comprehensive metabolic panel     Status: Abnormal   Result Value Ref Range    Sodium 136 135 - 145 mmol/L    Potassium 4.3 3.4 - 5.3 mmol/L    Carbon Dioxide (CO2) 26 22 - 29 mmol/L    Anion Gap 12 7 - 15 mmol/L    Urea Nitrogen 21.2 8.0 - 23.0 mg/dL    Creatinine 1.14 (H) 0.51 - 0.95 mg/dL    GFR Estimate 52 (L) >60 mL/min/1.73m2    Calcium 10.3 8.8 - 10.4 mg/dL    Chloride 98 98 - 107 mmol/L    Glucose 100 (H) 70 - 99 mg/dL    Alkaline Phosphatase 83 40 - 150 U/L    AST 22 0 - 45 U/L    ALT <5 0 - 50 U/L    Protein Total 8.4 (H) 6.4 - 8.3 g/dL    Albumin 4.6 3.5 - 5.2 g/dL    Bilirubin Total 0.6 <=1.2 mg/dL   CBC with platelets and differential     Status: Abnormal   Result Value Ref Range    WBC Count 5.4 4.0 - 11.0 10e3/uL    RBC Count 4.41 3.80 - 5.20 10e6/uL    Hemoglobin 13.6 11.7 - 15.7 g/dL    Hematocrit 41.2 35.0 - 47.0 %    MCV 93 78 - 100 fL    MCH 30.8 26.5 - 33.0 pg    MCHC 33.0 31.5 - 36.5 g/dL    RDW 16.0 (H) 10.0 - 15.0 %    Platelet Count 220 150 - 450 10e3/uL    % Neutrophils 56 %    % Lymphocytes 32 %    % Monocytes 8 %    % Eosinophils 4 %    % Basophils 1 %    % Immature Granulocytes 0 %    NRBCs per 100 WBC 0 <1 /100    Absolute Neutrophils 3.0 1.6 - 8.3 10e3/uL    Absolute Lymphocytes 1.7 0.8 - 5.3 10e3/uL    Absolute Monocytes 0.4 0.0 - 1.3 10e3/uL    Absolute  Eosinophils 0.2 0.0 - 0.7 10e3/uL    Absolute Basophils 0.1 0.0 - 0.2 10e3/uL    Absolute Immature Granulocytes 0.0 <=0.4 10e3/uL    Absolute NRBCs 0.0 10e3/uL   Extra Purple Top EDTA (LAB USE ONLY)     Status: None   Result Value Ref Range    Hold Specimen [facility]    Hemoglobin A1c     Status: Normal   Result Value Ref Range    Hemoglobin A1C 5.4 0.0 - 5.6 %   CBC with platelets differential     Status: Abnormal    Narrative    The following orders were created for panel order CBC with platelets differential.  Procedure                               Abnormality         Status                     ---------                               -----------         ------                     CBC with platelets and d...[[id number]]  Abnormal            Final result                 Please view results for these tests on the individual orders.   I reviewed the above labs today.    Imaging:  CT Chest/Abdomen/Pelvis w Contrast  Narrative: EXAM: CT CHEST/ABDOMEN/PELVIS W CONTRAST  LOCATION: [facility]  DATE: 6/17/2024    INDICATION: metastatic breast cancer  COMPARISON: 11/27/2023  TECHNIQUE: CT scan of the chest, abdomen, and pelvis was performed following injection of IV contrast. Multiplanar reformats were obtained. Dose reduction techniques were used.   CONTRAST: 97 ML ISOVIEW 370    FINDINGS:     CHEST WALL: The previously seen enhancing soft tissue nodules involving the left chest wall and breast appear to have markedly improved with decreased nodularity in the breast query small residual focus in the pectoralis minor muscle measuring 1.7 x 1.4   cm compared to 3.8 x 2.7 cm.    LUNGS AND PLEURA: Resolution of previously seen left pleural nodularity. No suspicious pulmonary nodules. No pneumothorax or pleural effusion.    MEDIASTINUM/AXILLAE: Right-sided port with tip at right atrial caval junction. Normal size heart. No pericardial effusion. No hilar or mediastinal lymphadenopathy.    CORONARY ARTERY  CALCIFICATION: Mild.    HEPATOBILIARY: Normal.    PANCREAS: Normal.    SPLEEN: Normal.    ADRENAL GLANDS: Normal.    KIDNEYS/BLADDER: Normal.    BOWEL: Normal.    LYMPH NODES: Normal.    VASCULATURE: Mild atherosclerotic vascular disease.    PELVIC ORGANS: Normal.    MUSCULOSKELETAL: Diffuse sclerotic bony metastases with increased overall sclerosis as an example and T6 and T11.  Impression: IMPRESSION:  1.  Marked reduction in size and conspicuity of infiltrated left chest wall mass and resolution of previously seen left pleural nodules.  2.  Progressive sclerosis of widespread osseous metastases in the axial/appendicular cyst skeleton  I reviewed the above imaging report today.      Assessment and Plan:   Breast cancer is ER+, [place]+ Her2 Negative; with bone metastasis   - On treatment with Xeloda 750 mg twice daily and tolerating dosage regimen better than previously  - Labs reviewed, okay to continue with current treatment without changes. Patient is agreeable.   - CT CAP due in ~12/2024  - Continue monthly appointments with provider for review, labs completed prior   - Leaving for [place] in January with anticipated return in mid-May 2025    Bone metastasis  - DEXA 11/2023 with normal bone density  - On Zometa q3 months, next dose due ~9/2024  - Continue calcium and vitamin D  - No new bone pains    HFS  - Mild peeling of skin of hands and feet, much improved compared to previous  - Using udder cream 2-3 daily  - Still able to perform ADLs/IADLs  - Continue to monitor    Elevated BP  - /112 this morning in clinic, no cardiac s/s  - Pt was fasting for her labs and did not take her medications yet this morning  - Currently on amlodipine 10 mg daily    DM  - On Metformin 500 mg solution BID  - A1C in 5/2024 decreased to 5.2%, checked today and stable at 5.4%  - Monitors BS at home and aware of s/s of hypoglycemia  - Working with PCP     Elevated creatinine  - Creatinine elevated to 1.14  - Encouraged patient to  drink adequate fluids   - Continue to monitor         Joanna RIVERA, CNP

## 2024-08-21 ENCOUNTER — ONCOLOGY VISIT (OUTPATIENT)
Dept: ONCOLOGY | Facility: CLINIC | Age: 69
End: 2024-08-21
Payer: MEDICARE

## 2024-08-21 ENCOUNTER — LAB (OUTPATIENT)
Dept: INFUSION THERAPY | Facility: CLINIC | Age: 69
End: 2024-08-21
Payer: MEDICARE

## 2024-08-21 VITALS
WEIGHT: 188.9 LBS | RESPIRATION RATE: 16 BRPM | DIASTOLIC BLOOD PRESSURE: 112 MMHG | BODY MASS INDEX: 33.47 KG/M2 | SYSTOLIC BLOOD PRESSURE: 167 MMHG | HEART RATE: 107 BPM | OXYGEN SATURATION: 98 % | HEIGHT: 63 IN

## 2024-08-21 DIAGNOSIS — C50.912 MALIGNANT NEOPLASM OF LEFT FEMALE BREAST, UNSPECIFIED ESTROGEN RECEPTOR STATUS, UNSPECIFIED SITE OF BREAST (H): ICD-10-CM

## 2024-08-21 DIAGNOSIS — I10 ESSENTIAL HYPERTENSION: ICD-10-CM

## 2024-08-21 DIAGNOSIS — Z17.0 MALIGNANT NEOPLASM OF OVERLAPPING SITES OF LEFT BREAST IN FEMALE, ESTROGEN RECEPTOR POSITIVE (H): Primary | ICD-10-CM

## 2024-08-21 DIAGNOSIS — R73.09 ELEVATED GLUCOSE: ICD-10-CM

## 2024-08-21 DIAGNOSIS — C79.51 MALIGNANT NEOPLASM METASTATIC TO BONE (H): ICD-10-CM

## 2024-08-21 DIAGNOSIS — R79.89 ELEVATED SERUM CREATININE: ICD-10-CM

## 2024-08-21 DIAGNOSIS — C50.812 MALIGNANT NEOPLASM OF OVERLAPPING SITES OF LEFT BREAST IN FEMALE, ESTROGEN RECEPTOR POSITIVE (H): Primary | ICD-10-CM

## 2024-08-21 DIAGNOSIS — G62.9 NEUROPATHY: ICD-10-CM

## 2024-08-21 LAB
ALBUMIN SERPL BCG-MCNC: 4.6 G/DL (ref 3.5–5.2)
ALP SERPL-CCNC: 83 U/L (ref 40–150)
ALT SERPL W P-5'-P-CCNC: <5 U/L (ref 0–50)
ANION GAP SERPL CALCULATED.3IONS-SCNC: 12 MMOL/L (ref 7–15)
AST SERPL W P-5'-P-CCNC: 22 U/L (ref 0–45)
BASOPHILS # BLD AUTO: 0.1 10E3/UL (ref 0–0.2)
BASOPHILS NFR BLD AUTO: 1 %
BILIRUB SERPL-MCNC: 0.6 MG/DL
BUN SERPL-MCNC: 21.2 MG/DL (ref 8–23)
CALCIUM SERPL-MCNC: 10.3 MG/DL (ref 8.8–10.4)
CANCER AG15-3 SERPL-ACNC: 168 U/ML
CHLORIDE SERPL-SCNC: 98 MMOL/L (ref 98–107)
CREAT SERPL-MCNC: 1.14 MG/DL (ref 0.51–0.95)
EGFRCR SERPLBLD CKD-EPI 2021: 52 ML/MIN/1.73M2
EOSINOPHIL # BLD AUTO: 0.2 10E3/UL (ref 0–0.7)
EOSINOPHIL NFR BLD AUTO: 4 %
ERYTHROCYTE [DISTWIDTH] IN BLOOD BY AUTOMATED COUNT: 16 % (ref 10–15)
GLUCOSE SERPL-MCNC: 100 MG/DL (ref 70–99)
HBA1C MFR BLD: 5.4 % (ref 0–5.6)
HCO3 SERPL-SCNC: 26 MMOL/L (ref 22–29)
HCT VFR BLD AUTO: 41.2 % (ref 35–47)
HGB BLD-MCNC: 13.6 G/DL (ref 11.7–15.7)
HOLD SPECIMEN: NORMAL
IMM GRANULOCYTES # BLD: 0 10E3/UL
IMM GRANULOCYTES NFR BLD: 0 %
LYMPHOCYTES # BLD AUTO: 1.7 10E3/UL (ref 0.8–5.3)
LYMPHOCYTES NFR BLD AUTO: 32 %
MCH RBC QN AUTO: 30.8 PG (ref 26.5–33)
MCHC RBC AUTO-ENTMCNC: 33 G/DL (ref 31.5–36.5)
MCV RBC AUTO: 93 FL (ref 78–100)
MONOCYTES # BLD AUTO: 0.4 10E3/UL (ref 0–1.3)
MONOCYTES NFR BLD AUTO: 8 %
NEUTROPHILS # BLD AUTO: 3 10E3/UL (ref 1.6–8.3)
NEUTROPHILS NFR BLD AUTO: 56 %
NRBC # BLD AUTO: 0 10E3/UL
NRBC BLD AUTO-RTO: 0 /100
PLATELET # BLD AUTO: 220 10E3/UL (ref 150–450)
POTASSIUM SERPL-SCNC: 4.3 MMOL/L (ref 3.4–5.3)
PROT SERPL-MCNC: 8.4 G/DL (ref 6.4–8.3)
RBC # BLD AUTO: 4.41 10E6/UL (ref 3.8–5.2)
SODIUM SERPL-SCNC: 136 MMOL/L (ref 135–145)
WBC # BLD AUTO: 5.4 10E3/UL (ref 4–11)

## 2024-08-21 PROCEDURE — 80053 COMPREHEN METABOLIC PANEL: CPT

## 2024-08-21 PROCEDURE — 36415 COLL VENOUS BLD VENIPUNCTURE: CPT

## 2024-08-21 PROCEDURE — 85025 COMPLETE CBC W/AUTO DIFF WBC: CPT

## 2024-08-21 PROCEDURE — G0463 HOSPITAL OUTPT CLINIC VISIT: HCPCS

## 2024-08-21 PROCEDURE — 83036 HEMOGLOBIN GLYCOSYLATED A1C: CPT

## 2024-08-21 PROCEDURE — 86300 IMMUNOASSAY TUMOR CA 15-3: CPT

## 2024-08-21 PROCEDURE — 250N000011 HC RX IP 250 OP 636

## 2024-08-21 PROCEDURE — 99214 OFFICE O/P EST MOD 30 MIN: CPT

## 2024-08-21 RX ORDER — GABAPENTIN 250 MG/5ML
250 SOLUTION ORAL AT BEDTIME
Qty: 470 ML | Refills: 0 | Status: SHIPPED | OUTPATIENT
Start: 2024-08-21 | End: 2024-10-02

## 2024-08-21 RX ORDER — GABAPENTIN 250 MG/5ML
250 SOLUTION ORAL AT BEDTIME
Qty: 470 ML | Refills: 3 | Status: CANCELLED | OUTPATIENT
Start: 2024-08-21

## 2024-08-21 RX ORDER — HEPARIN SODIUM (PORCINE) LOCK FLUSH IV SOLN 100 UNIT/ML 100 UNIT/ML
500 SOLUTION INTRAVENOUS ONCE
Status: COMPLETED | OUTPATIENT
Start: 2024-08-21 | End: 2024-08-21

## 2024-08-21 RX ADMIN — Medication 500 UNITS: at 07:21

## 2024-08-21 ASSESSMENT — PAIN SCALES - GENERAL: PAINLEVEL: NO PAIN (0)

## 2024-08-21 NOTE — LETTER
"8/21/2024      Mary Olson  [address]  [place] [address]      Dear Colleague,    Thank you for referring your patient, Mary Olson, to the [facility]. Please see a copy of my visit note below.    Oncology Follow Up Visit: August 21, 2024    Oncologist: Dr. Hilton / Dr. Ashraf (in Dr. Hilton's absence)  PCP: No Ref-Primary, Physician    Reason for Visit: Follow-up breast cancer     Diagnosis:   Patient has metastatic left-sided breast cancer and currently she is on Xeloda which was started in December 2023 requiring dose reductions.  Currently taking 750 mg twice a day since February 2024.  Breast cancer is ER+, [place]+ Her2 Negative; with bone metastasis. PIK3CA mutation.  TEJAS, TMB low, ESR1 no mutation.    Interval History:  Patient is seen in clinic today for review of Xeloda. She is tolerating this dosage much better and hands and feet are continuing to improve. Mild peeling and redness only. She is using Udder cream several times a day. HFS is tolerable and not impacting QOL. Approved for cataract surgery, planning sometime in Sept or Oct. Planning on vacationing in [place] from January to May. Having some landscaping done and looking forward to it being completed. Otherwise has been in good health with no new or recent concerns.     Patient denies any of the following except if noted above: fevers, chills, difficulty with energy, vision or hearing changes, chest pain, dyspnea, abdominal pain, nausea, vomiting, diarrhea, constipation, urinary concerns, headaches, numbness, tingling, issues with sleep or mood. She also denies lumps, bumps, rashes or skin lesions, bleeding or bruising issues.    Physical Exam:  BP (!) 167/112 (BP Location: Left arm)   Pulse 107   Resp 16   Ht 1.6 m (5' 2.99\")   Wt 85.7 kg (188 lb 14.4 oz)   SpO2 98%   BMI 33.47 kg/m     BP Readings from Last 6 Encounters:   08/21/24 (!) 167/112   07/15/24 (!) 170/92   06/18/24 (!) 172/90 "   05/17/24 137/83   01/23/24 120/89   12/26/23 (!) 134/90     Wt Readings from Last 6 Encounters:   08/21/24 85.7 kg (188 lb 14.4 oz)   07/15/24 83.6 kg (184 lb 3.2 oz)   06/18/24 83 kg (183 lb)   06/18/24 79.8 kg (176 lb)   05/17/24 80.3 kg (177 lb)   01/23/24 83.5 kg (184 lb)      Constitutional: No acute distress, pleasant, appropriately groomed.   ENT: PERRLA, sclera without erythema. Wearing mask.  Neck: Trachea midline, no adenopathy.   Resp: No respiratory distress, adequate depth and rate of respirations.   Cardiac: No cyanosis, JVD, or peripheral edema.   MS:  5/5 muscle strength, adequate ROM.   Skin: Mild redness and peeling to bilateral palms, no open blisters or sores. Wearing protective cloth gloves.  Neuro: A/O x 4, sensation intact.   Psych: Appropriate mentation and affect.    Laboratory Results:   Results for orders placed or performed in visit on 08/21/24   Comprehensive metabolic panel     Status: Abnormal   Result Value Ref Range    Sodium 136 135 - 145 mmol/L    Potassium 4.3 3.4 - 5.3 mmol/L    Carbon Dioxide (CO2) 26 22 - 29 mmol/L    Anion Gap 12 7 - 15 mmol/L    Urea Nitrogen 21.2 8.0 - 23.0 mg/dL    Creatinine 1.14 (H) 0.51 - 0.95 mg/dL    GFR Estimate 52 (L) >60 mL/min/1.73m2    Calcium 10.3 8.8 - 10.4 mg/dL    Chloride 98 98 - 107 mmol/L    Glucose 100 (H) 70 - 99 mg/dL    Alkaline Phosphatase 83 40 - 150 U/L    AST 22 0 - 45 U/L    ALT <5 0 - 50 U/L    Protein Total 8.4 (H) 6.4 - 8.3 g/dL    Albumin 4.6 3.5 - 5.2 g/dL    Bilirubin Total 0.6 <=1.2 mg/dL   CBC with platelets and differential     Status: Abnormal   Result Value Ref Range    WBC Count 5.4 4.0 - 11.0 10e3/uL    RBC Count 4.41 3.80 - 5.20 10e6/uL    Hemoglobin 13.6 11.7 - 15.7 g/dL    Hematocrit 41.2 35.0 - 47.0 %    MCV 93 78 - 100 fL    MCH 30.8 26.5 - 33.0 pg    MCHC 33.0 31.5 - 36.5 g/dL    RDW 16.0 (H) 10.0 - 15.0 %    Platelet Count 220 150 - 450 10e3/uL    % Neutrophils 56 %    % Lymphocytes 32 %    % Monocytes 8 %     % Eosinophils 4 %    % Basophils 1 %    % Immature Granulocytes 0 %    NRBCs per 100 WBC 0 <1 /100    Absolute Neutrophils 3.0 1.6 - 8.3 10e3/uL    Absolute Lymphocytes 1.7 0.8 - 5.3 10e3/uL    Absolute Monocytes 0.4 0.0 - 1.3 10e3/uL    Absolute Eosinophils 0.2 0.0 - 0.7 10e3/uL    Absolute Basophils 0.1 0.0 - 0.2 10e3/uL    Absolute Immature Granulocytes 0.0 <=0.4 10e3/uL    Absolute NRBCs 0.0 10e3/uL   Extra Purple Top EDTA (LAB USE ONLY)     Status: None   Result Value Ref Range    Hold Specimen [facility]    Hemoglobin A1c     Status: Normal   Result Value Ref Range    Hemoglobin A1C 5.4 0.0 - 5.6 %   CBC with platelets differential     Status: Abnormal    Narrative    The following orders were created for panel order CBC with platelets differential.  Procedure                               Abnormality         Status                     ---------                               -----------         ------                     CBC with platelets and d...[[id number]]  Abnormal            Final result                 Please view results for these tests on the individual orders.   I reviewed the above labs today.    Imaging:  CT Chest/Abdomen/Pelvis w Contrast  Narrative: EXAM: CT CHEST/ABDOMEN/PELVIS W CONTRAST  LOCATION: [facility]  DATE: 6/17/2024    INDICATION: metastatic breast cancer  COMPARISON: 11/27/2023  TECHNIQUE: CT scan of the chest, abdomen, and pelvis was performed following injection of IV contrast. Multiplanar reformats were obtained. Dose reduction techniques were used.   CONTRAST: 97 ML ISOVIEW 370    FINDINGS:     CHEST WALL: The previously seen enhancing soft tissue nodules involving the left chest wall and breast appear to have markedly improved with decreased nodularity in the breast query small residual focus in the pectoralis minor muscle measuring 1.7 x 1.4   cm compared to 3.8 x 2.7 cm.    LUNGS AND PLEURA: Resolution of previously seen left pleural nodularity. No suspicious  pulmonary nodules. No pneumothorax or pleural effusion.    MEDIASTINUM/AXILLAE: Right-sided port with tip at right atrial caval junction. Normal size heart. No pericardial effusion. No hilar or mediastinal lymphadenopathy.    CORONARY ARTERY CALCIFICATION: Mild.    HEPATOBILIARY: Normal.    PANCREAS: Normal.    SPLEEN: Normal.    ADRENAL GLANDS: Normal.    KIDNEYS/BLADDER: Normal.    BOWEL: Normal.    LYMPH NODES: Normal.    VASCULATURE: Mild atherosclerotic vascular disease.    PELVIC ORGANS: Normal.    MUSCULOSKELETAL: Diffuse sclerotic bony metastases with increased overall sclerosis as an example and T6 and T11.  Impression: IMPRESSION:  1.  Marked reduction in size and conspicuity of infiltrated left chest wall mass and resolution of previously seen left pleural nodules.  2.  Progressive sclerosis of widespread osseous metastases in the axial/appendicular cyst skeleton  I reviewed the above imaging report today.      Assessment and Plan:   Breast cancer is ER+, [place]+ Her2 Negative; with bone metastasis   - On treatment with Xeloda 750 mg twice daily and tolerating dosage regimen better than previously  - Labs reviewed, okay to continue with current treatment without changes. Patient is agreeable.   - CT CAP due in ~12/2024  - Continue monthly appointments with provider for review, labs completed prior   - Leaving for [place] in January with anticipated return in mid-May 2025    Bone metastasis  - DEXA 11/2023 with normal bone density  - On Zometa q3 months, next dose due ~9/2024  - Continue calcium and vitamin D  - No new bone pains    HFS  - Mild peeling of skin of hands and feet, much improved compared to previous  - Using udder cream 2-3 daily  - Still able to perform ADLs/IADLs  - Continue to monitor    Elevated BP  - /112 this morning in clinic, no cardiac s/s  - Pt was fasting for her labs and did not take her medications yet this morning  - Currently on amlodipine 10 mg daily    DM  - On Metformin  500 mg solution BID  - A1C in 5/2024 decreased to 5.2%, checked today and stable at 5.4%  - Monitors BS at home and aware of s/s of hypoglycemia  - Working with PCP     Elevated creatinine  - Creatinine elevated to 1.14  - Encouraged patient to drink adequate fluids   - Continue to monitor         Joanna RIVERA, CNP      Again, thank you for allowing me to participate in the care of your patient.        Sincerely,        NICOLE Avila CNP

## 2024-08-21 NOTE — NURSING NOTE
"Oncology Rooming Note    August 21, 2024 8:06 AM   Mary Olson is a 69 year old female who presents for:    Chief Complaint   Patient presents with    Oncology Clinic Visit     4 week follow up     Initial Vitals: BP (!) 167/112 (BP Location: Left arm)   Pulse 107   Resp 16   Ht 1.6 m (5' 2.99\")   Wt 85.7 kg (188 lb 14.4 oz)   SpO2 98%   BMI 33.47 kg/m   Estimated body mass index is 33.47 kg/m  as calculated from the following:    Height as of this encounter: 1.6 m (5' 2.99\").    Weight as of this encounter: 85.7 kg (188 lb 14.4 oz). Body surface area is 1.95 meters squared.  No Pain (0) Comment: Data Unavailable   No LMP recorded. Patient is postmenopausal.  Allergies reviewed: Yes  Medications reviewed: Yes    Medications: MEDICATION REFILLS NEEDED TODAY. Provider was notified.  Pharmacy name entered into EPIC:    [facility] - [place], [place] - [address] [place] [address]  [facility] - [place], [place] - [address] [place] [address] [facility] [place], [place] - [address] [facility].  [facility] - [place], [place] - [address] [facility] - [place] - [facility] [address]  [facility] [place], [place] - [address] [facility] - [place], [place] - [address] AT [place] [id number] - [place], [place] - [address] [facility] - ONCOLOGY PHARMACY  [id number] - [place], [place] - [address] [facility] AT [facility] - [place], [place] - [facility] [address]    Frailty Screening:   Is the patient here for a new oncology consult visit in cancer care? 2. No      Clinical concerns: can she get covid and flu vaccine at outside community vaccine event.       Kailee Jarvis LPN              "

## 2024-08-21 NOTE — PROGRESS NOTES
See IV flow sheet for details of port access. Labs sent: cbc, cmp, ca15-3, hgb A1c. Port needle flushed per protocol and needle removed without difficulty.    Katharina Enamorado RN

## 2024-09-16 DIAGNOSIS — C50.912 MALIGNANT NEOPLASM OF LEFT FEMALE BREAST, UNSPECIFIED ESTROGEN RECEPTOR STATUS, UNSPECIFIED SITE OF BREAST (H): Primary | ICD-10-CM

## 2024-09-17 DIAGNOSIS — C50.912 MALIGNANT NEOPLASM OF LEFT FEMALE BREAST, UNSPECIFIED ESTROGEN RECEPTOR STATUS, UNSPECIFIED SITE OF BREAST (H): Primary | ICD-10-CM

## 2024-09-17 RX ORDER — CAPECITABINE 150 MG/1
750 TABLET, FILM COATED ORAL 2 TIMES DAILY
Qty: 140 TABLET | Refills: 0 | Status: SHIPPED | OUTPATIENT
Start: 2024-09-17

## 2024-09-26 ENCOUNTER — OFFICE VISIT (OUTPATIENT)
Dept: FAMILY MEDICINE | Facility: CLINIC | Age: 69
End: 2024-09-26
Payer: MEDICARE

## 2024-09-26 VITALS
BODY MASS INDEX: 35.12 KG/M2 | HEART RATE: 100 BPM | OXYGEN SATURATION: 97 % | DIASTOLIC BLOOD PRESSURE: 77 MMHG | SYSTOLIC BLOOD PRESSURE: 134 MMHG | HEIGHT: 61 IN | TEMPERATURE: 99.2 F | WEIGHT: 186 LBS | RESPIRATION RATE: 20 BRPM

## 2024-09-26 DIAGNOSIS — C50.912 MALIGNANT NEOPLASM OF LEFT FEMALE BREAST, UNSPECIFIED ESTROGEN RECEPTOR STATUS, UNSPECIFIED SITE OF BREAST (H): ICD-10-CM

## 2024-09-26 DIAGNOSIS — E09.9 STEROID-INDUCED DIABETES MELLITUS, SUBSEQUENT ENCOUNTER (H): ICD-10-CM

## 2024-09-26 DIAGNOSIS — T38.0X5D STEROID-INDUCED DIABETES MELLITUS, SUBSEQUENT ENCOUNTER (H): ICD-10-CM

## 2024-09-26 DIAGNOSIS — I10 BENIGN ESSENTIAL HYPERTENSION: ICD-10-CM

## 2024-09-26 DIAGNOSIS — H26.9 CATARACT OF BOTH EYES, UNSPECIFIED CATARACT TYPE: ICD-10-CM

## 2024-09-26 DIAGNOSIS — Z01.818 PREOP GENERAL PHYSICAL EXAM: Primary | ICD-10-CM

## 2024-09-26 DIAGNOSIS — Z79.4 TYPE 2 DIABETES MELLITUS WITH HYPERGLYCEMIA, WITH LONG-TERM CURRENT USE OF INSULIN (H): ICD-10-CM

## 2024-09-26 DIAGNOSIS — E11.65 TYPE 2 DIABETES MELLITUS WITH HYPERGLYCEMIA, WITH LONG-TERM CURRENT USE OF INSULIN (H): ICD-10-CM

## 2024-09-26 PROCEDURE — 91320 SARSCV2 VAC 30MCG TRS-SUC IM: CPT | Performed by: FAMILY MEDICINE

## 2024-09-26 PROCEDURE — 90662 IIV NO PRSV INCREASED AG IM: CPT | Performed by: FAMILY MEDICINE

## 2024-09-26 PROCEDURE — 90480 ADMN SARSCOV2 VAC 1/ONLY CMP: CPT | Performed by: FAMILY MEDICINE

## 2024-09-26 PROCEDURE — G0008 ADMIN INFLUENZA VIRUS VAC: HCPCS | Performed by: FAMILY MEDICINE

## 2024-09-26 PROCEDURE — 99214 OFFICE O/P EST MOD 30 MIN: CPT | Mod: 25 | Performed by: FAMILY MEDICINE

## 2024-09-26 RX ORDER — TRIAMTERENE/HYDROCHLOROTHIAZID 37.5-25 MG
1 TABLET ORAL DAILY
Qty: 90 TABLET | Refills: 3 | Status: SHIPPED | OUTPATIENT
Start: 2024-09-26

## 2024-09-26 RX ORDER — AMLODIPINE BESYLATE 10 MG/1
10 TABLET ORAL DAILY
Qty: 90 TABLET | Refills: 3 | Status: SHIPPED | OUTPATIENT
Start: 2024-09-26

## 2024-09-26 ASSESSMENT — PAIN SCALES - GENERAL: PAINLEVEL: NO PAIN (0)

## 2024-09-26 NOTE — PATIENT INSTRUCTIONS
How to Take Your Medication Before Surgery  Preoperative Medication Instructions   Antiplatelet or Anticoagulation Medication Instructions   - Patient is on no antiplatelet or anticoagulation medications.    Additional Medication Instructions  Patient is on no additional chronic medications       Patient Education   Preparing for Your Surgery  For Adults  Getting started  In most cases, a nurse will call to review your health history and instructions. They will give you an arrival time based on your scheduled surgery time. Please be ready to share:  Your doctor's clinic name and phone number  Your medical, surgical, and anesthesia history  A list of allergies and sensitivities  A list of medicines, including herbal treatments and over-the-counter drugs  Whether the patient has a legal guardian (ask how to send us the papers in advance)  Note: You may not receive a call if you were seen at our PAC (Preoperative Assessment Center).  Please tell us if you're pregnant--or if there's any chance you might be pregnant. Some surgeries may injure a fetus (unborn baby), so they require a pregnancy test. Surgeries that are safe for a fetus don't always need a test, and you can choose whether to have one.   Preparing for surgery  Within 10 to 30 days of surgery: Have a pre-op exam (sometimes called an H&P, or History and Physical). This can be done at a clinic or pre-operative center.  If you're having a , you may not need this exam. Talk to your care team.  At your pre-op exam, talk to your care team about all medicines you take. (This includes CBD oil and any drugs, such as THC, marijuana, and other forms of cannabis.) If you need to stop any medicine before surgery, ask when to start taking it again.  This is for your safety. Many medicines and drugs can make you bleed too much during surgery. Some change how well surgery (anesthesia) drugs work.  Call your insurance company to let them know you're having surgery.  (If you don't have insurance, call [phone].)  Call your clinic if there's any change in your health. This includes a scrape or scratch near the surgery site, or any signs of a cold (sore throat, runny nose, cough, rash, fever).  Eating and drinking guidelines  For your safety: Unless your surgeon tells you otherwise, follow the guidelines below.  Eat and drink as normal until 8 hours before you arrive for surgery. After that, no food or milk. You can spit out gum when you arrive.  Drink clear liquids until 2 hours before you arrive. These are liquids you can see through, like water, Gatorade, and Propel Water. They also include plain black coffee and tea (no cream or milk).  No alcohol for 24 hours before you arrive. The night before surgery, stop any drinks that contain THC.  If your care team tells you to take medicine on the morning of surgery, it's okay to take it with a sip of water. No other medicines or drugs are allowed (including CBD oil)--follow your care team's instructions.  If you have questions the day of surgery, call your hospital or surgery center.   Preventing infection  Shower or bathe the night before and the morning of surgery. Follow the instructions your clinic gave you. (If no instructions, use regular soap.)  Don't shave or clip hair near your surgery site. We'll remove the hair if needed.  Don't smoke or vape the morning of surgery. No chewing tobacco for 6 hours before you arrive. A nicotine patch is okay. You may spit out nicotine gum when you arrive.  For some surgeries, the surgeon will tell you to fully quit smoking and nicotine.  We will make every effort to keep you safe from infection. We will:  Clean our hands often with soap and water (or an alcohol-based hand rub).  Clean the skin at your surgery site with a special soap that kills germs.  Give you a special gown to keep you warm. (Cold raises the risk of infection.)  Wear hair covers, masks, gowns, and gloves during  surgery.  Give antibiotic medicine, if prescribed. Not all surgeries need this medicine.  What to bring on the day of surgery  Photo ID and insurance card  Copy of your health care directive, if you have one  Glasses and hearing aids (bring cases)  You can't wear contacts during surgery  Inhaler and eye drops, if you use them (tell us about these when you arrive)  CPAP machine or breathing device, if you use them  A few personal items, if spending the night  If you have . . .  A pacemaker, ICD (cardiac defibrillator), or other implant: Bring the ID card.  An implanted stimulator: Bring the remote control.  A legal guardian: Bring a copy of the certified (court-stamped) guardianship papers.  Please remove any jewelry, including body piercings. Leave jewelry and other valuables at home.  If you're going home the day of surgery  You must have a responsible adult drive you home. They should stay with you overnight as well.  If you don't have someone to stay with you, and you aren't safe to go home alone, we may keep you overnight. Insurance often won't pay for this.  After surgery  If it's hard to control your pain or you need more pain medicine, please call your surgeon's office.  Questions?   If you have any questions for your care team, list them here:   ____________________________________________________________________________________________________________________________________________________________________________________________________________________________________________________________  For informational purposes only. Not to replace the advice of your health care provider. Copyright   2003, 2019 [facility]. All rights reserved. Clinically reviewed by Neri Nunn MD. [employer] [id number] - REV 08/24.

## 2024-09-26 NOTE — PROGRESS NOTES
Preoperative Evaluation  [facility]  [address] [facility] [address]  Phone: [phone]  Primary Provider: Physician No Ref-Primary  Pre-op Performing Provider: Davidson Enamorado MD  Sep 26, 2024             9/22/2024   Surgical Information   What procedure is being done? catarac surgery for both eyes   Facility or Hospital where procedure/surgery will be performed: [facility] [place] , [place] [id number] [place] [id number]   Who is doing the procedure / surgery? Dr. Trae Dailey M.D.   Date of surgery / procedure: Right eye 10/1/24, Left eye 10/15/24   Time of surgery / procedure: Will get times within 3-5 days of surgery   Where do you plan to recover after surgery? at home with family        Fax number for surgical facility: [phone]    ASSESSMENT / PLAN:  (Z01.818) Preop general physical exam  (primary encounter diagnosis)  Comment: generally healthy and normal exam.   Plan: ok for low risk surgery.     (H26.9) Cataract of both eyes, unspecified cataract type  Plan: per eye md    (I10) Benign essential hypertension  Comment: stable  Plan: amLODIPine (NORVASC) 10 MG tablet        Continue self-monitor and meds.     (E11.65,  Z79.4) Type 2 diabetes mellitus with hyperglycemia, with long-term current use of insulin (H)  Comment: stable  Plan: metFORMIN (GLUCOPHAGE) 500 MG tablet        Continue metformin and diet/exercise    (E09.9,  T38.0X5D) Steroid-induced diabetes mellitus, subsequent encounter (H24)  Comment: improving  Plan: metFORMIN (GLUCOPHAGE) 500 MG tablet        Recheck in 6 months    The longitudinal plan of care for the diagnosis(es)/condition(s) as documented were addressed during this visit. Due to the added complexity in care, I will continue to support Desmond in the subsequent management and with ongoing continuity of care.    (Q48.816) Malignant neoplasm of left female breast, unspecified estrogen receptor status, unspecified site  of breast (H)  Comment: gerardo  Plan: per oncology at [facility]   Desmond is a 69 year old, presenting for the following:  Pre-Op Exam  Cataract- left and then right.  No more mammograms needed - getting ct scans regularly per oncologist. Continue with steroids.   Twps6y=0.4 -2 months ago. Metformin - [employer]- neesd  131/86. No chest pain or shortness of breath. No nausea, vomiting or diarrhea or black/bloody stools. Eye exam regularly. No feet changes - improving overall. No fevers or chills.  [facility] in [place].   History steroid induced dm, metastatic breast cancer -seeing oncology, chemotherapy induced neuropathy, obesity and ht       9/26/2024     2:06 PM   Additional Questions   Roomed by felicity   Accompanied by self         9/26/2024     2:06 PM   Patient Reported Additional Medications   Patient reports taking the following new medications see chart     HPI related to upcoming procedure: cataract        9/22/2024   Pre-Op Questionnaire   Have you ever had a heart attack or stroke? No   Have you ever had surgery on your heart or blood vessels, such as a stent placement, a coronary artery bypass, or surgery on an artery in your head, neck, heart, or legs? No   Do you have chest pain with activity? No   Do you have a history of heart failure? No   Do you currently have a cold, bronchitis or symptoms of other infection? No   Do you have a cough, shortness of breath, or wheezing? No   Do you or anyone in your family have previous history of blood clots? No   Do you or does anyone in your family have a serious bleeding problem such as prolonged bleeding following surgeries or cuts? No   Have you ever had problems with anemia or been told to take iron pills? No   Have you had any abnormal blood loss such as black, tarry or bloody stools, or abnormal vaginal bleeding? No   Have you ever had a blood transfusion? No   Are you willing to have a blood transfusion if it is medically needed  before, during, or after your surgery? Yes   Have you or any of your relatives ever had problems with anesthesia? No   Do you have sleep apnea, excessive snoring or daytime drowsiness? No   Do you have any artifical heart valves or other implanted medical devices like a pacemaker, defibrillator, or continuous glucose monitor? No   Do you have artificial joints? No   Are you allergic to latex? No        Health Care Directive  Patient does not have a Health Care Directive or Living Will: Advance Directive received and scanned. Click on Code in the patient header to view.    Preoperative Review of    reviewed - controlled substances reflected in medication list.          Patient Active Problem List    Diagnosis Date Noted    Diabetes mellitus, type 2 (H) 07/14/2020     Priority: Medium    Bone metastasis 03/25/2020     Priority: Medium    Malignant neoplasm of unspecified site of left female breast (H) 03/16/2020     Priority: Medium    Family history of malignant neoplasm of breast 03/04/2020     Priority: Medium    Breast skin changes 03/04/2020     Priority: Medium    Nipple discharge, bloody 03/04/2020     Priority: Medium    Essential hypertension 11/02/2015     Priority: Medium    Morbid obesity (H) 11/02/2015     Priority: Medium    Hyperlipidemia LDL goal <160 10/28/2015     Priority: Medium    Excessive or frequent menstruation 03/31/2006     Priority: Medium    Enteritis 11/09/1998     Priority: Medium      Past Medical History:   Diagnosis Date    ASCUS favoring benign 10/28/15    neg HPV    Cancer (H)     Diabetes mellitus, type 2 (H) 7/14/2020    Hypertension     Obese      Past Surgical History:   Procedure Laterality Date    BIOPSY  3/2020    COLONOSCOPY N/A 10/14/2020    Procedure: COLONOSCOPY, WITH POLYPECTOMY  hot snare;  Surgeon: Leventhal, Thomas Michael, MD;  Location: [facility] OR    GYN SURGERY  3/13/89. & 6/22/92    C Sections    IR CHEST PORT PLACEMENT > 5 YRS OF AGE  3/18/2020     Current  Outpatient Medications   Medication Sig Dispense Refill    alcohol swab prep pads Use to swab area of injection/chrissy as directed. 400 each 3    amLODIPine (NORVASC) 10 MG tablet Take 1 tablet (10 mg) by mouth daily 90 tablet 3    Ascorbic Acid (VITAMIN C PO)       aspirin 81 MG tablet Take by mouth daily      blood glucose (NO BRAND SPECIFIED) test strip 1 strip by In Vitro route 3 times daily 100 strip 6    blood glucose calibration (NO BRAND SPECIFIED) solution To accompany: Blood Glucose Monitor Brands: per insurance. 1 Bottle 3    blood glucose monitoring (NO BRAND SPECIFIED) meter device kit Use to test blood sugar 3 times daily or as directed. 1 kit 0    Blood Pressure KIT Please test blood pressure at home 2 x daily 1 kit 0    calcium-vitamin D-vitamin K (VIACTIV) 500-500-40 MG-UNT-MCG CHEW Take 1 tablet by mouth every other day      capecitabine (XELODA) 150 MG tablet Take 5 tablets (750 mg) by mouth 2 times daily on days 1-7 and 15-21 140 tablet 0    Cholecalciferol (VITAMIN D3 PO) Take 2,000 Units by mouth daily Takes 4 tabs daily      Coenzyme Q10 (COQ-10) 200 MG CAPS       Cyanocobalamin (B-12) 2500 MCG TABS       gabapentin (NEURONTIN) 250 MG/5ML solution Take 5 mLs (250 mg) by mouth at bedtime. 470 mL 0    hydrocortisone 2.5 % cream Apply topically 2 times daily 3.5 g 0    lidocaine-prilocaine (EMLA) 2.5-2.5 % external cream Apply small amount to center of port site 45-60 minutes prior to chemo appointment, cover with clear dressing if desired. 30 g 1    magic mouthwash suspension (diphenhydrAMINE, lidocaine, aluminum-magnesium & simethicone) Swish and swallow 5 mLs in mouth every 6 hours as needed for mouth sores. 240 mL 2    metFORMIN (GLUCOPHAGE) 500 MG tablet Take 1 tablet (500 mg) by mouth 2 times daily (with meals) 180 tablet 0    Multiple Vitamins-Minerals (CENTRUM ADULTS PO)       potassium chloride (KLOR-CON) 20 MEQ packet Take 20 mEq by mouth 2 times daily 60 packet 11    thin (NO BRAND  "SPECIFIED) lancets 3 each by In Vitro route daily Use with lanceting device. To accompany: Blood Glucose Monitor Brands: per insurance. 300 each 6    triamterene-HCTZ (MAXZIDE-25) 37.5-25 MG tablet Take 1 tablet by mouth daily 90 tablet 1    UNABLE TO FIND MEDICATION NAME: Elderberry      UNABLE TO FIND MEDICATION NAME: clove of garlic daily      VITAMIN E NATURAL PO Take 400 Units by mouth      capecitabine (XELODA) 150 MG tablet Take 5 tablets (750 mg) by mouth 2 times daily. on days 1-7 and 15-21 (Patient not taking: Reported on 9/26/2024) 140 tablet 0    metFORMIN (GLUCOPHAGE) 500 MG/5ML SOLN solution Take 5 mLs (500 mg) by mouth two times daily (Patient not taking: Reported on 9/26/2024) 473 mL 3    STATIN NOT PRESCRIBED (INTENTIONAL) Please choose reason not prescribed from choices below. (Patient not taking: Reported on 9/26/2024)         Allergies   Allergen Reactions    Lisinopril Cough        Social History     Tobacco Use    Smoking status: Never     Passive exposure: Never    Smokeless tobacco: Never   Substance Use Topics    Alcohol use: Not Currently     Comment: 2-3 drinks a month        History   Drug Use No               Objective    There were no vitals taken for this visit.   Estimated body mass index is 33.47 kg/m  as calculated from the following:    Height as of 8/21/24: 1.6 m (5' 2.99\").    Weight as of 8/21/24: 85.7 kg (188 lb 14.4 oz).  /77   Pulse 100   Temp 99.2  F (37.3  C) (Tympanic)   Resp 20   Ht 1.549 m (5' 1\")   Wt 84.4 kg (186 lb)   SpO2 97%   BMI 35.14 kg/m      Physical Exam  GENERAL: alert and no distress  EYES: Eyes grossly normal to inspection, PERRL and conjunctivae and sclerae normal  HENT: ear canals and TM's normal, nose and mouth without ulcers or lesions  NECK: no adenopathy, no asymmetry, masses, or scars  RESP: lungs clear to auscultation - no rales, rhonchi or wheezes  CV: regular rate and rhythm, normal S1 S2, no S3 or S4, no murmur, click or rub, no " peripheral edema   ABDOMEN: soft, nontender, no hepatosplenomegaly, no masses and bowel sounds normal  MS: no gross musculoskeletal defects noted, no edema  NEURO: Normal strength and tone, mentation intact and speech normal  PSYCH: mentation appears normal, affect normal/bright  PSYCH: mildly anxious    Recent Labs   Lab Test 08/21/24  0718 07/15/24  0717 06/14/24  0831 05/17/24  0830   HGB 13.6 13.3   < > 13.3    241   < > 243    137   < > 138   POTASSIUM 4.3 4.3   < > 4.1   CR 1.14* 0.96*   < > 0.96*   A1C 5.4  --   --  5.2    < > = values in this interval not displayed.        Diagnostics  No labs were ordered during this visit.   No EKG required for low risk surgery (cataract, skin procedure, breast biopsy, etc).    Revised Cardiac Risk Index (RCRI)  The patient has the following serious cardiovascular risks for perioperative complications:   - No serious cardiac risks = 0 points     RCRI Interpretation: 0 points: Class I (very low risk - 0.4% complication rate)         Signed Electronically by: Davidson Enamorado MD  A copy of this evaluation report is provided to the requesting physician.

## 2024-10-01 NOTE — PROGRESS NOTES
"Oncology Follow Up Visit: October 2, 2024    Oncologist: Dr. Hilton / [facility]   PCP: No Ref-Primary, Physician    Reason for Visit: Follow-up breast cancer     Diagnosis:   Patient has metastatic left-sided breast cancer and currently she is on Xeloda which was started in December 2023 requiring dose reductions.  Currently taking 750 mg twice a day since February 2024.  Breast cancer is ER+, [place]+ Her2 Negative; with bone metastasis. PIK3CA mutation.  TEJAS, TMB low, ESR1 no mutation.    Interval History:  Patient is seen in clinic today for review of Xeloda. She is tolerating this dosage much better and hands and feet are continuing to improve. Mild peeling and redness only. She is using Udder cream several times a day. HFS is tolerable and not impacting QOL. Had cataract surgery in R) eye removed yesterday and recovering well, planning for L) cataract removal in 2 weeks. Otherwise has been in good health with no new or recent concerns.     Patient denies any of the following except if noted above: fevers, chills, difficulty with energy, vision or hearing changes, chest pain, dyspnea, abdominal pain, nausea, vomiting, diarrhea, constipation, urinary concerns, headaches, numbness, tingling, issues with sleep or mood. She also denies lumps, bumps, rashes or skin lesions, bleeding or bruising issues.    Physical Exam:  BP (!) 160/82 (BP Location: Right arm)   Pulse 105   Temp 98.2  F (36.8  C) (Oral)   Resp 16   Ht 1.549 m (5' 0.98\")   Wt 85.9 kg (189 lb 4.8 oz)   SpO2 94%   BMI 35.79 kg/m     BP Readings from Last 6 Encounters:   10/02/24 (!) 160/82   09/26/24 134/77   08/21/24 (!) 167/112   07/15/24 (!) 170/92   06/18/24 (!) 172/90   05/17/24 137/83     Wt Readings from Last 6 Encounters:   10/02/24 85.9 kg (189 lb 4.8 oz)   09/26/24 84.4 kg (186 lb)   08/21/24 85.7 kg (188 lb 14.4 oz)   07/15/24 83.6 kg (184 lb 3.2 oz)   06/18/24 83 kg (183 lb)   06/18/24 79.8 kg (176 lb)      Constitutional: No acute distress, " pleasant, appropriately groomed.   ENT: Eye protection on. Wearing mask.  Neck: Trachea midline, no adenopathy.   Resp: No respiratory distress, adequate depth and rate of respirations.   Cardiac: No cyanosis, JVD, or peripheral edema.   MS:  5/5 muscle strength, adequate ROM.   Skin: Mild redness and peeling to bilateral palms, no open blisters or sores. Wearing protective cloth gloves.  Neuro: A/O x 4, sensation intact.   Psych: Appropriate mentation and affect.    Laboratory Results:   Results for orders placed or performed in visit on 10/02/24   Comprehensive metabolic panel     Status: Abnormal   Result Value Ref Range    Sodium 135 135 - 145 mmol/L    Potassium 4.1 3.4 - 5.3 mmol/L    Carbon Dioxide (CO2) 24 22 - 29 mmol/L    Anion Gap 13 7 - 15 mmol/L    Urea Nitrogen 24.2 (H) 8.0 - 23.0 mg/dL    Creatinine 1.16 (H) 0.51 - 0.95 mg/dL    GFR Estimate 51 (L) >60 mL/min/1.73m2    Calcium 10.3 8.8 - 10.4 mg/dL    Chloride 98 98 - 107 mmol/L    Glucose 96 70 - 99 mg/dL    Alkaline Phosphatase 97 40 - 150 U/L    AST 21 0 - 45 U/L    ALT <5 0 - 50 U/L    Protein Total 8.2 6.4 - 8.3 g/dL    Albumin 4.4 3.5 - 5.2 g/dL    Bilirubin Total 0.4 <=1.2 mg/dL   CBC with platelets and differential     Status: Abnormal   Result Value Ref Range    WBC Count 8.6 4.0 - 11.0 10e3/uL    RBC Count 4.50 3.80 - 5.20 10e6/uL    Hemoglobin 13.9 11.7 - 15.7 g/dL    Hematocrit 41.6 35.0 - 47.0 %    MCV 92 78 - 100 fL    MCH 30.9 26.5 - 33.0 pg    MCHC 33.4 31.5 - 36.5 g/dL    RDW 16.6 (H) 10.0 - 15.0 %    Platelet Count 204 150 - 450 10e3/uL    % Neutrophils 66 %    % Lymphocytes 24 %    % Monocytes 6 %    % Eosinophils 2 %    % Basophils 1 %    % Immature Granulocytes 0 %    NRBCs per 100 WBC 0 <1 /100    Absolute Neutrophils 5.7 1.6 - 8.3 10e3/uL    Absolute Lymphocytes 2.1 0.8 - 5.3 10e3/uL    Absolute Monocytes 0.6 0.0 - 1.3 10e3/uL    Absolute Eosinophils 0.2 0.0 - 0.7 10e3/uL    Absolute Basophils 0.1 0.0 - 0.2 10e3/uL    Absolute  Immature Granulocytes 0.0 <=0.4 10e3/uL    Absolute NRBCs 0.0 10e3/uL   CBC with platelets differential     Status: Abnormal    Narrative    The following orders were created for panel order CBC with platelets differential.  Procedure                               Abnormality         Status                     ---------                               -----------         ------                     CBC with platelets and d...[[id number]]  Abnormal            Final result                 Please view results for these tests on the individual orders.     I reviewed the above labs today.      Assessment and Plan:   Breast cancer is ER+, [place]+ Her2 Negative; with bone metastasis   - On treatment with Xeloda 750 mg twice daily and tolerating dosage regimen better  - Labs reviewed, okay to continue with current treatment without changes. Ca 15-3 in process. Patient is agreeable.   - CT CAP due in ~12/2024, ordered today and requested provider visit to review results  - Continue monthly appointments with provider for review, labs completed prior - would like to establish with Dr. Fountain in November   - Leaving for [place] in January with anticipated return in mid-May 2025    Bone metastasis  - DEXA 11/2023 with normal bone density  - On Zometa q3 months, due for dose today  - Continue calcium and vitamin D  - No new bone pains    HFS  - Mild peeling of skin of hands and feet, much improved compared to previous  - Using udder cream 2-3 daily  - Still able to perform ADLs/IADLs  - Continue to monitor    Elevated BP  - BP elevated in clinic, no cardiac s/s  - Pt was fasting for her labs and did not take her medications yet this morning  - Does monitor BP at home and mentions trends are 130's/80's  - Currently on amlodipine 10 mg daily    DM  - On Metformin 500 mg solution BID  - A1C in 5/2024 decreased to 5.2%, checked today and stable at 5.4%  - Monitors BS at home and aware of s/s of hypoglycemia  - Working with PCP     Elevated  creatinine  - Creatinine elevated to 1.16  - Encouraged patient to drink adequate fluids   - Continue to monitor         Joanna RIVERA, CNP

## 2024-10-02 ENCOUNTER — LAB (OUTPATIENT)
Dept: INFUSION THERAPY | Facility: CLINIC | Age: 69
End: 2024-10-02
Attending: INTERNAL MEDICINE
Payer: MEDICARE

## 2024-10-02 ENCOUNTER — ONCOLOGY VISIT (OUTPATIENT)
Dept: ONCOLOGY | Facility: CLINIC | Age: 69
End: 2024-10-02
Attending: INTERNAL MEDICINE
Payer: MEDICARE

## 2024-10-02 ENCOUNTER — INFUSION THERAPY VISIT (OUTPATIENT)
Dept: INFUSION THERAPY | Facility: CLINIC | Age: 69
End: 2024-10-02
Attending: NURSE PRACTITIONER
Payer: MEDICARE

## 2024-10-02 VITALS
DIASTOLIC BLOOD PRESSURE: 82 MMHG | TEMPERATURE: 98.2 F | OXYGEN SATURATION: 94 % | SYSTOLIC BLOOD PRESSURE: 160 MMHG | HEIGHT: 61 IN | WEIGHT: 189.3 LBS | HEART RATE: 105 BPM | RESPIRATION RATE: 16 BRPM | BODY MASS INDEX: 35.74 KG/M2

## 2024-10-02 DIAGNOSIS — Z17.0 MALIGNANT NEOPLASM OF OVERLAPPING SITES OF LEFT BREAST IN FEMALE, ESTROGEN RECEPTOR POSITIVE (H): ICD-10-CM

## 2024-10-02 DIAGNOSIS — C50.812 MALIGNANT NEOPLASM OF OVERLAPPING SITES OF LEFT BREAST IN FEMALE, ESTROGEN RECEPTOR POSITIVE (H): ICD-10-CM

## 2024-10-02 DIAGNOSIS — C50.912 MALIGNANT NEOPLASM OF LEFT FEMALE BREAST, UNSPECIFIED ESTROGEN RECEPTOR STATUS, UNSPECIFIED SITE OF BREAST (H): ICD-10-CM

## 2024-10-02 DIAGNOSIS — R73.09 ELEVATED GLUCOSE: ICD-10-CM

## 2024-10-02 DIAGNOSIS — C79.51 MALIGNANT NEOPLASM METASTATIC TO BONE (H): ICD-10-CM

## 2024-10-02 DIAGNOSIS — C50.912 MALIGNANT NEOPLASM OF UNSPECIFIED SITE OF LEFT FEMALE BREAST (H): Primary | ICD-10-CM

## 2024-10-02 DIAGNOSIS — C50.912 MALIGNANT NEOPLASM OF LEFT FEMALE BREAST, UNSPECIFIED ESTROGEN RECEPTOR STATUS, UNSPECIFIED SITE OF BREAST (H): Primary | ICD-10-CM

## 2024-10-02 DIAGNOSIS — G62.9 NEUROPATHY: ICD-10-CM

## 2024-10-02 LAB
ALBUMIN SERPL BCG-MCNC: 4.4 G/DL (ref 3.5–5.2)
ALP SERPL-CCNC: 97 U/L (ref 40–150)
ALT SERPL W P-5'-P-CCNC: <5 U/L (ref 0–50)
ANION GAP SERPL CALCULATED.3IONS-SCNC: 13 MMOL/L (ref 7–15)
AST SERPL W P-5'-P-CCNC: 21 U/L (ref 0–45)
BASOPHILS # BLD AUTO: 0.1 10E3/UL (ref 0–0.2)
BASOPHILS NFR BLD AUTO: 1 %
BILIRUB SERPL-MCNC: 0.4 MG/DL
BUN SERPL-MCNC: 24.2 MG/DL (ref 8–23)
CALCIUM SERPL-MCNC: 10.3 MG/DL (ref 8.8–10.4)
CANCER AG15-3 SERPL-ACNC: 203 U/ML
CHLORIDE SERPL-SCNC: 98 MMOL/L (ref 98–107)
CREAT SERPL-MCNC: 1.16 MG/DL (ref 0.51–0.95)
EGFRCR SERPLBLD CKD-EPI 2021: 51 ML/MIN/1.73M2
EOSINOPHIL # BLD AUTO: 0.2 10E3/UL (ref 0–0.7)
EOSINOPHIL NFR BLD AUTO: 2 %
ERYTHROCYTE [DISTWIDTH] IN BLOOD BY AUTOMATED COUNT: 16.6 % (ref 10–15)
GLUCOSE SERPL-MCNC: 96 MG/DL (ref 70–99)
HCO3 SERPL-SCNC: 24 MMOL/L (ref 22–29)
HCT VFR BLD AUTO: 41.6 % (ref 35–47)
HGB BLD-MCNC: 13.9 G/DL (ref 11.7–15.7)
IMM GRANULOCYTES # BLD: 0 10E3/UL
IMM GRANULOCYTES NFR BLD: 0 %
LYMPHOCYTES # BLD AUTO: 2.1 10E3/UL (ref 0.8–5.3)
LYMPHOCYTES NFR BLD AUTO: 24 %
MCH RBC QN AUTO: 30.9 PG (ref 26.5–33)
MCHC RBC AUTO-ENTMCNC: 33.4 G/DL (ref 31.5–36.5)
MCV RBC AUTO: 92 FL (ref 78–100)
MONOCYTES # BLD AUTO: 0.6 10E3/UL (ref 0–1.3)
MONOCYTES NFR BLD AUTO: 6 %
NEUTROPHILS # BLD AUTO: 5.7 10E3/UL (ref 1.6–8.3)
NEUTROPHILS NFR BLD AUTO: 66 %
NRBC # BLD AUTO: 0 10E3/UL
NRBC BLD AUTO-RTO: 0 /100
PLATELET # BLD AUTO: 204 10E3/UL (ref 150–450)
POTASSIUM SERPL-SCNC: 4.1 MMOL/L (ref 3.4–5.3)
PROT SERPL-MCNC: 8.2 G/DL (ref 6.4–8.3)
RBC # BLD AUTO: 4.5 10E6/UL (ref 3.8–5.2)
SODIUM SERPL-SCNC: 135 MMOL/L (ref 135–145)
WBC # BLD AUTO: 8.6 10E3/UL (ref 4–11)

## 2024-10-02 PROCEDURE — 250N000011 HC RX IP 250 OP 636: Performed by: INTERNAL MEDICINE

## 2024-10-02 PROCEDURE — G0463 HOSPITAL OUTPT CLINIC VISIT: HCPCS

## 2024-10-02 PROCEDURE — 82040 ASSAY OF SERUM ALBUMIN: CPT

## 2024-10-02 PROCEDURE — 250N000011 HC RX IP 250 OP 636: Performed by: NURSE PRACTITIONER

## 2024-10-02 PROCEDURE — 258N000003 HC RX IP 258 OP 636: Performed by: NURSE PRACTITIONER

## 2024-10-02 PROCEDURE — 86300 IMMUNOASSAY TUMOR CA 15-3: CPT

## 2024-10-02 PROCEDURE — 99214 OFFICE O/P EST MOD 30 MIN: CPT

## 2024-10-02 PROCEDURE — 85014 HEMATOCRIT: CPT

## 2024-10-02 PROCEDURE — 36591 DRAW BLOOD OFF VENOUS DEVICE: CPT

## 2024-10-02 PROCEDURE — 85004 AUTOMATED DIFF WBC COUNT: CPT

## 2024-10-02 PROCEDURE — 99207 PR NO CHARGE LOS: CPT

## 2024-10-02 RX ORDER — GABAPENTIN 250 MG/5ML
250 SOLUTION ORAL AT BEDTIME
Qty: 470 ML | Refills: 0 | Status: SHIPPED | OUTPATIENT
Start: 2024-10-02

## 2024-10-02 RX ORDER — HEPARIN SODIUM (PORCINE) LOCK FLUSH IV SOLN 100 UNIT/ML 100 UNIT/ML
5 SOLUTION INTRAVENOUS EVERY 8 HOURS
Status: DISCONTINUED | OUTPATIENT
Start: 2024-10-02 | End: 2024-10-02 | Stop reason: HOSPADM

## 2024-10-02 RX ORDER — HEPARIN SODIUM (PORCINE) LOCK FLUSH IV SOLN 100 UNIT/ML 100 UNIT/ML
5 SOLUTION INTRAVENOUS
Status: DISCONTINUED | OUTPATIENT
Start: 2024-10-02 | End: 2024-10-02 | Stop reason: HOSPADM

## 2024-10-02 RX ADMIN — ZOLEDRONIC ACID 3.3 MG: 4 INJECTION, SOLUTION, CONCENTRATE INTRAVENOUS at 15:23

## 2024-10-02 RX ADMIN — Medication 5 ML: at 15:45

## 2024-10-02 RX ADMIN — Medication 5 ML: at 14:14

## 2024-10-02 RX ADMIN — SODIUM CHLORIDE 250 ML: 9 INJECTION, SOLUTION INTRAVENOUS at 15:19

## 2024-10-02 ASSESSMENT — PAIN SCALES - GENERAL: PAINLEVEL: NO PAIN (0)

## 2024-10-02 NOTE — Clinical Note
"10/2/2024      Mary Olson  [address]  [facility] [address]      Dear Colleague,    Thank you for referring your patient, Mary Olson, to the [facility]. Please see a copy of my visit note below.    Oncology Follow Up Visit: October 2, 2024    Oncologist: Dr. Hilton / [facility]   PCP: No Ref-Primary, Physician    Reason for Visit: Follow-up breast cancer     Diagnosis:   Patient has metastatic left-sided breast cancer and currently she is on Xeloda which was started in December 2023 requiring dose reductions.  Currently taking 750 mg twice a day since February 2024.  Breast cancer is ER+, DC+ Her2 Negative; with bone metastasis. PIK3CA mutation.  TEJAS, TMB low, ESR1 no mutation.    Interval History:  Patient is seen in clinic today for review of Xeloda. She is tolerating this dosage much better and hands and feet are continuing to improve. Mild peeling and redness only. She is using Udder cream several times a day. HFS is tolerable and not impacting QOL. Approved for cataract surgery, planning sometime in Sept or Oct. Planning on vacationing in [place] from January to May. Having some landscaping done and looking forward to it being completed. Otherwise has been in good health with no new or recent concerns.     Patient denies any of the following except if noted above: fevers, chills, difficulty with energy, vision or hearing changes, chest pain, dyspnea, abdominal pain, nausea, vomiting, diarrhea, constipation, urinary concerns, headaches, numbness, tingling, issues with sleep or mood. She also denies lumps, bumps, rashes or skin lesions, bleeding or bruising issues.    Physical Exam:  BP (!) 160/82 (BP Location: Right arm)   Pulse 105   Temp 98.2  F (36.8  C) (Oral)   Resp 16   Ht 1.549 m (5' 0.98\")   Wt 85.9 kg (189 lb 4.8 oz)   SpO2 94%   BMI 35.79 kg/m     BP Readings from Last 6 Encounters:   10/02/24 (!) 160/82   09/26/24 134/77   08/21/24 (!) 167/112 "   07/15/24 (!) 170/92   06/18/24 (!) 172/90   05/17/24 137/83     Wt Readings from Last 6 Encounters:   10/02/24 85.9 kg (189 lb 4.8 oz)   09/26/24 84.4 kg (186 lb)   08/21/24 85.7 kg (188 lb 14.4 oz)   07/15/24 83.6 kg (184 lb 3.2 oz)   06/18/24 83 kg (183 lb)   06/18/24 79.8 kg (176 lb)      Constitutional: No acute distress, pleasant, appropriately groomed.   ENT: PERRLA, sclera without erythema. Wearing mask.  Neck: Trachea midline, no adenopathy.   Resp: No respiratory distress, adequate depth and rate of respirations.   Cardiac: No cyanosis, JVD, or peripheral edema.   MS:  5/5 muscle strength, adequate ROM.   Skin: Mild redness and peeling to bilateral palms, no open blisters or sores. Wearing protective cloth gloves.  Neuro: A/O x 4, sensation intact.   Psych: Appropriate mentation and affect.    Laboratory Results:   Results for orders placed or performed in visit on 10/02/24   Comprehensive metabolic panel     Status: Abnormal   Result Value Ref Range    Sodium 135 135 - 145 mmol/L    Potassium 4.1 3.4 - 5.3 mmol/L    Carbon Dioxide (CO2) 24 22 - 29 mmol/L    Anion Gap 13 7 - 15 mmol/L    Urea Nitrogen 24.2 (H) 8.0 - 23.0 mg/dL    Creatinine 1.16 (H) 0.51 - 0.95 mg/dL    GFR Estimate 51 (L) >60 mL/min/1.73m2    Calcium 10.3 8.8 - 10.4 mg/dL    Chloride 98 98 - 107 mmol/L    Glucose 96 70 - 99 mg/dL    Alkaline Phosphatase 97 40 - 150 U/L    AST 21 0 - 45 U/L    ALT <5 0 - 50 U/L    Protein Total 8.2 6.4 - 8.3 g/dL    Albumin 4.4 3.5 - 5.2 g/dL    Bilirubin Total 0.4 <=1.2 mg/dL   CBC with platelets and differential     Status: Abnormal   Result Value Ref Range    WBC Count 8.6 4.0 - 11.0 10e3/uL    RBC Count 4.50 3.80 - 5.20 10e6/uL    Hemoglobin 13.9 11.7 - 15.7 g/dL    Hematocrit 41.6 35.0 - 47.0 %    MCV 92 78 - 100 fL    MCH 30.9 26.5 - 33.0 pg    MCHC 33.4 31.5 - 36.5 g/dL    RDW 16.6 (H) 10.0 - 15.0 %    Platelet Count 204 150 - 450 10e3/uL    % Neutrophils 66 %    % Lymphocytes 24 %    % Monocytes 6  %    % Eosinophils 2 %    % Basophils 1 %    % Immature Granulocytes 0 %    NRBCs per 100 WBC 0 <1 /100    Absolute Neutrophils 5.7 1.6 - 8.3 10e3/uL    Absolute Lymphocytes 2.1 0.8 - 5.3 10e3/uL    Absolute Monocytes 0.6 0.0 - 1.3 10e3/uL    Absolute Eosinophils 0.2 0.0 - 0.7 10e3/uL    Absolute Basophils 0.1 0.0 - 0.2 10e3/uL    Absolute Immature Granulocytes 0.0 <=0.4 10e3/uL    Absolute NRBCs 0.0 10e3/uL   CBC with platelets differential     Status: Abnormal    Narrative    The following orders were created for panel order CBC with platelets differential.  Procedure                               Abnormality         Status                     ---------                               -----------         ------                     CBC with platelets and d...[[id number]]  Abnormal            Final result                 Please view results for these tests on the individual orders.     I reviewed the above labs today.    Imaging:  CT Chest/Abdomen/Pelvis w Contrast  Narrative: EXAM: CT CHEST/ABDOMEN/PELVIS W CONTRAST  LOCATION: [facility]  DATE: 6/17/2024    INDICATION: metastatic breast cancer  COMPARISON: 11/27/2023  TECHNIQUE: CT scan of the chest, abdomen, and pelvis was performed following injection of IV contrast. Multiplanar reformats were obtained. Dose reduction techniques were used.   CONTRAST: 97 ML ISOVIEW 370    FINDINGS:     CHEST WALL: The previously seen enhancing soft tissue nodules involving the left chest wall and breast appear to have markedly improved with decreased nodularity in the breast query small residual focus in the pectoralis minor muscle measuring 1.7 x 1.4   cm compared to 3.8 x 2.7 cm.    LUNGS AND PLEURA: Resolution of previously seen left pleural nodularity. No suspicious pulmonary nodules. No pneumothorax or pleural effusion.    MEDIASTINUM/AXILLAE: Right-sided port with tip at right atrial caval junction. Normal size heart. No pericardial effusion. No hilar  or mediastinal lymphadenopathy.    CORONARY ARTERY CALCIFICATION: Mild.    HEPATOBILIARY: Normal.    PANCREAS: Normal.    SPLEEN: Normal.    ADRENAL GLANDS: Normal.    KIDNEYS/BLADDER: Normal.    BOWEL: Normal.    LYMPH NODES: Normal.    VASCULATURE: Mild atherosclerotic vascular disease.    PELVIC ORGANS: Normal.    MUSCULOSKELETAL: Diffuse sclerotic bony metastases with increased overall sclerosis as an example and T6 and T11.  Impression: IMPRESSION:  1.  Marked reduction in size and conspicuity of infiltrated left chest wall mass and resolution of previously seen left pleural nodules.  2.  Progressive sclerosis of widespread osseous metastases in the axial/appendicular cyst skeleton  I reviewed the above imaging report today.      Assessment and Plan:   Breast cancer is ER+, OR+ Her2 Negative; with bone metastasis   - On treatment with Xeloda 750 mg twice daily and tolerating dosage regimen better than previously  - Labs reviewed, okay to continue with current treatment without changes. Patient is agreeable.   - CT CAP due in ~12/2024  - Continue monthly appointments with provider for review, labs completed prior   - Leaving for [place] in January with anticipated return in mid-May 2025    Bone metastasis  - DEXA 11/2023 with normal bone density  - On Zometa q3 months  - Continue calcium and vitamin D  - No new bone pains    HFS  - Mild peeling of skin of hands and feet, much improved compared to previous  - Using udder cream 2-3 daily  - Still able to perform ADLs/IADLs  - Continue to monitor    Elevated BP  - /112 this morning in clinic, no cardiac s/s  - Pt was fasting for her labs and did not take her medications yet this morning  - Currently on amlodipine 10 mg daily    DM  - On Metformin 500 mg solution BID  - A1C in 5/2024 decreased to 5.2%, checked today and stable at 5.4%  - Monitors BS at home and aware of s/s of hypoglycemia  - Working with PCP     Elevated creatinine  - Creatinine elevated to  1.14  - Encouraged patient to drink adequate fluids   - Continue to monitor         Joanna RIVERA, CNP      Again, thank you for allowing me to participate in the care of your patient.        Sincerely,        NICOLE Avila CNP

## 2024-10-02 NOTE — NURSING NOTE
"Oncology Rooming Note    October 2, 2024 2:33 PM   Mary Olson is a 69 year old female who presents for:    Chief Complaint   Patient presents with    Oncology Clinic Visit     Prior to treatment       Initial Vitals: BP (!) 160/82 (BP Location: Right arm)   Pulse 105   Temp 98.2  F (36.8  C) (Oral)   Resp 16   Ht 1.549 m (5' 0.98\")   Wt 85.9 kg (189 lb 4.8 oz)   SpO2 94%   BMI 35.79 kg/m   Estimated body mass index is 35.79 kg/m  as calculated from the following:    Height as of this encounter: 1.549 m (5' 0.98\").    Weight as of this encounter: 85.9 kg (189 lb 4.8 oz). Body surface area is 1.92 meters squared.  No Pain (0) Comment: Data Unavailable   No LMP recorded. Patient is postmenopausal.  Allergies reviewed: Yes  Medications reviewed: Yes    Medications: MEDICATION REFILLS NEEDED TODAY. Provider was notified.  Pharmacy name entered into EPIC:    [facility] - [place], [place] - [address] [place] [address]  [facility] - [place], [place] - [address] [place] [address] [facility] [place] - [place], [place] - [address] [facility].  [facility] - [place], [place] - [address] [facility] - [place], [place] - [facility] [address]  [facility] - [place], [place] - [address] [facility] - [place], [place] - [address] AT [place] [id number] - [place], [place] - [address] [facility] - ONCOLOGY PHARMACY  [id number] - [place], [place] - [address] [facility] - [place], [place] - [facility] [address]    Frailty Screening:   Is the patient here for a new oncology consult visit in cancer care? 2. No      Clinical concerns: No new concerns         Kailee Jarvis LPN              "

## 2024-10-02 NOTE — PROGRESS NOTES
Infusion Nursing Note:  Mary Olson presents today for Zometa.    Patient seen by provider today: Yes: Joanna Headley CNP   present during visit today: Not Applicable.    Note: N/A.      Intravenous Access:  Implanted Port.    Treatment Conditions:  Lab Results   Component Value Date     10/02/2024    POTASSIUM 4.1 10/02/2024    CR 1.16 (H) 10/02/2024    BUFFY 10.3 10/02/2024    BILITOTAL 0.4 10/02/2024    ALBUMIN 4.4 10/02/2024    ALT <5 10/02/2024    AST 21 10/02/2024       Results reviewed, labs MET treatment parameters, ok to proceed with treatment.      Post Infusion Assessment:  Patient tolerated infusion without incident.       Discharge Plan:   AVS to patient via MYCHART.  Patient will follow up per  Joanna Shaw note today.  No appointments set up at this time.  Patient discharged in stable condition accompanied by: self.  Departure Mode: Ambulatory.      Melissa Pacheco RN

## 2024-10-14 ENCOUNTER — DOCUMENTATION ONLY (OUTPATIENT)
Dept: OTHER | Facility: CLINIC | Age: 69
End: 2024-10-14
Payer: MEDICARE

## 2024-10-14 DIAGNOSIS — C50.912 MALIGNANT NEOPLASM OF LEFT FEMALE BREAST, UNSPECIFIED ESTROGEN RECEPTOR STATUS, UNSPECIFIED SITE OF BREAST (H): Primary | ICD-10-CM

## 2024-10-14 RX ORDER — CAPECITABINE 150 MG/1
750 TABLET, FILM COATED ORAL 2 TIMES DAILY
Qty: 140 TABLET | Refills: 0 | Status: SHIPPED | OUTPATIENT
Start: 2024-10-14 | End: 2024-10-17

## 2024-10-17 DIAGNOSIS — C50.912 MALIGNANT NEOPLASM OF LEFT FEMALE BREAST, UNSPECIFIED ESTROGEN RECEPTOR STATUS, UNSPECIFIED SITE OF BREAST (H): Primary | ICD-10-CM

## 2024-10-17 RX ORDER — CAPECITABINE 150 MG/1
750 TABLET, FILM COATED ORAL 2 TIMES DAILY
Qty: 140 TABLET | Refills: 0 | Status: SHIPPED | OUTPATIENT
Start: 2024-11-11

## 2024-10-28 RX ORDER — DIPHENHYDRAMINE HYDROCHLORIDE 50 MG/ML
50 INJECTION INTRAMUSCULAR; INTRAVENOUS
Start: 2024-10-28

## 2024-10-28 RX ORDER — MEPERIDINE HYDROCHLORIDE 25 MG/ML
25 INJECTION INTRAMUSCULAR; INTRAVENOUS; SUBCUTANEOUS
OUTPATIENT
Start: 2024-10-28

## 2024-10-28 RX ORDER — ALBUTEROL SULFATE 0.83 MG/ML
2.5 SOLUTION RESPIRATORY (INHALATION)
OUTPATIENT
Start: 2024-10-28

## 2024-10-28 RX ORDER — METHYLPREDNISOLONE SODIUM SUCCINATE 40 MG/ML
40 INJECTION INTRAMUSCULAR; INTRAVENOUS
Start: 2024-10-28

## 2024-10-28 RX ORDER — ALBUTEROL SULFATE 90 UG/1
1-2 INHALANT RESPIRATORY (INHALATION)
Start: 2024-10-28

## 2024-10-28 RX ORDER — DIPHENHYDRAMINE HYDROCHLORIDE 50 MG/ML
25 INJECTION INTRAMUSCULAR; INTRAVENOUS
Start: 2024-10-28

## 2024-10-28 RX ORDER — EPINEPHRINE 1 MG/ML
0.3 INJECTION, SOLUTION, CONCENTRATE INTRAVENOUS EVERY 5 MIN PRN
OUTPATIENT
Start: 2024-10-28

## 2024-10-31 NOTE — PROGRESS NOTES
Hematology/Medical Oncology Follow-up Note      November 5, 2024    Reason for visit:  Mary Olson is a 69 year old woman from [place] who presents for oncologic reevaluation with a history of metastatic ER/MS positive HER2 negative left breast cancer with bone, pleural and chest wall metastases on capecitabine. She is a former patient of Dr. Tiffany Hilton.    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 Fountain on 12/6/2024 instead of Joanna Headley NP.  Will review 12/4/2024 CT imaging and consider whether we would rebiopsy to obtain updated molecular genetic studies.  RSV vaccine is okay    Time with patient including review, documentation, history, examination, coordination of care and counseling was 40 minutes.    The longitudinal plan of care for the diagnosis(es)/condition(s) as documented were addressed during this visit. Due to the added complexity in care, I will continue to support Desmond in the subsequent management and with ongoing continuity of care.    History of present illness:  In March, 2020, the patient presented with a several month history of left breast nodularity and palpable axillary adenopathy.  Subsequent imaging confirmed bilateral breast lesions including a 6.2 cm left breast mass with diffuse breast dermal thickening and a 1.3 cm right breast mass.  The left breast mass revealed a grade 2 invasive mammary carcinoma that was ER/MS positive and HER2 negative.  The right breast mass revealed grade 1 tubular carcinoma that was ER/MS positive and HER2 negative.    Staging PET scan revealed left axillary and retropectoral lymph node metastases as well as diffuse bony metastases.  There has been no MR  brain imaging.    March, 2020 - August, 2020: She received weekly paclitaxel with some disease response requested a break from interventional cytotoxic therapy significant issues with balance and neuropathy requiring wheelchair use.. Switched to every 3-month zoledronic acid.    August, 2020 - March, 2021: Started on anastrozole with February, 2021 PET scan evidence of progression.    March, 2021-August, 2023: Started on anastrozole and abemaciclib.  May, 2023 CT CAP with evidence of soft tissue progression. PIK3CA [id number] ESR1 negative, TMB-low, TEJAS on left axillary tissue.    August, 2023-December, 2023: Switch to exemestane and everolimus with treatment discontinued after 4 months because of significant left breast mass progression.    December, 2023 to present: Started capecitabine 1500 mg twice daily with dose reduction to current 500 mg twice daily 7 days on/7 days off due to mucositis and hand-foot syndrome.  CA 15-3 tumor marker had declined from a zenith of 945 on 12/26/2023 to a nerissa of 107 on 5/17/2024 with recent value of 203 on 10/2/2024.  Most recent 6/17/2024 CT CAP revealed marked reduction in size and conspicuity of infiltrative left chest wall mass and resolution of previously seen left pleural nodules with progressive sclerosis of widespread bony metastases.  Restaging imaging had been scheduled for 12/4/2024.  Last zoledronic acid was administered on 10/2/2024.    There is been no change in how she feels.  Her hand-foot syndrome has been unchanged.  She uses other cream regularly about 6 times a day and uses cotton gloves.  She is planning to go to her [facility] in November for a week and then spend most of January through May, 2025 in [place] where she is followed by Dr. Medellin at [facility].    Past medical history:  Past Medical History:   Diagnosis Date    ASCUS favoring benign 10/28/15    neg HPV    Cancer (H)     Diabetes mellitus, type 2 (H) 7/14/2020    Hypertension      Obese         Family history:  I have reviewed this patient's family history and updated it with pertinent information if needed.  Family History   Problem Relation Age of Onset    Hypertension Mother          12/3/21    Coronary Artery Disease Mother          12/3/21    Breast Cancer Mother          12/3/21    Hyperlipidemia Mother          12/3/21    Depression Mother          123/3/21    Arrhythmia Brother     Cerebrovascular Disease Brother     Diabetes Brother          Medications:  Current Outpatient Medications   Medication Sig Dispense Refill    alcohol swab prep pads Use to swab area of injection/chrissy as directed. 400 each 3    amLODIPine (NORVASC) 10 MG tablet Take 1 tablet (10 mg) by mouth daily. 90 tablet 3    Ascorbic Acid (VITAMIN C PO)       aspirin 81 MG tablet Take by mouth daily      blood glucose (NO BRAND SPECIFIED) test strip 1 strip by In Vitro route 3 times daily 100 strip 6    blood glucose calibration (NO BRAND SPECIFIED) solution To accompany: Blood Glucose Monitor Brands: per insurance. 1 Bottle 3    blood glucose monitoring (NO BRAND SPECIFIED) meter device kit Use to test blood sugar 3 times daily or as directed. 1 kit 0    Blood Pressure KIT Please test blood pressure at home 2 x daily 1 kit 0    calcium-vitamin D-vitamin K (VIACTIV) 500-500-40 MG-UNT-MCG CHEW Take 1 tablet by mouth every other day      [START ON 2024] capecitabine (XELODA) 150 MG tablet Take 5 tablets (750 mg) by mouth 2 times daily. on days 1-7 and 15-21 140 tablet 0    Cholecalciferol (VITAMIN D3 PO) Take 2,000 Units by mouth daily Takes 4 tabs daily      Coenzyme Q10 (COQ-10) 200 MG CAPS       Cyanocobalamin (B-12) 2500 MCG TABS       gabapentin (NEURONTIN) 250 MG/5ML solution Take 5 mLs (250 mg) by mouth at bedtime. 470 mL 0    hydrocortisone 2.5 % cream Apply topically 2 times daily 3.5 g 0    lidocaine-prilocaine (EMLA) 2.5-2.5 % external cream Apply small amount to center of port  site 45-60 minutes prior to chemo appointment, cover with clear dressing if desired. 30 g 1    magic mouthwash suspension (diphenhydrAMINE, lidocaine, aluminum-magnesium & simethicone) Swish and swallow 5 mLs in mouth every 6 hours as needed for mouth sores. 240 mL 2    metFORMIN (GLUCOPHAGE) 500 MG tablet Take 1 tablet (500 mg) by mouth 2 times daily (with meals). [employer] brand Please 180 tablet 3    Multiple Vitamins-Minerals (CENTRUM ADULTS PO)       potassium chloride (KLOR-CON) 20 MEQ packet Take 20 mEq by mouth 2 times daily 60 packet 11    STATIN NOT PRESCRIBED (INTENTIONAL) Please choose reason not prescribed from choices below.      thin (NO BRAND SPECIFIED) lancets 3 each by In Vitro route daily Use with lanceting device. To accompany: Blood Glucose Monitor Brands: per insurance. 300 each 6    triamterene-HCTZ (MAXZIDE-25) 37.5-25 MG tablet Take 1 tablet by mouth daily. 90 tablet 3    UNABLE TO FIND MEDICATION NAME: Elderberry      UNABLE TO FIND MEDICATION NAME: clove of garlic daily      VITAMIN E NATURAL PO Take 400 Units by mouth       No current facility-administered medications for this visit.     Facility-Administered Medications Ordered in Other Visits   Medication Dose Route Frequency Provider Last Rate Last Admin    heparin 100 UNIT/ML injection 500 Units  500 Units Intracatheter Q8H Keysha Birch MD   500 Units at 11/06/20 1017       Allergies:  Allergies   Allergen Reactions    Lisinopril Cough     cough       Review of systems:  Except as note above, the patient denies:  Headache, double vision, change in vision, hearing loss, tinnitus;  Dyspnea, chest pain, palpitations, pleurisy or hemoptysis;  Anorexia, nausea, vomiting, diarrhea, constipation, rectal bleeding bleeding, change in bowel habits;  Urinary frequency, burning or hematuria;  Fever, chills, sweats, change in appetite;  Bone or back pain; skin rash, joint swelling, new lumps;  Unexplained, bleeding or bruising,  "tingling numbness, change in gait;    Physical examination:  BP (!) 172/81 (BP Location: Left arm, Patient Position: Chair, Cuff Size: Adult Regular)   Pulse 109   Resp 16   Ht 1.549 m (5' 0.98\")   Wt 87.1 kg (192 lb)   SpO2 97%   BMI 36.30 kg/m      The patient is alert and oriented.   Throat and oral mucosa are normal-appearing.   Thyroid gland is not enlarged.   Adenopathy is absent in the neck, axilla, groin and supraclavicular fossa;   Lungs are clear to auscultation and percussion without rales, wheezes or rubs; Heart rhythm is regular, heart sounds are without murmurs or gallops  Right breast examination unremarkable.  The left breast examination reveals a erythematous retracted nipple with soft tissue thickening.  According to the patient this is not changed.  The area of induration measures approximately 2.5 cm.  Abdomen is soft and flat without hepatosplenomegaly, masses, ascites or tenderness;  Extremities and skin reveal no unusual skin lesions, joint swelling or edema;        Robert Fountain MD  "

## 2024-11-05 ENCOUNTER — LAB (OUTPATIENT)
Dept: INFUSION THERAPY | Facility: CLINIC | Age: 69
End: 2024-11-05
Payer: MEDICARE

## 2024-11-05 ENCOUNTER — ONCOLOGY VISIT (OUTPATIENT)
Dept: ONCOLOGY | Facility: CLINIC | Age: 69
End: 2024-11-05
Attending: INTERNAL MEDICINE
Payer: MEDICARE

## 2024-11-05 VITALS
DIASTOLIC BLOOD PRESSURE: 81 MMHG | OXYGEN SATURATION: 97 % | BODY MASS INDEX: 36.25 KG/M2 | RESPIRATION RATE: 16 BRPM | WEIGHT: 192 LBS | HEIGHT: 61 IN | HEART RATE: 109 BPM | SYSTOLIC BLOOD PRESSURE: 172 MMHG

## 2024-11-05 DIAGNOSIS — C50.812 MALIGNANT NEOPLASM OF OVERLAPPING SITES OF LEFT BREAST IN FEMALE, ESTROGEN RECEPTOR POSITIVE (H): ICD-10-CM

## 2024-11-05 DIAGNOSIS — C50.912 MALIGNANT NEOPLASM OF LEFT FEMALE BREAST, UNSPECIFIED ESTROGEN RECEPTOR STATUS, UNSPECIFIED SITE OF BREAST (H): ICD-10-CM

## 2024-11-05 DIAGNOSIS — C50.912 MALIGNANT NEOPLASM OF LEFT FEMALE BREAST, UNSPECIFIED ESTROGEN RECEPTOR STATUS, UNSPECIFIED SITE OF BREAST (H): Primary | ICD-10-CM

## 2024-11-05 DIAGNOSIS — Z17.0 MALIGNANT NEOPLASM OF OVERLAPPING SITES OF LEFT BREAST IN FEMALE, ESTROGEN RECEPTOR POSITIVE (H): ICD-10-CM

## 2024-11-05 LAB
ALBUMIN SERPL BCG-MCNC: 4.3 G/DL (ref 3.5–5.2)
ALP SERPL-CCNC: 100 U/L (ref 40–150)
ALT SERPL W P-5'-P-CCNC: 10 U/L (ref 0–50)
ANION GAP SERPL CALCULATED.3IONS-SCNC: 13 MMOL/L (ref 7–15)
AST SERPL W P-5'-P-CCNC: 26 U/L (ref 0–45)
BASOPHILS # BLD AUTO: 0.1 10E3/UL (ref 0–0.2)
BASOPHILS NFR BLD AUTO: 1 %
BILIRUB SERPL-MCNC: 0.6 MG/DL
BUN SERPL-MCNC: 18.7 MG/DL (ref 8–23)
CALCIUM SERPL-MCNC: 10.3 MG/DL (ref 8.8–10.4)
CANCER AG15-3 SERPL-ACNC: 286 U/ML
CHLORIDE SERPL-SCNC: 98 MMOL/L (ref 98–107)
CREAT SERPL-MCNC: 0.84 MG/DL (ref 0.51–0.95)
EGFRCR SERPLBLD CKD-EPI 2021: 75 ML/MIN/1.73M2
EOSINOPHIL # BLD AUTO: 0.1 10E3/UL (ref 0–0.7)
EOSINOPHIL NFR BLD AUTO: 2 %
ERYTHROCYTE [DISTWIDTH] IN BLOOD BY AUTOMATED COUNT: 16.9 % (ref 10–15)
GLUCOSE SERPL-MCNC: 102 MG/DL (ref 70–99)
HCO3 SERPL-SCNC: 25 MMOL/L (ref 22–29)
HCT VFR BLD AUTO: 39.1 % (ref 35–47)
HGB BLD-MCNC: 13.2 G/DL (ref 11.7–15.7)
IMM GRANULOCYTES # BLD: 0 10E3/UL
IMM GRANULOCYTES NFR BLD: 0 %
LYMPHOCYTES # BLD AUTO: 1.4 10E3/UL (ref 0.8–5.3)
LYMPHOCYTES NFR BLD AUTO: 23 %
MCH RBC QN AUTO: 31.4 PG (ref 26.5–33)
MCHC RBC AUTO-ENTMCNC: 33.8 G/DL (ref 31.5–36.5)
MCV RBC AUTO: 93 FL (ref 78–100)
MONOCYTES # BLD AUTO: 0.4 10E3/UL (ref 0–1.3)
MONOCYTES NFR BLD AUTO: 6 %
NEUTROPHILS # BLD AUTO: 4.1 10E3/UL (ref 1.6–8.3)
NEUTROPHILS NFR BLD AUTO: 67 %
NRBC # BLD AUTO: 0 10E3/UL
NRBC BLD AUTO-RTO: 0 /100
PLATELET # BLD AUTO: 234 10E3/UL (ref 150–450)
POTASSIUM SERPL-SCNC: 4.6 MMOL/L (ref 3.4–5.3)
PROT SERPL-MCNC: 8.1 G/DL (ref 6.4–8.3)
RBC # BLD AUTO: 4.21 10E6/UL (ref 3.8–5.2)
SODIUM SERPL-SCNC: 136 MMOL/L (ref 135–145)
WBC # BLD AUTO: 6.1 10E3/UL (ref 4–11)

## 2024-11-05 PROCEDURE — 82040 ASSAY OF SERUM ALBUMIN: CPT

## 2024-11-05 PROCEDURE — 250N000011 HC RX IP 250 OP 636: Performed by: INTERNAL MEDICINE

## 2024-11-05 PROCEDURE — 36591 DRAW BLOOD OFF VENOUS DEVICE: CPT

## 2024-11-05 PROCEDURE — 99207 PR NO CHARGE LOS: CPT

## 2024-11-05 PROCEDURE — 85004 AUTOMATED DIFF WBC COUNT: CPT

## 2024-11-05 PROCEDURE — G0463 HOSPITAL OUTPT CLINIC VISIT: HCPCS | Performed by: INTERNAL MEDICINE

## 2024-11-05 PROCEDURE — 86300 IMMUNOASSAY TUMOR CA 15-3: CPT

## 2024-11-05 RX ORDER — HEPARIN SODIUM (PORCINE) LOCK FLUSH IV SOLN 100 UNIT/ML 100 UNIT/ML
5 SOLUTION INTRAVENOUS
Status: DISCONTINUED | OUTPATIENT
Start: 2024-11-05 | End: 2024-11-05 | Stop reason: HOSPADM

## 2024-11-05 RX ADMIN — HEPARIN 5 ML: 100 SYRINGE at 07:32

## 2024-11-05 ASSESSMENT — PAIN SCALES - GENERAL: PAINLEVEL_OUTOF10: NO PAIN (0)

## 2024-11-05 NOTE — LETTER
11/5/2024      Mary Olson  [address]  [facility] [address]      Dear Colleague,    Thank you for referring your patient, Mary Olson, to the [facility]. Please see a copy of my visit note below.    Hematology/Medical Oncology Follow-up Note      November 5, 2024    Reason for visit:  Mary Olson is a 69 year old woman from [place] who presents for oncologic reevaluation with a history of metastatic ER/[place] positive HER2 negative left breast cancer with bone, pleural and chest wall metastases on capecitabine. She is a former patient of Dr. Tiffany Hilton.    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 Fountain on 12/6/2024 instead of Joanna Headley NP.  Will review 12/4/2024 CT imaging and consider whether we would rebiopsy to obtain updated molecular genetic studies.  RSV vaccine is okay    Time with patient including review, documentation, history, examination, coordination of care and counseling was 40 minutes.    The longitudinal plan of care for the diagnosis(es)/condition(s) as documented were addressed during this visit. Due to the added complexity in care, I will continue to support Desmond in the subsequent management and with ongoing continuity of care.    History of present illness:  In March, 2020, the patient presented with a several month history of left breast nodularity and palpable axillary adenopathy.  Subsequent imaging confirmed bilateral breast lesions including a 6.2 cm left breast mass with diffuse breast dermal thickening and a 1.3 cm right breast mass.  The left breast mass revealed a grade 2 invasive mammary carcinoma that was ER/NY positive and HER2  negative.  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    Staging PET scan revealed left axillary and retropectoral lymph node metastases as well as diffuse bony metastases.  There has been no MR brain imaging.    March, 2020 - August, 2020: She received weekly paclitaxel with some disease response requested a break from interventional cytotoxic therapy significant issues with balance and neuropathy requiring wheelchair use.. Switched to every 3-month zoledronic acid.    August, 2020 - March, 2021: Started on anastrozole with February, 2021 PET scan evidence of progression.    March, 2021-August, 2023: Started on anastrozole and abemaciclib.  May, 2023 CT CAP with evidence of soft tissue progression. PIK3CA p.E453K+, ESR1 negative, TMB-low, TEJAS on left axillary tissue.    August, 2023-December, 2023: Switch to exemestane and everolimus with treatment discontinued after 4 months because of significant left breast mass progression.    December, 2023 to present: Started capecitabine 1500 mg twice daily with dose reduction to current 500 mg twice daily 7 days on/7 days off due to mucositis and hand-foot syndrome.  CA 15-3 tumor marker had declined from a zenith of 945 on 12/26/2023 to a nerissa of 107 on 5/17/2024 with recent value of 203 on 10/2/2024.  Most recent 6/17/2024 CT CAP revealed marked reduction in size and conspicuity of infiltrative left chest wall mass and resolution of previously seen left pleural nodules with progressive sclerosis of widespread bony metastases.  Restaging imaging had been scheduled for 12/4/2024.  Last zoledronic acid was administered on 10/2/2024.    There is been no change in how she feels.  Her hand-foot syndrome has been unchanged.  She uses other cream regularly about 6 times a day and uses cotton gloves.  She is planning to go to her [facility] in November for a week and then spend most of January through May, 2025 in  [place] where she is followed by Dr. Medellin at [facility].    Past medical history:  Past Medical History:   Diagnosis Date     ASCUS favoring benign 10/28/15    neg HPV     Cancer (H)      Diabetes mellitus, type 2 (H) 2020     Hypertension      Obese         Family history:  I have reviewed this patient's family history and updated it with pertinent information if needed.  Family History   Problem Relation Age of Onset     Hypertension Mother          12/3/21     Coronary Artery Disease Mother          12/3/21     Breast Cancer Mother          12/3/21     Hyperlipidemia Mother          12/3/21     Depression Mother          123/3/21     Arrhythmia Brother      Cerebrovascular Disease Brother      Diabetes Brother          Medications:  Current Outpatient Medications   Medication Sig Dispense Refill     alcohol swab prep pads Use to swab area of injection/chrissy as directed. 400 each 3     amLODIPine (NORVASC) 10 MG tablet Take 1 tablet (10 mg) by mouth daily. 90 tablet 3     Ascorbic Acid (VITAMIN C PO)        aspirin 81 MG tablet Take by mouth daily       blood glucose (NO BRAND SPECIFIED) test strip 1 strip by In Vitro route 3 times daily 100 strip 6     blood glucose calibration (NO BRAND SPECIFIED) solution To accompany: Blood Glucose Monitor Brands: per insurance. 1 Bottle 3     blood glucose monitoring (NO BRAND SPECIFIED) meter device kit Use to test blood sugar 3 times daily or as directed. 1 kit 0     Blood Pressure KIT Please test blood pressure at home 2 x daily 1 kit 0     calcium-vitamin D-vitamin K (VIACTIV) 500-500-40 MG-UNT-MCG CHEW Take 1 tablet by mouth every other day       [START ON 2024] capecitabine (XELODA) 150 MG tablet Take 5 tablets (750 mg) by mouth 2 times daily. on days 1-7 and 15-21 140 tablet 0     Cholecalciferol (VITAMIN D3 PO) Take 2,000 Units by mouth daily Takes 4 tabs daily       Coenzyme Q10 (COQ-10) 200 MG CAPS        Cyanocobalamin  (B-12) 2500 MCG TABS        gabapentin (NEURONTIN) 250 MG/5ML solution Take 5 mLs (250 mg) by mouth at bedtime. 470 mL 0     hydrocortisone 2.5 % cream Apply topically 2 times daily 3.5 g 0     lidocaine-prilocaine (EMLA) 2.5-2.5 % external cream Apply small amount to center of port site 45-60 minutes prior to chemo appointment, cover with clear dressing if desired. 30 g 1     magic mouthwash suspension (diphenhydrAMINE, lidocaine, aluminum-magnesium & simethicone) Swish and swallow 5 mLs in mouth every 6 hours as needed for mouth sores. 240 mL 2     metFORMIN (GLUCOPHAGE) 500 MG tablet Take 1 tablet (500 mg) by mouth 2 times daily (with meals). [employer] brand Please 180 tablet 3     Multiple Vitamins-Minerals (CENTRUM ADULTS PO)        potassium chloride (KLOR-CON) 20 MEQ packet Take 20 mEq by mouth 2 times daily 60 packet 11     STATIN NOT PRESCRIBED (INTENTIONAL) Please choose reason not prescribed from choices below.       thin (NO BRAND SPECIFIED) lancets 3 each by In Vitro route daily Use with lanceting device. To accompany: Blood Glucose Monitor Brands: per insurance. 300 each 6     triamterene-HCTZ (MAXZIDE-25) 37.5-25 MG tablet Take 1 tablet by mouth daily. 90 tablet 3     UNABLE TO FIND MEDICATION NAME: Elderberry       UNABLE TO FIND MEDICATION NAME: clove of garlic daily       VITAMIN E NATURAL PO Take 400 Units by mouth       No current facility-administered medications for this visit.     Facility-Administered Medications Ordered in Other Visits   Medication Dose Route Frequency Provider Last Rate Last Admin     heparin 100 UNIT/ML injection 500 Units  500 Units Intracatheter Q8H Keysha Birch MD   500 Units at 11/06/20 1017       Allergies:  Allergies   Allergen Reactions     Lisinopril Cough     cough       Review of systems:  Except as note above, the patient denies:  Headache, double vision, change in vision, hearing loss, tinnitus;  Dyspnea, chest pain, palpitations, pleurisy or  "hemoptysis;  Anorexia, nausea, vomiting, diarrhea, constipation, rectal bleeding bleeding, change in bowel habits;  Urinary frequency, burning or hematuria;  Fever, chills, sweats, change in appetite;  Bone or back pain; skin rash, joint swelling, new lumps;  Unexplained, bleeding or bruising, tingling numbness, change in gait;    Physical examination:  BP (!) 172/81 (BP Location: Left arm, Patient Position: Chair, Cuff Size: Adult Regular)   Pulse 109   Resp 16   Ht 1.549 m (5' 0.98\")   Wt 87.1 kg (192 lb)   SpO2 97%   BMI 36.30 kg/m      The patient is alert and oriented.   Throat and oral mucosa are normal-appearing.   Thyroid gland is not enlarged.   Adenopathy is absent in the neck, axilla, groin and supraclavicular fossa;   Lungs are clear to auscultation and percussion without rales, wheezes or rubs; Heart rhythm is regular, heart sounds are without murmurs or gallops  Right breast examination unremarkable.  The left breast examination reveals a erythematous retracted nipple with soft tissue thickening.  According to the patient this is not changed.  The area of induration measures approximately 2.5 cm.  Abdomen is soft and flat without hepatosplenomegaly, masses, ascites or tenderness;  Extremities and skin reveal no unusual skin lesions, joint swelling or edema;        Robert Fountain MD      Again, thank you for allowing me to participate in the care of your patient.        Sincerely,        Robert Fountain MD  "

## 2024-11-05 NOTE — NURSING NOTE
"Oncology Rooming Note    November 5, 2024 7:50 AM   Mary Olson is a 69 year old female who presents for:    Chief Complaint   Patient presents with    Oncology Clinic Visit     Follow up     Initial Vitals: BP (!) 172/81 (BP Location: Left arm, Patient Position: Chair, Cuff Size: Adult Regular)   Pulse 109   Resp 16   Ht 1.549 m (5' 0.98\")   Wt 87.1 kg (192 lb)   SpO2 97%   BMI 36.30 kg/m   Estimated body mass index is 36.3 kg/m  as calculated from the following:    Height as of this encounter: 1.549 m (5' 0.98\").    Weight as of this encounter: 87.1 kg (192 lb). Body surface area is 1.94 meters squared.  No Pain (0) Comment: Data Unavailable   No LMP recorded. Patient is postmenopausal.  Allergies reviewed: Yes  Medications reviewed: Yes    Medications: Medication refills not needed today.  Pharmacy name entered into EPIC:    [facility] - [place], [place] - [address] [place] [address]  [facility] - [place], [place] - [address] [place] [address] [facility] [place], [place] - [address] [facility]  [facility] - [place], [place] - [address] [facility] - [place], [place] - [address]  [facility] - [place], [place] - [address] [facility] - [place], [place] - [address] AT [id number] - [place], [place] - [address] [facility] ONCOL[facility]Y  [id number] - [place], [place] - [address] [facility] - [place], [place] - [address]    Frailty Screening:   Is the patient here for a new oncology consult visit in cancer care? 2. No      Clinical concerns: NO       Sonia Vigil CMA              "

## 2024-11-05 NOTE — PATIENT INSTRUCTIONS
Refilled magic mouthwash  Continue capecitabine as before; 750 mg twice-a-day days 1-7 and 14-21 every 28 days  Follow-up Dr. Fountain on 12/6/2024 instead of Joanna Headley NP  RSV vaccine is okay

## 2024-11-05 NOTE — PROGRESS NOTES
Port site scrubbed with Chloraprep for 30 seconds. Accessed port using sterile technique. 2 green 1 lav tubes drawn. See documentation flowsheet. Port needle deaccessed for treatment. Tolerated port access and draw without complaint. Estrella Valle RN on 11/5/2024 at 7:29 AM

## 2024-11-11 DIAGNOSIS — C50.912 MALIGNANT NEOPLASM OF LEFT FEMALE BREAST, UNSPECIFIED ESTROGEN RECEPTOR STATUS, UNSPECIFIED SITE OF BREAST (H): Primary | ICD-10-CM

## 2024-11-11 RX ORDER — CAPECITABINE 150 MG/1
750 TABLET, FILM COATED ORAL 2 TIMES DAILY
Qty: 140 TABLET | Refills: 0 | Status: SHIPPED | OUTPATIENT
Start: 2024-12-09

## 2024-11-14 ENCOUNTER — TRANSFERRED RECORDS (OUTPATIENT)
Dept: HEALTH INFORMATION MANAGEMENT | Facility: CLINIC | Age: 69
End: 2024-11-14
Payer: MEDICARE

## 2024-11-16 ENCOUNTER — HEALTH MAINTENANCE LETTER (OUTPATIENT)
Age: 69
End: 2024-11-16

## 2024-12-03 DIAGNOSIS — C50.912 MALIGNANT NEOPLASM OF LEFT FEMALE BREAST, UNSPECIFIED ESTROGEN RECEPTOR STATUS, UNSPECIFIED SITE OF BREAST (H): Primary | ICD-10-CM

## 2024-12-04 ENCOUNTER — ANCILLARY PROCEDURE (OUTPATIENT)
Dept: CT IMAGING | Facility: CLINIC | Age: 69
End: 2024-12-04
Payer: MEDICARE

## 2024-12-04 DIAGNOSIS — C79.51 MALIGNANT NEOPLASM METASTATIC TO BONE (H): ICD-10-CM

## 2024-12-04 DIAGNOSIS — C50.912 MALIGNANT NEOPLASM OF LEFT FEMALE BREAST, UNSPECIFIED ESTROGEN RECEPTOR STATUS, UNSPECIFIED SITE OF BREAST (H): ICD-10-CM

## 2024-12-04 PROCEDURE — 71260 CT THORAX DX C+: CPT | Mod: MG | Performed by: STUDENT IN AN ORGANIZED HEALTH CARE EDUCATION/TRAINING PROGRAM

## 2024-12-04 PROCEDURE — G1010 CDSM STANSON: HCPCS | Mod: GC | Performed by: STUDENT IN AN ORGANIZED HEALTH CARE EDUCATION/TRAINING PROGRAM

## 2024-12-04 PROCEDURE — 74177 CT ABD & PELVIS W/CONTRAST: CPT | Mod: MG | Performed by: STUDENT IN AN ORGANIZED HEALTH CARE EDUCATION/TRAINING PROGRAM

## 2024-12-04 RX ORDER — HEPARIN SODIUM (PORCINE) LOCK FLUSH IV SOLN 100 UNIT/ML 100 UNIT/ML
5 SOLUTION INTRAVENOUS ONCE
Status: COMPLETED | OUTPATIENT
Start: 2024-12-04 | End: 2024-12-04

## 2024-12-04 RX ORDER — IOPAMIDOL 755 MG/ML
106 INJECTION, SOLUTION INTRAVASCULAR ONCE
Status: COMPLETED | OUTPATIENT
Start: 2024-12-04 | End: 2024-12-04

## 2024-12-04 RX ADMIN — IOPAMIDOL 106 ML: 755 INJECTION, SOLUTION INTRAVASCULAR at 08:22

## 2024-12-04 RX ADMIN — HEPARIN SODIUM (PORCINE) LOCK FLUSH IV SOLN 100 UNIT/ML 5 ML: 100 SOLUTION at 08:55

## 2024-12-06 ENCOUNTER — TRANSFERRED RECORDS (OUTPATIENT)
Dept: HEALTH INFORMATION MANAGEMENT | Facility: CLINIC | Age: 69
End: 2024-12-06

## 2024-12-18 ENCOUNTER — TRANSFERRED RECORDS (OUTPATIENT)
Dept: HEALTH INFORMATION MANAGEMENT | Facility: CLINIC | Age: 69
End: 2024-12-18
Payer: MEDICARE

## 2025-01-05 ENCOUNTER — HEALTH MAINTENANCE LETTER (OUTPATIENT)
Age: 70
End: 2025-01-05

## 2025-01-07 ENCOUNTER — PATIENT OUTREACH (OUTPATIENT)
Dept: FAMILY MEDICINE | Facility: CLINIC | Age: 70
End: 2025-01-07

## 2025-01-07 ENCOUNTER — INFUSION THERAPY VISIT (OUTPATIENT)
Dept: INFUSION THERAPY | Facility: HOSPITAL | Age: 70
End: 2025-01-07
Payer: COMMERCIAL

## 2025-01-07 VITALS
SYSTOLIC BLOOD PRESSURE: 153 MMHG | TEMPERATURE: 97.9 F | DIASTOLIC BLOOD PRESSURE: 71 MMHG | BODY MASS INDEX: 35.92 KG/M2 | OXYGEN SATURATION: 98 % | WEIGHT: 190 LBS | HEART RATE: 108 BPM | RESPIRATION RATE: 18 BRPM

## 2025-01-07 DIAGNOSIS — Z17.0 MALIGNANT NEOPLASM OF OVERLAPPING SITES OF LEFT BREAST IN FEMALE, ESTROGEN RECEPTOR POSITIVE (H): ICD-10-CM

## 2025-01-07 DIAGNOSIS — C50.812 MALIGNANT NEOPLASM OF OVERLAPPING SITES OF LEFT BREAST IN FEMALE, ESTROGEN RECEPTOR POSITIVE (H): ICD-10-CM

## 2025-01-07 DIAGNOSIS — C79.51 MALIGNANT NEOPLASM METASTATIC TO BONE (H): Primary | ICD-10-CM

## 2025-01-07 LAB
CALCIUM SERPL-MCNC: 10.7 MG/DL (ref 8.8–10.4)
CREAT SERPL-MCNC: 0.88 MG/DL (ref 0.51–0.95)
EGFRCR SERPLBLD CKD-EPI 2021: 71 ML/MIN/1.73M2

## 2025-01-07 PROCEDURE — 82565 ASSAY OF CREATININE: CPT

## 2025-01-07 PROCEDURE — 36415 COLL VENOUS BLD VENIPUNCTURE: CPT

## 2025-01-07 PROCEDURE — 250N000011 HC RX IP 250 OP 636

## 2025-01-07 PROCEDURE — 96365 THER/PROPH/DIAG IV INF INIT: CPT

## 2025-01-07 PROCEDURE — 82310 ASSAY OF CALCIUM: CPT

## 2025-01-07 RX ORDER — HEPARIN SODIUM (PORCINE) LOCK FLUSH IV SOLN 100 UNIT/ML 100 UNIT/ML
5 SOLUTION INTRAVENOUS
Status: DISCONTINUED | OUTPATIENT
Start: 2025-01-07 | End: 2025-01-07 | Stop reason: HOSPADM

## 2025-01-07 RX ORDER — ZOLEDRONIC ACID 0.04 MG/ML
4 INJECTION, SOLUTION INTRAVENOUS ONCE
Status: COMPLETED | OUTPATIENT
Start: 2025-01-07 | End: 2025-01-07

## 2025-01-07 RX ADMIN — HEPARIN SODIUM (PORCINE) LOCK FLUSH IV SOLN 100 UNIT/ML 5 ML: 100 SOLUTION at 14:35

## 2025-01-07 RX ADMIN — ZOLEDRONIC ACID 4 MG: 0.04 INJECTION, SOLUTION INTRAVENOUS at 14:05

## 2025-01-07 ASSESSMENT — PAIN SCALES - GENERAL: PAINLEVEL_OUTOF10: NO PAIN (0)

## 2025-01-07 NOTE — TELEPHONE ENCOUNTER
Patient Quality Outreach    Patient is due for the following:   Diabetes -  A1C, Diabetic Follow-Up Visit, and Foot Exam  Physical Annual Wellness Visit    Action(s) Taken:   Schedule a Annual Wellness Visit    Type of outreach:    Sent [employer] message.    Questions for provider review:    None           Gracy Payne, CMA

## 2025-01-07 NOTE — PROGRESS NOTES
Infusion Nursing Note:  Mary Olson presents today for Labs/Zometa.    Patient seen by provider today: No   present during visit today: Not Applicable.    Note: Desmond arrived into the infusion center ambulatory in a stable for Labs and Zometa, VSS. Plan of care reviewed with her, she verbalized understanding of her plan of care.        Intravenous Access:  Labs drawn without difficulty.  Implanted Port.    Treatment Conditions:  Lab Results   Component Value Date    HGB 13.0 12/06/2024    WBC 6.1 12/06/2024    ANEU 2.5 12/15/2021    ANEUTAUTO 4.0 12/06/2024     12/06/2024        Results reviewed, labs MET treatment parameters, ok to proceed with treatment.    Post Infusion Assessment:  Patient tolerated infusion without incident.  Blood return noted pre and post infusion.  Site patent and intact, free from redness, edema or discomfort.  No evidence of extravasations.     Discharge Plan:   Discharge instructions reviewed with: Patient.  Patient and/or family verbalized understanding of discharge instructions and all questions answered.  Patient discharged in stable condition accompanied by: self.  Departure Mode: Ambulatory.    Nuria Judd RN

## 2025-01-09 ENCOUNTER — TELEPHONE (OUTPATIENT)
Dept: ONCOLOGY | Facility: CLINIC | Age: 70
End: 2025-01-09
Payer: COMMERCIAL

## 2025-01-14 ENCOUNTER — MYC MEDICAL ADVICE (OUTPATIENT)
Dept: ONCOLOGY | Facility: CLINIC | Age: 70
End: 2025-01-14
Payer: COMMERCIAL

## 2025-01-14 DIAGNOSIS — H49.20 6TH NERVE PALSY: Primary | ICD-10-CM

## 2025-01-14 NOTE — CONFIDENTIAL NOTE
Received incoming call from patient's ophthalmologist stating that the patien thas newly developed right CNVI palsy. He is suggesting that the patient get a MRI to evaluate for a compressive lesion. Orders placed via verbal readback, message sent to patient with recommendations.    Daksha Carias, RN, BSN  RN Care Coordinator - Oncology/Hematology  [facility]

## 2025-01-15 NOTE — CONFIDENTIAL NOTE
Outgoing call placed to Desmond who states that she is aware of the MRI and attempted to call imaging yesterday prior to the order being placed. She states that imaging told her they would call once the order is received. She will reach out to us on Friday if she has not heard anything by then.    Daksha Carias, RN, BSN  RN Care Coordinator - Oncology/Hematology  [facility]

## 2025-01-25 NOTE — PROGRESS NOTES
Hematology/Medical Oncology Follow-up Note      January 30, 2025      Reason for visit:  Mary Olson is a 69 year old woman from [place] who presents for oncologic reevaluation with a history of metastatic ER/NM+ HER2- left breast cancer with bone, pleural and chest wall metastases on capecitabine. She is a former patient of Dr. Tiffany Hilton.    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  Discussed indications, benefits, risk, alternatives and side effects of fulvestrant and capivasertib with the patient.  She understands and agrees.  2.  Follow-up with me 4 weeks after beginning treatment with CBC, CMP and CA 15-3 tumor marker before appointment  3.  She is anticipating to be going down to [facility] for the winter sometime in the next 4 -8 weeks    Time with patient including review, documentation, history, examination, coordination of care and counseling was 30 minutes.    The longitudinal plan of care for the diagnosis(es)/condition(s) as documented were addressed during this visit. Due to the added complexity in care, I will continue to support Desmond in the subsequent management and with ongoing continuity of care.    History of present illness:  In March, 2020, the patient presented with a several month history of left breast nodularity and palpable axillary adenopathy.  Subsequent imaging confirmed bilateral breast lesions including a 6.2 cm left breast mass with diffuse breast dermal thickening and a 1.3 cm right breast mass.  The left breast mass revealed a grade 2 invasive mammary carcinoma that was ER/NM positive and HER2 negative.  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    Staging PET scan revealed left axillary and retropectoral lymph node metastases as well as diffuse bony metastases.  There has been no MR brain imaging.    March, 2020 - August, 2020: She received weekly paclitaxel with some disease response but requested a break from interventional cytotoxic therapy from significant issues with balance and neuropathy requiring wheelchair use. Switched to every 3-month zoledronic acid.    August, 2020 - March, 2021: Started on anastrozole with February, 2021 PET scan evidence of progression.    March, 2021-August, 2023: Started on anastrozole and abemaciclib.  May, 2023 CT CAP with evidence of soft tissue progression. PIK3CA p.E453K+, ESR1 negative, TMB-low, TEJAS on left axillary tissue.    August, 2023-December, 2023: Switch to exemestane and everolimus with treatment discontinued after 4 months because of significant left breast mass progression.    December, 2023-December, 2024: Started capecitabine 1500 mg twice daily with dose reduction to current 500 mg twice daily 7 days on/7 days off due to mucositis and hand-foot syndrome.  CA 15-3 tumor marker had declined from a zenith of 945 on 12/26/2023 to a nerissa of 107 on 5/17/2024 with recent value of 203 on 10/2/2024.  On 11/5/2024, her CA 15-3 tumor marker was up to 286.    12/4/2024 restaging CT CAP revealed evidence of increased nodularity of the left invasive breast cancer along the anterior chest wall as well as slight increase in anterior mediastinal soft tissue nodule.  A new left upper lobe 4 mm nodule was also identified with no other gross evidence of metastatic disease in the abdomen or pelvis.  Subsequent 12/13/2024 echocardiogram revealed a 65-70% EF. [employer] study revealed no new ESR 1 mutation.  3-month zoledronic acid was administered on 1/7/2025.    A 6th cranial nerve palsy was recently identified by her optometrist.  A 1/7/2025 MR brain revealed diffuse osseous disease but no specific  abnormal enhancement along the cisternal segment of the visualized cranial nerves.  She thinks her diplopia has improved on antiviral therapy.    Denies any current back pain.  She has noticed no change in her left breast induration.    Past medical history:  Past Medical History:   Diagnosis Date    ASCUS favoring benign 10/28/15    neg HPV    Cancer (H)     Diabetes mellitus, type 2 (H) 2020    Hypertension     Obese         Family history:  I have reviewed this patient's family history and updated it with pertinent information if needed.  Family History   Problem Relation Age of Onset    Hypertension Mother          12/3/21    Coronary Artery Disease Mother          12/3/21    Breast Cancer Mother          12/3/21    Hyperlipidemia Mother          12/3/21    Depression Mother          123/3/21    Arrhythmia Brother     Cerebrovascular Disease Brother     Diabetes Brother          Medications:  Current Outpatient Medications   Medication Sig Dispense Refill    alcohol swab prep pads Use to swab area of injection/chrissy as directed. 400 each 3    amLODIPine (NORVASC) 10 MG tablet Take 1 tablet (10 mg) by mouth daily. 90 tablet 3    Ascorbic Acid (VITAMIN C PO)       aspirin 81 MG tablet Take by mouth daily      blood glucose (NO BRAND SPECIFIED) test strip 1 strip by In Vitro route 3 times daily 100 strip 6    blood glucose calibration (NO BRAND SPECIFIED) solution To accompany: Blood Glucose Monitor Brands: per insurance. 1 Bottle 3    blood glucose monitoring (NO BRAND SPECIFIED) meter device kit Use to test blood sugar 3 times daily or as directed. 1 kit 0    Blood Pressure KIT Please test blood pressure at home 2 x daily 1 kit 0    calcium-vitamin D-vitamin K (VIACTIV) 500-500-40 MG-UNT-MCG CHEW Take 1 tablet by mouth every other day      Cholecalciferol (VITAMIN D3 PO) Take 2,000 Units by mouth daily Takes 4 tabs daily      Coenzyme Q10 (COQ-10) 200 MG CAPS       Cyanocobalamin (B-12)  2500 MCG TABS       gabapentin (NEURONTIN) 250 MG/5ML solution Take 5 mLs (250 mg) by mouth at bedtime. 470 mL 0    hydrocortisone 2.5 % cream Apply topically 2 times daily 3.5 g 0    lidocaine-prilocaine (EMLA) 2.5-2.5 % external cream Apply small amount to center of port site 45-60 minutes prior to chemo appointment, cover with clear dressing if desired. 30 g 1    magic mouthwash suspension (diphenhydrAMINE, lidocaine, aluminum-magnesium & simethicone) Swish and swallow 5 mLs in mouth every 6 hours as needed for mouth sores. 240 mL 2    metFORMIN (GLUCOPHAGE) 500 MG tablet Take 1 tablet (500 mg) by mouth 2 times daily (with meals). [employer] brand Please 180 tablet 3    Multiple Vitamins-Minerals (CENTRUM ADULTS PO)       potassium chloride (KLOR-CON) 20 MEQ packet Take 20 mEq by mouth 2 times daily. 60 packet 11    STATIN NOT PRESCRIBED (INTENTIONAL) Please choose reason not prescribed from choices below.      thin (NO BRAND SPECIFIED) lancets 3 each by In Vitro route daily Use with lanceting device. To accompany: Blood Glucose Monitor Brands: per insurance. 300 each 6    triamterene-HCTZ (MAXZIDE-25) 37.5-25 MG tablet Take 1 tablet by mouth daily. 90 tablet 3    UNABLE TO FIND MEDICATION NAME: Elderberry      UNABLE TO FIND MEDICATION NAME: clove of garlic daily      VITAMIN E NATURAL PO Take 400 Units by mouth       No current facility-administered medications for this visit.     Facility-Administered Medications Ordered in Other Visits   Medication Dose Route Frequency Provider Last Rate Last Admin    heparin 100 UNIT/ML injection 500 Units  500 Units Intracatheter Q8H Keysha Birch MD   500 Units at 11/06/20 1017    heparin lock flush 100 unit/mL injection 5 mL  5 mL Intracatheter Q30 Days Robert Fountain MD   5 mL at 12/13/24 1452    heparin lock flush 100 unit/mL injection 500 Units  500 Units Intracatheter Q8H Swathi Page, APRN CNP   500 Units at 01/30/25 0905  "      Allergies:  Allergies   Allergen Reactions    Lisinopril Cough     cough       Review of systems:  Except as note above, the patient denies:  Headache, double vision, change in vision, hearing loss, tinnitus;  Dyspnea, chest pain, palpitations, pleurisy or hemoptysis;  Anorexia, nausea, vomiting, diarrhea, constipation, rectal bleeding bleeding, change in bowel habits;  Urinary frequency, burning or hematuria;  Fever, chills, sweats, change in appetite;  Bone or back pain; skin rash, joint swelling, new lumps;  Unexplained, bleeding or bruising, tingling numbness, change in gait;    Physical examination:  BP (!) 159/80   Pulse 112   Resp 18   Ht 1.549 m (5' 0.98\")   Wt 83.9 kg (185 lb)   SpO2 97%   BMI 34.97 kg/m      The patient is alert and oriented.         Robert Fountain MD  "

## 2025-01-27 ENCOUNTER — HOSPITAL ENCOUNTER (OUTPATIENT)
Dept: MRI IMAGING | Facility: HOSPITAL | Age: 70
Discharge: HOME | End: 2025-01-27
Attending: INTERNAL MEDICINE | Admitting: INTERNAL MEDICINE
Payer: COMMERCIAL

## 2025-01-27 DIAGNOSIS — H49.20 6TH NERVE PALSY: ICD-10-CM

## 2025-01-27 PROCEDURE — 255N000002 HC RX 255 OP 636: Performed by: INTERNAL MEDICINE

## 2025-01-27 PROCEDURE — 70553 MRI BRAIN STEM W/O & W/DYE: CPT

## 2025-01-27 PROCEDURE — A9585 GADOBUTROL INJECTION: HCPCS | Performed by: INTERNAL MEDICINE

## 2025-01-27 RX ORDER — GADOBUTROL 604.72 MG/ML
9 INJECTION INTRAVENOUS ONCE
Status: DISCONTINUED | OUTPATIENT
Start: 2025-01-27 | End: 2025-01-27

## 2025-01-27 RX ORDER — GADOBUTROL 604.72 MG/ML
9 INJECTION INTRAVENOUS ONCE
Status: COMPLETED | OUTPATIENT
Start: 2025-01-27 | End: 2025-01-27

## 2025-01-27 RX ORDER — HEPARIN SODIUM (PORCINE) LOCK FLUSH IV SOLN 100 UNIT/ML 100 UNIT/ML
500 SOLUTION INTRAVENOUS ONCE
Status: COMPLETED | OUTPATIENT
Start: 2025-01-27 | End: 2025-01-27

## 2025-01-27 RX ADMIN — HEPARIN SODIUM (PORCINE) LOCK FLUSH IV SOLN 100 UNIT/ML 500 UNITS: 100 SOLUTION at 08:29

## 2025-01-27 RX ADMIN — GADOBUTROL 9 ML: 604.72 INJECTION INTRAVENOUS at 08:30

## 2025-01-30 ENCOUNTER — TELEPHONE (OUTPATIENT)
Dept: ONCOLOGY | Facility: CLINIC | Age: 70
End: 2025-01-30

## 2025-01-30 ENCOUNTER — INFUSION THERAPY VISIT (OUTPATIENT)
Dept: INFUSION THERAPY | Facility: CLINIC | Age: 70
End: 2025-01-30
Attending: NURSE PRACTITIONER
Payer: COMMERCIAL

## 2025-01-30 ENCOUNTER — ONCOLOGY VISIT (OUTPATIENT)
Dept: ONCOLOGY | Facility: CLINIC | Age: 70
End: 2025-01-30
Attending: INTERNAL MEDICINE
Payer: COMMERCIAL

## 2025-01-30 VITALS
SYSTOLIC BLOOD PRESSURE: 159 MMHG | BODY MASS INDEX: 34.93 KG/M2 | HEART RATE: 112 BPM | WEIGHT: 185 LBS | HEIGHT: 61 IN | RESPIRATION RATE: 18 BRPM | OXYGEN SATURATION: 97 % | DIASTOLIC BLOOD PRESSURE: 80 MMHG

## 2025-01-30 DIAGNOSIS — C50.812 MALIGNANT NEOPLASM OF OVERLAPPING SITES OF LEFT BREAST IN FEMALE, ESTROGEN RECEPTOR POSITIVE (H): ICD-10-CM

## 2025-01-30 DIAGNOSIS — C50.912 MALIGNANT NEOPLASM OF LEFT FEMALE BREAST, UNSPECIFIED ESTROGEN RECEPTOR STATUS, UNSPECIFIED SITE OF BREAST (H): Primary | ICD-10-CM

## 2025-01-30 DIAGNOSIS — C79.51 MALIGNANT NEOPLASM METASTATIC TO BONE (H): ICD-10-CM

## 2025-01-30 DIAGNOSIS — Z17.0 MALIGNANT NEOPLASM OF OVERLAPPING SITES OF LEFT BREAST IN FEMALE, ESTROGEN RECEPTOR POSITIVE (H): ICD-10-CM

## 2025-01-30 LAB
ALBUMIN SERPL BCG-MCNC: 4 G/DL (ref 3.5–5.2)
ALP SERPL-CCNC: 131 U/L (ref 40–150)
ALT SERPL W P-5'-P-CCNC: 9 U/L (ref 0–50)
ANION GAP SERPL CALCULATED.3IONS-SCNC: 16 MMOL/L (ref 7–15)
AST SERPL W P-5'-P-CCNC: 24 U/L (ref 0–45)
BASOPHILS # BLD AUTO: 0.1 10E3/UL (ref 0–0.2)
BASOPHILS NFR BLD AUTO: 1 %
BILIRUB SERPL-MCNC: 0.3 MG/DL
BUN SERPL-MCNC: 18.7 MG/DL (ref 8–23)
CALCIUM SERPL-MCNC: 9.9 MG/DL (ref 8.8–10.4)
CANCER AG15-3 SERPL-ACNC: 547 U/ML
CHLORIDE SERPL-SCNC: 94 MMOL/L (ref 98–107)
CREAT SERPL-MCNC: 0.94 MG/DL (ref 0.51–0.95)
EGFRCR SERPLBLD CKD-EPI 2021: 66 ML/MIN/1.73M2
EOSINOPHIL # BLD AUTO: 0.2 10E3/UL (ref 0–0.7)
EOSINOPHIL NFR BLD AUTO: 3 %
ERYTHROCYTE [DISTWIDTH] IN BLOOD BY AUTOMATED COUNT: 15.3 % (ref 10–15)
GLUCOSE SERPL-MCNC: 100 MG/DL (ref 70–99)
HCO3 SERPL-SCNC: 24 MMOL/L (ref 22–29)
HCT VFR BLD AUTO: 35.8 % (ref 35–47)
HGB BLD-MCNC: 11.9 G/DL (ref 11.7–15.7)
IMM GRANULOCYTES # BLD: 0 10E3/UL
IMM GRANULOCYTES NFR BLD: 0 %
LYMPHOCYTES # BLD AUTO: 1.5 10E3/UL (ref 0.8–5.3)
LYMPHOCYTES NFR BLD AUTO: 25 %
MCH RBC QN AUTO: 29.2 PG (ref 26.5–33)
MCHC RBC AUTO-ENTMCNC: 33.2 G/DL (ref 31.5–36.5)
MCV RBC AUTO: 88 FL (ref 78–100)
MONOCYTES # BLD AUTO: 0.4 10E3/UL (ref 0–1.3)
MONOCYTES NFR BLD AUTO: 6 %
NEUTROPHILS # BLD AUTO: 3.9 10E3/UL (ref 1.6–8.3)
NEUTROPHILS NFR BLD AUTO: 65 %
NRBC # BLD AUTO: 0 10E3/UL
NRBC BLD AUTO-RTO: 0 /100
PLATELET # BLD AUTO: 314 10E3/UL (ref 150–450)
POTASSIUM SERPL-SCNC: 4.6 MMOL/L (ref 3.4–5.3)
PROT SERPL-MCNC: 8.1 G/DL (ref 6.4–8.3)
RBC # BLD AUTO: 4.07 10E6/UL (ref 3.8–5.2)
SODIUM SERPL-SCNC: 134 MMOL/L (ref 135–145)
WBC # BLD AUTO: 6 10E3/UL (ref 4–11)

## 2025-01-30 PROCEDURE — G0463 HOSPITAL OUTPT CLINIC VISIT: HCPCS | Performed by: INTERNAL MEDICINE

## 2025-01-30 PROCEDURE — 36591 DRAW BLOOD OFF VENOUS DEVICE: CPT

## 2025-01-30 PROCEDURE — 84155 ASSAY OF PROTEIN SERUM: CPT

## 2025-01-30 PROCEDURE — 250N000011 HC RX IP 250 OP 636: Performed by: NURSE PRACTITIONER

## 2025-01-30 PROCEDURE — 85004 AUTOMATED DIFF WBC COUNT: CPT

## 2025-01-30 PROCEDURE — 86300 IMMUNOASSAY TUMOR CA 15-3: CPT

## 2025-01-30 PROCEDURE — 82040 ASSAY OF SERUM ALBUMIN: CPT

## 2025-01-30 PROCEDURE — 85048 AUTOMATED LEUKOCYTE COUNT: CPT

## 2025-01-30 RX ORDER — LOPERAMIDE HYDROCHLORIDE 2 MG/1
CAPSULE ORAL
Qty: 30 CAPSULE | Refills: 0 | Status: SHIPPED | OUTPATIENT
Start: 2025-02-04

## 2025-01-30 RX ORDER — ALBUTEROL SULFATE 90 UG/1
1-2 INHALANT RESPIRATORY (INHALATION)
Start: 2025-02-19

## 2025-01-30 RX ORDER — ALBUTEROL SULFATE 0.83 MG/ML
2.5 SOLUTION RESPIRATORY (INHALATION)
OUTPATIENT
Start: 2025-02-05

## 2025-01-30 RX ORDER — HEPARIN SODIUM (PORCINE) LOCK FLUSH IV SOLN 100 UNIT/ML 100 UNIT/ML
500 SOLUTION INTRAVENOUS EVERY 8 HOURS
Status: DISCONTINUED | OUTPATIENT
Start: 2025-01-30 | End: 2025-01-30 | Stop reason: HOSPADM

## 2025-01-30 RX ORDER — DIPHENHYDRAMINE HYDROCHLORIDE 50 MG/ML
25 INJECTION INTRAMUSCULAR; INTRAVENOUS
Start: 2025-02-05

## 2025-01-30 RX ORDER — LIDOCAINE/PRILOCAINE 2.5 %-2.5%
CREAM (GRAM) TOPICAL
Qty: 30 G | Refills: 1 | Status: SHIPPED | OUTPATIENT
Start: 2025-01-30

## 2025-01-30 RX ORDER — LAMOTRIGINE 25 MG/1
500 TABLET ORAL ONCE
OUTPATIENT
Start: 2025-02-05 | End: 2025-01-30

## 2025-01-30 RX ORDER — PROCHLORPERAZINE MALEATE 10 MG
10 TABLET ORAL EVERY 6 HOURS PRN
Qty: 30 TABLET | Refills: 2 | Status: SHIPPED | OUTPATIENT
Start: 2025-02-04

## 2025-01-30 RX ORDER — METHYLPREDNISOLONE SODIUM SUCCINATE 40 MG/ML
40 INJECTION INTRAMUSCULAR; INTRAVENOUS
Start: 2025-02-05

## 2025-01-30 RX ORDER — DIPHENHYDRAMINE HYDROCHLORIDE 50 MG/ML
50 INJECTION INTRAMUSCULAR; INTRAVENOUS
Start: 2025-02-05

## 2025-01-30 RX ORDER — DIPHENHYDRAMINE HYDROCHLORIDE 50 MG/ML
25 INJECTION INTRAMUSCULAR; INTRAVENOUS
Start: 2025-02-19

## 2025-01-30 RX ORDER — LAMOTRIGINE 25 MG/1
500 TABLET ORAL ONCE
OUTPATIENT
Start: 2025-02-19 | End: 2025-02-12

## 2025-01-30 RX ORDER — MEPERIDINE HYDROCHLORIDE 25 MG/ML
25 INJECTION INTRAMUSCULAR; INTRAVENOUS; SUBCUTANEOUS
OUTPATIENT
Start: 2025-02-05

## 2025-01-30 RX ORDER — EPINEPHRINE 1 MG/ML
0.3 INJECTION, SOLUTION INTRAMUSCULAR; SUBCUTANEOUS EVERY 5 MIN PRN
OUTPATIENT
Start: 2025-02-05

## 2025-01-30 RX ORDER — DIPHENHYDRAMINE HYDROCHLORIDE 50 MG/ML
50 INJECTION INTRAMUSCULAR; INTRAVENOUS
Start: 2025-02-19

## 2025-01-30 RX ORDER — EPINEPHRINE 1 MG/ML
0.3 INJECTION, SOLUTION INTRAMUSCULAR; SUBCUTANEOUS EVERY 5 MIN PRN
OUTPATIENT
Start: 2025-02-19

## 2025-01-30 RX ORDER — ALBUTEROL SULFATE 90 UG/1
1-2 INHALANT RESPIRATORY (INHALATION)
Start: 2025-02-05

## 2025-01-30 RX ORDER — MEPERIDINE HYDROCHLORIDE 25 MG/ML
25 INJECTION INTRAMUSCULAR; INTRAVENOUS; SUBCUTANEOUS
OUTPATIENT
Start: 2025-02-19

## 2025-01-30 RX ORDER — ALBUTEROL SULFATE 0.83 MG/ML
2.5 SOLUTION RESPIRATORY (INHALATION)
OUTPATIENT
Start: 2025-02-19

## 2025-01-30 RX ORDER — METHYLPREDNISOLONE SODIUM SUCCINATE 40 MG/ML
40 INJECTION INTRAMUSCULAR; INTRAVENOUS
Start: 2025-02-19

## 2025-01-30 RX ADMIN — Medication 500 UNITS: at 09:05

## 2025-01-30 ASSESSMENT — PAIN SCALES - GENERAL: PAINLEVEL_OUTOF10: NO PAIN (0)

## 2025-01-30 NOTE — TELEPHONE ENCOUNTER
Prior Authorization Not Needed per Insurance    Medication: TRUQAP PO  Insurance Company: [employer] - Phone [phone] Fax [phone]  Expected CoPay: $    Pharmacy Filling the Rx:    Pharmacy Notified: Yes  Patient Notified: Yes   [facility] Counseling:  I discussed with the patient the risks of imiquimod including but not limited to erythema, scaling, itching, weeping, crusting, and pain.  Patient understands that the inflammatory response to imiquimod is variable from person to person and was educated regarded proper titration schedule.  If flu-like symptoms develop, patient knows to discontinue the medication and contact us.

## 2025-01-30 NOTE — PROGRESS NOTES
"Patient's name and  were verified.  See Doc Flowsheet - IV assess for details.  IVAD accessed with 20G 3/4\" carroll gripper plus needle  blood return positive: YES  Site without redness, tenderness or swelling: YES  flushed with 20cc NS and 5cc 100u/ml heparin  Needle: De-accessed  Comments: Labs drawn.  Patient tolerated procedure without incident.    Viviane Forrest RN, BSN, OCN    "

## 2025-01-30 NOTE — NURSING NOTE
"Oncology Rooming Note    January 30, 2025 9:13 AM   Mary Olson is a 69 year old female who presents for:    Chief Complaint   Patient presents with    Oncology Clinic Visit     Follow up     Initial Vitals: BP (!) 159/80   Pulse 112   Resp 18   Ht 1.549 m (5' 0.98\")   Wt 83.9 kg (185 lb)   SpO2 97%   BMI 34.97 kg/m   Estimated body mass index is 34.97 kg/m  as calculated from the following:    Height as of this encounter: 1.549 m (5' 0.98\").    Weight as of this encounter: 83.9 kg (185 lb). Body surface area is 1.9 meters squared.  No Pain (0) Comment: Data Unavailable   No LMP recorded. Patient is postmenopausal.  Allergies reviewed: Yes  Medications reviewed: Yes    Medications: Medication refills not needed today.  Pharmacy name entered into EPIC:    [facility] - [place], [place] - [address] [place] [address]  [facility] - [address] [facility] [place] - [place], [place] - [address] [facility].  [facility] - [place], [place] - [address] [place] [address] MAIL/SPECIALTY PHARMACY - [place], [place] - [facility] [address]  [facility] - [place], [place] - [address] [facility] - [place] - [address] AT [id number] - [place], [place] - [address] [facility] ONCOLOGY PHARMACY  [id number] - [place], [place] - [address] [facility] - [place], [place] - [facility] [address]    Frailty Screening:   Is the patient here for a new oncology consult visit in cancer care? 2. No      Clinical concerns: discuss treatment       Kailee Jarvis LPN              "

## 2025-01-30 NOTE — PATIENT INSTRUCTIONS
1. Faslodex and Truqap(capivasertib) as directed  2. Follow-up Dr. Fountain 4 weeks after beginning therapy with labs before appointment

## 2025-01-30 NOTE — TELEPHONE ENCOUNTER
"Oral Chemotherapy Monitoring Program    Primary Oncologist: Dr. Fountain  Primary Oncology Clinic: [facility] Diagnosis: Breast Cancer    Drug: Capivasertib 400 mg BID days 1-4 of each week  Start Date: TBD  Expected duration of therapy: Until disease progression or unacceptable toxicity    Drug Interaction Assessment: None    Lab Monitoring Plan  Per treatment plan    Subjective/Objective:  Mary Olson is a 69 year old female seen in clinic for an initial visit for oral chemotherapy education.          6/21/2024     4:00 PM 7/22/2024     8:00 AM 8/19/2024     7:00 AM 9/17/2024     7:00 AM 10/14/2024     4:00 PM 11/11/2024     4:00 PM 1/30/2025    10:00 AM   ORAL CHEMOTHERAPY   Assessment Type Refill Refill Refill Refill Refill Refill New Teach   Diagnosis Code Breast Cancer Breast Cancer Breast Cancer Breast Cancer Breast Cancer Breast Cancer Breast Cancer   [facility]    [place]   Clinic Name/Location [facility]   Drug Name Xeloda (capecitabine) Xeloda (capecitabine) Xeloda (capecitabine) Xeloda (capecitabine) Xeloda (capecitabine) Xeloda (capecitabine) Truqap (capivasertib)   Dose 750 mg 750 mg 750 mg 750 mg 750 mg 750 mg 400 mg   Current Schedule BID BID BID BID BID BID BID   Cycle Details 1 week on, 1 week off 1 week on, 1 week off 1 week on, 1 week off 1 week on, 1 week off 1 week on, 1 week off 1 week on, 1 week off Days 1-4 each week       Vitals:  BP:   BP Readings from Last 1 Encounters:   01/30/25 (!) 159/80     Wt Readings from Last 1 Encounters:   01/30/25 83.9 kg (185 lb)     Estimated body surface area is 1.9 meters squared as calculated from the following:    Height as of an earlier encounter on 1/30/25: 1.549 m (5' 0.98\").    Weight as of an earlier encounter on 1/30/25: 83.9 kg (185 lb).    Labs:  _  Result Component Current Result Ref Range   Sodium 134 (L) (1/30/2025) 135 - 145 mmol/L "     _  Result Component Current Result Ref Range   Potassium 4.6 (1/30/2025) 3.4 - 5.3 mmol/L     _  Result Component Current Result Ref Range   Calcium 9.9 (1/30/2025) 8.8 - 10.4 mg/dL     No results found for Mag within last 30 days.     No results found for Phos within last 30 days.     _  Result Component Current Result Ref Range   Albumin 4.0 (1/30/2025) 3.5 - 5.2 g/dL     _  Result Component Current Result Ref Range   Urea Nitrogen 18.7 (1/30/2025) 8.0 - 23.0 mg/dL     _  Result Component Current Result Ref Range   Creatinine 0.94 (1/30/2025) 0.51 - 0.95 mg/dL       _  Result Component Current Result Ref Range   AST 24 (1/30/2025) 0 - 45 U/L     _  Result Component Current Result Ref Range   ALT 9 (1/30/2025) 0 - 50 U/L     _  Result Component Current Result Ref Range   Bilirubin Total 0.3 (1/30/2025) <=1.2 mg/dL       _  Result Component Current Result Ref Range   WBC Count 6.0 (1/30/2025) 4.0 - 11.0 10e3/uL     _  Result Component Current Result Ref Range   Hemoglobin 11.9 (1/30/2025) 11.7 - 15.7 g/dL     _  Result Component Current Result Ref Range   Platelet Count 314 (1/30/2025) 150 - 450 10e3/uL     No results found for ANC within last 30 days.         Assessment:  Patient is appropriate to start therapy.    Plan:  Basic chemotherapy teaching was reviewed with the patient including indication, start date of therapy, dose, administration, adverse effects, missed doses, food and drug interactions, monitoring, side effect management, office contact information, and safe handling. Written materials were provided and all questions answered.    Follow-Up:  TBD: 1 week follow up call to assess tolerability and confirm start date     [facility]  Oncology Pharmacy [facility]  [phone]

## 2025-01-30 NOTE — LETTER
1/30/2025      Mary Olson  [address]  [place] [address]      Dear Colleague,    Thank you for referring your patient, Mary Olson, to the [facility]. Please see a copy of my visit note below.    Hematology/Medical Oncology Follow-up Note      January 30, 2025      Reason for visit:  Mary Olson is a 69 year old woman from [place] who presents for oncologic reevaluation with a history of metastatic ER/AZ+ HER2- left breast cancer with bone, pleural and chest wall metastases on capecitabine. She is a former patient of Dr. Tiffany Hilton.    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  Discussed indications, benefits, risk, alternatives and side effects of fulvestrant and capivasertib with the patient.  She understands and agrees.  2.  Follow-up with me 4 weeks after beginning treatment with CBC, CMP and CA 15-3 tumor marker before appointment  3.  She is anticipating to be going down to [facility] for the winter sometime in the next 4 -8 weeks    Time with patient including review, documentation, history, examination, coordination of care and counseling was 30 minutes.    The longitudinal plan of care for the diagnosis(es)/condition(s) as documented were addressed during this visit. Due to the added complexity in care, I will continue to support Desmond in the subsequent management and with ongoing continuity of care.    History of present illness:  In March, 2020, the patient presented with a several month history of left breast nodularity and palpable axillary adenopathy.  Subsequent imaging confirmed bilateral breast lesions including a 6.2 cm left breast mass with diffuse  breast dermal thickening and a 1.3 cm right breast mass.  The left breast mass revealed a grade 2 invasive mammary carcinoma that was ER/VT positive and HER2 negative.  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    Staging PET scan revealed left axillary and retropectoral lymph node metastases as well as diffuse bony metastases.  There has been no MR brain imaging.    March, 2020 - August, 2020: She received weekly paclitaxel with some disease response but requested a break from interventional cytotoxic therapy from significant issues with balance and neuropathy requiring wheelchair use. Switched to every 3-month zoledronic acid.    August, 2020 - March, 2021: Started on anastrozole with February, 2021 PET scan evidence of progression.    March, 2021-August, 2023: Started on anastrozole and abemaciclib.  May, 2023 CT CAP with evidence of soft tissue progression. PIK3CA [id number]+, ESR1 negative, TMB-low, TEJAS on left axillary tissue.    August, 2023-December, 2023: Switch to exemestane and everolimus with treatment discontinued after 4 months because of significant left breast mass progression.    December, 2023-December, 2024: Started capecitabine 1500 mg twice daily with dose reduction to current 500 mg twice daily 7 days on/7 days off due to mucositis and hand-foot syndrome.  CA 15-3 tumor marker had declined from a zenith of 945 on 12/26/2023 to a nerissa of 107 on 5/17/2024 with recent value of 203 on 10/2/2024.  On 11/5/2024, her CA 15-3 tumor marker was up to 286.    12/4/2024 restaging CT CAP revealed evidence of increased nodularity of the left invasive breast cancer along the anterior chest wall as well as slight increase in anterior mediastinal soft tissue nodule.  A new left upper lobe 4 mm nodule was also identified with no other gross evidence of metastatic disease in the abdomen or pelvis.  Subsequent 12/13/2024 echocardiogram revealed a 65-70% EF.  [employer] study revealed no new ESR 1 mutation.  3-month zoledronic acid was administered on 2025.    A 6th cranial nerve palsy was recently identified by her optometrist.  A 2025 MR brain revealed diffuse osseous disease but no specific abnormal enhancement along the cisternal segment of the visualized cranial nerves.  She thinks her diplopia has improved on antiviral therapy.    Denies any current back pain.  She has noticed no change in her left breast induration.    Past medical history:  Past Medical History:   Diagnosis Date     ASCUS favoring benign 10/28/15    neg HPV     Cancer (H)      Diabetes mellitus, type 2 (H) 2020     Hypertension      Obese         Family history:  I have reviewed this patient's family history and updated it with pertinent information if needed.  Family History   Problem Relation Age of Onset     Hypertension Mother          12/3/21     Coronary Artery Disease Mother          12/3/21     Breast Cancer Mother          12/3/21     Hyperlipidemia Mother          12/3/21     Depression Mother          123/3/21     Arrhythmia Brother      Cerebrovascular Disease Brother      Diabetes Brother          Medications:  Current Outpatient Medications   Medication Sig Dispense Refill     alcohol swab prep pads Use to swab area of injection/chrissy as directed. 400 each 3     amLODIPine (NORVASC) 10 MG tablet Take 1 tablet (10 mg) by mouth daily. 90 tablet 3     Ascorbic Acid (VITAMIN C PO)        aspirin 81 MG tablet Take by mouth daily       blood glucose (NO BRAND SPECIFIED) test strip 1 strip by In Vitro route 3 times daily 100 strip 6     blood glucose calibration (NO BRAND SPECIFIED) solution To accompany: Blood Glucose Monitor Brands: per insurance. 1 Bottle 3     blood glucose monitoring (NO BRAND SPECIFIED) meter device kit Use to test blood sugar 3 times daily or as directed. 1 kit 0     Blood Pressure KIT Please test blood pressure at home 2 x daily  1 kit 0     calcium-vitamin D-vitamin K (VIACTIV) 500-500-40 MG-UNT-MCG CHEW Take 1 tablet by mouth every other day       Cholecalciferol (VITAMIN D3 PO) Take 2,000 Units by mouth daily Takes 4 tabs daily       Coenzyme Q10 (COQ-10) 200 MG CAPS        Cyanocobalamin (B-12) 2500 MCG TABS        gabapentin (NEURONTIN) 250 MG/5ML solution Take 5 mLs (250 mg) by mouth at bedtime. 470 mL 0     hydrocortisone 2.5 % cream Apply topically 2 times daily 3.5 g 0     lidocaine-prilocaine (EMLA) 2.5-2.5 % external cream Apply small amount to center of port site 45-60 minutes prior to chemo appointment, cover with clear dressing if desired. 30 g 1     magic mouthwash suspension (diphenhydrAMINE, lidocaine, aluminum-magnesium & simethicone) Swish and swallow 5 mLs in mouth every 6 hours as needed for mouth sores. 240 mL 2     metFORMIN (GLUCOPHAGE) 500 MG tablet Take 1 tablet (500 mg) by mouth 2 times daily (with meals). [employer] brand Please 180 tablet 3     Multiple Vitamins-Minerals (CENTRUM ADULTS PO)        potassium chloride (KLOR-CON) 20 MEQ packet Take 20 mEq by mouth 2 times daily. 60 packet 11     STATIN NOT PRESCRIBED (INTENTIONAL) Please choose reason not prescribed from choices below.       thin (NO BRAND SPECIFIED) lancets 3 each by In Vitro route daily Use with lanceting device. To accompany: Blood Glucose Monitor Brands: per insurance. 300 each 6     triamterene-HCTZ (MAXZIDE-25) 37.5-25 MG tablet Take 1 tablet by mouth daily. 90 tablet 3     UNABLE TO FIND MEDICATION NAME: Elderberry       UNABLE TO FIND MEDICATION NAME: clove of garlic daily       VITAMIN E NATURAL PO Take 400 Units by mouth       No current facility-administered medications for this visit.     Facility-Administered Medications Ordered in Other Visits   Medication Dose Route Frequency Provider Last Rate Last Admin     heparin 100 UNIT/ML injection 500 Units  500 Units Intracatheter Q8H Keysha Birch MD   500 Units at 11/06/20  "1017     heparin lock flush 100 unit/mL injection 5 mL  5 mL Intracatheter Q30 Days Robert Fountain MD   5 mL at 12/13/24 1452     heparin lock flush 100 unit/mL injection 500 Units  500 Units Intracatheter Q8H Swathi Page, APRN CNP   500 Units at 01/30/25 0905       Allergies:  Allergies   Allergen Reactions     Lisinopril Cough     cough       Review of systems:  Except as note above, the patient denies:  Headache, double vision, change in vision, hearing loss, tinnitus;  Dyspnea, chest pain, palpitations, pleurisy or hemoptysis;  Anorexia, nausea, vomiting, diarrhea, constipation, rectal bleeding bleeding, change in bowel habits;  Urinary frequency, burning or hematuria;  Fever, chills, sweats, change in appetite;  Bone or back pain; skin rash, joint swelling, new lumps;  Unexplained, bleeding or bruising, tingling numbness, change in gait;    Physical examination:  BP (!) 159/80   Pulse 112   Resp 18   Ht 1.549 m (5' 0.98\")   Wt 83.9 kg (185 lb)   SpO2 97%   BMI 34.97 kg/m      The patient is alert and oriented.         Robert Fountain MD      Again, thank you for allowing me to participate in the care of your patient.        Sincerely,        Robert Fountain MD    Electronically signed"

## 2025-02-05 ENCOUNTER — INFUSION THERAPY VISIT (OUTPATIENT)
Dept: INFUSION THERAPY | Facility: CLINIC | Age: 70
End: 2025-02-05
Attending: INTERNAL MEDICINE
Payer: COMMERCIAL

## 2025-02-05 VITALS — OXYGEN SATURATION: 98 % | DIASTOLIC BLOOD PRESSURE: 88 MMHG | HEART RATE: 100 BPM | SYSTOLIC BLOOD PRESSURE: 158 MMHG

## 2025-02-05 DIAGNOSIS — C79.51 MALIGNANT NEOPLASM METASTATIC TO BONE (H): ICD-10-CM

## 2025-02-05 DIAGNOSIS — C50.912 MALIGNANT NEOPLASM OF LEFT FEMALE BREAST, UNSPECIFIED ESTROGEN RECEPTOR STATUS, UNSPECIFIED SITE OF BREAST (H): Primary | ICD-10-CM

## 2025-02-05 DIAGNOSIS — C50.912 MALIGNANT NEOPLASM OF LEFT FEMALE BREAST, UNSPECIFIED ESTROGEN RECEPTOR STATUS, UNSPECIFIED SITE OF BREAST (H): ICD-10-CM

## 2025-02-05 DIAGNOSIS — C79.51 MALIGNANT NEOPLASM METASTATIC TO BONE (H): Primary | ICD-10-CM

## 2025-02-05 LAB
ALBUMIN SERPL BCG-MCNC: 4 G/DL (ref 3.5–5.2)
ALP SERPL-CCNC: 121 U/L (ref 40–150)
ALT SERPL W P-5'-P-CCNC: 9 U/L (ref 0–50)
ANION GAP SERPL CALCULATED.3IONS-SCNC: 12 MMOL/L (ref 7–15)
AST SERPL W P-5'-P-CCNC: 22 U/L (ref 0–45)
BASOPHILS # BLD AUTO: 0.1 10E3/UL (ref 0–0.2)
BASOPHILS NFR BLD AUTO: 1 %
BILIRUB SERPL-MCNC: 0.3 MG/DL
BUN SERPL-MCNC: 13.4 MG/DL (ref 8–23)
CALCIUM SERPL-MCNC: 9.9 MG/DL (ref 8.8–10.4)
CHLORIDE SERPL-SCNC: 97 MMOL/L (ref 98–107)
CREAT SERPL-MCNC: 0.86 MG/DL (ref 0.51–0.95)
EGFRCR SERPLBLD CKD-EPI 2021: 73 ML/MIN/1.73M2
EOSINOPHIL # BLD AUTO: 0.2 10E3/UL (ref 0–0.7)
EOSINOPHIL NFR BLD AUTO: 4 %
ERYTHROCYTE [DISTWIDTH] IN BLOOD BY AUTOMATED COUNT: 15.5 % (ref 10–15)
EST. AVERAGE GLUCOSE BLD GHB EST-MCNC: 134 MG/DL
GLUCOSE SERPL-MCNC: 153 MG/DL (ref 70–99)
HBA1C MFR BLD: 6.3 % (ref 0–5.6)
HCO3 SERPL-SCNC: 25 MMOL/L (ref 22–29)
HCT VFR BLD AUTO: 35.9 % (ref 35–47)
HGB BLD-MCNC: 11.5 G/DL (ref 11.7–15.7)
IMM GRANULOCYTES # BLD: 0.1 10E3/UL
IMM GRANULOCYTES NFR BLD: 1 %
LYMPHOCYTES # BLD AUTO: 1.4 10E3/UL (ref 0.8–5.3)
LYMPHOCYTES NFR BLD AUTO: 26 %
MAGNESIUM SERPL-MCNC: 1.6 MG/DL (ref 1.7–2.3)
MCH RBC QN AUTO: 28.9 PG (ref 26.5–33)
MCHC RBC AUTO-ENTMCNC: 32 G/DL (ref 31.5–36.5)
MCV RBC AUTO: 90 FL (ref 78–100)
MONOCYTES # BLD AUTO: 0.3 10E3/UL (ref 0–1.3)
MONOCYTES NFR BLD AUTO: 5 %
NEUTROPHILS # BLD AUTO: 3.5 10E3/UL (ref 1.6–8.3)
NEUTROPHILS NFR BLD AUTO: 64 %
NRBC # BLD AUTO: 0 10E3/UL
NRBC BLD AUTO-RTO: 0 /100
PLATELET # BLD AUTO: 345 10E3/UL (ref 150–450)
POTASSIUM SERPL-SCNC: 4.3 MMOL/L (ref 3.4–5.3)
PROT SERPL-MCNC: 8 G/DL (ref 6.4–8.3)
RBC # BLD AUTO: 3.98 10E6/UL (ref 3.8–5.2)
SODIUM SERPL-SCNC: 134 MMOL/L (ref 135–145)
WBC # BLD AUTO: 5.5 10E3/UL (ref 4–11)

## 2025-02-05 PROCEDURE — 80053 COMPREHEN METABOLIC PANEL: CPT

## 2025-02-05 PROCEDURE — 85025 COMPLETE CBC W/AUTO DIFF WBC: CPT

## 2025-02-05 PROCEDURE — 250N000011 HC RX IP 250 OP 636: Mod: JZ | Performed by: INTERNAL MEDICINE

## 2025-02-05 PROCEDURE — 83735 ASSAY OF MAGNESIUM: CPT

## 2025-02-05 PROCEDURE — 250N000011 HC RX IP 250 OP 636: Performed by: INTERNAL MEDICINE

## 2025-02-05 PROCEDURE — 82310 ASSAY OF CALCIUM: CPT

## 2025-02-05 PROCEDURE — 83036 HEMOGLOBIN GLYCOSYLATED A1C: CPT | Mod: GA

## 2025-02-05 PROCEDURE — 96402 CHEMO HORMON ANTINEOPL SQ/IM: CPT

## 2025-02-05 PROCEDURE — 99207 PR NO CHARGE LOS: CPT

## 2025-02-05 PROCEDURE — 36591 DRAW BLOOD OFF VENOUS DEVICE: CPT

## 2025-02-05 RX ORDER — LAMOTRIGINE 25 MG/1
500 TABLET ORAL ONCE
Status: COMPLETED | OUTPATIENT
Start: 2025-02-05 | End: 2025-02-05

## 2025-02-05 RX ORDER — HEPARIN SODIUM (PORCINE) LOCK FLUSH IV SOLN 100 UNIT/ML 100 UNIT/ML
5 SOLUTION INTRAVENOUS
Status: DISCONTINUED | OUTPATIENT
Start: 2025-02-05 | End: 2025-02-05 | Stop reason: HOSPADM

## 2025-02-05 RX ADMIN — FULVESTRANT 500 MG: 250 INJECTION, SOLUTION INTRAMUSCULAR at 10:43

## 2025-02-05 RX ADMIN — Medication 5 ML: at 10:16

## 2025-02-05 ASSESSMENT — PAIN SCALES - GENERAL: PAINLEVEL_OUTOF10: NO PAIN (0)

## 2025-02-05 NOTE — PROGRESS NOTES
Infusion Nursing Note:  Marysweta Olson presents today for Faslodex.    Patient seen by provider today: No   present during visit today: Not Applicable.    Note: Assessment performed by Malena CARTER RN prior to injection today.    Intravenous Access:  No Intravenous access/labs at this visit.    Treatment Conditions:  Not Applicable.      Post Infusion Assessment:  Patient tolerated injection without incident.  Site patent and intact, free from redness, edema or discomfort.       Discharge Plan:   Patient discharged in stable condition accompanied by: self.  Departure Mode: Ambulatory.  Patient will return on 2/19 for C1D15 dose of faslodex.      Kailee Jarvis LPN

## 2025-02-05 NOTE — PROGRESS NOTES
Port site scrubbed with Chloraprep for 30 seconds. Accessed port using sterile technique. Cbc, cmp, mag, a1c tubes drawn. See documentation flowsheet. Port needle flushed per protocol and deaccessed. Tolerated port access and draw without complaint. Katharina Enamorado RN on 2/5/2025 at 10:16 AM

## 2025-02-06 ENCOUNTER — TELEPHONE (OUTPATIENT)
Dept: ONCOLOGY | Facility: CLINIC | Age: 70
End: 2025-02-06
Payer: COMMERCIAL

## 2025-02-06 NOTE — TELEPHONE ENCOUNTER
Oral Chemotherapy Monitoring Program    Primary Oncologist: Dr. Fountain  Drug: capivasertib (Truqap) 400 mg BID on days 1 through 4 of each week; 28-days cycle  Start Date: 2/2/2025    Subjective/Objective:  Mary Olson is a 69 year old female contacted by phone for a follow-up visit for oral chemotherapy.          8/19/2024     7:00 AM 9/17/2024     7:00 AM 10/14/2024     4:00 PM 11/11/2024     4:00 PM 1/30/2025    10:00 AM 1/30/2025    10:41 AM 2/6/2025     2:00 PM   ORAL CHEMOTHERAPY   Assessment Type Refill Refill Refill Refill New Teach Refill Initial Follow up   Diagnosis Code Breast Cancer Breast Cancer Breast Cancer Breast Cancer Breast Cancer Breast Cancer Breast Cancer   Providers Yobani Fountain   Clinic Name/Location [facility]   Drug Name Xeloda (capecitabine) Xeloda (capecitabine) Xeloda (capecitabine) Xeloda (capecitabine) Truqap (capivasertib) Truqap (capivasertib) Truqap (capivasertib)   Dose 750 mg 750 mg 750 mg 750 mg 400 mg 400 mg 400 mg   Current Schedule BID BID BID BID BID BID BID   Cycle Details 1 week on, 1 week off 1 week on, 1 week off 1 week on, 1 week off 1 week on, 1 week off Days 1-4 each week Days 1-4 each week Days 1-4 each week   Start Date of Last Cycle       2/2/2025   Planned next cycle start date       3/2/2025   Doses missed in last 2 weeks       0   Adherence Assessment       Adherent   Adverse Effects       Diarrhea   Pharmacist Intervention(diarrhea)       No   Home BPs       Not applicable   Any new drug interactions?       No   Is the dose as ordered appropriate for the patient?       Yes   Is the patient currently in pain?       No   Has the patient been assessed within the past 6 months for depression?       Yes   Has the patient missed any days of school, work, or other routine activity?       No   Since the last time we talked, have you been hospitalized or used the emergency room?        "No   Was a medication prescribed as part of a CPA?       No       Vitals:  BP:   BP Readings from Last 1 Encounters:   02/05/25 (!) 158/88     Wt Readings from Last 1 Encounters:   01/30/25 83.9 kg (185 lb)     Estimated body surface area is 1.9 meters squared as calculated from the following:    Height as of 1/30/25: 1.549 m (5' 0.98\").    Weight as of 1/30/25: 83.9 kg (185 lb).    Labs:  _  Result Component Current Result Ref Range   Sodium 134 (L) (2/5/2025) 135 - 145 mmol/L     _  Result Component Current Result Ref Range   Potassium 4.3 (2/5/2025) 3.4 - 5.3 mmol/L     _  Result Component Current Result Ref Range   Calcium 9.9 (2/5/2025) 8.8 - 10.4 mg/dL     _  Result Component Current Result Ref Range   Magnesium 1.6 (L) (2/5/2025) 1.7 - 2.3 mg/dL     No results found for Phos within last 30 days.     _  Result Component Current Result Ref Range   Albumin 4.0 (2/5/2025) 3.5 - 5.2 g/dL     _  Result Component Current Result Ref Range   Urea Nitrogen 13.4 (2/5/2025) 8.0 - 23.0 mg/dL     _  Result Component Current Result Ref Range   Creatinine 0.86 (2/5/2025) 0.51 - 0.95 mg/dL       _  Result Component Current Result Ref Range   AST 22 (2/5/2025) 0 - 45 U/L     _  Result Component Current Result Ref Range   ALT 9 (2/5/2025) 0 - 50 U/L     _  Result Component Current Result Ref Range   Bilirubin Total 0.3 (2/5/2025) <=1.2 mg/dL       _  Result Component Current Result Ref Range   WBC Count 5.5 (2/5/2025) 4.0 - 11.0 10e3/uL     _  Result Component Current Result Ref Range   Hemoglobin 11.5 (L) (2/5/2025) 11.7 - 15.7 g/dL     _  Result Component Current Result Ref Range   Platelet Count 345 (2/5/2025) 150 - 450 10e3/uL     No results found for ANC within last 30 days.         Assessment/Plan:  I spoke with patient today. Patient reports minor diarrhea on day 3 of taking capivasertib, but patient notes that diarrhea symptoms resolved after taking loperamide. Patient reports doing well on therapy. Patient reports no " current or new concerns. Patient instructed to contact Care team or Oral Chemotherapy Pharmacy team with any questions or concerns. Patient verbalized understanding and agreement to plan. Patient is compliant with therapy and no missed doses. Future appointments have been confirmed with patient. We will continue to follow.    Follow-Up:  2/19/2025 - C1D15 Labs and infusion appointment  2/25/2025 - Appointment with Dr. Александр Lea  Oncology Pharmacy Intern  [facility]  [phone]

## 2025-02-13 ENCOUNTER — OFFICE VISIT (OUTPATIENT)
Dept: FAMILY MEDICINE | Facility: CLINIC | Age: 70
End: 2025-02-13
Attending: FAMILY MEDICINE
Payer: COMMERCIAL

## 2025-02-13 VITALS
SYSTOLIC BLOOD PRESSURE: 124 MMHG | OXYGEN SATURATION: 98 % | DIASTOLIC BLOOD PRESSURE: 80 MMHG | BODY MASS INDEX: 34.89 KG/M2 | WEIGHT: 184.8 LBS | HEIGHT: 61 IN | HEART RATE: 106 BPM | RESPIRATION RATE: 25 BRPM | TEMPERATURE: 97.9 F

## 2025-02-13 DIAGNOSIS — I10 BENIGN ESSENTIAL HYPERTENSION: Primary | ICD-10-CM

## 2025-02-13 DIAGNOSIS — E09.9 STEROID-INDUCED DIABETES MELLITUS, SUBSEQUENT ENCOUNTER: ICD-10-CM

## 2025-02-13 DIAGNOSIS — T38.0X5D STEROID-INDUCED DIABETES MELLITUS, SUBSEQUENT ENCOUNTER: ICD-10-CM

## 2025-02-13 DIAGNOSIS — C50.012 MALIGNANT NEOPLASM OF NIPPLE OF LEFT BREAST IN FEMALE, UNSPECIFIED ESTROGEN RECEPTOR STATUS (H): ICD-10-CM

## 2025-02-13 DIAGNOSIS — H49.21 SIXTH NERVE PALSY OF RIGHT EYE: ICD-10-CM

## 2025-02-13 RX ORDER — AMLODIPINE BESYLATE 10 MG/1
10 TABLET ORAL DAILY
Qty: 90 TABLET | Refills: 3 | Status: SHIPPED | OUTPATIENT
Start: 2025-02-13

## 2025-02-13 NOTE — PROGRESS NOTES
"  Assessment & Plan     Benign essential hypertension  Well controlled, continue with current medicines.  Recent CMP  - amLODIPine (NORVASC) 10 MG tablet; Take 1 tablet (10 mg) by mouth daily.    Malignant neoplasm of nipple of left breast in female, unspecified estrogen receptor status (H)  Follow up with Oncology  Currently, she is on Xeloda and Truqap.    Steroid-induced diabetes mellitus, subsequent encounter  On metformin, discussed diet modifications.  Last A1c at 6.3    Sixth nerve palsy of right eye.    She did have cataract surgery back in October of last year.  She had zoster infection on her right side, and that caused nerve palsy in her right eye.  She has been recovering.    BMI  Estimated body mass index is 34.92 kg/m  as calculated from the following:    Height as of this encounter: 1.549 m (5' 1\").    Weight as of this encounter: 83.8 kg (184 lb 12.8 oz).   Weight management plan: Discussed healthy diet and exercise guidelines      Work on weight loss  Regular exercise    Subjective   Desmond is a 69 year old, presenting for the following health issues:  Health Update    Patient with history of diabetes steroid and medication endorsed.  She has been on metformin.  History of malignant neoplasm of the breast with bone metastasis, she does follow-up with oncology.    Blood pressure is well-controlled.    Patient reports she will be moving down to [place] for the winter season.        2/13/2025     8:33 AM   Additional Questions   Roomed by odilia peacock ma     History of Present Illness       Diabetes:   She presents for follow up of diabetes.  She is checking home blood glucose a few times a week.   She checks blood glucose before meals.  Blood glucose is never over 200 and never under 70. She is aware of hypoglycemia symptoms including weakness.    She has no concerns regarding her diabetes at this time.   She is not experiencing numbness or burning in feet, excessive thirst, blurry vision, weight " "changes or redness, sores or blisters on feet.             Patient is here to update provider on her cancer, cataract surgery and any other health issues.        Review of Systems  Constitutional, HEENT, cardiovascular, pulmonary, GI, , musculoskeletal, neuro, skin, endocrine and psych systems are negative, except as otherwise noted.      Objective    BP (!) 144/91 (BP Location: Other (Comment), Patient Position: Sitting, Cuff Size: Adult Regular)   Pulse 106   Temp 97.9  F (36.6  C) (Oral)   Resp 25   Ht 1.549 m (5' 1\")   Wt 83.8 kg (184 lb 12.8 oz)   SpO2 98%   BMI 34.92 kg/m    Body mass index is 34.92 kg/m .  Physical Exam   GENERAL: alert and no distress  NECK: no adenopathy, no asymmetry, masses, or scars  RESP: lungs clear to auscultation - no rales, rhonchi or wheezes  CV: regular rate and rhythm, normal S1 S2, no S3 or S4, no murmur, click or rub, no peripheral edema  ABDOMEN: soft, nontender, no hepatosplenomegaly, no masses and bowel sounds normal  MS: no gross musculoskeletal defects noted, no edema  PSYCH: mentation appears normal, affect normal/bright    No orders of the defined types were placed in this encounter.    Lab Results   Component Value Date    A1C 6.3 02/05/2025    A1C 5.4 08/21/2024    A1C 5.2 05/17/2024    A1C 9.7 10/03/2023    A1C 5.3 12/15/2021    A1C 6.5 12/23/2020    A1C 6.3 09/02/2020    A1C 10.6 06/25/2020             Signed Electronically by: Vanesa Claros MD    "

## 2025-02-15 NOTE — PROGRESS NOTES
Hematology/Medical Oncology Follow-up Note      February 25, 2025      Reason for visit:  Mary Olson is a 69 year old woman from [place] who presents for oncologic reevaluation with a history of metastatic ER/ID+ HER2- left breast cancer with bone, pleural and chest wall metastases on fulvestrant/capivasertib. She is a former patient of Dr. Tiffany Hilton.    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  Triamcinolone cream 0.1% apply twice a day to affected area of skin; will call if the rash gets worse  2.  Continue capivasertib as before  3.  Contact primary care provider regarding a little tighter glucose control in addition to the current metformin  4.  Follow-up with me in 2 weeks with CBC, CMP, CA 15-3 before appointment    Time with patient including review, documentation, history, examination, coordination of care and counseling was 20 minutes.    The longitudinal plan of care for the diagnosis(es)/condition(s) as documented were addressed during this visit. Due to the added complexity in care, I will continue to support Desmond in the subsequent management and with ongoing continuity of care.    History of present illness:  In March, 2020, the patient presented with a several month history of left breast nodularity and palpable axillary adenopathy.  Subsequent imaging confirmed bilateral breast lesions including a 6.2 cm left breast mass with diffuse breast dermal thickening and a 1.3 cm right breast mass.  The left breast mass revealed a grade 2 invasive mammary carcinoma that was ER/ID positive and HER2 negative.  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    Staging PET scan revealed left axillary and retropectoral lymph node metastases as well as diffuse bony metastases.  There has been no MR brain imaging.    March, 2020 - August, 2020: She received weekly paclitaxel with some disease response but requested a break from interventional cytotoxic therapy from significant issues with balance and neuropathy requiring wheelchair use. Switched to every 3-month zoledronic acid.    August, 2020 - March, 2021: Started on anastrozole with February, 2021 PET scan evidence of progression.    March, 2021-August, 2023: Started on anastrozole and abemaciclib.  May, 2023 CT CAP with evidence of soft tissue progression. PIK3CA p.E453K+, ESR1 negative, TMB-low, TEJAS on left axillary tissue.    August, 2023-December, 2023: Switch to exemestane and everolimus with treatment discontinued after 4 months because of significant left breast mass progression.    December, 2023-December, 2024: Started capecitabine 1500 mg twice daily with dose reduction to current 500 mg twice daily 7 days on/7 days off due to mucositis and hand-foot syndrome.  CA 15-3 tumor marker had declined from a zenith of 945 on 12/26/2023 to a nerissa of 107 on 5/17/2024 with recent value of 203 on 10/2/2024.  On 11/5/2024, her CA 15-3 tumor marker was up to 286.    12/4/2024 restaging CT CAP revealed evidence of increased nodularity of the left invasive breast cancer along the anterior chest wall as well as slight increase in anterior mediastinal soft tissue nodule.  A new left upper lobe 4 mm nodule was also identified with no other gross evidence of metastatic disease in the abdomen or pelvis.  Subsequent 12/13/2024 echocardiogram revealed a 65-70% EF. FoundationOne study revealed no new ESR 1 mutation.  3-month zoledronic acid was administered on 1/7/2025.    A 6th cranial nerve palsy was recently identified by her optometrist.  A 1/7/2025 MR brain  revealed diffuse osseous disease but no specific abnormal enhancement along the cisternal segment of the visualized cranial nerves.  She thinks her diplopia has improved on antiviral therapy.    On 2025, capivasertib was initiated with fulvestrant initiated on 2025.  She experienced minor diarrhea 3 times over the last 5 weeks since she started capivasertib.  Faint skin rash over arms and front of her torso started a week ago.  She denies any fever, chills or sweats.  Blood sugars have been rising and today it is higher at 212.    Past medical history:  Past Medical History:   Diagnosis Date    ASCUS favoring benign 10/28/15    neg HPV    Cancer (H)     Diabetes mellitus, type 2 (H) 2020    Hypertension     Obese         Family history:  I have reviewed this patient's family history and updated it with pertinent information if needed.  Family History   Problem Relation Age of Onset    Hypertension Mother          12/3/21    Coronary Artery Disease Mother          12/3/21    Breast Cancer Mother          12/3/21    Hyperlipidemia Mother          12/3/21    Depression Mother          123/3/21    Arrhythmia Brother     Cerebrovascular Disease Brother     Diabetes Brother          Medications:  Current Outpatient Medications   Medication Sig Dispense Refill    amLODIPine (NORVASC) 10 MG tablet Take 1 tablet (10 mg) by mouth daily. 90 tablet 3    Ascorbic Acid (VITAMIN C PO)       aspirin 81 MG tablet Take by mouth daily      calcium-vitamin D-vitamin K (VIACTIV) 500-500-40 MG-UNT-MCG CHEW Take 1 tablet by mouth every other day      [START ON 3/5/2025] capivasertib (TRUQAP) 200 MG tablet Take 2 tablets (400 mg) by mouth 2 times daily. Days 1 through 4 of each week. 64 tablet 0    capivasertib (TRUQAP) 200 MG tablet Take 2 tablets (400 mg) by mouth 2 times daily. Days 1 through 4 of each week. 64 tablet 0    Cholecalciferol (VITAMIN D3 PO) Take 2,000 Units by mouth daily Takes 4 tabs  daily      Coenzyme Q10 (COQ-10) 200 MG CAPS       Cyanocobalamin (B-12) 2500 MCG TABS       gabapentin (NEURONTIN) 250 MG/5ML solution Take 5 mLs (250 mg) by mouth at bedtime. 470 mL 0    hydrocortisone 2.5 % cream Apply topically 2 times daily 3.5 g 0    lidocaine-prilocaine (EMLA) 2.5-2.5 % external cream Apply small amount to center of port site 45-60 minutes prior to chemo appointment, cover with clear dressing if desired. 30 g 1    loperamide (IMODIUM) 2 MG capsule Start with 2 caps (4 mg), then take one cap (2 mg) after each diarrheal stool as needed. Do not use more than 8 caps (16 mg) per day. 30 capsule 0    magic mouthwash suspension (diphenhydrAMINE, lidocaine, aluminum-magnesium & simethicone) Swish and swallow 5 mLs in mouth every 6 hours as needed for mouth sores. 240 mL 2    metFORMIN (GLUCOPHAGE) 500 MG tablet Take 1 tablet (500 mg) by mouth 2 times daily (with meals). [employer] brand Please 180 tablet 3    Multiple Vitamins-Minerals (CENTRUM ADULTS PO)       potassium chloride (KLOR-CON) 20 MEQ packet Take 20 mEq by mouth 2 times daily. 60 packet 11    triamterene-HCTZ (MAXZIDE-25) 37.5-25 MG tablet Take 1 tablet by mouth daily. 90 tablet 3    UNABLE TO FIND MEDICATION NAME: Elderberry      UNABLE TO FIND MEDICATION NAME: clove of garlic daily      VITAMIN E NATURAL PO Take 400 Units by mouth      alcohol swab prep pads Use to swab area of injection/chrissy as directed. 400 each 3    blood glucose (NO BRAND SPECIFIED) test strip 1 strip by In Vitro route 3 times daily 100 strip 6    blood glucose calibration (NO BRAND SPECIFIED) solution To accompany: Blood Glucose Monitor Brands: per insurance. 1 Bottle 3    blood glucose monitoring (NO BRAND SPECIFIED) meter device kit Use to test blood sugar 3 times daily or as directed. 1 kit 0    Blood Pressure KIT Please test blood pressure at home 2 x daily 1 kit 0    prochlorperazine (COMPAZINE) 10 MG tablet Take 1 tablet (10 mg) by mouth every 6  hours as needed for nausea or vomiting. (Patient not taking: Reported on 2/25/2025) 30 tablet 2    STATIN NOT PRESCRIBED (INTENTIONAL) Please choose reason not prescribed from choices below.      thin (NO BRAND SPECIFIED) lancets 3 each by In Vitro route daily Use with lanceting device. To accompany: Blood Glucose Monitor Brands: per insurance. 300 each 6     No current facility-administered medications for this visit.     Facility-Administered Medications Ordered in Other Visits   Medication Dose Route Frequency Provider Last Rate Last Admin    heparin 100 UNIT/ML injection 500 Units  500 Units Intracatheter Q8H Keysha Birch MD   500 Units at 11/06/20 1017    heparin lock flush 100 unit/mL injection 5 mL  5 mL Intracatheter Q30 Days Robert Fountain MD   5 mL at 12/13/24 1452       Allergies:  Allergies   Allergen Reactions    Lisinopril Cough     cough       Review of systems:  Except as note above, the patient denies:  Headache, double vision, change in vision, hearing loss, tinnitus;  Dyspnea, chest pain, palpitations, pleurisy or hemoptysis;  Anorexia, nausea, vomiting, diarrhea, constipation, rectal bleeding bleeding, change in bowel habits;  Urinary frequency, burning or hematuria;  Fever, chills, sweats, change in appetite;  Bone or back pain; skin rash, joint swelling, new lumps;  Unexplained, bleeding or bruising, tingling numbness, change in gait;    Physical examination:  BP (!) 142/99 (BP Location: Right arm, Patient Position: Sitting, Cuff Size: Adult Regular)   Pulse 117   Temp 98  F (36.7  C) (Oral)   SpO2 97%     The patient is alert and oriented.   Faint erythematous patchy macular rash over lower thorax and arms.      Robert Fountain MD

## 2025-02-19 ENCOUNTER — INFUSION THERAPY VISIT (OUTPATIENT)
Dept: INFUSION THERAPY | Facility: CLINIC | Age: 70
End: 2025-02-19
Attending: INTERNAL MEDICINE
Payer: COMMERCIAL

## 2025-02-19 VITALS
HEART RATE: 109 BPM | SYSTOLIC BLOOD PRESSURE: 146 MMHG | DIASTOLIC BLOOD PRESSURE: 72 MMHG | BODY MASS INDEX: 34.92 KG/M2 | OXYGEN SATURATION: 97 % | HEIGHT: 61 IN

## 2025-02-19 DIAGNOSIS — C79.51 MALIGNANT NEOPLASM METASTATIC TO BONE (H): ICD-10-CM

## 2025-02-19 DIAGNOSIS — C50.912 MALIGNANT NEOPLASM OF LEFT FEMALE BREAST, UNSPECIFIED ESTROGEN RECEPTOR STATUS, UNSPECIFIED SITE OF BREAST (H): Primary | ICD-10-CM

## 2025-02-19 DIAGNOSIS — C50.912 MALIGNANT NEOPLASM OF LEFT FEMALE BREAST, UNSPECIFIED ESTROGEN RECEPTOR STATUS, UNSPECIFIED SITE OF BREAST (H): ICD-10-CM

## 2025-02-19 DIAGNOSIS — C79.51 MALIGNANT NEOPLASM METASTATIC TO BONE (H): Primary | ICD-10-CM

## 2025-02-19 LAB
ALBUMIN SERPL BCG-MCNC: 4.2 G/DL (ref 3.5–5.2)
ALP SERPL-CCNC: 131 U/L (ref 40–150)
ALT SERPL W P-5'-P-CCNC: 7 U/L (ref 0–50)
ANION GAP SERPL CALCULATED.3IONS-SCNC: 14 MMOL/L (ref 7–15)
AST SERPL W P-5'-P-CCNC: 32 U/L (ref 0–45)
BILIRUB SERPL-MCNC: 0.4 MG/DL
BUN SERPL-MCNC: 18.3 MG/DL (ref 8–23)
CALCIUM SERPL-MCNC: 9.7 MG/DL (ref 8.8–10.4)
CHLORIDE SERPL-SCNC: 97 MMOL/L (ref 98–107)
CREAT SERPL-MCNC: 1.04 MG/DL (ref 0.51–0.95)
EGFRCR SERPLBLD CKD-EPI 2021: 58 ML/MIN/1.73M2
GLUCOSE SERPL-MCNC: 116 MG/DL (ref 70–99)
HCO3 SERPL-SCNC: 25 MMOL/L (ref 22–29)
MAGNESIUM SERPL-MCNC: 1.6 MG/DL (ref 1.7–2.3)
POTASSIUM SERPL-SCNC: 4 MMOL/L (ref 3.4–5.3)
PROT SERPL-MCNC: 8.1 G/DL (ref 6.4–8.3)
SODIUM SERPL-SCNC: 136 MMOL/L (ref 135–145)

## 2025-02-19 PROCEDURE — 250N000011 HC RX IP 250 OP 636: Mod: JZ | Performed by: INTERNAL MEDICINE

## 2025-02-19 PROCEDURE — 96402 CHEMO HORMON ANTINEOPL SQ/IM: CPT

## 2025-02-19 PROCEDURE — 99207 PR NO CHARGE LOS: CPT

## 2025-02-19 PROCEDURE — 82565 ASSAY OF CREATININE: CPT

## 2025-02-19 PROCEDURE — 250N000011 HC RX IP 250 OP 636: Performed by: INTERNAL MEDICINE

## 2025-02-19 PROCEDURE — 36591 DRAW BLOOD OFF VENOUS DEVICE: CPT

## 2025-02-19 PROCEDURE — 83735 ASSAY OF MAGNESIUM: CPT

## 2025-02-19 RX ORDER — LAMOTRIGINE 25 MG/1
500 TABLET ORAL ONCE
Status: COMPLETED | OUTPATIENT
Start: 2025-02-19 | End: 2025-02-19

## 2025-02-19 RX ORDER — HEPARIN SODIUM (PORCINE) LOCK FLUSH IV SOLN 100 UNIT/ML 100 UNIT/ML
5 SOLUTION INTRAVENOUS ONCE
Status: COMPLETED | OUTPATIENT
Start: 2025-02-19 | End: 2025-02-19

## 2025-02-19 RX ADMIN — FULVESTRANT 500 MG: 250 INJECTION, SOLUTION INTRAMUSCULAR at 10:35

## 2025-02-19 RX ADMIN — Medication 5 ML: at 09:23

## 2025-02-19 ASSESSMENT — PAIN SCALES - GENERAL: PAINLEVEL_OUTOF10: NO PAIN (0)

## 2025-02-19 NOTE — PROGRESS NOTES
Infusion Nursing Note:  Mary Olson presents today for Faslodex.    Patient seen by provider today: No   present during visit today: Not Applicable.    Note: Assessment performed by Malena CARTER RN prior to injection today. .      Intravenous Access:  No Intravenous access/labs at this visit.    Treatment Conditions:  Not Applicable.      Post Infusion Assessment:  Patient tolerated injection without incident.  Site patent and intact, free from redness, edema or discomfort.       Discharge Plan:   Patient discharged in stable condition accompanied by: self.  Departure Mode: Ambulatory.      Teena Osborne MA

## 2025-02-19 NOTE — PROGRESS NOTES
Port site scrubbed with Chloraprep for 30 seconds. Accessed port using sterile technique. Tubes drawn in rainbow order. See documentation flowsheet. Tolerated port access and draw without complaint.     Labs drawn and sent: mag Rachel rosario RN

## 2025-02-20 DIAGNOSIS — C50.912 MALIGNANT NEOPLASM OF LEFT FEMALE BREAST, UNSPECIFIED ESTROGEN RECEPTOR STATUS, UNSPECIFIED SITE OF BREAST (H): Primary | ICD-10-CM

## 2025-02-20 DIAGNOSIS — C79.51 MALIGNANT NEOPLASM METASTATIC TO BONE (H): ICD-10-CM

## 2025-02-25 ENCOUNTER — ONCOLOGY VISIT (OUTPATIENT)
Dept: ONCOLOGY | Facility: CLINIC | Age: 70
End: 2025-02-25
Attending: INTERNAL MEDICINE
Payer: COMMERCIAL

## 2025-02-25 ENCOUNTER — INFUSION THERAPY VISIT (OUTPATIENT)
Dept: INFUSION THERAPY | Facility: CLINIC | Age: 70
End: 2025-02-25
Payer: COMMERCIAL

## 2025-02-25 VITALS
HEART RATE: 117 BPM | SYSTOLIC BLOOD PRESSURE: 142 MMHG | DIASTOLIC BLOOD PRESSURE: 99 MMHG | OXYGEN SATURATION: 97 % | TEMPERATURE: 98 F

## 2025-02-25 DIAGNOSIS — C79.51 MALIGNANT NEOPLASM METASTATIC TO BONE (H): ICD-10-CM

## 2025-02-25 DIAGNOSIS — C50.912 MALIGNANT NEOPLASM OF LEFT FEMALE BREAST, UNSPECIFIED ESTROGEN RECEPTOR STATUS, UNSPECIFIED SITE OF BREAST (H): ICD-10-CM

## 2025-02-25 DIAGNOSIS — C50.912 MALIGNANT NEOPLASM OF LEFT FEMALE BREAST, UNSPECIFIED ESTROGEN RECEPTOR STATUS, UNSPECIFIED SITE OF BREAST (H): Primary | ICD-10-CM

## 2025-02-25 DIAGNOSIS — Z17.0 MALIGNANT NEOPLASM OF OVERLAPPING SITES OF LEFT BREAST IN FEMALE, ESTROGEN RECEPTOR POSITIVE (H): ICD-10-CM

## 2025-02-25 DIAGNOSIS — C50.812 MALIGNANT NEOPLASM OF OVERLAPPING SITES OF LEFT BREAST IN FEMALE, ESTROGEN RECEPTOR POSITIVE (H): ICD-10-CM

## 2025-02-25 LAB
ALBUMIN SERPL BCG-MCNC: 3.9 G/DL (ref 3.5–5.2)
ALP SERPL-CCNC: 128 U/L (ref 40–150)
ALT SERPL W P-5'-P-CCNC: 10 U/L (ref 0–50)
ANION GAP SERPL CALCULATED.3IONS-SCNC: 17 MMOL/L (ref 7–15)
AST SERPL W P-5'-P-CCNC: 26 U/L (ref 0–45)
BASOPHILS # BLD AUTO: 0.1 10E3/UL (ref 0–0.2)
BASOPHILS NFR BLD AUTO: 1 %
BILIRUB SERPL-MCNC: 0.4 MG/DL
BUN SERPL-MCNC: 14.4 MG/DL (ref 8–23)
CALCIUM SERPL-MCNC: 9.5 MG/DL (ref 8.8–10.4)
CANCER AG15-3 SERPL-ACNC: 797 U/ML
CHLORIDE SERPL-SCNC: 91 MMOL/L (ref 98–107)
CREAT SERPL-MCNC: 0.96 MG/DL (ref 0.51–0.95)
EGFRCR SERPLBLD CKD-EPI 2021: 64 ML/MIN/1.73M2
EOSINOPHIL # BLD AUTO: 0.2 10E3/UL (ref 0–0.7)
EOSINOPHIL NFR BLD AUTO: 3 %
ERYTHROCYTE [DISTWIDTH] IN BLOOD BY AUTOMATED COUNT: 15.6 % (ref 10–15)
GLUCOSE SERPL-MCNC: 212 MG/DL (ref 70–99)
HCO3 SERPL-SCNC: 22 MMOL/L (ref 22–29)
HCT VFR BLD AUTO: 33.2 % (ref 35–47)
HGB BLD-MCNC: 10.9 G/DL (ref 11.7–15.7)
IMM GRANULOCYTES # BLD: 0.1 10E3/UL
IMM GRANULOCYTES NFR BLD: 1 %
LYMPHOCYTES # BLD AUTO: 1.3 10E3/UL (ref 0.8–5.3)
LYMPHOCYTES NFR BLD AUTO: 19 %
MAGNESIUM SERPL-MCNC: 1.7 MG/DL (ref 1.7–2.3)
MCH RBC QN AUTO: 28.4 PG (ref 26.5–33)
MCHC RBC AUTO-ENTMCNC: 32.8 G/DL (ref 31.5–36.5)
MCV RBC AUTO: 87 FL (ref 78–100)
MONOCYTES # BLD AUTO: 0.4 10E3/UL (ref 0–1.3)
MONOCYTES NFR BLD AUTO: 6 %
NEUTROPHILS # BLD AUTO: 4.8 10E3/UL (ref 1.6–8.3)
NEUTROPHILS NFR BLD AUTO: 71 %
NRBC # BLD AUTO: 0 10E3/UL
NRBC BLD AUTO-RTO: 0 /100
PLATELET # BLD AUTO: 303 10E3/UL (ref 150–450)
POTASSIUM SERPL-SCNC: 4.3 MMOL/L (ref 3.4–5.3)
PROT SERPL-MCNC: 7.8 G/DL (ref 6.4–8.3)
RBC # BLD AUTO: 3.84 10E6/UL (ref 3.8–5.2)
SODIUM SERPL-SCNC: 130 MMOL/L (ref 135–145)
WBC # BLD AUTO: 6.8 10E3/UL (ref 4–11)

## 2025-02-25 PROCEDURE — 84155 ASSAY OF PROTEIN SERUM: CPT

## 2025-02-25 PROCEDURE — 85004 AUTOMATED DIFF WBC COUNT: CPT

## 2025-02-25 PROCEDURE — G0463 HOSPITAL OUTPT CLINIC VISIT: HCPCS | Performed by: INTERNAL MEDICINE

## 2025-02-25 PROCEDURE — G2211 COMPLEX E/M VISIT ADD ON: HCPCS | Performed by: INTERNAL MEDICINE

## 2025-02-25 PROCEDURE — 99214 OFFICE O/P EST MOD 30 MIN: CPT | Performed by: INTERNAL MEDICINE

## 2025-02-25 PROCEDURE — 86300 IMMUNOASSAY TUMOR CA 15-3: CPT

## 2025-02-25 PROCEDURE — 250N000011 HC RX IP 250 OP 636: Performed by: INTERNAL MEDICINE

## 2025-02-25 PROCEDURE — 83735 ASSAY OF MAGNESIUM: CPT

## 2025-02-25 PROCEDURE — 36591 DRAW BLOOD OFF VENOUS DEVICE: CPT

## 2025-02-25 PROCEDURE — 82040 ASSAY OF SERUM ALBUMIN: CPT

## 2025-02-25 RX ORDER — HEPARIN SODIUM (PORCINE) LOCK FLUSH IV SOLN 100 UNIT/ML 100 UNIT/ML
500 SOLUTION INTRAVENOUS ONCE
Status: COMPLETED | OUTPATIENT
Start: 2025-02-25 | End: 2025-02-25

## 2025-02-25 RX ORDER — TRIAMCINOLONE ACETONIDE 1 MG/G
CREAM TOPICAL 2 TIMES DAILY
Qty: 80 G | Refills: 1 | Status: SHIPPED | OUTPATIENT
Start: 2025-02-25

## 2025-02-25 RX ADMIN — Medication 500 UNITS: at 07:54

## 2025-02-25 ASSESSMENT — PAIN SCALES - GENERAL: PAINLEVEL_OUTOF10: NO PAIN (0)

## 2025-02-25 NOTE — NURSING NOTE
"Oncology Rooming Note    February 25, 2025 8:27 AM   Mary Olson is a 69 year old female who presents for:    Chief Complaint   Patient presents with    Oncology Clinic Visit     Initial Vitals: There were no vitals taken for this visit. Estimated body mass index is 34.92 kg/m  as calculated from the following:    Height as of 2/19/25: 1.549 m (5' 1\").    Weight as of 2/13/25: 83.8 kg (184 lb 12.8 oz). There is no height or weight on file to calculate BSA.  Data Unavailable Comment: Data Unavailable   No LMP recorded. Patient is postmenopausal.  Allergies reviewed: No  Medications reviewed: Yes    Medications: MEDICATION REFILLS NEEDED TODAY. Provider was notified.  Pharmacy name entered into EPIC:    [facility] - [place], [place] - [address] [facility] [place] - [place], [place] - [address].  [facility] [place], [place] - [address] [facility] [place], [place] - [address] [facility] - [place] - [address] [id number] [place], [place] [address] [facility]  [id number] - [place], [place] - [address] [facility]    Frailty Screening:   Is the patient here for a new oncology consult visit in cancer care? 2. No    PHQ9:  Did this patient require a PHQ9?: No      Clinical concerns: Has a rash on torso. Needs refill on Magic Mouthwash. Needs to review oral chemo, had intake mix up.  Dr. Fountain was notified.      Malena Holman RN              "

## 2025-02-25 NOTE — PROGRESS NOTES
See IV flow sheet for details of port access. Labs sent: cbc, cmp, ca 15-3, and magnesium. Port needle flushed per protocol and needle removed without difficulty.    Katharina Enamorado RN

## 2025-02-25 NOTE — PATIENT INSTRUCTIONS
1. Triamcinolone cream 0.1% twice-a-day; call if rash gets worse  2. Take capivasertib as before  3. Follow-up Dr. Fountain in 2 weeks with repeat labs  4. Primary care follow-up as soon as possible regarding blood sugar control

## 2025-02-25 NOTE — LETTER
2/25/2025      Mary Olson  [address]  [place] [address]      Dear Colleague,    Thank you for referring your patient, Mary Olson, to the [facility]. Please see a copy of my visit note below.    Hematology/Medical Oncology Follow-up Note      February 25, 2025      Reason for visit:  Mary Olson is a 69 year old woman from [place] who presents for oncologic reevaluation with a history of metastatic ER/ID+ HER2- left breast cancer with bone, pleural and chest wall metastases on fulvestrant/capivasertib. She is a former patient of Dr. Tiffany Hilton.    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  Triamcinolone cream 0.1% apply twice a day to affected area of skin; will call if the rash gets worse  2.  Continue capivasertib as before  3.  Contact primary care provider regarding a little tighter glucose control in addition to the current metformin  4.  Follow-up with me in 2 weeks with CBC, CMP, CA 15-3 before appointment    Time with patient including review, documentation, history, examination, coordination of care and counseling was 20 minutes.    The longitudinal plan of care for the diagnosis(es)/condition(s) as documented were addressed during this visit. Due to the added complexity in care, I will continue to support Desmond in the subsequent management and with ongoing continuity of care.    History of present illness:  In March, 2020, the patient presented with a several month history of left breast nodularity and palpable axillary adenopathy.  Subsequent imaging confirmed bilateral breast  lesions including a 6.2 cm left breast mass with diffuse breast dermal thickening and a 1.3 cm right breast mass.  The left breast mass revealed a grade 2 invasive mammary carcinoma that was ER/DE positive and HER2 negative.  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    Staging PET scan revealed left axillary and retropectoral lymph node metastases as well as diffuse bony metastases.  There has been no MR brain imaging.    March, 2020 - August, 2020: She received weekly paclitaxel with some disease response but requested a break from interventional cytotoxic therapy from significant issues with balance and neuropathy requiring wheelchair use. Switched to every 3-month zoledronic acid.    August, 2020 - March, 2021: Started on anastrozole with February, 2021 PET scan evidence of progression.    March, 2021-August, 2023: Started on anastrozole and abemaciclib.  May, 2023 CT CAP with evidence of soft tissue progression. PIK3CA p.E453K+, ESR1 negative, TMB-low, TEJAS on left axillary tissue.    August, 2023-December, 2023: Switch to exemestane and everolimus with treatment discontinued after 4 months because of significant left breast mass progression.    December, 2023-December, 2024: Started capecitabine 1500 mg twice daily with dose reduction to current 500 mg twice daily 7 days on/7 days off due to mucositis and hand-foot syndrome.  CA 15-3 tumor marker had declined from a zenith of 945 on 12/26/2023 to a nerissa of 107 on 5/17/2024 with recent value of 203 on 10/2/2024.  On 11/5/2024, her CA 15-3 tumor marker was up to 286.    12/4/2024 restaging CT CAP revealed evidence of increased nodularity of the left invasive breast cancer along the anterior chest wall as well as slight increase in anterior mediastinal soft tissue nodule.  A new left upper lobe 4 mm nodule was also identified with no other gross evidence of metastatic disease in the abdomen or pelvis.   Subsequent 2024 echocardiogram revealed a 65-70% EF. FoundationOne study revealed no new ESR 1 mutation.  3-month zoledronic acid was administered on 2025.    A 6th cranial nerve palsy was recently identified by her optometrist.  A 2025 MR brain revealed diffuse osseous disease but no specific abnormal enhancement along the cisternal segment of the visualized cranial nerves.  She thinks her diplopia has improved on antiviral therapy.    On 2025, capivasertib was initiated with fulvestrant initiated on 2025.  She experienced minor diarrhea 3 times over the last 5 weeks since she started capivasertib.  Faint skin rash over arms and front of her torso started a week ago.  She denies any fever, chills or sweats.  Blood sugars have been rising and today it is higher at 212.    Past medical history:  Past Medical History:   Diagnosis Date     ASCUS favoring benign 10/28/15    neg HPV     Cancer (H)      Diabetes mellitus, type 2 (H) 2020     Hypertension      Obese         Family history:  I have reviewed this patient's family history and updated it with pertinent information if needed.  Family History   Problem Relation Age of Onset     Hypertension Mother          12/3/21     Coronary Artery Disease Mother          12/3/21     Breast Cancer Mother          12/3/21     Hyperlipidemia Mother          12/3/21     Depression Mother          123/3/21     Arrhythmia Brother      Cerebrovascular Disease Brother      Diabetes Brother          Medications:  Current Outpatient Medications   Medication Sig Dispense Refill     amLODIPine (NORVASC) 10 MG tablet Take 1 tablet (10 mg) by mouth daily. 90 tablet 3     Ascorbic Acid (VITAMIN C PO)        aspirin 81 MG tablet Take by mouth daily       calcium-vitamin D-vitamin K (VIACTIV) 500-500-40 MG-UNT-MCG CHEW Take 1 tablet by mouth every other day       [START ON 3/5/2025] capivasertib (TRUQAP) 200 MG tablet Take 2 tablets (400 mg) by  mouth 2 times daily. Days 1 through 4 of each week. 64 tablet 0     capivasertib (TRUQAP) 200 MG tablet Take 2 tablets (400 mg) by mouth 2 times daily. Days 1 through 4 of each week. 64 tablet 0     Cholecalciferol (VITAMIN D3 PO) Take 2,000 Units by mouth daily Takes 4 tabs daily       Coenzyme Q10 (COQ-10) 200 MG CAPS        Cyanocobalamin (B-12) 2500 MCG TABS        gabapentin (NEURONTIN) 250 MG/5ML solution Take 5 mLs (250 mg) by mouth at bedtime. 470 mL 0     hydrocortisone 2.5 % cream Apply topically 2 times daily 3.5 g 0     lidocaine-prilocaine (EMLA) 2.5-2.5 % external cream Apply small amount to center of port site 45-60 minutes prior to chemo appointment, cover with clear dressing if desired. 30 g 1     loperamide (IMODIUM) 2 MG capsule Start with 2 caps (4 mg), then take one cap (2 mg) after each diarrheal stool as needed. Do not use more than 8 caps (16 mg) per day. 30 capsule 0     magic mouthwash suspension (diphenhydrAMINE, lidocaine, aluminum-magnesium & simethicone) Swish and swallow 5 mLs in mouth every 6 hours as needed for mouth sores. 240 mL 2     metFORMIN (GLUCOPHAGE) 500 MG tablet Take 1 tablet (500 mg) by mouth 2 times daily (with meals). [employer] brand Please 180 tablet 3     Multiple Vitamins-Minerals (CENTRUM ADULTS PO)        potassium chloride (KLOR-CON) 20 MEQ packet Take 20 mEq by mouth 2 times daily. 60 packet 11     triamterene-HCTZ (MAXZIDE-25) 37.5-25 MG tablet Take 1 tablet by mouth daily. 90 tablet 3     UNABLE TO FIND MEDICATION NAME: Elderberry       UNABLE TO FIND MEDICATION NAME: clove of garlic daily       VITAMIN E NATURAL PO Take 400 Units by mouth       alcohol swab prep pads Use to swab area of injection/chrissy as directed. 400 each 3     blood glucose (NO BRAND SPECIFIED) test strip 1 strip by In Vitro route 3 times daily 100 strip 6     blood glucose calibration (NO BRAND SPECIFIED) solution To accompany: Blood Glucose Monitor Brands: per insurance. 1 Bottle  3     blood glucose monitoring (NO BRAND SPECIFIED) meter device kit Use to test blood sugar 3 times daily or as directed. 1 kit 0     Blood Pressure KIT Please test blood pressure at home 2 x daily 1 kit 0     prochlorperazine (COMPAZINE) 10 MG tablet Take 1 tablet (10 mg) by mouth every 6 hours as needed for nausea or vomiting. (Patient not taking: Reported on 2/25/2025) 30 tablet 2     STATIN NOT PRESCRIBED (INTENTIONAL) Please choose reason not prescribed from choices below.       thin (NO BRAND SPECIFIED) lancets 3 each by In Vitro route daily Use with lanceting device. To accompany: Blood Glucose Monitor Brands: per insurance. 300 each 6     No current facility-administered medications for this visit.     Facility-Administered Medications Ordered in Other Visits   Medication Dose Route Frequency Provider Last Rate Last Admin     heparin 100 UNIT/ML injection 500 Units  500 Units Intracatheter Q8H Keysha Birch MD   500 Units at 11/06/20 1017     heparin lock flush 100 unit/mL injection 5 mL  5 mL Intracatheter Q30 Days Robert Fountain MD   5 mL at 12/13/24 1452       Allergies:  Allergies   Allergen Reactions     Lisinopril Cough     cough       Review of systems:  Except as note above, the patient denies:  Headache, double vision, change in vision, hearing loss, tinnitus;  Dyspnea, chest pain, palpitations, pleurisy or hemoptysis;  Anorexia, nausea, vomiting, diarrhea, constipation, rectal bleeding bleeding, change in bowel habits;  Urinary frequency, burning or hematuria;  Fever, chills, sweats, change in appetite;  Bone or back pain; skin rash, joint swelling, new lumps;  Unexplained, bleeding or bruising, tingling numbness, change in gait;    Physical examination:  BP (!) 142/99 (BP Location: Right arm, Patient Position: Sitting, Cuff Size: Adult Regular)   Pulse 117   Temp 98  F (36.7  C) (Oral)   SpO2 97%     The patient is alert and oriented.   Faint erythematous patchy macular rash over lower  thorax and arms.      Robert Fountain MD      Again, thank you for allowing me to participate in the care of your patient.        Sincerely,        Robert Fountain MD    Electronically signed

## 2025-02-26 ENCOUNTER — VIRTUAL VISIT (OUTPATIENT)
Dept: FAMILY MEDICINE | Facility: CLINIC | Age: 70
End: 2025-02-26
Payer: COMMERCIAL

## 2025-02-26 DIAGNOSIS — E11.65 TYPE 2 DIABETES MELLITUS WITH HYPERGLYCEMIA, WITH LONG-TERM CURRENT USE OF INSULIN (H): Primary | ICD-10-CM

## 2025-02-26 DIAGNOSIS — Z79.4 TYPE 2 DIABETES MELLITUS WITH HYPERGLYCEMIA, WITH LONG-TERM CURRENT USE OF INSULIN (H): Primary | ICD-10-CM

## 2025-02-26 PROCEDURE — 98005 SYNCH AUDIO-VIDEO EST LOW 20: CPT | Performed by: FAMILY MEDICINE

## 2025-02-26 RX ORDER — METFORMIN HYDROCHLORIDE 500 MG/1
500 TABLET, EXTENDED RELEASE ORAL
COMMUNITY
Start: 2025-02-18 | End: 2025-02-26

## 2025-02-26 RX ORDER — METFORMIN HYDROCHLORIDE 500 MG/1
TABLET, EXTENDED RELEASE ORAL
Qty: 270 TABLET | Refills: 3 | Status: SHIPPED | OUTPATIENT
Start: 2025-03-03

## 2025-02-26 NOTE — PROGRESS NOTES
Desmond is a 69 year old who is being evaluated via a billable video visit.    How would you like to obtain your AVS? MyChart  If the video visit is dropped, the invitation should be resent by: Send to e-mail at: [email or web address]  Will anyone else be joining your video visit? No      Assessment & Plan     Type 2 diabetes mellitus with hyperglycemia, with long-term current use of insulin (H)  Recent A1c at 6.3, increased since your started the injection, fulvestrant,  Advised with diet and weight loss  Increase physical activities, diet modifications  Increased Metformin to 2 tab in AM and one tab at PM.  - metFORMIN (GLUCOPHAGE XR) 500 MG 24 hr tablet; Take 2 tab in AM and one tab at PM , after a meal ( dose increased )        Subjective   Desmond is a 69 year old, presenting for the following health issues:  Recheck Medication    History of Malignant neoplasm metastatic to bone, follow up with Oncology, she is on fulvestrant, this has increased her blood sugar.  She is been on Glucophage XR, 2 tab a day.    Otherwise, she is been feeling well.        2/26/2025     2:07 PM   Additional Questions   Roomed by odilia peacock ma       Video Start Time: 2:43 PM    HPI     Patient had an appointment with Dr. Fountain yesterday and was told she should discuss with her provider about changing dosage for metformin as as the cancer drug brought her glucose.        Review of Systems  Constitutional, HEENT, cardiovascular, pulmonary, GI, , musculoskeletal, neuro, skin, endocrine and psych systems are negative, except as otherwise noted.      Objective           Vitals:  No vitals were obtained today due to virtual visit.    Physical Exam   GENERAL: alert and no distress  EYES: Eyes grossly normal to inspection.  No discharge or erythema, or obvious scleral/conjunctival abnormalities.  RESP: No audible wheeze, cough, or visible cyanosis.    SKIN: Visible skin clear. No significant rash, abnormal pigmentation or lesions.  NEURO:  Cranial nerves grossly intact.  Mentation and speech appropriate for age.  PSYCH: Appropriate affect, tone, and pace of words    Lab Results   Component Value Date    A1C 6.3 02/05/2025    A1C 5.4 08/21/2024    A1C 5.2 05/17/2024    A1C 9.7 10/03/2023    A1C 5.3 12/15/2021    A1C 6.5 12/23/2020    A1C 6.3 09/02/2020    A1C 10.6 06/25/2020           Video-Visit Details    Type of service:  Video Visit   Video End Time: 2: 55 PM  Originating Location (pt. Location): Home    Distant Location (provider location):  On-site  Platform used for Video Visit: Marquez  Signed Electronically by: Vanesa Claros MD

## 2025-03-01 NOTE — PROGRESS NOTES
Hematology/Medical Oncology Follow-up Note      March 11, 2025      Reason for visit:  Mary Olson is a 69 year old woman from [place] who presents for oncologic reevaluation with a history of metastatic ER/CA+ HER2- left breast cancer with bone, pleural and chest wall metastases on fulvestrant/capivasertib. She is a former patient of Dr. Tiffany Hilton.    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  Reduce capivasertib 320 mg twice daily 4/7 days  2.  She has not been going to [facility] for the next 6-8 weeks and recommend follow-up at [facility] where she has been seen before around April 8, 2025  3.  Anticipate follow-up here May 13, 2025 with labs and zoledronic acid infusion  4.  Continue fulvestrant as before  5.  Await follow-up CA 15-3 tumor marker available probably later today or tomorrow.    Time with patient including review, documentation, history, examination, coordination of care and counseling was 30 minutes.    The longitudinal plan of care for the diagnosis(es)/condition(s) as documented were addressed during this visit. Due to the added complexity in care, I will continue to support Desmond in the subsequent management and with ongoing continuity of care.    Recent oncology course:  On 1/30/2025, capivasertib was initiated with fulvestrant initiated on 2/5/2025.  She experienced minor diarrhea 3 times over the last 5 weeks since she started capivasertib.  Faint skin rash over arms and front of her torso started a week ago.  She denies any fever, chills or sweats.  Blood sugars have been rising over 200.  1/30/2025 CA 15-3 was 547 and 797 on 2/25/2025.    She has had more problems with diarrhea despite Imodium and Lomotil beginning on the second day rather than the third day of her 4-day courses of treatment.  Her 6th nerve palsy is still slowly improving.  Her rash has resolved without triamcinolone.  Today's blood counts including CMP are stable.  The CA 15-3 tumor marker is pending.    History of present illness:  In March, 2020, the patient presented with a several month history of left breast nodularity and palpable axillary adenopathy.  Subsequent imaging confirmed bilateral breast lesions including a 6.2 cm left breast mass with diffuse breast dermal thickening and a 1.3 cm right breast mass.  The left breast mass revealed a grade 2 invasive mammary carcinoma that was ER/AL positive and HER2 negative.  The right breast mass revealed grade 1 tubular carcinoma that was ER/AL positive and HER2 negative.    Staging PET scan revealed left axillary and retropectoral lymph node metastases as well as diffuse bony metastases.  There has been no MR brain imaging.    March, 2020 - August, 2020: She received weekly paclitaxel with some disease response but requested a break from interventional cytotoxic therapy from significant issues with balance and neuropathy requiring wheelchair use. Switched to every 3-month zoledronic acid.    August, 2020 - March, 2021: Started on anastrozole with February, 2021 PET scan evidence of progression.    March, 2021-August, 2023: Started on anastrozole and abemaciclib.  May, 2023 CT CAP with evidence of soft tissue progression. PIK3CA p.E453K+, ESR1 negative, TMB-low, TEJAS on left axillary tissue.    August, 2023-December, 2023: Switch to exemestane and everolimus with treatment discontinued after 4 months because of significant left breast mass progression.    December, 2023-December, 2024: Started capecitabine 1500 mg twice daily with dose reduction to current 500 mg twice daily 7 days  on/7 days off due to mucositis and hand-foot syndrome.  CA 15-3 tumor marker had declined from a zenith of 945 on 2023 to a nerissa of 107 on 2024 with recent value of 203 on 10/2/2024.  On 2024, her CA 15-3 tumor marker was up to 286.    2024 restaging CT CAP revealed evidence of increased nodularity of the left invasive breast cancer along the anterior chest wall as well as slight increase in anterior mediastinal soft tissue nodule.  A new left upper lobe 4 mm nodule was also identified with no other gross evidence of metastatic disease in the abdomen or pelvis.  Subsequent 2024 echocardiogram revealed a 65-70% EF. [employer] study revealed no new ESR 1 mutation.  3-month zoledronic acid was administered on 2025.    A 6th cranial nerve palsy was recently identified by her optometrist.  A 2025 MR brain revealed diffuse osseous disease but no specific abnormal enhancement along the cisternal segment of the visualized cranial nerves.  She thinks her diplopia has improved on antiviral therapy.    Past medical history:  Past Medical History:   Diagnosis Date    ASCUS favoring benign 10/28/15    neg HPV    Cancer (H)     Diabetes mellitus, type 2 (H) 2020    Hypertension     Obese         Family history:  I have reviewed this patient's family history and updated it with pertinent information if needed.  Family History   Problem Relation Age of Onset    Hypertension Mother          12/3/21    Coronary Artery Disease Mother          12/3/21    Breast Cancer Mother          12/3/21    Hyperlipidemia Mother          12/3/21    Depression Mother          123/3/21    Arrhythmia Brother     Cerebrovascular Disease Brother     Diabetes Brother          Medications:  Current Outpatient Medications   Medication Sig Dispense Refill    alcohol swab prep pads Use to swab area of injection/chrissy as directed. 400 each 3    amLODIPine (NORVASC) 10 MG tablet Take 1 tablet (10  mg) by mouth daily. 90 tablet 3    Ascorbic Acid (VITAMIN C PO)       aspirin 81 MG tablet Take by mouth daily      blood glucose (NO BRAND SPECIFIED) test strip 1 strip by In Vitro route 3 times daily 100 strip 6    blood glucose calibration (NO BRAND SPECIFIED) solution To accompany: Blood Glucose Monitor Brands: per insurance. 1 Bottle 3    blood glucose monitoring (NO BRAND SPECIFIED) meter device kit Use to test blood sugar 3 times daily or as directed. 1 kit 0    Blood Pressure KIT Please test blood pressure at home 2 x daily 1 kit 0    calcium-vitamin D-vitamin K (VIACTIV) 500-500-40 MG-UNT-MCG CHEW Take 1 tablet by mouth every other day      capivasertib (TRUQAP) 200 MG tablet Take 2 tablets (400 mg) by mouth 2 times daily. Days 1 through 4 of each week. 64 tablet 0    capivasertib (TRUQAP) 200 MG tablet Take 2 tablets (400 mg) by mouth 2 times daily. Days 1 through 4 of each week. 64 tablet 0    Cholecalciferol (VITAMIN D3 PO) Take 2,000 Units by mouth daily Takes 4 tabs daily      Coenzyme Q10 (COQ-10) 200 MG CAPS       Cyanocobalamin (B-12) 2500 MCG TABS       diphenoxylate-atropine (LOMOTIL) 2.5-0.025 MG tablet Take 1 tablet by mouth 4 times daily as needed for diarrhea. 60 tablet 0    gabapentin (NEURONTIN) 250 MG/5ML solution Take 5 mLs (250 mg) by mouth at bedtime. 470 mL 0    hydrocortisone 2.5 % cream Apply topically 2 times daily 3.5 g 0    lidocaine-prilocaine (EMLA) 2.5-2.5 % external cream Apply small amount to center of port site 45-60 minutes prior to chemo appointment, cover with clear dressing if desired. 30 g 1    loperamide (IMODIUM) 2 MG capsule Start with 2 caps (4 mg), then take one cap (2 mg) after each diarrheal stool as needed. Do not use more than 8 caps (16 mg) per day. 30 capsule 0    magic mouthwash suspension (diphenhydrAMINE, lidocaine, aluminum-magnesium & simethicone) Swish and swallow 5 mLs in mouth every 6 hours as needed for mouth sores. 240 mL 2    metFORMIN (GLUCOPHAGE  XR) 500 MG 24 hr tablet Take 2 tab in AM and one tab at PM , after a meal ( dose increased ) 270 tablet 3    Multiple Vitamins-Minerals (CENTRUM ADULTS PO)       potassium chloride (KLOR-CON) 20 MEQ packet Take 20 mEq by mouth 2 times daily. 60 packet 11    prochlorperazine (COMPAZINE) 10 MG tablet Take 1 tablet (10 mg) by mouth every 6 hours as needed for nausea or vomiting. 30 tablet 2    STATIN NOT PRESCRIBED (INTENTIONAL) Please choose reason not prescribed from choices below.      thin (NO BRAND SPECIFIED) lancets 3 each by In Vitro route daily Use with lanceting device. To accompany: Blood Glucose Monitor Brands: per insurance. 300 each 6    triamcinolone (KENALOG) 0.1 % external cream Apply topically 2 times daily. 80 g 1    triamterene-HCTZ (MAXZIDE-25) 37.5-25 MG tablet Take 1 tablet by mouth daily. 90 tablet 3    UNABLE TO FIND MEDICATION NAME: Elderberry      UNABLE TO FIND MEDICATION NAME: clove of garlic daily      VITAMIN E NATURAL PO Take 400 Units by mouth      [START ON 3/12/2025] capivasertib (TRUQAP) 160 MG tablet therapy pack Take 2 tablets (320 mg) by mouth 2 times daily. Days 1 through 4 of each week. 64 tablet 0     Current Facility-Administered Medications   Medication Dose Route Frequency Provider Last Rate Last Admin    fulvestrant (FASLODEX) injection 500 mg  500 mg Intramuscular Once Robert Fountain MD         Facility-Administered Medications Ordered in Other Visits   Medication Dose Route Frequency Provider Last Rate Last Admin    heparin 100 UNIT/ML injection 500 Units  500 Units Intracatheter Q8H Keysha Birch MD   500 Units at 11/06/20 1017    heparin lock flush 100 unit/mL injection 5 mL  5 mL Intracatheter Q30 Days Robert Fountain MD   5 mL at 12/13/24 1452       Allergies:  Allergies   Allergen Reactions    Lisinopril Cough     cough       Review of systems:  Except as note above, the patient denies:  Headache, double vision, change in vision, hearing loss,  "tinnitus;  Dyspnea, chest pain, palpitations, pleurisy or hemoptysis;  Anorexia, nausea, vomiting, diarrhea, constipation, rectal bleeding bleeding, change in bowel habits;  Urinary frequency, burning or hematuria;  Fever, chills, sweats, change in appetite;  Bone or back pain; skin rash, joint swelling, new lumps;  Unexplained, bleeding or bruising, tingling numbness, change in gait;    Physical examination:  BP (!) 178/98 (BP Location: Right arm)   Pulse 112   Ht 1.549 m (5' 1\")   Wt 80.2 kg (176 lb 14.4 oz)   SpO2 96%   BMI 33.42 kg/m      The patient is alert and oriented.   Throat and oral mucosa are normal-appearing.   Thyroid gland is not enlarged.   Adenopathy is absent in the neck, axilla, groin and supraclavicular fossa;   Lungs are clear to auscultation and percussion without rales, wheezes or rubs;   88 heart rhythm is regular, heart sounds are without murmurs or gallops  Right breast exam reveals no palpable dominant masses; Left breast exam reveals slightly larger 3.5 cm area of sub-areolar induration;   Abdomen is soft and flat without hepatosplenomegaly, masses, ascites or tenderness;  Extremities and skin reveal no unusual skin lesions, joint swelling or edema;          Robert Fountain MD  "

## 2025-03-11 ENCOUNTER — INFUSION THERAPY VISIT (OUTPATIENT)
Dept: INFUSION THERAPY | Facility: CLINIC | Age: 70
End: 2025-03-11
Attending: INTERNAL MEDICINE
Payer: COMMERCIAL

## 2025-03-11 ENCOUNTER — ONCOLOGY VISIT (OUTPATIENT)
Dept: ONCOLOGY | Facility: CLINIC | Age: 70
End: 2025-03-11
Attending: INTERNAL MEDICINE
Payer: COMMERCIAL

## 2025-03-11 VITALS — DIASTOLIC BLOOD PRESSURE: 87 MMHG | HEART RATE: 82 BPM | SYSTOLIC BLOOD PRESSURE: 146 MMHG

## 2025-03-11 VITALS
SYSTOLIC BLOOD PRESSURE: 178 MMHG | BODY MASS INDEX: 33.4 KG/M2 | WEIGHT: 176.9 LBS | OXYGEN SATURATION: 96 % | HEART RATE: 112 BPM | HEIGHT: 61 IN | DIASTOLIC BLOOD PRESSURE: 98 MMHG

## 2025-03-11 DIAGNOSIS — C50.912 MALIGNANT NEOPLASM OF LEFT FEMALE BREAST, UNSPECIFIED ESTROGEN RECEPTOR STATUS, UNSPECIFIED SITE OF BREAST (H): Primary | ICD-10-CM

## 2025-03-11 DIAGNOSIS — C79.51 MALIGNANT NEOPLASM METASTATIC TO BONE (H): ICD-10-CM

## 2025-03-11 DIAGNOSIS — C50.912 MALIGNANT NEOPLASM OF LEFT FEMALE BREAST, UNSPECIFIED ESTROGEN RECEPTOR STATUS, UNSPECIFIED SITE OF BREAST (H): ICD-10-CM

## 2025-03-11 LAB
ALBUMIN SERPL BCG-MCNC: 4 G/DL (ref 3.5–5.2)
ALP SERPL-CCNC: 137 U/L (ref 40–150)
ALT SERPL W P-5'-P-CCNC: 6 U/L (ref 0–50)
ANION GAP SERPL CALCULATED.3IONS-SCNC: 14 MMOL/L (ref 7–15)
AST SERPL W P-5'-P-CCNC: 28 U/L (ref 0–45)
BASOPHILS # BLD AUTO: 0.1 10E3/UL (ref 0–0.2)
BASOPHILS NFR BLD AUTO: 1 %
BILIRUB SERPL-MCNC: 0.3 MG/DL
BUN SERPL-MCNC: 16.1 MG/DL (ref 8–23)
CALCIUM SERPL-MCNC: 10 MG/DL (ref 8.8–10.4)
CANCER AG15-3 SERPL-ACNC: 884 U/ML
CHLORIDE SERPL-SCNC: 98 MMOL/L (ref 98–107)
CREAT SERPL-MCNC: 1.23 MG/DL (ref 0.51–0.95)
EGFRCR SERPLBLD CKD-EPI 2021: 48 ML/MIN/1.73M2
EOSINOPHIL # BLD AUTO: 0.3 10E3/UL (ref 0–0.7)
EOSINOPHIL NFR BLD AUTO: 5 %
ERYTHROCYTE [DISTWIDTH] IN BLOOD BY AUTOMATED COUNT: 16.2 % (ref 10–15)
GLUCOSE SERPL-MCNC: 165 MG/DL (ref 70–99)
HCO3 SERPL-SCNC: 23 MMOL/L (ref 22–29)
HCT VFR BLD AUTO: 32.2 % (ref 35–47)
HGB BLD-MCNC: 10.7 G/DL (ref 11.7–15.7)
IMM GRANULOCYTES # BLD: 0.1 10E3/UL
IMM GRANULOCYTES NFR BLD: 1 %
LYMPHOCYTES # BLD AUTO: 1.4 10E3/UL (ref 0.8–5.3)
LYMPHOCYTES NFR BLD AUTO: 26 %
MAGNESIUM SERPL-MCNC: 1.6 MG/DL (ref 1.7–2.3)
MCH RBC QN AUTO: 28.5 PG (ref 26.5–33)
MCHC RBC AUTO-ENTMCNC: 33.2 G/DL (ref 31.5–36.5)
MCV RBC AUTO: 86 FL (ref 78–100)
MONOCYTES # BLD AUTO: 0.3 10E3/UL (ref 0–1.3)
MONOCYTES NFR BLD AUTO: 6 %
NEUTROPHILS # BLD AUTO: 3.3 10E3/UL (ref 1.6–8.3)
NEUTROPHILS NFR BLD AUTO: 61 %
NRBC # BLD AUTO: 0 10E3/UL
NRBC BLD AUTO-RTO: 0 /100
PLATELET # BLD AUTO: 284 10E3/UL (ref 150–450)
POTASSIUM SERPL-SCNC: 4.3 MMOL/L (ref 3.4–5.3)
PROT SERPL-MCNC: 7.6 G/DL (ref 6.4–8.3)
RBC # BLD AUTO: 3.75 10E6/UL (ref 3.8–5.2)
SODIUM SERPL-SCNC: 135 MMOL/L (ref 135–145)
WBC # BLD AUTO: 5.4 10E3/UL (ref 4–11)

## 2025-03-11 PROCEDURE — 99214 OFFICE O/P EST MOD 30 MIN: CPT | Performed by: INTERNAL MEDICINE

## 2025-03-11 PROCEDURE — 99207 PR NO CHARGE LOS: CPT

## 2025-03-11 PROCEDURE — 85004 AUTOMATED DIFF WBC COUNT: CPT

## 2025-03-11 PROCEDURE — 250N000011 HC RX IP 250 OP 636: Mod: JZ | Performed by: INTERNAL MEDICINE

## 2025-03-11 PROCEDURE — 82040 ASSAY OF SERUM ALBUMIN: CPT

## 2025-03-11 PROCEDURE — G2211 COMPLEX E/M VISIT ADD ON: HCPCS | Performed by: INTERNAL MEDICINE

## 2025-03-11 PROCEDURE — 82435 ASSAY OF BLOOD CHLORIDE: CPT

## 2025-03-11 PROCEDURE — G0463 HOSPITAL OUTPT CLINIC VISIT: HCPCS | Mod: 25 | Performed by: INTERNAL MEDICINE

## 2025-03-11 PROCEDURE — 86300 IMMUNOASSAY TUMOR CA 15-3: CPT

## 2025-03-11 PROCEDURE — 250N000011 HC RX IP 250 OP 636

## 2025-03-11 PROCEDURE — 83735 ASSAY OF MAGNESIUM: CPT | Performed by: INTERNAL MEDICINE

## 2025-03-11 PROCEDURE — 36591 DRAW BLOOD OFF VENOUS DEVICE: CPT

## 2025-03-11 PROCEDURE — 96402 CHEMO HORMON ANTINEOPL SQ/IM: CPT

## 2025-03-11 RX ORDER — DIPHENHYDRAMINE HYDROCHLORIDE 50 MG/ML
50 INJECTION, SOLUTION INTRAMUSCULAR; INTRAVENOUS
Start: 2025-04-09

## 2025-03-11 RX ORDER — ALBUTEROL SULFATE 90 UG/1
1-2 INHALANT RESPIRATORY (INHALATION)
Start: 2025-04-09

## 2025-03-11 RX ORDER — EPINEPHRINE 1 MG/ML
0.3 INJECTION, SOLUTION INTRAMUSCULAR; SUBCUTANEOUS EVERY 5 MIN PRN
OUTPATIENT
Start: 2025-04-09

## 2025-03-11 RX ORDER — HEPARIN SODIUM (PORCINE) LOCK FLUSH IV SOLN 100 UNIT/ML 100 UNIT/ML
5 SOLUTION INTRAVENOUS ONCE
Status: COMPLETED | OUTPATIENT
Start: 2025-03-11 | End: 2025-03-11

## 2025-03-11 RX ORDER — ALBUTEROL SULFATE 0.83 MG/ML
2.5 SOLUTION RESPIRATORY (INHALATION)
OUTPATIENT
Start: 2025-04-09

## 2025-03-11 RX ORDER — METHYLPREDNISOLONE SODIUM SUCCINATE 40 MG/ML
40 INJECTION INTRAMUSCULAR; INTRAVENOUS
Start: 2025-04-09

## 2025-03-11 RX ORDER — LAMOTRIGINE 25 MG/1
500 TABLET ORAL ONCE
OUTPATIENT
Start: 2025-05-07 | End: 2025-05-07

## 2025-03-11 RX ORDER — DIPHENHYDRAMINE HYDROCHLORIDE 50 MG/ML
25 INJECTION, SOLUTION INTRAMUSCULAR; INTRAVENOUS
Start: 2025-04-09

## 2025-03-11 RX ORDER — METHYLPREDNISOLONE SODIUM SUCCINATE 40 MG/ML
40 INJECTION INTRAMUSCULAR; INTRAVENOUS
Start: 2025-05-07

## 2025-03-11 RX ORDER — LAMOTRIGINE 25 MG/1
500 TABLET ORAL ONCE
OUTPATIENT
Start: 2025-04-09 | End: 2025-04-09

## 2025-03-11 RX ORDER — LAMOTRIGINE 25 MG/1
500 TABLET ORAL ONCE
Status: COMPLETED | OUTPATIENT
Start: 2025-03-11 | End: 2025-03-11

## 2025-03-11 RX ORDER — DIPHENHYDRAMINE HYDROCHLORIDE 50 MG/ML
50 INJECTION, SOLUTION INTRAMUSCULAR; INTRAVENOUS
Start: 2025-05-07

## 2025-03-11 RX ORDER — EPINEPHRINE 1 MG/ML
0.3 INJECTION, SOLUTION INTRAMUSCULAR; SUBCUTANEOUS EVERY 5 MIN PRN
OUTPATIENT
Start: 2025-05-07

## 2025-03-11 RX ORDER — DIPHENHYDRAMINE HYDROCHLORIDE 50 MG/ML
25 INJECTION, SOLUTION INTRAMUSCULAR; INTRAVENOUS
Start: 2025-05-07

## 2025-03-11 RX ORDER — MEPERIDINE HYDROCHLORIDE 25 MG/ML
25 INJECTION INTRAMUSCULAR; INTRAVENOUS; SUBCUTANEOUS
OUTPATIENT
Start: 2025-05-07

## 2025-03-11 RX ORDER — MEPERIDINE HYDROCHLORIDE 25 MG/ML
25 INJECTION INTRAMUSCULAR; INTRAVENOUS; SUBCUTANEOUS
OUTPATIENT
Start: 2025-04-09

## 2025-03-11 RX ORDER — CAPIVASERTIB 160 MG/1
TABLET, FILM COATED ORAL
Qty: 64 TABLET | Refills: 0 | Status: SHIPPED | OUTPATIENT
Start: 2025-03-12

## 2025-03-11 RX ORDER — ALBUTEROL SULFATE 0.83 MG/ML
2.5 SOLUTION RESPIRATORY (INHALATION)
OUTPATIENT
Start: 2025-05-07

## 2025-03-11 RX ORDER — ALBUTEROL SULFATE 90 UG/1
1-2 INHALANT RESPIRATORY (INHALATION)
Start: 2025-05-07

## 2025-03-11 RX ADMIN — Medication 5 ML: at 07:22

## 2025-03-11 RX ADMIN — FULVESTRANT 500 MG: 250 INJECTION, SOLUTION INTRAMUSCULAR at 08:52

## 2025-03-11 ASSESSMENT — PAIN SCALES - GENERAL: PAINLEVEL_OUTOF10: NO PAIN (0)

## 2025-03-11 NOTE — PATIENT INSTRUCTIONS
1. Reduce capivasertib to 320 mg twice-a-day for 4 days, then 3 days off; (Formerly 400 mg)  2. [facility] follow-up for April 8, 2025  3. Follow-up her May 13, 2025 with labs and Zometa infusion  4. Continue fulvestrant as before

## 2025-03-11 NOTE — PROGRESS NOTES
Port site scrubbed with Chloraprep for 30 seconds. Accessed port using sterile technique. Tubes drawn in rainbow order. See documentation flowsheet. Tolerated port access and draw without complaint.     Labs drawn and sent: ca 15-3, cbc, cmp, mg    Rachel Saini RN

## 2025-03-11 NOTE — PROGRESS NOTES
Infusion Nursing Note:  Mary Olson presents today for Faslodex.    Patient seen by provider today: Yes: Dr. Fountain   present during visit today: Not Applicable.    Note: N/A.      Intravenous Access:  No Intravenous access/labs at this visit.    Treatment Conditions:  Not Applicable.      Post Infusion Assessment:  Patient tolerated injection without incident.  Site patent and intact, free from redness, edema or discomfort.       Discharge Plan:   Patient discharged in stable condition accompanied by: self.  Departure Mode: Ambulatory.      Kailee Jarvis LPN

## 2025-03-11 NOTE — NURSING NOTE
"Oncology Rooming Note    March 11, 2025 8:01 AM   Mary Olson is a 69 year old female who presents for:    Chief Complaint   Patient presents with    Oncology Clinic Visit     Initial Vitals: BP (!) 178/98 (BP Location: Right arm)   Pulse 112   Ht 1.549 m (5' 1\")   Wt 80.2 kg (176 lb 14.4 oz)   SpO2 96%   BMI 33.42 kg/m   Estimated body mass index is 33.42 kg/m  as calculated from the following:    Height as of this encounter: 1.549 m (5' 1\").    Weight as of this encounter: 80.2 kg (176 lb 14.4 oz). Body surface area is 1.86 meters squared.  No Pain (0) Comment: Data Unavailable   No LMP recorded. Patient is postmenopausal.  Allergies reviewed: Yes  Medications reviewed: Yes    Medications: Medication refills not needed today.  Pharmacy name entered into EPIC:    [facility]  FOR [place], [place] - [address] [facility] [place] - [place], [place] - [address] [facility] [place] [address] [place] [address] [facility] [place] - [address] [facility] [place] - [address] [id number] - [place] - [address] [facility]  [id number] - [place], [place] - [address] [facility]    Frailty Screening:   Is the patient here for a new oncology consult visit in cancer care? 2. No    PHQ9:  Did this patient require a PHQ9?: No      Clinical concerns: No Concerns        Teena Osborne MA            "

## 2025-03-11 NOTE — LETTER
3/11/2025      Mary Olson  [address]  [facility] [address]      Dear Colleague,    Thank you for referring your patient, Mary Olson, to the [facility]. Please see a copy of my visit note below.    Hematology/Medical Oncology Follow-up Note      March 11, 2025      Reason for visit:  Mary Olson is a 69 year old woman from [place] who presents for oncologic reevaluation with a history of metastatic ER/IA+ HER2- left breast cancer with bone, pleural and chest wall metastases on fulvestrant/capivasertib. She is a former patient of Dr. Tiffany Hilton.    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  Reduce capivasertib 320 mg twice daily 4/7 days  2.  She has not been going to [facility] for the next 6-8 weeks and recommend follow-up at [facility] where she has been seen before around April 8, 2025  3.  Anticipate follow-up here May 13, 2025 with labs and zoledronic acid infusion  4.  Continue fulvestrant as before  5.  Await follow-up CA 15-3 tumor marker available probably later today or tomorrow.    Time with patient including review, documentation, history, examination, coordination of care and counseling was 30 minutes.    The longitudinal plan of care for the diagnosis(es)/condition(s) as documented were addressed during this visit. Due to the added complexity in care, I will continue to support Desmond in the subsequent management and with ongoing continuity of care.    Recent oncology course:  On 1/30/2025, capivasertib was initiated with fulvestrant initiated on 2/5/2025.   PRE-SEDATION ASSESSMENT    CONSENT  Consent for procedure and sedation obtained: Yes    MEDICAL HISTORY      Past Medical History:   Diagnosis Date   • Chronic pain    • Gastroesophageal reflux disease    • High cholesterol          PHYSICAL EXAM  Visit Vitals  /89   Pulse 76   Temp 97.3 °F (36.3 °C) (Oral)   Resp 18   Ht 6' 1\" (1.854 m)   Wt 105.2 kg   SpO2 98%   BMI 30.61 kg/m²     History and Physical Reviewed: H&P completed today  Airway Anatomy : Class I  Heart : Normal  Lungs : Normal  LOC/Mental Status : Normal    OTHER FINDINGS  Reviewed current medications and allergies: Yes  Pertinent lab/diagnostic test reviewed: Yes    SEDATION RISK ASSESSMENT  Risk Status ASA: Class I - Normal, healthy patient  Plan for Sedation: Moderate Sedation  Indications for Procedure/Pre-Procedure Diagnosis and Planned Procedure: rectal bleeding and reflux symptoms for colonoscopy and EGD  EKG Monitoring: Yes    NARRATIVE FINDINGS  Narrative Findings: Rectal bleeding and reflux symptoms for colonoscopy and EGD   She experienced minor diarrhea 3 times over the last 5 weeks since she started capivasertib.  Faint skin rash over arms and front of her torso started a week ago.  She denies any fever, chills or sweats.  Blood sugars have been rising over 200. 1/30/2025 CA 15-3 was 547 and 797 on 2/25/2025.    She has had more problems with diarrhea despite Imodium and Lomotil beginning on the second day rather than the third day of her 4-day courses of treatment.  Her 6th nerve palsy is still slowly improving.  Her rash has resolved without triamcinolone.  Today's blood counts including CMP are stable.  The CA 15-3 tumor marker is pending.    History of present illness:  In March, 2020, the patient presented with a several month history of left breast nodularity and palpable axillary adenopathy.  Subsequent imaging confirmed bilateral breast lesions including a 6.2 cm left breast mass with diffuse breast dermal thickening and a 1.3 cm right breast mass.  The left breast mass revealed a grade 2 invasive mammary carcinoma that was ER/SC positive and HER2 negative.  The right breast mass revealed grade 1 tubular carcinoma that was ER/SC positive and HER2 negative.    Staging PET scan revealed left axillary and retropectoral lymph node metastases as well as diffuse bony metastases.  There has been no MR brain imaging.    March, 2020 - August, 2020: She received weekly paclitaxel with some disease response but requested a break from interventional cytotoxic therapy from significant issues with balance and neuropathy requiring wheelchair use. Switched to every 3-month zoledronic acid.    August, 2020 - March, 2021: Started on anastrozole with February, 2021 PET scan evidence of progression.    March, 2021-August, 2023: Started on anastrozole and abemaciclib.  May, 2023 CT CAP with evidence of soft tissue progression. PIK3CA p.E453K+, ESR1 negative, TMB-low, TEJAS on left axillary tissue.    August, 2023-December, 2023: Switch to  exemestane and everolimus with treatment discontinued after 4 months because of significant left breast mass progression.    -: Started capecitabine 1500 mg twice daily with dose reduction to current 500 mg twice daily 7 days on/7 days off due to mucositis and hand-foot syndrome.  CA 15-3 tumor marker had declined from a zenith of 945 on 2023 to a nerissa of 107 on 2024 with recent value of 203 on 10/2/2024.  On 2024, her CA 15-3 tumor marker was up to 286.    2024 restaging CT CAP revealed evidence of increased nodularity of the left invasive breast cancer along the anterior chest wall as well as slight increase in anterior mediastinal soft tissue nodule.  A new left upper lobe 4 mm nodule was also identified with no other gross evidence of metastatic disease in the abdomen or pelvis.  Subsequent 2024 echocardiogram revealed a 65-70% EF. [employer] study revealed no new ESR 1 mutation.  3-month zoledronic acid was administered on 2025.    A 6th cranial nerve palsy was recently identified by her optometrist.  A 2025 MR brain revealed diffuse osseous disease but no specific abnormal enhancement along the cisternal segment of the visualized cranial nerves.  She thinks her diplopia has improved on antiviral therapy.    Past medical history:  Past Medical History:   Diagnosis Date     ASCUS favoring benign 10/28/15    neg HPV     Cancer (H)      Diabetes mellitus, type 2 (H) 2020     Hypertension      Obese         Family history:  I have reviewed this patient's family history and updated it with pertinent information if needed.  Family History   Problem Relation Age of Onset     Hypertension Mother          12/3/21     Coronary Artery Disease Mother          12/3/21     Breast Cancer Mother          12/3/21     Hyperlipidemia Mother          12/3/21     Depression Mother          123/3/21     Arrhythmia Brother      Cerebrovascular  Disease Brother      Diabetes Brother          Medications:  Current Outpatient Medications   Medication Sig Dispense Refill     alcohol swab prep pads Use to swab area of injection/chrissy as directed. 400 each 3     amLODIPine (NORVASC) 10 MG tablet Take 1 tablet (10 mg) by mouth daily. 90 tablet 3     Ascorbic Acid (VITAMIN C PO)        aspirin 81 MG tablet Take by mouth daily       blood glucose (NO BRAND SPECIFIED) test strip 1 strip by In Vitro route 3 times daily 100 strip 6     blood glucose calibration (NO BRAND SPECIFIED) solution To accompany: Blood Glucose Monitor Brands: per insurance. 1 Bottle 3     blood glucose monitoring (NO BRAND SPECIFIED) meter device kit Use to test blood sugar 3 times daily or as directed. 1 kit 0     Blood Pressure KIT Please test blood pressure at home 2 x daily 1 kit 0     calcium-vitamin D-vitamin K (VIACTIV) 500-500-40 MG-UNT-MCG CHEW Take 1 tablet by mouth every other day       capivasertib (TRUQAP) 200 MG tablet Take 2 tablets (400 mg) by mouth 2 times daily. Days 1 through 4 of each week. 64 tablet 0     capivasertib (TRUQAP) 200 MG tablet Take 2 tablets (400 mg) by mouth 2 times daily. Days 1 through 4 of each week. 64 tablet 0     Cholecalciferol (VITAMIN D3 PO) Take 2,000 Units by mouth daily Takes 4 tabs daily       Coenzyme Q10 (COQ-10) 200 MG CAPS        Cyanocobalamin (B-12) 2500 MCG TABS        diphenoxylate-atropine (LOMOTIL) 2.5-0.025 MG tablet Take 1 tablet by mouth 4 times daily as needed for diarrhea. 60 tablet 0     gabapentin (NEURONTIN) 250 MG/5ML solution Take 5 mLs (250 mg) by mouth at bedtime. 470 mL 0     hydrocortisone 2.5 % cream Apply topically 2 times daily 3.5 g 0     lidocaine-prilocaine (EMLA) 2.5-2.5 % external cream Apply small amount to center of port site 45-60 minutes prior to chemo appointment, cover with clear dressing if desired. 30 g 1     loperamide (IMODIUM) 2 MG capsule Start with 2 caps (4 mg), then take one cap (2 mg) after each  diarrheal stool as needed. Do not use more than 8 caps (16 mg) per day. 30 capsule 0     magic mouthwash suspension (diphenhydrAMINE, lidocaine, aluminum-magnesium & simethicone) Swish and swallow 5 mLs in mouth every 6 hours as needed for mouth sores. 240 mL 2     metFORMIN (GLUCOPHAGE XR) 500 MG 24 hr tablet Take 2 tab in AM and one tab at PM , after a meal ( dose increased ) 270 tablet 3     Multiple Vitamins-Minerals (CENTRUM ADULTS PO)        potassium chloride (KLOR-CON) 20 MEQ packet Take 20 mEq by mouth 2 times daily. 60 packet 11     prochlorperazine (COMPAZINE) 10 MG tablet Take 1 tablet (10 mg) by mouth every 6 hours as needed for nausea or vomiting. 30 tablet 2     STATIN NOT PRESCRIBED (INTENTIONAL) Please choose reason not prescribed from choices below.       thin (NO BRAND SPECIFIED) lancets 3 each by In Vitro route daily Use with lanceting device. To accompany: Blood Glucose Monitor Brands: per insurance. 300 each 6     triamcinolone (KENALOG) 0.1 % external cream Apply topically 2 times daily. 80 g 1     triamterene-HCTZ (MAXZIDE-25) 37.5-25 MG tablet Take 1 tablet by mouth daily. 90 tablet 3     UNABLE TO FIND MEDICATION NAME: Elderberry       UNABLE TO FIND MEDICATION NAME: clove of garlic daily       VITAMIN E NATURAL PO Take 400 Units by mouth       [START ON 3/12/2025] capivasertib (TRUQAP) 160 MG tablet therapy pack Take 2 tablets (320 mg) by mouth 2 times daily. Days 1 through 4 of each week. 64 tablet 0     Current Facility-Administered Medications   Medication Dose Route Frequency Provider Last Rate Last Admin     fulvestrant (FASLODEX) injection 500 mg  500 mg Intramuscular Once Robert Fountain MD         Facility-Administered Medications Ordered in Other Visits   Medication Dose Route Frequency Provider Last Rate Last Admin     heparin 100 UNIT/ML injection 500 Units  500 Units Intracatheter Q8H Keysha Birch MD   500 Units at 11/06/20 1017     heparin lock flush 100 unit/mL  "injection 5 mL  5 mL Intracatheter Q30 Days Robert Fountain MD   5 mL at 12/13/24 7292       Allergies:  Allergies   Allergen Reactions     Lisinopril Cough     cough       Review of systems:  Except as note above, the patient denies:  Headache, double vision, change in vision, hearing loss, tinnitus;  Dyspnea, chest pain, palpitations, pleurisy or hemoptysis;  Anorexia, nausea, vomiting, diarrhea, constipation, rectal bleeding bleeding, change in bowel habits;  Urinary frequency, burning or hematuria;  Fever, chills, sweats, change in appetite;  Bone or back pain; skin rash, joint swelling, new lumps;  Unexplained, bleeding or bruising, tingling numbness, change in gait;    Physical examination:  BP (!) 178/98 (BP Location: Right arm)   Pulse 112   Ht 1.549 m (5' 1\")   Wt 80.2 kg (176 lb 14.4 oz)   SpO2 96%   BMI 33.42 kg/m      The patient is alert and oriented.   Throat and oral mucosa are normal-appearing.   Thyroid gland is not enlarged.   Adenopathy is absent in the neck, axilla, groin and supraclavicular fossa;   Lungs are clear to auscultation and percussion without rales, wheezes or rubs;   88 heart rhythm is regular, heart sounds are without murmurs or gallops  Right breast exam reveals no palpable dominant masses; Left breast exam reveals slightly larger 3.5 cm area of sub-areolar induration;   Abdomen is soft and flat without hepatosplenomegaly, masses, ascites or tenderness;  Extremities and skin reveal no unusual skin lesions, joint swelling or edema;          Robert Fountain MD      Again, thank you for allowing me to participate in the care of your patient.        Sincerely,        Robert Fountain MD    Electronically signed"

## 2025-03-24 DIAGNOSIS — C50.912 MALIGNANT NEOPLASM OF LEFT FEMALE BREAST, UNSPECIFIED ESTROGEN RECEPTOR STATUS, UNSPECIFIED SITE OF BREAST (H): ICD-10-CM

## 2025-03-24 DIAGNOSIS — C79.51 MALIGNANT NEOPLASM METASTATIC TO BONE (H): ICD-10-CM

## 2025-03-24 RX ORDER — DIPHENOXYLATE HYDROCHLORIDE AND ATROPINE SULFATE 2.5; .025 MG/1; MG/1
1 TABLET ORAL 4 TIMES DAILY PRN
Qty: 60 TABLET | Refills: 0 | Status: SHIPPED | OUTPATIENT
Start: 2025-03-24

## 2025-03-27 ENCOUNTER — TELEPHONE (OUTPATIENT)
Dept: ONCOLOGY | Facility: CLINIC | Age: 70
End: 2025-03-27
Payer: COMMERCIAL

## 2025-03-27 DIAGNOSIS — C50.912 MALIGNANT NEOPLASM OF LEFT FEMALE BREAST, UNSPECIFIED ESTROGEN RECEPTOR STATUS, UNSPECIFIED SITE OF BREAST (H): ICD-10-CM

## 2025-03-27 DIAGNOSIS — C79.51 MALIGNANT NEOPLASM METASTATIC TO BONE (H): ICD-10-CM

## 2025-03-27 RX ORDER — LOPERAMIDE HYDROCHLORIDE 2 MG/1
CAPSULE ORAL
Qty: 30 CAPSULE | Refills: 0 | Status: CANCELLED | OUTPATIENT
Start: 2025-03-27

## 2025-03-27 NOTE — TELEPHONE ENCOUNTER
Called patient and offered appointment as noted below. Patient agreed. She will follow up with Dr. Ashraf then to discuss future tx.     Malena Holman RN, BSN, PHN, OCN  [facility]     Felicia Ashraf MD  You30 minutes ago (12:56 PM)       I refilled the Lomotil on 3/24/2025.    Can I see her as video visit on 4/3/2025 at 730

## 2025-03-27 NOTE — TELEPHONE ENCOUNTER
Pt called back.   She wants to make sure that Dr Ashraf is aware that she does not want to take Truqap any longer. She does not plan to start her dosing again on 3/30/2025, and just wants to make sure that this is OK with Dr Ashraf.   Pt is also wondering if she should go back on her original dosing of metformin which was one tablet BID. Pt states that PCP increased dose of metformin to 2 tablets in the morning and one tablet in the evening due to elevated blood sugar on Truqap, but if pt is stopping the Truqap, should the metformin dosage also be adjusted?  Will route to Dr Ashraf for advice.  Patient verbalized understanding and agreement with plan.  Patient was instructed to call the clinic with any questions, concerns, or worsening symptoms.  Jordyn June RN on 3/27/2025 at 1:44 PM

## 2025-03-27 NOTE — TELEPHONE ENCOUNTER
Oncology Nurse Triage    Situation:   Mary reporting the following symptoms: persistent diarrhea    Background:   Treating Provider:   Dr. Ashraf    Date of last office visit: 3/11/25    Recent Treatments:Truqap, faslodex, zometa    Assessment:     Patient continues to have diarrhea despite Truqap dose reduction. She has been taking 320 mg in the morning and 160 mg in the evening. She has been alternating lomotil and imodium to help with sx. Sx improve however not for long. She is no longer comfortable taking truqap and would like to stop. In addition, would also like to stop metformin, and faslodex. She wants to discuss alternative treatment and potentially start treatment after she returns from vacation. Wants to defer tx for now give her body time to heal before starting another treatment. She also wants to avoid not feeling well while on vacation.    Denies any dizziness, lightheadedness, weakness,fever, n/v/c, rash, or pain. No trouble with eating or drinking. She has been pushing fluids including electrolytes. No other sx noted.    Recommendations:     Routing to care team for further review and follow up.    Malena Holman, RN, BSN, PHN, OCN  [facility]

## 2025-03-27 NOTE — TELEPHONE ENCOUNTER
Felicia Ashraf MD  You; Malena Holman RN41 minutes ago (2:04 PM)       She can hold it for now  Yes ask PCP for metformin     Pt was notified with recommendations per Dr Ashraf.  She will contact PCP for recommendations on metformin.  Patient verbalized understanding and agreement with plan.  Patient was instructed to call the clinic with any questions, concerns, or worsening symptoms.  Jordyn June RN on 3/27/2025 at 2:49 PM

## 2025-04-03 ENCOUNTER — VIRTUAL VISIT (OUTPATIENT)
Dept: ONCOLOGY | Facility: CLINIC | Age: 70
End: 2025-04-03
Attending: INTERNAL MEDICINE
Payer: COMMERCIAL

## 2025-04-03 VITALS — WEIGHT: 162 LBS | BODY MASS INDEX: 30.58 KG/M2 | HEIGHT: 61 IN

## 2025-04-03 DIAGNOSIS — C79.51 MALIGNANT NEOPLASM METASTATIC TO BONE (H): Primary | ICD-10-CM

## 2025-04-03 DIAGNOSIS — C50.812 MALIGNANT NEOPLASM OF OVERLAPPING SITES OF LEFT BREAST IN FEMALE, ESTROGEN RECEPTOR POSITIVE (H): ICD-10-CM

## 2025-04-03 DIAGNOSIS — C50.912 MALIGNANT NEOPLASM OF LEFT FEMALE BREAST, UNSPECIFIED ESTROGEN RECEPTOR STATUS, UNSPECIFIED SITE OF BREAST (H): Primary | ICD-10-CM

## 2025-04-03 DIAGNOSIS — Z17.0 MALIGNANT NEOPLASM OF OVERLAPPING SITES OF LEFT BREAST IN FEMALE, ESTROGEN RECEPTOR POSITIVE (H): ICD-10-CM

## 2025-04-03 DIAGNOSIS — C79.51 MALIGNANT NEOPLASM METASTATIC TO BONE (H): ICD-10-CM

## 2025-04-03 RX ORDER — HEPARIN SODIUM,PORCINE 10 UNIT/ML
5 VIAL (ML) INTRAVENOUS
OUTPATIENT
Start: 2025-04-03

## 2025-04-03 RX ORDER — ZOLEDRONIC ACID 0.04 MG/ML
4 INJECTION, SOLUTION INTRAVENOUS ONCE
OUTPATIENT
Start: 2025-04-03 | End: 2025-04-03

## 2025-04-03 RX ORDER — HEPARIN SODIUM (PORCINE) LOCK FLUSH IV SOLN 100 UNIT/ML 100 UNIT/ML
5 SOLUTION INTRAVENOUS
OUTPATIENT
Start: 2025-04-03

## 2025-04-03 ASSESSMENT — PAIN SCALES - GENERAL: PAINLEVEL_OUTOF10: NO PAIN (0)

## 2025-04-03 NOTE — NURSING NOTE
Current patient location: [facility] [place] [facility] [address]    Is the patient currently in the state of [place]? YES    Visit mode: VIDEO    If the visit is dropped, the patient can be reconnected by:VIDEO VISIT: Text to cell phone:   Telephone Information:   Mobile [phone]       Will anyone else be joining the visit? NO  (If patient encounters technical issues they should call [phone]    Are changes needed to the allergy or medication list? Pt stated no changes to allergies and Pt stated no med changes    Are refills needed on medications prescribed by this physician? NO    Rooming Documentation:  Questionnaire(s) completed    Reason for visit: ASHISH BALLARD

## 2025-04-03 NOTE — PATIENT INSTRUCTIONS
Decrease dose of Capiversitib 200 mg twice daily  Cont Fulvestrant every 28 days- She needs this scheduled for next week    Take lomotil and imodium as instructed  Take 2 tabs of imodium and 1 lomotil with 1st episode of diarrhea  With 2nd episode and other episodes of diarrhea, take 1 imodium and 1 lomotil.  In this way you can take upto 8 imodium a day and 4 lomotil a day.      Zometa every 3 months    Take calcium 1200 mg daily  Take Vit D 2000 U daily    Schedule CT scan in about 1 month and see me as scheduled       Congestive heart failure, unspecified congestive heart failure chronicity, unspecified congestive heart failure type

## 2025-04-03 NOTE — PROGRESS NOTES
Virtual Visit Details    Type of service:  Video Visit   Video Start Time: 7:19 AM  Video End Time:7:55 AM    Originating Location (pt. Location): Home    Distant Location (provider location):  On-site  Platform used for Video Visit: Marquez

## 2025-04-03 NOTE — PROGRESS NOTES
Oncology follow-up note:  Date on this visit: 4/3/2025    Diagnosis.  Metastatic breast cancer.    Primary Physician: No Ref-Primary, Physician     History Of Present Illness:  Ms. Olson is a 69 year old female who presents with metastatic breast cancer.        Patient was being followed by Dr. Hilton and more recently by Dr. Fountain.  I have reviewed previous oncology notes and have copied and updated from prior notes.      In March, 2020, the patient presented with a several month history of left breast nodularity and palpable axillary adenopathy.  Subsequent imaging confirmed bilateral breast lesions including a 6.2 cm left breast mass with diffuse breast dermal thickening and a 1.3 cm right breast mass.  The left breast mass revealed a grade 2 invasive mammary carcinoma that was ER/GA positive and HER2 negative.  The right breast mass revealed grade 1 tubular carcinoma that was ER/GA positive and HER2 negative.     Staging PET scan revealed left axillary and retropectoral lymph node metastases as well as diffuse bony metastases.         March, 2020 - August, 2020: She received weekly paclitaxel with some disease response but requested a break from cytotoxic therapy from significant issues with balance and neuropathy requiring wheelchair use.     August, 2020 - March, 2021: Started on anastrozole with February, 2021 PET scan evidence of progression.     March, 2021-August, 2023: Started on anastrozole and abemaciclib.  May, 2023 CT CAP with evidence of soft tissue progression.     PIK3CA p.E453K+, ESR1 negative, TMB-low, TEJAS on left axillary tissue.     August, 2023-December, 2023: Switch to exemestane and everolimus with treatment discontinued after 4 months because of significant left breast mass progression.     December, 2023-December, 2024: Started capecitabine 1500 mg twice daily with dose reduction to 500 mg twice daily 7 days on/7 days off due to mucositis and hand-foot syndrome.      CA 15-3 tumor marker  "had declined from a zenith of 945 on 12/26/2023 to a nerissa of 107 on 5/17/2024 with value of 203 on 10/2/2024.  On 11/5/2024, her CA 15-3 tumor marker was up to 286.     12/4/2024 restaging CT CAP revealed evidence of increased nodularity of the left invasive breast cancer along the anterior chest wall as well as slight increase in anterior mediastinal soft tissue nodule.  A new left upper lobe 4 mm nodule was also identified with no other gross evidence of metastatic disease in the abdomen or pelvis.      Subsequent 12/13/2024 echocardiogram revealed a 65-70% EF.     [employer] study revealed no new ESR 1 mutation.    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 A 1/7/2025 MR brain revealed diffuse osseous disease but no specific abnormal enhancement along the cisternal segment of the visualized cranial nerves. She thinks her diplopia has improved on antiviral therapy.     1/3/2025- because of PIK3CA p.E453K positive, initiated  fulvestrant/capivasertib     3/16/2025.  Reduced Capivasertib to 320 mg twice daily Days 1-4 out of 7-mainly due to diarrhea.    3/23/2025. We further dose reduced Capivasertib to 320 mg in the morning and 160 mg in the evening because of worsening diarrhea    Interval history.  This is a video visit.  She last took Capivasertib on 3/26/2025 and then decided not to take it again.  Till last week she had several episodes of diarrhea daily despite using 1 Imodium and 1 Lomotil alternating with diarrheal episodes.  This week she has been feeling better.  Currently denies any diarrhea constipation.  No pain.  No nausea or vomiting.  She mentions that it was basically the diarrhea which was  \"awful\" with Capivasertib but otherwise she was tolerating it well.  She was also prescribed increased dose of metformin and it was increased to 1000/500 instead of her baseline 500 twice a day.  Now that she stopped taking Capivasertib this week, she has gone back to metformin " 500 twice a day.      ECOG 1    ROS:  A comprehensive ROS was otherwise neg          Past Medical/Surgical History:  Past Medical History:   Diagnosis Date    ASCUS favoring benign 10/28/15    neg HPV    Cancer (H)     Diabetes mellitus, type 2 (H) 7/14/2020    Hypertension     Obese      Past Surgical History:   Procedure Laterality Date    BIOPSY  03/2020    CATARACT EXTRACTION Bilateral 10/2024    COLONOSCOPY N/A 10/14/2020    Procedure: COLONOSCOPY, WITH POLYPECTOMY  hot snare;  Surgeon: Leventhal, Thomas Michael, MD;  Location: [facility] OR    GYN SURGERY  3/13/89. & 6/22/92    C Sections    IR CHEST PORT PLACEMENT > 5 YRS OF AGE  03/18/2020     Cancer History:   As above  Allergies:  Allergies as of 04/03/2025 - Reviewed 04/03/2025   Allergen Reaction Noted    Lisinopril Cough 10/28/2015     Current Medications:  Current Outpatient Medications   Medication Sig Dispense Refill    alcohol swab prep pads Use to swab area of injection/chrissy as directed. 400 each 3    amLODIPine (NORVASC) 10 MG tablet Take 1 tablet (10 mg) by mouth daily. 90 tablet 3    Ascorbic Acid (VITAMIN C PO)       aspirin 81 MG tablet Take by mouth daily      blood glucose (NO BRAND SPECIFIED) test strip 1 strip by In Vitro route 3 times daily 100 strip 6    blood glucose calibration (NO BRAND SPECIFIED) solution To accompany: Blood Glucose Monitor Brands: per insurance. 1 Bottle 3    blood glucose monitoring (NO BRAND SPECIFIED) meter device kit Use to test blood sugar 3 times daily or as directed. 1 kit 0    Blood Pressure KIT Please test blood pressure at home 2 x daily 1 kit 0    calcium-vitamin D-vitamin K (VIACTIV) 500-500-40 MG-UNT-MCG CHEW Take 1 tablet by mouth every other day      capivasertib (TRUQAP) 160 MG tablet therapy pack Take 2 tablets (320 mg) by mouth 2 times daily. Days 1 through 4 of each week. 64 tablet 0    Cholecalciferol (VITAMIN D3 PO) Take 2,000 Units by mouth daily Takes 4 tabs daily      Coenzyme Q10 (COQ-10) 200 MG  CAPS       Cyanocobalamin (B-12) 2500 MCG TABS       diphenoxylate-atropine (LOMOTIL) 2.5-0.025 MG tablet Take 1 tablet by mouth 4 times daily as needed for diarrhea. 60 tablet 0    gabapentin (NEURONTIN) 250 MG/5ML solution Take 5 mLs (250 mg) by mouth at bedtime. 470 mL 0    hydrocortisone 2.5 % cream Apply topically 2 times daily 3.5 g 0    lidocaine-prilocaine (EMLA) 2.5-2.5 % external cream Apply small amount to center of port site 45-60 minutes prior to chemo appointment, cover with clear dressing if desired. 30 g 1    loperamide (IMODIUM) 2 MG capsule Start with 2 caps (4 mg), then take one cap (2 mg) after each diarrheal stool as needed. Do not use more than 8 caps (16 mg) per day. 30 capsule 0    magic mouthwash suspension (diphenhydrAMINE, lidocaine, aluminum-magnesium & simethicone) Swish and swallow 5 mLs in mouth every 6 hours as needed for mouth sores. 240 mL 2    metFORMIN (GLUCOPHAGE XR) 500 MG 24 hr tablet Take 2 tab in AM and one tab at PM , after a meal ( dose increased ) 270 tablet 3    Multiple Vitamins-Minerals (CENTRUM ADULTS PO)       potassium chloride (KLOR-CON) 20 MEQ packet Take 20 mEq by mouth 2 times daily. 60 packet 11    prochlorperazine (COMPAZINE) 10 MG tablet Take 1 tablet (10 mg) by mouth every 6 hours as needed for nausea or vomiting. 30 tablet 2    STATIN NOT PRESCRIBED (INTENTIONAL) Please choose reason not prescribed from choices below.      thin (NO BRAND SPECIFIED) lancets 3 each by In Vitro route daily Use with lanceting device. To accompany: Blood Glucose Monitor Brands: per insurance. 300 each 6    triamcinolone (KENALOG) 0.1 % external cream Apply topically 2 times daily. 80 g 1    triamterene-HCTZ (MAXZIDE-25) 37.5-25 MG tablet Take 1 tablet by mouth daily. 90 tablet 3    UNABLE TO FIND MEDICATION NAME: Elderberry      UNABLE TO FIND MEDICATION NAME: clove of garlic daily      VITAMIN E NATURAL PO Take 400 Units by mouth        Family History:  Family History   Problem  Relation Age of Onset    Hypertension Mother          12/3/21    Coronary Artery Disease Mother          12/3/21    Breast Cancer Mother          12/3/21    Hyperlipidemia Mother          12/3/21    Depression Mother          123/3/21    Arrhythmia Brother     Cerebrovascular Disease Brother     Diabetes Brother      Social History:  Social History     Socioeconomic History    Marital status:      Spouse name: Not on file    Number of children: Not on file    Years of education: Not on file    Highest education level: Not on file   Occupational History    Not on file   Tobacco Use    Smoking status: Never     Passive exposure: Never    Smokeless tobacco: Never   Vaping Use    Vaping status: Never Used   Substance and Sexual Activity    Alcohol use: Not Currently     Comment: 2-3 drinks a month     Drug use: No    Sexual activity: Not Currently     Partners: Male     Birth control/protection: Pill, None     Comment: On the pill prior to having children   Other Topics Concern    Parent/sibling w/ CABG, MI or angioplasty before 65F 55M? No   Social History Narrative    Not on file     Social Drivers of Health     Financial Resource Strain: Low Risk  (2023)    Financial Resource Strain     Within the past 12 months, have you or your family members you live with been unable to get utilities (heat, electricity) when it was really needed?: No   Food Insecurity: No Food Insecurity (2024)    Received from [facility]    Hunger Vital Sign     Worried About Running Out of Food in the Last Year: Never true     Ran Out of Food in the Last Year: Never true   Transportation Needs: No Transportation Needs (2024)    Received from [facility]    PRAPARE - Transportation     Lack of Transportation (Medical): No     Lack of Transportation (Non-Medical): No   Physical Activity: Patient Declined (2024)    Received from [facility]    Exercise Vital Sign     Days of Exercise per Week: Patient  "declined     Minutes of Exercise per Session: Patient declined   Stress: Not on file   Social Connections: Not on file   Interpersonal Safety: Low Risk  (9/26/2024)    Interpersonal Safety     Do you feel physically and emotionally safe where you currently live?: Yes     Within the past 12 months, have you been hit, slapped, kicked or otherwise physically hurt by someone?: No     Within the past 12 months, have you been humiliated or emotionally abused in other ways by your partner or ex-partner?: No   Housing Stability: Low Risk  (4/6/2024)    Received from [facility]    Housing Stability     What is your living situation today?: I have a steady place to live     Physical Exam:  Ht 1.549 m (5' 1\")   Wt 73.5 kg (162 lb)   BMI 30.61 kg/m    Wt Readings from Last 4 Encounters:   04/03/25 73.5 kg (162 lb)   03/11/25 80.2 kg (176 lb 14.4 oz)   02/13/25 83.8 kg (184 lb 12.8 oz)   01/30/25 83.9 kg (185 lb)         Constitutional.  Does not seem to be in any acute distress.  Eyes.  No redness or discharge noted.  Respiratory.  Speaking in full sentences.  Breathing seems comfortable without any accessory use of muscles.    Skin.  Visualized skin does not show any obvious rashes.  Musculoskeletal.  Range of motion for visualized areas is intact.  Neurological.  Alert and oriented x3.  Psychiatric.  Mood, mentation and affect are normal.  Decision making capacity is intact.      Laboratory/Imaging Studies    Reviewed    3/11/2025  CBC shows WBC 5.4.  Hemoglobin 10.7.  Platelets 284.  CMP shows creatinine 1.23.    Glucose 165.    CA 15-3 was 884.        ASSESSMENT/PLAN:    Metastatic breast cancer, hormone positive, HER2 negative.    She has been on Capivasertib/fulvestrant.    Despite dose reductions of Capivasertib, she continued to have very frequent diarrhea.  She stopped taking Capivasertib and the last dose was on 3/26/2025.  She was supposed to start the next dose on 3/31/2025 which she did not do.  Now she is " feeling better.    We discussed the situation in detail.  I would recommend that we go down on the dose of Capivasertib again and she starts taking it 200 mg twice a day.  She should continue with fulvestrant.  She will need to schedule the next dose of fulvestrant as the last 1 was given on 3/11/2025.    Previously she was planning to go to [place] but that plan has been put on hold for now.    Diarrhea.  If the diarrhea happens again then I recommend that she takes 2 tabs of imodium and 1 lomotil with 1st episode of diarrhea.  With 2nd episode and other episodes of diarrhea, take 1 imodium and 1 lomotil.  In this way she can take upto 8 imodium a day and 4 lomotil a day.  But I told her that if the diarrhea is worse again, then she should stop Capivasertib and let us know.    We will continue to serially monitor CA 15-3 and I plan to repeat a CT chest abdomen and pelvis next month before I see her.    Cancer involving the bones.  Continue Zometa every 3 months.  Advised her to take calcium and vitamin D.      I will see her back next month.  I have also communicated my recommendations to pharmacist.    All questions answered to her satisfaction.  She is agreeable and comfortable with the plan.    Felicia Ashraf MD     The longitudinal plan of care for the diagnosis(es)/condition(s) as documented were addressed during this visit. Due to the added complexity in care, I will continue to support Desmond in the subsequent management and with ongoing continuity of care.    I spent >54 minutes on this visit on the date of service, including the video time, reviewing records and labs and imaging and placing new orders as well as coordination of care and documentation.

## 2025-04-03 NOTE — LETTER
4/3/2025      Mary Olson  [address]  [facility] [address]      Dear Colleague,    Thank you for referring your patient, Mary Olson, to the [facility]. Please see a copy of my visit note below.    Virtual Visit Details    Type of service:  Video Visit   Video Start Time: 7:19 AM  Video End Time:7:55 AM    Originating Location (pt. Location): Home    Distant Location (provider location):  On-site  Platform used for Video Visit: [facility]    Oncology follow-up note:  Date on this visit: 4/3/2025    Diagnosis.  Metastatic breast cancer.    Primary Physician: No Ref-Primary, Physician     History Of Present Illness:  Ms. lOson is a 69 year old female who presents with metastatic breast cancer.        Patient was being followed by Dr. Hilton and more recently by Dr. Fountain.  I have reviewed previous oncology notes and have copied and updated from prior notes.      In March, 2020, the patient presented with a several month history of left breast nodularity and palpable axillary adenopathy.  Subsequent imaging confirmed bilateral breast lesions including a 6.2 cm left breast mass with diffuse breast dermal thickening and a 1.3 cm right breast mass.  The left breast mass revealed a grade 2 invasive mammary carcinoma that was ER/MO positive and HER2 negative.  The right breast mass revealed grade 1 tubular carcinoma that was ER/MO positive and HER2 negative.     Staging PET scan revealed left axillary and retropectoral lymph node metastases as well as diffuse bony metastases.         March, 2020 - August, 2020: She received weekly paclitaxel with some disease response but requested a break from cytotoxic therapy from significant issues with balance and neuropathy requiring wheelchair use.     August, 2020 - March, 2021: Started on anastrozole with February, 2021 PET scan evidence of progression.     March, 2021-August, 2023: Started on anastrozole and abemaciclib.  May, 2023 CT  CAP with evidence of soft tissue progression.     PIK3CA p.E453K+, ESR1 negative, TMB-low, TEJAS on left axillary tissue.     August, 2023-December, 2023: Switch to exemestane and everolimus with treatment discontinued after 4 months because of significant left breast mass progression.     December, 2023-December, 2024: Started capecitabine 1500 mg twice daily with dose reduction to 500 mg twice daily 7 days on/7 days off due to mucositis and hand-foot syndrome.      CA 15-3 tumor marker had declined from a zenith of 945 on 12/26/2023 to a nerissa of 107 on 5/17/2024 with value of 203 on 10/2/2024.  On 11/5/2024, her CA 15-3 tumor marker was up to 286.     12/4/2024 restaging CT CAP revealed evidence of increased nodularity of the left invasive breast cancer along the anterior chest wall as well as slight increase in anterior mediastinal soft tissue nodule.  A new left upper lobe 4 mm nodule was also identified with no other gross evidence of metastatic disease in the abdomen or pelvis.      Subsequent 12/13/2024 echocardiogram revealed a 65-70% EF.     [employer] study revealed no new ESR 1 mutation.    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 A 1/7/2025 MR brain revealed diffuse osseous disease but no specific abnormal enhancement along the cisternal segment of the visualized cranial nerves. She thinks her diplopia has improved on antiviral therapy.     1/3/2025- because of PIK3CA p.E453K positive, initiated  fulvestrant/capivasertib     3/16/2025.  Reduced Capivasertib to 320 mg twice daily Days 1-4 out of 7-mainly due to diarrhea.    3/23/2025. We further dose reduced Capivasertib to 320 mg in the morning and 160 mg in the evening because of worsening diarrhea    Interval history.  This is a video visit.  She last took Capivasertib on 3/26/2025 and then decided not to take it again.  Till last week she had several episodes of diarrhea daily despite using 1 Imodium and 1 Lomotil  "alternating with diarrheal episodes.  This week she has been feeling better.  Currently denies any diarrhea constipation.  No pain.  No nausea or vomiting.  She mentions that it was basically the diarrhea which was  \"awful\" with Capivasertib but otherwise she was tolerating it well.  She was also prescribed increased dose of metformin and it was increased to 1000/500 instead of her baseline 500 twice a day.  Now that she stopped taking Capivasertib this week, she has gone back to metformin 500 twice a day.      ECOG 1    ROS:  A comprehensive ROS was otherwise neg          Past Medical/Surgical History:  Past Medical History:   Diagnosis Date     ASCUS favoring benign 10/28/15    neg HPV     Cancer (H)      Diabetes mellitus, type 2 (H) 7/14/2020     Hypertension      Obese      Past Surgical History:   Procedure Laterality Date     BIOPSY  03/2020     CATARACT EXTRACTION Bilateral 10/2024     COLONOSCOPY N/A 10/14/2020    Procedure: COLONOSCOPY, WITH POLYPECTOMY  hot snare;  Surgeon: Leventhal, Thomas Michael, MD;  Location: [facility] OR     GYN SURGERY  3/13/89. & 6/22/92    C Sections     IR CHEST PORT PLACEMENT > 5 YRS OF AGE  03/18/2020     Cancer History:   As above  Allergies:  Allergies as of 04/03/2025 - Reviewed 04/03/2025   Allergen Reaction Noted     Lisinopril Cough 10/28/2015     Current Medications:  Current Outpatient Medications   Medication Sig Dispense Refill     alcohol swab prep pads Use to swab area of injection/chrissy as directed. 400 each 3     amLODIPine (NORVASC) 10 MG tablet Take 1 tablet (10 mg) by mouth daily. 90 tablet 3     Ascorbic Acid (VITAMIN C PO)        aspirin 81 MG tablet Take by mouth daily       blood glucose (NO BRAND SPECIFIED) test strip 1 strip by In Vitro route 3 times daily 100 strip 6     blood glucose calibration (NO BRAND SPECIFIED) solution To accompany: Blood Glucose Monitor Brands: per insurance. 1 Bottle 3     blood glucose monitoring (NO BRAND SPECIFIED) meter device " kit Use to test blood sugar 3 times daily or as directed. 1 kit 0     Blood Pressure KIT Please test blood pressure at home 2 x daily 1 kit 0     calcium-vitamin D-vitamin K (VIACTIV) 500-500-40 MG-UNT-MCG CHEW Take 1 tablet by mouth every other day       capivasertib (TRUQAP) 160 MG tablet therapy pack Take 2 tablets (320 mg) by mouth 2 times daily. Days 1 through 4 of each week. 64 tablet 0     Cholecalciferol (VITAMIN D3 PO) Take 2,000 Units by mouth daily Takes 4 tabs daily       Coenzyme Q10 (COQ-10) 200 MG CAPS        Cyanocobalamin (B-12) 2500 MCG TABS        diphenoxylate-atropine (LOMOTIL) 2.5-0.025 MG tablet Take 1 tablet by mouth 4 times daily as needed for diarrhea. 60 tablet 0     gabapentin (NEURONTIN) 250 MG/5ML solution Take 5 mLs (250 mg) by mouth at bedtime. 470 mL 0     hydrocortisone 2.5 % cream Apply topically 2 times daily 3.5 g 0     lidocaine-prilocaine (EMLA) 2.5-2.5 % external cream Apply small amount to center of port site 45-60 minutes prior to chemo appointment, cover with clear dressing if desired. 30 g 1     loperamide (IMODIUM) 2 MG capsule Start with 2 caps (4 mg), then take one cap (2 mg) after each diarrheal stool as needed. Do not use more than 8 caps (16 mg) per day. 30 capsule 0     magic mouthwash suspension (diphenhydrAMINE, lidocaine, aluminum-magnesium & simethicone) Swish and swallow 5 mLs in mouth every 6 hours as needed for mouth sores. 240 mL 2     metFORMIN (GLUCOPHAGE XR) 500 MG 24 hr tablet Take 2 tab in AM and one tab at PM , after a meal ( dose increased ) 270 tablet 3     Multiple Vitamins-Minerals (CENTRUM ADULTS PO)        potassium chloride (KLOR-CON) 20 MEQ packet Take 20 mEq by mouth 2 times daily. 60 packet 11     prochlorperazine (COMPAZINE) 10 MG tablet Take 1 tablet (10 mg) by mouth every 6 hours as needed for nausea or vomiting. 30 tablet 2     STATIN NOT PRESCRIBED (INTENTIONAL) Please choose reason not prescribed from choices below.       thin (NO BRAND  SPECIFIED) lancets 3 each by In Vitro route daily Use with lanceting device. To accompany: Blood Glucose Monitor Brands: per insurance. 300 each 6     triamcinolone (KENALOG) 0.1 % external cream Apply topically 2 times daily. 80 g 1     triamterene-HCTZ (MAXZIDE-25) 37.5-25 MG tablet Take 1 tablet by mouth daily. 90 tablet 3     UNABLE TO FIND MEDICATION NAME: Elderberry       UNABLE TO FIND MEDICATION NAME: clove of garlic daily       VITAMIN E NATURAL PO Take 400 Units by mouth        Family History:  Family History   Problem Relation Age of Onset     Hypertension Mother          12/3/21     Coronary Artery Disease Mother          12/3/21     Breast Cancer Mother          12/3/21     Hyperlipidemia Mother          12/3/21     Depression Mother          123/3/21     Arrhythmia Brother      Cerebrovascular Disease Brother      Diabetes Brother      Social History:  Social History     Socioeconomic History     Marital status:      Spouse name: Not on file     Number of children: Not on file     Years of education: Not on file     Highest education level: Not on file   Occupational History     Not on file   Tobacco Use     Smoking status: Never     Passive exposure: Never     Smokeless tobacco: Never   Vaping Use     Vaping status: Never Used   Substance and Sexual Activity     Alcohol use: Not Currently     Comment: 2-3 drinks a month      Drug use: No     Sexual activity: Not Currently     Partners: Male     Birth control/protection: Pill, None     Comment: On the pill prior to having children   Other Topics Concern     Parent/sibling w/ CABG, MI or angioplasty before 65F 55M? No   Social History Narrative     Not on file     Social Drivers of Health     Financial Resource Strain: Low Risk  (2023)    Financial Resource Strain      Within the past 12 months, have you or your family members you live with been unable to get utilities (heat, electricity) when it was really needed?: No  "  Food Insecurity: No Food Insecurity (4/6/2024)    Received from [facility]    Hunger Vital Sign      Worried About Running Out of Food in the Last Year: Never true      Ran Out of Food in the Last Year: Never true   Transportation Needs: No Transportation Needs (4/6/2024)    Received from [facility]    PRAPARE - Transportation      Lack of Transportation (Medical): No      Lack of Transportation (Non-Medical): No   Physical Activity: Patient Declined (4/6/2024)    Received from [facility]    Exercise Vital Sign      Days of Exercise per Week: Patient declined      Minutes of Exercise per Session: Patient declined   Stress: Not on file   Social Connections: Not on file   Interpersonal Safety: Low Risk  (9/26/2024)    Interpersonal Safety      Do you feel physically and emotionally safe where you currently live?: Yes      Within the past 12 months, have you been hit, slapped, kicked or otherwise physically hurt by someone?: No      Within the past 12 months, have you been humiliated or emotionally abused in other ways by your partner or ex-partner?: No   Housing Stability: Low Risk  (4/6/2024)    Received from [facility]    Housing Stability      What is your living situation today?: I have a steady place to live     Physical Exam:  Ht 1.549 m (5' 1\")   Wt 73.5 kg (162 lb)   BMI 30.61 kg/m    Wt Readings from Last 4 Encounters:   04/03/25 73.5 kg (162 lb)   03/11/25 80.2 kg (176 lb 14.4 oz)   02/13/25 83.8 kg (184 lb 12.8 oz)   01/30/25 83.9 kg (185 lb)         Constitutional.  Does not seem to be in any acute distress.  Eyes.  No redness or discharge noted.  Respiratory.  Speaking in full sentences.  Breathing seems comfortable without any accessory use of muscles.    Skin.  Visualized skin does not show any obvious rashes.  Musculoskeletal.  Range of motion for visualized areas is intact.  Neurological.  Alert and oriented x3.  Psychiatric.  Mood, mentation and affect are normal.  Decision making capacity is " intact.      Laboratory/Imaging Studies    Reviewed    3/11/2025  CBC shows WBC 5.4.  Hemoglobin 10.7.  Platelets 284.  CMP shows creatinine 1.23.    Glucose 165.    CA 15-3 was 884.        ASSESSMENT/PLAN:    Metastatic breast cancer, hormone positive, HER2 negative.    She has been on Capivasertib/fulvestrant.    Despite dose reductions of Capivasertib, she continued to have very frequent diarrhea.  She stopped taking Capivasertib and the last dose was on 3/26/2025.  She was supposed to start the next dose on 3/31/2025 which she did not do.  Now she is feeling better.    We discussed the situation in detail.  I would recommend that we go down on the dose of Capivasertib again and she starts taking it 200 mg twice a day.  She should continue with fulvestrant.  She will need to schedule the next dose of fulvestrant as the last 1 was given on 3/11/2025.    Previously she was planning to go to [place] but that plan has been put on hold for now.    Diarrhea.  If the diarrhea happens again then I recommend that she takes 2 tabs of imodium and 1 lomotil with 1st episode of diarrhea.  With 2nd episode and other episodes of diarrhea, take 1 imodium and 1 lomotil.  In this way she can take upto 8 imodium a day and 4 lomotil a day.  But I told her that if the diarrhea is worse again, then she should stop Capivasertib and let us know.    We will continue to serially monitor CA 15-3 and I plan to repeat a CT chest abdomen and pelvis next month before I see her.    Cancer involving the bones.  Continue Zometa every 3 months.  Advised her to take calcium and vitamin D.      I will see her back next month.  I have also communicated my recommendations to pharmacist.    All questions answered to her satisfaction.  She is agreeable and comfortable with the plan.    Fleicia Ashraf MD     The longitudinal plan of care for the diagnosis(es)/condition(s) as documented were addressed during this visit. Due to the added complexity in  care, I will continue to support Desmond in the subsequent management and with ongoing continuity of care.    I spent >54 minutes on this visit on the date of service, including the video time, reviewing records and labs and imaging and placing new orders as well as coordination of care and documentation.                  Again, thank you for allowing me to participate in the care of your patient.        Sincerely,        Felicia Ashraf MD    Electronically signed

## 2025-04-07 ENCOUNTER — TELEPHONE (OUTPATIENT)
Dept: ONCOLOGY | Facility: CLINIC | Age: 70
End: 2025-04-07
Payer: COMMERCIAL

## 2025-04-07 NOTE — PROGRESS NOTES
CLINICAL NUTRITION SERVICES     Reason for Contact: Questions from Oncology Distress Screening  1. How concerned are you about your ability to eat? :  8  2. How concerned are you about unintended weight loss or your current weight? : 0    Action: RD called patient indicating reason for phone call. Left a VM with a return call back number.     Follow up: Wait for a return phone call.    Gemma Kilpatrick RD,   [phone]

## 2025-04-09 NOTE — PROGRESS NOTES
"SPIRITUAL HEALTH SERVICES  SPIRITUAL ASSESSMENT Progress Note  [facility]  [facility]     PRIMARY FOCUS:     Support for coping    REFERRAL SOURCE:  self referral     ILLNESS CIRCUMSTANCES:   Reviewed documentation. Reflective conversation shared with Mary Olson (Chris) which integrated elements of illness and family narratives.     Context of Serious Illness/Symptom(s) - Patient was diagnosed with breast cancer and \"my sternum is also involved\". She has also been dealing with high blood sugar and high blood pressure.     Resources for Support - Patient is  to [facility], she took an early skilled nursing.      DISTRESS:     Emotional/Spiritual/Existential Distress - Patient shared how she was unsettled at first with the diagnosis but the chemotherapy has gone better than she anticipated. The uncertainty and the unknown is the most difficult.      Quaker Distress - None discussed.     Social/Cultural/Economic Distress - None discussed.    SPIRITUAL/Christian COPING:     Rastafari/Sanaz - Buddhism     Spiritual Practice(s) - She states that she is on many prayer lists.  She reports that God has a plan in all of this.     Emotional/Relational/Existential Connections - Patient is trying to stay positive.  She is losing weight, going for walks and doing everything that she has been asked to do.  She has taken each new medical development in stride and trying to stay positive.  She talked about the support of others that have had metastatic breast cancer.  Talking and seeing them deal with it gives her hope.     GOALS OF CARE:    Goals of Care - Support for coping.     Meaning/Sense-Making - She hopes to help others with cancer diagnosis just as others have helped her.     PLAN:  I gave the patient my contact information and she knows that she can request a Spiritual Health encounter as needed.  She said that she would be appreciative of ongoing support.     Namrata Michaud  Staff "
Infusion Nursing Note:  Mary Olson presents today for C4 D8 Taxol.    Patient seen by provider today: No   present during visit today: Not Applicable.    Note: BP elevated after taxol infusion, denies headaches, dizziness or chest pain. Will monitor it at home.    Intravenous Access:  Implanted Port.    Treatment Conditions:  Lab Results   Component Value Date    HGB 13.6 06/18/2020     Lab Results   Component Value Date    WBC 8.5 06/18/2020      Lab Results   Component Value Date    ANEU 5.9 06/18/2020     Lab Results   Component Value Date     06/18/2020      Lab Results   Component Value Date     03/04/2020                   Lab Results   Component Value Date    POTASSIUM 4.7 03/04/2020           No results found for: MAG         Lab Results   Component Value Date    CR 0.66 06/18/2020                   Lab Results   Component Value Date    BUFFY 9.7 06/18/2020                Lab Results   Component Value Date    BILITOTAL 0.5 06/12/2020           Lab Results   Component Value Date    ALBUMIN 3.3 06/18/2020                    Lab Results   Component Value Date    ALT 46 06/12/2020           Lab Results   Component Value Date    AST 27 06/12/2020       Results reviewed, labs MET treatment parameters, ok to proceed with treatment.      Post Infusion Assessment:  Patient tolerated infusion without incident.  Patient observed for 15 minutes post infusion per protocol.  Blood return noted pre and post infusion.  Site patent and intact, free from redness, edema or discomfort.  No evidence of extravasations.  Access discontinued per protocol.       Discharge Plan:   Discharge instructions reviewed with: Patient.  Patient and/or family verbalized understanding of discharge instructions and all questions answered.  Patient discharged in stable condition accompanied by: self.  Departure Mode: Ambulatory.    Keysha Soler RN                      
How Severe Is Your Skin Lesion?: moderate
Has Your Skin Lesion Been Treated?: not been treated
Is This A New Presentation, Or A Follow-Up?: Skin Lesion

## 2025-04-11 ENCOUNTER — INFUSION THERAPY VISIT (OUTPATIENT)
Dept: INFUSION THERAPY | Facility: CLINIC | Age: 70
End: 2025-04-11
Payer: COMMERCIAL

## 2025-04-11 VITALS — OXYGEN SATURATION: 98 % | SYSTOLIC BLOOD PRESSURE: 138 MMHG | DIASTOLIC BLOOD PRESSURE: 88 MMHG | HEART RATE: 116 BPM

## 2025-04-11 DIAGNOSIS — C79.51 MALIGNANT NEOPLASM METASTATIC TO BONE (H): Primary | ICD-10-CM

## 2025-04-11 DIAGNOSIS — C50.912 MALIGNANT NEOPLASM OF LEFT FEMALE BREAST, UNSPECIFIED ESTROGEN RECEPTOR STATUS, UNSPECIFIED SITE OF BREAST (H): ICD-10-CM

## 2025-04-11 PROCEDURE — 250N000011 HC RX IP 250 OP 636: Mod: JZ | Performed by: INTERNAL MEDICINE

## 2025-04-11 PROCEDURE — 99207 PR NO CHARGE LOS: CPT

## 2025-04-11 PROCEDURE — 96402 CHEMO HORMON ANTINEOPL SQ/IM: CPT

## 2025-04-11 RX ORDER — LAMOTRIGINE 25 MG/1
500 TABLET ORAL ONCE
Status: COMPLETED | OUTPATIENT
Start: 2025-04-11 | End: 2025-04-11

## 2025-04-11 RX ADMIN — FULVESTRANT 500 MG: 250 INJECTION, SOLUTION INTRAMUSCULAR at 08:49

## 2025-04-11 ASSESSMENT — PAIN SCALES - GENERAL: PAINLEVEL_OUTOF10: NO PAIN (0)

## 2025-04-15 NOTE — PROGRESS NOTES
Infusion Nursing Note:  Mary Olson presents today for Faslodex.    Patient seen by provider today: No   present during visit today: Not Applicable.    Note: Patient was assessed and drug released by Emily MIMS RN .      Intravenous Access:  No Intravenous access at this visit.    Treatment Conditions:  Not Applicable.      Post Infusion Assessment:  Patient tolerated injections without incident.       Discharge Plan:   AVS to patient via [employer].  Patient will return 5/13/2025 for next appointment.   Patient discharged in stable condition accompanied by: self.  Departure Mode: Ambulatory.      Melissa Pacheco RN

## 2025-04-25 ENCOUNTER — ANCILLARY PROCEDURE (OUTPATIENT)
Dept: CT IMAGING | Facility: CLINIC | Age: 70
End: 2025-04-25
Attending: INTERNAL MEDICINE
Payer: COMMERCIAL

## 2025-04-25 DIAGNOSIS — C50.812 MALIGNANT NEOPLASM OF OVERLAPPING SITES OF LEFT BREAST IN FEMALE, ESTROGEN RECEPTOR POSITIVE (H): ICD-10-CM

## 2025-04-25 DIAGNOSIS — C79.51 MALIGNANT NEOPLASM METASTATIC TO BONE (H): ICD-10-CM

## 2025-04-25 DIAGNOSIS — Z17.0 MALIGNANT NEOPLASM OF OVERLAPPING SITES OF LEFT BREAST IN FEMALE, ESTROGEN RECEPTOR POSITIVE (H): ICD-10-CM

## 2025-04-25 PROCEDURE — 74177 CT ABD & PELVIS W/CONTRAST: CPT | Mod: GC | Performed by: RADIOLOGY

## 2025-04-25 PROCEDURE — 71260 CT THORAX DX C+: CPT | Mod: GC | Performed by: RADIOLOGY

## 2025-04-25 RX ORDER — IOPAMIDOL 755 MG/ML
89 INJECTION, SOLUTION INTRAVASCULAR ONCE
Status: COMPLETED | OUTPATIENT
Start: 2025-04-25 | End: 2025-04-25

## 2025-04-25 RX ORDER — HEPARIN SODIUM (PORCINE) LOCK FLUSH IV SOLN 100 UNIT/ML 100 UNIT/ML
5 SOLUTION INTRAVENOUS
OUTPATIENT
Start: 2025-04-25

## 2025-04-25 RX ADMIN — IOPAMIDOL 89 ML: 755 INJECTION, SOLUTION INTRAVASCULAR at 08:29

## 2025-04-25 RX ADMIN — HEPARIN SODIUM (PORCINE) LOCK FLUSH IV SOLN 100 UNIT/ML 5 ML: 100 SOLUTION at 08:34

## 2025-05-11 ENCOUNTER — HEALTH MAINTENANCE LETTER (OUTPATIENT)
Age: 70
End: 2025-05-11

## 2025-05-13 ENCOUNTER — ONCOLOGY VISIT (OUTPATIENT)
Dept: ONCOLOGY | Facility: CLINIC | Age: 70
End: 2025-05-13
Attending: INTERNAL MEDICINE
Payer: COMMERCIAL

## 2025-05-13 ENCOUNTER — INFUSION THERAPY VISIT (OUTPATIENT)
Dept: INFUSION THERAPY | Facility: CLINIC | Age: 70
End: 2025-05-13
Attending: INTERNAL MEDICINE
Payer: COMMERCIAL

## 2025-05-13 ENCOUNTER — DOCUMENTATION ONLY (OUTPATIENT)
Dept: ONCOLOGY | Facility: CLINIC | Age: 70
End: 2025-05-13

## 2025-05-13 ENCOUNTER — TELEPHONE (OUTPATIENT)
Dept: ONCOLOGY | Facility: CLINIC | Age: 70
End: 2025-05-13

## 2025-05-13 VITALS
WEIGHT: 165.5 LBS | OXYGEN SATURATION: 96 % | HEART RATE: 131 BPM | TEMPERATURE: 98.4 F | DIASTOLIC BLOOD PRESSURE: 105 MMHG | SYSTOLIC BLOOD PRESSURE: 162 MMHG | BODY MASS INDEX: 31.27 KG/M2

## 2025-05-13 DIAGNOSIS — C79.51 MALIGNANT NEOPLASM METASTATIC TO BONE (H): Primary | ICD-10-CM

## 2025-05-13 DIAGNOSIS — D64.9 ANEMIA, UNSPECIFIED TYPE: ICD-10-CM

## 2025-05-13 DIAGNOSIS — C50.912 MALIGNANT NEOPLASM OF LEFT FEMALE BREAST, UNSPECIFIED ESTROGEN RECEPTOR STATUS, UNSPECIFIED SITE OF BREAST (H): ICD-10-CM

## 2025-05-13 DIAGNOSIS — C79.51 MALIGNANT NEOPLASM METASTATIC TO BONE (H): ICD-10-CM

## 2025-05-13 DIAGNOSIS — G62.9 NEUROPATHY: ICD-10-CM

## 2025-05-13 DIAGNOSIS — C50.812 MALIGNANT NEOPLASM OF OVERLAPPING SITES OF LEFT BREAST IN FEMALE, ESTROGEN RECEPTOR POSITIVE (H): ICD-10-CM

## 2025-05-13 DIAGNOSIS — Z17.0 MALIGNANT NEOPLASM OF OVERLAPPING SITES OF LEFT BREAST IN FEMALE, ESTROGEN RECEPTOR POSITIVE (H): ICD-10-CM

## 2025-05-13 DIAGNOSIS — C50.912 MALIGNANT NEOPLASM OF LEFT FEMALE BREAST, UNSPECIFIED ESTROGEN RECEPTOR STATUS, UNSPECIFIED SITE OF BREAST (H): Primary | ICD-10-CM

## 2025-05-13 LAB
ALBUMIN SERPL BCG-MCNC: 3.8 G/DL (ref 3.5–5.2)
ALP SERPL-CCNC: 196 U/L (ref 40–150)
ALT SERPL W P-5'-P-CCNC: 7 U/L (ref 0–50)
ANION GAP SERPL CALCULATED.3IONS-SCNC: 15 MMOL/L (ref 7–15)
AST SERPL W P-5'-P-CCNC: 30 U/L (ref 0–45)
BASOPHILS # BLD AUTO: 0.1 10E3/UL (ref 0–0.2)
BASOPHILS NFR BLD AUTO: 1 %
BILIRUB SERPL-MCNC: 0.2 MG/DL
BUN SERPL-MCNC: 16.6 MG/DL (ref 8–23)
CALCIUM SERPL-MCNC: 10.1 MG/DL (ref 8.8–10.4)
CANCER AG15-3 SERPL-ACNC: 1163 U/ML
CHLORIDE SERPL-SCNC: 99 MMOL/L (ref 98–107)
CREAT SERPL-MCNC: 0.8 MG/DL (ref 0.51–0.95)
EGFRCR SERPLBLD CKD-EPI 2021: 80 ML/MIN/1.73M2
EOSINOPHIL # BLD AUTO: 0.1 10E3/UL (ref 0–0.7)
EOSINOPHIL NFR BLD AUTO: 2 %
ERYTHROCYTE [DISTWIDTH] IN BLOOD BY AUTOMATED COUNT: 19.4 % (ref 10–15)
EST. AVERAGE GLUCOSE BLD GHB EST-MCNC: 131 MG/DL
FERRITIN SERPL-MCNC: 1468 NG/ML (ref 11–328)
GLUCOSE SERPL-MCNC: 128 MG/DL (ref 70–99)
HBA1C MFR BLD: 6.2 % (ref 0–5.6)
HCO3 SERPL-SCNC: 22 MMOL/L (ref 22–29)
HCT VFR BLD AUTO: 27.1 % (ref 35–47)
HGB BLD-MCNC: 8.6 G/DL (ref 11.7–15.7)
IMM GRANULOCYTES # BLD: 0.1 10E3/UL
IMM GRANULOCYTES NFR BLD: 2 %
IRON BINDING CAPACITY (ROCHE): 297 UG/DL (ref 240–430)
IRON SATN MFR SERPL: 30 % (ref 15–46)
IRON SERPL-MCNC: 89 UG/DL (ref 37–145)
LYMPHOCYTES # BLD AUTO: 1.5 10E3/UL (ref 0.8–5.3)
LYMPHOCYTES NFR BLD AUTO: 26 %
MAGNESIUM SERPL-MCNC: 1.8 MG/DL (ref 1.7–2.3)
MCH RBC QN AUTO: 27.8 PG (ref 26.5–33)
MCHC RBC AUTO-ENTMCNC: 31.7 G/DL (ref 31.5–36.5)
MCV RBC AUTO: 88 FL (ref 78–100)
MONOCYTES # BLD AUTO: 0.4 10E3/UL (ref 0–1.3)
MONOCYTES NFR BLD AUTO: 7 %
NEUTROPHILS # BLD AUTO: 3.7 10E3/UL (ref 1.6–8.3)
NEUTROPHILS NFR BLD AUTO: 61 %
NRBC # BLD AUTO: 0.1 10E3/UL
NRBC BLD AUTO-RTO: 2 /100
PLATELET # BLD AUTO: 271 10E3/UL (ref 150–450)
POTASSIUM SERPL-SCNC: 4.6 MMOL/L (ref 3.4–5.3)
PROT SERPL-MCNC: 7.5 G/DL (ref 6.4–8.3)
RBC # BLD AUTO: 3.09 10E6/UL (ref 3.8–5.2)
SODIUM SERPL-SCNC: 136 MMOL/L (ref 135–145)
VIT B12 SERPL-MCNC: 2150 PG/ML (ref 232–1245)
WBC # BLD AUTO: 6 10E3/UL (ref 4–11)

## 2025-05-13 PROCEDURE — 99215 OFFICE O/P EST HI 40 MIN: CPT | Performed by: INTERNAL MEDICINE

## 2025-05-13 PROCEDURE — 85025 COMPLETE CBC W/AUTO DIFF WBC: CPT

## 2025-05-13 PROCEDURE — 250N000011 HC RX IP 250 OP 636: Performed by: INTERNAL MEDICINE

## 2025-05-13 PROCEDURE — 250N000011 HC RX IP 250 OP 636: Mod: JZ | Performed by: INTERNAL MEDICINE

## 2025-05-13 PROCEDURE — 82435 ASSAY OF BLOOD CHLORIDE: CPT

## 2025-05-13 PROCEDURE — 96372 THER/PROPH/DIAG INJ SC/IM: CPT | Performed by: INTERNAL MEDICINE

## 2025-05-13 PROCEDURE — 99207 PR NO CHARGE LOS: CPT

## 2025-05-13 PROCEDURE — G2211 COMPLEX E/M VISIT ADD ON: HCPCS | Performed by: INTERNAL MEDICINE

## 2025-05-13 PROCEDURE — 82607 VITAMIN B-12: CPT

## 2025-05-13 PROCEDURE — 83540 ASSAY OF IRON: CPT

## 2025-05-13 PROCEDURE — G0463 HOSPITAL OUTPT CLINIC VISIT: HCPCS | Mod: 25 | Performed by: INTERNAL MEDICINE

## 2025-05-13 PROCEDURE — 82728 ASSAY OF FERRITIN: CPT

## 2025-05-13 PROCEDURE — 83036 HEMOGLOBIN GLYCOSYLATED A1C: CPT | Mod: GA

## 2025-05-13 PROCEDURE — 36591 DRAW BLOOD OFF VENOUS DEVICE: CPT

## 2025-05-13 PROCEDURE — 83735 ASSAY OF MAGNESIUM: CPT

## 2025-05-13 PROCEDURE — 86300 IMMUNOASSAY TUMOR CA 15-3: CPT

## 2025-05-13 RX ORDER — PROCHLORPERAZINE MALEATE 10 MG
10 TABLET ORAL EVERY 6 HOURS PRN
Qty: 30 TABLET | Refills: 2 | Status: SHIPPED | OUTPATIENT
Start: 2025-05-13

## 2025-05-13 RX ORDER — LOPERAMIDE HYDROCHLORIDE 2 MG/1
CAPSULE ORAL
Qty: 30 CAPSULE | Refills: 0 | Status: SHIPPED | OUTPATIENT
Start: 2025-05-13

## 2025-05-13 RX ORDER — LETROZOLE 2.5 MG/1
2.5 TABLET, FILM COATED ORAL DAILY
Qty: 28 TABLET | Refills: 0 | Status: SHIPPED | OUTPATIENT
Start: 2025-05-13 | End: 2025-06-10

## 2025-05-13 RX ORDER — HEPARIN SODIUM (PORCINE) LOCK FLUSH IV SOLN 100 UNIT/ML 100 UNIT/ML
5 SOLUTION INTRAVENOUS ONCE
Status: COMPLETED | OUTPATIENT
Start: 2025-05-13 | End: 2025-05-13

## 2025-05-13 RX ORDER — GABAPENTIN 250 MG/5ML
250 SOLUTION ORAL AT BEDTIME
Qty: 470 ML | Refills: 0 | Status: SHIPPED | OUTPATIENT
Start: 2025-05-13

## 2025-05-13 RX ADMIN — Medication 5 ML: at 08:29

## 2025-05-13 RX ADMIN — DENOSUMAB 120 MG: 120 INJECTION SUBCUTANEOUS at 10:12

## 2025-05-13 ASSESSMENT — PAIN SCALES - GENERAL: PAINLEVEL_OUTOF10: NO PAIN (0)

## 2025-05-13 NOTE — PROGRESS NOTES
Oncology follow-up note:  Date on this visit: 5/13/25     Diagnosis.  Metastatic breast cancer.    Primary Physician: No Ref-Primary, Physician     History Of Present Illness:  Ms. Olson is a 69 year old female who presents with metastatic breast cancer.        Patient was being followed by Dr. Hilton and more recently by Dr. Fountain.  I have reviewed previous oncology notes and have copied and updated from prior notes.      In March, 2020, the patient presented with a several month history of left breast nodularity and palpable axillary adenopathy.  Subsequent imaging confirmed bilateral breast lesions including a 6.2 cm left breast mass with diffuse breast dermal thickening and a 1.3 cm right breast mass.  The left breast mass revealed a grade 2 invasive mammary carcinoma that was ER/IA positive and HER2 negative.  The right breast mass revealed grade 1 tubular carcinoma that was ER/IA positive and HER2 negative.     Staging PET scan revealed left axillary and retropectoral lymph node metastases as well as diffuse bony metastases.         March, 2020 - August, 2020: She received weekly paclitaxel with some disease response but requested a break from cytotoxic therapy from significant issues with balance and neuropathy requiring wheelchair use.     August, 2020 - March, 2021: Started on anastrozole with February, 2021 PET scan evidence of progression.     March, 2021-August, 2023: Started on anastrozole and abemaciclib.  May, 2023 CT CAP with evidence of soft tissue progression.     PIK3CA p.E453K+, ESR1 negative, TMB-low, TEJAS on left axillary tissue.     August, 2023-December, 2023: Switch to exemestane and everolimus with treatment discontinued after 4 months because of significant left breast mass progression.     December, 2023-December, 2024: Started capecitabine 1500 mg twice daily with dose reduction to 500 mg twice daily 7 days on/7 days off due to mucositis and hand-foot syndrome.      CA 15-3 tumor marker  had declined from a zenith of 945 on 12/26/2023 to a nerissa of 107 on 5/17/2024 with value of 203 on 10/2/2024.  On 11/5/2024, her CA 15-3 tumor marker was up to 286.     12/4/2024 restaging CT CAP revealed evidence of increased nodularity of the left invasive breast cancer along the anterior chest wall as well as slight increase in anterior mediastinal soft tissue nodule.  A new left upper lobe 4 mm nodule was also identified with no other gross evidence of metastatic disease in the abdomen or pelvis.      Subsequent 12/13/2024 echocardiogram revealed a 65-70% EF.     [employer] study revealed no new ESR 1 mutation.    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 A 1/7/2025 MR brain revealed diffuse osseous disease but no specific abnormal enhancement along the cisternal segment of the visualized cranial nerves. She thinks her diplopia has improved on antiviral therapy.     1/3/2025- because of PIK3CA p.E453K positive, initiated  fulvestrant/capivasertib     3/16/2025.  Reduced Capivasertib to 320 mg twice daily Days 1-4 out of 7-mainly due to diarrhea.    3/23/2025. We further dose reduced Capivasertib to 320 mg in the morning and 160 mg in the evening because of worsening diarrhea    Interval history.  When I saw her on 4/3/2025, we decreased Capivasertib dose to 200 mg twice a day but she did not tolerate this well because of recurrence of diarrhea so she stopped that.  She received Faslodex on 4/11/2025.    Currently she feels well.  She has not noticed any new swellings.  Breast lump as per her seems about the same.  Denies any significant pain.  Bowel movements are fine.  No shortness of breath.  She has mild numbness of fingertips and toes.  She still has issues with double vision although they are better.  This is from the right 6th nerve palsy.  She is on acyclovir.      ECOG 1    ROS:  A comprehensive ROS was otherwise neg    I reviewed other history in epic as  below.      Past Medical/Surgical History:  Past Medical History:   Diagnosis Date    ASCUS favoring benign 10/28/15    neg HPV    Cancer (H)     Diabetes mellitus, type 2 (H) 7/14/2020    Hypertension     Obese      Past Surgical History:   Procedure Laterality Date    BIOPSY  03/2020    CATARACT EXTRACTION Bilateral 10/2024    COLONOSCOPY N/A 10/14/2020    Procedure: COLONOSCOPY, WITH POLYPECTOMY  hot snare;  Surgeon: Leventhal, Thomas Michael, MD;  Location: [facility] OR    GYN SURGERY  3/13/89. & 6/22/92    C Sections    IR CHEST PORT PLACEMENT > 5 YRS OF AGE  03/18/2020     Cancer History:   As above  Allergies:  Allergies as of 05/13/2025 - Reviewed 05/13/2025   Allergen Reaction Noted    Lisinopril Cough 10/28/2015     Current Medications:  Current Outpatient Medications   Medication Sig Dispense Refill    alcohol swab prep pads Use to swab area of injection/chrissy as directed. 400 each 3    amLODIPine (NORVASC) 10 MG tablet Take 1 tablet (10 mg) by mouth daily. 90 tablet 3    Ascorbic Acid (VITAMIN C PO)       aspirin 81 MG tablet Take by mouth daily      blood glucose (NO BRAND SPECIFIED) test strip 1 strip by In Vitro route 3 times daily 100 strip 6    blood glucose calibration (NO BRAND SPECIFIED) solution To accompany: Blood Glucose Monitor Brands: per insurance. 1 Bottle 3    blood glucose monitoring (NO BRAND SPECIFIED) meter device kit Use to test blood sugar 3 times daily or as directed. 1 kit 0    Blood Pressure KIT Please test blood pressure at home 2 x daily 1 kit 0    calcium-vitamin D-vitamin K (VIACTIV) 500-500-40 MG-UNT-MCG CHEW Take 1 tablet by mouth every other day      Cholecalciferol (VITAMIN D3 PO) Take 2,000 Units by mouth daily Takes 4 tabs daily      Coenzyme Q10 (COQ-10) 200 MG CAPS       Cyanocobalamin (B-12) 2500 MCG TABS       diphenoxylate-atropine (LOMOTIL) 2.5-0.025 MG tablet Take 1 tablet by mouth 4 times daily as needed for diarrhea. 60 tablet 0    gabapentin (NEURONTIN) 250 MG/5ML  solution Take 5 mLs (250 mg) by mouth at bedtime. 470 mL 0    hydrocortisone 2.5 % cream Apply topically 2 times daily 3.5 g 0    lidocaine-prilocaine (EMLA) 2.5-2.5 % external cream Apply small amount to center of port site 45-60 minutes prior to chemo appointment, cover with clear dressing if desired. 30 g 1    loperamide (IMODIUM) 2 MG capsule Start with 2 caps (4 mg), then take one cap (2 mg) after each diarrheal stool as needed. Do not use more than 8 caps (16 mg) per day. 30 capsule 0    magic mouthwash suspension (diphenhydrAMINE, lidocaine, aluminum-magnesium & simethicone) Swish and swallow 5 mLs in mouth every 6 hours as needed for mouth sores. 240 mL 2    metFORMIN (GLUCOPHAGE XR) 500 MG 24 hr tablet Take 2 tab in AM and one tab at PM , after a meal ( dose increased ) 270 tablet 3    Multiple Vitamins-Minerals (CENTRUM ADULTS PO)       potassium chloride (KLOR-CON) 20 MEQ packet Take 20 mEq by mouth 2 times daily. 60 packet 11    prochlorperazine (COMPAZINE) 10 MG tablet Take 1 tablet (10 mg) by mouth every 6 hours as needed for nausea or vomiting. 30 tablet 2    STATIN NOT PRESCRIBED (INTENTIONAL) Please choose reason not prescribed from choices below.      thin (NO BRAND SPECIFIED) lancets 3 each by In Vitro route daily Use with lanceting device. To accompany: Blood Glucose Monitor Brands: per insurance. 300 each 6    triamcinolone (KENALOG) 0.1 % external cream Apply topically 2 times daily. 80 g 1    triamterene-HCTZ (MAXZIDE-25) 37.5-25 MG tablet Take 1 tablet by mouth daily. 90 tablet 3    UNABLE TO FIND MEDICATION NAME: Elderberry      UNABLE TO FIND MEDICATION NAME: clove of garlic daily      VITAMIN E NATURAL PO Take 400 Units by mouth        Family History:  Family History   Problem Relation Age of Onset    Hypertension Mother          12/3/21    Coronary Artery Disease Mother          12/3/21    Breast Cancer Mother          12/3/21    Hyperlipidemia Mother          12/3/21     Depression Mother          123/3/21    Arrhythmia Brother     Cerebrovascular Disease Brother     Diabetes Brother      Social History:  Social History     Socioeconomic History    Marital status:      Spouse name: Not on file    Number of children: Not on file    Years of education: Not on file    Highest education level: Not on file   Occupational History    Not on file   Tobacco Use    Smoking status: Never     Passive exposure: Never    Smokeless tobacco: Never   Vaping Use    Vaping status: Never Used   Substance and Sexual Activity    Alcohol use: Not Currently     Comment: 2-3 drinks a month     Drug use: No    Sexual activity: Not Currently     Partners: Male     Birth control/protection: Pill, None     Comment: On the pill prior to having children   Other Topics Concern    Parent/sibling w/ CABG, MI or angioplasty before 65F 55M? No   Social History Narrative    Not on file     Social Drivers of Health     Financial Resource Strain: Low Risk  (2023)    Financial Resource Strain     Within the past 12 months, have you or your family members you live with been unable to get utilities (heat, electricity) when it was really needed?: No   Food Insecurity: No Food Insecurity (2024)    Received from [facility]    Hunger Vital Sign     Worried About Running Out of Food in the Last Year: Never true     Ran Out of Food in the Last Year: Never true   Transportation Needs: No Transportation Needs (2024)    Received from [facility]    PRAPARE - Transportation     Lack of Transportation (Medical): No     Lack of Transportation (Non-Medical): No   Physical Activity: Patient Declined (2024)    Received from [facility]    Exercise Vital Sign     Days of Exercise per Week: Patient declined     Minutes of Exercise per Session: Patient declined   Stress: Not on file   Social Connections: Not on file   Interpersonal Safety: Low Risk  (2024)    Interpersonal Safety     Do you feel physically  and emotionally safe where you currently live?: Yes     Within the past 12 months, have you been hit, slapped, kicked or otherwise physically hurt by someone?: No     Within the past 12 months, have you been humiliated or emotionally abused in other ways by your partner or ex-partner?: No   Housing Stability: Low Risk  (4/6/2024)    Received from [facility]    Housing Stability     What is your living situation today?: I have a steady place to live     Physical Exam:  BP (!) 162/105 (BP Location: Right arm)   Pulse (!) 131   Temp 98.4  F (36.9  C) (Oral)   Wt 75.1 kg (165 lb 8 oz)   SpO2 96%   BMI 31.27 kg/m    Wt Readings from Last 4 Encounters:   05/13/25 75.1 kg (165 lb 8 oz)   04/03/25 73.5 kg (162 lb)   03/11/25 80.2 kg (176 lb 14.4 oz)   02/13/25 83.8 kg (184 lb 12.8 oz)     CONSTITUTIONAL: No apparent distress  EYES: There is pallor but no jaundice.  ENT/MOUTH: Ears unremarkable. No oral lesions  CVS: s1s2 normal  RESPIRATORY: Chest is clear  GI: Abdomen is benign  NEURO: Alert and oriented ×3  INTEGUMENT: no concerning skin rashes   LYMPHATIC: no palpable lymphadenopathy  MUSCULOSKELETAL: Unremarkable. No bony tenderness.   EXTREMITIES: no pedal edema  PSYCH: Mentation, mood and affect are appropriate  Hard lump is palpated in the left breast.  Nipple is retracted.      Laboratory/Imaging Studies    Reviewed    5/13/2025  CBC shows WBC 6.  Hemoglobin 8.6.  Platelets 271.  Chemistry showed alkaline phosphatase 196.  Otherwise unremarkable.    Hemoglobin A1c 6.2.    .  3/11/2025  CA 15-3 was 884.    CT chest abdomen and pelvis 4/25/2025.  I reviewed it myself.    FINDINGS:     Devices: Right chest wall Port-A-Cath tip terminates at cavoatrial  junction.     CHEST:  MEDIASTINUM: Normal heart size. Trace pericardial effusion, with  multiple new subcentimeter nodules along the anterior pericardium.  Normal caliber ascending aorta. Normal caliber main pulmonary artery.  New and enlarging metastatic  mediastinal lymphadenopathy compared to  12/4/2024 CT. Findings include but are not limited to:  -14 x 10 mm anterior mediastinal node (series 8, image 78), previously 6 x 4 mm.  -27 x 13 mm nodular mass like thickening along the anterior  mediastinum/pericardium (series 8, image 119), previously measuring 11 x 8 mm.     LUNGS: Central tracheobronchial tree is patent. New small malignant left-sided pleural effusion with new pleural-based nodularity. For example, a 17 x 7 mm nodular thickening of the left posterior pleura (series 8, image 202). Additionally, 14 x 12 mm soft tissue mass along the medial left pleura (series 8, image 95), previously measuring 11 x 3 mm. No focal airspace opacity. Enlarging pulmonary nodules compared to prior. Findings include but are not limited to:  -New 5 mm solid nodule of the right middle lobe (series 9, image 155).  -New 7 mm solid nodule of the medial right lower lobe (series 9, image 165).     Normal thyroid.     ABDOMEN/PELVIS:  LIVER: No suspicious hepatic lesion. Unchanged too small to  characterize hypodensity in segment 4. No intrahepatic or extrahepatic  biliary ductal dilation.     GALLBLADDER: Cholelithiasis without findings to suggest acute  cholecystitis      PANCREAS: No focal pancreatic lesion. The main pancreatic duct is not  dilated.     SPLEEN: Within normal limits.     ADRENAL GLANDS: No focal adrenal nodule.     URINARY TRACT: No suspicious renal lesion.  No hydronephrosis or  hydroureter.  No nephrolithiasis.     BLADDER: Within normal limits.     REPRODUCTIVE ORGANS: Within normal limits.     GASTROINTESTINAL TRACT: Normal caliber large and small bowel.  Normal  appendix.     PERITONEUM/MESENTERY: No free fluid. No free air.     VESSELS: The aorta and major branch vessels are patent. The main  portal, splenic and proximal superior mesenteric veins are patent. No  significant atherosclerotic disease.     LYMPH NODES: Rounded and irregular axillary  lymphadenopathy bilaterally, which appears new from prior. For example, a 13 x 9 mm  right axillary node (series 8, image 54). Mildly increased conspicuity  of left external iliac lymph nodes, which remain subcentimeter in  short axis. No abdominopelvic lymphadenopathy identified.     BONES: Extensive sclerotic metastatic lesions throughout the axial and visualized appendicular skeleton, which have increased in number compared to prior. Healing left lateral ninth rib fracture, which appears new from prior.     SOFT TISSUES: Increased soft tissue nodularity/masses about the left anterior chest wall. For example, 3.0 x 2.5 cm mass along the lateral left pectoralis musculature (series 8, image 83), previously measuring 2.2 x 1.9 cm. Continued skin thickening and nipple retraction of the left breast.                                                                      IMPRESSION:      1. Interval metastatic disease progression with new and increased metastatic mediastinal and axillary lymphadenopathy, increased soft tissue masses in the left breast/anterior chest wall, new subcentimeter anterior pericardial nodules, new and enlarging pleural-based metastases with a small malignant left-sided pleural  effusion, increased osseous metastases, and multiple new and enlarging pulmonary nodules.     2. Mildly displaced left ninth rib fracture appears new when compared to 12/4/2024 CT.      3. Cholelithiasis without findings to suggest acute cholecystitis.      ASSESSMENT/PLAN:    Metastatic breast cancer, hormone positive, HER2 negative.    She has been on Capivasertib/fulvestrant since January 2025 but she has had several breaks in treatment because of worsening diarrhea.    She is unable to tolerate even her lower dose of Capivasertib 200 mg twice daily because of worsening diarrhea.    She last received Capivasertib in early April 2025.    Now she has evidence of significant progression of the disease including increase in  the size of the soft tissue masses in the left breast/anterior chest wall as well as new and increased mediastinal and axillary lymphadenopathy, pericardial nodules, pleural-based metastasis and small left-sided pleural effusion, likely malignant as well as worsening lung metastasis and worsening of osseous disease.      Because of PIK3CA p.E453K positive, we can try Alpelisib/letrozole.  We discussed the rational schedule and potential side effects of it in detail.  If she is unable to tolerate this, or she progresses on this, then I would consider chemotherapy with eribulin.      We also discussed the rationale schedule and potential side effects of eribulin but we decided on trying Alpelisib/letrozole.  We will continue to monitor CA 15-3.        Diarrhea.  If the diarrhea happens again then I recommend that she takes 2 tabs of imodium and 1 lomotil with 1st episode of diarrhea.  With 2nd episode and other episodes of diarrhea, take 1 imodium and 1 lomotil.  In this way she can take upto 8 imodium a day and 4 lomotil a day.    Anemia.  We will check anemia workup.      Cancer involving the bones.  Because of worsening bone metastasis, I will change Zometa to Xgeva.  Continue vitamin D and calcium.          I will see her back in 1 month.     All questions answered to her satisfaction.  She is agreeable and comfortable with the plan.    Felicia Ashraf MD     The longitudinal plan of care for the diagnosis(es)/condition(s) as documented were addressed during this visit. Due to the added complexity in care, I will continue to support Desmond in the subsequent management and with ongoing continuity of care.    I spent >47 minutes on this visit on the date of service, including the face to face time, reviewing records and labs and imaging and placing new orders as well as coordination of care and documentation.

## 2025-05-13 NOTE — TELEPHONE ENCOUNTER
PA Initiation    Medication: PIQRAY (300 MG DAILY DOSE) PO  Insurance Company: [employer] - Phone [phone] Fax [phone]  Pharmacy Filling the Rx:    Filling Pharmacy Phone:    Filling Pharmacy Fax:    Start Date: 5/13/2025

## 2025-05-13 NOTE — PROGRESS NOTES
Port site scrubbed with Chloraprep for 30 seconds. Accessed port using sterile technique. Tubes drawn in rainbow order. Port flushed per protocol. See documentation flowsheet. Tolerated port access and draw without complaint.     Labs drawn and sent: Tx labs: cbc, cmp, mg, ca 15-3, hgb A1C    Rachel Saini RN

## 2025-05-13 NOTE — PATIENT INSTRUCTIONS
Xgeva today and monthly    No Fulvestrant    Change to Alpelisib and Letrozole asap    See me back in 1 month

## 2025-05-13 NOTE — LETTER
5/13/2025      Mary Olson  [address]  [facility] [address]      Dear Colleague,    Thank you for referring your patient, Mary Olson, to the [facility]. Please see a copy of my visit note below.    Oncology follow-up note:  Date on this visit: 5/13/25     Diagnosis.  Metastatic breast cancer.    Primary Physician: No Ref-Primary, Physician     History Of Present Illness:  Ms. Olson is a 69 year old female who presents with metastatic breast cancer.        Patient was being followed by Dr. Hilton and more recently by Dr. Fountain.  I have reviewed previous oncology notes and have copied and updated from prior notes.      In March, 2020, the patient presented with a several month history of left breast nodularity and palpable axillary adenopathy.  Subsequent imaging confirmed bilateral breast lesions including a 6.2 cm left breast mass with diffuse breast dermal thickening and a 1.3 cm right breast mass.  The left breast mass revealed a grade 2 invasive mammary carcinoma that was ER/VT positive and HER2 negative.  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     Staging PET scan revealed left axillary and retropectoral lymph node metastases as well as diffuse bony metastases.         March, 2020 - August, 2020: She received weekly paclitaxel with some disease response but requested a break from cytotoxic therapy from significant issues with balance and neuropathy requiring wheelchair use.     August, 2020 - March, 2021: Started on anastrozole with February, 2021 PET scan evidence of progression.     March, 2021-August, 2023: Started on anastrozole and abemaciclib.  May, 2023 CT CAP with evidence of soft tissue progression.     PIK3CA p.E453K+, ESR1 negative, TMB-low, TEJAS on left axillary tissue.     August, 2023-December, 2023: Switch to exemestane and everolimus with treatment discontinued after 4 months because  of significant left breast mass progression.     December, 2023-December, 2024: Started capecitabine 1500 mg twice daily with dose reduction to 500 mg twice daily 7 days on/7 days off due to mucositis and hand-foot syndrome.      CA 15-3 tumor marker had declined from a zenith of 945 on 12/26/2023 to a nerissa of 107 on 5/17/2024 with value of 203 on 10/2/2024.  On 11/5/2024, her CA 15-3 tumor marker was up to 286.     12/4/2024 restaging CT CAP revealed evidence of increased nodularity of the left invasive breast cancer along the anterior chest wall as well as slight increase in anterior mediastinal soft tissue nodule.  A new left upper lobe 4 mm nodule was also identified with no other gross evidence of metastatic disease in the abdomen or pelvis.      Subsequent 12/13/2024 echocardiogram revealed a 65-70% EF.     [employer] study revealed no new ESR 1 mutation.    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 A 1/7/2025 MR brain revealed diffuse osseous disease but no specific abnormal enhancement along the cisternal segment of the visualized cranial nerves. She thinks her diplopia has improved on antiviral therapy.     1/3/2025- because of PIK3CA p.E453K positive, initiated  fulvestrant/capivasertib     3/16/2025.  Reduced Capivasertib to 320 mg twice daily Days 1-4 out of 7-mainly due to diarrhea.    3/23/2025. We further dose reduced Capivasertib to 320 mg in the morning and 160 mg in the evening because of worsening diarrhea    Interval history.  When I saw her on 4/3/2025, we decreased Capivasertib dose to 200 mg twice a day but she did not tolerate this well because of recurrence of diarrhea so she stopped that.  She received Faslodex on 4/11/2025.    Currently she feels well.  She has not noticed any new swellings.  Breast lump as per her seems about the same.  Denies any significant pain.  Bowel movements are fine.  No shortness of breath.  She has mild numbness of fingertips  and toes.  She still has issues with double vision although they are better.  This is from the right 6th nerve palsy.  She is on acyclovir.      ECOG 1    ROS:  A comprehensive ROS was otherwise neg    I reviewed other history in epic as below.      Past Medical/Surgical History:  Past Medical History:   Diagnosis Date     ASCUS favoring benign 10/28/15    neg HPV     Cancer (H)      Diabetes mellitus, type 2 (H) 7/14/2020     Hypertension      Obese      Past Surgical History:   Procedure Laterality Date     BIOPSY  03/2020     CATARACT EXTRACTION Bilateral 10/2024     COLONOSCOPY N/A 10/14/2020    Procedure: COLONOSCOPY, WITH POLYPECTOMY  hot snare;  Surgeon: Leventhal, Thomas Michael, MD;  Location: [facility] OR     GYN SURGERY  3/13/89. & 6/22/92    C Sections     IR CHEST PORT PLACEMENT > 5 YRS OF AGE  03/18/2020     Cancer History:   As above  Allergies:  Allergies as of 05/13/2025 - Reviewed 05/13/2025   Allergen Reaction Noted     Lisinopril Cough 10/28/2015     Current Medications:  Current Outpatient Medications   Medication Sig Dispense Refill     alcohol swab prep pads Use to swab area of injection/chrissy as directed. 400 each 3     amLODIPine (NORVASC) 10 MG tablet Take 1 tablet (10 mg) by mouth daily. 90 tablet 3     Ascorbic Acid (VITAMIN C PO)        aspirin 81 MG tablet Take by mouth daily       blood glucose (NO BRAND SPECIFIED) test strip 1 strip by In Vitro route 3 times daily 100 strip 6     blood glucose calibration (NO BRAND SPECIFIED) solution To accompany: Blood Glucose Monitor Brands: per insurance. 1 Bottle 3     blood glucose monitoring (NO BRAND SPECIFIED) meter device kit Use to test blood sugar 3 times daily or as directed. 1 kit 0     Blood Pressure KIT Please test blood pressure at home 2 x daily 1 kit 0     calcium-vitamin D-vitamin K (VIACTIV) 500-500-40 MG-UNT-MCG CHEW Take 1 tablet by mouth every other day       Cholecalciferol (VITAMIN D3 PO) Take 2,000 Units by mouth daily Takes 4  tabs daily       Coenzyme Q10 (COQ-10) 200 MG CAPS        Cyanocobalamin (B-12) 2500 MCG TABS        diphenoxylate-atropine (LOMOTIL) 2.5-0.025 MG tablet Take 1 tablet by mouth 4 times daily as needed for diarrhea. 60 tablet 0     gabapentin (NEURONTIN) 250 MG/5ML solution Take 5 mLs (250 mg) by mouth at bedtime. 470 mL 0     hydrocortisone 2.5 % cream Apply topically 2 times daily 3.5 g 0     lidocaine-prilocaine (EMLA) 2.5-2.5 % external cream Apply small amount to center of port site 45-60 minutes prior to chemo appointment, cover with clear dressing if desired. 30 g 1     loperamide (IMODIUM) 2 MG capsule Start with 2 caps (4 mg), then take one cap (2 mg) after each diarrheal stool as needed. Do not use more than 8 caps (16 mg) per day. 30 capsule 0     magic mouthwash suspension (diphenhydrAMINE, lidocaine, aluminum-magnesium & simethicone) Swish and swallow 5 mLs in mouth every 6 hours as needed for mouth sores. 240 mL 2     metFORMIN (GLUCOPHAGE XR) 500 MG 24 hr tablet Take 2 tab in AM and one tab at PM , after a meal ( dose increased ) 270 tablet 3     Multiple Vitamins-Minerals (CENTRUM ADULTS PO)        potassium chloride (KLOR-CON) 20 MEQ packet Take 20 mEq by mouth 2 times daily. 60 packet 11     prochlorperazine (COMPAZINE) 10 MG tablet Take 1 tablet (10 mg) by mouth every 6 hours as needed for nausea or vomiting. 30 tablet 2     STATIN NOT PRESCRIBED (INTENTIONAL) Please choose reason not prescribed from choices below.       thin (NO BRAND SPECIFIED) lancets 3 each by In Vitro route daily Use with lanceting device. To accompany: Blood Glucose Monitor Brands: per insurance. 300 each 6     triamcinolone (KENALOG) 0.1 % external cream Apply topically 2 times daily. 80 g 1     triamterene-HCTZ (MAXZIDE-25) 37.5-25 MG tablet Take 1 tablet by mouth daily. 90 tablet 3     UNABLE TO FIND MEDICATION NAME: Elderberry       UNABLE TO FIND MEDICATION NAME: clove of garlic daily       VITAMIN E NATURAL PO Take 400  Units by mouth        Family History:  Family History   Problem Relation Age of Onset     Hypertension Mother          12/3/21     Coronary Artery Disease Mother          12/3/21     Breast Cancer Mother          12/3/21     Hyperlipidemia Mother          12/3/21     Depression Mother          123/3/21     Arrhythmia Brother      Cerebrovascular Disease Brother      Diabetes Brother      Social History:  Social History     Socioeconomic History     Marital status:      Spouse name: Not on file     Number of children: Not on file     Years of education: Not on file     Highest education level: Not on file   Occupational History     Not on file   Tobacco Use     Smoking status: Never     Passive exposure: Never     Smokeless tobacco: Never   Vaping Use     Vaping status: Never Used   Substance and Sexual Activity     Alcohol use: Not Currently     Comment: 2-3 drinks a month      Drug use: No     Sexual activity: Not Currently     Partners: Male     Birth control/protection: Pill, None     Comment: On the pill prior to having children   Other Topics Concern     Parent/sibling w/ CABG, MI or angioplasty before 65F 55M? No   Social History Narrative     Not on file     Social Drivers of Health     Financial Resource Strain: Low Risk  (2023)    Financial Resource Strain      Within the past 12 months, have you or your family members you live with been unable to get utilities (heat, electricity) when it was really needed?: No   Food Insecurity: No Food Insecurity (2024)    Received from [facility]    Hunger Vital Sign      Worried About Running Out of Food in the Last Year: Never true      Ran Out of Food in the Last Year: Never true   Transportation Needs: No Transportation Needs (2024)    Received from [facility]    PRAPARE - Transportation      Lack of Transportation (Medical): No      Lack of Transportation (Non-Medical): No   Physical Activity: Patient Declined (2024)     Received from [facility]    Exercise Vital Sign      Days of Exercise per Week: Patient declined      Minutes of Exercise per Session: Patient declined   Stress: Not on file   Social Connections: Not on file   Interpersonal Safety: Low Risk  (9/26/2024)    Interpersonal Safety      Do you feel physically and emotionally safe where you currently live?: Yes      Within the past 12 months, have you been hit, slapped, kicked or otherwise physically hurt by someone?: No      Within the past 12 months, have you been humiliated or emotionally abused in other ways by your partner or ex-partner?: No   Housing Stability: Low Risk  (4/6/2024)    Received from [facility]    Housing Stability      What is your living situation today?: I have a steady place to live     Physical Exam:  BP (!) 162/105 (BP Location: Right arm)   Pulse (!) 131   Temp 98.4  F (36.9  C) (Oral)   Wt 75.1 kg (165 lb 8 oz)   SpO2 96%   BMI 31.27 kg/m    Wt Readings from Last 4 Encounters:   05/13/25 75.1 kg (165 lb 8 oz)   04/03/25 73.5 kg (162 lb)   03/11/25 80.2 kg (176 lb 14.4 oz)   02/13/25 83.8 kg (184 lb 12.8 oz)     CONSTITUTIONAL: No apparent distress  EYES: There is pallor but no jaundice.  ENT/MOUTH: Ears unremarkable. No oral lesions  CVS: s1s2 normal  RESPIRATORY: Chest is clear  GI: Abdomen is benign  NEURO: Alert and oriented ×3  INTEGUMENT: no concerning skin rashes   LYMPHATIC: no palpable lymphadenopathy  MUSCULOSKELETAL: Unremarkable. No bony tenderness.   EXTREMITIES: no pedal edema  PSYCH: Mentation, mood and affect are appropriate  Hard lump is palpated in the left breast.  Nipple is retracted.      Laboratory/Imaging Studies    Reviewed    5/13/2025  CBC shows WBC 6.  Hemoglobin 8.6.  Platelets 271.  Chemistry showed alkaline phosphatase 196.  Otherwise unremarkable.    Hemoglobin A1c 6.2.    .  3/11/2025  CA 15-3 was 884.    CT chest abdomen and pelvis 4/25/2025.  I reviewed it myself.    FINDINGS:     Devices: Right chest  wall Port-A-Cath tip terminates at cavoatrial  junction.     CHEST:  MEDIASTINUM: Normal heart size. Trace pericardial effusion, with  multiple new subcentimeter nodules along the anterior pericardium.  Normal caliber ascending aorta. Normal caliber main pulmonary artery.  New and enlarging metastatic mediastinal lymphadenopathy compared to  12/4/2024 CT. Findings include but are not limited to:  -14 x 10 mm anterior mediastinal node (series 8, image 78), previously 6 x 4 mm.  -27 x 13 mm nodular mass like thickening along the anterior  mediastinum/pericardium (series 8, image 119), previously measuring 11 x 8 mm.     LUNGS: Central tracheobronchial tree is patent. New small malignant left-sided pleural effusion with new pleural-based nodularity. For example, a 17 x 7 mm nodular thickening of the left posterior pleura (series 8, image 202). Additionally, 14 x 12 mm soft tissue mass along the medial left pleura (series 8, image 95), previously measuring 11 x 3 mm. No focal airspace opacity. Enlarging pulmonary nodules compared to prior. Findings include but are not limited to:  -New 5 mm solid nodule of the right middle lobe (series 9, image 155).  -New 7 mm solid nodule of the medial right lower lobe (series 9, image 165).     Normal thyroid.     ABDOMEN/PELVIS:  LIVER: No suspicious hepatic lesion. Unchanged too small to  characterize hypodensity in segment 4. No intrahepatic or extrahepatic  biliary ductal dilation.     GALLBLADDER: Cholelithiasis without findings to suggest acute  cholecystitis      PANCREAS: No focal pancreatic lesion. The main pancreatic duct is not  dilated.     SPLEEN: Within normal limits.     ADRENAL GLANDS: No focal adrenal nodule.     URINARY TRACT: No suspicious renal lesion.  No hydronephrosis or  hydroureter.  No nephrolithiasis.     BLADDER: Within normal limits.     REPRODUCTIVE ORGANS: Within normal limits.     GASTROINTESTINAL TRACT: Normal caliber large and small bowel.   Normal  appendix.     PERITONEUM/MESENTERY: No free fluid. No free air.     VESSELS: The aorta and major branch vessels are patent. The main  portal, splenic and proximal superior mesenteric veins are patent. No  significant atherosclerotic disease.     LYMPH NODES: Rounded and irregular axillary lymphadenopathy bilaterally, which appears new from prior. For example, a 13 x 9 mm  right axillary node (series 8, image 54). Mildly increased conspicuity  of left external iliac lymph nodes, which remain subcentimeter in  short axis. No abdominopelvic lymphadenopathy identified.     BONES: Extensive sclerotic metastatic lesions throughout the axial and visualized appendicular skeleton, which have increased in number compared to prior. Healing left lateral ninth rib fracture, which appears new from prior.     SOFT TISSUES: Increased soft tissue nodularity/masses about the left anterior chest wall. For example, 3.0 x 2.5 cm mass along the lateral left pectoralis musculature (series 8, image 83), previously measuring 2.2 x 1.9 cm. Continued skin thickening and nipple retraction of the left breast.                                                                      IMPRESSION:      1. Interval metastatic disease progression with new and increased metastatic mediastinal and axillary lymphadenopathy, increased soft tissue masses in the left breast/anterior chest wall, new subcentimeter anterior pericardial nodules, new and enlarging pleural-based metastases with a small malignant left-sided pleural  effusion, increased osseous metastases, and multiple new and enlarging pulmonary nodules.     2. Mildly displaced left ninth rib fracture appears new when compared to 12/4/2024 CT.      3. Cholelithiasis without findings to suggest acute cholecystitis.      ASSESSMENT/PLAN:    Metastatic breast cancer, hormone positive, HER2 negative.    She has been on Capivasertib/fulvestrant since January 2025 but she has had several breaks in  treatment because of worsening diarrhea.    She is unable to tolerate even her lower dose of Capivasertib 200 mg twice daily because of worsening diarrhea.    She last received Capivasertib in early April 2025.    Now she has evidence of significant progression of the disease including increase in the size of the soft tissue masses in the left breast/anterior chest wall as well as new and increased mediastinal and axillary lymphadenopathy, pericardial nodules, pleural-based metastasis and small left-sided pleural effusion, likely malignant as well as worsening lung metastasis and worsening of osseous disease.      Because of PIK3CA p.E453K positive, we can try Alpelisib/letrozole.  We discussed the rational schedule and potential side effects of it in detail.  If she is unable to tolerate this, or she progresses on this, then I would consider chemotherapy with eribulin.      We also discussed the rationale schedule and potential side effects of eribulin but we decided on trying Alpelisib/letrozole.  We will continue to monitor CA 15-3.        Diarrhea.  If the diarrhea happens again then I recommend that she takes 2 tabs of imodium and 1 lomotil with 1st episode of diarrhea.  With 2nd episode and other episodes of diarrhea, take 1 imodium and 1 lomotil.  In this way she can take upto 8 imodium a day and 4 lomotil a day.    Anemia.  We will check anemia workup.      Cancer involving the bones.  Because of worsening bone metastasis, I will change Zometa to Xgeva.  Continue vitamin D and calcium.          I will see her back in 1 month.     All questions answered to her satisfaction.  She is agreeable and comfortable with the plan.    Felicia Ashraf MD     The longitudinal plan of care for the diagnosis(es)/condition(s) as documented were addressed during this visit. Due to the added complexity in care, I will continue to support Desmond in the subsequent management and with ongoing continuity of care.    I spent >47  minutes on this visit on the date of service, including the face to face time, reviewing records and labs and imaging and placing new orders as well as coordination of care and documentation.                  Again, thank you for allowing me to participate in the care of your patient.        Sincerely,        Felicia Ashraf MD    Electronically signed

## 2025-05-13 NOTE — NURSING NOTE
"Oncology Rooming Note    May 13, 2025 8:55 AM   Mary Olson is a 69 year old female who presents for:    Chief Complaint   Patient presents with    Oncology Clinic Visit     Initial Vitals: Wt 75.1 kg (165 lb 8 oz)   BMI 31.27 kg/m   Estimated body mass index is 31.27 kg/m  as calculated from the following:    Height as of 4/3/25: 1.549 m (5' 1\").    Weight as of this encounter: 75.1 kg (165 lb 8 oz). Body surface area is 1.8 meters squared.  No Pain (0) Comment: Data Unavailable   No LMP recorded. Patient is postmenopausal.  Allergies reviewed: Yes  Medications reviewed: Yes    Medications: MEDICATION REFILLS NEEDED TODAY. Provider was notified.  Pharmacy name entered into new test company:    [facility] - [place], [place] - [address].  [facility] - [place], [place] - [address] [facility] [place], [place] - [address] [facility] - [place] - [address] [id number] - [place], [place] - [address] [facility]  [id number] - [place], [place] - [address] AT [facility]    Frailty Screening:   Is the patient here for a new oncology consult visit in cancer care? 2. No    PHQ9:  Did this patient require a PHQ9?: No      Clinical concerns: No Concerns        Teena Osborne MA            "

## 2025-05-13 NOTE — PROGRESS NOTES
Oral Chemotherapy Monitoring Program    Primary Oncologist: Dr. Ashraf  Primary Oncology Clinic: [facility] Diagnosis: Metastatic breast cancer    Drug: Alpelisib (Piqray)  Start Date: after 5/13/15  Expected duration of therapy: Until disease progression or unacceptable toxicity    Drug Interaction Assessment: None    Lab Monitoring Plan      Subjective/Objective:  Desmond Olson is a 69 year old female seen in clinic for an initial visit for oral chemotherapy education. Patient checks blood sugar regularly at home; recommended that she reports any high blood sugar spikes while on alpelisib as it is a known side effect. Patient reports having trouble swallowing large tablets (makes her gag).         2/20/2025     4:00 PM 3/11/2025     8:00 AM 3/21/2025    12:00 PM 3/21/2025     1:00 PM 4/3/2025     7:00 AM 5/13/2025     9:00 AM 5/13/2025    10:00 AM   ORAL CHEMOTHERAPY   Assessment Type Refill Refill Other  Refill Initial Work up New Teach   Diagnosis Code Breast Cancer Breast Cancer Breast Cancer Breast Cancer Breast Cancer Breast Cancer Breast Cancer   Providers Александр Ashraf Andriy Andriy [place] Andriy   Clinic Name/Location [facility]   Drug Name Truqap (capivasertib)  Truqap (capivasertib) Truqap (capivasertib) Truqap (capivasertib) Truqap (capivasertib) Piqray (alpelisib) Piqray (alpelisib)   Dose 400 mg  320 mg  Other: 200 mg 300 mg 300 mg   Current Schedule BID BID  BID BID Daily Daily   Cycle Details Days 1-4 each week  Days 1-4 each week  Days 1-4 each week Days 1-4 each week Continuous Continuous   Adverse Effects  Diarrhea        Any new drug interactions?      No    Is the dose as ordered appropriate for the patient?      Yes        Data saved with a previous flowsheet row definition       Vitals:  BP:   BP Readings from Last 1 Encounters:   05/13/25 (!) 162/105     Wt Readings from Last 1 Encounters:   05/13/25 75.1 kg (165 lb 8 oz)  "    Estimated body surface area is 1.8 meters squared as calculated from the following:    Height as of 4/3/25: 1.549 m (5' 1\").    Weight as of an earlier encounter on 5/13/25: 75.1 kg (165 lb 8 oz).    Labs:  _  Result Component Current Result Ref Range   Sodium 136 (5/13/2025) 135 - 145 mmol/L     _  Result Component Current Result Ref Range   Potassium 4.6 (5/13/2025) 3.4 - 5.3 mmol/L     _  Result Component Current Result Ref Range   Calcium 10.1 (5/13/2025) 8.8 - 10.4 mg/dL     _  Result Component Current Result Ref Range   Magnesium 1.8 (5/13/2025) 1.7 - 2.3 mg/dL     No results found for Phos within last 30 days.     _  Result Component Current Result Ref Range   Albumin 3.8 (5/13/2025) 3.5 - 5.2 g/dL     _  Result Component Current Result Ref Range   Urea Nitrogen 16.6 (5/13/2025) 8.0 - 23.0 mg/dL     _  Result Component Current Result Ref Range   Creatinine 0.80 (5/13/2025) 0.51 - 0.95 mg/dL       _  Result Component Current Result Ref Range   AST 30 (5/13/2025) 0 - 45 U/L     _  Result Component Current Result Ref Range   ALT 7 (5/13/2025) 0 - 50 U/L     _  Result Component Current Result Ref Range   Bilirubin Total 0.2 (5/13/2025) <=1.2 mg/dL       _  Result Component Current Result Ref Range   WBC Count 6.0 (5/13/2025) 4.0 - 11.0 10e3/uL     _  Result Component Current Result Ref Range   Hemoglobin 8.6 (L) (5/13/2025) 11.7 - 15.7 g/dL     _  Result Component Current Result Ref Range   Platelet Count 271 (5/13/2025) 150 - 450 10e3/uL     _  Result Component Current Result Ref Range   Absolute Neutrophils 3.7 (5/13/2025) 1.6 - 8.3 10e3/uL         Assessment:  Patient is appropriate to start therapy.    Plan:  Basic chemotherapy teaching was reviewed with the patient including indication, start date of therapy, dose, administration, adverse effects, missed doses, food and drug interactions, monitoring, side effect management, office contact information, and safe handling. Written materials were provided " and all questions answered.    Follow-Up:  Oral chemotherapy monitoring team will follow-up with the patient in ~1 week to confirm medication start date and assess for any side effects. Please confirm with patient if she was able to take the medication without any trouble as she has difficulty with large sized tablets.      Mary Moralez, PharmD  Oral Chemotherapy Monitoring Program  Clinics and Surgery [facility]  [phone]

## 2025-05-13 NOTE — PROGRESS NOTES
Infusion Nursing Note:  Mary Olson presents today for New Xgeva.    Patient seen by provider today: Yes: Dr. Ashraf-treatment plan adjustments made today-pharmacy and charge RN aware and insurance auth approved.   present during visit today: Not Applicable.    Note: Pt had treatment plan adjustments made today (stop previous oral chemo, Faslodex and start new oral chemo as well as switch from Zometa to Xgeva. Pharmacy did teaching for new medications with pt and her  today.      Intravenous Access:  No Intravenous access at this visit.    Treatment Conditions:  Lab Results   Component Value Date    HGB 8.6 (L) 05/13/2025    WBC 6.0 05/13/2025    ANEU 3.7 05/13/2025     05/13/2025        Lab Results   Component Value Date     05/13/2025    POTASSIUM 4.6 05/13/2025    MAG 1.8 05/13/2025    CR 0.80 05/13/2025    BUFFY 10.1 05/13/2025    BILITOTAL 0.2 05/13/2025    ALBUMIN 3.8 05/13/2025    ALT 7 05/13/2025    AST 30 05/13/2025       Results reviewed, labs MET treatment parameters, ok to proceed with treatment.      Post Infusion Assessment:  Patient tolerated injection without incident.  Site patent and intact, free from redness, edema or discomfort.       Discharge Plan:   AVS to patient via MYCHART.  Patient will return 6/10/25 for next appointment. Future appts have been reviewed and crosschecked with appt note and plan.   Patient discharged in stable condition accompanied by: .  Departure Mode: Ambulatory.      Yazmin Peng RN

## 2025-05-14 DIAGNOSIS — C50.912 MALIGNANT NEOPLASM OF LEFT FEMALE BREAST, UNSPECIFIED ESTROGEN RECEPTOR STATUS, UNSPECIFIED SITE OF BREAST (H): ICD-10-CM

## 2025-05-14 DIAGNOSIS — C79.51 MALIGNANT NEOPLASM METASTATIC TO BONE (H): Primary | ICD-10-CM

## 2025-05-14 NOTE — TELEPHONE ENCOUNTER
Prior Authorization Approval    Medication: PIQRAY (300 MG DAILY DOSE) PO  Authorization Effective Date: 5/15/2025  Authorization Expiration Date: 5/15/2026  Approved Dose/Quantity: 120/30  Reference #: [id number]   Insurance Company: [employer] - Phone [phone] Fax [phone]  Expected CoPay: $    CoPay Card Available:      Financial Assistance Needed:   Which Pharmacy is filling the prescription: [facility] - [place], [place] - [address]  Pharmacy Notified: Yes  Patient Notified: Yes

## 2025-05-22 ENCOUNTER — TELEPHONE (OUTPATIENT)
Dept: ONCOLOGY | Facility: CLINIC | Age: 70
End: 2025-05-22
Payer: COMMERCIAL

## 2025-05-22 NOTE — ORAL ONC MGMT
Oral Chemotherapy Monitoring Program    Primary Oncologist: Andriy  Drug: Alpelisib (Piqray)  Start Date: 5/17/25    Subjective/Objective:  Mary Olson is a 69 year old female contacted by phone for a follow-up visit for oral chemotherapy. Desmond is tolerating alpelisib relatively well with minor side effects. She notes a couple of loose stools so far, although one occurred right after taking her potassium chloride powder so she wonders if it was related to that. Her blood glucose readings have remained at her baseline - I encouraged her to continue checking these and let the care team know if she notices a significant upward trend. She is not having any difficulty swallowing the alpelisib tablets.         3/21/2025     1:00 PM 4/3/2025     7:00 AM 5/13/2025     9:00 AM 5/13/2025    10:00 AM 5/14/2025    11:00 AM 5/22/2025     9:00 AM 5/22/2025    10:00 AM   ORAL CHEMOTHERAPY   Assessment Type  Refill Initial Work up New Teach Refill Left Voicemail Initial Follow up   Diagnosis Code Breast Cancer Breast Cancer Breast Cancer Breast Cancer Breast Cancer Breast Cancer Breast Cancer   Providers Milroy Andriy Andriy Andriy Milroy Milroy Milroy   Clinic Name/Location [facility]   Drug Name Truqap (capivasertib) Truqap (capivasertib) Piqray (alpelisib) Piqray (alpelisib) Piqray (alpelisib) Piqray (alpelisib) Piqray (alpelisib)   Dose Other: 200 mg 300 mg 300 mg 300 mg 300 mg 300 mg   Current Schedule BID BID Daily Daily Daily Daily Daily   Cycle Details Days 1-4 each week Days 1-4 each week Continuous Continuous Continuous Continuous Continuous   Start Date of Last Cycle       5/17/2025   Doses missed in last 2 weeks       0   Adherence Assessment       Adherent   Adverse Effects       Diarrhea   Diarrhea       Grade 1   Pharmacist Intervention(diarrhea)       No   Any new drug interactions?   No       Is the dose as ordered appropriate for the patient?   Yes            Assessment/Plan:  Tolerating alpelisib well with manageable side effects. OK to continue at current dose and schedule. We will continue to follow.    Follow-Up:  6/10: [place] appt & labs    Tommy Roche, PharmD, [facility]  Hematology/Oncology Clinical Pharmacist  [facility]  [phone]

## 2025-05-31 DIAGNOSIS — C79.51 MALIGNANT NEOPLASM METASTATIC TO BONE (H): Primary | ICD-10-CM

## 2025-05-31 DIAGNOSIS — C50.912 MALIGNANT NEOPLASM OF LEFT FEMALE BREAST, UNSPECIFIED ESTROGEN RECEPTOR STATUS, UNSPECIFIED SITE OF BREAST (H): ICD-10-CM

## 2025-06-01 ENCOUNTER — APPOINTMENT (OUTPATIENT)
Dept: CT IMAGING | Facility: HOSPITAL | Age: 70
DRG: 637 | End: 2025-06-01
Attending: EMERGENCY MEDICINE
Payer: COMMERCIAL

## 2025-06-01 ENCOUNTER — HOSPITAL ENCOUNTER (INPATIENT)
Facility: HOSPITAL | Age: 70
LOS: 3 days | Discharge: HOME | DRG: 637 | End: 2025-06-04
Attending: EMERGENCY MEDICINE | Admitting: INTERNAL MEDICINE
Payer: COMMERCIAL

## 2025-06-01 ENCOUNTER — APPOINTMENT (OUTPATIENT)
Dept: RADIOLOGY | Facility: HOSPITAL | Age: 70
DRG: 637 | End: 2025-06-01
Attending: INTERNAL MEDICINE
Payer: COMMERCIAL

## 2025-06-01 DIAGNOSIS — E11.10 DIABETIC KETOACIDOSIS WITHOUT COMA ASSOCIATED WITH TYPE 2 DIABETES MELLITUS (H): ICD-10-CM

## 2025-06-01 LAB
ALBUMIN SERPL BCG-MCNC: 3.9 G/DL (ref 3.5–5.2)
ALBUMIN UR-MCNC: NEGATIVE MG/DL
ALP SERPL-CCNC: 210 U/L (ref 40–150)
ALT SERPL W P-5'-P-CCNC: 14 U/L (ref 0–50)
ANION GAP SERPL CALCULATED.3IONS-SCNC: 21 MMOL/L (ref 7–15)
ANION GAP SERPL CALCULATED.3IONS-SCNC: 28 MMOL/L (ref 7–15)
ANION GAP SERPL CALCULATED.3IONS-SCNC: 40 MMOL/L (ref 7–15)
APPEARANCE UR: CLEAR
AST SERPL W P-5'-P-CCNC: 16 U/L (ref 0–45)
B-OH-BUTYR SERPL-SCNC: 3.05 MMOL/L
B-OH-BUTYR SERPL-SCNC: 5.95 MMOL/L
B-OH-BUTYR SERPL-SCNC: >9 MMOL/L
BASE EXCESS BLDV CALC-SCNC: -1.1 MMOL/L (ref -3–3)
BASE EXCESS BLDV CALC-SCNC: -15.4 MMOL/L (ref -3–3)
BASE EXCESS BLDV CALC-SCNC: -2.9 MMOL/L (ref -3–3)
BASE EXCESS BLDV CALC-SCNC: -8 MMOL/L (ref -3–3)
BASOPHILS # BLD AUTO: 0.2 10E3/UL (ref 0–0.2)
BASOPHILS NFR BLD AUTO: 2 %
BILIRUB DIRECT SERPL-MCNC: 0.21 MG/DL (ref 0–0.3)
BILIRUB SERPL-MCNC: 0.5 MG/DL
BILIRUB UR QL STRIP: NEGATIVE
BUN SERPL-MCNC: 51.8 MG/DL (ref 8–23)
BUN SERPL-MCNC: 55 MG/DL (ref 8–23)
BUN SERPL-MCNC: 55.2 MG/DL (ref 8–23)
CALCIUM SERPL-MCNC: 7.6 MG/DL (ref 8.8–10.4)
CALCIUM SERPL-MCNC: 8.1 MG/DL (ref 8.8–10.4)
CALCIUM SERPL-MCNC: 8.3 MG/DL (ref 8.8–10.4)
CHLORIDE SERPL-SCNC: 76 MMOL/L (ref 98–107)
CHLORIDE SERPL-SCNC: 88 MMOL/L (ref 98–107)
CHLORIDE SERPL-SCNC: 91 MMOL/L (ref 98–107)
COLOR UR AUTO: COLORLESS
CREAT SERPL-MCNC: 1.28 MG/DL (ref 0.51–0.95)
CREAT SERPL-MCNC: 1.4 MG/DL (ref 0.51–0.95)
CREAT SERPL-MCNC: 1.68 MG/DL (ref 0.51–0.95)
EGFRCR SERPLBLD CKD-EPI 2021: 33 ML/MIN/1.73M2
EGFRCR SERPLBLD CKD-EPI 2021: 41 ML/MIN/1.73M2
EGFRCR SERPLBLD CKD-EPI 2021: 45 ML/MIN/1.73M2
EOSINOPHIL # BLD AUTO: 0.1 10E3/UL (ref 0–0.7)
EOSINOPHIL NFR BLD AUTO: 1 %
ERYTHROCYTE [DISTWIDTH] IN BLOOD BY AUTOMATED COUNT: 18.9 % (ref 10–15)
EST. AVERAGE GLUCOSE BLD GHB EST-MCNC: 220 MG/DL
GLUCOSE BLDC GLUCOMTR-MCNC: 324 MG/DL (ref 70–99)
GLUCOSE BLDC GLUCOMTR-MCNC: 329 MG/DL (ref 70–99)
GLUCOSE BLDC GLUCOMTR-MCNC: 409 MG/DL (ref 70–99)
GLUCOSE BLDC GLUCOMTR-MCNC: 413 MG/DL (ref 70–99)
GLUCOSE BLDC GLUCOMTR-MCNC: 424 MG/DL (ref 70–99)
GLUCOSE BLDC GLUCOMTR-MCNC: 517 MG/DL (ref 70–99)
GLUCOSE BLDC GLUCOMTR-MCNC: >600 MG/DL (ref 70–99)
GLUCOSE SERPL-MCNC: 1009 MG/DL (ref 70–99)
GLUCOSE SERPL-MCNC: 476 MG/DL (ref 70–99)
GLUCOSE SERPL-MCNC: 629 MG/DL (ref 70–99)
GLUCOSE SERPL-MCNC: 817 MG/DL (ref 70–99)
GLUCOSE UR STRIP-MCNC: >1000 MG/DL
HBA1C MFR BLD: 9.3 %
HCO3 BLDV-SCNC: 10 MMOL/L (ref 21–28)
HCO3 BLDV-SCNC: 16 MMOL/L (ref 21–28)
HCO3 BLDV-SCNC: 22 MMOL/L (ref 21–28)
HCO3 BLDV-SCNC: 24 MMOL/L (ref 21–28)
HCO3 SERPL-SCNC: 15 MMOL/L (ref 22–29)
HCO3 SERPL-SCNC: 19 MMOL/L (ref 22–29)
HCO3 SERPL-SCNC: 8 MMOL/L (ref 22–29)
HCT VFR BLD AUTO: 36.4 % (ref 35–47)
HGB BLD-MCNC: 11.1 G/DL (ref 11.7–15.7)
HGB UR QL STRIP: ABNORMAL
HOLD SPECIMEN: NORMAL
IMM GRANULOCYTES # BLD: 0.4 10E3/UL
IMM GRANULOCYTES NFR BLD: 4 %
KETONES UR STRIP-MCNC: 80 MG/DL
LEUKOCYTE ESTERASE UR QL STRIP: NEGATIVE
LIPASE SERPL-CCNC: 68 U/L (ref 13–60)
LYMPHOCYTES # BLD AUTO: 1.2 10E3/UL (ref 0.8–5.3)
LYMPHOCYTES NFR BLD AUTO: 11 %
MCH RBC QN AUTO: 27.5 PG (ref 26.5–33)
MCHC RBC AUTO-ENTMCNC: 30.5 G/DL (ref 31.5–36.5)
MCV RBC AUTO: 90 FL (ref 78–100)
MONOCYTES # BLD AUTO: 0.6 10E3/UL (ref 0–1.3)
MONOCYTES NFR BLD AUTO: 6 %
MUCOUS THREADS #/AREA URNS LPF: PRESENT /LPF
NEUTROPHILS # BLD AUTO: 7.9 10E3/UL (ref 1.6–8.3)
NEUTROPHILS NFR BLD AUTO: 76 %
NITRATE UR QL: NEGATIVE
NRBC # BLD AUTO: 0.2 10E3/UL
NRBC BLD AUTO-RTO: 2 /100
O2/TOTAL GAS SETTING VFR VENT: 0 %
O2/TOTAL GAS SETTING VFR VENT: 21 %
OSMOLALITY SERPL: 351 MMOL/KG (ref 280–301)
OXYHGB MFR BLDV: 72 % (ref 70–75)
OXYHGB MFR BLDV: 72 % (ref 70–75)
OXYHGB MFR BLDV: 73 % (ref 70–75)
OXYHGB MFR BLDV: 75 % (ref 70–75)
PCO2 BLDV: 24 MM HG (ref 40–50)
PCO2 BLDV: 29 MM HG (ref 40–50)
PCO2 BLDV: 36 MM HG (ref 40–50)
PCO2 BLDV: 38 MM HG (ref 40–50)
PH BLDV: 7.24 [PH] (ref 7.32–7.43)
PH BLDV: 7.36 [PH] (ref 7.32–7.43)
PH BLDV: 7.39 [PH] (ref 7.32–7.43)
PH BLDV: 7.4 [PH] (ref 7.32–7.43)
PH UR STRIP: 5 [PH] (ref 5–7)
PHOSPHATE SERPL-MCNC: 3.5 MG/DL (ref 2.5–4.5)
PHOSPHATE SERPL-MCNC: 3.8 MG/DL (ref 2.5–4.5)
PHOSPHATE SERPL-MCNC: 5 MG/DL (ref 2.5–4.5)
PLATELET # BLD AUTO: 202 10E3/UL (ref 150–450)
PO2 BLDV: 39 MM HG (ref 25–47)
PO2 BLDV: 40 MM HG (ref 25–47)
PO2 BLDV: 41 MM HG (ref 25–47)
PO2 BLDV: 45 MM HG (ref 25–47)
POTASSIUM SERPL-SCNC: 3.8 MMOL/L (ref 3.4–5.3)
POTASSIUM SERPL-SCNC: 4 MMOL/L (ref 3.4–5.3)
POTASSIUM SERPL-SCNC: 5.5 MMOL/L (ref 3.4–5.3)
PROT SERPL-MCNC: 7.3 G/DL (ref 6.4–8.3)
RBC # BLD AUTO: 4.04 10E6/UL (ref 3.8–5.2)
RBC URINE: <1 /HPF
SAO2 % BLDV: 72.9 % (ref 70–75)
SAO2 % BLDV: 73.4 % (ref 70–75)
SAO2 % BLDV: 74.4 % (ref 70–75)
SAO2 % BLDV: 76.1 % (ref 70–75)
SODIUM SERPL-SCNC: 124 MMOL/L (ref 135–145)
SODIUM SERPL-SCNC: 131 MMOL/L (ref 135–145)
SODIUM SERPL-SCNC: 131 MMOL/L (ref 135–145)
SP GR UR STRIP: 1.02 (ref 1–1.03)
TROPONIN T SERPL HS-MCNC: 16 NG/L
TROPONIN T SERPL HS-MCNC: 16 NG/L
UROBILINOGEN UR STRIP-MCNC: NORMAL MG/DL
WBC # BLD AUTO: 10.3 10E3/UL (ref 4–11)
WBC URINE: 1 /HPF

## 2025-06-01 PROCEDURE — 85025 COMPLETE CBC W/AUTO DIFF WBC: CPT | Performed by: EMERGENCY MEDICINE

## 2025-06-01 PROCEDURE — 258N000002 HC RX IP 258 OP 250: Performed by: INTERNAL MEDICINE

## 2025-06-01 PROCEDURE — 93005 ELECTROCARDIOGRAM TRACING: CPT | Performed by: EMERGENCY MEDICINE

## 2025-06-01 PROCEDURE — 82310 ASSAY OF CALCIUM: CPT | Performed by: EMERGENCY MEDICINE

## 2025-06-01 PROCEDURE — 82947 ASSAY GLUCOSE BLOOD QUANT: CPT | Performed by: EMERGENCY MEDICINE

## 2025-06-01 PROCEDURE — 81001 URINALYSIS AUTO W/SCOPE: CPT | Performed by: EMERGENCY MEDICINE

## 2025-06-01 PROCEDURE — 84484 ASSAY OF TROPONIN QUANT: CPT | Performed by: EMERGENCY MEDICINE

## 2025-06-01 PROCEDURE — 82947 ASSAY GLUCOSE BLOOD QUANT: CPT | Performed by: INTERNAL MEDICINE

## 2025-06-01 PROCEDURE — 83690 ASSAY OF LIPASE: CPT | Performed by: EMERGENCY MEDICINE

## 2025-06-01 PROCEDURE — 82010 KETONE BODYS QUAN: CPT | Performed by: EMERGENCY MEDICINE

## 2025-06-01 PROCEDURE — 72125 CT NECK SPINE W/O DYE: CPT

## 2025-06-01 PROCEDURE — 83930 ASSAY OF BLOOD OSMOLALITY: CPT | Performed by: INTERNAL MEDICINE

## 2025-06-01 PROCEDURE — 200N000001 HC R&B ICU

## 2025-06-01 PROCEDURE — 80053 COMPREHEN METABOLIC PANEL: CPT | Performed by: EMERGENCY MEDICINE

## 2025-06-01 PROCEDURE — 99291 CRITICAL CARE FIRST HOUR: CPT | Mod: 25

## 2025-06-01 PROCEDURE — 70450 CT HEAD/BRAIN W/O DYE: CPT

## 2025-06-01 PROCEDURE — 96374 THER/PROPH/DIAG INJ IV PUSH: CPT

## 2025-06-01 PROCEDURE — 99292 CRITICAL CARE ADDL 30 MIN: CPT

## 2025-06-01 PROCEDURE — 250N000013 HC RX MED GY IP 250 OP 250 PS 637: Performed by: INTERNAL MEDICINE

## 2025-06-01 PROCEDURE — 250N000011 HC RX IP 250 OP 636: Performed by: INTERNAL MEDICINE

## 2025-06-01 PROCEDURE — 83036 HEMOGLOBIN GLYCOSYLATED A1C: CPT | Performed by: EMERGENCY MEDICINE

## 2025-06-01 PROCEDURE — 99222 1ST HOSP IP/OBS MODERATE 55: CPT | Performed by: STUDENT IN AN ORGANIZED HEALTH CARE EDUCATION/TRAINING PROGRAM

## 2025-06-01 PROCEDURE — 36415 COLL VENOUS BLD VENIPUNCTURE: CPT | Performed by: STUDENT IN AN ORGANIZED HEALTH CARE EDUCATION/TRAINING PROGRAM

## 2025-06-01 PROCEDURE — 71045 X-RAY EXAM CHEST 1 VIEW: CPT

## 2025-06-01 PROCEDURE — 82805 BLOOD GASES W/O2 SATURATION: CPT | Performed by: EMERGENCY MEDICINE

## 2025-06-01 PROCEDURE — 99223 1ST HOSP IP/OBS HIGH 75: CPT | Performed by: INTERNAL MEDICINE

## 2025-06-01 PROCEDURE — 82805 BLOOD GASES W/O2 SATURATION: CPT | Performed by: INTERNAL MEDICINE

## 2025-06-01 PROCEDURE — 96375 TX/PRO/DX INJ NEW DRUG ADDON: CPT

## 2025-06-01 PROCEDURE — 36415 COLL VENOUS BLD VENIPUNCTURE: CPT | Performed by: EMERGENCY MEDICINE

## 2025-06-01 PROCEDURE — 84100 ASSAY OF PHOSPHORUS: CPT | Performed by: INTERNAL MEDICINE

## 2025-06-01 PROCEDURE — 250N000009 HC RX 250: Performed by: EMERGENCY MEDICINE

## 2025-06-01 PROCEDURE — 82962 GLUCOSE BLOOD TEST: CPT

## 2025-06-01 PROCEDURE — 96361 HYDRATE IV INFUSION ADD-ON: CPT

## 2025-06-01 PROCEDURE — 258N000003 HC RX IP 258 OP 636: Performed by: EMERGENCY MEDICINE

## 2025-06-01 PROCEDURE — 82010 KETONE BODYS QUAN: CPT | Performed by: INTERNAL MEDICINE

## 2025-06-01 RX ORDER — DEXTROSE MONOHYDRATE 25 G/50ML
25-50 INJECTION, SOLUTION INTRAVENOUS
Status: DISCONTINUED | OUTPATIENT
Start: 2025-06-01 | End: 2025-06-01

## 2025-06-01 RX ORDER — LANOLIN ALCOHOL/MO/W.PET/CERES
1000 CREAM (GRAM) TOPICAL DAILY
Status: DISCONTINUED | OUTPATIENT
Start: 2025-06-02 | End: 2025-06-04 | Stop reason: HOSPADM

## 2025-06-01 RX ORDER — NICOTINE POLACRILEX 4 MG
15-30 LOZENGE BUCCAL
Status: DISCONTINUED | OUTPATIENT
Start: 2025-06-01 | End: 2025-06-04 | Stop reason: HOSPADM

## 2025-06-01 RX ORDER — LOPERAMIDE HYDROCHLORIDE 2 MG/1
2 CAPSULE ORAL 4 TIMES DAILY PRN
Status: DISCONTINUED | OUTPATIENT
Start: 2025-06-01 | End: 2025-06-04 | Stop reason: HOSPADM

## 2025-06-01 RX ORDER — DEXTROSE MONOHYDRATE 25 G/50ML
25-50 INJECTION, SOLUTION INTRAVENOUS
Status: DISCONTINUED | OUTPATIENT
Start: 2025-06-01 | End: 2025-06-04 | Stop reason: HOSPADM

## 2025-06-01 RX ORDER — AMLODIPINE BESYLATE 5 MG/1
10 TABLET ORAL DAILY
Status: DISCONTINUED | OUTPATIENT
Start: 2025-06-01 | End: 2025-06-04 | Stop reason: HOSPADM

## 2025-06-01 RX ORDER — SODIUM CHLORIDE 450 MG/100ML
INJECTION, SOLUTION INTRAVENOUS CONTINUOUS
Status: DISCONTINUED | OUTPATIENT
Start: 2025-06-01 | End: 2025-06-02

## 2025-06-01 RX ORDER — INDOMETHACIN 25 MG/1
25 CAPSULE ORAL ONCE
Status: COMPLETED | OUTPATIENT
Start: 2025-06-01 | End: 2025-06-01

## 2025-06-01 RX ORDER — GABAPENTIN 250 MG/5ML
250 SOLUTION ORAL AT BEDTIME
Status: DISCONTINUED | OUTPATIENT
Start: 2025-06-01 | End: 2025-06-04 | Stop reason: HOSPADM

## 2025-06-01 RX ORDER — DEXTROSE MONOHYDRATE AND SODIUM CHLORIDE 5; .45 G/100ML; G/100ML
INJECTION, SOLUTION INTRAVENOUS CONTINUOUS
Status: DISCONTINUED | OUTPATIENT
Start: 2025-06-01 | End: 2025-06-02

## 2025-06-01 RX ORDER — HYDROCORTISONE 25 MG/G
CREAM TOPICAL 2 TIMES DAILY PRN
COMMUNITY

## 2025-06-01 RX ORDER — HEPARIN SODIUM 5000 [USP'U]/.5ML
5000 INJECTION, SOLUTION INTRAVENOUS; SUBCUTANEOUS EVERY 8 HOURS
Status: DISCONTINUED | OUTPATIENT
Start: 2025-06-01 | End: 2025-06-04 | Stop reason: HOSPADM

## 2025-06-01 RX ORDER — METFORMIN HYDROCHLORIDE 500 MG/1
500 TABLET, EXTENDED RELEASE ORAL 2 TIMES DAILY WITH MEALS
COMMUNITY

## 2025-06-01 RX ORDER — TRIAMTERENE AND HYDROCHLOROTHIAZIDE 37.5; 25 MG/1; MG/1
1 TABLET ORAL DAILY
Status: DISCONTINUED | OUTPATIENT
Start: 2025-06-01 | End: 2025-06-04 | Stop reason: HOSPADM

## 2025-06-01 RX ORDER — ASPIRIN 81 MG/1
81 TABLET ORAL DAILY
Status: DISCONTINUED | OUTPATIENT
Start: 2025-06-01 | End: 2025-06-04 | Stop reason: HOSPADM

## 2025-06-01 RX ORDER — NICOTINE POLACRILEX 4 MG
15-30 LOZENGE BUCCAL
Status: DISCONTINUED | OUTPATIENT
Start: 2025-06-01 | End: 2025-06-01

## 2025-06-01 RX ORDER — TRIAMCINOLONE ACETONIDE 1 MG/G
CREAM TOPICAL 2 TIMES DAILY PRN
COMMUNITY

## 2025-06-01 RX ADMIN — SODIUM CHLORIDE: 0.45 INJECTION, SOLUTION INTRAVENOUS at 16:20

## 2025-06-01 RX ADMIN — INSULIN HUMAN 5.5 UNITS/HR: 1 INJECTION, SOLUTION INTRAVENOUS at 13:54

## 2025-06-01 RX ADMIN — SODIUM CHLORIDE 1000 ML: 0.9 INJECTION, SOLUTION INTRAVENOUS at 12:54

## 2025-06-01 RX ADMIN — SODIUM BICARBONATE 25 MEQ: 84 INJECTION, SOLUTION INTRAVENOUS at 13:48

## 2025-06-01 RX ADMIN — HEPARIN SODIUM 5000 UNITS: 10000 INJECTION, SOLUTION INTRAVENOUS; SUBCUTANEOUS at 17:38

## 2025-06-01 RX ADMIN — SODIUM CHLORIDE 1000 ML: 0.9 INJECTION, SOLUTION INTRAVENOUS at 14:26

## 2025-06-01 RX ADMIN — ASPIRIN 81 MG: 81 TABLET, COATED ORAL at 16:20

## 2025-06-01 ASSESSMENT — ACTIVITIES OF DAILY LIVING (ADL)
WEAR_GLASSES_OR_BLIND: YES
ADLS_ACUITY_SCORE: 43
ADLS_ACUITY_SCORE: 39
HEARING_DIFFICULTY_OR_DEAF: NO
FALL_HISTORY_WITHIN_LAST_SIX_MONTHS: YES
DRESSING/BATHING: BATHING DIFFICULTY, ASSISTANCE 1 PERSON
ADLS_ACUITY_SCORE: 41
DIFFICULTY_EATING/SWALLOWING: NO
ADLS_ACUITY_SCORE: 43
VISION_MANAGEMENT: GLASSES FOR READING
CHANGE_IN_FUNCTIONAL_STATUS_SINCE_ONSET_OF_CURRENT_ILLNESS/INJURY: YES
ADLS_ACUITY_SCORE: 43
DRESSING/BATHING_DIFFICULTY: YES
WALKING_OR_CLIMBING_STAIRS_DIFFICULTY: YES
DIFFICULTY_COMMUNICATING: NO
EQUIPMENT_CURRENTLY_USED_AT_HOME: CANE, QUAD;WHEELCHAIR, MANUAL
ADLS_ACUITY_SCORE: 41
DOING_ERRANDS_INDEPENDENTLY_DIFFICULTY: YES
CONCENTRATING,_REMEMBERING_OR_MAKING_DECISIONS_DIFFICULTY: NO
ADLS_ACUITY_SCORE: 41
ADLS_ACUITY_SCORE: 41
ADLS_ACUITY_SCORE: 43
TOILETING_ISSUES: NO
WALKING_OR_CLIMBING_STAIRS: AMBULATION DIFFICULTY, REQUIRES EQUIPMENT
ADLS_ACUITY_SCORE: 43
ADLS_ACUITY_SCORE: 41
NUMBER_OF_TIMES_PATIENT_HAS_FALLEN_WITHIN_LAST_SIX_MONTHS: 1

## 2025-06-01 ASSESSMENT — COLUMBIA-SUICIDE SEVERITY RATING SCALE - C-SSRS
1. IN THE PAST MONTH, HAVE YOU WISHED YOU WERE DEAD OR WISHED YOU COULD GO TO SLEEP AND NOT WAKE UP?: NO
2. HAVE YOU ACTUALLY HAD ANY THOUGHTS OF KILLING YOURSELF IN THE PAST MONTH?: NO
6. HAVE YOU EVER DONE ANYTHING, STARTED TO DO ANYTHING, OR PREPARED TO DO ANYTHING TO END YOUR LIFE?: NO

## 2025-06-01 NOTE — PHARMACY-ADMISSION MEDICATION HISTORY
Pharmacist Admission Medication History    Admission medication history is complete. The information provided in this note is only as accurate as the sources available at the time of the update.    Information Source(s): Patient, Family member, Prescription bottles, and CareEverywhere/SureScripts via in-person    Pertinent Information:   Patient stated that they believes the symptoms that they are experiencing are from the PIQRAY. Patient had a bag of medications bedside which helped me conduct the med rec along with the interview.  Novolog: Patient stated that they have been using their sliding scale insulin up to 20 units and that the last time they administered the medication was yesterday evening.  Letrozole: Patient stated that they were discontinued off this medication when they transitioned therapy to PIQRAY.    Changes made to PTA medication list:  Added: None  Deleted: Viactiv, vitamin d, lomotil, loperamide (duplicate), potassium, compazine, letrozole  Changed: None    Allergies reviewed with patient and updates made in EHR: yes    Medication History Completed By: Jaswinder Mcfarland [facility] 6/1/2025 1:53 PM    PTA Med List   Medication Sig Last Dose/Taking    alpelisib (PIQRAY) 2 x 150 MG tablet pack Take 2 tablets (300 mg) by mouth daily for 28 days. . Take with food. 5/31/2025 Morning    amLODIPine (NORVASC) 10 MG tablet Take 1 tablet (10 mg) by mouth daily. 5/31/2025 Morning    Ascorbic Acid (VITAMIN C PO) Take 500 mg by mouth daily. 5/31/2025 Morning    aspirin 81 MG tablet Take 81 mg by mouth daily. Past Week    Coenzyme Q10 (COQ-10) 100 MG CAPS Take 1 capsule by mouth daily as needed (supplementation). Taking As Needed    Cyanocobalamin (B-12) 1000 MCG TABS Take 1 tablet by mouth daily. 5/31/2025 Morning    gabapentin (NEURONTIN) 250 MG/5ML solution Take 5 mLs (250 mg) by mouth at bedtime. 5/31/2025 Bedtime    hydrocortisone 2.5 % cream Apply topically 2 times daily as needed for rash. Taking As Needed     insulin aspart (NOVOLOG PEN) 100 UNIT/ML pen Check BS tid before meals,  5 units, - 150: 10 units,151- 200: 15 units, - 250 : 18 units, over 251 : 20 units ( max 50 units/ day ) 5/31/2025 Evening    lidocaine-prilocaine (EMLA) 2.5-2.5 % external cream Apply small amount to center of port site 45-60 minutes prior to chemo appointment, cover with clear dressing if desired. Taking    loperamide (IMODIUM) 2 MG capsule Start with 2 caps (4 mg), then take one cap (2 mg) after each diarrheal stool as needed. Do not use more than 8 caps (16 mg) per day. Taking    magic mouthwash suspension (diphenhydrAMINE, lidocaine, aluminum-magnesium & simethicone) Swish and swallow 5 mLs in mouth every 6 hours as needed for mouth sores. Taking As Needed    metFORMIN (GLUCOPHAGE XR) 500 MG 24 hr tablet Take 500 mg by mouth 2 times daily (with meals). 5/31/2025 Evening    triamcinolone (KENALOG) 0.1 % external cream Apply topically 2 times daily as needed for irritation. Taking As Needed    triamterene-HCTZ (MAXZIDE-25) 37.5-25 MG tablet Take 1 tablet by mouth daily. 5/31/2025 Morning

## 2025-06-01 NOTE — PLAN OF CARE
Goal Outcome Evaluation:      Plan of Care Reviewed With: patient, spouse    Overall Patient Progress: no change    Outcome Evaluation: see note    [facility] - ICU Admission Note       Dual Skin Assessment:     Patient admitted from ED to ICU.      Comprehensive skin inspection completed by myself and MARY Goyal RN.      Abnormal skin assessment findings: Yes      If yes above, LDA initiated for skin breakdown/non-blanchable erythema:  Yes     Provider notified: No, Endorsed to next shift.      WOC consult order obtained: No, Endorsed to next shift.       [facility] - ICU    RN Progress Note:            Pertinent Assessments:      Please refer to flowsheet rows for full assessment     Admitted from ED at 1730. Pt alert and oriented x 3, but confused conversation and frequent repetition of questions. Q1 hour neruo checks unchanged.            Key Events - This Shift:     See above     RN Managed Protocols Ordered:  No  Protocols:  PRN'S:  Protocols Status:             Barriers to Discharge / Downgrade:     DKA protocol in process           Point of Contact Update: YES-OR-NO: Yes  If No, reason:   Name: Dieter  Phone Number: on file  Summary of Conversation: Admit performed with spouse. Updated on ICU policies and pt's lab improvement.

## 2025-06-01 NOTE — CONSULTS
Nephrology IP Consult  Consult performed by: Luis Alberto Barajas MD  Consult ordered by: Neftaly Bowen MD          RENAL CONSULT NOTE    ASSESSMENT/PLAN:    Mary Olson is a 69 year old female w/ metastatic breast cancer on alpelisib, T2DM admitted 6/1/25 with DKA.    CHIQUITA. Baseline Cr 0.80 -> Ad Cr 1.68. Etiology is pre-renal related to obligate diuresis from glucosuria and ketonuria. Aggressive fluid resuscitation in DKA; making good urine and no contraindication (e.g. CHF, underlying CKD) limiting amount of fluids.  DKA. Presumably related to alpelisib. Standard DKA protocol with crystalloids and insulin gtt with serial monitoring. Will likely need to add KCl to fluids once we close some of her acidosis. No need for HCO3.  Mixed metabolic acidosis and metabolic alkalosis with appropriately compensated respiratory alkalosis. VBG 7.24/24/8. AG 40. Give fluids. No role for exogenous bicarbonate; she has an underlying met alk (related to vomiting and volume depletion), but it'll all correct with crystalloids.  Pseudohyponatremia due to hypoglycemia. Encourage free water intake.  Hyperkalemia. Related to shifting due to insulin deficiency. Will likely need KCl back.    Please reach out via [employer] Secure Chat with any questions or concerns.    Luis Alberto Barajas MD  [facility]    Subjective     Mary Olson is a 69 year old female w/ metastatic breast cancer on alpelisib, T2DM admitted 6/1/25 with DKA.    Mentating appropriately. Feeling poor for last few days, fall prompted ED transfer with grossly abnormal labs. Patient is in DKA; currently receiving crystalloids and insulin gtt in the ED.    Review of Systems    Past Medical History:   Diagnosis Date    ASCUS favoring benign 10/28/15    neg HPV    Cancer (H)     Diabetes mellitus, type 2 (H) 7/14/2020    Hypertension     Obese        Current Facility-Administered Medications   Medication Dose Route Frequency Provider Last  Rate Last Admin    0.45% sodium chloride infusion   Intravenous Continuous Neftaly Bowen MD        dextrose 5% and 0.45% NaCl infusion   Intravenous Continuous Neftaly Bowen MD        dextrose 50 % injection 25-50 mL  25-50 mL Intravenous Q15 Min PRN Jacinta Moncada MD        glucose gel 15-30 g  15-30 g Oral Q15 Min PRN Neftaly Bowen MD        Or    dextrose 50 % injection 25-50 mL  25-50 mL Intravenous Q15 Min PRN Neftaly Bowen MD        Or    glucagon injection 1 mg  1 mg Subcutaneous Q15 Min PRN Neftaly Bowen MD        insulin regular (MYXREDLIN) 1 unit/mL infusion   Intravenous Continuous Neftaly Bowen MD         Current Outpatient Medications   Medication Sig Dispense Refill    alpelisib (PIQRAY) 2 x 150 MG tablet pack Take 2 tablets (300 mg) by mouth daily for 28 days. . Take with food. 56 tablet 0    amLODIPine (NORVASC) 10 MG tablet Take 1 tablet (10 mg) by mouth daily. 90 tablet 3    Ascorbic Acid (VITAMIN C PO) Take 500 mg by mouth daily.      aspirin 81 MG tablet Take 81 mg by mouth daily.      Coenzyme Q10 (COQ-10) 100 MG CAPS Take 1 capsule by mouth daily as needed (supplementation).      Cyanocobalamin (B-12) 1000 MCG TABS Take 1 tablet by mouth daily.      gabapentin (NEURONTIN) 250 MG/5ML solution Take 5 mLs (250 mg) by mouth at bedtime. 470 mL 0    hydrocortisone 2.5 % cream Apply topically 2 times daily as needed for rash.      insulin aspart (NOVOLOG PEN) 100 UNIT/ML pen Check BS tid before meals,  5 units, - 150: 10 units,151- 200: 15 units, - 250 : 18 units, over 251 : 20 units ( max 50 units/ day ) 15 mL 1    lidocaine-prilocaine (EMLA) 2.5-2.5 % external cream Apply small amount to center of port site 45-60 minutes prior to chemo appointment, cover with clear dressing if desired. 30 g 1    loperamide (IMODIUM) 2 MG capsule Start with 2 caps (4 mg), then take one cap (2 mg) after each diarrheal stool as needed. Do not use more than 8 caps (16  mg) per day. 30 capsule 0    magic mouthwash suspension (diphenhydrAMINE, lidocaine, aluminum-magnesium & simethicone) Swish and swallow 5 mLs in mouth every 6 hours as needed for mouth sores. 240 mL 2    metFORMIN (GLUCOPHAGE XR) 500 MG 24 hr tablet Take 500 mg by mouth 2 times daily (with meals).      triamcinolone (KENALOG) 0.1 % external cream Apply topically 2 times daily as needed for irritation.      triamterene-HCTZ (MAXZIDE-25) 37.5-25 MG tablet Take 1 tablet by mouth daily. 90 tablet 3    alcohol swab prep pads Use to swab area of injection/chrissy as directed. 400 each 3    blood glucose (NO BRAND SPECIFIED) test strip 1 strip by In Vitro route 3 times daily 100 strip 6    blood glucose calibration (NO BRAND SPECIFIED) solution To accompany: Blood Glucose Monitor Brands: per insurance. 1 Bottle 3    blood glucose monitoring (NO BRAND SPECIFIED) meter device kit Use to test blood sugar 3 times daily or as directed. 1 kit 0    Blood Pressure KIT Please test blood pressure at home 2 x daily 1 kit 0    insulin pen needle (31G X 8 MM) 31G X 8 MM miscellaneous Use 4 pen needles daily or as directed. 100 each 4    STATIN NOT PRESCRIBED (INTENTIONAL) Please choose reason not prescribed from choices below.      thin (NO BRAND SPECIFIED) lancets 3 each by In Vitro route daily Use with lanceting device. To accompany: Blood Glucose Monitor Brands: per insurance. 300 each 6     Facility-Administered Medications Ordered in Other Encounters   Medication Dose Route Frequency Provider Last Rate Last Admin    heparin 100 UNIT/ML injection 500 Units  500 Units Intracatheter Q8H Keysha Birch MD   500 Units at 11/06/20 1017    heparin lock flush 100 unit/mL injection 5 mL  5 mL Intracatheter Q30 Days Felicia Ashraf MD   5 mL at 04/25/25 0834    heparin lock flush 100 unit/mL injection 5 mL  5 mL Intracatheter Q30 Days Robert Fountain MD   5 mL at 12/13/24 1452       alpelisib (PIQRAY) 2 x 150 MG tablet pack  amLODIPine  (NORVASC) 10 MG tablet  Ascorbic Acid (VITAMIN C PO)  aspirin 81 MG tablet  Coenzyme Q10 (COQ-10) 100 MG CAPS  Cyanocobalamin (B-12) 1000 MCG TABS  gabapentin (NEURONTIN) 250 MG/5ML solution  hydrocortisone 2.5 % cream  insulin aspart (NOVOLOG PEN) 100 UNIT/ML pen  lidocaine-prilocaine (EMLA) 2.5-2.5 % external cream  loperamide (IMODIUM) 2 MG capsule  magic mouthwash suspension (diphenhydrAMINE, lidocaine, aluminum-magnesium & simethicone)  metFORMIN (GLUCOPHAGE XR) 500 MG 24 hr tablet  triamcinolone (KENALOG) 0.1 % external cream  triamterene-HCTZ (MAXZIDE-25) 37.5-25 MG tablet  alcohol swab prep pads  blood glucose (NO BRAND SPECIFIED) test strip  blood glucose calibration (NO BRAND SPECIFIED) solution  blood glucose monitoring (NO BRAND SPECIFIED) meter device kit  Blood Pressure KIT  insulin pen needle (31G X 8 MM) 31G X 8 MM miscellaneous  STATIN NOT PRESCRIBED (INTENTIONAL)  thin (NO BRAND SPECIFIED) lancets        Social History     Tobacco Use    Smoking status: Never     Passive exposure: Never    Smokeless tobacco: Never   Vaping Use    Vaping status: Never Used   Substance Use Topics    Alcohol use: Not Currently     Comment: 2-3 drinks a month     Drug use: No            Objective     There were no vitals filed for this visit.  Vital Signs with Ranges  Temp:  [97.8  F (36.6  C)] 97.8  F (36.6  C)  Pulse:  [110-116] 110  Resp:  [18-23] 23  BP: (127-130)/(58-83) 130/58  SpO2:  [100 %] 100 %  No intake/output data recorded.  Resp: 23    Physical Exam  Constitutional:       General: She is not in acute distress.  Pulmonary:      Effort: No respiratory distress.   Musculoskeletal:      Right lower leg: No edema.      Left lower leg: No edema.   Neurological:      Mental Status: She is oriented to person, place, and time.         Recent Results (from the past 24 hours)   Glucose by meter    Collection Time: 06/01/25 12:24 PM   Result Value Ref Range    GLUCOSE BY METER POCT >600 (HH) 70 - 99 mg/dL   Basic  metabolic panel    Collection Time: 06/01/25 12:51 PM   Result Value Ref Range    Sodium 124 (L) 135 - 145 mmol/L    Potassium 5.5 (H) 3.4 - 5.3 mmol/L    Chloride 76 (L) 98 - 107 mmol/L    Carbon Dioxide (CO2) 8 (LL) 22 - 29 mmol/L    Anion Gap 40 (H) 7 - 15 mmol/L    Urea Nitrogen 55.0 (H) 8.0 - 23.0 mg/dL    Creatinine 1.68 (H) 0.51 - 0.95 mg/dL    GFR Estimate 33 (L) >60 mL/min/1.73m2    Calcium 8.1 (L) 8.8 - 10.4 mg/dL    Glucose 1,009 (HH) 70 - 99 mg/dL   Ketone Beta-Hydroxybutyrate Quantitative    Collection Time: 06/01/25 12:51 PM   Result Value Ref Range    Ketone (Beta-Hydroxybutyrate) Quantitative >9.00 (HH) <=0.30 mmol/L   Blood gas venous    Collection Time: 06/01/25 12:51 PM   Result Value Ref Range    pH Venous 7.24 (L) 7.32 - 7.43    pCO2 Venous 24 (L) 40 - 50 mm Hg    pO2 Venous 45 25 - 47 mm Hg    Bicarbonate Venous 10 (LL) 21 - 28 mmol/L    Base Excess/Deficit Venous -15.4 (L) -3.0 - 3.0 mmol/L    FIO2 21     Oxyhemoglobin Venous 73 70 - 75 %    O2 Sat, Venous 74.4 70.0 - 75.0 %   Hepatic function panel    Collection Time: 06/01/25 12:51 PM   Result Value Ref Range    Protein Total 7.3 6.4 - 8.3 g/dL    Albumin 3.9 3.5 - 5.2 g/dL    Bilirubin Total 0.5 <=1.2 mg/dL    Alkaline Phosphatase 210 (H) 40 - 150 U/L    AST 16 0 - 45 U/L    ALT 14 0 - 50 U/L    Bilirubin Direct 0.21 0.00 - 0.30 mg/dL   Lipase    Collection Time: 06/01/25 12:51 PM   Result Value Ref Range    Lipase 68 (H) 13 - 60 U/L   CBC with platelets and differential    Collection Time: 06/01/25 12:51 PM   Result Value Ref Range    WBC Count 10.3 4.0 - 11.0 10e3/uL    RBC Count 4.04 3.80 - 5.20 10e6/uL    Hemoglobin 11.1 (L) 11.7 - 15.7 g/dL    Hematocrit 36.4 35.0 - 47.0 %    MCV 90 78 - 100 fL    MCH 27.5 26.5 - 33.0 pg    MCHC 30.5 (L) 31.5 - 36.5 g/dL    RDW 18.9 (H) 10.0 - 15.0 %    Platelet Count 202 150 - 450 10e3/uL    % Neutrophils 76 %    % Lymphocytes 11 %    % Monocytes 6 %    % Eosinophils 1 %    % Basophils 2 %    %  Immature Granulocytes 4 %    NRBCs per 100 WBC 2 (H) <1 /100    Absolute Neutrophils 7.9 1.6 - 8.3 10e3/uL    Absolute Lymphocytes 1.2 0.8 - 5.3 10e3/uL    Absolute Monocytes 0.6 0.0 - 1.3 10e3/uL    Absolute Eosinophils 0.1 0.0 - 0.7 10e3/uL    Absolute Basophils 0.2 0.0 - 0.2 10e3/uL    Absolute Immature Granulocytes 0.4 <=0.4 10e3/uL    Absolute NRBCs 0.2 10e3/uL   Troponin T, High Sensitivity    Collection Time: 06/01/25 12:51 PM   Result Value Ref Range    Troponin T, High Sensitivity 16 (H) <=14 ng/L   Hemoglobin A1c    Collection Time: 06/01/25 12:51 PM   Result Value Ref Range    Estimated Average Glucose 220 (H) <117 mg/dL    Hemoglobin A1C 9.3 (H) <5.7 %   Glucose by meter    Collection Time: 06/01/25  3:12 PM   Result Value Ref Range    GLUCOSE BY METER POCT >600 (HH) 70 - 99 mg/dL

## 2025-06-01 NOTE — ED PROVIDER NOTES
"  Emergency Department Encounter     Evaluation Date & Time:   2025 12:06 PM    CHIEF COMPLAINT:  Generalized Weakness and Fall      Triage Note:       Impression and Plan       FINAL IMPRESSION:    ICD-10-CM    1. Diabetic ketoacidosis without coma associated with type 2 diabetes mellitus (H)  E11.10             ED COURSE & MEDICAL DECISION MAKIN:11 PM I met with the patient, obtained history, performed an initial exam, and discussed options and plan for diagnostics and treatment here in the ED.   2:00 PM I spoke with Dr. Bowen, the hospitalist. Admitted patient.       69 year old female, history of breast cancer (on chemotherapy), DM2, HTN and HLD, who presents for evaluation of generalized weakness and lethargy since a medication change 10 days ago. She reports associated nausea with ~3 episodes of non-bloody emesis and poor po intake. Denies abdominal pain, diarrhea, fevers.      Patient did \"slip\" out of bed earlier today, but denies associated injury. She does have faint ecchymosis to the left forehead, however does not recall hitting her head and denies headache, neck pain, anticoagulation.     Neuro exam is non-focal.    Patient placed on cardiac monitor, IV access established and blood sent for labs.    EKG performed and demonstrated sinus tachycardia with no ischemic changes. Troponin slightly elevated above reference cut-off for females (16) with delta troponin unchanged (16); I do not suspect ACS.     POCT glucose >600 for which IV fluids were initiated.     Serum glucose returned elevated to 1,009 with acidosis (pH 7.24, bicarb 10) and ketosis (BHOB >9). NS x 2 liters and 1/2 amp bicarb given. Potassium 5.5 and insulin drip thus initiated.    She has CHIQUITA with creatinine 1.68, GFR 33 (baseline renal function from 2 weeks ago is normal) - suspect pre-renal secondary to dehydration.    No laboratory evidence of hepatitis, biliary obstruction or pancreatitis (lipase slightly elevated to 68). "     UA ordered to evaluate for UTI, however not collected by time of disposition.     Given head injury with amnesia to such, head CT and cervical spine CT performed and negative for acute abnormalities.     Patient admitted to ICU for further management of DKA. I spoke with the Intensivist and the Hospitalist. Patient hemodynamically stable throughout ED course.       At the conclusion of the encounter I discussed the results of all the tests and the disposition. The questions were answered. The patient acknowledged understanding and was agreeable with the care plan.    35 minutes of critical care time    Medical Decision Making    History:  Obtained supplemental history:Supplemental history obtained?: N/A  Reviewed external records: External records reviewed?: Echo from 12/13/2024 with EF 65-70%    External consultation:  Did you consider but not order tests?: Work up considered but not performed and documented in chart, if applicable  Did you interpret images independently?: Independent interpretation of ECG and images noted in documentation, when applicable.    1:41 PM  I independently interpreted the patient's head CT and there is no obvious bleed or mass; please refer to the radiologist's report for official read    Consultation discussion with other provider:Did you involve another provider (consultant, MH, pharmacy, etc.)?: I discussed the care with another health care provider, see documentation for details. Hospitalist    Complicating factors:  Care impacted by chronic illness:Cancer/Chemotherapy, Diabetes, Hyperlipidemia, and Hypertension  Care significantly affected by social determinants of health:N/A    Disposition considerations: Admit.    MIPS    Adult Minor Head Trauma:Age 65 years or older    MEDICATIONS GIVEN IN THE EMERGENCY DEPARTMENT:  Medications   glucose gel 15-30 g (has no administration in time range)     Or   dextrose 50 % injection 25-50 mL (has no administration in time range)     Or    glucagon injection 1 mg (has no administration in time range)   amLODIPine (NORVASC) tablet 10 mg ( Oral Automatically Held 6/7/25 0800)   cyanocobalamin (VITAMIN B-12) tablet 1,000 mcg (1,000 mcg Oral $Given 6/2/25 0750)   gabapentin (NEURONTIN) solution 250 mg ( Oral Automatically Held 6/7/25 2200)   loperamide (IMODIUM) capsule 2 mg (has no administration in time range)   magic mouthwash suspension (diphenhydrAMINE, lidocaine, aluminum-magnesium & simethicone) (has no administration in time range)   triamterene-HCTZ (MAXZIDE-25) 37.5-25 MG per tablet 1 tablet ( Oral Automatically Held 6/7/25 0800)   aspirin EC tablet 81 mg (81 mg Oral $Given 6/2/25 0750)   heparin ANTICOAGULANT injection 5,000 Units (5,000 Units Subcutaneous $Given 6/2/25 0750)   insulin aspart (NovoLOG) injection (RAPID ACTING) ( Subcutaneous Not Given 6/2/25 1102)   insulin aspart (NovoLOG) injection (RAPID ACTING) (has no administration in time range)   sodium chloride 0.9 % infusion ( Intravenous $New Bag 6/2/25 0833)   potassium & sodium phosphates (NEUTRA-PHOS) Packet 2 packet (2 packets Oral or Feeding Tube $Given 6/2/25 1436)   sodium chloride 0.9% BOLUS 1,000 mL (0 mLs Intravenous Stopped 6/1/25 1426)   sodium chloride 0.9% BOLUS 1,000 mL (0 mLs Intravenous Stopped 6/1/25 1630)   sodium bicarbonate 8.4 % injection 25 mEq (25 mEq Intravenous $Given 6/1/25 1348)   potassium chloride moe ER (KLOR-CON M20) CR tablet 40 mEq (40 mEq Oral $Given 6/2/25 0212)   insulin glargine (LANTUS PEN) injection 10 Units (10 Units Subcutaneous $Given 6/2/25 0835)   potassium chloride (KLOR-CON) Packet 40 mEq (40 mEq Oral or Feeding Tube $Given 6/2/25 1000)       NEW PRESCRIPTIONS STARTED AT TODAY'S ED VISIT:  Current Discharge Medication List          HPI     Mary Olson is a 69 year old female, history of breast cancer, DM2, HTN and HLD, who presents to this ED by EMS for evaluation of generalized weakness and a fall.     Per EMS via RN, the  "patient started a new medication ten days ago with worsening generalized weakness and lethargy since. This morning she \"slipped\" out of bed, but denies falling. She does have evidence of head injury, but patient does not recall hitting her head.     Patient reports that since starting a new medication 10 days ago (piqray), she has had increasing generalized weakness and fatigue. She reports decreased po intake secondary to nausea with ~3 episodes of emesis daily; no hematemesis. She denies associated abdominal pain or diarrhea.     She does not recall hitting her head and denies headache, neck pain. She is not anticoagulated.     She otherwise denies chest pain, shortness of breath, back pain, fevers.     Of note, her last chemotherapy treatment was yesterday.         5/13/2025- [facility] visit for malignant neoplasm metastatic bone. Pt had treatment plan adjusted, she was instructed to stop oral chemo Faslodex and switch from Zometa to Xgeva.     Medical History     Past Medical History:   Diagnosis Date    ASCUS favoring benign 10/28/15    Cancer (H)     Diabetes mellitus, type 2 (H) 7/14/2020    Hypertension     Obese        No current outpatient medications on file.      Physical Exam     First Vitals:  Patient Vitals for the past 24 hrs:   BP Temp Temp src Pulse Resp SpO2 Weight   06/02/25 0900 118/72 -- -- 107 22 96 % --   06/02/25 0800 104/67 98  F (36.7  C) Oral 109 22 98 % --   06/02/25 0700 105/66 -- -- 105 18 97 % --   06/02/25 0600 97/66 -- -- 102 13 95 % --   06/02/25 0500 92/60 -- -- 100 14 94 % --   06/02/25 0400 105/67 98.4  F (36.9  C) Oral 108 25 94 % --   06/02/25 0300 97/60 -- -- 112 17 94 % --   06/02/25 0200 103/56 -- -- 100 15 93 % --   06/02/25 0100 102/65 -- -- 106 19 95 % --   06/02/25 0000 102/57 98  F (36.7  C) Oral 107 17 95 % --   06/01/25 2310 106/63 -- -- -- -- -- --   06/01/25 2300 (!) 84/55 -- -- 105 17 94 % --   06/01/25 2200 107/66 -- -- 115 17 96 % --   06/01/25 2100 " 111/62 -- -- 115 17 96 % --   06/01/25 2000 105/68 98.2  F (36.8  C) Oral 116 16 97 % --   06/01/25 1900 108/64 -- -- 118 22 98 % --   06/01/25 1800 108/59 -- -- (!) 122 18 98 % --   06/01/25 1745 124/63 -- -- (!) 125 25 98 % --   06/01/25 1730 118/63 98.7  F (37.1  C) Oral (!) 128 20 99 % 68.3 kg (150 lb 9.2 oz)       PHYSICAL EXAM:   Physical Exam    GENERAL: Awake, alert.  In no acute distress.   HEENT: Normocephalic. Faint ecchymosis left frontal scalp with superficial abrasion. Pupils equal, round and reactive. Conjunctiva normal.   NECK: No midline tenderness to palpation of cervical spine. No stridor.  PULMONARY: Symmetrical breath sounds without distress.  Lungs clear to auscultation bilaterally without wheezes, rhonchi or rales.  CARDIO: Tachycardic rate with regular rhythm.  No significant murmur, rub or gallop.  Radial pulses strong and symmetrical.  ABDOMINAL: Abdomen soft, non-distended and non-tender to palpation.    BACK:  Back is atraumatic. No midline tenderness to palpation of thoracic and lumbar spines.   EXTREMITIES: Ecchymosis over left shoulder; she has good ROM of the shoulder. No lower extremity swelling or edema.      NEURO: Alert and oriented to person, place and time.  Cranial nerves grossly intact.  Strength 5/5 BL upper and lower extremities with sensation to light touch grossly intact.   PSYCH: Normal mood and affect.  SKIN: Ecchymoses, as noted above.     Results     LAB:  All pertinent labs reviewed and interpreted  Labs Ordered and Resulted from Time of ED Arrival to Time of ED Departure   BASIC METABOLIC PANEL - Abnormal       Result Value    Sodium 124 (*)     Potassium 5.5 (*)     Chloride 76 (*)     Carbon Dioxide (CO2) 8 (*)     Anion Gap 40 (*)     Urea Nitrogen 55.0 (*)     Creatinine 1.68 (*)     GFR Estimate 33 (*)     Calcium 8.1 (*)     Glucose 1,009 (*)    KETONE BETA-HYDROXYBUTYRATE QUANTITATIVE, RAPID - Abnormal    Ketone (Beta-Hydroxybutyrate) Quantitative >9.00 (*)     BLOOD GAS VENOUS - Abnormal    pH Venous 7.24 (*)     pCO2 Venous 24 (*)     pO2 Venous 45      Bicarbonate Venous 10 (*)     Base Excess/Deficit Venous -15.4 (*)     FIO2 21      Oxyhemoglobin Venous 73      O2 Sat, Venous 74.4     HEPATIC FUNCTION PANEL - Abnormal    Protein Total 7.3      Albumin 3.9      Bilirubin Total 0.5      Alkaline Phosphatase 210 (*)     AST 16      ALT 14      Bilirubin Direct 0.21     LIPASE - Abnormal    Lipase 68 (*)    CBC WITH PLATELETS AND DIFFERENTIAL - Abnormal    WBC Count 10.3      RBC Count 4.04      Hemoglobin 11.1 (*)     Hematocrit 36.4      MCV 90      MCH 27.5      MCHC 30.5 (*)     RDW 18.9 (*)     Platelet Count 202      % Neutrophils 76      % Lymphocytes 11      % Monocytes 6      % Eosinophils 1      % Basophils 2      % Immature Granulocytes 4      NRBCs per 100 WBC 2 (*)     Absolute Neutrophils 7.9      Absolute Lymphocytes 1.2      Absolute Monocytes 0.6      Absolute Eosinophils 0.1      Absolute Basophils 0.2      Absolute Immature Granulocytes 0.4      Absolute NRBCs 0.2     GLUCOSE BY METER - Abnormal    GLUCOSE BY METER POCT >600 (*)    TROPONIN T, HIGH SENSITIVITY - Abnormal    Troponin T, High Sensitivity 16 (*)    TROPONIN T, HIGH SENSITIVITY - Abnormal    Troponin T, High Sensitivity 16 (*)    HEMOGLOBIN A1C - Abnormal    Estimated Average Glucose 220 (*)     Hemoglobin A1C 9.3 (*)    GLUCOSE - Abnormal    Glucose 817 (*)    GLUCOSE BY METER - Abnormal    GLUCOSE BY METER POCT >600 (*)    PHOSPHORUS - Abnormal    Phosphorus 5.0 (*)    PHOSPHORUS - Normal    Phosphorus 3.8     GLUCOSE MONITOR NURSING POCT   GLUCOSE MONITOR NURSING POCT   GLUCOSE MONITOR NURSING POCT       RADIOLOGY:  XR Chest Port 1 View   Final Result   IMPRESSION: Right IJ Port-A-Cath tip in mid SVC. Heart size and vascularity are normal. Small moderate left pleural effusion overall similar with associated left basilar atelectasis. Right lung clear. No pneumothorax.      CT Cervical  Spine w/o Contrast   Final Result   IMPRESSION:   HEAD CT:   No acute intracranial process.      CERVICAL SPINE CT:   1.  No CT evidence for acute fracture or post traumatic subluxation.   2.  Diffuse osseous metastatic disease.         Head CT w/o contrast   Final Result   IMPRESSION:   HEAD CT:   No acute intracranial process.      CERVICAL SPINE CT:   1.  No CT evidence for acute fracture or post traumatic subluxation.   2.  Diffuse osseous metastatic disease.             EC2025, 12:31; sinus tachycardia with rate of 108 bpm; normal intervals; normal conduction; poor quality EKG with no obvious ST-T wave changes consistent with ACS or pericarditis; no previous EKG available for comparison    EKG independently reviewed and interpreted by Jacinta Moncada MD      PROCEDURES:  Procedures:    Critical Care     Performed by: Dr Jacinta Moncada  Authorized by: Dr Jacinta Moncada    Total critical care time: 35 minutes    Critical care was necessary to treat or prevent imminent or life-threatening deterioration of the following conditions: DKA  Critical care was time spent personally by me on the following activities: development of treatment plan with patient or surrogate, discussions with consultants, examination of patient, evaluation of patient's response to treatment, obtaining history from patient or surrogate, ordering and performing treatments and interventions, ordering and review of laboratory studies, ordering and review of radiographic studies, re-evaluation of patient's condition and monitoring for potential decompensation.  Critical care time was exclusive of separately billable procedures and treating other patients.        I, Brenda Lopez, am serving as a scribe to document services personally performed by Jacinta Moncada MD based on my observation and the provider's statements to me. I, Jacinta Moncada MD attest that Brenda Lopez is acting in a scribe capacity, has observed my performance of the  services and has documented them in accordance with my direction.    Jacinta Moncada MD  Emergency Medicine  [facility] EMERGENCY DEPARTMENT           Jacinta Moncada MD  06/02/25 2817

## 2025-06-02 ENCOUNTER — APPOINTMENT (OUTPATIENT)
Dept: PHYSICAL THERAPY | Facility: HOSPITAL | Age: 70
DRG: 637 | End: 2025-06-02
Attending: STUDENT IN AN ORGANIZED HEALTH CARE EDUCATION/TRAINING PROGRAM
Payer: COMMERCIAL

## 2025-06-02 ENCOUNTER — APPOINTMENT (OUTPATIENT)
Dept: OCCUPATIONAL THERAPY | Facility: HOSPITAL | Age: 70
DRG: 637 | End: 2025-06-02
Attending: STUDENT IN AN ORGANIZED HEALTH CARE EDUCATION/TRAINING PROGRAM
Payer: COMMERCIAL

## 2025-06-02 LAB
ANION GAP SERPL CALCULATED.3IONS-SCNC: 12 MMOL/L (ref 7–15)
ANION GAP SERPL CALCULATED.3IONS-SCNC: 14 MMOL/L (ref 7–15)
ATRIAL RATE - MUSE: 108 BPM
B-OH-BUTYR SERPL-SCNC: 0.58 MMOL/L
B-OH-BUTYR SERPL-SCNC: 0.99 MMOL/L
B-OH-BUTYR SERPL-SCNC: <0.18 MMOL/L
BASE EXCESS BLDV CALC-SCNC: 0.4 MMOL/L (ref -3–3)
BASE EXCESS BLDV CALC-SCNC: 0.6 MMOL/L (ref -3–3)
BUN SERPL-MCNC: 33.6 MG/DL (ref 8–23)
BUN SERPL-MCNC: 38.2 MG/DL (ref 8–23)
BUN SERPL-MCNC: 42.6 MG/DL (ref 8–23)
BUN SERPL-MCNC: 45.2 MG/DL (ref 8–23)
BUN SERPL-MCNC: 48.2 MG/DL (ref 8–23)
CALCIUM SERPL-MCNC: 6.6 MG/DL (ref 8.8–10.4)
CALCIUM SERPL-MCNC: 7 MG/DL (ref 8.8–10.4)
CALCIUM SERPL-MCNC: 7.2 MG/DL (ref 8.8–10.4)
CALCIUM SERPL-MCNC: 7.2 MG/DL (ref 8.8–10.4)
CALCIUM SERPL-MCNC: 7.3 MG/DL (ref 8.8–10.4)
CHLORIDE SERPL-SCNC: 94 MMOL/L (ref 98–107)
CHLORIDE SERPL-SCNC: 95 MMOL/L (ref 98–107)
CHLORIDE SERPL-SCNC: 96 MMOL/L (ref 98–107)
CHLORIDE SERPL-SCNC: 96 MMOL/L (ref 98–107)
CHLORIDE SERPL-SCNC: 97 MMOL/L (ref 98–107)
CREAT SERPL-MCNC: 0.95 MG/DL (ref 0.51–0.95)
CREAT SERPL-MCNC: 1.02 MG/DL (ref 0.51–0.95)
CREAT SERPL-MCNC: 1.15 MG/DL (ref 0.51–0.95)
CREAT SERPL-MCNC: 1.16 MG/DL (ref 0.51–0.95)
CREAT SERPL-MCNC: 1.17 MG/DL (ref 0.51–0.95)
DIASTOLIC BLOOD PRESSURE - MUSE: NORMAL MMHG
EGFRCR SERPLBLD CKD-EPI 2021: 51 ML/MIN/1.73M2
EGFRCR SERPLBLD CKD-EPI 2021: 51 ML/MIN/1.73M2
EGFRCR SERPLBLD CKD-EPI 2021: 52 ML/MIN/1.73M2
EGFRCR SERPLBLD CKD-EPI 2021: 60 ML/MIN/1.73M2
EGFRCR SERPLBLD CKD-EPI 2021: 65 ML/MIN/1.73M2
GLUCOSE BLDC GLUCOMTR-MCNC: 121 MG/DL (ref 70–99)
GLUCOSE BLDC GLUCOMTR-MCNC: 137 MG/DL (ref 70–99)
GLUCOSE BLDC GLUCOMTR-MCNC: 141 MG/DL (ref 70–99)
GLUCOSE BLDC GLUCOMTR-MCNC: 142 MG/DL (ref 70–99)
GLUCOSE BLDC GLUCOMTR-MCNC: 169 MG/DL (ref 70–99)
GLUCOSE BLDC GLUCOMTR-MCNC: 184 MG/DL (ref 70–99)
GLUCOSE BLDC GLUCOMTR-MCNC: 202 MG/DL (ref 70–99)
GLUCOSE BLDC GLUCOMTR-MCNC: 226 MG/DL (ref 70–99)
GLUCOSE BLDC GLUCOMTR-MCNC: 252 MG/DL (ref 70–99)
GLUCOSE BLDC GLUCOMTR-MCNC: 302 MG/DL (ref 70–99)
GLUCOSE BLDC GLUCOMTR-MCNC: 328 MG/DL (ref 70–99)
GLUCOSE BLDC GLUCOMTR-MCNC: 345 MG/DL (ref 70–99)
GLUCOSE SERPL-MCNC: 130 MG/DL (ref 70–99)
GLUCOSE SERPL-MCNC: 142 MG/DL (ref 70–99)
GLUCOSE SERPL-MCNC: 192 MG/DL (ref 70–99)
GLUCOSE SERPL-MCNC: 295 MG/DL (ref 70–99)
GLUCOSE SERPL-MCNC: 431 MG/DL (ref 70–99)
HCO3 BLDV-SCNC: 25 MMOL/L (ref 21–28)
HCO3 BLDV-SCNC: 25 MMOL/L (ref 21–28)
HCO3 SERPL-SCNC: 20 MMOL/L (ref 22–29)
HCO3 SERPL-SCNC: 23 MMOL/L (ref 22–29)
HCO3 SERPL-SCNC: 23 MMOL/L (ref 22–29)
HCO3 SERPL-SCNC: 24 MMOL/L (ref 22–29)
HCO3 SERPL-SCNC: 24 MMOL/L (ref 22–29)
INTERPRETATION ECG - MUSE: NORMAL
O2/TOTAL GAS SETTING VFR VENT: 21 %
O2/TOTAL GAS SETTING VFR VENT: 21 %
OXYHGB MFR BLDV: 73 % (ref 70–75)
OXYHGB MFR BLDV: 73 % (ref 70–75)
P AXIS - MUSE: 43 DEGREES
PCO2 BLDV: 40 MM HG (ref 40–50)
PCO2 BLDV: 40 MM HG (ref 40–50)
PH BLDV: 7.41 [PH] (ref 7.32–7.43)
PH BLDV: 7.41 [PH] (ref 7.32–7.43)
PHOSPHATE SERPL-MCNC: 1.8 MG/DL (ref 2.5–4.5)
PHOSPHATE SERPL-MCNC: 2 MG/DL (ref 2.5–4.5)
PHOSPHATE SERPL-MCNC: 2.3 MG/DL (ref 2.5–4.5)
PHOSPHATE SERPL-MCNC: 2.4 MG/DL (ref 2.5–4.5)
PHOSPHATE SERPL-MCNC: 2.8 MG/DL (ref 2.5–4.5)
PHOSPHATE SERPL-MCNC: 2.8 MG/DL (ref 2.5–4.5)
PO2 BLDV: 39 MM HG (ref 25–47)
PO2 BLDV: 39 MM HG (ref 25–47)
POTASSIUM SERPL-SCNC: 3.3 MMOL/L (ref 3.4–5.3)
POTASSIUM SERPL-SCNC: 3.4 MMOL/L (ref 3.4–5.3)
POTASSIUM SERPL-SCNC: 3.4 MMOL/L (ref 3.4–5.3)
POTASSIUM SERPL-SCNC: 3.5 MMOL/L (ref 3.4–5.3)
POTASSIUM SERPL-SCNC: 4.5 MMOL/L (ref 3.4–5.3)
POTASSIUM SERPL-SCNC: 4.8 MMOL/L (ref 3.4–5.3)
PR INTERVAL - MUSE: 130 MS
QRS DURATION - MUSE: 70 MS
QT - MUSE: 318 MS
QTC - MUSE: 426 MS
R AXIS - MUSE: 24 DEGREES
SAO2 % BLDV: 74.2 % (ref 70–75)
SAO2 % BLDV: 74.7 % (ref 70–75)
SODIUM SERPL-SCNC: 130 MMOL/L (ref 135–145)
SODIUM SERPL-SCNC: 131 MMOL/L (ref 135–145)
SODIUM SERPL-SCNC: 132 MMOL/L (ref 135–145)
SODIUM SERPL-SCNC: 133 MMOL/L (ref 135–145)
SODIUM SERPL-SCNC: 134 MMOL/L (ref 135–145)
SYSTOLIC BLOOD PRESSURE - MUSE: NORMAL MMHG
T AXIS - MUSE: 37 DEGREES
VENTRICULAR RATE- MUSE: 108 BPM

## 2025-06-02 PROCEDURE — 97162 PT EVAL MOD COMPLEX 30 MIN: CPT | Mod: GP

## 2025-06-02 PROCEDURE — 80048 BASIC METABOLIC PNL TOTAL CA: CPT | Performed by: INTERNAL MEDICINE

## 2025-06-02 PROCEDURE — 82805 BLOOD GASES W/O2 SATURATION: CPT | Performed by: INTERNAL MEDICINE

## 2025-06-02 PROCEDURE — 250N000009 HC RX 250: Performed by: INTERNAL MEDICINE

## 2025-06-02 PROCEDURE — 250N000013 HC RX MED GY IP 250 OP 250 PS 637: Performed by: INTERNAL MEDICINE

## 2025-06-02 PROCEDURE — 99418 PROLNG IP/OBS E/M EA 15 MIN: CPT | Performed by: STUDENT IN AN ORGANIZED HEALTH CARE EDUCATION/TRAINING PROGRAM

## 2025-06-02 PROCEDURE — 250N000011 HC RX IP 250 OP 636: Performed by: INTERNAL MEDICINE

## 2025-06-02 PROCEDURE — 97535 SELF CARE MNGMENT TRAINING: CPT | Mod: GO

## 2025-06-02 PROCEDURE — 80051 ELECTROLYTE PANEL: CPT | Performed by: STUDENT IN AN ORGANIZED HEALTH CARE EDUCATION/TRAINING PROGRAM

## 2025-06-02 PROCEDURE — 84100 ASSAY OF PHOSPHORUS: CPT | Performed by: STUDENT IN AN ORGANIZED HEALTH CARE EDUCATION/TRAINING PROGRAM

## 2025-06-02 PROCEDURE — 258N000002 HC RX IP 258 OP 250: Performed by: INTERNAL MEDICINE

## 2025-06-02 PROCEDURE — 250N000012 HC RX MED GY IP 250 OP 636 PS 637: Performed by: STUDENT IN AN ORGANIZED HEALTH CARE EDUCATION/TRAINING PROGRAM

## 2025-06-02 PROCEDURE — 120N000001 HC R&B MED SURG/OB

## 2025-06-02 PROCEDURE — 97116 GAIT TRAINING THERAPY: CPT | Mod: GP

## 2025-06-02 PROCEDURE — 250N000013 HC RX MED GY IP 250 OP 250 PS 637: Performed by: HOSPITALIST

## 2025-06-02 PROCEDURE — 84132 ASSAY OF SERUM POTASSIUM: CPT | Performed by: STUDENT IN AN ORGANIZED HEALTH CARE EDUCATION/TRAINING PROGRAM

## 2025-06-02 PROCEDURE — 99232 SBSQ HOSP IP/OBS MODERATE 35: CPT | Performed by: INTERNAL MEDICINE

## 2025-06-02 PROCEDURE — 250N000013 HC RX MED GY IP 250 OP 250 PS 637: Performed by: STUDENT IN AN ORGANIZED HEALTH CARE EDUCATION/TRAINING PROGRAM

## 2025-06-02 PROCEDURE — 97166 OT EVAL MOD COMPLEX 45 MIN: CPT | Mod: GO

## 2025-06-02 PROCEDURE — 82010 KETONE BODYS QUAN: CPT | Performed by: INTERNAL MEDICINE

## 2025-06-02 PROCEDURE — 84100 ASSAY OF PHOSPHORUS: CPT | Performed by: INTERNAL MEDICINE

## 2025-06-02 PROCEDURE — 258N000003 HC RX IP 258 OP 636: Performed by: STUDENT IN AN ORGANIZED HEALTH CARE EDUCATION/TRAINING PROGRAM

## 2025-06-02 PROCEDURE — 258N000003 HC RX IP 258 OP 636: Performed by: INTERNAL MEDICINE

## 2025-06-02 PROCEDURE — 99233 SBSQ HOSP IP/OBS HIGH 50: CPT | Performed by: STUDENT IN AN ORGANIZED HEALTH CARE EDUCATION/TRAINING PROGRAM

## 2025-06-02 RX ORDER — NICOTINE POLACRILEX 4 MG
15-30 LOZENGE BUCCAL
Status: DISCONTINUED | OUTPATIENT
Start: 2025-06-02 | End: 2025-06-02

## 2025-06-02 RX ORDER — SODIUM CHLORIDE 9 MG/ML
INJECTION, SOLUTION INTRAVENOUS CONTINUOUS
Status: DISCONTINUED | OUTPATIENT
Start: 2025-06-02 | End: 2025-06-04 | Stop reason: HOSPADM

## 2025-06-02 RX ORDER — POTASSIUM CHLORIDE 1.5 G/1.58G
40 POWDER, FOR SOLUTION ORAL ONCE
Status: COMPLETED | OUTPATIENT
Start: 2025-06-02 | End: 2025-06-02

## 2025-06-02 RX ORDER — DEXTROSE MONOHYDRATE 25 G/50ML
25-50 INJECTION, SOLUTION INTRAVENOUS
Status: DISCONTINUED | OUTPATIENT
Start: 2025-06-02 | End: 2025-06-02

## 2025-06-02 RX ORDER — POTASSIUM CHLORIDE 1500 MG/1
40 TABLET, EXTENDED RELEASE ORAL ONCE
Status: COMPLETED | OUTPATIENT
Start: 2025-06-02 | End: 2025-06-02

## 2025-06-02 RX ADMIN — POTASSIUM CHLORIDE 40 MEQ: 1500 TABLET, EXTENDED RELEASE ORAL at 02:12

## 2025-06-02 RX ADMIN — POTASSIUM CHLORIDE 40 MEQ: 1.5 POWDER, FOR SOLUTION ORAL at 10:00

## 2025-06-02 RX ADMIN — HEPARIN SODIUM 5000 UNITS: 10000 INJECTION, SOLUTION INTRAVENOUS; SUBCUTANEOUS at 07:50

## 2025-06-02 RX ADMIN — SODIUM CHLORIDE: 0.45 INJECTION, SOLUTION INTRAVENOUS at 00:26

## 2025-06-02 RX ADMIN — SODIUM CHLORIDE, PRESERVATIVE FREE: 5 INJECTION INTRAVENOUS at 08:33

## 2025-06-02 RX ADMIN — POTASSIUM & SODIUM PHOSPHATES POWDER PACK 280-160-250 MG 2 PACKET: 280-160-250 PACK at 14:36

## 2025-06-02 RX ADMIN — INSULIN HUMAN 9 UNITS/HR: 1 INJECTION, SOLUTION INTRAVENOUS at 00:20

## 2025-06-02 RX ADMIN — HEPARIN SODIUM 5000 UNITS: 10000 INJECTION, SOLUTION INTRAVENOUS; SUBCUTANEOUS at 16:18

## 2025-06-02 RX ADMIN — POTASSIUM & SODIUM PHOSPHATES POWDER PACK 280-160-250 MG 2 PACKET: 280-160-250 PACK at 18:15

## 2025-06-02 RX ADMIN — DEXTROSE AND SODIUM CHLORIDE: 5; 450 INJECTION, SOLUTION INTRAVENOUS at 03:21

## 2025-06-02 RX ADMIN — SODIUM CHLORIDE, PRESERVATIVE FREE: 5 INJECTION INTRAVENOUS at 19:59

## 2025-06-02 RX ADMIN — INSULIN ASPART 5 UNITS: 100 INJECTION, SOLUTION INTRAVENOUS; SUBCUTANEOUS at 16:17

## 2025-06-02 RX ADMIN — HEPARIN SODIUM 5000 UNITS: 10000 INJECTION, SOLUTION INTRAVENOUS; SUBCUTANEOUS at 00:25

## 2025-06-02 RX ADMIN — INSULIN GLARGINE 10 UNITS: 100 INJECTION, SOLUTION SUBCUTANEOUS at 08:35

## 2025-06-02 RX ADMIN — POTASSIUM & SODIUM PHOSPHATES POWDER PACK 280-160-250 MG 2 PACKET: 280-160-250 PACK at 10:00

## 2025-06-02 RX ADMIN — ASPIRIN 81 MG: 81 TABLET, COATED ORAL at 07:50

## 2025-06-02 RX ADMIN — CYANOCOBALAMIN TAB 1000 MCG 1000 MCG: 1000 TAB at 07:50

## 2025-06-02 ASSESSMENT — ACTIVITIES OF DAILY LIVING (ADL)
ADLS_ACUITY_SCORE: 45
ADLS_ACUITY_SCORE: 45
ADLS_ACUITY_SCORE: 46
ADLS_ACUITY_SCORE: 46
ADLS_ACUITY_SCORE: 45
ADLS_ACUITY_SCORE: 46
ADLS_ACUITY_SCORE: 45
ADLS_ACUITY_SCORE: 46
ADLS_ACUITY_SCORE: 45
ADLS_ACUITY_SCORE: 46
ADLS_ACUITY_SCORE: 45
ADLS_ACUITY_SCORE: 46
ADLS_ACUITY_SCORE: 45
ADLS_ACUITY_SCORE: 46
ADLS_ACUITY_SCORE: 46

## 2025-06-02 NOTE — PLAN OF CARE
Goal Outcome Evaluation:      Plan of Care Reviewed With: patient, child, spouse    Overall Patient Progress: improvingOverall Patient Progress: improving    Outcome Evaluation: Discontinued insulin gtt  [facility] - ICU    RN Progress Note:            Pertinent Assessments:      Please refer to flowsheet rows for full assessment     Pt is alert and oriented x4, little forgetful/confused, and follow commands, and calls appropriately. Denies pain.            Key Events - This Shift:       -Insulin gtt discontinued  -Transfer to  at 1840     RN Managed Protocols Ordered:  N/A  Protocols:Potassium, Magnesium, and Phosphorus  PRN'S:N/A  Protocols Status: Endorsed to Oncoming RN                Barriers to Discharge / Downgrade:     NONE     Problem: Adult Inpatient Plan of Care  Goal: Absence of Hospital-Acquired Illness or Injury  Outcome: Progressing  Intervention: Identify and Manage Fall Risk  Recent Flowsheet Documentation  Taken 6/2/2025 1600 by Juanjo Headley RN  Safety Promotion/Fall Prevention:   activity supervised   lighting adjusted   room near nurse's station   room organization consistent   safety round/check completed   clutter free environment maintained  Taken 6/2/2025 1200 by Juanjo Headley RN  Safety Promotion/Fall Prevention:   activity supervised   lighting adjusted   room near nurse's station   room organization consistent   safety round/check completed   clutter free environment maintained  Taken 6/2/2025 0800 by Juanjo Headley RN  Safety Promotion/Fall Prevention:   activity supervised   lighting adjusted   room near nurse's station   room organization consistent   safety round/check completed   clutter free environment maintained  Intervention: Prevent Skin Injury  Recent Flowsheet Documentation  Taken 6/2/2025 1600 by Juanjo Headley, RN  Body Position: weight shifting  Taken 6/2/2025 1200 by Juanjo Headley, RN  Body Position: weight shifting  Taken  6/2/2025 0800 by Juanjo Headley RN  Body Position:   turned   right   weight shifting  Intervention: Prevent and Manage VTE (Venous Thromboembolism) Risk  Recent Flowsheet Documentation  Taken 6/2/2025 1600 by Juanjo Headley RN  VTE Prevention/Management: SCDs on (sequential compression devices)  Taken 6/2/2025 1200 by Juanjo Headley RN  VTE Prevention/Management: SCDs on (sequential compression devices)  Taken 6/2/2025 0800 by Juanjo Headley RN  VTE Prevention/Management: SCDs on (sequential compression devices)     Problem: Adult Inpatient Plan of Care  Goal: Absence of Hospital-Acquired Illness or Injury  Intervention: Prevent and Manage VTE (Venous Thromboembolism) Risk  Recent Flowsheet Documentation  Taken 6/2/2025 1600 by Juanjo Headley RN  VTE Prevention/Management: SCDs on (sequential compression devices)  Taken 6/2/2025 1200 by Juanjo Headley RN  VTE Prevention/Management: SCDs on (sequential compression devices)  Taken 6/2/2025 0800 by Juanjo Headley RN  VTE Prevention/Management: SCDs on (sequential compression devices)

## 2025-06-02 NOTE — PROGRESS NOTES
[facility]    Medicine Progress Note - Hospitalist Service    Date of Admission:  6/1/2025    Assessment & Plan   Mary Olson is a 69-year-old woman with a medical history of diabetes, breast cancer and hypertension who is presenting for generalized weakness and fall at home.  Found to be in DKA.    DKA  - Likely a result of Piqray use for breast cancer  - Anion gap, beta ketones resolved this morning.  Bicarb stores are appropriate based on most recent BMP.  Will plan to transition to subcutaneous insulin   - Holding PTA metformin   - Not on home scheduled insulin but has home sliding scale     - Transition to medium dose sliding scale and 10 U basal x1   - Continue close glucose monitoring and BMP serial checks, adjust insulin based on results    - Stop D5 fluids and continue with NS as pt still tachycardic     Generalized weakness and fall  Metabolic encephalopathy  - Secondary to the above in setting of breast cancer. Additional head imaging negative for acute issues but notin chronic metastatc disease  - Holding PTA gabapentin  - PT/ OT consults     CHIQUITA- improving  - Continue IVF pending improved PO intake     Metastatic breast cancer  - Will need oncology follow-up shortly after hospital to discuss additional management of breast cancer as DKA likely precipitated by Piqray  - Has right sided port     Hypertension  - Hold PTA amlodipine and Maxide pending hemodynamic stability, restart when appropriate          Diet: NPO for Medical/Clinical Reasons Except for: Meds    DVT Prophylaxis: Heparin   Correa Catheter: Not present  Lines: PRESENT      Port A Cath Single 03/18/20 Right Chest wall-Site Assessment: WDL      Cardiac Monitoring: ACTIVE order. Indication: tachy/dka  Code Status: Full Code      Clinically Significant Risk Factors Present on Admission        # Hyperkalemia: Highest K = 5.5 mmol/L in last 2 days, will monitor as appropriate  # Hyponatremia: Lowest Na = 124 mmol/L  in last 2 days, will monitor as appropriate  # Hypochloremia: Lowest Cl = 76 mmol/L in last 2 days, will monitor as appropriate  # Hypocalcemia: Lowest Ca = 7 mg/dL in last 2 days, will monitor and replace as appropriate    # Anion Gap Metabolic Acidosis: Highest Anion Gap = 40 mmol/L in last 2 days, will monitor and treat as appropriate    # Drug Induced Platelet Defect: home medication list includes an antiplatelet medication   # Hypertension: Noted on problem list          # DMII: A1C = 9.3 % (Ref range: <5.7 %) within past 6 months               Social Drivers of Health    Physical Activity: Patient Declined (4/6/2024)    Received from [facility]    Exercise Vital Sign     Days of Exercise per Week: Patient declined     Minutes of Exercise per Session: Patient declined          Disposition Plan     Medically Ready for Discharge: Anticipated Tomorrow             Matt Bell DO  Hospitalist Service  [facility]  Securely message with [employer] (more info)  Text page via StyleChat by [employer] Paging/Directory   ______________________________________________________________________    Interval History   She is feeling much better. Hasn't eaten anything yet. No fevers, chills.    Physical Exam   Vital Signs: Temp: 98  F (36.7  C) Temp src: Oral BP: 105/66 Pulse: 105   Resp: 18 SpO2: 97 % O2 Device: None (Room air)    Weight: 150 lbs 9.19 oz    General Appearance: Nontoxic, cooperative   Respiratory: CTAB  Cardiovascular: RRR  GI: distended without pain   Skin: No exposed rash   Other: No focal deficits      Medical Decision Making       65 MINUTES SPENT BY ME on the date of service doing chart review, history, exam, documentation & further activities per the note.      Data     I have personally reviewed the following data over the past 24 hrs:    10.3  \   11.1 (L)   / 202     131 (L) 95 (L) 38.2 (H) /  121 (H)   3.3 (L) 24 1.02 (H) \     ALT: 14 AST: 16 AP: 210 (H) TBILI: 0.5   ALB: 3.9 TOT PROTEIN: 7.3  LIPASE: 68 (H)     Trop: 16 (H) BNP: N/A     TSH: N/A T4: N/A A1C: 9.3 (H)       Imaging results reviewed over the past 24 hrs:   Recent Results (from the past 24 hours)   Head CT w/o contrast    Narrative    EXAM: CT HEAD W/O CONTRAST, CT CERVICAL SPINE W/O CONTRAST  LOCATION: [facility]  DATE: 6/1/2025    INDICATION: head injury with amnesia  COMPARISON: MRI brain 1/27/2025  TECHNIQUE:   1) Routine CT Head without IV contrast. Multiplanar reformats. Dose reduction techniques were used.  2) Routine CT Cervical Spine without IV contrast. Multiplanar reformats. Dose reduction techniques were used.    FINDINGS:   HEAD CT:   INTRACRANIAL CONTENTS: No intracranial hemorrhage, extraaxial collection, or mass effect.  No CT evidence of acute infarct. Mild presumed chronic small vessel ischemic changes. Mild generalized volume loss. No hydrocephalus.     VISUALIZED ORBITS/SINUSES/MASTOIDS: Prior bilateral cataract surgery. Visualized portions of the orbits are otherwise unremarkable. No paranasal sinus mucosal disease. No middle ear or mastoid effusion.    BONES/SOFT TISSUES: No scalp hematoma. No skull fracture.    CERVICAL SPINE CT:   VERTEBRA: Normal vertebral body heights and alignment. No fracture or posttraumatic subluxation. Diffuse sclerotic osseous metastatic lesions are present throughout the visualized spine. No definite pathologic fractures.    CANAL/FORAMINA: There is multilevel disc height loss and facet hypertrophy. No high-grade spinal canal stenosis. Scattered mild-to-moderate neural foraminal stenosis throughout.    PARASPINAL: No extraspinal abnormality. Visualized lung fields are clear.      Impression    IMPRESSION:  HEAD CT:  No acute intracranial process.    CERVICAL SPINE CT:  1.  No CT evidence for acute fracture or post traumatic subluxation.  2.  Diffuse osseous metastatic disease.     CT Cervical Spine w/o Contrast    Narrative    EXAM: CT HEAD W/O CONTRAST, CT CERVICAL  SPINE W/O CONTRAST  LOCATION: [facility]  DATE: 6/1/2025    INDICATION: head injury with amnesia  COMPARISON: MRI brain 1/27/2025  TECHNIQUE:   1) Routine CT Head without IV contrast. Multiplanar reformats. Dose reduction techniques were used.  2) Routine CT Cervical Spine without IV contrast. Multiplanar reformats. Dose reduction techniques were used.    FINDINGS:   HEAD CT:   INTRACRANIAL CONTENTS: No intracranial hemorrhage, extraaxial collection, or mass effect.  No CT evidence of acute infarct. Mild presumed chronic small vessel ischemic changes. Mild generalized volume loss. No hydrocephalus.     VISUALIZED ORBITS/SINUSES/MASTOIDS: Prior bilateral cataract surgery. Visualized portions of the orbits are otherwise unremarkable. No paranasal sinus mucosal disease. No middle ear or mastoid effusion.    BONES/SOFT TISSUES: No scalp hematoma. No skull fracture.    CERVICAL SPINE CT:   VERTEBRA: Normal vertebral body heights and alignment. No fracture or posttraumatic subluxation. Diffuse sclerotic osseous metastatic lesions are present throughout the visualized spine. No definite pathologic fractures.    CANAL/FORAMINA: There is multilevel disc height loss and facet hypertrophy. No high-grade spinal canal stenosis. Scattered mild-to-moderate neural foraminal stenosis throughout.    PARASPINAL: No extraspinal abnormality. Visualized lung fields are clear.      Impression    IMPRESSION:  HEAD CT:  No acute intracranial process.    CERVICAL SPINE CT:  1.  No CT evidence for acute fracture or post traumatic subluxation.  2.  Diffuse osseous metastatic disease.     XR Chest Port 1 View    Narrative    EXAM: XR CHEST PORT 1 VIEW  LOCATION: [facility]  DATE: 6/1/2025    INDICATION: Weakness.  COMPARISON: CT chest 4/25/2025      Impression    IMPRESSION: Right IJ Port-A-Cath tip in mid SVC. Heart size and vascularity are normal. Small moderate left pleural effusion overall  similar with associated left basilar atelectasis. Right lung clear. No pneumothorax.

## 2025-06-02 NOTE — PROGRESS NOTES
RENAL PROGRESS NOTE    CC:  Matt Bell, DO      ASSESSMENT & PLAN:   Mary Olson is a 69 year old female w/ metastatic breast cancer on alpelisib, T2DM admitted 6/1/25 with DKA.     CHIQUITA. Baseline Cr 0.80 -> Ad Cr 1.68. Etiology is pre-renal related to obligate diuresis from glucosuria and ketonuria. Aggressive fluid resuscitation in DKA; making good urine and no contraindication (e.g. CHF, underlying CKD) limiting amount of fluids.  Any function improving.  Continue supportive measure.    Hypokalemia: Previously hyperkalemia and now intracellular shift and hypokalemia are noted.  Okay to replace from nephrology standpoint.    Hyponatremia: Monitor for now and workup if persists or getting worse.    DKA. Presumably related to alpelisib. Standard DKA protocol with crystalloids and insulin gtt with serial monitoring. Will likely need to add KCl to fluids once we close some of her acidosis. No need for HCO3.    Mixed metabolic acidosis and metabolic alkalosis with appropriately compensated respiratory alkalosis. VBG 7.24/24/8. AG 40. Give fluids. No role for exogenous bicarbonate; she has an underlying met alk (related to vomiting and volume depletion), but it'll all correct with crystalloids.  Bicarb 24 today.    SUBJECTIVE: Patient seen and examined at bedside.  Discussed with family at bedside.  Denies any chest pain or shortness of breath.  Urinating more.    OBJECTIVE:  Physical Exam   Temp: 98  F (36.7  C) Temp src: Oral BP: 118/72 Pulse: 107   Resp: 22 SpO2: 96 % O2 Device: None (Room air)    Vitals:    06/01/25 1730   Weight: 68.3 kg (150 lb 9.2 oz)     Vital Signs with Ranges  Temp:  [97.8  F (36.6  C)-98.7  F (37.1  C)] 98  F (36.7  C)  Pulse:  [100-128] 107  Resp:  [13-25] 22  BP: ()/(55-83) 118/72  SpO2:  [93 %-100 %] 96 %  I/O last 3 completed shifts:  In: 2051.33 [I.V.:2051.33]  Out: 900 [Urine:900]    @TMAXR(24)@    Patient Vitals for the past 72 hrs:   Weight   06/01/25 1730 68.3 kg  (150 lb 9.2 oz)     Intake/Output Summary (Last 24 hours) at 6/2/2025 1125  Last data filed at 6/2/2025 0806  Gross per 24 hour   Intake 2078.3 ml   Output 900 ml   Net 1178.3 ml       PHYSICAL EXAM:  General - Alert and oriented x3, appears comfortable, NAD  Cardiovascular - Regular rate and rhythm, no rub  Respiratory - Clear to auscultation bilaterally, no crackles or wheezes  Abd: BS present, no guarding or pain with palpation, no ascites  Extremities - No lower extremity edema bilaterally  Skin: dry, intact, no rash, good turgor  Neuro:  Grossly intact, no focal deficits  MSK:  Grossly intact  Psych:  Normal affect    LABORATORY STUDIES:     Recent Labs   Lab 06/01/25  1251   WBC 10.3   RBC 4.04   HGB 11.1*   HCT 36.4          Basic Metabolic Panel:  Recent Labs   Lab 06/02/25  1005 06/02/25  0901 06/02/25  0803 06/02/25  0656 06/02/25  0612 06/02/25  0605 06/02/25  0310 06/02/25  0308 06/02/25  0057 06/02/25  0015 06/01/25  2101 06/01/25  2057 06/01/25  1945 06/01/25  1737   NA  --  131*  --   --   --  134*  --  133*  --  132*  --  131*  --  131*   POTASSIUM  --  3.3*  --   --   --  3.5  --  3.4  --  3.4  --  3.8  --  4.0   CHLORIDE  --  95*  --   --   --  97*  --  96*  --  94*  --  91*  --  88*   CO2  --  24  --   --   --  23  --  23  --  24  --  19*  --  15*   BUN  --  38.2*  --   --   --  42.6*  --  45.2*  --  48.2*  --  51.8*  --  55.2*   CR  --  1.02*  --   --   --  1.15*  --  1.16*  --  1.17*  --  1.28*  --  1.40*   * 130* 141* 142* 137* 142*   < > 192*   < > 302*  295*   < > 476*   < > 629*   BUFFY  --  6.6*  --   --   --  7.0*  --  7.3*  --  7.2*  --  7.6*  --  8.3*    < > = values in this interval not displayed.       INRNo lab results found in last 7 days.     Recent Labs   Lab Test 06/01/25  1251 05/13/25  0828   WBC 10.3 6.0   HGB 11.1* 8.6*    271       Personally reviewed current labs      Martin Syed MD  [facility]  [phone]

## 2025-06-02 NOTE — PROGRESS NOTES
06/02/25 1000   Appointment Info   Signing Clinician's Name / Credentials (OT) Rosa Bai ANDRES OTR/L CLT   Living Environment   People in Home spouse   Current Living Arrangements house   Home Accessibility stairs to enter home;stairs within home   Transportation Anticipated family or friend will provide   Self-Care   Equipment Currently Used at Home wheelchair, manual;cane, quad   Activity/Exercise/Self-Care Comment Spouse drives, states she is I with ADLs   General Information   Onset of Illness/Injury or Date of Surgery 06/01/25   Referring Physician    Additional Occupational Profile Info/Pertinent History of Current Problem Mary Olson is a 69 year old female, history of breast cancer, enteritis, diabetes mellitis type 2, hypertension, and hyperlipidemia, who presents to this ED by walk in for evaluation of generalized weakness and fall.   Existing Precautions/Restrictions no known precautions/restrictions   Cognitive Status Examination   Affect/Mental Status (Cognitive) confused   Follows Commands follows one-step commands   Cognitive Status Comments cognition grossly intact- oriented to place, year, month not date. Will assess as able   Bed Mobility   Bed Mobility supine-sit   Supine-Sit [place] (Bed Mobility) minimum assist (75% patient effort)   Transfers   Transfers sit-stand transfer   Sit-Stand Transfer   Sit-Stand [place] (Transfers) contact guard;minimum assist (75% patient effort)   Balance   Balance Assessment sitting static balance   Sitting Balance: Static supervision   Activities of Daily Living   BADL Assessment/Intervention lower body dressing   Lower Body Dressing Assessment/Training   [place] Level (Lower Body Dressing) socks;maximum assist (25% patient effort)   Clinical Impression   Criteria for Skilled Therapeutic Interventions Met (OT) Yes, treatment indicated   OT Diagnosis decreased ADL strength   OT Problem List-Impairments impacting ADL problems  related to;activity tolerance impaired;strength;cognition   Assessment of Occupational Performance 1-3 Performance Deficits   Identified Performance Deficits trsfs, g/h, LB drsg   Clinical Decision Making Complexity (OT) detailed assessment/moderate complexity   Risk & Benefits of therapy have been explained care plan/treatment goals reviewed   OT Total Evaluation Time   OT Eval, Moderate Complexity Minutes (08831) 8   OT Goals   Therapy Frequency (OT) 5 times/week   OT Predicted Duration/Target Date for Goal Attainment 06/09/25   OT Goals Hygiene/Grooming;Transfers;Lower Body Dressing   OT: Hygiene/Grooming supervision/stand-by assist   OT: Lower Body Dressing Supervision/stand-by assist   OT: Transfer Supervision/stand-by assist   Interventions   Interventions Quick Adds Self-Care/Home Management   Self-Care/Home Management   Self-Care/Home Mgmt/ADL, Compensatory, Meal Prep Minutes (80190) 8   Treatment Detail/Skilled Intervention Sat EOB for several minutes in prep for trsf with SBA. Completed bed to chair trsf with min A- HHA on left side , quad cane on R. Patient very focused on looking at menu, no further therapy provided. May benefit fromFWW   OT Discharge Planning   OT Plan trsfs with fww, g/h, LB drsg, monitor cognition   OT Discharge Recommendation (DC Rec) home with assist;home with home care occupational therapy   OT Rationale for DC Rec Patient A of 1 with trsfs, supportive spouse. Anticipate will be able to return home   OT Brief overview of current status Min A   OT Total Distance Amb During Session (feet) 2   Total Session Time   Timed Code Treatment Minutes 8   Total Session Time (sum of timed and untimed services) 16

## 2025-06-02 NOTE — PLAN OF CARE
Goal Outcome Evaluation:      Plan of Care Reviewed With: patient    Overall Patient Progress: no changeOverall Patient Progress: no change    Outcome Evaluation: DKA resolving        [facility] - ICU    RN Progress Note:            Pertinent Assessments:      Please refer to flowsheet rows for full assessment     Pt is alert and oriented x4 but intermittently confused and repeats questions/phrases. Denies pain. VSS on RA. LS diminished.            Key Events - This Shift:       Slept between blood sugar checks. Sugars coming down appropriately; started D5 1/2 NS. .      RN Managed Protocols Ordered:  No  Protocols:  PRN'S:  Protocols Status:                 Barriers to Discharge / Downgrade:     DKA

## 2025-06-02 NOTE — SIGNIFICANT EVENT
Significant Event Note    Time of event: 6:19 PM June 1, 2025    Description of event:  Called by nursing staff to inform this provider that patient and family have found her CODE STATUS paperwork and that she wishes to be full code.  Went to visit patient at bedside.  She is currently still encephalopathic and not competent to confirm or deny her wishes at this time.  Called all 3 family members on file including spouse, son and daughter: All 3 phone numbers rolled over to voicemail.    Plan:  Patient is changed back to full code.  AM provider can readdress with patient and family at that time.    Discussed with: bedside nurse    Erickson Christianson MD

## 2025-06-02 NOTE — PROGRESS NOTES
CLINICAL NUTRITION SERVICES - ASSESSMENT NOTE    RECOMMENDATIONS FOR MDs/PROVIDERS TO ORDER:      Registered Dietitian Interventions:  Glucerna with meals.    Future/Additional Recommendations:       REASON FOR ASSESSMENT  Positive admission nutrition risk screen    INFORMATION OBTAINED  Assessed patient in room.    NUTRITION HISTORY  Met patient, patient's spouse and daughter.  Patient reports weight loss, diarrhea and elevated blood sugars due to medication change for cancer therapy.  Recently started on Alpelisib.  Patient eats 2-3 meals/day plus snacks.  Patient has been trying to eat a BRAT, low fiber diet due to diarrhea.  Patient avoiding lettuce salads.  She tried a protein shake recently and would like protein shakes with her meals.     CURRENT NUTRITION ORDERS  Diet: Moderate Consistent Carbohydrate    CURRENT INTAKE/TOLERANCE  Patient was NPO this am, lunch will be her first meal since admission.      LABS  CMP  Recent Labs   Lab 06/02/25  1005 06/02/25  0901 06/02/25  0803 06/02/25  0656 06/02/25  0612 06/02/25  0605 06/02/25  0310 06/02/25  0308 06/02/25  0057 06/02/25  0015 06/01/25  1512 06/01/25  1251   NA  --  131*  --   --   --  134*  --  133*  --  132*   < > 124*   POTASSIUM  --  3.3*  --   --   --  3.5  --  3.4  --  3.4   < > 5.5*   * 130* 141* 142*   < > 142*   < > 192*   < > 302*  295*   < > 1,009*   BUN  --  38.2*  --   --   --  42.6*  --  45.2*  --  48.2*   < > 55.0*   CR  --  1.02*  --   --   --  1.15*  --  1.16*  --  1.17*   < > 1.68*   BUFFY  --  6.6*  --   --   --  7.0*  --  7.3*  --  7.2*   < > 8.1*   PHOS  --  1.8*  --   --   --  2.0*  --  2.4*  --  2.8   < > 5.0*   ALBUMIN  --   --   --   --   --   --   --   --   --   --   --  3.9   BILITOTAL  --   --   --   --   --   --   --   --   --   --   --  0.5   ALKPHOS  --   --   --   --   --   --   --   --   --   --   --  210*   AST  --   --   --   --   --   --   --   --   --   --   --  16   ALT  --   --   --   --   --   --   --   --    --   --   --  14    < > = values in this interval not displayed.   HgbA1c 9.3 (H) 6/1/25,   was 6.2 5/13/25.    Potassium and phosphorus replaced per protocol.      MEDICATIONS  Nutrition-relevant medications:   Current Facility-Administered Medications   Medication Dose Route Frequency Provider Last Rate Last Admin    [Held by provider] amLODIPine (NORVASC) tablet 10 mg  10 mg Oral Daily Neftaly Bowen MD        aspirin EC tablet 81 mg  81 mg Oral Daily Neftaly Bowen MD   81 mg at 06/02/25 0750    cyanocobalamin (VITAMIN B-12) tablet 1,000 mcg  1,000 mcg Oral Daily Neftaly Bowen MD   1,000 mcg at 06/02/25 0750    [Held by provider] gabapentin (NEURONTIN) solution 250 mg  250 mg Oral At Bedtime Neftaly Bowen MD        heparin ANTICOAGULANT injection 5,000 Units  5,000 Units Subcutaneous Q8H Neftaly Bowen MD   5,000 Units at 06/02/25 0750    insulin aspart (NovoLOG) injection (RAPID ACTING)  1-7 Units Subcutaneous TID AC Matt Bell DO        insulin aspart (NovoLOG) injection (RAPID ACTING)  1-5 Units Subcutaneous At Bedtime Matt Bell DO        potassium & sodium phosphates (NEUTRA-PHOS) Packet 2 packet  2 packet Oral or Feeding Tube Q4H BellMatt pascual DO   2 packet at 06/02/25 1000    [Held by provider] triamterene-HCTZ (MAXZIDE-25) 37.5-25 MG per tablet 1 tablet  1 tablet Oral Daily Neftaly Bowen MD          Current Facility-Administered Medications   Medication Dose Route Frequency Provider Last Rate Last Admin    sodium chloride 0.9 % infusion   Intravenous Continuous Matt Bell  mL/hr at 06/02/25 0833 New Bag at 06/02/25 0833      Current Facility-Administered Medications   Medication Dose Route Frequency Provider Last Rate Last Admin    glucose gel 15-30 g  15-30 g Oral Q15 Min PRN Neftaly Bowen MD        Or    dextrose 50 % injection 25-50 mL  25-50 mL Intravenous Q15 Min PRN Neftaly Bowen MD        Or    glucagon injection 1 mg  1 mg Subcutaneous Q15  "Min PRNeftaly Garcia MD        loperamide (IMODIUM) capsule 2 mg  2 mg Oral 4x Daily PRN Neftaly Bowen MD        magic mouthwash suspension (diphenhydrAMINE, lidocaine, aluminum-magnesium & simethicone)  5 mL Swish & Swallow Q6H PRN Neftaly Bowen MD            ANTHROPOMETRICS  Height: 0 cm (Data Unavailable) 5'1\"  Admission Weight: 68.3 kg (150 lb 9.2 oz) (06/01/25 1730)   Most Recent Weight: 68.3 kg (150 lb 9.2 oz) (06/01/25 1730)  BMI: Body mass index is 28.45 kg/m .   Weight History:   06/01/25 : 68.3 kg (150 lb 9.2 oz)   05/13/25 : 75.1 kg (165 lb 8 oz)   04/03/25 : 73.5 kg (162 lb)   03/11/25 : 80.2 kg (176 lb 14.4 oz)   02/13/25 : 83.8 kg (184 lb 12.8 oz)   01/30/25 : 83.9 kg (185 lb)   01/07/25 : 86.2 kg (190 lb)   12/06/24 : 86.6 kg (190 lb 14.4 oz)  21% loss in 6 months.   11/05/24 : 87.1 kg (192 lb)   10/02/24 : 85.9 kg (189 lb 4.8 oz)       Dosing Weight: 68.3 kg, actual-energy, 48 kg-ideal-protein    ASSESSED NUTRITION NEEDS  Estimated Energy Needs: 9852-9920 kcals/day ([place] [employer])  Justification: 120-140% of MREE  Estimated Protein Needs: 58+ grams protein/day (1.2+ g/kg)  Justification: Increased needs  Estimated Fluid Needs: 1385+ mL/day (1 mL/kcal)  Justification: Maintenance    SYSTEM AND PHYSICAL FINDINGS      GI symptoms: Last BM preadmit-diarrhea  Skin/wounds: coccyx-skin tear and moisture damage.    MALNUTRITION  % Intake: No decreased intake noted  % Weight Loss: > 10% in 6 months (severe)   Subcutaneous Fat Loss: mild to moderate loss  Muscle Loss: Clavicles (pectoralis and deltoids): Mild and Shoulders (deltoids): no loss noted.  Fluid Accumulation/Edema: None noted  Malnutrition Diagnosis: Severe malnutrition in the context of acute illness or injury  Malnutrition Present on Admission: Yes    NUTRITION DIAGNOSIS  Malnutrition (undernutrition) related to acute illness as evidenced by significant wt loss, loss of lean body mass    INTERVENTIONS  Medical food supplement " therapy-Glucerna with meals.    Provided patient with menu item carbohydrate content.      GOALS  Blood glucose 140-180 mg/dL  Electrolytes WNL  Patient to consume % of nutritionally adequate meal trays TID, or the equivalent with supplements/snacks.     MONITORING/EVALUATION  Progress toward goals will be monitored and evaluated per policy.

## 2025-06-03 LAB
ANION GAP SERPL CALCULATED.3IONS-SCNC: 13 MMOL/L (ref 7–15)
ANION GAP SERPL CALCULATED.3IONS-SCNC: 13 MMOL/L (ref 7–15)
ATRIAL RATE - MUSE: 119 BPM
BUN SERPL-MCNC: 22.1 MG/DL (ref 8–23)
BUN SERPL-MCNC: 26.6 MG/DL (ref 8–23)
CALCIUM SERPL-MCNC: 6.6 MG/DL (ref 8.8–10.4)
CALCIUM SERPL-MCNC: 6.9 MG/DL (ref 8.8–10.4)
CHLORIDE SERPL-SCNC: 98 MMOL/L (ref 98–107)
CHLORIDE SERPL-SCNC: 98 MMOL/L (ref 98–107)
CREAT SERPL-MCNC: 0.85 MG/DL (ref 0.51–0.95)
CREAT SERPL-MCNC: 0.91 MG/DL (ref 0.51–0.95)
DIASTOLIC BLOOD PRESSURE - MUSE: NORMAL MMHG
EGFRCR SERPLBLD CKD-EPI 2021: 68 ML/MIN/1.73M2
EGFRCR SERPLBLD CKD-EPI 2021: 74 ML/MIN/1.73M2
GLUCOSE BLDC GLUCOMTR-MCNC: 237 MG/DL (ref 70–99)
GLUCOSE BLDC GLUCOMTR-MCNC: 261 MG/DL (ref 70–99)
GLUCOSE BLDC GLUCOMTR-MCNC: 262 MG/DL (ref 70–99)
GLUCOSE BLDC GLUCOMTR-MCNC: 277 MG/DL (ref 70–99)
GLUCOSE BLDC GLUCOMTR-MCNC: 296 MG/DL (ref 70–99)
GLUCOSE SERPL-MCNC: 270 MG/DL (ref 70–99)
GLUCOSE SERPL-MCNC: 307 MG/DL (ref 70–99)
HCO3 SERPL-SCNC: 22 MMOL/L (ref 22–29)
HCO3 SERPL-SCNC: 22 MMOL/L (ref 22–29)
INTERPRETATION ECG - MUSE: NORMAL
MAGNESIUM SERPL-MCNC: 2 MG/DL (ref 1.7–2.3)
P AXIS - MUSE: 41 DEGREES
PHOSPHATE SERPL-MCNC: 2.1 MG/DL (ref 2.5–4.5)
POTASSIUM SERPL-SCNC: 3.9 MMOL/L (ref 3.4–5.3)
POTASSIUM SERPL-SCNC: 4 MMOL/L (ref 3.4–5.3)
POTASSIUM SERPL-SCNC: 4 MMOL/L (ref 3.4–5.3)
PR INTERVAL - MUSE: 132 MS
QRS DURATION - MUSE: 62 MS
QT - MUSE: 286 MS
QTC - MUSE: 402 MS
R AXIS - MUSE: 18 DEGREES
SODIUM SERPL-SCNC: 133 MMOL/L (ref 135–145)
SODIUM SERPL-SCNC: 133 MMOL/L (ref 135–145)
SYSTOLIC BLOOD PRESSURE - MUSE: NORMAL MMHG
T AXIS - MUSE: 2 DEGREES
VENTRICULAR RATE- MUSE: 119 BPM

## 2025-06-03 PROCEDURE — 99232 SBSQ HOSP IP/OBS MODERATE 35: CPT | Performed by: INTERNAL MEDICINE

## 2025-06-03 PROCEDURE — 83735 ASSAY OF MAGNESIUM: CPT | Performed by: STUDENT IN AN ORGANIZED HEALTH CARE EDUCATION/TRAINING PROGRAM

## 2025-06-03 PROCEDURE — 84100 ASSAY OF PHOSPHORUS: CPT | Performed by: STUDENT IN AN ORGANIZED HEALTH CARE EDUCATION/TRAINING PROGRAM

## 2025-06-03 PROCEDURE — 93010 ELECTROCARDIOGRAM REPORT: CPT | Performed by: STUDENT IN AN ORGANIZED HEALTH CARE EDUCATION/TRAINING PROGRAM

## 2025-06-03 PROCEDURE — 258N000003 HC RX IP 258 OP 636: Performed by: STUDENT IN AN ORGANIZED HEALTH CARE EDUCATION/TRAINING PROGRAM

## 2025-06-03 PROCEDURE — 250N000013 HC RX MED GY IP 250 OP 250 PS 637: Performed by: STUDENT IN AN ORGANIZED HEALTH CARE EDUCATION/TRAINING PROGRAM

## 2025-06-03 PROCEDURE — 250N000013 HC RX MED GY IP 250 OP 250 PS 637: Performed by: INTERNAL MEDICINE

## 2025-06-03 PROCEDURE — 99233 SBSQ HOSP IP/OBS HIGH 50: CPT | Performed by: STUDENT IN AN ORGANIZED HEALTH CARE EDUCATION/TRAINING PROGRAM

## 2025-06-03 PROCEDURE — 84132 ASSAY OF SERUM POTASSIUM: CPT | Performed by: STUDENT IN AN ORGANIZED HEALTH CARE EDUCATION/TRAINING PROGRAM

## 2025-06-03 PROCEDURE — 93005 ELECTROCARDIOGRAM TRACING: CPT

## 2025-06-03 PROCEDURE — 80048 BASIC METABOLIC PNL TOTAL CA: CPT | Performed by: STUDENT IN AN ORGANIZED HEALTH CARE EDUCATION/TRAINING PROGRAM

## 2025-06-03 PROCEDURE — 82435 ASSAY OF BLOOD CHLORIDE: CPT | Performed by: STUDENT IN AN ORGANIZED HEALTH CARE EDUCATION/TRAINING PROGRAM

## 2025-06-03 PROCEDURE — 120N000001 HC R&B MED SURG/OB

## 2025-06-03 PROCEDURE — 250N000011 HC RX IP 250 OP 636: Performed by: INTERNAL MEDICINE

## 2025-06-03 RX ADMIN — HEPARIN SODIUM 5000 UNITS: 10000 INJECTION, SOLUTION INTRAVENOUS; SUBCUTANEOUS at 09:05

## 2025-06-03 RX ADMIN — SODIUM CHLORIDE, PRESERVATIVE FREE: 5 INJECTION INTRAVENOUS at 15:58

## 2025-06-03 RX ADMIN — HEPARIN SODIUM 5000 UNITS: 10000 INJECTION, SOLUTION INTRAVENOUS; SUBCUTANEOUS at 16:19

## 2025-06-03 RX ADMIN — POTASSIUM & SODIUM PHOSPHATES POWDER PACK 280-160-250 MG 1 PACKET: 280-160-250 PACK at 14:23

## 2025-06-03 RX ADMIN — ASPIRIN 81 MG: 81 TABLET, COATED ORAL at 09:03

## 2025-06-03 RX ADMIN — HEPARIN SODIUM 5000 UNITS: 10000 INJECTION, SOLUTION INTRAVENOUS; SUBCUTANEOUS at 00:20

## 2025-06-03 RX ADMIN — SODIUM CHLORIDE, PRESERVATIVE FREE: 5 INJECTION INTRAVENOUS at 05:40

## 2025-06-03 RX ADMIN — CYANOCOBALAMIN TAB 1000 MCG 1000 MCG: 1000 TAB at 09:04

## 2025-06-03 RX ADMIN — POTASSIUM & SODIUM PHOSPHATES POWDER PACK 280-160-250 MG 1 PACKET: 280-160-250 PACK at 16:19

## 2025-06-03 RX ADMIN — INSULIN ASPART 2 UNITS: 100 INJECTION, SOLUTION INTRAVENOUS; SUBCUTANEOUS at 14:40

## 2025-06-03 RX ADMIN — POTASSIUM & SODIUM PHOSPHATES POWDER PACK 280-160-250 MG 1 PACKET: 280-160-250 PACK at 09:03

## 2025-06-03 RX ADMIN — GABAPENTIN 250 MG: 250 SUSPENSION ORAL at 21:36

## 2025-06-03 RX ADMIN — INSULIN ASPART 4 UNITS: 100 INJECTION, SOLUTION INTRAVENOUS; SUBCUTANEOUS at 10:13

## 2025-06-03 RX ADMIN — INSULIN ASPART 3 UNITS: 100 INJECTION, SOLUTION INTRAVENOUS; SUBCUTANEOUS at 16:56

## 2025-06-03 ASSESSMENT — ACTIVITIES OF DAILY LIVING (ADL)
ADLS_ACUITY_SCORE: 55
ADLS_ACUITY_SCORE: 49
ADLS_ACUITY_SCORE: 47
ADLS_ACUITY_SCORE: 57
ADLS_ACUITY_SCORE: 46
ADLS_ACUITY_SCORE: 57
ADLS_ACUITY_SCORE: 49
ADLS_ACUITY_SCORE: 47
ADLS_ACUITY_SCORE: 57
ADLS_ACUITY_SCORE: 47
ADLS_ACUITY_SCORE: 47
DEPENDENT_IADLS:: INDEPENDENT
ADLS_ACUITY_SCORE: 47
ADLS_ACUITY_SCORE: 55
ADLS_ACUITY_SCORE: 47
ADLS_ACUITY_SCORE: 49
ADLS_ACUITY_SCORE: 47
ADLS_ACUITY_SCORE: 49
ADLS_ACUITY_SCORE: 49
ADLS_ACUITY_SCORE: 47
ADLS_ACUITY_SCORE: 47
ADLS_ACUITY_SCORE: 49

## 2025-06-03 NOTE — PROGRESS NOTES
[facility]    Medicine Progress Note - Hospitalist Service    Date of Admission:  6/1/2025    Assessment & Plan   Mary Olson is a 69-year-old woman with a medical history of diabetes, breast cancer and hypertension who is presenting for generalized weakness and fall at home.  Found to be in DKA.    DKA  - Likely a result of Piqray use for breast cancer  - Anion gap, beta ketones resolved AM 6/2/25.  Bicarb stores are appropriate.  Transitioned to SubQ insulin AM 6/2/25 with continued elevated BG   - Holding PTA metformin   - Not on home scheduled insulin but has home sliding scale     - Increase basal insulin to 17 U, continue sliding scale and carb ratio   - Continue close glucose monitoring and BMP checks, adjust insulin based on results    - Anticipate discharge 6/4/25 pending stable glucose reads and final insulin regimen plan     Generalized weakness and fall  Metabolic encephalopathy- resolved  - Secondary to the above in setting of breast cancer. Additional head imaging negative for acute issues but notin chronic metastatc disease  - Holding PTA gabapentin  - PT/ OT consults     Tachycardia  - Continue IVF as patient still tachycardic and appearing a bit volume down on assessment   - Moderate risk Wells criteria, but has been on DVT ppx.  Consider CTA for PE pending course but will hold off for now    CHIQUITA- resolved  - Resolved with IVF    Metastatic breast cancer  - Will need oncology follow-up shortly after hospital to discuss additional management of breast cancer as DKA likely precipitated by Piqray  - Has right sided port     Hypertension  - PTA amlodipine continued   - Hold PTA Maxide while on IVF, restart when appropriate          Diet: Moderate Consistent Carb (60 g CHO per Meal) Diet  Snacks/Supplements Adult: Glucerna; With Meals    DVT Prophylaxis: Heparin   Correa Catheter: Not present  Lines: PRESENT      Port A Cath Single 03/18/20 Right Chest wall-Site Assessment:  WDL      Cardiac Monitoring: None  Code Status: Full Code      Clinically Significant Risk Factors        # Hypokalemia: Lowest K = 3.3 mmol/L in last 2 days, will replace as needed  # Hyperkalemia: Highest K = 5.5 mmol/L in last 2 days, will monitor as appropriate  # Hyponatremia: Lowest Na = 124 mmol/L in last 2 days, will monitor as appropriate  # Hypochloremia: Lowest Cl = 76 mmol/L in last 2 days, will monitor as appropriate  # Hypocalcemia: Lowest Ca = 6.6 mg/dL in last 2 days, will monitor and replace as appropriate    # Anion Gap Metabolic Acidosis: Highest Anion Gap = 40 mmol/L in last 2 days, will monitor and treat as appropriate      # Hypertension: Noted on problem list           # DMII: A1C = 9.3 % (Ref range: <5.7 %) within past 6 months, PRESENT ON ADMISSION   # Severe Malnutrition: based on nutrition assessment and treatment provided per dietitian's recommendations., PRESENT ON ADMISSION   # Financial/Environmental Concerns: none         Social Drivers of Health    Physical Activity: Patient Declined (4/6/2024)    Received from [facility]    Exercise Vital Sign     Days of Exercise per Week: Patient declined     Minutes of Exercise per Session: Patient declined          Disposition Plan     Medically Ready for Discharge: Anticipated Tomorrow             Matt Bell DO  Hospitalist Service  [facility]  Securely message with [employer] (more info)  Text page via [employer] Paging/Directory   ______________________________________________________________________    Interval History   Feels good and without acute complaints. States some anxiety about condition. No new issues overnight.     Physical Exam   Vital Signs: Temp: 97.5  F (36.4  C) Temp src: Oral BP: 116/65 Pulse: (!) 121   Resp: 18 SpO2: 95 % O2 Device: None (Room air)    Weight: 150 lbs 9.19 oz    General Appearance: Nontoxic, cooperative   Respiratory: CTAB  Cardiovascular: RRR  GI: distended without pain   Skin: No  exposed rash   Other: No focal deficits      Medical Decision Making       55 MINUTES SPENT BY ME on the date of service doing chart review, history, exam, documentation & further activities per the note.      Data     I have personally reviewed the following data over the past 24 hrs:    N/A  \   N/A   / N/A     133 (L) 98 22.1 /  296 (H)   4.0; 4.0 22 0.85 \       Imaging results reviewed over the past 24 hrs:   No results found for this or any previous visit (from the past 24 hours).

## 2025-06-03 NOTE — PLAN OF CARE
Goal Outcome Evaluation:    Alert and oriented but forgetful. Tolerating diet.  Standby assist to the bathroom.  BG ac hs.  Family at bedside. Neurologically intact.

## 2025-06-03 NOTE — CONSULTS
Care Management Initial Consult    General Information  Assessment completed with: Patient,    Type of CM/SW Visit: Initial Assessment    Primary Care Provider verified and updated as needed: Yes   Readmission within the last 30 days: no previous admission in last 30 days      Reason for Consult: discharge planning  Advance Care Planning:            Communication Assessment  Patient's communication style: spoken language (English or Bilingual)    Hearing Difficulty or Deaf: no   Wear Glasses or Blind: yes    Cognitive  Cognitive/Neuro/Behavioral: WDL  Level of Consciousness: intermittent confusion, other (see comments)  Arousal Level: opens eyes spontaneously  Orientation: oriented x 4  Mood/Behavior: calm, cooperative  Best Language: 0 - No aphasia  Speech: logical, clear    Living Environment:   People in home: spouse     Current living Arrangements: house      Able to return to prior arrangements: yes       Family/Social Support:  Care provided by: self  Provides care for: no one  Marital Status:   Support system:           Description of Support System: Supportive, Involved         Current Resources:   Patient receiving home care services: No        Community Resources: None  Equipment currently used at home: cane, quad  Supplies currently used at home: None    Employment/Financial:  Employment Status: retired        Financial Concerns: none   Referral to Financial Worker: No       Does the patient's insurance plan have a 3 day qualifying hospital stay waiver?  Yes     Which insurance plan 3 day waiver is available? Alternative insurance waiver    Will the waiver be used for post-acute placement? Undetermined at this time    Lifestyle & Psychosocial Needs:  Social Drivers of Health     Food Insecurity: Low Risk  (6/1/2025)    Food Insecurity     Within the past 12 months, did you worry that your food would run out before you got money to buy more?: No     Within the past 12 months, did the food you  bought just not last and you didn t have money to get more?: No   Depression: Not at risk (2/13/2025)    PHQ-2     PHQ-2 Score: 0   Housing Stability: Low Risk  (6/1/2025)    Housing Stability     Do you have housing? : Yes     Are you worried about losing your housing?: No   Tobacco Use: Low Risk  (6/1/2025)    Patient History     Smoking Tobacco Use: Never     Smokeless Tobacco Use: Never     Passive Exposure: Never   Financial Resource Strain: Low Risk  (6/1/2025)    Financial Resource Strain     Within the past 12 months, have you or your family members you live with been unable to get utilities (heat, electricity) when it was really needed?: No   Alcohol Use: Not on file   Transportation Needs: Low Risk  (6/1/2025)    Transportation Needs     Within the past 12 months, has lack of transportation kept you from medical appointments, getting your medicines, non-medical meetings or appointments, work, or from getting things that you need?: No   Physical Activity: Patient Declined (4/6/2024)    Received from [facility]    Exercise Vital Sign     Days of Exercise per Week: Patient declined     Minutes of Exercise per Session: Patient declined   Interpersonal Safety: Low Risk  (6/1/2025)    Interpersonal Safety     Do you feel physically and emotionally safe where you currently live?: Yes     Within the past 12 months, have you been hit, slapped, kicked or otherwise physically hurt by someone?: No     Within the past 12 months, have you been humiliated or emotionally abused in other ways by your partner or ex-partner?: No   Stress: Not on file   Social Connections: Not on file   Health Literacy: Not on file       Functional Status:  Prior to admission patient needed assistance:   Dependent ADLs:: Ambulation-cane  Dependent IADLs:: Independent       Mental Health Status:  Mental Health Status: No Current Concerns       Chemical Dependency Status:  Chemical Dependency Status: No Current Concerns          "    Values/Beliefs:  Spiritual, Cultural Beliefs, Hoahaoism Practices, Values that affect care:                 Discussed  Partnership in Safe Discharge Planning  document with patient/family: No    Additional Information:  SW met with patient at bedside, introduced self and CM role. Patient lives in a house with her . At baseline, patient reports independence with I/ADLs, uses a cane. Patient reports positive support from her spouse, reports they \"help each other\". Patient denies any concern related to home environment or level of support available. Offered home care services, patient declined. Spouse will transport at discharge.     Next Steps: follow for discharge needs.     MAYO Bella      "

## 2025-06-03 NOTE — PROGRESS NOTES
RENAL PROGRESS NOTE    CC:  Matt Bell,       ASSESSMENT & PLAN:   Mary Olson is a 69 year old female w/ metastatic breast cancer on alpelisib, T2DM admitted 6/1/25 with DKA.     CHIQUITA. Baseline Cr 0.80. Cr 0.85 today. . Etiology is pre-renal related to obligate diuresis from glucosuria and ketonuria. Aggressive fluid resuscitation in DKA; making good urine and no contraindication (e.g. CHF, underlying CKD) limiting amount of fluids.  Any function improving.  Continue supportive measure.    Hypokalemia: Previously hyperkalemia and now intracellular shift and hypokalemia are noted.  Okay to replace from nephrology standpoint.  K 4.0.     Hyponatremia: Mild. Stable at 133. Encourage PO intake.   Monitor    DKA. Presumably related to alpelisib. Standard DKA protocol with crystalloids and insulin gtt with serial monitoring. Will likely need to add KCl to fluids once we close some of her acidosis. No need for HCO3.    Mixed metabolic acidosis and metabolic alkalosis with appropriately compensated respiratory alkalosis. VBG 7.24/24/8. AG 40. Give fluids. No role for exogenous bicarbonate; she has an underlying met alk (related to vomiting and volume depletion), but it'll all correct with crystalloids.  Bicarb 22 today.    Nephrology will sign off for now. No follow up planned as an outpatient. Ok to discontinue IvF when she can have good PO.   Please call with questions.       SUBJECTIVE: Patient seen and examined at bedside.  Discussed with family at bedside.  Denies any chest pain or shortness of breath.        OBJECTIVE:  Physical Exam   Temp: 97.5  F (36.4  C) Temp src: Oral BP: 116/65 Pulse: (!) 121   Resp: 18 SpO2: 95 % O2 Device: None (Room air)    Vitals:    06/01/25 1730   Weight: 68.3 kg (150 lb 9.2 oz)     Vital Signs with Ranges  Temp:  [97.5  F (36.4  C)-99  F (37.2  C)] 97.5  F (36.4  C)  Pulse:  [111-121] 121  Resp:  [18] 18  BP: (103-117)/(57-76) 116/65  SpO2:  [94 %-99 %] 95 %  I/O last 3  completed shifts:  In: 26.97 [I.V.:26.97]  Out: 1250 [Urine:1250]    @TMAXR(24)@    Patient Vitals for the past 72 hrs:   Weight   06/01/25 1730 68.3 kg (150 lb 9.2 oz)     Intake/Output Summary (Last 24 hours) at 6/2/2025 1125  Last data filed at 6/2/2025 0806  Gross per 24 hour   Intake 2078.3 ml   Output 900 ml   Net 1178.3 ml       PHYSICAL EXAM:  General - Alert and oriented x3, appears comfortable, NAD  Cardiovascular - Regular rate and rhythm, no rub  Respiratory - Clear to auscultation bilaterally, no crackles or wheezes  Abd: BS present, no guarding or pain with palpation, no ascites  Extremities - No lower extremity edema bilaterally  Skin: dry, intact, no rash, good turgor  Neuro:  Grossly intact, no focal deficits  MSK:  Grossly intact  Psych:  Normal affect    LABORATORY STUDIES:     Recent Labs   Lab 06/01/25  1251   WBC 10.3   RBC 4.04   HGB 11.1*   HCT 36.4          Basic Metabolic Panel:  Recent Labs   Lab 06/03/25  1408 06/03/25  1010 06/03/25  0544 06/03/25  0246 06/03/25  0019 06/02/25  2225 06/02/25  1833 06/02/25  1612 06/02/25  1443 06/02/25  1005 06/02/25  0901 06/02/25  0612 06/02/25  0605 06/02/25  0310 06/02/25  0308   NA  --   --  133*  --  133*  --  130*  --   --   --  131*  --  134*  --  133*   POTASSIUM  --   --  4.0  4.0  --  3.9  --  4.5  --  4.8  --  3.3*  --  3.5  --  3.4   CHLORIDE  --   --  98  --  98  --  96*  --   --   --  95*  --  97*  --  96*   CO2  --   --  22  --  22  --  20*  --   --   --  24  --  23  --  23   BUN  --   --  22.1  --  26.6*  --  33.6*  --   --   --  38.2*  --  42.6*  --  45.2*   CR  --   --  0.85  --  0.91  --  0.95  --   --   --  1.02*  --  1.15*  --  1.16*   * 296* 270* 277* 307* 328* 431*   < >  --    < > 130*   < > 142*   < > 192*   BUFFY  --   --  6.6*  --  6.9*  --  7.2*  --   --   --  6.6*  --  7.0*  --  7.3*    < > = values in this interval not displayed.       INRNo lab results found in last 7 days.     Recent Labs   Lab Test  06/01/25  1251 05/13/25  0828   WBC 10.3 6.0   HGB 11.1* 8.6*    271       Personally reviewed current labs      Martin Syed MD  [facility]  [phone]

## 2025-06-03 NOTE — PLAN OF CARE
Goal Outcome Evaluation:      Plan of Care Reviewed With: patient        Patient is alert and oriented x 4. Pleasant and cooperative with cares. Port-a-cath is intact and patent,  ml/hr infusing. Denies pain.  Tele is sinus tachycardia, 's - 110's.     Problem: Adult Inpatient Plan of Care  Goal: Plan of Care Review  Description: The Plan of Care Review/Shift note should be completed every shift.  The Outcome Evaluation is a brief statement about your assessment that the patient is improving, declining, or no change.  This information will be displayed automatically on your shift  note.  Outcome: Progressing  Flowsheets (Taken 6/3/2025 5194)  Plan of Care Reviewed With: patient     Problem: Dysrhythmia  Goal: Normalized Cardiac Rhythm  Outcome: Progressing  Intervention: Monitor and Manage Cardiac Rhythm Effect  Recent Flowsheet Documentation  Taken 6/3/2025 0748 by Fortunato Elena, RN  VTE Prevention/Management: SCDs on (sequential compression devices)

## 2025-06-03 NOTE — PLAN OF CARE
"  Problem: Adult Inpatient Plan of Care  Goal: Plan of Care Review  Description: The Plan of Care Review/Shift note should be completed every shift.  The Outcome Evaluation is a brief statement about your assessment that the patient is improving, declining, or no change.  This information will be displayed automatically on your shift  note.  Outcome: Progressing  Goal: Patient-Specific Goal (Individualized)  Description: You can add care plan individualizations to a care plan. Examples of Individualization might be:  \"Parent requests to be called daily at 9am for status\", \"I have a hard time hearing out of my right ear\", or \"Do not touch me to wake me up as it startles  me\".  Outcome: Progressing  Goal: Absence of Hospital-Acquired Illness or Injury  Outcome: Progressing  Intervention: Identify and Manage Fall Risk  Recent Flowsheet Documentation  Taken 6/3/2025 0000 by Selin Cerrato RN  Safety Promotion/Fall Prevention:   activity supervised   clutter free environment maintained   nonskid shoes/slippers when out of bed   room near nurse's station  Intervention: Prevent Skin Injury  Recent Flowsheet Documentation  Taken 6/3/2025 0000 by Selin Cerrato RN  Body Position: position changed independently  Intervention: Prevent Infection  Recent Flowsheet Documentation  Taken 6/3/2025 0000 by Selin Cerrato RN  Infection Prevention: hand hygiene promoted  Goal: Optimal Comfort and Wellbeing  Outcome: Progressing  Goal: Readiness for Transition of Care  Outcome: Progressing     Problem: Delirium  Goal: Optimal Coping  Outcome: Progressing  Goal: Improved Behavioral Control  Outcome: Progressing  Intervention: Minimize Safety Risk  Recent Flowsheet Documentation  Taken 6/3/2025 0000 by Selin Cerrato, RN  Enhanced Safety Measures:   pain management   review medications for side effects with activity   room near unit station  Goal: Improved Attention and Thought Clarity  Outcome: Progressing  Goal: Improved " Sleep  Outcome: Progressing     Problem: Risk for Delirium  Goal: Optimal Coping  Outcome: Progressing  Goal: Improved Behavioral Control  Outcome: Progressing  Intervention: Minimize Safety Risk  Recent Flowsheet Documentation  Taken 6/3/2025 0000 by Selin Cerrato RN  Enhanced Safety Measures:   pain management   review medications for side effects with activity   room near unit station  Goal: Improved Attention and Thought Clarity  Outcome: Progressing  Goal: Improved Sleep  Outcome: Progressing     Problem: Skin Injury Risk Increased  Goal: Skin Health and Integrity  Outcome: Progressing  Intervention: Plan: Nurse Driven Intervention: Moisture Management  Recent Flowsheet Documentation  Taken 6/3/2025 0000 by Selin Cerrato RN  Moisture Interventions: Urinary collection device  Intervention: Plan: Nurse Driven Intervention: Friction and Shear  Recent Flowsheet Documentation  Taken 6/3/2025 0000 by Selin Cerrato RN  Friction/Shear Interventions: Silicone foam sacral dressing  Intervention: Optimize Skin Protection  Recent Flowsheet Documentation  Taken 6/3/2025 0000 by Selin Cerrato RN  Activity Management: activity adjusted per tolerance  Head of Bed (HOB) Positioning: HOB at 20-30 degrees     Problem: Comorbidity Management  Goal: Blood Glucose Levels Within Targeted Range  Outcome: Progressing  Intervention: Monitor and Manage Glycemia  Recent Flowsheet Documentation  Taken 6/3/2025 0000 by Selin Cerrato RN  Medication Review/Management: medications reviewed     Problem: Fall Injury Risk  Goal: Absence of Fall and Fall-Related Injury  Outcome: Progressing  Intervention: Identify and Manage Contributors  Recent Flowsheet Documentation  Taken 6/3/2025 0000 by Selin Cerrato RN  Medication Review/Management: medications reviewed  Intervention: Promote Injury-Free Environment  Recent Flowsheet Documentation  Taken 6/3/2025 0000 by Selin Cerrato RN  Safety Promotion/Fall Prevention:   activity  supervised   clutter free environment maintained   nonskid shoes/slippers when out of bed   room near nurse's station   Goal Outcome Evaluation:  NURSING PROGRESS NOTE  Shift Summary      Date: Katina 3, 2025     Neuro/Musculoskeletal:  A&Ox4.  Intermittent confusion and forgetful.  Cardiac:  ST w/ occ PVCs.  VSS.     Respiratory:  Sating in the 90s on room air.  GI/:  Adequate urine output.  Pt states she has diarrhea.  Diet/Appetite:  Tolerating diabetic diet.  Activity:  SBA with cane.   Pain:  Denies.  Occ pain on sacrum near abrasion.  Meplex applied to site  Skin:  No new deficits noted. Meplex to sacrum for wound.  LDAs + Drips/IVF:  Port accessed. NS @ 100 ml/hr.    Protocols/Labs:  Magnesium, Potassium, and Phosphorus protocols.  Phosphorus and Potassium ordered this morning.      Pertinent Shift Updates:  Pt transferred from ICU last evening.  Pt slept most of shift.  Pt continues to be tachycardic.  Denies pain except when applying pressure to sacrum area.  Pt's insulin gtt was discontinued yesterday morning.  Port is accessed, flushes easily with excellent blood return.  Bed alarm placed since pt is forgetful.  Pt is able to answer orientation questions appropriately, but does get confused about plan of care easily.      Plan:  Pt states she is discharging home today.  BG continue to be elevated.  Carb coverage was added to pt's DM management.        Selin Cerrato RN  ....................................................

## 2025-06-04 VITALS
WEIGHT: 150.57 LBS | HEART RATE: 109 BPM | RESPIRATION RATE: 16 BRPM | OXYGEN SATURATION: 94 % | BODY MASS INDEX: 28.45 KG/M2 | SYSTOLIC BLOOD PRESSURE: 128 MMHG | DIASTOLIC BLOOD PRESSURE: 72 MMHG | TEMPERATURE: 98 F

## 2025-06-04 LAB
ANION GAP SERPL CALCULATED.3IONS-SCNC: 10 MMOL/L (ref 7–15)
BUN SERPL-MCNC: 13.9 MG/DL (ref 8–23)
CALCIUM SERPL-MCNC: 7.7 MG/DL (ref 8.8–10.4)
CHLORIDE SERPL-SCNC: 101 MMOL/L (ref 98–107)
CREAT SERPL-MCNC: 0.77 MG/DL (ref 0.51–0.95)
EGFRCR SERPLBLD CKD-EPI 2021: 84 ML/MIN/1.73M2
GLUCOSE BLDC GLUCOMTR-MCNC: 199 MG/DL (ref 70–99)
GLUCOSE BLDC GLUCOMTR-MCNC: 224 MG/DL (ref 70–99)
GLUCOSE BLDC GLUCOMTR-MCNC: 245 MG/DL (ref 70–99)
GLUCOSE SERPL-MCNC: 229 MG/DL (ref 70–99)
HCO3 SERPL-SCNC: 24 MMOL/L (ref 22–29)
MAGNESIUM SERPL-MCNC: 1.8 MG/DL (ref 1.7–2.3)
PHOSPHATE SERPL-MCNC: 2.5 MG/DL (ref 2.5–4.5)
POTASSIUM SERPL-SCNC: 4 MMOL/L (ref 3.4–5.3)
SODIUM SERPL-SCNC: 135 MMOL/L (ref 135–145)

## 2025-06-04 PROCEDURE — 83735 ASSAY OF MAGNESIUM: CPT | Performed by: STUDENT IN AN ORGANIZED HEALTH CARE EDUCATION/TRAINING PROGRAM

## 2025-06-04 PROCEDURE — 258N000003 HC RX IP 258 OP 636: Performed by: STUDENT IN AN ORGANIZED HEALTH CARE EDUCATION/TRAINING PROGRAM

## 2025-06-04 PROCEDURE — 250N000011 HC RX IP 250 OP 636: Performed by: STUDENT IN AN ORGANIZED HEALTH CARE EDUCATION/TRAINING PROGRAM

## 2025-06-04 PROCEDURE — 99239 HOSP IP/OBS DSCHRG MGMT >30: CPT | Performed by: STUDENT IN AN ORGANIZED HEALTH CARE EDUCATION/TRAINING PROGRAM

## 2025-06-04 PROCEDURE — 80048 BASIC METABOLIC PNL TOTAL CA: CPT | Performed by: STUDENT IN AN ORGANIZED HEALTH CARE EDUCATION/TRAINING PROGRAM

## 2025-06-04 PROCEDURE — 250N000013 HC RX MED GY IP 250 OP 250 PS 637: Performed by: INTERNAL MEDICINE

## 2025-06-04 PROCEDURE — 250N000011 HC RX IP 250 OP 636: Performed by: INTERNAL MEDICINE

## 2025-06-04 PROCEDURE — 250N000013 HC RX MED GY IP 250 OP 250 PS 637: Performed by: STUDENT IN AN ORGANIZED HEALTH CARE EDUCATION/TRAINING PROGRAM

## 2025-06-04 PROCEDURE — 84100 ASSAY OF PHOSPHORUS: CPT | Performed by: STUDENT IN AN ORGANIZED HEALTH CARE EDUCATION/TRAINING PROGRAM

## 2025-06-04 RX ORDER — HEPARIN SODIUM (PORCINE) LOCK FLUSH IV SOLN 100 UNIT/ML 100 UNIT/ML
5-10 SOLUTION INTRAVENOUS
Status: DISCONTINUED | OUTPATIENT
Start: 2025-06-04 | End: 2025-06-04 | Stop reason: HOSPADM

## 2025-06-04 RX ORDER — HEPARIN SODIUM,PORCINE 10 UNIT/ML
5-10 VIAL (ML) INTRAVENOUS
Status: DISCONTINUED | OUTPATIENT
Start: 2025-06-04 | End: 2025-06-04 | Stop reason: HOSPADM

## 2025-06-04 RX ORDER — HEPARIN SODIUM,PORCINE 10 UNIT/ML
5-10 VIAL (ML) INTRAVENOUS EVERY 24 HOURS
Status: DISCONTINUED | OUTPATIENT
Start: 2025-06-04 | End: 2025-06-04 | Stop reason: HOSPADM

## 2025-06-04 RX ADMIN — HEPARIN SODIUM 5000 UNITS: 10000 INJECTION, SOLUTION INTRAVENOUS; SUBCUTANEOUS at 08:13

## 2025-06-04 RX ADMIN — HEPARIN SODIUM 5000 UNITS: 10000 INJECTION, SOLUTION INTRAVENOUS; SUBCUTANEOUS at 00:19

## 2025-06-04 RX ADMIN — SODIUM CHLORIDE, PRESERVATIVE FREE: 5 INJECTION INTRAVENOUS at 08:13

## 2025-06-04 RX ADMIN — ASPIRIN 81 MG: 81 TABLET, COATED ORAL at 08:13

## 2025-06-04 RX ADMIN — SODIUM CHLORIDE, PRESERVATIVE FREE: 5 INJECTION INTRAVENOUS at 11:00

## 2025-06-04 RX ADMIN — INSULIN ASPART 2 UNITS: 100 INJECTION, SOLUTION INTRAVENOUS; SUBCUTANEOUS at 08:15

## 2025-06-04 RX ADMIN — Medication 5 ML: at 11:00

## 2025-06-04 RX ADMIN — SODIUM CHLORIDE, PRESERVATIVE FREE: 5 INJECTION INTRAVENOUS at 02:32

## 2025-06-04 RX ADMIN — CYANOCOBALAMIN TAB 1000 MCG 1000 MCG: 1000 TAB at 08:13

## 2025-06-04 RX ADMIN — AMLODIPINE BESYLATE 10 MG: 5 TABLET ORAL at 08:13

## 2025-06-04 ASSESSMENT — ACTIVITIES OF DAILY LIVING (ADL)
ADLS_ACUITY_SCORE: 55

## 2025-06-04 NOTE — PLAN OF CARE
Problem: Comorbidity Management  Goal: Blood Glucose Levels Within Targeted Range  Outcome: Progressing     Problem: Delirium  Goal: Improved Attention and Thought Clarity  Outcome: Progressing     Problem: Risk for Delirium  Goal: Improved Behavioral Control  Intervention: Minimize Safety Risk  Recent Flowsheet Documentation  Taken 6/4/2025 0000 by Ashley Mahajan RN  Enhanced Safety Measures:   assistive devices when indicated   pain management   patient/family teach back on injury risk   review medications for side effects with activity   Goal Outcome Evaluation:        A&Ox4.  Intermittent confusion and forgetful, denies pain. Tachycardic, on IVF running at 100ml/hr. Port patent, Mg, K+ and ph protocol. Blood sugar Q4hrs, 245 and 224.  Pt up with AX1 with gait belt and walker. Pt slept entire night with no significant event. /59 (BP Location: Right arm)   Pulse 105   Temp 97.7  F (36.5  C) (Oral)   Resp 20   Wt 68.3 kg (150 lb 9.2 oz)   SpO2 95%   BMI 28.45 kg/m

## 2025-06-04 NOTE — PLAN OF CARE
Goal Outcome Evaluation:    Alert and oriented.  Standby assist.  Tolerating diet.  Arie cath de accessed and heparinized per orders.  Assist with dressing.  Wheelchair ride to lobby.  Reviewed discharge papers with pt and family.  All questions answered.

## 2025-06-04 NOTE — PLAN OF CARE
Goal Outcome Evaluation:                          A&Ox4 but forgetful with intermittent confusion. Assist of 1 with cane and gait belt. Blood sugars were 262 and 261, insulin administered per orders.  Port-a-cath intact and patent. NS infusing at 100ml/hr per orders. Neuros intact. Ambulated in hallway x2 this shift. Pt continues to be tachycardic. Denies pain.

## 2025-06-04 NOTE — DISCHARGE SUMMARY
[facility]  Hospitalist Discharge Summary      Date of Admission:  6/1/2025  Date of Discharge:  6/4/2025  Discharging Provider: Matt Bell DO  Discharge Service: Hospitalist Service    Discharge Diagnoses   Active Problems:    Diabetic ketoacidosis without coma associated with type 2 diabetes mellitus (H)        Clinically Significant Risk Factors     # DMII: A1C = 9.3 % (Ref range: <5.7 %) within past 6 months  # Severe Malnutrition: based on nutrition assessment and treatment provided per dietitian's recommendations.      Follow-ups Needed After Discharge   Follow-up Appointments       Follow Up      Follow up with oncology team regarding medication and hospital stay. Patient to call for follow-up.        Hospital Follow-up with Existing Primary Care Provider (PCP)      Hospital follow-up, insulin regimen adjustments likely needed    Schedule Primary Care visit within: 7 Days             Discharge Disposition   Discharged to home  Condition at discharge: Stable    Hospital Course   Mary Olson is a 69-year-old woman with a medical history of diabetes, breast cancer and hypertension who is presenting for generalized weakness and fall at home.  Found to be in DKA.    DKA  - Likely a result of Piqray use for breast cancer  - Anion gap, beta ketones resolved AM 6/2/25.  Bicarb stores are appropriate.  Transitioned to SubQ insulin AM 6/2/25   - Restart PTA metformin on discharge   - Patient to record blood sugars 4x daily at home    - Will start basal insulin 22 U in AM with close outpatient follow-up    - Sliding scale as outlined   - PCP and oncology follow-up     Generalized weakness and fall  Metabolic encephalopathy- resolved  - Secondary to the above in setting of breast cancer. Additional head imaging negative for acute issues but notin chronic metastatc disease  - Holding PTA gabapentin while inpatient   - PT/ OT consults     Tachycardia  - Improving with hydration and  movement  - Moderate risk Wells criteria, but has been on DVT ppx.  Consider CTA for PE pending course but will hold off for now    CHIQUITA- resolved  - Resolved with IVF    Metastatic breast cancer  - Will need oncology follow-up shortly after hospital to discuss additional management of breast cancer as DKA likely precipitated by Piqray  - Has right sided port     Hypertension  - PTA amlodipine and Maxzide continued     Consultations This Hospital Stay   NEPHROLOGY IP CONSULT  CARE MANAGEMENT / SOCIAL WORK IP CONSULT  PHYSICAL THERAPY ADULT IP CONSULT  OCCUPATIONAL THERAPY ADULT IP CONSULT    Code Status   Full Code    Time Spent on this Encounter   IMatt DO, personally saw the patient today and spent greater than 30 minutes discharging this patient.       Matt Bell DO  [address] [address]  Phone: [phone]  Fax: [phone]  ______________________________________________________________________    Physical Exam   Vital Signs: Temp: 98  F (36.7  C) Temp src: Oral BP: 128/72 Pulse: 109   Resp: 16 SpO2: 94 % O2 Device: None (Room air)    Weight: 150 lbs 9.19 oz    General Appearance:  Nontoxic, cooperative   Respiratory: CTAB  Cardiovascular: RRR  GI: distended without pain   Skin: No exposed rash   Other:  No focal deficits         Primary Care Physician   Vanesa Claros    Discharge Orders      Reason for your hospital stay    Active Problems:    Diabetic ketoacidosis without coma associated with type 2 diabetes mellitus (H)     Activity    Your activity upon discharge: activity as tolerated     Tubes and Drains    Current Tubes and Drains:     CVC Line  Duration           Port A Cath Single 03/18/20 Right Chest wall 1904 days              Follow Up    Follow up with oncology team regarding medication and hospital stay. Patient to call for follow-up.     Monitor and record    Monitor blood sugar.    Sliding scale as outlined  below:  Correction Scale - MEDIUM INSULIN RESISTANCE DOSING     Do Not give Correction Insulin if Pre-Meal BG less than 140.   For Pre-Meal  - 189 give 1 unit.   For Pre-Meal  - 239 give 2 units.   For Pre-Meal  - 289 give 3 units.   For Pre-Meal  - 339 give 4 units.   For Pre-Meal - 389 give 5 units.   For Pre-Meal -439 give 6 units  For Pre-Meal BG greater than or equal to 440 give 7 units.    If patient is NOT eating a meal: Blood glucose should still be checked and correction (sliding scale) insulin to be administered within 30 minutes if order parameters are met.     If patient is eating a meal: To be given with prandial insulin if ordered, and based on pre-meal blood glucose. Administering insulin within 5 minutes of the start of the meal is ideal. Administer insulin no more than 30 minutes after the start of the meal, unless directed otherwise by provider.     Notify provider if glucose greater than or equal to 350 mg/dL after administration of correction dose.     Diet    Follow this diet upon discharge: Current Diet:Orders Placed This Encounter      Snacks/Supplements Adult: Glucerna; With Meals      Moderate Consistent Carb (60 g CHO per Meal) Diet     Hospital Follow-up with Existing Primary Care Provider (PCP)    Hospital follow-up, insulin regimen adjustments likely needed       Significant Results and Procedures   Results for orders placed or performed during the hospital encounter of 06/01/25   Head CT w/o contrast    Narrative    EXAM: CT HEAD W/O CONTRAST, CT CERVICAL SPINE W/O CONTRAST  LOCATION: [facility]  DATE: 6/1/2025    INDICATION: head injury with amnesia  COMPARISON: MRI brain 1/27/2025  TECHNIQUE:   1) Routine CT Head without IV contrast. Multiplanar reformats. Dose reduction techniques were used.  2) Routine CT Cervical Spine without IV contrast. Multiplanar reformats. Dose reduction techniques were used.    FINDINGS:   HEAD CT:    INTRACRANIAL CONTENTS: No intracranial hemorrhage, extraaxial collection, or mass effect.  No CT evidence of acute infarct. Mild presumed chronic small vessel ischemic changes. Mild generalized volume loss. No hydrocephalus.     VISUALIZED ORBITS/SINUSES/MASTOIDS: Prior bilateral cataract surgery. Visualized portions of the orbits are otherwise unremarkable. No paranasal sinus mucosal disease. No middle ear or mastoid effusion.    BONES/SOFT TISSUES: No scalp hematoma. No skull fracture.    CERVICAL SPINE CT:   VERTEBRA: Normal vertebral body heights and alignment. No fracture or posttraumatic subluxation. Diffuse sclerotic osseous metastatic lesions are present throughout the visualized spine. No definite pathologic fractures.    CANAL/FORAMINA: There is multilevel disc height loss and facet hypertrophy. No high-grade spinal canal stenosis. Scattered mild-to-moderate neural foraminal stenosis throughout.    PARASPINAL: No extraspinal abnormality. Visualized lung fields are clear.      Impression    IMPRESSION:  HEAD CT:  No acute intracranial process.    CERVICAL SPINE CT:  1.  No CT evidence for acute fracture or post traumatic subluxation.  2.  Diffuse osseous metastatic disease.     CT Cervical Spine w/o Contrast    Narrative    EXAM: CT HEAD W/O CONTRAST, CT CERVICAL SPINE W/O CONTRAST  LOCATION: [facility]  DATE: 6/1/2025    INDICATION: head injury with amnesia  COMPARISON: MRI brain 1/27/2025  TECHNIQUE:   1) Routine CT Head without IV contrast. Multiplanar reformats. Dose reduction techniques were used.  2) Routine CT Cervical Spine without IV contrast. Multiplanar reformats. Dose reduction techniques were used.    FINDINGS:   HEAD CT:   INTRACRANIAL CONTENTS: No intracranial hemorrhage, extraaxial collection, or mass effect.  No CT evidence of acute infarct. Mild presumed chronic small vessel ischemic changes. Mild generalized volume loss. No hydrocephalus.     VISUALIZED  ORBITS/SINUSES/MASTOIDS: Prior bilateral cataract surgery. Visualized portions of the orbits are otherwise unremarkable. No paranasal sinus mucosal disease. No middle ear or mastoid effusion.    BONES/SOFT TISSUES: No scalp hematoma. No skull fracture.    CERVICAL SPINE CT:   VERTEBRA: Normal vertebral body heights and alignment. No fracture or posttraumatic subluxation. Diffuse sclerotic osseous metastatic lesions are present throughout the visualized spine. No definite pathologic fractures.    CANAL/FORAMINA: There is multilevel disc height loss and facet hypertrophy. No high-grade spinal canal stenosis. Scattered mild-to-moderate neural foraminal stenosis throughout.    PARASPINAL: No extraspinal abnormality. Visualized lung fields are clear.      Impression    IMPRESSION:  HEAD CT:  No acute intracranial process.    CERVICAL SPINE CT:  1.  No CT evidence for acute fracture or post traumatic subluxation.  2.  Diffuse osseous metastatic disease.     XR Chest Port 1 View    Narrative    EXAM: XR CHEST PORT 1 VIEW  LOCATION: [facility]  DATE: 6/1/2025    INDICATION: Weakness.  COMPARISON: CT chest 4/25/2025      Impression    IMPRESSION: Right IJ Port-A-Cath tip in mid SVC. Heart size and vascularity are normal. Small moderate left pleural effusion overall similar with associated left basilar atelectasis. Right lung clear. No pneumothorax.       Discharge Medications   Current Discharge Medication List        START taking these medications    Details   insulin glargine (LANTUS PEN) 100 UNIT/ML pen Inject 22 Units subcutaneously every morning (before breakfast).  Qty: 15 mL, Refills: 0    Comments: If Lantus is not covered by insurance, may substitute Basaglar or Semglee or other insulin glargine product per insurance preference at same dose and frequency.    Associated Diagnoses: Diabetic ketoacidosis without coma associated with type 2 diabetes mellitus (H)           CONTINUE these  medications which have CHANGED    Details   insulin aspart (NOVOLOG PEN) 100 UNIT/ML pen Inject 1-7 Units subcutaneously 3 times daily (before meals).  Qty: 15 mL, Refills: 0    Comments: Please see administration instructions on dosing.  Associated Diagnoses: Diabetic ketoacidosis without coma associated with type 2 diabetes mellitus (H)           CONTINUE these medications which have NOT CHANGED    Details   amLODIPine (NORVASC) 10 MG tablet Take 1 tablet (10 mg) by mouth daily.  Qty: 90 tablet, Refills: 3    Comments: Brand name ( smaller )  Associated Diagnoses: Benign essential hypertension      Ascorbic Acid (VITAMIN C PO) Take 500 mg by mouth daily.      aspirin 81 MG tablet Take 81 mg by mouth daily.      Coenzyme Q10 (COQ-10) 100 MG CAPS Take 1 capsule by mouth daily as needed (supplementation).      Cyanocobalamin (B-12) 1000 MCG TABS Take 1 tablet by mouth daily.      gabapentin (NEURONTIN) 250 MG/5ML solution Take 5 mLs (250 mg) by mouth at bedtime.  Qty: 470 mL, Refills: 0    Associated Diagnoses: Malignant neoplasm of overlapping sites of left breast in female, estrogen receptor positive (H); Neuropathy      hydrocortisone 2.5 % cream Apply topically 2 times daily as needed for rash.      lidocaine-prilocaine (EMLA) 2.5-2.5 % external cream Apply small amount to center of port site 45-60 minutes prior to chemo appointment, cover with clear dressing if desired.  Qty: 30 g, Refills: 1    Associated Diagnoses: Malignant neoplasm of overlapping sites of left breast in female, estrogen receptor positive (H)      loperamide (IMODIUM) 2 MG capsule Start with 2 caps (4 mg), then take one cap (2 mg) after each diarrheal stool as needed. Do not use more than 8 caps (16 mg) per day.  Qty: 30 capsule, Refills: 0    Associated Diagnoses: Malignant neoplasm metastatic to bone (H); Malignant neoplasm of left female breast, unspecified estrogen receptor status, unspecified site of breast (H)      magic mouthwash  suspension (diphenhydrAMINE, lidocaine, aluminum-magnesium & simethicone) Swish and swallow 5 mLs in mouth every 6 hours as needed for mouth sores.  Qty: 240 mL, Refills: 2    Associated Diagnoses: Malignant neoplasm of overlapping sites of left breast in female, estrogen receptor positive (H)      metFORMIN (GLUCOPHAGE XR) 500 MG 24 hr tablet Take 500 mg by mouth 2 times daily (with meals).      triamcinolone (KENALOG) 0.1 % external cream Apply topically 2 times daily as needed for irritation.      triamterene-HCTZ (MAXZIDE-25) 37.5-25 MG tablet Take 1 tablet by mouth daily.  Qty: 90 tablet, Refills: 3    Comments: Actavis Brand please      alcohol swab prep pads Use to swab area of injection/chrissy as directed.  Qty: 400 each, Refills: 3    Associated Diagnoses: Type 2 diabetes mellitus with hyperglycemia, with long-term current use of insulin (H)      blood glucose (NO BRAND SPECIFIED) test strip 1 strip by In Vitro route 3 times daily  Qty: 100 strip, Refills: 6    Associated Diagnoses: Type 2 diabetes mellitus with hyperglycemia, with long-term current use of insulin (H)      blood glucose calibration (NO BRAND SPECIFIED) solution To accompany: Blood Glucose Monitor Brands: per insurance.  Qty: 1 Bottle, Refills: 3    Associated Diagnoses: Type 2 diabetes mellitus with hyperglycemia, with long-term current use of insulin (H)      blood glucose monitoring (NO BRAND SPECIFIED) meter device kit Use to test blood sugar 3 times daily or as directed.  Qty: 1 kit, Refills: 0    Associated Diagnoses: Type 2 diabetes mellitus with hyperglycemia, with long-term current use of insulin (H)      Blood Pressure KIT Please test blood pressure at home 2 x daily  Qty: 1 kit, Refills: 0    Associated Diagnoses: Essential hypertension      insulin pen needle (31G X 8 MM) 31G X 8 MM miscellaneous Use 4 pen needles daily or as directed.  Qty: 100 each, Refills: 4    Associated Diagnoses: Type 2 diabetes mellitus with hyperglycemia,  with long-term current use of insulin (H)      STATIN NOT PRESCRIBED (INTENTIONAL) Please choose reason not prescribed from choices below.      thin (NO BRAND SPECIFIED) lancets 3 each by In Vitro route daily Use with lanceting device. To accompany: Blood Glucose Monitor Brands: per insurance.  Qty: 300 each, Refills: 6    Associated Diagnoses: Type 2 diabetes mellitus with hyperglycemia, with long-term current use of insulin (H)           STOP taking these medications       alpelisib (PIQRAY) 2 x 150 MG tablet pack Comments:   Reason for Stopping:             Allergies   Allergies   Allergen Reactions    Lisinopril Cough     cough

## 2025-06-04 NOTE — PROGRESS NOTES
Occupational Therapy Discharge Summary    Reason for therapy discharge:    Discharged to home.    Progress towards therapy goal(s). See goals on Care Plan in [facility] electronic health record for goal details.  Goals partially met.  Barriers to achieving goals:   discharge from facility.    Therapy recommendation(s):    Continued therapy is recommended.  Rationale/Recommendations:  Home OT recommended - pt declined home therapy services.

## 2025-06-04 NOTE — PROGRESS NOTES
Physical Therapy Discharge Summary    Reason for therapy discharge:    Discharged to home.    Progress towards therapy goal(s). See goals on Care Plan in [facility] electronic [facility] record for goal details.  Goals not met.  Barriers to achieving goals:   discharge from facility.    Therapy recommendation(s):    Continued therapy is recommended.  Rationale/Recommendations:  Home PT recommended; patient declined.

## 2025-06-04 NOTE — PROGRESS NOTES
Care Management Discharge Note    Discharge Date: 06/04/2025       Discharge Disposition: Home    Discharge Services: None    Discharge DME: None    Discharge Transportation: family or friend will provide    Private pay costs discussed: Not applicable    Does the patient's insurance plan have a 3 day qualifying hospital stay waiver?  Yes     Which insurance plan 3 day waiver is available? Alternative insurance waiver    Will the waiver be used for post-acute placement? Undetermined at this time    PAS Confirmation Code: NA  Patient/family educated on Medicare website which has current facility and service quality ratings: no    Education Provided on the Discharge Plan:  (per treatment team)  Persons Notified of Discharge Plans: patient   Patient/Family in Agreement with the Plan: yes    Handoff Referral Completed: No, handoff not indicated or clinically appropriate    Additional Information:  Discussed updates with MD. Patient discharging home to prior living environment. Patient declines home care services. Family transport.     MAYO Bella

## 2025-06-05 ENCOUNTER — PATIENT OUTREACH (OUTPATIENT)
Dept: FAMILY MEDICINE | Facility: CLINIC | Age: 70
End: 2025-06-05
Payer: COMMERCIAL

## 2025-06-05 DIAGNOSIS — C79.51 MALIGNANT NEOPLASM METASTATIC TO BONE (H): Primary | ICD-10-CM

## 2025-06-05 DIAGNOSIS — C50.912 MALIGNANT NEOPLASM OF LEFT FEMALE BREAST, UNSPECIFIED ESTROGEN RECEPTOR STATUS, UNSPECIFIED SITE OF BREAST (H): ICD-10-CM

## 2025-06-05 DIAGNOSIS — I10 BENIGN ESSENTIAL HYPERTENSION: Primary | ICD-10-CM

## 2025-06-05 RX ORDER — LETROZOLE 2.5 MG/1
2.5 TABLET, FILM COATED ORAL DAILY
Qty: 28 TABLET | Refills: 11 | Status: SHIPPED | OUTPATIENT
Start: 2025-06-14

## 2025-06-05 RX ORDER — AMLODIPINE BESYLATE 5 MG/1
10 TABLET ORAL DAILY
Qty: 60 TABLET | Refills: 2 | Status: SHIPPED | OUTPATIENT
Start: 2025-06-05

## 2025-06-05 NOTE — TELEPHONE ENCOUNTER
Called and left semi detailed message that a script that was requested was sent by PCP.      Sonia, RN    Triage Nurse  [facility]

## 2025-06-05 NOTE — TELEPHONE ENCOUNTER
Called and left message for patient to return call to clinic    Sonia RN    Triage Nurse  [facility]

## 2025-06-05 NOTE — TELEPHONE ENCOUNTER
Transitions of Care Outreach  Chief Complaint   Patient presents with    Hospital F/U       Most Recent Admission Date: 6/1/2025   Most Recent Admission Diagnosis: Diabetic ketoacidosis without coma associated with type 2 diabetes mellitus (H) - E11.10     Most Recent Discharge Date: 6/4/2025   Most Recent Discharge Diagnosis: Diabetic ketoacidosis without coma associated with type 2 diabetes mellitus (H) - E11.10     Transitions of Care Assessment    Discharge Assessment  How are you doing now that you are home?: doing alright  How are your symptoms? (Red Flag symptoms escalate to triage hotline per guidelines): Improved  Do you know how to contact your clinic care team if you have future questions or changes to your health status? : Yes  Does the patient have their discharge instructions? : Yes  Does the patient have questions regarding their discharge instructions? : No  Were you started on any new medications or were there changes to any of your previous medications? : Yes  Does the patient have all of their medications?: Yes  Do you have questions regarding any of your medications? : No  Do you have all of your needed medical supplies or equipment (DME)?  (i.e. oxygen tank, CPAP, cane, etc.): Yes    Follow up Plan     Discharge Follow-Up  Discharge follow up appointment scheduled in alignment with recommended follow up timeframe or Transitions of Risk Category? (Low = within 30 days; Moderate= within 14 days; High= within 7 days): Yes  Discharge Follow Up Appointment Date: 06/12/25  Discharge Follow Up Appointment Scheduled with?: Primary Care Provider    Future Appointments   Date Time Provider Department Center   6/10/2025  3:00 PM [facility]   6/10/2025  3:30 [facility] [place]   6/10/2025  4:00 PM [facility] [address] [facility] [place]   6/12/2025  9:20 AM Vanesa Claros MD [facility]       Outpatient Plan as outlined on AVS reviewed with patient.    For any  urgent concerns, please contact our 24 hour nurse triage line: [phone] ([phone])       Jacki GUNN RN

## 2025-06-05 NOTE — TELEPHONE ENCOUNTER
Routing to PCP    Patient has hosp follow up scheduled for next week, but she will be out of her current rx of amlodipine.    She is wondering if she can get a new rx for 2 tablets of 5 mg dose as the 10 mg tablets are too large for her to swallow. Willing to send updated rx?    MOUNIKA Wood  [place] Triage RN  [facility]

## 2025-06-05 NOTE — TELEPHONE ENCOUNTER
Hospital Follow Up    ED to Admission dates: 6/1/25-6/4/25    Dx: Diabetic ketoacidosis without coma associated with type 2 diabetes mellitus    Recommended Follow up: Hospital follow-up, insulin regimen adjustments likely needed    Schedule Primary Care visit within: 7 Days    MOUNIKA Wood  [place] Triage RN  [facility]

## 2025-06-10 ENCOUNTER — DOCUMENTATION ONLY (OUTPATIENT)
Dept: ONCOLOGY | Facility: CLINIC | Age: 70
End: 2025-06-10

## 2025-06-10 ENCOUNTER — ONCOLOGY VISIT (OUTPATIENT)
Dept: ONCOLOGY | Facility: CLINIC | Age: 70
End: 2025-06-10
Attending: INTERNAL MEDICINE
Payer: COMMERCIAL

## 2025-06-10 ENCOUNTER — INFUSION THERAPY VISIT (OUTPATIENT)
Dept: INFUSION THERAPY | Facility: CLINIC | Age: 70
End: 2025-06-10
Attending: INTERNAL MEDICINE
Payer: COMMERCIAL

## 2025-06-10 VITALS
OXYGEN SATURATION: 98 % | RESPIRATION RATE: 16 BRPM | SYSTOLIC BLOOD PRESSURE: 129 MMHG | DIASTOLIC BLOOD PRESSURE: 84 MMHG | HEART RATE: 121 BPM

## 2025-06-10 VITALS — OXYGEN SATURATION: 98 % | HEART RATE: 121 BPM | DIASTOLIC BLOOD PRESSURE: 84 MMHG | SYSTOLIC BLOOD PRESSURE: 129 MMHG

## 2025-06-10 DIAGNOSIS — C79.51 MALIGNANT NEOPLASM METASTATIC TO BONE (H): Primary | ICD-10-CM

## 2025-06-10 DIAGNOSIS — C50.912 MALIGNANT NEOPLASM OF LEFT FEMALE BREAST, UNSPECIFIED ESTROGEN RECEPTOR STATUS, UNSPECIFIED SITE OF BREAST (H): Primary | ICD-10-CM

## 2025-06-10 DIAGNOSIS — E11.10 DIABETIC KETOACIDOSIS WITHOUT COMA ASSOCIATED WITH TYPE 2 DIABETES MELLITUS (H): ICD-10-CM

## 2025-06-10 DIAGNOSIS — C50.912 MALIGNANT NEOPLASM OF LEFT FEMALE BREAST, UNSPECIFIED ESTROGEN RECEPTOR STATUS, UNSPECIFIED SITE OF BREAST (H): ICD-10-CM

## 2025-06-10 DIAGNOSIS — C79.51 MALIGNANT NEOPLASM METASTATIC TO BONE (H): ICD-10-CM

## 2025-06-10 LAB
ALBUMIN SERPL BCG-MCNC: 3.5 G/DL (ref 3.5–5.2)
CALCIUM SERPL-MCNC: 9 MG/DL (ref 8.8–10.4)
CREAT SERPL-MCNC: 0.87 MG/DL (ref 0.51–0.95)
EGFRCR SERPLBLD CKD-EPI 2021: 72 ML/MIN/1.73M2

## 2025-06-10 PROCEDURE — 250N000011 HC RX IP 250 OP 636: Mod: JZ | Performed by: INTERNAL MEDICINE

## 2025-06-10 PROCEDURE — 82565 ASSAY OF CREATININE: CPT | Performed by: INTERNAL MEDICINE

## 2025-06-10 PROCEDURE — 99207 PR NO CHARGE LOS: CPT

## 2025-06-10 PROCEDURE — 250N000011 HC RX IP 250 OP 636: Performed by: INTERNAL MEDICINE

## 2025-06-10 PROCEDURE — 36591 DRAW BLOOD OFF VENOUS DEVICE: CPT | Performed by: INTERNAL MEDICINE

## 2025-06-10 PROCEDURE — 82310 ASSAY OF CALCIUM: CPT | Performed by: INTERNAL MEDICINE

## 2025-06-10 PROCEDURE — G0463 HOSPITAL OUTPT CLINIC VISIT: HCPCS | Performed by: INTERNAL MEDICINE

## 2025-06-10 PROCEDURE — 99215 OFFICE O/P EST HI 40 MIN: CPT | Performed by: INTERNAL MEDICINE

## 2025-06-10 PROCEDURE — G2211 COMPLEX E/M VISIT ADD ON: HCPCS | Performed by: INTERNAL MEDICINE

## 2025-06-10 PROCEDURE — 82040 ASSAY OF SERUM ALBUMIN: CPT | Performed by: INTERNAL MEDICINE

## 2025-06-10 PROCEDURE — 96372 THER/PROPH/DIAG INJ SC/IM: CPT | Performed by: INTERNAL MEDICINE

## 2025-06-10 PROCEDURE — 82465 ASSAY BLD/SERUM CHOLESTEROL: CPT

## 2025-06-10 RX ORDER — HEPARIN SODIUM (PORCINE) LOCK FLUSH IV SOLN 100 UNIT/ML 100 UNIT/ML
5 SOLUTION INTRAVENOUS
OUTPATIENT
Start: 2025-07-14

## 2025-06-10 RX ORDER — METHYLPREDNISOLONE SODIUM SUCCINATE 40 MG/ML
40 INJECTION INTRAMUSCULAR; INTRAVENOUS
Start: 2025-07-07

## 2025-06-10 RX ORDER — DIPHENHYDRAMINE HCL 25 MG
50 CAPSULE ORAL ONCE
Start: 2025-07-07

## 2025-06-10 RX ORDER — MEPERIDINE HYDROCHLORIDE 25 MG/ML
25 INJECTION INTRAMUSCULAR; INTRAVENOUS; SUBCUTANEOUS
OUTPATIENT
Start: 2025-07-07

## 2025-06-10 RX ORDER — ALBUTEROL SULFATE 90 UG/1
1-2 INHALANT RESPIRATORY (INHALATION)
Start: 2025-07-14

## 2025-06-10 RX ORDER — HEPARIN SODIUM,PORCINE 10 UNIT/ML
5-20 VIAL (ML) INTRAVENOUS DAILY PRN
OUTPATIENT
Start: 2025-07-14

## 2025-06-10 RX ORDER — METHYLPREDNISOLONE SODIUM SUCCINATE 40 MG/ML
40 INJECTION INTRAMUSCULAR; INTRAVENOUS
Start: 2025-07-14

## 2025-06-10 RX ORDER — MEPERIDINE HYDROCHLORIDE 25 MG/ML
25 INJECTION INTRAMUSCULAR; INTRAVENOUS; SUBCUTANEOUS
OUTPATIENT
Start: 2025-07-21

## 2025-06-10 RX ORDER — DIPHENHYDRAMINE HCL 25 MG
50 CAPSULE ORAL
Start: 2025-07-21

## 2025-06-10 RX ORDER — DIPHENHYDRAMINE HYDROCHLORIDE 50 MG/ML
25 INJECTION, SOLUTION INTRAMUSCULAR; INTRAVENOUS
Start: 2025-07-21

## 2025-06-10 RX ORDER — ALBUTEROL SULFATE 90 UG/1
1-2 INHALANT RESPIRATORY (INHALATION)
Start: 2025-07-07

## 2025-06-10 RX ORDER — HEPARIN SODIUM (PORCINE) LOCK FLUSH IV SOLN 100 UNIT/ML 100 UNIT/ML
5 SOLUTION INTRAVENOUS
OUTPATIENT
Start: 2025-07-21

## 2025-06-10 RX ORDER — DIPHENHYDRAMINE HYDROCHLORIDE 50 MG/ML
25 INJECTION, SOLUTION INTRAMUSCULAR; INTRAVENOUS
Start: 2025-07-14

## 2025-06-10 RX ORDER — ONDANSETRON 2 MG/ML
8 INJECTION INTRAMUSCULAR; INTRAVENOUS ONCE
OUTPATIENT
Start: 2025-07-21

## 2025-06-10 RX ORDER — EPINEPHRINE 1 MG/ML
0.3 INJECTION, SOLUTION INTRAMUSCULAR; SUBCUTANEOUS EVERY 5 MIN PRN
OUTPATIENT
Start: 2025-07-14

## 2025-06-10 RX ORDER — DIPHENHYDRAMINE HYDROCHLORIDE 50 MG/ML
50 INJECTION, SOLUTION INTRAMUSCULAR; INTRAVENOUS
Start: 2025-07-21

## 2025-06-10 RX ORDER — HEPARIN SODIUM,PORCINE 10 UNIT/ML
5-20 VIAL (ML) INTRAVENOUS DAILY PRN
OUTPATIENT
Start: 2025-07-21

## 2025-06-10 RX ORDER — HEPARIN SODIUM,PORCINE 10 UNIT/ML
5-20 VIAL (ML) INTRAVENOUS DAILY PRN
OUTPATIENT
Start: 2025-07-07

## 2025-06-10 RX ORDER — ALBUTEROL SULFATE 0.83 MG/ML
2.5 SOLUTION RESPIRATORY (INHALATION)
OUTPATIENT
Start: 2025-07-07

## 2025-06-10 RX ORDER — DIPHENHYDRAMINE HYDROCHLORIDE 50 MG/ML
25 INJECTION, SOLUTION INTRAMUSCULAR; INTRAVENOUS
Start: 2025-07-07

## 2025-06-10 RX ORDER — EPINEPHRINE 1 MG/ML
0.3 INJECTION, SOLUTION INTRAMUSCULAR; SUBCUTANEOUS EVERY 5 MIN PRN
OUTPATIENT
Start: 2025-07-07

## 2025-06-10 RX ORDER — HEPARIN SODIUM (PORCINE) LOCK FLUSH IV SOLN 100 UNIT/ML 100 UNIT/ML
5 SOLUTION INTRAVENOUS
OUTPATIENT
Start: 2025-07-07

## 2025-06-10 RX ORDER — EPINEPHRINE 1 MG/ML
0.3 INJECTION, SOLUTION INTRAMUSCULAR; SUBCUTANEOUS EVERY 5 MIN PRN
OUTPATIENT
Start: 2025-07-21

## 2025-06-10 RX ORDER — DIPHENHYDRAMINE HYDROCHLORIDE 50 MG/ML
50 INJECTION, SOLUTION INTRAMUSCULAR; INTRAVENOUS
OUTPATIENT
Start: 2025-07-21

## 2025-06-10 RX ORDER — DIPHENHYDRAMINE HYDROCHLORIDE 50 MG/ML
50 INJECTION, SOLUTION INTRAMUSCULAR; INTRAVENOUS
Start: 2025-07-14

## 2025-06-10 RX ORDER — ALBUTEROL SULFATE 90 UG/1
1-2 INHALANT RESPIRATORY (INHALATION)
Start: 2025-07-21

## 2025-06-10 RX ORDER — HEPARIN SODIUM (PORCINE) LOCK FLUSH IV SOLN 100 UNIT/ML 100 UNIT/ML
5 SOLUTION INTRAVENOUS
Status: DISCONTINUED | OUTPATIENT
Start: 2025-06-10 | End: 2025-06-10 | Stop reason: HOSPADM

## 2025-06-10 RX ORDER — DIPHENHYDRAMINE HCL 25 MG
50 CAPSULE ORAL ONCE
Start: 2025-07-14

## 2025-06-10 RX ORDER — DIPHENHYDRAMINE HYDROCHLORIDE 50 MG/ML
50 INJECTION, SOLUTION INTRAMUSCULAR; INTRAVENOUS ONCE
OUTPATIENT
Start: 2025-07-14

## 2025-06-10 RX ORDER — DIPHENHYDRAMINE HYDROCHLORIDE 50 MG/ML
50 INJECTION, SOLUTION INTRAMUSCULAR; INTRAVENOUS
Start: 2025-07-07

## 2025-06-10 RX ORDER — ALBUTEROL SULFATE 0.83 MG/ML
2.5 SOLUTION RESPIRATORY (INHALATION)
OUTPATIENT
Start: 2025-07-14

## 2025-06-10 RX ORDER — MEPERIDINE HYDROCHLORIDE 25 MG/ML
25 INJECTION INTRAMUSCULAR; INTRAVENOUS; SUBCUTANEOUS
OUTPATIENT
Start: 2025-07-14

## 2025-06-10 RX ORDER — METHYLPREDNISOLONE SODIUM SUCCINATE 40 MG/ML
40 INJECTION INTRAMUSCULAR; INTRAVENOUS
Start: 2025-07-21

## 2025-06-10 RX ORDER — DIPHENHYDRAMINE HYDROCHLORIDE 50 MG/ML
50 INJECTION, SOLUTION INTRAMUSCULAR; INTRAVENOUS ONCE
OUTPATIENT
Start: 2025-07-07

## 2025-06-10 RX ORDER — ALBUTEROL SULFATE 0.83 MG/ML
2.5 SOLUTION RESPIRATORY (INHALATION)
OUTPATIENT
Start: 2025-07-21

## 2025-06-10 RX ADMIN — DENOSUMAB 120 MG: 120 INJECTION SUBCUTANEOUS at 16:08

## 2025-06-10 RX ADMIN — Medication 5 ML: at 14:47

## 2025-06-10 ASSESSMENT — PAIN SCALES - GENERAL
PAINLEVEL_OUTOF10: NO PAIN (0)
PAINLEVEL_OUTOF10: NO PAIN (0)

## 2025-06-10 NOTE — PROGRESS NOTES
Oncology follow-up note:  Date on this visit: 6/10/25     Diagnosis.  Metastatic breast cancer.    Primary Physician: No Ref-Primary, Physician     History Of Present Illness:  Ms. Olson is a 69 year old female who presents with metastatic breast cancer.        Patient was being followed by Dr. Hilton and more recently by Dr. Fountain.  I have reviewed previous oncology notes and have copied and updated from prior notes.      In March, 2020, the patient presented with a several month history of left breast nodularity and palpable axillary adenopathy.  Subsequent imaging confirmed bilateral breast lesions including a 6.2 cm left breast mass with diffuse breast dermal thickening and a 1.3 cm right breast mass.  The left breast mass revealed a grade 2 invasive mammary carcinoma that was ER/ME positive and HER2 negative.  The right breast mass revealed grade 1 tubular carcinoma that was ER/ME positive and HER2 negative.     Staging PET scan revealed left axillary and retropectoral lymph node metastases as well as diffuse bony metastases.         March, 2020 - August, 2020: She received weekly paclitaxel with some disease response but requested a break from cytotoxic therapy from significant issues with balance and neuropathy requiring wheelchair use.     August, 2020 - March, 2021: Started on anastrozole with February, 2021 PET scan evidence of progression.     March, 2021-August, 2023: Started on anastrozole and abemaciclib.  May, 2023 CT CAP with evidence of soft tissue progression.     PIK3CA p.E453K+, ESR1 negative, TMB-low, TEJAS on left axillary tissue.     August, 2023-December, 2023: Switch to exemestane and everolimus with treatment discontinued after 4 months because of significant left breast mass progression.     December, 2023-December, 2024: Started capecitabine 1500 mg twice daily with dose reduction to 500 mg twice daily 7 days on/7 days off due to mucositis and hand-foot syndrome.      CA 15-3 tumor marker  had declined from a zenith of 945 on 12/26/2023 to a nerissa of 107 on 5/17/2024 with value of 203 on 10/2/2024.  On 11/5/2024, her CA 15-3 tumor marker was up to 286.     12/4/2024 restaging CT CAP revealed evidence of increased nodularity of the left invasive breast cancer along the anterior chest wall as well as slight increase in anterior mediastinal soft tissue nodule.  A new left upper lobe 4 mm nodule was also identified with no other gross evidence of metastatic disease in the abdomen or pelvis.      Subsequent 12/13/2024 echocardiogram revealed a 65-70% EF.     [employer] study revealed no new ESR 1 mutation.    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 A 1/7/2025 MR brain revealed diffuse osseous disease but no specific abnormal enhancement along the cisternal segment of the visualized cranial nerves. She thinks her diplopia has improved on antiviral therapy.     1/3/2025- because of PIK3CA p.E453K positive, initiated  fulvestrant/capivasertib     3/16/2025.  Reduced Capivasertib to 320 mg twice daily Days 1-4 out of 7-mainly due to diarrhea.    3/23/2025. We further dose reduced Capivasertib to 320 mg in the morning and 160 mg in the evening because of worsening diarrhea    When I saw her on 4/3/2025, we decreased Capivasertib dose to 200 mg twice a day but she did not tolerate this well because of recurrence of diarrhea so she stopped that.  She received Faslodex on 4/11/2025.        Interval history.    She started Alpelisib/letrozole around 5/17/2025.      She was admitted from 6/1/2025 - 6/4/2025 for diabetic ketoacidosis likely from Alpelisib.      She stopped Alpelisib and letrozole at that time.  Currently feels well.  She is taking insulin glargine 22 units at baseline and uses NovoLog sliding scale.  Now her blood sugars are well.  She denies any pain.  No nausea or vomiting.  No shortness of breath.  No new swellings.  She has mild tingling and numbness in the  fingers and feet.  She is not willing to resume Alpelisib again.  She still has issues with double vision although they are better.  This is from the right 6th nerve palsy.          ECOG 1    ROS:  A comprehensive ROS was otherwise neg    I reviewed other history in epic as below.      Past Medical/Surgical History:  Past Medical History:   Diagnosis Date    ASCUS favoring benign 10/28/15    neg HPV    Cancer (H)     Diabetes mellitus, type 2 (H) 7/14/2020    Hypertension     Obese      Past Surgical History:   Procedure Laterality Date    BIOPSY  03/2020    CATARACT EXTRACTION Bilateral 10/2024    COLONOSCOPY N/A 10/14/2020    Procedure: COLONOSCOPY, WITH POLYPECTOMY  hot snare;  Surgeon: Leventhal, Thomas Michael, MD;  Location: [facility] OR    GYN SURGERY  3/13/89. & 6/22/92    C Sections    IR CHEST PORT PLACEMENT > 5 YRS OF AGE  03/18/2020     Cancer History:   As above  Allergies:  Allergies as of 06/10/2025 - Reviewed 06/02/2025   Allergen Reaction Noted    Lisinopril Cough 10/28/2015     Current Medications:  Current Outpatient Medications   Medication Sig Dispense Refill    alcohol swab prep pads Use to swab area of injection/chrissy as directed. 400 each 3    [START ON 6/14/2025] alpelisib (PIQRAY) 2 x 150 MG tablet pack Take 2 tablets (300 mg) by mouth daily for 28 days. . Take with food. 56 tablet 0    amLODIPine (NORVASC) 10 MG tablet Take 1 tablet (10 mg) by mouth daily. 90 tablet 3    amLODIPine (NORVASC) 5 MG tablet Take 2 tablets (10 mg) by mouth daily. 60 tablet 2    Ascorbic Acid (VITAMIN C PO) Take 500 mg by mouth daily.      aspirin 81 MG tablet Take 81 mg by mouth daily.      blood glucose (NO BRAND SPECIFIED) test strip 1 strip by In Vitro route 3 times daily 100 strip 6    blood glucose calibration (NO BRAND SPECIFIED) solution To accompany: Blood Glucose Monitor Brands: per insurance. 1 Bottle 3    blood glucose monitoring (NO BRAND SPECIFIED) meter device kit Use to test blood sugar 3 times daily  or as directed. 1 kit 0    Blood Pressure KIT Please test blood pressure at home 2 x daily 1 kit 0    Coenzyme Q10 (COQ-10) 100 MG CAPS Take 1 capsule by mouth daily as needed (supplementation).      Cyanocobalamin (B-12) 1000 MCG TABS Take 1 tablet by mouth daily.      gabapentin (NEURONTIN) 250 MG/5ML solution Take 5 mLs (250 mg) by mouth at bedtime. 470 mL 0    hydrocortisone 2.5 % cream Apply topically 2 times daily as needed for rash.      insulin aspart (NOVOLOG PEN) 100 UNIT/ML pen Inject 1-7 Units subcutaneously 3 times daily (before meals). 15 mL 0    insulin glargine (LANTUS PEN) 100 UNIT/ML pen Inject 22 Units subcutaneously every morning (before breakfast). 15 mL 0    insulin pen needle (31G X 8 MM) 31G X 8 MM miscellaneous Use 4 pen needles daily or as directed. 100 each 4    [START ON 6/14/2025] letrozole (FEMARA) 2.5 MG tablet Take 1 tablet (2.5 mg) by mouth daily. 28 tablet 11    lidocaine-prilocaine (EMLA) 2.5-2.5 % external cream Apply small amount to center of port site 45-60 minutes prior to chemo appointment, cover with clear dressing if desired. 30 g 1    loperamide (IMODIUM) 2 MG capsule Start with 2 caps (4 mg), then take one cap (2 mg) after each diarrheal stool as needed. Do not use more than 8 caps (16 mg) per day. 30 capsule 0    magic mouthwash suspension (diphenhydrAMINE, lidocaine, aluminum-magnesium & simethicone) Swish and swallow 5 mLs in mouth every 6 hours as needed for mouth sores. 240 mL 2    metFORMIN (GLUCOPHAGE XR) 500 MG 24 hr tablet Take 500 mg by mouth 2 times daily (with meals).      STATIN NOT PRESCRIBED (INTENTIONAL) Please choose reason not prescribed from choices below.      thin (NO BRAND SPECIFIED) lancets 3 each by In Vitro route daily Use with lanceting device. To accompany: Blood Glucose Monitor Brands: per insurance. 300 each 6    triamcinolone (KENALOG) 0.1 % external cream Apply topically 2 times daily as needed for irritation.      triamterene-HCTZ  (MAXZIDE-25) 37.5-25 MG tablet Take 1 tablet by mouth daily. 90 tablet 3      Family History:  Family History   Problem Relation Age of Onset    Hypertension Mother          12/3/21    Coronary Artery Disease Mother          12/3/21    Breast Cancer Mother          12/3/21    Hyperlipidemia Mother          12/3/21    Depression Mother          123/3/21    Arrhythmia Brother     Cerebrovascular Disease Brother     Diabetes Brother      Social History:  Social History     Socioeconomic History    Marital status:      Spouse name: Not on file    Number of children: Not on file    Years of education: Not on file    Highest education level: Not on file   Occupational History    Not on file   Tobacco Use    Smoking status: Never     Passive exposure: Never    Smokeless tobacco: Never   Vaping Use    Vaping status: Never Used   Substance and Sexual Activity    Alcohol use: Not Currently     Comment: 2-3 drinks a month     Drug use: No    Sexual activity: Not Currently     Partners: Male     Birth control/protection: Pill, None     Comment: On the pill prior to having children   Other Topics Concern    Parent/sibling w/ CABG, MI or angioplasty before 65F 55M? No   Social History Narrative    Not on file     Social Drivers of Health     Financial Resource Strain: Low Risk  (2025)    Financial Resource Strain     Within the past 12 months, have you or your family members you live with been unable to get utilities (heat, electricity) when it was really needed?: No   Food Insecurity: Low Risk  (2025)    Food Insecurity     Within the past 12 months, did you worry that your food would run out before you got money to buy more?: No     Within the past 12 months, did the food you bought just not last and you didn t have money to get more?: No   Transportation Needs: Low Risk  (2025)    Transportation Needs     Within the past 12 months, has lack of transportation kept you from medical  appointments, getting your medicines, non-medical meetings or appointments, work, or from getting things that you need?: No   Physical Activity: Patient Declined (4/6/2024)    Received from [facility]    Exercise Vital Sign     Days of Exercise per Week: Patient declined     Minutes of Exercise per Session: Patient declined   Stress: Not on file   Social Connections: Not on file   Interpersonal Safety: Low Risk  (6/1/2025)    Interpersonal Safety     Do you feel physically and emotionally safe where you currently live?: Yes     Within the past 12 months, have you been hit, slapped, kicked or otherwise physically hurt by someone?: No     Within the past 12 months, have you been humiliated or emotionally abused in other ways by your partner or ex-partner?: No   Housing Stability: Low Risk  (6/1/2025)    Housing Stability     Do you have housing? : Yes     Are you worried about losing your housing?: No     Physical Exam:  There were no vitals taken for this visit.  Wt Readings from Last 4 Encounters:   06/01/25 68.3 kg (150 lb 9.2 oz)   05/13/25 75.1 kg (165 lb 8 oz)   04/03/25 73.5 kg (162 lb)   03/11/25 80.2 kg (176 lb 14.4 oz)     CONSTITUTIONAL: no acute distress  EYES:  no palor or icterus.   ENT/MOUTH: no mouth lesions. Ears normal  CVS: s1s2 no m r g .   RESPIRATORY: clear to auscultation b/l  GI: soft non tender no hepatosplenomegaly  NEURO: AAOX3  Grossly non focal neuro exam  INTEGUMENT: no obvious rashes  LYMPHATIC: no palpable cervical, supraclavicular, axillary or inguinal LAD  MUSCULOSKELETAL: Unremarkable. No bony tenderness.   EXTREMITIES: no edema  PSYCH: Mentation, mood and affect are normal. Decision making capacity is intact  Previously, breast exam showed a hard palpable lump in the left breast.  Nipple is retracted.      Laboratory/Imaging Studies    Reviewed    6/10/2025    Creatinine 0.87.  Calcium 9.  Albumin 3.5.    6/4/2025.  Chemistry showed glucose 229.    6/1/2025.  CBC showed hemoglobin  11.1.      CA 15-3 was 1163 on 5/13/2025.    CT chest abdomen and pelvis 4/25/2025.      FINDINGS:     Devices: Right chest wall Port-A-Cath tip terminates at cavoatrial  junction.     CHEST:  MEDIASTINUM: Normal heart size. Trace pericardial effusion, with  multiple new subcentimeter nodules along the anterior pericardium.  Normal caliber ascending aorta. Normal caliber main pulmonary artery.  New and enlarging metastatic mediastinal lymphadenopathy compared to  12/4/2024 CT. Findings include but are not limited to:  -14 x 10 mm anterior mediastinal node (series 8, image 78), previously 6 x 4 mm.  -27 x 13 mm nodular mass like thickening along the anterior  mediastinum/pericardium (series 8, image 119), previously measuring 11 x 8 mm.     LUNGS: Central tracheobronchial tree is patent. New small malignant left-sided pleural effusion with new pleural-based nodularity. For example, a 17 x 7 mm nodular thickening of the left posterior pleura (series 8, image 202). Additionally, 14 x 12 mm soft tissue mass along the medial left pleura (series 8, image 95), previously measuring 11 x 3 mm. No focal airspace opacity. Enlarging pulmonary nodules compared to prior. Findings include but are not limited to:  -New 5 mm solid nodule of the right middle lobe (series 9, image 155).  -New 7 mm solid nodule of the medial right lower lobe (series 9, image 165).     Normal thyroid.     ABDOMEN/PELVIS:  LIVER: No suspicious hepatic lesion. Unchanged too small to  characterize hypodensity in segment 4. No intrahepatic or extrahepatic  biliary ductal dilation.     GALLBLADDER: Cholelithiasis without findings to suggest acute  cholecystitis      PANCREAS: No focal pancreatic lesion. The main pancreatic duct is not  dilated.     SPLEEN: Within normal limits.     ADRENAL GLANDS: No focal adrenal nodule.     URINARY TRACT: No suspicious renal lesion.  No hydronephrosis or  hydroureter.  No nephrolithiasis.     BLADDER: Within normal  limits.     REPRODUCTIVE ORGANS: Within normal limits.     GASTROINTESTINAL TRACT: Normal caliber large and small bowel.  Normal  appendix.     PERITONEUM/MESENTERY: No free fluid. No free air.     VESSELS: The aorta and major branch vessels are patent. The main  portal, splenic and proximal superior mesenteric veins are patent. No  significant atherosclerotic disease.     LYMPH NODES: Rounded and irregular axillary lymphadenopathy bilaterally, which appears new from prior. For example, a 13 x 9 mm  right axillary node (series 8, image 54). Mildly increased conspicuity  of left external iliac lymph nodes, which remain subcentimeter in  short axis. No abdominopelvic lymphadenopathy identified.     BONES: Extensive sclerotic metastatic lesions throughout the axial and visualized appendicular skeleton, which have increased in number compared to prior. Healing left lateral ninth rib fracture, which appears new from prior.     SOFT TISSUES: Increased soft tissue nodularity/masses about the left anterior chest wall. For example, 3.0 x 2.5 cm mass along the lateral left pectoralis musculature (series 8, image 83), previously measuring 2.2 x 1.9 cm. Continued skin thickening and nipple retraction of the left breast.                                                                      IMPRESSION:      1. Interval metastatic disease progression with new and increased metastatic mediastinal and axillary lymphadenopathy, increased soft tissue masses in the left breast/anterior chest wall, new subcentimeter anterior pericardial nodules, new and enlarging pleural-based metastases with a small malignant left-sided pleural  effusion, increased osseous metastases, and multiple new and enlarging pulmonary nodules.     2. Mildly displaced left ninth rib fracture appears new when compared to 12/4/2024 CT.      3. Cholelithiasis without findings to suggest acute cholecystitis.      ASSESSMENT/PLAN:    Metastatic breast cancer, hormone  positive, HER2 negative.    She was on Capivasertib/fulvestrant since January 2025 but she has had several breaks in treatment because of worsening diarrhea despite dose reductions.  She last received Capivasertib in early April 2025.    Now she has evidence of significant progression of the disease including increase in the size of the soft tissue masses in the left breast/anterior chest wall as well as new and increased mediastinal and axillary lymphadenopathy, pericardial nodules, pleural-based metastasis and small left-sided pleural effusion, likely malignant as well as worsening lung metastasis and worsening of osseous disease.      Because of PIK3CA p.E453K positive, we started her on Alpelisib/letrozole on 5/17/2025.  She was admitted for diabetic ketoacidosis.    Now her blood sugars are better controlled with insulin but she does not want to try Alpelisib/letrozole again.    I would recommend starting chemotherapy with paclitaxel.  We discussed the rationale schedule and potential side effects of it in detail.  We will give it to her every week for 3 weeks with fourth week being off.  Because of baseline neuropathy, I will dose reduce paclitaxel to 70 mg/m .    We will get baseline CT chest abdomen and pelvis and bone scan prior to start of the chemotherapy.    Uncontrolled diabetes.  Recently was admitted for diabetic ketoacidosis likely from Alpelisib.  Now sugars are better controlled and she is taking insulin glargine 22 units at baseline and NovoLog sliding scale.  She will follow closely with PCP.        Cancer involving the bones.  Because of worsening bone metastasis, we have changed Zometa to Xgeva.  She will get this today.  Continue calcium and vitamin D.     Return to clinic before cycle #2 of chemotherapy.    All questions answered to her satisfaction.  She is agreeable and comfortable with the plan.    Felicia Ashraf MD     The longitudinal plan of care for the diagnosis(es)/condition(s) as  documented were addressed during this visit. Due to the added complexity in care, I will continue to support Desmond in the subsequent management and with ongoing continuity of care.

## 2025-06-10 NOTE — PROGRESS NOTES
Infusion Nursing Note:  Mary Olson presents today for Xgeva.    Patient seen by provider today: Yes: Dr. Ashraf   present during visit today: Not Applicable.    Note: N/A.      Intravenous Access:  No Intravenous access/labs at this visit.    Treatment Conditions:  Lab Results   Component Value Date     06/04/2025    POTASSIUM 4.0 06/04/2025    MAG 1.8 06/04/2025    CR 0.87 06/10/2025    BUFFY 9.0 06/10/2025    BILITOTAL 0.5 06/01/2025    ALBUMIN 3.5 06/10/2025    ALT 14 06/01/2025    AST 16 06/01/2025       Results reviewed, labs MET treatment parameters, ok to proceed with treatment.      Post Infusion Assessment:  Patient tolerated injection without incident.  Site patent and intact, free from redness, edema or discomfort.       Discharge Plan:   Patient discharged in stable condition accompanied by: self.  Departure Mode: Wheelchair.      Kailee Jarvis LPN

## 2025-06-10 NOTE — PROGRESS NOTES
Port site scrubbed with Chloraprep for 30 seconds. Accessed port using sterile technique. 1 tubes drawn. See documentation flowsheet. Port needle deaccessed for treatment. Tolerated port access and draw. Estrella Valle RN on 6/10/2025 at 8:35 AM

## 2025-06-10 NOTE — NURSING NOTE
"Oncology Rooming Note    Katina 10, 2025 3:26 PM   Mary Olson is a 69 year old female who presents for:    Chief Complaint   Patient presents with    Oncology Clinic Visit     Initial Vitals: /84 (BP Location: Right arm)   Pulse (!) 121   SpO2 98%  Estimated body mass index is 28.45 kg/m  as calculated from the following:    Height as of 4/3/25: 1.549 m (5' 1\").    Weight as of 6/1/25: 68.3 kg (150 lb 9.2 oz). There is no height or weight on file to calculate BSA.  No Pain (0) Comment: Data Unavailable   No LMP recorded. Patient is postmenopausal.  Allergies reviewed: Yes  Medications reviewed: Yes    Medications: Medication refills not needed today.  Pharmacy name entered into [employer] Software:    [facility] - [place], [place] - [address].  [facility] - [place], [place] - [address] [facility] [place], [place] - [address] [facility] [place] - [address] [place] [id number] - [place], [place] - [address] [facility]  [id number] [place], [place] - [address] AT [facility]    Frailty Screening:   Is the patient here for a new oncology consult visit in cancer care? 2. No    PHQ9:  Did this patient require a PHQ9?: No      Clinical concerns: Patient will discuss concerns regarding Chemotherapy and Medications.        Teena Osborne MA            "

## 2025-06-10 NOTE — PATIENT INSTRUCTIONS
Schedule CT CAP and Bone scan asap    Start Paclitaxel weekly    See me or MARISA before C#2 and C#3 and C#4    Cont xgeva monthly

## 2025-06-10 NOTE — LETTER
6/10/2025      Mary Olson  [address]  [facility] [address]      Dear Colleague,    Thank you for referring your patient, Mary Olson, to the [facility]. Please see a copy of my visit note below.    Oncology follow-up note:  Date on this visit: 6/10/25     Diagnosis.  Metastatic breast cancer.    Primary Physician: No Ref-Primary, Physician     History Of Present Illness:  Ms. Olson is a 69 year old female who presents with metastatic breast cancer.        Patient was being followed by Dr. Hilton and more recently by Dr. Fountain.  I have reviewed previous oncology notes and have copied and updated from prior notes.      In March, 2020, the patient presented with a several month history of left breast nodularity and palpable axillary adenopathy.  Subsequent imaging confirmed bilateral breast lesions including a 6.2 cm left breast mass with diffuse breast dermal thickening and a 1.3 cm right breast mass.  The left breast mass revealed a grade 2 invasive mammary carcinoma that was ER/NV positive and HER2 negative.  The right breast mass revealed grade 1 tubular carcinoma that was ER/NV positive and HER2 negative.     Staging PET scan revealed left axillary and retropectoral lymph node metastases as well as diffuse bony metastases.         March, 2020 - August, 2020: She received weekly paclitaxel with some disease response but requested a break from cytotoxic therapy from significant issues with balance and neuropathy requiring wheelchair use.     August, 2020 - March, 2021: Started on anastrozole with February, 2021 PET scan evidence of progression.     March, 2021-August, 2023: Started on anastrozole and abemaciclib.  May, 2023 CT CAP with evidence of soft tissue progression.     PIK3CA p.E453K+, ESR1 negative, TMB-low, TEJAS on left axillary tissue.     August, 2023-December, 2023: Switch to exemestane and everolimus with treatment discontinued after 4 months because  of significant left breast mass progression.     December, 2023-December, 2024: Started capecitabine 1500 mg twice daily with dose reduction to 500 mg twice daily 7 days on/7 days off due to mucositis and hand-foot syndrome.      CA 15-3 tumor marker had declined from a zenith of 945 on 12/26/2023 to a nerissa of 107 on 5/17/2024 with value of 203 on 10/2/2024.  On 11/5/2024, her CA 15-3 tumor marker was up to 286.     12/4/2024 restaging CT CAP revealed evidence of increased nodularity of the left invasive breast cancer along the anterior chest wall as well as slight increase in anterior mediastinal soft tissue nodule.  A new left upper lobe 4 mm nodule was also identified with no other gross evidence of metastatic disease in the abdomen or pelvis.      Subsequent 12/13/2024 echocardiogram revealed a 65-70% EF.     [employer] study revealed no new ESR 1 mutation.    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 A 1/7/2025 MR brain revealed diffuse osseous disease but no specific abnormal enhancement along the cisternal segment of the visualized cranial nerves. She thinks her diplopia has improved on antiviral therapy.     1/3/2025- because of PIK3CA p.E453K positive, initiated  fulvestrant/capivasertib     3/16/2025.  Reduced Capivasertib to 320 mg twice daily Days 1-4 out of 7-mainly due to diarrhea.    3/23/2025. We further dose reduced Capivasertib to 320 mg in the morning and 160 mg in the evening because of worsening diarrhea    When I saw her on 4/3/2025, we decreased Capivasertib dose to 200 mg twice a day but she did not tolerate this well because of recurrence of diarrhea so she stopped that.  She received Faslodex on 4/11/2025.        Interval history.    She started Alpelisib/letrozole around 5/17/2025.      She was admitted from 6/1/2025 - 6/4/2025 for diabetic ketoacidosis likely from Alpelisib.      She stopped Alpelisib and letrozole at that time.  Currently feels well.   She is taking insulin glargine 22 units at baseline and uses NovoLog sliding scale.  Now her blood sugars are well.  She denies any pain.  No nausea or vomiting.  No shortness of breath.  No new swellings.  She has mild tingling and numbness in the fingers and feet.  She is not willing to resume Alpelisib again.  She still has issues with double vision although they are better.  This is from the right 6th nerve palsy.          ECOG 1    ROS:  A comprehensive ROS was otherwise neg    I reviewed other history in epic as below.      Past Medical/Surgical History:  Past Medical History:   Diagnosis Date     ASCUS favoring benign 10/28/15    neg HPV     Cancer (H)      Diabetes mellitus, type 2 (H) 7/14/2020     Hypertension      Obese      Past Surgical History:   Procedure Laterality Date     BIOPSY  03/2020     CATARACT EXTRACTION Bilateral 10/2024     COLONOSCOPY N/A 10/14/2020    Procedure: COLONOSCOPY, WITH POLYPECTOMY  hot snare;  Surgeon: Leventhal, Thomas Michael, MD;  Location: [facility] OR     GYN SURGERY  3/13/89. & 6/22/92    C Sections     IR CHEST PORT PLACEMENT > 5 YRS OF AGE  03/18/2020     Cancer History:   As above  Allergies:  Allergies as of 06/10/2025 - Reviewed 06/02/2025   Allergen Reaction Noted     Lisinopril Cough 10/28/2015     Current Medications:  Current Outpatient Medications   Medication Sig Dispense Refill     alcohol swab prep pads Use to swab area of injection/chrissy as directed. 400 each 3     [START ON 6/14/2025] alpelisib (PIQRAY) 2 x 150 MG tablet pack Take 2 tablets (300 mg) by mouth daily for 28 days. . Take with food. 56 tablet 0     amLODIPine (NORVASC) 10 MG tablet Take 1 tablet (10 mg) by mouth daily. 90 tablet 3     amLODIPine (NORVASC) 5 MG tablet Take 2 tablets (10 mg) by mouth daily. 60 tablet 2     Ascorbic Acid (VITAMIN C PO) Take 500 mg by mouth daily.       aspirin 81 MG tablet Take 81 mg by mouth daily.       blood glucose (NO BRAND SPECIFIED) test strip 1 strip by In  Vitro route 3 times daily 100 strip 6     blood glucose calibration (NO BRAND SPECIFIED) solution To accompany: Blood Glucose Monitor Brands: per insurance. 1 Bottle 3     blood glucose monitoring (NO BRAND SPECIFIED) meter device kit Use to test blood sugar 3 times daily or as directed. 1 kit 0     Blood Pressure KIT Please test blood pressure at home 2 x daily 1 kit 0     Coenzyme Q10 (COQ-10) 100 MG CAPS Take 1 capsule by mouth daily as needed (supplementation).       Cyanocobalamin (B-12) 1000 MCG TABS Take 1 tablet by mouth daily.       gabapentin (NEURONTIN) 250 MG/5ML solution Take 5 mLs (250 mg) by mouth at bedtime. 470 mL 0     hydrocortisone 2.5 % cream Apply topically 2 times daily as needed for rash.       insulin aspart (NOVOLOG PEN) 100 UNIT/ML pen Inject 1-7 Units subcutaneously 3 times daily (before meals). 15 mL 0     insulin glargine (LANTUS PEN) 100 UNIT/ML pen Inject 22 Units subcutaneously every morning (before breakfast). 15 mL 0     insulin pen needle (31G X 8 MM) 31G X 8 MM miscellaneous Use 4 pen needles daily or as directed. 100 each 4     [START ON 6/14/2025] letrozole (FEMARA) 2.5 MG tablet Take 1 tablet (2.5 mg) by mouth daily. 28 tablet 11     lidocaine-prilocaine (EMLA) 2.5-2.5 % external cream Apply small amount to center of port site 45-60 minutes prior to chemo appointment, cover with clear dressing if desired. 30 g 1     loperamide (IMODIUM) 2 MG capsule Start with 2 caps (4 mg), then take one cap (2 mg) after each diarrheal stool as needed. Do not use more than 8 caps (16 mg) per day. 30 capsule 0     magic mouthwash suspension (diphenhydrAMINE, lidocaine, aluminum-magnesium & simethicone) Swish and swallow 5 mLs in mouth every 6 hours as needed for mouth sores. 240 mL 2     metFORMIN (GLUCOPHAGE XR) 500 MG 24 hr tablet Take 500 mg by mouth 2 times daily (with meals).       STATIN NOT PRESCRIBED (INTENTIONAL) Please choose reason not prescribed from choices below.       thin (NO  BRAND SPECIFIED) lancets 3 each by In Vitro route daily Use with lanceting device. To accompany: Blood Glucose Monitor Brands: per insurance. 300 each 6     triamcinolone (KENALOG) 0.1 % external cream Apply topically 2 times daily as needed for irritation.       triamterene-HCTZ (MAXZIDE-25) 37.5-25 MG tablet Take 1 tablet by mouth daily. 90 tablet 3      Family History:  Family History   Problem Relation Age of Onset     Hypertension Mother          12/3/21     Coronary Artery Disease Mother          12/3/21     Breast Cancer Mother          12/3/21     Hyperlipidemia Mother          12/3/21     Depression Mother          123/3/21     Arrhythmia Brother      Cerebrovascular Disease Brother      Diabetes Brother      Social History:  Social History     Socioeconomic History     Marital status:      Spouse name: Not on file     Number of children: Not on file     Years of education: Not on file     Highest education level: Not on file   Occupational History     Not on file   Tobacco Use     Smoking status: Never     Passive exposure: Never     Smokeless tobacco: Never   Vaping Use     Vaping status: Never Used   Substance and Sexual Activity     Alcohol use: Not Currently     Comment: 2-3 drinks a month      Drug use: No     Sexual activity: Not Currently     Partners: Male     Birth control/protection: Pill, None     Comment: On the pill prior to having children   Other Topics Concern     Parent/sibling w/ CABG, MI or angioplasty before 65F 55M? No   Social History Narrative     Not on file     Social Drivers of Health     Financial Resource Strain: Low Risk  (2025)    Financial Resource Strain      Within the past 12 months, have you or your family members you live with been unable to get utilities (heat, electricity) when it was really needed?: No   Food Insecurity: Low Risk  (2025)    Food Insecurity      Within the past 12 months, did you worry that your food would run out  before you got money to buy more?: No      Within the past 12 months, did the food you bought just not last and you didn t have money to get more?: No   Transportation Needs: Low Risk  (6/1/2025)    Transportation Needs      Within the past 12 months, has lack of transportation kept you from medical appointments, getting your medicines, non-medical meetings or appointments, work, or from getting things that you need?: No   Physical Activity: Patient Declined (4/6/2024)    Received from [facility]    Exercise Vital Sign      Days of Exercise per Week: Patient declined      Minutes of Exercise per Session: Patient declined   Stress: Not on file   Social Connections: Not on file   Interpersonal Safety: Low Risk  (6/1/2025)    Interpersonal Safety      Do you feel physically and emotionally safe where you currently live?: Yes      Within the past 12 months, have you been hit, slapped, kicked or otherwise physically hurt by someone?: No      Within the past 12 months, have you been humiliated or emotionally abused in other ways by your partner or ex-partner?: No   Housing Stability: Low Risk  (6/1/2025)    Housing Stability      Do you have housing? : Yes      Are you worried about losing your housing?: No     Physical Exam:  There were no vitals taken for this visit.  Wt Readings from Last 4 Encounters:   06/01/25 68.3 kg (150 lb 9.2 oz)   05/13/25 75.1 kg (165 lb 8 oz)   04/03/25 73.5 kg (162 lb)   03/11/25 80.2 kg (176 lb 14.4 oz)     CONSTITUTIONAL: no acute distress  EYES:  no palor or icterus.   ENT/MOUTH: no mouth lesions. Ears normal  CVS: s1s2 no m r g .   RESPIRATORY: clear to auscultation b/l  GI: soft non tender no hepatosplenomegaly  NEURO: AAOX3  Grossly non focal neuro exam  INTEGUMENT: no obvious rashes  LYMPHATIC: no palpable cervical, supraclavicular, axillary or inguinal LAD  MUSCULOSKELETAL: Unremarkable. No bony tenderness.   EXTREMITIES: no edema  PSYCH: Mentation, mood and affect are normal.  Decision making capacity is intact  Previously, breast exam showed a hard palpable lump in the left breast.  Nipple is retracted.      Laboratory/Imaging Studies    Reviewed    6/10/2025    Creatinine 0.87.  Calcium 9.  Albumin 3.5.    6/4/2025.  Chemistry showed glucose 229.    6/1/2025.  CBC showed hemoglobin 11.1.      CA 15-3 was 1163 on 5/13/2025.    CT chest abdomen and pelvis 4/25/2025.      FINDINGS:     Devices: Right chest wall Port-A-Cath tip terminates at cavoatrial  junction.     CHEST:  MEDIASTINUM: Normal heart size. Trace pericardial effusion, with  multiple new subcentimeter nodules along the anterior pericardium.  Normal caliber ascending aorta. Normal caliber main pulmonary artery.  New and enlarging metastatic mediastinal lymphadenopathy compared to  12/4/2024 CT. Findings include but are not limited to:  -14 x 10 mm anterior mediastinal node (series 8, image 78), previously 6 x 4 mm.  -27 x 13 mm nodular mass like thickening along the anterior  mediastinum/pericardium (series 8, image 119), previously measuring 11 x 8 mm.     LUNGS: Central tracheobronchial tree is patent. New small malignant left-sided pleural effusion with new pleural-based nodularity. For example, a 17 x 7 mm nodular thickening of the left posterior pleura (series 8, image 202). Additionally, 14 x 12 mm soft tissue mass along the medial left pleura (series 8, image 95), previously measuring 11 x 3 mm. No focal airspace opacity. Enlarging pulmonary nodules compared to prior. Findings include but are not limited to:  -New 5 mm solid nodule of the right middle lobe (series 9, image 155).  -New 7 mm solid nodule of the medial right lower lobe (series 9, image 165).     Normal thyroid.     ABDOMEN/PELVIS:  LIVER: No suspicious hepatic lesion. Unchanged too small to  characterize hypodensity in segment 4. No intrahepatic or extrahepatic  biliary ductal dilation.     GALLBLADDER: Cholelithiasis without findings to suggest  acute  cholecystitis      PANCREAS: No focal pancreatic lesion. The main pancreatic duct is not  dilated.     SPLEEN: Within normal limits.     ADRENAL GLANDS: No focal adrenal nodule.     URINARY TRACT: No suspicious renal lesion.  No hydronephrosis or  hydroureter.  No nephrolithiasis.     BLADDER: Within normal limits.     REPRODUCTIVE ORGANS: Within normal limits.     GASTROINTESTINAL TRACT: Normal caliber large and small bowel.  Normal  appendix.     PERITONEUM/MESENTERY: No free fluid. No free air.     VESSELS: The aorta and major branch vessels are patent. The main  portal, splenic and proximal superior mesenteric veins are patent. No  significant atherosclerotic disease.     LYMPH NODES: Rounded and irregular axillary lymphadenopathy bilaterally, which appears new from prior. For example, a 13 x 9 mm  right axillary node (series 8, image 54). Mildly increased conspicuity  of left external iliac lymph nodes, which remain subcentimeter in  short axis. No abdominopelvic lymphadenopathy identified.     BONES: Extensive sclerotic metastatic lesions throughout the axial and visualized appendicular skeleton, which have increased in number compared to prior. Healing left lateral ninth rib fracture, which appears new from prior.     SOFT TISSUES: Increased soft tissue nodularity/masses about the left anterior chest wall. For example, 3.0 x 2.5 cm mass along the lateral left pectoralis musculature (series 8, image 83), previously measuring 2.2 x 1.9 cm. Continued skin thickening and nipple retraction of the left breast.                                                                      IMPRESSION:      1. Interval metastatic disease progression with new and increased metastatic mediastinal and axillary lymphadenopathy, increased soft tissue masses in the left breast/anterior chest wall, new subcentimeter anterior pericardial nodules, new and enlarging pleural-based metastases with a small malignant left-sided  pleural  effusion, increased osseous metastases, and multiple new and enlarging pulmonary nodules.     2. Mildly displaced left ninth rib fracture appears new when compared to 12/4/2024 CT.      3. Cholelithiasis without findings to suggest acute cholecystitis.      ASSESSMENT/PLAN:    Metastatic breast cancer, hormone positive, HER2 negative.    She was on Capivasertib/fulvestrant since January 2025 but she has had several breaks in treatment because of worsening diarrhea despite dose reductions.  She last received Capivasertib in early April 2025.    Now she has evidence of significant progression of the disease including increase in the size of the soft tissue masses in the left breast/anterior chest wall as well as new and increased mediastinal and axillary lymphadenopathy, pericardial nodules, pleural-based metastasis and small left-sided pleural effusion, likely malignant as well as worsening lung metastasis and worsening of osseous disease.      Because of PIK3CA p.E453K positive, we started her on Alpelisib/letrozole on 5/17/2025.  She was admitted for diabetic ketoacidosis.    Now her blood sugars are better controlled with insulin but she does not want to try Alpelisib/letrozole again.    I would recommend starting chemotherapy with paclitaxel.  We discussed the rationale schedule and potential side effects of it in detail.  We will give it to her every week for 3 weeks with fourth week being off.  Because of baseline neuropathy, I will dose reduce paclitaxel to 70 mg/m .    We will get baseline CT chest abdomen and pelvis and bone scan prior to start of the chemotherapy.    Uncontrolled diabetes.  Recently was admitted for diabetic ketoacidosis likely from Alpelisib.  Now sugars are better controlled and she is taking insulin glargine 22 units at baseline and NovoLog sliding scale.  She will follow closely with PCP.        Cancer involving the bones.  Because of worsening bone metastasis, we have changed  Zometa to Xgeva.  She will get this today.  Continue calcium and vitamin D.     Return to clinic before cycle #2 of chemotherapy.    All questions answered to her satisfaction.  She is agreeable and comfortable with the plan.    Felicia Ashraf MD     The longitudinal plan of care for the diagnosis(es)/condition(s) as documented were addressed during this visit. Due to the added complexity in care, I will continue to support Desmond in the subsequent management and with ongoing continuity of care.                    Again, thank you for allowing me to participate in the care of your patient.        Sincerely,        Felicia Ashraf MD    Electronically signed

## 2025-06-10 NOTE — PROGRESS NOTES
Oral Chemotherapy Program    Thank you for the opportunity to be a part in the care of this patient's oral chemotherapy. The oncology pharmacy will no longer be following this patient for oral chemotherapy. If there are any questions or the plan changes, feel free to contact us.    Tommy Roche, PharmD, [facility]  Hematology/Oncology Clinical Pharmacist  [facility]  [phone]

## 2025-06-12 ENCOUNTER — RESULTS FOLLOW-UP (OUTPATIENT)
Dept: FAMILY MEDICINE | Facility: CLINIC | Age: 70
End: 2025-06-12

## 2025-06-12 ENCOUNTER — OFFICE VISIT (OUTPATIENT)
Dept: FAMILY MEDICINE | Facility: CLINIC | Age: 70
End: 2025-06-12
Payer: COMMERCIAL

## 2025-06-12 VITALS
HEART RATE: 118 BPM | DIASTOLIC BLOOD PRESSURE: 75 MMHG | RESPIRATION RATE: 30 BRPM | WEIGHT: 151 LBS | TEMPERATURE: 98.2 F | HEIGHT: 61 IN | BODY MASS INDEX: 28.51 KG/M2 | SYSTOLIC BLOOD PRESSURE: 108 MMHG | OXYGEN SATURATION: 94 %

## 2025-06-12 DIAGNOSIS — E78.5 DYSLIPIDEMIA: Primary | ICD-10-CM

## 2025-06-12 DIAGNOSIS — C79.51 MALIGNANT NEOPLASM METASTATIC TO BONE (H): ICD-10-CM

## 2025-06-12 DIAGNOSIS — I10 BENIGN ESSENTIAL HYPERTENSION: ICD-10-CM

## 2025-06-12 DIAGNOSIS — Z09 HOSPITAL DISCHARGE FOLLOW-UP: Primary | ICD-10-CM

## 2025-06-12 DIAGNOSIS — E11.10 DIABETIC KETOACIDOSIS WITHOUT COMA ASSOCIATED WITH TYPE 2 DIABETES MELLITUS (H): ICD-10-CM

## 2025-06-12 DIAGNOSIS — Z13.6 SCREENING FOR CARDIOVASCULAR CONDITION: ICD-10-CM

## 2025-06-12 LAB
CHOLEST SERPL-MCNC: 209 MG/DL
HDLC SERPL-MCNC: 57 MG/DL
LDLC SERPL CALC-MCNC: 115 MG/DL
NONHDLC SERPL-MCNC: 152 MG/DL
TRIGL SERPL-MCNC: 183 MG/DL

## 2025-06-12 RX ORDER — AMLODIPINE BESYLATE 5 MG/1
10 TABLET ORAL DAILY
Qty: 180 TABLET | Refills: 3 | Status: SHIPPED | OUTPATIENT
Start: 2025-06-12

## 2025-06-12 RX ORDER — ATORVASTATIN CALCIUM 20 MG/1
20 TABLET, FILM COATED ORAL AT BEDTIME
Qty: 90 TABLET | Refills: 3 | Status: SHIPPED | OUTPATIENT
Start: 2025-06-12

## 2025-06-12 RX ORDER — METFORMIN HYDROCHLORIDE 500 MG/1
500 TABLET, EXTENDED RELEASE ORAL 2 TIMES DAILY WITH MEALS
Qty: 180 TABLET | Refills: 3 | Status: SHIPPED | OUTPATIENT
Start: 2025-06-12

## 2025-06-12 NOTE — PROGRESS NOTES
Assessment & Plan     Hospital discharge follow-up  Patient was admitted to the hospital, June 1, 2025, was discharged on 4 May 2025.  She was admitted for diabetic ketoacidosis. A1c was elevated, blood sugar was elevated.    Patient was receiving immunotherapy for her metastatic breast cancer, this was causes of her blood sugar to be elevated.    Her electrolyte, blood sugar was normalized during her hospitalization.  She was started on insulin, since her discharge her blood sugar has been averaging in the 150s, to 140s.  No blood sugar.    I reviewed her admission, discharge summary.  Reviewed her labs, workup.  Multiple CT scan which included head CT scan, cervical CT scan.  X-ray all came back with no acute finding      Diabetic ketoacidosis without coma associated with type 2 diabetes mellitus (H)  Last A1c at 9.3  Continue with current medicine  Lantus at 22 units in AM and metformin  Sliding scale tid before meals.  - Lipid panel reflex to direct LDL Non-fasting; Future  - insulin glargine (LANTUS PEN) 100 UNIT/ML pen; Inject 22 Units subcutaneously every morning (before breakfast).  - metFORMIN (GLUCOPHAGE XR) 500 MG 24 hr tablet; Take 1 tablet (500 mg) by mouth 2 times daily (with meals).    Bone metastasis:  History of breast cancer, she does follow-up with oncology.  She reports she is scheduled to start chemo therapy in the near future    Screening for cardiovascular condition  Lipid panel    Benign essential hypertension    - amLODIPine (NORVASC) 5 MG tablet; Take 2 tablets (10 mg) by mouth daily.    Handicap parking form was filled and given to the patient    MED REC REQUIRED  Post Medication Reconciliation Status:       Follow-up    Follow-up Visit   Expected date:  Sep 12, 2025 (Approximate)      Follow Up Appointment Details:     Follow-up with whom?: PCP    Follow-Up for what?: Medicare Wellness    Any Additional Chronic Condition Management?: Diabetes    Welcome or Annual?: Annual Wellness     How?: In Person                 Subjective   Desmond is a 69 year old, presenting for the following health issues:  Hospital F/U  Patient with history of diabetes, breast cancer with metastatic to bone, she was admitted to the hospital secondary to weakness, fall at home.  She was found to be in DKA, likely 2nd to Pigray use.  Since then she has stopped Pigray, she will follow-up with her oncology and likely, start chemotherapy.    Her blood sugar has been well-controlled with her current medications.  She has no low blood sugar.    She has no fever, no chills, no cough.          6/12/2025     9:05 AM   Additional Questions   Roomed by odilia peacock ma   Accompanied by self         6/12/2025     9:05 AM   Patient Reported Additional Medications   Patient reports taking the following new medications none     HPI            6/5/2025   Post Discharge Outreach   How are you doing now that you are home? doing alright   How are your symptoms? (Red Flag symptoms escalate to triage hotline per guidelines) Improved   Does the patient have their discharge instructions?  Yes   Does the patient have questions regarding their discharge instructions?  No   Were you started on any new medications or were there changes to any of your previous medications?  Yes   Does the patient have all of their medications? Yes   Do you have questions regarding any of your medications?  No   Do you have all of your needed medical supplies or equipment (DME)?  (i.e. oxygen tank, CPAP, cane, etc.) Yes   Discharge Follow Up Appointment Date 6/12/2025   Discharge Follow Up Appointment Scheduled with? Primary Care Provider       Hospital Follow-up Visit:    Hospital/Nursing Home/IP Rehab Facility: [facility]  Most Recent Admission Date: 6/1/2025   Most Recent Admission Diagnosis: Diabetic ketoacidosis without coma associated with type 2 diabetes mellitus (H) - E11.10     Most Recent Discharge Date: 6/4/2025   Most Recent  "Discharge Diagnosis: Diabetic ketoacidosis without coma associated with type 2 diabetes mellitus (H) - E11.10   Do you have any other stressors you would like to discuss with your provider? OTHER: med she was in induced diabetes and she would like to discuss medication    Problems taking medications regularly:  None  Medication changes since discharge: None  Problems adhering to non-medication therapy:  None    Summary of hospitalization:  [facility] discharge summary reviewed  Diagnostic Tests/Treatments reviewed.  Follow up needed: Oncology.  Other Healthcare Providers Involved in Patient s Care:         None  Update since discharge: improved.         Plan of care communicated with patient               Review of Systems  Constitutional, HEENT, cardiovascular, pulmonary, GI, , musculoskeletal, neuro, skin, endocrine and psych systems are negative, except as otherwise noted.      Objective    /75 (BP Location: Other (Comment), Patient Position: Sitting, Cuff Size: Adult Regular)   Pulse (!) 124   Temp 98.2  F (36.8  C) (Oral)   Resp 30   Ht 1.549 m (5' 1\")   Wt 68.5 kg (151 lb)   SpO2 94%   BMI 28.53 kg/m    Body mass index is 28.53 kg/m .  Physical Exam   GENERAL: alert and no distress  RESP: lungs clear to auscultation - no rales, rhonchi or wheezes  CV: regular rate and rhythm, normal S1 S2, no S3 or S4, no murmur, click or rub, no peripheral edema  ABDOMEN: soft, nontender, no hepatosplenomegaly, no masses and bowel sounds normal  MS: no gross musculoskeletal defects noted, no edema  NEURO: Normal strength and tone, mentation intact and speech normal  PSYCH: mentation appears normal, affect normal/bright    Orders Placed This Encounter   Procedures    REVIEW OF HEALTH MAINTENANCE PROTOCOL ORDERS    COVID-19 12+ (PFIZER)    Lipid panel reflex to direct LDL Non-fasting     Lab Results   Component Value Date    A1C 9.3 06/01/2025    A1C 6.2 05/13/2025    A1C 6.3 02/05/2025    A1C 5.4 " 08/21/2024    A1C 5.2 05/17/2024    A1C 6.5 12/23/2020    A1C 6.3 09/02/2020    A1C 10.6 06/25/2020             Signed Electronically by: Vanesa Claros MD

## 2025-06-18 ENCOUNTER — TELEPHONE (OUTPATIENT)
Dept: FAMILY MEDICINE | Facility: CLINIC | Age: 70
End: 2025-06-18
Payer: COMMERCIAL

## 2025-06-18 NOTE — TELEPHONE ENCOUNTER
Patient Quality Outreach    Patient is due for the following:   Diabetes -  Foot Exam  Physical Annual Wellness Visit    Action(s) Taken:   Schedule a Annual Wellness Visit    Type of outreach:    Sent [employer] message.    Questions for provider review:    None         Tiffani Veliz [facility]  Chart routed to Care Team.

## 2025-06-30 ENCOUNTER — ANCILLARY PROCEDURE (OUTPATIENT)
Dept: NUCLEAR MEDICINE | Facility: CLINIC | Age: 70
End: 2025-06-30
Attending: INTERNAL MEDICINE
Payer: COMMERCIAL

## 2025-06-30 ENCOUNTER — ANCILLARY PROCEDURE (OUTPATIENT)
Dept: CT IMAGING | Facility: CLINIC | Age: 70
End: 2025-06-30
Attending: INTERNAL MEDICINE
Payer: COMMERCIAL

## 2025-06-30 DIAGNOSIS — C50.912 MALIGNANT NEOPLASM OF LEFT FEMALE BREAST, UNSPECIFIED ESTROGEN RECEPTOR STATUS, UNSPECIFIED SITE OF BREAST (H): ICD-10-CM

## 2025-06-30 DIAGNOSIS — C79.51 MALIGNANT NEOPLASM METASTATIC TO BONE (H): ICD-10-CM

## 2025-06-30 PROCEDURE — 71260 CT THORAX DX C+: CPT | Mod: GC | Performed by: RADIOLOGY

## 2025-06-30 PROCEDURE — 78306 BONE IMAGING WHOLE BODY: CPT | Performed by: RADIOLOGY

## 2025-06-30 PROCEDURE — A9503 TC99M MEDRONATE: HCPCS | Performed by: RADIOLOGY

## 2025-06-30 PROCEDURE — 74177 CT ABD & PELVIS W/CONTRAST: CPT | Mod: GC | Performed by: RADIOLOGY

## 2025-06-30 RX ORDER — IOPAMIDOL 755 MG/ML
83 INJECTION, SOLUTION INTRAVASCULAR ONCE
Status: COMPLETED | OUTPATIENT
Start: 2025-06-30 | End: 2025-06-30

## 2025-06-30 RX ORDER — HEPARIN SODIUM (PORCINE) LOCK FLUSH IV SOLN 100 UNIT/ML 100 UNIT/ML
5 SOLUTION INTRAVENOUS ONCE
Status: COMPLETED | OUTPATIENT
Start: 2025-06-30 | End: 2025-06-30

## 2025-06-30 RX ORDER — TC 99M MEDRONATE 20 MG/10ML
26.3 INJECTION, POWDER, LYOPHILIZED, FOR SOLUTION INTRAVENOUS ONCE
Status: COMPLETED | OUTPATIENT
Start: 2025-06-30 | End: 2025-06-30

## 2025-06-30 RX ADMIN — TC 99M MEDRONATE 26.3 MILLICURIE: 20 INJECTION, POWDER, LYOPHILIZED, FOR SOLUTION INTRAVENOUS at 09:00

## 2025-06-30 RX ADMIN — HEPARIN SODIUM (PORCINE) LOCK FLUSH IV SOLN 100 UNIT/ML 5 ML: 100 SOLUTION at 08:40

## 2025-06-30 RX ADMIN — IOPAMIDOL 83 ML: 755 INJECTION, SOLUTION INTRAVASCULAR at 08:08

## 2025-07-03 ENCOUNTER — VIRTUAL VISIT (OUTPATIENT)
Dept: ONCOLOGY | Facility: CLINIC | Age: 70
End: 2025-07-03
Payer: COMMERCIAL

## 2025-07-03 DIAGNOSIS — M89.8X5 LYTIC BONE LESION OF RIGHT FEMUR: ICD-10-CM

## 2025-07-03 DIAGNOSIS — G62.9 NEUROPATHY: ICD-10-CM

## 2025-07-03 DIAGNOSIS — D64.9 ANEMIA, UNSPECIFIED TYPE: ICD-10-CM

## 2025-07-03 DIAGNOSIS — C50.812 MALIGNANT NEOPLASM OF OVERLAPPING SITES OF LEFT BREAST IN FEMALE, ESTROGEN RECEPTOR POSITIVE (H): ICD-10-CM

## 2025-07-03 DIAGNOSIS — R73.09 ELEVATED GLUCOSE: ICD-10-CM

## 2025-07-03 DIAGNOSIS — Z17.0 MALIGNANT NEOPLASM OF OVERLAPPING SITES OF LEFT BREAST IN FEMALE, ESTROGEN RECEPTOR POSITIVE (H): ICD-10-CM

## 2025-07-03 DIAGNOSIS — C50.912 MALIGNANT NEOPLASM OF LEFT FEMALE BREAST, UNSPECIFIED ESTROGEN RECEPTOR STATUS, UNSPECIFIED SITE OF BREAST (H): Primary | ICD-10-CM

## 2025-07-03 DIAGNOSIS — C79.51 MALIGNANT NEOPLASM METASTATIC TO BONE (H): ICD-10-CM

## 2025-07-03 RX ORDER — GABAPENTIN 250 MG/5ML
250 SOLUTION ORAL AT BEDTIME
Qty: 470 ML | Refills: 0 | Status: SHIPPED | OUTPATIENT
Start: 2025-07-03

## 2025-07-03 NOTE — Clinical Note
7/3/2025      Mary Olson  [address]  [facility] [address]      Dear Colleague,    Thank you for referring your patient, Mary Olson, to the [facility]. Please see a copy of my visit note below.    Virtual Oncology Follow-Up Visit: July 3, 2025    Oncologist:   PCP: Vanesa Claros    Reason for Visit: Follow-up    Diagnosis:   In March, 2020, the patient presented with a several month history of left breast nodularity and palpable axillary adenopathy.  Subsequent imaging confirmed bilateral breast lesions including a 6.2 cm left breast mass with diffuse breast dermal thickening and a 1.3 cm right breast mass.  The left breast mass revealed a grade 2 invasive mammary carcinoma that was ER/FL positive and HER2 negative.  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     Staging PET scan revealed left axillary and retropectoral lymph node metastases as well as diffuse bony metastases.          March, 2020 - August, 2020: She received weekly paclitaxel with some disease response but requested a break from cytotoxic therapy from significant issues with balance and neuropathy requiring wheelchair use.      August, 2020 - March, 2021: Started on anastrozole with February, 2021 PET scan evidence of progression.     March, 2021-August, 2023: Started on anastrozole and abemaciclib.  May, 2023 CT CAP with evidence of soft tissue progression.      PIK3CA p.E453K+, ESR1 negative, TMB-low, TEJAS on left axillary tissue.     August, 2023-December, 2023: Switch to exemestane and everolimus with treatment discontinued after 4 months because of significant left breast mass progression.     December, 2023-December, 2024: Started capecitabine 1500 mg twice daily with dose reduction to 500 mg twice daily 7 days on/7 days off due to mucositis and hand-foot syndrome.       CA 15-3 tumor marker had declined from a zenith of 945 on  12/26/2023 to a nerissa of 107 on 5/17/2024 with value of 203 on 10/2/2024.  On 11/5/2024, her CA 15-3 tumor marker was up to 286.     12/4/2024 restaging CT CAP revealed evidence of increased nodularity of the left invasive breast cancer along the anterior chest wall as well as slight increase in anterior mediastinal soft tissue nodule.  A new left upper lobe 4 mm nodule was also identified with no other gross evidence of metastatic disease in the abdomen or pelvis.       Subsequent 12/13/2024 echocardiogram revealed a 65-70% EF.      FoundationOne study revealed no new ESR 1 mutation.     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 A 1/7/2025 MR brain revealed diffuse osseous disease but no specific abnormal enhancement along the cisternal segment of the visualized cranial nerves. She thinks her diplopia has improved on antiviral therapy.      1/3/2025- because of PIK3CA p.E453K positive, initiated  fulvestrant/capivasertib      3/16/2025.  Reduced Capivasertib to 320 mg twice daily Days 1-4 out of 7-mainly due to diarrhea.     3/23/2025. We further dose reduced Capivasertib to 320 mg in the morning and 160 mg in the evening because of worsening diarrhea     When I saw her on 4/3/2025, we decreased Capivasertib dose to 200 mg twice a day but she did not tolerate this well because of recurrence of diarrhea so she stopped that.  She received Faslodex on 4/11/2025.      She started Alpelisib/letrozole around 5/17/2025.       She was admitted from 6/1/2025 - 6/4/2025 for diabetic ketoacidosis likely from Alpelisib.    She stopped Alpelisib and letrozole at that time.  Currently feels well.  She is taking insulin glargine 22 units at baseline and uses NovoLog sliding scale.  Now her blood sugars are well.  She denies any pain.  No nausea or vomiting.  No shortness of breath.  No new swellings.  She has mild tingling and numbness in the fingers and feet.  She is not willing to resume Alpelisib  again.  She still has issues with double vision although they are better.  This is from the right 6th nerve palsy.      Treatment:      Interval History:  Pt is seen virtually for review of ***    Patient denies any of the following except if noted above: fevers, chills, difficulty with energy, vision or hearing changes, chest pain, dyspnea, abdominal pain, nausea, vomiting, diarrhea, constipation, urinary concerns, headaches, numbness, tingling, issues with sleep or mood. {carmenECU Health Bertie Hospitalrigoberto:[id number]} also denies lumps, bumps, rashes or skin lesions, bleeding or bruising issues.    Physical Exam: Limited due to virtual visit restrictions.  General: No acute distress. Appearance is well-groomed.  Resp: No respiratory distress. No cough.   Skin: No visible rash or lesions.  Neuro: A/O x 4. Appropriate mentation and speech.  Psych: Appropriate mood.     Laboratory Results:   No results found for any visits on 07/03/25.  I reviewed the above labs today.    Imaging:  CT Chest/Abdomen/Pelvis w Contrast  Narrative: EXAM: Chest/abdomen/pelvis CT with contrast 6/30/2025 8:20 AM    HISTORY: 69 years Female Malignant neoplasm metastatic to bone (H);  Malignant neoplasm of left female breast, unspecified estrogen  receptor status, unspecified site of breast (H)    Additional history (per chart review): 69-year-old female with history  of metastatic breast cancer. Breast nodularity and palpable axillary  adenopathy in March 20, 2020. Subsequent imaging confirmed bilateral  breast lesions including 6.2 cm left breast mass with diffuse breast  normal thickening and a 1.3 cm right breast mass. Staging PET CT  revealed left axillary and retropectoral lymph node metastases as well  as diffuse bony metastases. The chemotherapeutic regimen as listed in  the most recent hematology and oncology note. Most recently started  Alpelisib.    COMPARISON: None.    TECHNIQUE: Helical CT images from the thoracic inlet through the  symphysis pubis were  obtained with IV contrast. Contrast dose: 83 ML  ISOVUE 370.    FINDINGS:   image(s) are noncontributory.    LINES/TUBES: Right chest Port-A-Cath with tip terminating low-to-mid  SVC.    CHEST:  LOWER NECK: Unremarkable thyroid.     PULMONARY: Central tracheal bronchial tree is patent. Left lower lobe  and left upper lobe base compressive atelectasis. Moderate-sized  left-sided malignant pleural effusion. No pneumothorax. No  pleural-based calcification.    Multiple pulmonary nodules throughout both lungs, for example:  -8 mm groundglass nodule (4/102) within the left upper lobe,  previously 7 mm.  -6 mm right middle lobe solid nodule (4/176), previously 6 mm.  -6 mm right upper lobe peribronchial nodule (4/103), previously 5 mm.    Multifocal sub-6 mm micronodularity most prominently at the lung  periphery are similar to 4/25/2025. No definite new pulmonary nodules.    Multiple mediastinal and pleural-based nodules are as follows:  - 2.1 x 0.8 cm pleural-based nodule within the left posterior lung  base (3/242), previously 1.7 x 0.7 cm.  - 2.5 x 2.1 cm solid nodularity within the anterior mediastinum  (3/139), previously 2.2 x 1.7 cm.   -1.4 x 1.1 cm anterior mediastinum peripherally enhancing and  centrally hypoattenuating nodules/lymph nodes (3/100), previously 1.5  x 1.1 cm.  -Enlarging soft tissue nodule in the inferior most left pleural recess  measures 2.1 x 1.8 cm (3/248) previously 1.7 x 1.0 cm.    CARDIAC: No cardiomegaly. Focal nodularity along the pericardial sac,  for example, nodular thickening along the anterior pericardium  (3/192). No intracardiac mass or thrombus. No significant coronary  artery atherosclerotic calcifications. Midascending thoracic aortic  diameter is within normal limits. Main pulmonary artery diameter is  normal. No central pulmonary embolus.    MEDIASTINUM: Multiple enlarged mediastinal lymph nodes, for example:  -1.1 cm right paratracheal lymph node (3/93), previously 0.8  cm.  -New 1.8 x 1.6 cm conglomerate right paratracheal lymph nodes (3/132)  -1.1 cm anterior mediastinal centrally necrotic lymph nodes (3/93),  previously 1.1 cm.  -1.3 cm right axillary lymph node (3/75), previously 0.9 cm.  -1.1 cm left axillary lymph node (3/72), previously 1.1 cm.    ESOPHAGUS: Unremarkable.    ABDOMEN/PELVIS:  HEPATIC: No suspicious hepatic lesion. Unchanged too small to  characterize hypodensity within hepatic segment 4. No intrahepatic or  extrahepatic biliary ductal dilation.    BILIARY: Punctate cholelithiasis within the gallbladder neck. The  gallbladder size is within normal limits. No gallbladder wall  thickening or pericholecystic fluid or fat stranding.    PANCREAS: No focal pancreatic lesion. The main pancreatic duct is not  dilated.    SPLEEN: No splenomegaly. No suspicious lesions or masses. Incidental  splenule.    ADRENALS: Stable mild thickening of the adrenal glands, left greater  than right.    RENAL: No hydronephrosis, calculi, or mass.    URINARY BLADDER: Unremarkable.    REPRODUCTIVE: Possible small uterine fibroid (3/518) along the right  lower uterine segment.    GASTROINTESTINAL: Unremarkable stomach and duodenum. Normal caliber  large and small bowel. No abnormal bowel wall thickening or  enhancement. No pneumatosis or portal venous gas. No appendicitis.  Colonic diverticulosis without diverticulitis.    MESENTERY/PERITONEUM: No ascites or pneumoperitoneum.    VASCULAR: Nonaneurysmal abdominal aorta. Major abdominal vessels  appear patent. Mild atherosclerotic vascular calcifications.    LYMPH NODES: No suspicious mesenteric or retroperitoneal lymph nodes.  Axillary mediastinal lymph nodes as described above.    MUSCULOSKELETAL: Diffuse confluent sclerotic osseous metastases  throughout the axial and appendicular skeleton, overall, similar to  the previous examination from 4/25/2025. Stable displaced rib fracture  of the left ninth rib. Chronic appearing fracture  deformity of the  right 9th rib.     SOFT TISSUES: Similar-appearing soft tissue nodularity/masses about  the left anterior chest wall, for example, 3.3 x 2.3 cm mass along the  left lateral pectoralis musculature (3/94), previously 3.0 x 2.5 cm.  Continued skin thickening, nipple retraction, and calcifications  within the left breast. New soft tissue nodule within the right lower  abdominal wall subcutaneous soft tissues measuring 2.1 x 2.0 cm  (3/481). Groundglass, somewhat ill-defined lesion within the  peripheral left lower abdominal subcutaneous soft tissues (3/468)  measuring 1.1 x 1.1 cm. Fatty infiltration of the paraspinous  musculature.   Impression: IMPRESSION: Compared to CT from 4/25/2025:    1. Interval metastatic disease progression as evidenced by:  A. New and increased metastatic mediastinal and axillary  lymphadenopathy  B. Similar left chest wall/breast soft tissue nodularity.  C. Increased size of anterior mediastinal nodularity/lymphadenopathy.  D. Increased volume of left-sided pleural effusion with increased  associated pleural-based nodules/metastatic disease.    2. Stable mildly displaced left ninth rib fracture. Stable fracture  deformity of the right 10th    3. Cholelithiasis without evidence of acute cholecystitis.    4. Unchanged diffuse confluent metastatic sclerotic lesions throughout  the axial and appendicular skeleton.     5. Other incidental findings are noted in the body of the report.    I have personally reviewed the examination and initial interpretation  and I agree with the findings.    JUSTICE CRAIG MD         SYSTEM ID:  [id number]    I reviewed the above imaging report today.      Assessment and Plan:   ***  - On treatment with ***  - Tolerating *** with manageable side effects.   - Tolerating *** without significant bone pain. Using Claritin appropriately. Reviewed adequate hand hygiene.  - Labs reviewed and discussed. Continues to meet treatment goals, will proceed  with cycle *** without changes.   - Repeat imaging due ***, followed by review with Dr. Fox.  - Continue provider / MARISA visits prior to treatment for review.  - Patient is in agreement with plan.       Joanna RIVERA, [address] [address]                Virtual Visit Details     Type of service:  Video Visit     Originating Location (pt. Location): Home  Distant Location (provider location): On-site  Platform used for Video Visit: {[email or web address]:[id number]}      Video Total Time:     Virtual Oncology Follow-Up Visit: July 3, 2025    Oncologist: Dr. Ashraf  PCP: Vanesa Claros    Reason for Visit: Pre-treatment follow-up    Diagnosis: Metastatic breast cancer   In March, 2020, the patient presented with a several month history of left breast nodularity and palpable axillary adenopathy.  Subsequent imaging confirmed bilateral breast lesions including a 6.2 cm left breast mass with diffuse breast dermal thickening and a 1.3 cm right breast mass.  The left breast mass revealed a grade 2 invasive mammary carcinoma that was ER/IL positive and HER2 negative.  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     Staging PET scan revealed left axillary and retropectoral lymph node metastases as well as diffuse bony metastases.          March, 2020 - August, 2020: She received weekly paclitaxel with some disease response but requested a break from cytotoxic therapy from significant issues with balance and neuropathy requiring wheelchair use.      August, 2020 - March, 2021: Started on anastrozole with February, 2021 PET scan evidence of progression.     March, 2021-August, 2023: Started on anastrozole and abemaciclib.  May, 2023 CT CAP with evidence of soft tissue progression.      PIK3CA p.E453K+, ESR1 negative, TMB-low, TEJAS on left axillary tissue.     August, 2023-December, 2023: Switch to exemestane  and everolimus with treatment discontinued after 4 months because of significant left breast mass progression.     December, 2023-December, 2024: Started capecitabine 1500 mg twice daily with dose reduction to 500 mg twice daily 7 days on/7 days off due to mucositis and hand-foot syndrome.       CA 15-3 tumor marker had declined from a zenith of 945 on 12/26/2023 to a nerissa of 107 on 5/17/2024 with value of 203 on 10/2/2024.  On 11/5/2024, her CA 15-3 tumor marker was up to 286.     12/4/2024 restaging CT CAP revealed evidence of increased nodularity of the left invasive breast cancer along the anterior chest wall as well as slight increase in anterior mediastinal soft tissue nodule.  A new left upper lobe 4 mm nodule was also identified with no other gross evidence of metastatic disease in the abdomen or pelvis.       Subsequent 12/13/2024 echocardiogram revealed a 65-70% EF.      FoundationOne study revealed no new ESR 1 mutation.     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 A 1/7/2025 MR brain revealed diffuse osseous disease but no specific abnormal enhancement along the cisternal segment of the visualized cranial nerves. She thinks her diplopia has improved on antiviral therapy.      1/3/2025- because of PIK3CA p.E453K positive, initiated  fulvestrant/capivasertib      3/16/2025.  Reduced Capivasertib to 320 mg twice daily Days 1-4 out of 7-mainly due to diarrhea.     3/23/2025. We further dose reduced Capivasertib to 320 mg in the morning and 160 mg in the evening because of worsening diarrhea     When I saw her on 4/3/2025, we decreased Capivasertib dose to 200 mg twice a day but she did not tolerate this well because of recurrence of diarrhea so she stopped that.  She received Faslodex on 4/11/2025.      She started Alpelisib/letrozole around 5/17/2025.       She was admitted from 6/1/2025 - 6/4/2025 for diabetic ketoacidosis likely from Alpelisib.    She stopped Alpelisib and  letrozole at that time.  Currently feels well.  She is taking insulin glargine 22 units at baseline and uses NovoLog sliding scale.  Now her blood sugars are well.  She denies any pain.  No nausea or vomiting.  No shortness of breath.  No new swellings.  She has mild tingling and numbness in the fingers and feet.  She is not willing to resume Alpelisib again.  She still has issues with double vision although they are better.  This is from the right 6th nerve palsy.        Treatment:      Interval History:      Patient denies any of the following except if noted above: fevers, chills, difficulty with energy, vision or hearing changes, chest pain, dyspnea, abdominal pain, nausea, vomiting, diarrhea, constipation, urinary concerns, headaches, numbness, tingling, issues with sleep or mood. {kimberly:[id number]} also denies lumps, bumps, rashes or skin lesions, bleeding or bruising issues.    Physical Exam: Limited d/t virtual restrictions.  General: No acute distress.  Resp: No respiratory distress. No cough.    Skin: No visible rash or lesions.  Neuro: A/O x 4. Appropriate mentation and speech.  Psych: Appropriate mood. Appearance is well-groomed.     Laboratory Results:   No results found for any visits on 07/03/25.  I reviewed the above labs today.    Imaging:  CT Chest/Abdomen/Pelvis w Contrast  Narrative: EXAM: Chest/abdomen/pelvis CT with contrast 6/30/2025 8:20 AM    HISTORY: 69 years Female Malignant neoplasm metastatic to bone (H);  Malignant neoplasm of left female breast, unspecified estrogen  receptor status, unspecified site of breast (H)    Additional history (per chart review): 69-year-old female with history  of metastatic breast cancer. Breast nodularity and palpable axillary  adenopathy in March 20, 2020. Subsequent imaging confirmed bilateral  breast lesions including 6.2 cm left breast mass with diffuse breast  normal thickening and a 1.3 cm right breast mass. Staging PET CT  revealed left axillary and  retropectoral lymph node metastases as well  as diffuse bony metastases. The chemotherapeutic regimen as listed in  the most recent hematology and oncology note. Most recently started  Alpelisib.    COMPARISON: None.    TECHNIQUE: Helical CT images from the thoracic inlet through the  symphysis pubis were obtained with IV contrast. Contrast dose: 83 ML  ISOVUE 370.    FINDINGS:   image(s) are noncontributory.    LINES/TUBES: Right chest Port-A-Cath with tip terminating low-to-mid  SVC.    CHEST:  LOWER NECK: Unremarkable thyroid.     PULMONARY: Central tracheal bronchial tree is patent. Left lower lobe  and left upper lobe base compressive atelectasis. Moderate-sized  left-sided malignant pleural effusion. No pneumothorax. No  pleural-based calcification.    Multiple pulmonary nodules throughout both lungs, for example:  -8 mm groundglass nodule (4/102) within the left upper lobe,  previously 7 mm.  -6 mm right middle lobe solid nodule (4/176), previously 6 mm.  -6 mm right upper lobe peribronchial nodule (4/103), previously 5 mm.    Multifocal sub-6 mm micronodularity most prominently at the lung  periphery are similar to 4/25/2025. No definite new pulmonary nodules.    Multiple mediastinal and pleural-based nodules are as follows:  - 2.1 x 0.8 cm pleural-based nodule within the left posterior lung  base (3/242), previously 1.7 x 0.7 cm.  - 2.5 x 2.1 cm solid nodularity within the anterior mediastinum  (3/139), previously 2.2 x 1.7 cm.   -1.4 x 1.1 cm anterior mediastinum peripherally enhancing and  centrally hypoattenuating nodules/lymph nodes (3/100), previously 1.5  x 1.1 cm.  -Enlarging soft tissue nodule in the inferior most left pleural recess  measures 2.1 x 1.8 cm (3/248) previously 1.7 x 1.0 cm.    CARDIAC: No cardiomegaly. Focal nodularity along the pericardial sac,  for example, nodular thickening along the anterior pericardium  (3/192). No intracardiac mass or thrombus. No significant  coronary  artery atherosclerotic calcifications. Midascending thoracic aortic  diameter is within normal limits. Main pulmonary artery diameter is  normal. No central pulmonary embolus.    MEDIASTINUM: Multiple enlarged mediastinal lymph nodes, for example:  -1.1 cm right paratracheal lymph node (3/93), previously 0.8 cm.  -New 1.8 x 1.6 cm conglomerate right paratracheal lymph nodes (3/132)  -1.1 cm anterior mediastinal centrally necrotic lymph nodes (3/93),  previously 1.1 cm.  -1.3 cm right axillary lymph node (3/75), previously 0.9 cm.  -1.1 cm left axillary lymph node (3/72), previously 1.1 cm.    ESOPHAGUS: Unremarkable.    ABDOMEN/PELVIS:  HEPATIC: No suspicious hepatic lesion. Unchanged too small to  characterize hypodensity within hepatic segment 4. No intrahepatic or  extrahepatic biliary ductal dilation.    BILIARY: Punctate cholelithiasis within the gallbladder neck. The  gallbladder size is within normal limits. No gallbladder wall  thickening or pericholecystic fluid or fat stranding.    PANCREAS: No focal pancreatic lesion. The main pancreatic duct is not  dilated.    SPLEEN: No splenomegaly. No suspicious lesions or masses. Incidental  splenule.    ADRENALS: Stable mild thickening of the adrenal glands, left greater  than right.    RENAL: No hydronephrosis, calculi, or mass.    URINARY BLADDER: Unremarkable.    REPRODUCTIVE: Possible small uterine fibroid (3/518) along the right  lower uterine segment.    GASTROINTESTINAL: Unremarkable stomach and duodenum. Normal caliber  large and small bowel. No abnormal bowel wall thickening or  enhancement. No pneumatosis or portal venous gas. No appendicitis.  Colonic diverticulosis without diverticulitis.    MESENTERY/PERITONEUM: No ascites or pneumoperitoneum.    VASCULAR: Nonaneurysmal abdominal aorta. Major abdominal vessels  appear patent. Mild atherosclerotic vascular calcifications.    LYMPH NODES: No suspicious mesenteric or retroperitoneal lymph  nodes.  Axillary mediastinal lymph nodes as described above.    MUSCULOSKELETAL: Diffuse confluent sclerotic osseous metastases  throughout the axial and appendicular skeleton, overall, similar to  the previous examination from 4/25/2025. Stable displaced rib fracture  of the left ninth rib. Chronic appearing fracture deformity of the  right 9th rib.     SOFT TISSUES: Similar-appearing soft tissue nodularity/masses about  the left anterior chest wall, for example, 3.3 x 2.3 cm mass along the  left lateral pectoralis musculature (3/94), previously 3.0 x 2.5 cm.  Continued skin thickening, nipple retraction, and calcifications  within the left breast. New soft tissue nodule within the right lower  abdominal wall subcutaneous soft tissues measuring 2.1 x 2.0 cm  (3/481). Groundglass, somewhat ill-defined lesion within the  peripheral left lower abdominal subcutaneous soft tissues (3/468)  measuring 1.1 x 1.1 cm. Fatty infiltration of the paraspinous  musculature.   Impression: IMPRESSION: Compared to CT from 4/25/2025:    1. Interval metastatic disease progression as evidenced by:  A. New and increased metastatic mediastinal and axillary  lymphadenopathy  B. Similar left chest wall/breast soft tissue nodularity.  C. Increased size of anterior mediastinal nodularity/lymphadenopathy.  D. Increased volume of left-sided pleural effusion with increased  associated pleural-based nodules/metastatic disease.    2. Stable mildly displaced left ninth rib fracture. Stable fracture  deformity of the right 10th    3. Cholelithiasis without evidence of acute cholecystitis.    4. Unchanged diffuse confluent metastatic sclerotic lesions throughout  the axial and appendicular skeleton.     5. Other incidental findings are noted in the body of the report.    I have personally reviewed the examination and initial interpretation  and I agree with the findings.    JUSTICE CRAIG MD         SYSTEM ID:  [id number]    I reviewed the above  "imaging report today.        Assessment and Plan:     - Starting treatment today with ***  - Port placed ***. Discussed Emla cream ***  - Again reviewed potential side effects including risk for hypersensitivity reaction, alopecia, nausea, vomiting, mouth sores, taste change, appetite loss, diarrhea, constipation, fatigue, infection, bleeding, bruising as well as skin rashes, liver inflammation, hormone disruption and other autoimmune side effects. Discussed use of diary log of symptoms to review at provider visits.   - Take Home Medications reviewed in detail (zofran, compazine, and dexamethasone).    - Reviewed use of G-CSF to support blood counts & Claritin for bone pain.   - Reviewed importance of adequate hydration, nutritional status, and regular physical activity.   - Discussed logistics of regimen/scheduling w/labs, provider visit, +/- infusion.   - Planning for *** cycles followed by repeat ***.   - All questions asked and answered. Patient feels comfortable with plan and provides consent to treatment.         [place] [address]          Virtual Visit Details     Type of service:  Video Visit     Originating Location (pt. Location): Home  Distant Location (provider location): {On or Off-Site - Virtual video:838353::\"On-site\",\"Off-site\"}  Platform used for Video Visit: {[email or web address]:925077}      Video Total Time:       Again, thank you for allowing me to participate in the care of your patient.        Sincerely,        NICOLE Avila CNP    Electronically signed"

## 2025-07-03 NOTE — Clinical Note
7/3/2025      Mary Olson  [address]  [facility] [address]      Dear Colleague,    Thank you for referring your patient, Mary Olson, to the [facility]. Please see a copy of my visit note below.    Virtual Oncology Follow-Up Visit: July 3, 2025    Oncologist:   PCP: Vanesa Claros    Reason for Visit: Follow-up    Diagnosis:   In March, 2020, the patient presented with a several month history of left breast nodularity and palpable axillary adenopathy.  Subsequent imaging confirmed bilateral breast lesions including a 6.2 cm left breast mass with diffuse breast dermal thickening and a 1.3 cm right breast mass.  The left breast mass revealed a grade 2 invasive mammary carcinoma that was ER/OR positive and HER2 negative.  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     Staging PET scan revealed left axillary and retropectoral lymph node metastases as well as diffuse bony metastases.          March, 2020 - August, 2020: She received weekly paclitaxel with some disease response but requested a break from cytotoxic therapy from significant issues with balance and neuropathy requiring wheelchair use.      August, 2020 - March, 2021: Started on anastrozole with February, 2021 PET scan evidence of progression.     March, 2021-August, 2023: Started on anastrozole and abemaciclib.  May, 2023 CT CAP with evidence of soft tissue progression.      PIK3CA p.E453K+, ESR1 negative, TMB-low, TEJAS on left axillary tissue.     August, 2023-December, 2023: Switch to exemestane and everolimus with treatment discontinued after 4 months because of significant left breast mass progression.     December, 2023-December, 2024: Started capecitabine 1500 mg twice daily with dose reduction to 500 mg twice daily 7 days on/7 days off due to mucositis and hand-foot syndrome.       CA 15-3 tumor marker had declined from a zenith of 945 on  12/26/2023 to a nerissa of 107 on 5/17/2024 with value of 203 on 10/2/2024.  On 11/5/2024, her CA 15-3 tumor marker was up to 286.     12/4/2024 restaging CT CAP revealed evidence of increased nodularity of the left invasive breast cancer along the anterior chest wall as well as slight increase in anterior mediastinal soft tissue nodule.  A new left upper lobe 4 mm nodule was also identified with no other gross evidence of metastatic disease in the abdomen or pelvis.       Subsequent 12/13/2024 echocardiogram revealed a 65-70% EF.      FoundationOne study revealed no new ESR 1 mutation.     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 A 1/7/2025 MR brain revealed diffuse osseous disease but no specific abnormal enhancement along the cisternal segment of the visualized cranial nerves. She thinks her diplopia has improved on antiviral therapy.      1/3/2025- because of PIK3CA p.E453K positive, initiated  fulvestrant/capivasertib      3/16/2025.  Reduced Capivasertib to 320 mg twice daily Days 1-4 out of 7-mainly due to diarrhea.     3/23/2025. We further dose reduced Capivasertib to 320 mg in the morning and 160 mg in the evening because of worsening diarrhea     When I saw her on 4/3/2025, we decreased Capivasertib dose to 200 mg twice a day but she did not tolerate this well because of recurrence of diarrhea so she stopped that.  She received Faslodex on 4/11/2025.      She started Alpelisib/letrozole around 5/17/2025.       She was admitted from 6/1/2025 - 6/4/2025 for diabetic ketoacidosis likely from Alpelisib.    She stopped Alpelisib and letrozole at that time.  Currently feels well.  She is taking insulin glargine 22 units at baseline and uses NovoLog sliding scale.  Now her blood sugars are well.  She denies any pain.  No nausea or vomiting.  No shortness of breath.  No new swellings.  She has mild tingling and numbness in the fingers and feet.  She is not willing to resume Alpelisib  again.  She still has issues with double vision although they are better.  This is from the right 6th nerve palsy.      Treatment:      Interval History:  Pt is seen virtually for review of ***    Patient denies any of the following except if noted above: fevers, chills, difficulty with energy, vision or hearing changes, chest pain, dyspnea, abdominal pain, nausea, vomiting, diarrhea, constipation, urinary concerns, headaches, numbness, tingling, issues with sleep or mood. {[facility]enAt[email or web address]:[id number]} also denies lumps, bumps, rashes or skin lesions, bleeding or bruising issues.    Physical Exam: Limited due to virtual visit restrictions.  General: No acute distress. Appearance is well-groomed.  Resp: No respiratory distress. No cough.   Skin: No visible rash or lesions.  Neuro: A/O x 4. Appropriate mentation and speech.  Psych: Appropriate mood.     Laboratory Results:   No results found for any visits on 07/03/25.  I reviewed the above labs today.    Imaging:  CT Chest/Abdomen/Pelvis w Contrast  Narrative: EXAM: Chest/abdomen/pelvis CT with contrast 6/30/2025 8:20 AM    HISTORY: 69 years Female Malignant neoplasm metastatic to bone (H);  Malignant neoplasm of left female breast, unspecified estrogen  receptor status, unspecified site of breast (H)    Additional history (per chart review): 69-year-old female with history  of metastatic breast cancer. Breast nodularity and palpable axillary  adenopathy in March 20, 2020. Subsequent imaging confirmed bilateral  breast lesions including 6.2 cm left breast mass with diffuse breast  normal thickening and a 1.3 cm right breast mass. Staging PET CT  revealed left axillary and retropectoral lymph node metastases as well  as diffuse bony metastases. The chemotherapeutic regimen as listed in  the most recent hematology and oncology note. Most recently started  Alpelisib.    COMPARISON: None.    TECHNIQUE: Helical CT images from the thoracic inlet through the  symphysis pubis were  obtained with IV contrast. Contrast dose: 83 ML  ISOVUE 370.    FINDINGS:   image(s) are noncontributory.    LINES/TUBES: Right chest Port-A-Cath with tip terminating low-to-mid  SVC.    CHEST:  LOWER NECK: Unremarkable thyroid.     PULMONARY: Central tracheal bronchial tree is patent. Left lower lobe  and left upper lobe base compressive atelectasis. Moderate-sized  left-sided malignant pleural effusion. No pneumothorax. No  pleural-based calcification.    Multiple pulmonary nodules throughout both lungs, for example:  -8 mm groundglass nodule (4/102) within the left upper lobe,  previously 7 mm.  -6 mm right middle lobe solid nodule (4/176), previously 6 mm.  -6 mm right upper lobe peribronchial nodule (4/103), previously 5 mm.    Multifocal sub-6 mm micronodularity most prominently at the lung  periphery are similar to 4/25/2025. No definite new pulmonary nodules.    Multiple mediastinal and pleural-based nodules are as follows:  - 2.1 x 0.8 cm pleural-based nodule within the left posterior lung  base (3/242), previously 1.7 x 0.7 cm.  - 2.5 x 2.1 cm solid nodularity within the anterior mediastinum  (3/139), previously 2.2 x 1.7 cm.   -1.4 x 1.1 cm anterior mediastinum peripherally enhancing and  centrally hypoattenuating nodules/lymph nodes (3/100), previously 1.5  x 1.1 cm.  -Enlarging soft tissue nodule in the inferior most left pleural recess  measures 2.1 x 1.8 cm (3/248) previously 1.7 x 1.0 cm.    CARDIAC: No cardiomegaly. Focal nodularity along the pericardial sac,  for example, nodular thickening along the anterior pericardium  (3/192). No intracardiac mass or thrombus. No significant coronary  artery atherosclerotic calcifications. Midascending thoracic aortic  diameter is within normal limits. Main pulmonary artery diameter is  normal. No central pulmonary embolus.    MEDIASTINUM: Multiple enlarged mediastinal lymph nodes, for example:  -1.1 cm right paratracheal lymph node (3/93), previously 0.8  cm.  -New 1.8 x 1.6 cm conglomerate right paratracheal lymph nodes (3/132)  -1.1 cm anterior mediastinal centrally necrotic lymph nodes (3/93),  previously 1.1 cm.  -1.3 cm right axillary lymph node (3/75), previously 0.9 cm.  -1.1 cm left axillary lymph node (3/72), previously 1.1 cm.    ESOPHAGUS: Unremarkable.    ABDOMEN/PELVIS:  HEPATIC: No suspicious hepatic lesion. Unchanged too small to  characterize hypodensity within hepatic segment 4. No intrahepatic or  extrahepatic biliary ductal dilation.    BILIARY: Punctate cholelithiasis within the gallbladder neck. The  gallbladder size is within normal limits. No gallbladder wall  thickening or pericholecystic fluid or fat stranding.    PANCREAS: No focal pancreatic lesion. The main pancreatic duct is not  dilated.    SPLEEN: No splenomegaly. No suspicious lesions or masses. Incidental  splenule.    ADRENALS: Stable mild thickening of the adrenal glands, left greater  than right.    RENAL: No hydronephrosis, calculi, or mass.    URINARY BLADDER: Unremarkable.    REPRODUCTIVE: Possible small uterine fibroid (3/518) along the right  lower uterine segment.    GASTROINTESTINAL: Unremarkable stomach and duodenum. Normal caliber  large and small bowel. No abnormal bowel wall thickening or  enhancement. No pneumatosis or portal venous gas. No appendicitis.  Colonic diverticulosis without diverticulitis.    MESENTERY/PERITONEUM: No ascites or pneumoperitoneum.    VASCULAR: Nonaneurysmal abdominal aorta. Major abdominal vessels  appear patent. Mild atherosclerotic vascular calcifications.    LYMPH NODES: No suspicious mesenteric or retroperitoneal lymph nodes.  Axillary mediastinal lymph nodes as described above.    MUSCULOSKELETAL: Diffuse confluent sclerotic osseous metastases  throughout the axial and appendicular skeleton, overall, similar to  the previous examination from 4/25/2025. Stable displaced rib fracture  of the left ninth rib. Chronic appearing fracture  deformity of the  right 9th rib.     SOFT TISSUES: Similar-appearing soft tissue nodularity/masses about  the left anterior chest wall, for example, 3.3 x 2.3 cm mass along the  left lateral pectoralis musculature (3/94), previously 3.0 x 2.5 cm.  Continued skin thickening, nipple retraction, and calcifications  within the left breast. New soft tissue nodule within the right lower  abdominal wall subcutaneous soft tissues measuring 2.1 x 2.0 cm  (3/481). Groundglass, somewhat ill-defined lesion within the  peripheral left lower abdominal subcutaneous soft tissues (3/468)  measuring 1.1 x 1.1 cm. Fatty infiltration of the paraspinous  musculature.   Impression: IMPRESSION: Compared to CT from 4/25/2025:    1. Interval metastatic disease progression as evidenced by:  A. New and increased metastatic mediastinal and axillary  lymphadenopathy  B. Similar left chest wall/breast soft tissue nodularity.  C. Increased size of anterior mediastinal nodularity/lymphadenopathy.  D. Increased volume of left-sided pleural effusion with increased  associated pleural-based nodules/metastatic disease.    2. Stable mildly displaced left ninth rib fracture. Stable fracture  deformity of the right 10th    3. Cholelithiasis without evidence of acute cholecystitis.    4. Unchanged diffuse confluent metastatic sclerotic lesions throughout  the axial and appendicular skeleton.     5. Other incidental findings are noted in the body of the report.    I have personally reviewed the examination and initial interpretation  and I agree with the findings.    JUSTICE CRAIG MD         SYSTEM ID:  [id number]    I reviewed the above imaging report today.      Assessment and Plan:   ***  - On treatment with ***  - Tolerating *** with manageable side effects.   - Tolerating *** without significant bone pain. Using Claritin appropriately. Reviewed adequate hand hygiene.  - Labs reviewed and discussed. Continues to meet treatment goals, will proceed  with cycle *** without changes.   - Repeat imaging due ***, followed by review with Dr. Fox.  - Continue provider / MARISA visits prior to treatment for review.  - Patient is in agreement with plan.       Joanna RIVERA, [address] [address]                Virtual Visit Details     Type of service:  Video Visit     Originating Location (pt. Location): Home  Distant Location (provider location): On-site  Platform used for Video Visit: {[id number]}      Video Total Time:     Virtual Oncology Follow-Up Visit: July 3, 2025    Oncologist: Dr. Ashraf  PCP: Vanesa Claros    Reason for Visit: Pre-treatment follow-up    Diagnosis: Metastatic breast cancer   In March, 2020, the patient presented with a several month history of left breast nodularity and palpable axillary adenopathy.  Subsequent imaging confirmed bilateral breast lesions including a 6.2 cm left breast mass with diffuse breast dermal thickening and a 1.3 cm right breast mass.  The left breast mass revealed a grade 2 invasive mammary carcinoma that was ER/VT positive and HER2 negative.  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     Staging PET scan revealed left axillary and retropectoral lymph node metastases as well as diffuse bony metastases.          March, 2020 - August, 2020: She received weekly paclitaxel with some disease response but requested a break from cytotoxic therapy from significant issues with balance and neuropathy requiring wheelchair use.      August, 2020 - March, 2021: Started on anastrozole with February, 2021 PET scan evidence of progression.     March, 2021-August, 2023: Started on anastrozole and abemaciclib.  May, 2023 CT CAP with evidence of soft tissue progression.      PIK3CA p.E453K+, ESR1 negative, TMB-low, TEJAS on left axillary tissue.     August, 2023-December, 2023: Switch to exemestane  and everolimus with treatment discontinued after 4 months because of significant left breast mass progression.     December, 2023-December, 2024: Started capecitabine 1500 mg twice daily with dose reduction to 500 mg twice daily 7 days on/7 days off due to mucositis and hand-foot syndrome.       CA 15-3 tumor marker had declined from a zenith of 945 on 12/26/2023 to a nerissa of 107 on 5/17/2024 with value of 203 on 10/2/2024.  On 11/5/2024, her CA 15-3 tumor marker was up to 286.     12/4/2024 restaging CT CAP revealed evidence of increased nodularity of the left invasive breast cancer along the anterior chest wall as well as slight increase in anterior mediastinal soft tissue nodule.  A new left upper lobe 4 mm nodule was also identified with no other gross evidence of metastatic disease in the abdomen or pelvis.       Subsequent 12/13/2024 echocardiogram revealed a 65-70% EF.      FoundationOne study revealed no new ESR 1 mutation.     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 A 1/7/2025 MR brain revealed diffuse osseous disease but no specific abnormal enhancement along the cisternal segment of the visualized cranial nerves. She thinks her diplopia has improved on antiviral therapy.      1/3/2025- because of PIK3CA p.E453K positive, initiated  fulvestrant/capivasertib      3/16/2025.  Reduced Capivasertib to 320 mg twice daily Days 1-4 out of 7-mainly due to diarrhea.     3/23/2025. We further dose reduced Capivasertib to 320 mg in the morning and 160 mg in the evening because of worsening diarrhea     When I saw her on 4/3/2025, we decreased Capivasertib dose to 200 mg twice a day but she did not tolerate this well because of recurrence of diarrhea so she stopped that.  She received Faslodex on 4/11/2025.      She started Alpelisib/letrozole around 5/17/2025.       She was admitted from 6/1/2025 - 6/4/2025 for diabetic ketoacidosis likely from Alpelisib.    She stopped Alpelisib and  letrozole at that time.  Currently feels well.  She is taking insulin glargine 22 units at baseline and uses NovoLog sliding scale.  Now her blood sugars are well.  She denies any pain.  No nausea or vomiting.  No shortness of breath.  No new swellings.  She has mild tingling and numbness in the fingers and feet.  She is not willing to resume Alpelisib again.  She still has issues with double vision although they are better.  This is from the right 6th nerve palsy.        Treatment:      Interval History:      Patient denies any of the following except if noted above: fevers, chills, difficulty with energy, vision or hearing changes, chest pain, dyspnea, abdominal pain, nausea, vomiting, diarrhea, constipation, urinary concerns, headaches, numbness, tingling, issues with sleep or mood. {kimberly:[id number]} also denies lumps, bumps, rashes or skin lesions, bleeding or bruising issues.    Physical Exam: Limited d/t virtual restrictions.  General: No acute distress.  Resp: No respiratory distress. No cough.    Skin: No visible rash or lesions.  Neuro: A/O x 4. Appropriate mentation and speech.  Psych: Appropriate mood. Appearance is well-groomed.     Laboratory Results:   No results found for any visits on 07/03/25.  I reviewed the above labs today.    Imaging:  CT Chest/Abdomen/Pelvis w Contrast  Narrative: EXAM: Chest/abdomen/pelvis CT with contrast 6/30/2025 8:20 AM    HISTORY: 69 years Female Malignant neoplasm metastatic to bone (H);  Malignant neoplasm of left female breast, unspecified estrogen  receptor status, unspecified site of breast (H)    Additional history (per chart review): 69-year-old female with history  of metastatic breast cancer. Breast nodularity and palpable axillary  adenopathy in March 20, 2020. Subsequent imaging confirmed bilateral  breast lesions including 6.2 cm left breast mass with diffuse breast  normal thickening and a 1.3 cm right breast mass. Staging PET CT  revealed left axillary and  retropectoral lymph node metastases as well  as diffuse bony metastases. The chemotherapeutic regimen as listed in  the most recent hematology and oncology note. Most recently started  Alpelisib.    COMPARISON: None.    TECHNIQUE: Helical CT images from the thoracic inlet through the  symphysis pubis were obtained with IV contrast. Contrast dose: 83 ML  ISOVUE 370.    FINDINGS:   image(s) are noncontributory.    LINES/TUBES: Right chest Port-A-Cath with tip terminating low-to-mid  SVC.    CHEST:  LOWER NECK: Unremarkable thyroid.     PULMONARY: Central tracheal bronchial tree is patent. Left lower lobe  and left upper lobe base compressive atelectasis. Moderate-sized  left-sided malignant pleural effusion. No pneumothorax. No  pleural-based calcification.    Multiple pulmonary nodules throughout both lungs, for example:  -8 mm groundglass nodule (4/102) within the left upper lobe,  previously 7 mm.  -6 mm right middle lobe solid nodule (4/176), previously 6 mm.  -6 mm right upper lobe peribronchial nodule (4/103), previously 5 mm.    Multifocal sub-6 mm micronodularity most prominently at the lung  periphery are similar to 4/25/2025. No definite new pulmonary nodules.    Multiple mediastinal and pleural-based nodules are as follows:  - 2.1 x 0.8 cm pleural-based nodule within the left posterior lung  base (3/242), previously 1.7 x 0.7 cm.  - 2.5 x 2.1 cm solid nodularity within the anterior mediastinum  (3/139), previously 2.2 x 1.7 cm.   -1.4 x 1.1 cm anterior mediastinum peripherally enhancing and  centrally hypoattenuating nodules/lymph nodes (3/100), previously 1.5  x 1.1 cm.  -Enlarging soft tissue nodule in the inferior most left pleural recess  measures 2.1 x 1.8 cm (3/248) previously 1.7 x 1.0 cm.    CARDIAC: No cardiomegaly. Focal nodularity along the pericardial sac,  for example, nodular thickening along the anterior pericardium  (3/192). No intracardiac mass or thrombus. No significant  coronary  artery atherosclerotic calcifications. Midascending thoracic aortic  diameter is within normal limits. Main pulmonary artery diameter is  normal. No central pulmonary embolus.    MEDIASTINUM: Multiple enlarged mediastinal lymph nodes, for example:  -1.1 cm right paratracheal lymph node (3/93), previously 0.8 cm.  -New 1.8 x 1.6 cm conglomerate right paratracheal lymph nodes (3/132)  -1.1 cm anterior mediastinal centrally necrotic lymph nodes (3/93),  previously 1.1 cm.  -1.3 cm right axillary lymph node (3/75), previously 0.9 cm.  -1.1 cm left axillary lymph node (3/72), previously 1.1 cm.    ESOPHAGUS: Unremarkable.    ABDOMEN/PELVIS:  HEPATIC: No suspicious hepatic lesion. Unchanged too small to  characterize hypodensity within hepatic segment 4. No intrahepatic or  extrahepatic biliary ductal dilation.    BILIARY: Punctate cholelithiasis within the gallbladder neck. The  gallbladder size is within normal limits. No gallbladder wall  thickening or pericholecystic fluid or fat stranding.    PANCREAS: No focal pancreatic lesion. The main pancreatic duct is not  dilated.    SPLEEN: No splenomegaly. No suspicious lesions or masses. Incidental  splenule.    ADRENALS: Stable mild thickening of the adrenal glands, left greater  than right.    RENAL: No hydronephrosis, calculi, or mass.    URINARY BLADDER: Unremarkable.    REPRODUCTIVE: Possible small uterine fibroid (3/518) along the right  lower uterine segment.    GASTROINTESTINAL: Unremarkable stomach and duodenum. Normal caliber  large and small bowel. No abnormal bowel wall thickening or  enhancement. No pneumatosis or portal venous gas. No appendicitis.  Colonic diverticulosis without diverticulitis.    MESENTERY/PERITONEUM: No ascites or pneumoperitoneum.    VASCULAR: Nonaneurysmal abdominal aorta. Major abdominal vessels  appear patent. Mild atherosclerotic vascular calcifications.    LYMPH NODES: No suspicious mesenteric or retroperitoneal lymph  nodes.  Axillary mediastinal lymph nodes as described above.    MUSCULOSKELETAL: Diffuse confluent sclerotic osseous metastases  throughout the axial and appendicular skeleton, overall, similar to  the previous examination from 4/25/2025. Stable displaced rib fracture  of the left ninth rib. Chronic appearing fracture deformity of the  right 9th rib.     SOFT TISSUES: Similar-appearing soft tissue nodularity/masses about  the left anterior chest wall, for example, 3.3 x 2.3 cm mass along the  left lateral pectoralis musculature (3/94), previously 3.0 x 2.5 cm.  Continued skin thickening, nipple retraction, and calcifications  within the left breast. New soft tissue nodule within the right lower  abdominal wall subcutaneous soft tissues measuring 2.1 x 2.0 cm  (3/481). Groundglass, somewhat ill-defined lesion within the  peripheral left lower abdominal subcutaneous soft tissues (3/468)  measuring 1.1 x 1.1 cm. Fatty infiltration of the paraspinous  musculature.   Impression: IMPRESSION: Compared to CT from 4/25/2025:    1. Interval metastatic disease progression as evidenced by:  A. New and increased metastatic mediastinal and axillary  lymphadenopathy  B. Similar left chest wall/breast soft tissue nodularity.  C. Increased size of anterior mediastinal nodularity/lymphadenopathy.  D. Increased volume of left-sided pleural effusion with increased  associated pleural-based nodules/metastatic disease.    2. Stable mildly displaced left ninth rib fracture. Stable fracture  deformity of the right 10th    3. Cholelithiasis without evidence of acute cholecystitis.    4. Unchanged diffuse confluent metastatic sclerotic lesions throughout  the axial and appendicular skeleton.     5. Other incidental findings are noted in the body of the report.    I have personally reviewed the examination and initial interpretation  and I agree with the findings.    JUSTICE CRAIG MD         SYSTEM ID:  [id number]    I reviewed the above  "imaging report today.        Assessment and Plan:     - Starting treatment today with ***  - Port placed ***. Discussed Emla cream ***  - Again reviewed potential side effects including risk for hypersensitivity reaction, alopecia, nausea, vomiting, mouth sores, taste change, appetite loss, diarrhea, constipation, fatigue, infection, bleeding, bruising as well as skin rashes, liver inflammation, hormone disruption and other autoimmune side effects. Discussed use of diary log of symptoms to review at provider visits.   - Take Home Medications reviewed in detail (zofran, compazine, and dexamethasone).    - Reviewed use of G-CSF to support blood counts & Claritin for bone pain.   - Reviewed importance of adequate hydration, nutritional status, and regular physical activity.   - Discussed logistics of regimen/scheduling w/labs, provider visit, +/- infusion.   - Planning for *** cycles followed by repeat ***.   - All questions asked and answered. Patient feels comfortable with plan and provides consent to treatment.         [place] [address]          Virtual Visit Details     Type of service:  Video Visit     Originating Location (pt. Location): Home  Distant Location (provider location): {On or Off-Site - Virtual video:[id number]::\"On-site\",\"Off-site\"}  Platform used for Video Visit: {rjplatform:714446}      Video Total Time:       Again, thank you for allowing me to participate in the care of your patient.        Sincerely,        NICOLE Avila CNP    Electronically signed"

## 2025-07-07 ENCOUNTER — INFUSION THERAPY VISIT (OUTPATIENT)
Dept: INFUSION THERAPY | Facility: CLINIC | Age: 70
End: 2025-07-07
Attending: INTERNAL MEDICINE
Payer: COMMERCIAL

## 2025-07-07 ENCOUNTER — PRE VISIT (OUTPATIENT)
Dept: ORTHOPEDICS | Facility: CLINIC | Age: 70
End: 2025-07-07

## 2025-07-07 ENCOUNTER — ANCILLARY PROCEDURE (OUTPATIENT)
Dept: GENERAL RADIOLOGY | Facility: CLINIC | Age: 70
End: 2025-07-07
Payer: COMMERCIAL

## 2025-07-07 VITALS
SYSTOLIC BLOOD PRESSURE: 114 MMHG | HEIGHT: 62 IN | BODY MASS INDEX: 27.18 KG/M2 | RESPIRATION RATE: 18 BRPM | TEMPERATURE: 98.4 F | WEIGHT: 147.7 LBS | OXYGEN SATURATION: 96 % | DIASTOLIC BLOOD PRESSURE: 76 MMHG | HEART RATE: 120 BPM

## 2025-07-07 DIAGNOSIS — M89.8X5 LYTIC BONE LESION OF RIGHT FEMUR: ICD-10-CM

## 2025-07-07 DIAGNOSIS — C50.912 MALIGNANT NEOPLASM OF LEFT FEMALE BREAST, UNSPECIFIED ESTROGEN RECEPTOR STATUS, UNSPECIFIED SITE OF BREAST (H): ICD-10-CM

## 2025-07-07 DIAGNOSIS — C50.912 MALIGNANT NEOPLASM OF LEFT FEMALE BREAST, UNSPECIFIED ESTROGEN RECEPTOR STATUS, UNSPECIFIED SITE OF BREAST (H): Primary | ICD-10-CM

## 2025-07-07 DIAGNOSIS — C79.51 MALIGNANT NEOPLASM METASTATIC TO BONE (H): Primary | ICD-10-CM

## 2025-07-07 DIAGNOSIS — C79.51 MALIGNANT NEOPLASM METASTATIC TO BONE (H): ICD-10-CM

## 2025-07-07 LAB
ALBUMIN SERPL BCG-MCNC: 2.6 G/DL (ref 3.4–5)
ALP SERPL-CCNC: 120 U/L (ref 40–150)
ALT SERPL W P-5'-P-CCNC: 31 U/L (ref 0–50)
ANION GAP SERPL CALCULATED.3IONS-SCNC: 14 MMOL/L (ref 7–15)
AST SERPL W P-5'-P-CCNC: 45 U/L (ref 0–45)
BASOPHILS # BLD AUTO: 0.1 10E3/UL (ref 0–0.2)
BASOPHILS NFR BLD AUTO: 1 %
BILIRUB SERPL-MCNC: 0.5 MG/DL (ref 0.2–1.3)
BUN SERPL-MCNC: 32 MG/DL (ref 7–30)
CALCIUM SERPL-MCNC: 9.1 MG/DL (ref 8.5–10.1)
CANCER AG15-3 SERPL-ACNC: 841 U/ML
CHLORIDE SERPL-SCNC: 100 MMOL/L (ref 94–109)
CREAT SERPL-MCNC: 1.2 MG/DL (ref 0.52–1.04)
EGFRCR SERPLBLD CKD-EPI 2021: 49 ML/MIN/1.73M2
EOSINOPHIL # BLD AUTO: 0.2 10E3/UL (ref 0–0.7)
EOSINOPHIL NFR BLD AUTO: 2 %
ERYTHROCYTE [DISTWIDTH] IN BLOOD BY AUTOMATED COUNT: 19.6 % (ref 10–15)
GLUCOSE SERPL-MCNC: 184 MG/DL (ref 70–99)
HCO3 SERPL-SCNC: 28 MMOL/L (ref 20–32)
HCT VFR BLD AUTO: 25.8 % (ref 35–47)
HGB BLD-MCNC: 8.1 G/DL (ref 11.7–15.7)
IMM GRANULOCYTES # BLD: 0.2 10E3/UL
IMM GRANULOCYTES NFR BLD: 3 %
LYMPHOCYTES # BLD AUTO: 1.7 10E3/UL (ref 0.8–5.3)
LYMPHOCYTES NFR BLD AUTO: 20 %
MCH RBC QN AUTO: 28 PG (ref 26.5–33)
MCHC RBC AUTO-ENTMCNC: 31.4 G/DL (ref 31.5–36.5)
MCV RBC AUTO: 89 FL (ref 78–100)
MONOCYTES # BLD AUTO: 0.6 10E3/UL (ref 0–1.3)
MONOCYTES NFR BLD AUTO: 7 %
NEUTROPHILS # BLD AUTO: 5.6 10E3/UL (ref 1.6–8.3)
NEUTROPHILS NFR BLD AUTO: 67 %
NRBC # BLD AUTO: 0.3 10E3/UL
NRBC BLD AUTO-RTO: 4 /100
PLATELET # BLD AUTO: 367 10E3/UL (ref 150–450)
POTASSIUM SERPL-SCNC: 4.4 MMOL/L (ref 3.4–5.3)
PROT SERPL-MCNC: 7.7 G/DL (ref 6.8–8.8)
RBC # BLD AUTO: 2.89 10E6/UL (ref 3.8–5.2)
SODIUM SERPL-SCNC: 142 MMOL/L (ref 135–145)
WBC # BLD AUTO: 8.4 10E3/UL (ref 4–11)

## 2025-07-07 PROCEDURE — 250N000011 HC RX IP 250 OP 636: Performed by: INTERNAL MEDICINE

## 2025-07-07 PROCEDURE — 96413 CHEMO IV INFUSION 1 HR: CPT

## 2025-07-07 PROCEDURE — 96375 TX/PRO/DX INJ NEW DRUG ADDON: CPT

## 2025-07-07 PROCEDURE — 96372 THER/PROPH/DIAG INJ SC/IM: CPT

## 2025-07-07 PROCEDURE — 36591 DRAW BLOOD OFF VENOUS DEVICE: CPT | Performed by: INTERNAL MEDICINE

## 2025-07-07 PROCEDURE — 96367 TX/PROPH/DG ADDL SEQ IV INF: CPT

## 2025-07-07 PROCEDURE — 85004 AUTOMATED DIFF WBC COUNT: CPT | Performed by: INTERNAL MEDICINE

## 2025-07-07 PROCEDURE — 250N000011 HC RX IP 250 OP 636: Mod: JZ

## 2025-07-07 PROCEDURE — 99207 PR NO CHARGE LOS: CPT

## 2025-07-07 PROCEDURE — 73552 X-RAY EXAM OF FEMUR 2/>: CPT | Mod: RT | Performed by: RADIOLOGY

## 2025-07-07 PROCEDURE — 258N000003 HC RX IP 258 OP 636: Performed by: INTERNAL MEDICINE

## 2025-07-07 PROCEDURE — 82247 BILIRUBIN TOTAL: CPT | Performed by: INTERNAL MEDICINE

## 2025-07-07 PROCEDURE — 86300 IMMUNOASSAY TUMOR CA 15-3: CPT | Performed by: INTERNAL MEDICINE

## 2025-07-07 RX ORDER — HEPARIN SODIUM (PORCINE) LOCK FLUSH IV SOLN 100 UNIT/ML 100 UNIT/ML
5 SOLUTION INTRAVENOUS ONCE
Status: COMPLETED | OUTPATIENT
Start: 2025-07-07 | End: 2025-07-07

## 2025-07-07 RX ORDER — DIPHENHYDRAMINE HYDROCHLORIDE 50 MG/ML
50 INJECTION, SOLUTION INTRAMUSCULAR; INTRAVENOUS ONCE
Status: COMPLETED | OUTPATIENT
Start: 2025-07-07 | End: 2025-07-07

## 2025-07-07 RX ORDER — HEPARIN SODIUM (PORCINE) LOCK FLUSH IV SOLN 100 UNIT/ML 100 UNIT/ML
5 SOLUTION INTRAVENOUS
Status: DISCONTINUED | OUTPATIENT
Start: 2025-07-07 | End: 2025-07-07 | Stop reason: HOSPADM

## 2025-07-07 RX ADMIN — DEXAMETHASONE SODIUM PHOSPHATE: 10 INJECTION, SOLUTION INTRAMUSCULAR; INTRAVENOUS at 08:49

## 2025-07-07 RX ADMIN — PACLITAXEL 120 MG: 6 INJECTION, SOLUTION INTRAVENOUS at 09:11

## 2025-07-07 RX ADMIN — DIPHENHYDRAMINE HYDROCHLORIDE 50 MG: 50 INJECTION INTRAMUSCULAR; INTRAVENOUS at 08:36

## 2025-07-07 RX ADMIN — DENOSUMAB 120 MG: 120 INJECTION SUBCUTANEOUS at 08:41

## 2025-07-07 RX ADMIN — Medication 5 ML: at 10:17

## 2025-07-07 RX ADMIN — FAMOTIDINE 20 MG: 10 INJECTION, SOLUTION INTRAVENOUS at 08:47

## 2025-07-07 RX ADMIN — SODIUM CHLORIDE 250 ML: 0.9 INJECTION, SOLUTION INTRAVENOUS at 08:33

## 2025-07-07 RX ADMIN — Medication 5 ML: at 07:28

## 2025-07-07 NOTE — PROGRESS NOTES
Port site scrubbed with Chloraprep for 30 seconds. Accessed port using sterile technique. Tubes drawn in rainbow order. Port flushed per protocol. See documentation flowsheet. Tolerated port access and draw without complaint.     Labs drawn and sent: CBC, CMP, CA 15-3    Rachel Saini RN

## 2025-07-07 NOTE — PROGRESS NOTES
Infusion Nursing Note:  Mary Olson presents today for C1D1 Taxol infusion.    Patient seen by provider today: No   present during visit today: Not Applicable.    Note:   Arrives in wheelchair due to being cautious with right femur having widespread metastatic disease. Pivot transferring.    Denies dental issues at this time. Xgeva given. Takes Calcium daily, not twice daily. States this is per a nurse practitioner.    6/1/25  ER with hospital admission. Had diabetic ketoacidosis. Reminded patient that Decadron given today can cause elevated blood sugars for a few days. Patient checks glucose level three times a day. Takes Lantus daily and Novolog as needed depending on glucose level. Reminded patient to be diligent about checking sugar levels the next few days.    Pharmacist met with patient regarding at home antiemetics.    Using magic mouthwash 1-2x daily for mouth sores with relief.    Creatinine elevated. States she drank 6 glasses of fluid yesterday. Gave entire mainline of normal saline today. Encouraged patient to drink 6-8 glasses of non-caffeinated fluids daily.    Intravenous Access:  Implanted Port.    Treatment Conditions:  Lab Results   Component Value Date    HGB 8.1 (L) 07/07/2025    WBC 8.4 07/07/2025    ANEU 5.6 07/07/2025     07/07/2025        Lab Results   Component Value Date     07/07/2025    POTASSIUM 4.4 07/07/2025    MAG 1.8 06/04/2025    CR 1.20 (H) 07/07/2025    BUFFY 9.1 07/07/2025    BILITOTAL 0.5 07/07/2025    ALBUMIN 2.6 (L) 07/07/2025    ALT 31 07/07/2025    AST 45 07/07/2025       Results reviewed, labs MET treatment parameters, ok to proceed with treatment.      Post Infusion Assessment:  Patient tolerated infusion without incident.  Blood return noted pre and post infusion.  Site patent and intact, free from redness, edema or discomfort.  No evidence of extravasations.  Access discontinued per protocol.       Discharge Plan:   AVS to patient via MYCHART.   Per charge nurse, patient is on wait list for next week's chemo.  Patient discharged in stable condition accompanied by: self.  Departure Mode: Wheelchair.      An Harding RN

## 2025-07-07 NOTE — TELEPHONE ENCOUNTER
DIAGNOSIS: Lytic bone lesion of right femur [M89.8X5]   APPOINTMENT DATE: 07/11/2025   NOTES STATUS DETAILS   OFFICE NOTE from referring provider Internal Joanna Headley APRN CNP  Hematology & Oncology   OFFICE NOTE from other specialist Internal    XRAYS (IMAGES & REPORTS) Internal      
Spontaneous, unlabored and symmetrical

## 2025-07-08 NOTE — PROGRESS NOTES
[facility], Orthopaedic Oncology Surgery Consultation  by Sam Cartwright M.D.    Mary Olson MRN# [id number]    YOB: 1955     Requesting physician: Vanesa Baez Dr., hematology oncology            Assessment and Plan:   Assessment:  Right proximal femoral metastatic breast carcinoma moderate risk for path fx, (Mirels score 8)    Left proximal femur metastatic breast carcinoma, low to moderate risk for fracture, (Alondra score 7)       Plan:  Advised patient that her risk for fracture is moderate and I do not see an imperative or immediate need to proceed directly with prophylactic internal fixation.  In the absence of hip pain, I think it is reasonable to focus on her systemic chemotherapy and perhaps radiation to the right femur.  If she is develop pain or discomfort when standing or otherwise, she will notify us in which case we would reconsider decision to proceed with prophylactic internal fixation of her right femur.      Sam Cartwright MD  [facility] Professor  Oncology and Adult Reconstructive Surgery  Dept Orthopaedic Surgery, [facility]  [phone] office, [phone] pager  [email or web address]    This note was created using dictation software and may contain errors.  Please contact the creator for any clarifications that are needed.            History of Present Illness:   70 year old female  chief complaint    Patient with known history of metastatic breast cancer to multiple osseous sites was referred for evaluation of right proximal femur due to extent of disease noticed radiographically.  She denies pain or discomfort involving her right femur, either at rest or when standing.    Current symptoms:  Problem: Right femur lytic bone lesion  Onset and duration: incidental finding on bone scan 6/30/25  Awakens from sleep due to sx's:  No  Precipitating Injury:  No    Other joints or sites painful:  No  Fever: No  Appetite change or weight  loss: No  History of prior or existing cancer: Metastatic breast cancer         Oncologic history/Diagnosis:   Metastatic breast cancer   In March, 2020, the patient presented with a several month history of left breast nodularity and palpable axillary adenopathy.  Subsequent imaging confirmed bilateral breast lesions including a 6.2 cm left breast mass with diffuse breast dermal thickening and a 1.3 cm right breast mass.  The left breast mass revealed a grade 2 invasive mammary carcinoma that was ER/AZ positive and HER2 negative.  The right breast mass revealed grade 1 tubular carcinoma that was ER/AZ positive and HER2 negative.     Staging PET scan revealed left axillary and retropectoral lymph node metastases as well as diffuse bony metastases.       March, 2020 - August, 2020: She received weekly paclitaxel with some disease response but requested a break from cytotoxic therapy from significant issues with balance and neuropathy requiring wheelchair use.      August, 2020 - March, 2021: Started on anastrozole with February, 2021 PET scan evidence of progression.     March, 2021-August, 2023: Started on anastrozole and abemaciclib.  May, 2023 CT CAP with evidence of soft tissue progression.      PIK3CA p.E453K+, ESR1 negative, TMB-low, TEJAS on left axillary tissue.     August, 2023-December, 2023: Switch to exemestane and everolimus with treatment discontinued after 4 months because of significant left breast mass progression.     December, 2023-December, 2024: Started capecitabine 1500 mg twice daily with dose reduction to 500 mg twice daily 7 days on/7 days off due to mucositis and hand-foot syndrome.       CA 15-3 tumor marker had declined from a zenith of 945 on 12/26/2023 to a nerissa of 107 on 5/17/2024 with value of 203 on 10/2/2024.  On 11/5/2024, her CA 15-3 tumor marker was up to 286.     12/4/2024 restaging CT CAP revealed evidence of increased nodularity of the left invasive breast cancer along the  anterior chest wall as well as slight increase in anterior mediastinal soft tissue nodule.  A new left upper lobe 4 mm nodule was also identified with no other gross evidence of metastatic disease in the abdomen or pelvis.       12/13/2024 echocardiogram revealed a 65-70% EF.      FoundationOne study revealed no new ESR 1 mutation.     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 A 1/7/2025 MR brain revealed diffuse osseous disease but no specific abnormal enhancement along the cisternal segment of the visualized cranial nerves. She thinks her diplopia has improved on antiviral therapy.      1/3/2025 - because of PIK3CA p.E453K positive, initiated fulvestrant/capivasertib   4/3/2025 - decreased Capivasertib dose to 200 mg twice a day but she did not tolerate this well because of recurrence of diarrhea so she stopped that.  She received Faslodex on 4/11/2025.       She started Alpelisib/letrozole around 5/17/2025.       She was admitted from 6/1/2025 - 6/4/2025 for diabetic ketoacidosis likely from Alpelisib.    She stopped Alpelisib and letrozole at that time.  Currently feels well.  She is taking insulin glargine 22 units at baseline and uses NovoLog sliding scale.  Now her blood sugars are well.  She denies any pain.  No nausea or vomiting.  No shortness of breath.  No new swellings.  She has mild tingling and numbness in the fingers and feet.  She is not willing to resume Alpelisib again.  She still has issues with double vision although they are better.  This is from the right 6th nerve palsy.       Now she has evidence of significant progression of the disease including increase in the size of the soft tissue masses in the left breast/anterior chest wall as well as new and increased mediastinal and axillary lymphadenopathy, pericardial nodules, pleural-based metastasis and small left-sided pleural effusion, likely malignant as well as worsening lung metastasis and worsening of  osseous disease.       Now starting chemotherapy with paclitaxel. We will give it to her every week for 3 weeks with fourth week being off.  Because of baseline neuropathy, dose reduce paclitaxel to 70 mg/m .         Physical Exam:     EXAMINATION pertinent findings:   PSYCH: Pleasant, healthy-appearing, alert, oriented x3, cooperative. Normal mood and affect.  VITAL SIGNS: not currently breastfeeding..  Reviewed nursing intake notes.   There is no height or weight on file to calculate BMI.  RESP: non labored breathing   ABD: benign, soft, non-tender, no acute peritoneal findings  SKIN: grossly normal   LYMPHATIC: grossly normal, no adenopathy, no extremity edema  NEURO: grossly normal , no motor deficits  VASCULAR: satisfactory perfusion of all extremities   MUSCULOSKELETAL:   Patient able to stand and bear weight on both lower extremities and perform single-leg stance without any pain.  She does have difficulty with standing however, likely due to strength and coordination issues.    No pain with range of motion of either hip joint             Data:   All laboratory data reviewed  All imaging studies reviewed by me      Radiographs and CT scan of chest abdomen pelvis are reviewed.  No large osteolytic areas are identified.  Multiple sites of osseous metastasis are seen within her pelvis as well as bilateral femoral.      DATA for DOCUMENTATION:         Past Medical History:     Patient Active Problem List   Diagnosis    Hyperlipidemia LDL goal <160    Essential hypertension    Morbid obesity (H)    Family history of malignant neoplasm of breast    Breast skin changes    Nipple discharge, bloody    Malignant neoplasm of unspecified site of left female breast (H)    Bone metastasis    Diabetes mellitus, type 2 (H)    Enteritis    Excessive or frequent menstruation    Sixth nerve palsy of right eye    Diabetic ketoacidosis without coma associated with type 2 diabetes mellitus (H)     Past Medical History:   Diagnosis  Date    ASCUS favoring benign 10/28/15    neg HPV    Cancer (H)     Diabetes mellitus, type 2 (H) 7/14/2020    Hypertension     Obese        Also see scanned health assessment forms.       Past Surgical History:     Past Surgical History:   Procedure Laterality Date    BIOPSY  3/2020    CATARACT EXTRACTION Bilateral 10/2024    COLONOSCOPY N/A 10/14/2020    Procedure: COLONOSCOPY, WITH POLYPECTOMY  hot snare;  Surgeon: Leventhal, Thomas Michael, MD;  Location: [facility] OR    GYN SURGERY  3/13/89. & 6/22/92    C Sections    IR CHEST PORT PLACEMENT > 5 YRS OF AGE  03/18/2020            Social History:     Social History     Socioeconomic History    Marital status:      Spouse name: Not on file    Number of children: Not on file    Years of education: Not on file    Highest education level: Not on file   Occupational History    Not on file   Tobacco Use    Smoking status: Never     Passive exposure: Never    Smokeless tobacco: Never   Vaping Use    Vaping status: Never Used   Substance and Sexual Activity    Alcohol use: Not Currently     Comment: 2-3 drinks a month     Drug use: No    Sexual activity: Not Currently     Partners: Male     Birth control/protection: Pill, None     Comment: On the pill prior to having children   Other Topics Concern    Parent/sibling w/ CABG, MI or angioplasty before 65F 55M? No   Social History Narrative    Not on file     Social Drivers of Health     Financial Resource Strain: Low Risk  (6/1/2025)    Financial Resource Strain     Within the past 12 months, have you or your family members you live with been unable to get utilities (heat, electricity) when it was really needed?: No   Food Insecurity: Low Risk  (6/1/2025)    Food Insecurity     Within the past 12 months, did you worry that your food would run out before you got money to buy more?: No     Within the past 12 months, did the food you bought just not last and you didn t have money to get more?: No   Transportation Needs: Low  Risk  (2025)    Transportation Needs     Within the past 12 months, has lack of transportation kept you from medical appointments, getting your medicines, non-medical meetings or appointments, work, or from getting things that you need?: No   Physical Activity: Patient Declined (2024)    Received from [facility]    Exercise Vital Sign     Days of Exercise per Week: Patient declined     Minutes of Exercise per Session: Patient declined   Stress: Not on file   Social Connections: Not on file   Interpersonal Safety: Low Risk  (2025)    Interpersonal Safety     Do you feel physically and emotionally safe where you currently live?: Yes     Within the past 12 months, have you been hit, slapped, kicked or otherwise physically hurt by someone?: No     Within the past 12 months, have you been humiliated or emotionally abused in other ways by your partner or ex-partner?: No   Housing Stability: Low Risk  (2025)    Housing Stability     Do you have housing? : Yes     Are you worried about losing your housing?: No            Family History:       Family History   Problem Relation Age of Onset    Hypertension Mother          12/3/21    Coronary Artery Disease Mother          12/3/21    Breast Cancer Mother          12/3/21    Hyperlipidemia Mother          12/3/21    Depression Mother          123/3/21    Arrhythmia Brother     Cerebrovascular Disease Brother     Diabetes No family hx of             Medications:     Current Outpatient Medications   Medication Sig Dispense Refill    alcohol swab prep pads Use to swab area of injection/chrissy as directed. 400 each 3    amLODIPine (NORVASC) 5 MG tablet Take 2 tablets (10 mg) by mouth daily. 180 tablet 3    Ascorbic Acid (VITAMIN C PO) Take 500 mg by mouth daily.      aspirin 81 MG tablet Take 81 mg by mouth daily.      atorvastatin (LIPITOR) 20 MG tablet Take 1 tablet (20 mg) by mouth at bedtime. 90 tablet 3    blood glucose (NO BRAND SPECIFIED)  test strip 1 strip by In Vitro route 3 times daily 100 strip 6    blood glucose calibration (NO BRAND SPECIFIED) solution To accompany: Blood Glucose Monitor Brands: per insurance. 1 Bottle 3    blood glucose monitoring (NO BRAND SPECIFIED) meter device kit Use to test blood sugar 3 times daily or as directed. 1 kit 0    Blood Pressure KIT Please test blood pressure at home 2 x daily 1 kit 0    calcium-vitamin D-vitamin K (VIACTIV) 500-500-40 MG-UNT-MCG CHEW Take 1 tablet by mouth daily.      Coenzyme Q10 (COQ-10) 100 MG CAPS Take 1 capsule by mouth daily as needed (supplementation).      Cyanocobalamin (B-12) 1000 MCG TABS Take 1 tablet by mouth daily.      gabapentin (NEURONTIN) 250 MG/5ML solution Take 5 mLs (250 mg) by mouth at bedtime. 470 mL 0    hydrocortisone 2.5 % cream Apply topically 2 times daily as needed for rash.      insulin aspart (NOVOLOG PEN) 100 UNIT/ML pen Inject 1-7 Units subcutaneously 3 times daily (before meals). 15 mL 0    insulin glargine (LANTUS PEN) 100 UNIT/ML pen Inject 22 Units subcutaneously every morning (before breakfast). 15 mL 5    insulin pen needle (31G X 8 MM) 31G X 8 MM miscellaneous Use 4 pen needles daily or as directed. 100 each 4    lidocaine-prilocaine (EMLA) 2.5-2.5 % external cream Apply small amount to center of port site 45-60 minutes prior to chemo appointment, cover with clear dressing if desired. 30 g 1    magic mouthwash suspension (diphenhydrAMINE, lidocaine, aluminum-magnesium & simethicone) Swish and swallow 5 mLs in mouth every 6 hours as needed for mouth sores. 240 mL 2    metFORMIN (GLUCOPHAGE XR) 500 MG 24 hr tablet Take 1 tablet (500 mg) by mouth 2 times daily (with meals). 180 tablet 3    STATIN NOT PRESCRIBED (INTENTIONAL) Please choose reason not prescribed from choices below. (Patient not taking: Reported on 7/7/2025)      thin (NO BRAND SPECIFIED) lancets 3 each by In Vitro route daily Use with lanceting device. To accompany: Blood Glucose Monitor  Brands: per insurance. 300 each 6    triamcinolone (KENALOG) 0.1 % external cream Apply topically 2 times daily as needed for irritation.      triamterene-HCTZ (MAXZIDE-25) 37.5-25 MG tablet Take 1 tablet by mouth daily. 90 tablet 3     No current facility-administered medications for this visit.     Facility-Administered Medications Ordered in Other Visits   Medication Dose Route Frequency Provider Last Rate Last Admin    heparin 100 UNIT/ML injection 500 Units  500 Units Intracatheter Q8H Keysha Birch MD   500 Units at 11/06/20 1017    heparin lock flush 100 unit/mL injection 5 mL  5 mL Intracatheter Q30 Days Felicia Ashraf MD   5 mL at 04/25/25 0834    heparin lock flush 100 unit/mL injection 5 mL  5 mL Intracatheter Q30 Days Robert Fountain MD   5 mL at 12/13/24 1452              Review of Systems:   A comprehensive 10 point review of systems (constitutional, ENT, cardiac, peripheral vascular, lymphatic, respiratory, GI, , Musculoskeletal, skin, Neurological) was performed and found to be negative except as described in this note.     See intake form completed by patient

## 2025-07-10 ENCOUNTER — PATIENT OUTREACH (OUTPATIENT)
Dept: ONCOLOGY | Facility: CLINIC | Age: 70
End: 2025-07-10

## 2025-07-10 ENCOUNTER — OFFICE VISIT (OUTPATIENT)
Dept: ORTHOPEDICS | Facility: CLINIC | Age: 70
End: 2025-07-10
Payer: COMMERCIAL

## 2025-07-10 DIAGNOSIS — M89.8X5 LYTIC BONE LESION OF RIGHT FEMUR: ICD-10-CM

## 2025-07-10 DIAGNOSIS — C79.51 MALIGNANT NEOPLASM METASTATIC TO BONE (H): Primary | ICD-10-CM

## 2025-07-10 NOTE — PROGRESS NOTES
New Patient Oncology Nurse Navigator Note     Referring provider: Felicia Ashraf MD      Referring Clinic/Organization: [facility]     Referred to (specialty:) Radiation Oncology      Date Referral Received: July 10, 2025     Evaluation for:  C79.51 (ICD-10-CM) - Malignant neoplasm metastatic to bone (H)     Clinical History (per Nurse review of records provided):      Mary Olson is a 70 year old female diagnosed with metastatic breast cancer in 03/2020 after noticing a lesion on her breast.     In March, 2020, the patient presented with a several month history of left breast nodularity and palpable axillary adenopathy.  Subsequent imaging confirmed bilateral breast lesions including a 6.2 cm left breast mass with diffuse breast dermal thickening and a 1.3 cm right breast mass.  The left breast mass revealed a grade 2 invasive mammary carcinoma that was ER/MS positive and HER2 negative.  The right breast mass revealed grade 1 tubular carcinoma that was ER/MS positive and HER2 negative.     Staging PET scan revealed left axillary and retropectoral lymph node metastases as well as diffuse bony metastases.       March, 2020 - August, 2020: She received weekly paclitaxel with some disease response but requested a break from cytotoxic therapy from significant issues with balance and neuropathy requiring wheelchair use.      August, 2020 - March, 2021: Started on anastrozole with February, 2021 PET scan evidence of progression.     March, 2021-August, 2023: Started on anastrozole and abemaciclib.  May, 2023 CT CAP with evidence of soft tissue progression.      PIK3CA p.E453K+, ESR1 negative, TMB-low, TEJAS on left axillary tissue.     August, 2023-December, 2023: Switch to exemestane and everolimus with treatment discontinued after 4 months because of significant left breast mass progression.     December, 2023-December, 2024: Started capecitabine 1500 mg twice daily with dose  reduction to 500 mg twice daily 7 days on/7 days off due to mucositis and hand-foot syndrome.       CA 15-3 tumor marker had declined from a zenith of 945 on 12/26/2023 to a nerissa of 107 on 5/17/2024 with value of 203 on 10/2/2024.  On 11/5/2024, her CA 15-3 tumor marker was up to 286.     12/4/2024 restaging CT CAP revealed evidence of increased nodularity of the left invasive breast cancer along the anterior chest wall as well as slight increase in anterior mediastinal soft tissue nodule.  A new left upper lobe 4 mm nodule was also identified with no other gross evidence of metastatic disease in the abdomen or pelvis.       Subsequent 12/13/2024 echocardiogram revealed a 65-70% EF.      FoundationOne study revealed no new ESR 1 mutation.     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 A 1/7/2025 MR brain revealed diffuse osseous disease but no specific abnormal enhancement along the cisternal segment of the visualized cranial nerves. She thinks her diplopia has improved on antiviral therapy.      1/3/2025- because of PIK3CA p.E453K positive, initiated  fulvestrant/capivasertib      3/16/2025.  Reduced Capivasertib to 320 mg twice daily Days 1-4 out of 7-mainly due to diarrhea.     3/23/2025. Dr. Ashraf further dose reduced Capivasertib to 320 mg in the morning and 160 mg in the evening because of worsening diarrhea     On 4/3/2025, Dr. Ashraf decreased Capivasertib dose to 200 mg twice a day but she did not tolerate this well because of recurrence of diarrhea so she stopped that.  She received Faslodex on 4/11/2025.      She started Alpelisib/letrozole around 5/17/2025.       She was admitted from 6/1/2025 - 6/4/2025 for diabetic ketoacidosis likely from Alpelisib.       She stopped Alpelisib and letrozole at that time.     6/30/25 - NM Bone scan showed widespread metastatic disease. Of note extensive involvement of the right femoral neck may pose risk for future fracture.    7/7/25 - XR  Femur Right 2 views  Impression:  1. Lytic lesions of the proximal femur centered about the greater trochanter and femoral neck on the right with scattered lucencies throughout the visualized pelvic bones.    Patient consulted with Dr. Sam Cartwright who does not see an imperative or immediate need to proceed directly with prophylactic internal fixation. In the absence of hip pain, it is reasonable to focus on her systemic chemotherapy and perhaps radiation to the right femur.  If she is develop pain or discomfort when standing or otherwise, she will notify Dr. Cartwright's office  in which case they would reconsider decision to proceed with prophylactic internal fixation of her right femur.      This referral is to consider right femur palliative radiation to decrease risk of fracture.        Patient resides in [place], [place].    SCHEDULE - SHOO at , 7/23 at 1:00pm, 60 min in person (There is a hold on that)      Records Location: See Bookmarked material    Records Needed:  Path reports-biopsy and surgery (per protocol)  Pathology reviews (per protocol)  Breast imaging for past 5 years  Systemic imaging (per protocol)  Surgery consult, operative, and progress notes (per protocol)  Plastic surgery consult, operative and progress notes (per protocol)  Medical oncology notes (per protocol)  Radiation oncology notes (per protocol)  Genetic results (per protocol)  Echo or MUGA results (per protocol)  Radiation summary (per protocol)  Chemotherapy summary (per protocol)

## 2025-07-10 NOTE — LETTER
7/10/2025      Mary Olson  [address]  [facility] [address]      Dear Colleague,    Thank you for referring your patient, Mary Olson, to the [facility] [place]. Please see a copy of my visit note below.        [facility], Orthopaedic Oncology Surgery Consultation  by Sam Cartwright M.D.    Mary Olson MRN# [id number]    YOB: 1955     Requesting physician: Vanesa Baze Dr., hematology oncology            Assessment and Plan:   Assessment:  Right proximal femoral metastatic breast carcinoma moderate risk for path fx, (Mirels score 8)    Left proximal femur metastatic breast carcinoma, low to moderate risk for fracture, (Alondra score 7)       Plan:  Advised patient that her risk for fracture is moderate and I do not see an imperative or immediate need to proceed directly with prophylactic internal fixation.  In the absence of hip pain, I think it is reasonable to focus on her systemic chemotherapy and perhaps radiation to the right femur.  If she is develop pain or discomfort when standing or otherwise, she will notify us in which case we would reconsider decision to proceed with prophylactic internal fixation of her right femur.      Sam Cartwright MD MaFormerly [facility] Family Professor  Oncology and Adult Reconstructive Surgery  Dept Orthopaedic Surgery, [facility]  [phone] office, [phone] pager  [email or web address]    This note was created using dictation software and may contain errors.  Please contact the creator for any clarifications that are needed.            History of Present Illness:   70 year old female  chief complaint    Patient with known history of metastatic breast cancer to multiple osseous sites was referred for evaluation of right proximal femur due to extent of disease noticed radiographically.  She denies pain or discomfort involving her right femur, either at rest or when  standing.    Current symptoms:  Problem: Right femur lytic bone lesion  Onset and duration: incidental finding on bone scan 6/30/25  Awakens from sleep due to sx's:  No  Precipitating Injury:  No    Other joints or sites painful:  No  Fever: No  Appetite change or weight loss: No  History of prior or existing cancer: Metastatic breast cancer         Oncologic history/Diagnosis:   Metastatic breast cancer   In March, 2020, the patient presented with a several month history of left breast nodularity and palpable axillary adenopathy.  Subsequent imaging confirmed bilateral breast lesions including a 6.2 cm left breast mass with diffuse breast dermal thickening and a 1.3 cm right breast mass.  The left breast mass revealed a grade 2 invasive mammary carcinoma that was ER/CT positive and HER2 negative.  The right breast mass revealed grade 1 tubular carcinoma that was ER/CT positive and HER2 negative.     Staging PET scan revealed left axillary and retropectoral lymph node metastases as well as diffuse bony metastases.       March, 2020 - August, 2020: She received weekly paclitaxel with some disease response but requested a break from cytotoxic therapy from significant issues with balance and neuropathy requiring wheelchair use.      August, 2020 - March, 2021: Started on anastrozole with February, 2021 PET scan evidence of progression.     March, 2021-August, 2023: Started on anastrozole and abemaciclib.  May, 2023 CT CAP with evidence of soft tissue progression.      PIK3CA p.E453K+, ESR1 negative, TMB-low, TEJAS on left axillary tissue.     August, 2023-December, 2023: Switch to exemestane and everolimus with treatment discontinued after 4 months because of significant left breast mass progression.     December, 2023-December, 2024: Started capecitabine 1500 mg twice daily with dose reduction to 500 mg twice daily 7 days on/7 days off due to mucositis and hand-foot syndrome.       CA 15-3 tumor marker had declined  from a zenith of 945 on 12/26/2023 to a nerissa of 107 on 5/17/2024 with value of 203 on 10/2/2024.  On 11/5/2024, her CA 15-3 tumor marker was up to 286.     12/4/2024 restaging CT CAP revealed evidence of increased nodularity of the left invasive breast cancer along the anterior chest wall as well as slight increase in anterior mediastinal soft tissue nodule.  A new left upper lobe 4 mm nodule was also identified with no other gross evidence of metastatic disease in the abdomen or pelvis.       12/13/2024 echocardiogram revealed a 65-70% EF.      FoundationOne study revealed no new ESR 1 mutation.     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 A 1/7/2025 MR brain revealed diffuse osseous disease but no specific abnormal enhancement along the cisternal segment of the visualized cranial nerves. She thinks her diplopia has improved on antiviral therapy.      1/3/2025 - because of PIK3CA p.E453K positive, initiated fulvestrant/capivasertib   4/3/2025 - decreased Capivasertib dose to 200 mg twice a day but she did not tolerate this well because of recurrence of diarrhea so she stopped that.  She received Faslodex on 4/11/2025.       She started Alpelisib/letrozole around 5/17/2025.       She was admitted from 6/1/2025 - 6/4/2025 for diabetic ketoacidosis likely from Alpelisib.    She stopped Alpelisib and letrozole at that time.  Currently feels well.  She is taking insulin glargine 22 units at baseline and uses NovoLog sliding scale.  Now her blood sugars are well.  She denies any pain.  No nausea or vomiting.  No shortness of breath.  No new swellings.  She has mild tingling and numbness in the fingers and feet.  She is not willing to resume Alpelisib again.  She still has issues with double vision although they are better.  This is from the right 6th nerve palsy.       Now she has evidence of significant progression of the disease including increase in the size of the soft tissue masses  in the left breast/anterior chest wall as well as new and increased mediastinal and axillary lymphadenopathy, pericardial nodules, pleural-based metastasis and small left-sided pleural effusion, likely malignant as well as worsening lung metastasis and worsening of osseous disease.       Now starting chemotherapy with paclitaxel. We will give it to her every week for 3 weeks with fourth week being off.  Because of baseline neuropathy, dose reduce paclitaxel to 70 mg/m .         Physical Exam:     EXAMINATION pertinent findings:   PSYCH: Pleasant, healthy-appearing, alert, oriented x3, cooperative. Normal mood and affect.  VITAL SIGNS: not currently breastfeeding..  Reviewed nursing intake notes.   There is no height or weight on file to calculate BMI.  RESP: non labored breathing   ABD: benign, soft, non-tender, no acute peritoneal findings  SKIN: grossly normal   LYMPHATIC: grossly normal, no adenopathy, no extremity edema  NEURO: grossly normal , no motor deficits  VASCULAR: satisfactory perfusion of all extremities   MUSCULOSKELETAL:   Patient able to stand and bear weight on both lower extremities and perform single-leg stance without any pain.  She does have difficulty with standing however, likely due to strength and coordination issues.    No pain with range of motion of either hip joint             Data:   All laboratory data reviewed  All imaging studies reviewed by me      Radiographs and CT scan of chest abdomen pelvis are reviewed.  No large osteolytic areas are identified.  Multiple sites of osseous metastasis are seen within her pelvis as well as bilateral femoral.      DATA for DOCUMENTATION:         Past Medical History:     Patient Active Problem List   Diagnosis     Hyperlipidemia LDL goal <160     Essential hypertension     Morbid obesity (H)     Family history of malignant neoplasm of breast     Breast skin changes     Nipple discharge, bloody     Malignant neoplasm of unspecified site of left  female breast (H)     Bone metastasis     Diabetes mellitus, type 2 (H)     Enteritis     Excessive or frequent menstruation     Sixth nerve palsy of right eye     Diabetic ketoacidosis without coma associated with type 2 diabetes mellitus (H)     Past Medical History:   Diagnosis Date     ASCUS favoring benign 10/28/15    neg HPV     Cancer (H)      Diabetes mellitus, type 2 (H) 7/14/2020     Hypertension      Obese        Also see scanned health assessment forms.       Past Surgical History:     Past Surgical History:   Procedure Laterality Date     BIOPSY  3/2020     CATARACT EXTRACTION Bilateral 10/2024     COLONOSCOPY N/A 10/14/2020    Procedure: COLONOSCOPY, WITH POLYPECTOMY  hot snare;  Surgeon: Leventhal, Thomas Michael, MD;  Location: [facility] OR     GYN SURGERY  3/13/89. & 6/22/92    C Sections     IR CHEST PORT PLACEMENT > 5 YRS OF AGE  03/18/2020            Social History:     Social History     Socioeconomic History     Marital status:      Spouse name: Not on file     Number of children: Not on file     Years of education: Not on file     Highest education level: Not on file   Occupational History     Not on file   Tobacco Use     Smoking status: Never     Passive exposure: Never     Smokeless tobacco: Never   Vaping Use     Vaping status: Never Used   Substance and Sexual Activity     Alcohol use: Not Currently     Comment: 2-3 drinks a month      Drug use: No     Sexual activity: Not Currently     Partners: Male     Birth control/protection: Pill, None     Comment: On the pill prior to having children   Other Topics Concern     Parent/sibling w/ CABG, MI or angioplasty before 65F 55M? No   Social History Narrative     Not on file     Social Drivers of Health     Financial Resource Strain: Low Risk  (6/1/2025)    Financial Resource Strain      Within the past 12 months, have you or your family members you live with been unable to get utilities (heat, electricity) when it was really needed?: No    Food Insecurity: Low Risk  (2025)    Food Insecurity      Within the past 12 months, did you worry that your food would run out before you got money to buy more?: No      Within the past 12 months, did the food you bought just not last and you didn t have money to get more?: No   Transportation Needs: Low Risk  (2025)    Transportation Needs      Within the past 12 months, has lack of transportation kept you from medical appointments, getting your medicines, non-medical meetings or appointments, work, or from getting things that you need?: No   Physical Activity: Patient Declined (2024)    Received from [facility]    Exercise Vital Sign      Days of Exercise per Week: Patient declined      Minutes of Exercise per Session: Patient declined   Stress: Not on file   Social Connections: Not on file   Interpersonal Safety: Low Risk  (2025)    Interpersonal Safety      Do you feel physically and emotionally safe where you currently live?: Yes      Within the past 12 months, have you been hit, slapped, kicked or otherwise physically hurt by someone?: No      Within the past 12 months, have you been humiliated or emotionally abused in other ways by your partner or ex-partner?: No   Housing Stability: Low Risk  (2025)    Housing Stability      Do you have housing? : Yes      Are you worried about losing your housing?: No            Family History:       Family History   Problem Relation Age of Onset     Hypertension Mother          12/3/21     Coronary Artery Disease Mother          12/3/21     Breast Cancer Mother          12/3/21     Hyperlipidemia Mother          12/3/21     Depression Mother          123/3/21     Arrhythmia Brother      Cerebrovascular Disease Brother      Diabetes No family hx of             Medications:     Current Outpatient Medications   Medication Sig Dispense Refill     alcohol swab prep pads Use to swab area of injection/chrissy as directed. 400 each 3      amLODIPine (NORVASC) 5 MG tablet Take 2 tablets (10 mg) by mouth daily. 180 tablet 3     Ascorbic Acid (VITAMIN C PO) Take 500 mg by mouth daily.       aspirin 81 MG tablet Take 81 mg by mouth daily.       atorvastatin (LIPITOR) 20 MG tablet Take 1 tablet (20 mg) by mouth at bedtime. 90 tablet 3     blood glucose (NO BRAND SPECIFIED) test strip 1 strip by In Vitro route 3 times daily 100 strip 6     blood glucose calibration (NO BRAND SPECIFIED) solution To accompany: Blood Glucose Monitor Brands: per insurance. 1 Bottle 3     blood glucose monitoring (NO BRAND SPECIFIED) meter device kit Use to test blood sugar 3 times daily or as directed. 1 kit 0     Blood Pressure KIT Please test blood pressure at home 2 x daily 1 kit 0     calcium-vitamin D-vitamin K (VIACTIV) 500-500-40 MG-UNT-MCG CHEW Take 1 tablet by mouth daily.       Coenzyme Q10 (COQ-10) 100 MG CAPS Take 1 capsule by mouth daily as needed (supplementation).       Cyanocobalamin (B-12) 1000 MCG TABS Take 1 tablet by mouth daily.       gabapentin (NEURONTIN) 250 MG/5ML solution Take 5 mLs (250 mg) by mouth at bedtime. 470 mL 0     hydrocortisone 2.5 % cream Apply topically 2 times daily as needed for rash.       insulin aspart (NOVOLOG PEN) 100 UNIT/ML pen Inject 1-7 Units subcutaneously 3 times daily (before meals). 15 mL 0     insulin glargine (LANTUS PEN) 100 UNIT/ML pen Inject 22 Units subcutaneously every morning (before breakfast). 15 mL 5     insulin pen needle (31G X 8 MM) 31G X 8 MM miscellaneous Use 4 pen needles daily or as directed. 100 each 4     lidocaine-prilocaine (EMLA) 2.5-2.5 % external cream Apply small amount to center of port site 45-60 minutes prior to chemo appointment, cover with clear dressing if desired. 30 g 1     magic mouthwash suspension (diphenhydrAMINE, lidocaine, aluminum-magnesium & simethicone) Swish and swallow 5 mLs in mouth every 6 hours as needed for mouth sores. 240 mL 2     metFORMIN (GLUCOPHAGE XR) 500 MG 24 hr  tablet Take 1 tablet (500 mg) by mouth 2 times daily (with meals). 180 tablet 3     STATIN NOT PRESCRIBED (INTENTIONAL) Please choose reason not prescribed from choices below. (Patient not taking: Reported on 7/7/2025)       thin (NO BRAND SPECIFIED) lancets 3 each by In Vitro route daily Use with lanceting device. To accompany: Blood Glucose Monitor Brands: per insurance. 300 each 6     triamcinolone (KENALOG) 0.1 % external cream Apply topically 2 times daily as needed for irritation.       triamterene-HCTZ (MAXZIDE-25) 37.5-25 MG tablet Take 1 tablet by mouth daily. 90 tablet 3     No current facility-administered medications for this visit.     Facility-Administered Medications Ordered in Other Visits   Medication Dose Route Frequency Provider Last Rate Last Admin     heparin 100 UNIT/ML injection 500 Units  500 Units Intracatheter Q8H Keysha Birch MD   500 Units at 11/06/20 1017     heparin lock flush 100 unit/mL injection 5 mL  5 mL Intracatheter Q30 Days Felicia Ashraf MD   5 mL at 04/25/25 0834     heparin lock flush 100 unit/mL injection 5 mL  5 mL Intracatheter Q30 Days Robert Fountain MD   5 mL at 12/13/24 1452              Review of Systems:   A comprehensive 10 point review of systems (constitutional, ENT, cardiac, peripheral vascular, lymphatic, respiratory, GI, , Musculoskeletal, skin, Neurological) was performed and found to be negative except as described in this note.     See intake form completed by patient      Again, thank you for allowing me to participate in the care of your patient.        Sincerely,        Sam Cartwright MD    Electronically signed

## 2025-07-15 ENCOUNTER — INFUSION THERAPY VISIT (OUTPATIENT)
Dept: INFUSION THERAPY | Facility: CLINIC | Age: 70
End: 2025-07-15
Attending: INTERNAL MEDICINE
Payer: COMMERCIAL

## 2025-07-15 VITALS
DIASTOLIC BLOOD PRESSURE: 85 MMHG | SYSTOLIC BLOOD PRESSURE: 118 MMHG | HEART RATE: 132 BPM | OXYGEN SATURATION: 96 % | BODY MASS INDEX: 26.35 KG/M2 | RESPIRATION RATE: 18 BRPM | WEIGHT: 145 LBS | TEMPERATURE: 98.5 F

## 2025-07-15 DIAGNOSIS — C79.51 MALIGNANT NEOPLASM METASTATIC TO BONE (H): Primary | ICD-10-CM

## 2025-07-15 DIAGNOSIS — C50.912 MALIGNANT NEOPLASM OF LEFT FEMALE BREAST, UNSPECIFIED ESTROGEN RECEPTOR STATUS, UNSPECIFIED SITE OF BREAST (H): ICD-10-CM

## 2025-07-15 DIAGNOSIS — C79.51 MALIGNANT NEOPLASM METASTATIC TO BONE (H): ICD-10-CM

## 2025-07-15 DIAGNOSIS — C50.912 MALIGNANT NEOPLASM OF LEFT FEMALE BREAST, UNSPECIFIED ESTROGEN RECEPTOR STATUS, UNSPECIFIED SITE OF BREAST (H): Primary | ICD-10-CM

## 2025-07-15 LAB
DACRYOCYTES BLD QL SMEAR: SLIGHT
ERYTHROCYTE [DISTWIDTH] IN BLOOD BY AUTOMATED COUNT: 20.1 % (ref 10–15)
GIANT PLATELETS BLD QL SMEAR: SLIGHT
HCT VFR BLD AUTO: 27 % (ref 35–47)
HGB BLD-MCNC: 8.5 G/DL (ref 11.7–15.7)
MCH RBC QN AUTO: 28.2 PG (ref 26.5–33)
MCHC RBC AUTO-ENTMCNC: 31.5 G/DL (ref 31.5–36.5)
MCV RBC AUTO: 90 FL (ref 78–100)
NEUTS HYPERSEG BLD QL SMEAR: PRESENT
PLAT MORPH BLD: ABNORMAL
PLATELET # BLD AUTO: 344 10E3/UL (ref 150–450)
POLYCHROMASIA BLD QL SMEAR: SLIGHT
RBC # BLD AUTO: 3.01 10E6/UL (ref 3.8–5.2)
RBC MORPH BLD: ABNORMAL
STOMATOCYTES BLD QL SMEAR: SLIGHT
WBC # BLD AUTO: 8.1 10E3/UL (ref 4–11)

## 2025-07-15 PROCEDURE — 250N000011 HC RX IP 250 OP 636

## 2025-07-15 PROCEDURE — 99207 PR NO CHARGE LOS: CPT

## 2025-07-15 PROCEDURE — 96367 TX/PROPH/DG ADDL SEQ IV INF: CPT

## 2025-07-15 PROCEDURE — 85014 HEMATOCRIT: CPT | Performed by: INTERNAL MEDICINE

## 2025-07-15 PROCEDURE — 250N000011 HC RX IP 250 OP 636: Performed by: INTERNAL MEDICINE

## 2025-07-15 PROCEDURE — 85007 BL SMEAR W/DIFF WBC COUNT: CPT | Performed by: INTERNAL MEDICINE

## 2025-07-15 PROCEDURE — 96375 TX/PRO/DX INJ NEW DRUG ADDON: CPT

## 2025-07-15 PROCEDURE — 250N000013 HC RX MED GY IP 250 OP 250 PS 637: Performed by: INTERNAL MEDICINE

## 2025-07-15 PROCEDURE — 258N000003 HC RX IP 258 OP 636: Performed by: INTERNAL MEDICINE

## 2025-07-15 PROCEDURE — 36591 DRAW BLOOD OFF VENOUS DEVICE: CPT | Performed by: INTERNAL MEDICINE

## 2025-07-15 PROCEDURE — 96413 CHEMO IV INFUSION 1 HR: CPT

## 2025-07-15 RX ORDER — HEPARIN SODIUM (PORCINE) LOCK FLUSH IV SOLN 100 UNIT/ML 100 UNIT/ML
5 SOLUTION INTRAVENOUS
Status: DISCONTINUED | OUTPATIENT
Start: 2025-07-15 | End: 2025-07-15 | Stop reason: HOSPADM

## 2025-07-15 RX ORDER — HEPARIN SODIUM (PORCINE) LOCK FLUSH IV SOLN 100 UNIT/ML 100 UNIT/ML
5 SOLUTION INTRAVENOUS EVERY 8 HOURS
Status: DISCONTINUED | OUTPATIENT
Start: 2025-07-15 | End: 2025-07-15 | Stop reason: HOSPADM

## 2025-07-15 RX ORDER — DIPHENHYDRAMINE HCL 25 MG
50 CAPSULE ORAL ONCE
Status: COMPLETED | OUTPATIENT
Start: 2025-07-15 | End: 2025-07-15

## 2025-07-15 RX ADMIN — PACLITAXEL 120 MG: 6 INJECTION, SOLUTION INTRAVENOUS at 15:56

## 2025-07-15 RX ADMIN — FAMOTIDINE 20 MG: 10 INJECTION, SOLUTION INTRAVENOUS at 15:33

## 2025-07-15 RX ADMIN — Medication 5 ML: at 16:59

## 2025-07-15 RX ADMIN — Medication 5 ML: at 15:06

## 2025-07-15 RX ADMIN — DIPHENHYDRAMINE HYDROCHLORIDE 50 MG: 25 CAPSULE ORAL at 15:29

## 2025-07-15 RX ADMIN — DEXAMETHASONE SODIUM PHOSPHATE: 10 INJECTION, SOLUTION INTRAMUSCULAR; INTRAVENOUS at 15:36

## 2025-07-15 RX ADMIN — SODIUM CHLORIDE 250 ML: 0.9 INJECTION, SOLUTION INTRAVENOUS at 15:32

## 2025-07-15 NOTE — PROGRESS NOTES
Infusion Nursing Note:  Mary Olson presents today for C1D8 Taxol.    Patient seen by provider today: No   present during visit today: Not Applicable.    Note:   Saw Dr Cartwright on 7/10/25 regarding lytic lesion in femur. Surgery not recommended at this time. Has consult with radiation on 7/23/25. Using a wheelchair for transportation.    Did not take diuretic today as she didn't want to use the bathroom while here.  States she will take when she gets home after this.    States most burdensome side effect this past week was fatigue.    States sugar levels were pretty good when she checked them at home this week.    Intravenous Access:  Implanted Port.    Treatment Conditions:  Lab Results   Component Value Date    HGB 8.5 (L) 07/15/2025    WBC 8.1 07/15/2025    ANEU 4.9 07/15/2025     07/15/2025        Results reviewed, labs MET treatment parameters, ok to proceed with treatment.      Post Infusion Assessment:  Patient tolerated infusion without incident.  Blood return noted pre and post infusion.  Site patent and intact, free from redness, edema or discomfort.  No evidence of extravasations.  Access discontinued per protocol.       Discharge Plan:   [facility]  Patient will return 7/21/25 for next appointment.   Patient discharged in stable condition accompanied by: .  Departure Mode: Wheelchair.      An Harding RN

## 2025-07-15 NOTE — PROGRESS NOTES
"Patient's name and  were verified.  See Doc Flowsheet - IV assess for details.  IVAD accessed with 20G 3/4\" carroll gripper plus needle  blood return positive: YES  Site without redness, tenderness or swelling: YES  flushed with 20cc NS and 5cc 100u/ml heparin  Needle: left in place for infusion  Comments: Labs drawn.  Patient tolerated procedure without incident.  "

## 2025-07-16 LAB
BASOPHILS # BLD MANUAL: 0 10E3/UL (ref 0–0.2)
BASOPHILS NFR BLD MANUAL: 0 %
EOSINOPHIL # BLD MANUAL: 0.1 10E3/UL (ref 0–0.7)
EOSINOPHIL NFR BLD MANUAL: 1 %
LYMPHOCYTES # BLD MANUAL: 2.2 10E3/UL (ref 0.8–5.3)
LYMPHOCYTES NFR BLD MANUAL: 27 %
METAMYELOCYTES # BLD MANUAL: 0.2 10E3/UL
METAMYELOCYTES NFR BLD MANUAL: 2 %
MONOCYTES # BLD MANUAL: 0.6 10E3/UL (ref 0–1.3)
MONOCYTES NFR BLD MANUAL: 8 %
MYELOCYTES # BLD MANUAL: 0.1 10E3/UL
MYELOCYTES NFR BLD MANUAL: 1 %
NEUTROPHILS # BLD MANUAL: 4.9 10E3/UL (ref 1.6–8.3)
NEUTROPHILS NFR BLD MANUAL: 61 %
NRBC # BLD AUTO: 0.6 10E3/UL
NRBC BLD MANUAL-RTO: 7 %
PATH REV: ABNORMAL

## 2025-07-21 ENCOUNTER — INFUSION THERAPY VISIT (OUTPATIENT)
Dept: INFUSION THERAPY | Facility: CLINIC | Age: 70
End: 2025-07-21
Attending: INTERNAL MEDICINE
Payer: COMMERCIAL

## 2025-07-21 VITALS
HEART RATE: 132 BPM | WEIGHT: 144.4 LBS | BODY MASS INDEX: 26.24 KG/M2 | TEMPERATURE: 98.4 F | DIASTOLIC BLOOD PRESSURE: 78 MMHG | SYSTOLIC BLOOD PRESSURE: 122 MMHG | RESPIRATION RATE: 18 BRPM | OXYGEN SATURATION: 94 %

## 2025-07-21 DIAGNOSIS — C79.51 MALIGNANT NEOPLASM METASTATIC TO BONE (H): Primary | ICD-10-CM

## 2025-07-21 DIAGNOSIS — C50.912 MALIGNANT NEOPLASM OF LEFT FEMALE BREAST, UNSPECIFIED ESTROGEN RECEPTOR STATUS, UNSPECIFIED SITE OF BREAST (H): ICD-10-CM

## 2025-07-21 DIAGNOSIS — C79.51 MALIGNANT NEOPLASM METASTATIC TO BONE (H): ICD-10-CM

## 2025-07-21 DIAGNOSIS — C50.912 MALIGNANT NEOPLASM OF LEFT FEMALE BREAST, UNSPECIFIED ESTROGEN RECEPTOR STATUS, UNSPECIFIED SITE OF BREAST (H): Primary | ICD-10-CM

## 2025-07-21 LAB
BASOPHILS # BLD MANUAL: 0.1 10E3/UL (ref 0–0.2)
BASOPHILS NFR BLD MANUAL: 1 %
EOSINOPHIL # BLD MANUAL: 0.1 10E3/UL (ref 0–0.7)
EOSINOPHIL NFR BLD MANUAL: 1 %
ERYTHROCYTE [DISTWIDTH] IN BLOOD BY AUTOMATED COUNT: 20.8 % (ref 10–15)
FRAGMENTS BLD QL SMEAR: ABNORMAL
GIANT PLATELETS BLD QL SMEAR: SLIGHT
HCT VFR BLD AUTO: 27.3 % (ref 35–47)
HGB BLD-MCNC: 8.6 G/DL (ref 11.7–15.7)
LYMPHOCYTES # BLD MANUAL: 2.1 10E3/UL (ref 0.8–5.3)
LYMPHOCYTES NFR BLD MANUAL: 37 %
MCH RBC QN AUTO: 28.5 PG (ref 26.5–33)
MCHC RBC AUTO-ENTMCNC: 31.5 G/DL (ref 31.5–36.5)
MCV RBC AUTO: 90 FL (ref 78–100)
METAMYELOCYTES # BLD MANUAL: 0.1 10E3/UL
METAMYELOCYTES NFR BLD MANUAL: 1 %
MONOCYTES # BLD MANUAL: 0.3 10E3/UL (ref 0–1.3)
MONOCYTES NFR BLD MANUAL: 5 %
NEUTROPHILS # BLD MANUAL: 3.2 10E3/UL (ref 1.6–8.3)
NEUTROPHILS NFR BLD MANUAL: 55 %
NRBC # BLD AUTO: 0.3 10E3/UL
NRBC BLD MANUAL-RTO: 5 %
PLAT MORPH BLD: ABNORMAL
PLATELET # BLD AUTO: 253 10E3/UL (ref 150–450)
POLYCHROMASIA BLD QL SMEAR: SLIGHT
RBC # BLD AUTO: 3.02 10E6/UL (ref 3.8–5.2)
RBC MORPH BLD: ABNORMAL
STOMATOCYTES BLD QL SMEAR: SLIGHT
VARIANT LYMPHS BLD QL SMEAR: PRESENT
WBC # BLD AUTO: 5.8 10E3/UL (ref 4–11)

## 2025-07-21 PROCEDURE — 250N000011 HC RX IP 250 OP 636: Performed by: INTERNAL MEDICINE

## 2025-07-21 PROCEDURE — 258N000003 HC RX IP 258 OP 636: Performed by: INTERNAL MEDICINE

## 2025-07-21 PROCEDURE — 99207 PR NO CHARGE LOS: CPT

## 2025-07-21 PROCEDURE — 85027 COMPLETE CBC AUTOMATED: CPT | Performed by: INTERNAL MEDICINE

## 2025-07-21 PROCEDURE — 85007 BL SMEAR W/DIFF WBC COUNT: CPT | Performed by: INTERNAL MEDICINE

## 2025-07-21 PROCEDURE — 36591 DRAW BLOOD OFF VENOUS DEVICE: CPT | Performed by: INTERNAL MEDICINE

## 2025-07-21 PROCEDURE — 96413 CHEMO IV INFUSION 1 HR: CPT

## 2025-07-21 PROCEDURE — 96375 TX/PRO/DX INJ NEW DRUG ADDON: CPT

## 2025-07-21 RX ORDER — HEPARIN SODIUM (PORCINE) LOCK FLUSH IV SOLN 100 UNIT/ML 100 UNIT/ML
500 SOLUTION INTRAVENOUS ONCE
Status: COMPLETED | OUTPATIENT
Start: 2025-07-21 | End: 2025-07-21

## 2025-07-21 RX ORDER — ONDANSETRON 2 MG/ML
8 INJECTION INTRAMUSCULAR; INTRAVENOUS ONCE
Status: COMPLETED | OUTPATIENT
Start: 2025-07-21 | End: 2025-07-21

## 2025-07-21 RX ORDER — HEPARIN SODIUM (PORCINE) LOCK FLUSH IV SOLN 100 UNIT/ML 100 UNIT/ML
5 SOLUTION INTRAVENOUS
Status: DISCONTINUED | OUTPATIENT
Start: 2025-07-21 | End: 2025-07-21 | Stop reason: HOSPADM

## 2025-07-21 RX ADMIN — SODIUM CHLORIDE 250 ML: 0.9 INJECTION, SOLUTION INTRAVENOUS at 09:34

## 2025-07-21 RX ADMIN — ONDANSETRON 8 MG: 2 INJECTION, SOLUTION INTRAMUSCULAR; INTRAVENOUS at 09:39

## 2025-07-21 RX ADMIN — Medication 5 ML: at 11:05

## 2025-07-21 RX ADMIN — PACLITAXEL 120 MG: 6 INJECTION, SOLUTION INTRAVENOUS at 09:55

## 2025-07-21 RX ADMIN — Medication 500 UNITS: at 08:28

## 2025-07-21 ASSESSMENT — PAIN SCALES - GENERAL: PAINLEVEL_OUTOF10: NO PAIN (0)

## 2025-07-21 NOTE — PROGRESS NOTES
See IV flow sheet for details of port access. Lab sent: cbcd/p. Port needle flushed per protocol and needle left in place for chemotherapy to follow.    Katharina Enamorado RN

## 2025-07-21 NOTE — TELEPHONE ENCOUNTER
RECORDS STATUS - ALL OTHER DIAGNOSIS      RECORDS RECEIVED FROM: [facility] - Internal records   DATE RECEIVED: 7/21

## 2025-07-21 NOTE — PROGRESS NOTES
Infusion Nursing Note:  Mary Olson presents today for C1D15 Taxol.    Patient seen by provider today: No   present during visit today: Not Applicable.    Note: Premedication of zofran given.    Patient notes continued neuropathy in her hands and feet. Baseline tremors noted in hands. Patient's heart rate in the 130s today, patient notes this is normal for her.     Intravenous Access:  Implanted Port.    Treatment Conditions:  Lab Results   Component Value Date    HGB 8.6 (L) 07/21/2025    WBC 5.8 07/21/2025    ANEU 3.2 07/21/2025     07/21/2025        Results reviewed, labs MET treatment parameters, ok to proceed with treatment.    Post Infusion Assessment:  Patient tolerated infusion without incident.  Blood return noted pre and post infusion.  Site patent and intact, free from redness, edema or discomfort.  No evidence of extravasations.  Access discontinued per protocol.     Discharge Plan:   Discharge instructions reviewed with: Patient.  Patient and/or family verbalized understanding of discharge instructions and all questions answered.  Patient discharged in stable condition accompanied by: self.  Departure Mode: Ambulatory.  Future appts have been reviewed and crosschecked with appt note and plan.    Lara Lafleur RN

## 2025-07-23 ENCOUNTER — VIRTUAL VISIT (OUTPATIENT)
Dept: RADIATION ONCOLOGY | Facility: CLINIC | Age: 70
End: 2025-07-23
Attending: INTERNAL MEDICINE
Payer: COMMERCIAL

## 2025-07-23 ENCOUNTER — PRE VISIT (OUTPATIENT)
Dept: RADIATION ONCOLOGY | Facility: CLINIC | Age: 70
End: 2025-07-23

## 2025-07-23 DIAGNOSIS — C79.51 MALIGNANT NEOPLASM METASTATIC TO BONE (H): ICD-10-CM

## 2025-07-23 ASSESSMENT — PAIN SCALES - GENERAL: PAINLEVEL_OUTOF10: NO PAIN (0)

## 2025-07-23 NOTE — NURSING NOTE
"Virtual Visit Details    Type of service:  Video Visit   Video Start Time: 1300 nursing start time  Video End Time:per physician documentation    Originating Location (pt. Location): Home    Distant Location (provider location):  On-site  Platform used for Video Visit: [facility]      Oncology Rooming Note    July 23, 2025 2:15 PM   Mary Olson is a 70 year old female who presents for:    Chief Complaint   Patient presents with    Cancer     Radiation oncology new patient consultation with Dr. Wolfe     Initial Vitals: There were no vitals taken for this visit. Estimated body mass index is 26.24 kg/m  as calculated from the following:    Height as of 7/7/25: 1.58 m (5' 2.21\").    Weight as of 7/21/25: 65.5 kg (144 lb 6.4 oz). There is no height or weight on file to calculate BSA.  No Pain (0) Comment: Data Unavailable   No LMP recorded. Patient is postmenopausal.  Allergies reviewed: Yes  Medications reviewed: Yes    Medications: Medication refills not needed today.  Pharmacy name entered into Mobile [employer]:    [facility] - [place], [place] - [address].  [facility] [place], [place] - [address] [facility] [place] [address] [facility] [place] [address] [id number] - [place], [place] - [address] [facility]  [id number] - [place], [place] - [address] AT [facility]    PHQ9:  Did this patient require a PHQ9?: No      Clinical concerns: patient presents via video visit for new patient consultation in radiation oncology.  Patient reports she is feeling well, denies pain.  Patient reports she is active and mobile at baseline, however due to nervousness regarding risk of fracture, patient uses wheelchair or cane since finding out about femur lesion.  Cancer Rehab alert triggered, not ordered as patient " with limitations due to fear of fracture versus physical limitations.    Considerations for radiation treatment   Pregnancy status: Female age 55+, patient reports menopause at age 51  Implanted Cardiac Devices: No   Any previous radiation therapy: No     Dr. Wolfe was notified.      Fadia Mata RN BSN OCN CBCN

## 2025-07-23 NOTE — PATIENT INSTRUCTIONS
What to expect at your Simulation visit:    You will meet with a Radiation Therapist and other team members who will be doing a planning session called a  simulation  with you. This process will determine your daily treatment.    ~ You will lie on a flat table and have a treatment planning CT scan.  It is important during the scan to hold very still and breathe normally.    ~ Your therapist may construct a body mold to help you hold still for your treatments.    ~ If you are having treatment to the head or neck area you will be fitted with a plastic mesh mask that fits very snugly over your face and neck.     ~ Your therapist will be taking some digital photos that will go in your treatment chart.      ~Your therapist will make marks on your skin and take measurements. Your therapist may ask you about making small tattoos (a permanent small dot) over these marks.  These marks are used to position you daily for your radiation therapy treatments. Please do not wash off any marks until all of your radiation therapy treatments are complete unless you are instructed to do so by your therapist.    ~ Once the simulation is completed it can take from 3 to 10 business days before you start radiation therapy treatments.    ~ You may meet with a nurse who will go over management of treatment side effects and self care during your treatments. The nurse will help to plan care with other departments and physicians if needed.     Please contact [facility] RN with questions or concerns following today's appointment.    If you are a patient of Dr. Bañuelos call: [phone]   If you are a patient of Dr. Wolfe call: [phone]    Please feel free to leave a detailed message if your call is not answered.    If your call is not received before 3:00 PM, it may not be returned until the following business day.    If you are receiving radiation treatment and need assistance after 3:00 PM or on the weekends, please  call [phone] and ask to speak to the radiation oncologist on-call.    Thank you!    [facility] Radiation Oncology Nursing

## 2025-07-23 NOTE — PROGRESS NOTES
Dear Colleagues,  Today Mary Olson was seen in consultation.    IDENTIFICATION: This is a 70 year old woman with breast cancer metastatic to the bone and lymph nodes who has progressed on multiple lines of systemic regimens with a large right femoral neck tumor that poses a risk for fracture referred for palliative radiotherapy.      HISTORY OF PRESENT ILLNESS:  Mary Olson is a 70-year-old woman with metastatic breast cancer.  She was a previous patient of Dr. Prado and Dr. Fountain and now is being followed by Dr. Ashraf.  Her relevant medical history begins in March 2020 when she presented with left breast nodularity and axillary adenopathy.  Imaging revealed left breast cancer associated with regional and distant metastasis.  The latter included a retropectoral lymph node and diffuse bony mets.  In  2020 she initially received immunotherapy.  She then received weekly paclitaxel and was on it for several months.  It was switched to anastrozole due to significant side effects (balance issues and neuropathy requiring wheelchair use).  By March 2021 she was started on anastrozole and abemaciclib.  In August 2023 she was changed to exemestane and everolimus.  She progressed on this and was started on capecitabine.  Her markers declined.  By December 2024 imaging revealed subtle progression.  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      On 1/7/2025 MR brain revealed diffuse osseous disease but no specific abnormal enhancement along the cisternal segment of the visualized cranial nerves. She thinks her diplopia has improved on antiviral therapy.  She was then started on fulvestrant/capivasertib.  In May 2025 she was switched to  Alpelisib/letrozole.  In June she went into DKA and this was thought to likely be due to the  Alpelisib.  She was recently started on Taxol which was dose reduced due to her baseline neuropathy.    Relevant imaging includes the following:    On 6/30/25  CAP CT IMPRESSION: Compared to CT from 4/25/2025:   1. Interval metastatic disease progression as evidenced by: A. New and increased metastatic mediastinal and axillary lymphadenopathy  B. Similar left chest wall/breast soft tissue nodularity.  C. Increased size of anterior mediastinal nodularity/lymphadenopathy.  D. Increased volume of left-sided pleural effusion with increased associated pleural-based nodules/metastatic disease.   2. Stable mildly displaced left ninth rib fracture. Stable fracture deformity of the right 10th   3. Cholelithiasis without evidence of acute cholecystitis.  4. Unchanged diffuse confluent metastatic sclerotic lesions throughout the axial and appendicular skeleton.   5. Other incidental findings are noted in the body of the report.    On 6/30/25 bone scan showed widespread metastatic disease with extensive involvement of the right femoral neck that may pose risk for future fracture.    On 7/7/25 femur Xray confirmed lytic lesions of the proximal femur centered about the greater  trochanter and femoral neck on the right with scattered lucencies throughout the visualized pelvic bones.    She saw Dr. Cartwright on 7/10/25 who did not see an imperative or immediate need to proceed directly with prophylactic internal fixation.    Today as she is being seen she states she has no pain.  However she does experience quick sharp 5 out of 10 intermittent pain with movement.  She was previously very active but now is confined to a wheelchair versus cane because of fear of injury or fracturing her right femoral neck.  Denies any neurologic symptoms, GI or  symptoms associated with either pain. She is being seen here today via video for consideration of palliative radiotherapy.    REVIEW OF SYSTEMS: As per HPI, a 14-point review of system is otherwise negative.  PAST RADIATION THERAPY:  Denies  PAST CTD/PACEMAKER: Denies    Past Medical History:   Diagnosis Date    ASCUS favoring benign 10/28/15    neg  HPV    Cancer (H)     Diabetes mellitus, type 2 (H) 2020    Hypertension     Obese        Past Surgical History:   Procedure Laterality Date    BIOPSY  3/2020    CATARACT EXTRACTION Bilateral 10/2024    COLONOSCOPY N/A 10/14/2020    Procedure: COLONOSCOPY, WITH POLYPECTOMY  hot snare;  Surgeon: Leventhal, Thomas Michael, MD;  Location: [facility] OR    GYN SURGERY  3/13/89. & 92    C Sections    IR CHEST PORT PLACEMENT > 5 YRS OF AGE  2020       Family History   Problem Relation Age of Onset    Hypertension Mother          12/3/21    Coronary Artery Disease Mother          12/3/21    Breast Cancer Mother          12/3/21    Hyperlipidemia Mother          12/3/21    Depression Mother          123/3/21    Arrhythmia Brother     Cerebrovascular Disease Brother     Diabetes No family hx of        Social History     Tobacco Use    Smoking status: Never     Passive exposure: Never    Smokeless tobacco: Never   Substance Use Topics    Alcohol use: Not Currently       Allergies   Allergen Reactions    Lisinopril Cough     cough         PHYSICAL EXAMINATION:  GENERAL: alert and no distress  EYES: Eyes grossly normal to inspection.  No discharge or erythema, or obvious scleral/conjunctival abnormalities.  RESP: No audible wheeze, cough, or visible cyanosis.    SKIN: Visible skin clear. No significant rash, abnormal pigmentation or lesions.  NEURO: Cranial nerves grossly intact.  Mentation and speech appropriate for age.  PSYCH: Appropriate affect, tone, and pace of words      ECOG PERFORMANCE STATUS: 1        IMAGING: I personally reviewed the relevant imaging for this case as stated in the HPI.      IMPRESSION/PLAN:  Mary Olson is a 70 year old woman with breast cancer metastatic to the bone and lymph nodes who has progressed on multiple lines of systemic regimens and has a large intermittently painful right femoral neck tumor that poses a risk for fracture referred for palliative  radiotherapy.   I recommend a palliative course of radiotherapy to the right femur to improve local control and prevent future fracture.    The risks, benefits, treatment rationale and regimen of radiation therapy to the right hip were discussed. Risks include but are not limited to fatigue, skin erythema/changes, nausea, vomiting, erratic stools,  symptoms such as urgency or dysuria, bony fracture, nerve damage and secondary malignancy. Pt signed consent and will be scheduled for CT simulation.  Treatment will start shortly afterwards.    Additional problem list to be addressed in the following manner:    Systemic Treatment: On Taxol    There was ample time for questions and all were answered to the patient's satisfaction. Thank you for allowing me to participate in the care of this pleasant patient. If you have any questions, please do not hesitate to contact my office.     Sincerely,  Fan Wolfe MD    [phone] Pager   [phone] [facility]   [phone] Kate Goff  [phone] Kayla      Joined the call at 7/23/2025, 1:49:23 pm.  Left the call at 7/23/2025, 2:07:34 pm.

## 2025-07-24 ENCOUNTER — PATIENT OUTREACH (OUTPATIENT)
Dept: RADIATION ONCOLOGY | Facility: CLINIC | Age: 70
End: 2025-07-24
Payer: COMMERCIAL

## 2025-07-24 NOTE — PROGRESS NOTES
"Patient called into clinic today wanting to let Dr. Wolfe know that she experienced pain in her right lateral thigh last evening at 8:30 PM for a couple of minutes, \"a quick sharp pain\", denied need for pain medications. Patient reports she may have felt the pain a couple more times depending on how she moved her leg, but reports the pain is not constant.  Patient requesting Dr. Wolfe to be notified.  Patient currently scheduled for CT Simulation for radiation treatment on Monday, 7/28/2025.  Patient requesting a return call if any updates, recommendations or changes to treatment plan from Dr. Wolfe.    Fadia Mata, RN BSN OCN CBCN    "

## 2025-07-29 DIAGNOSIS — C50.912 MALIGNANT NEOPLASM OF LEFT FEMALE BREAST, UNSPECIFIED ESTROGEN RECEPTOR STATUS, UNSPECIFIED SITE OF BREAST (H): ICD-10-CM

## 2025-07-29 DIAGNOSIS — M89.9 LESION OF RIGHT FEMUR: ICD-10-CM

## 2025-07-29 DIAGNOSIS — R29.898 RIGHT LEG WEAKNESS: ICD-10-CM

## 2025-07-29 DIAGNOSIS — C79.51 MALIGNANT NEOPLASM METASTATIC TO BONE (H): Primary | ICD-10-CM

## 2025-08-03 ENCOUNTER — HEALTH MAINTENANCE LETTER (OUTPATIENT)
Age: 70
End: 2025-08-03

## 2025-08-04 ENCOUNTER — VIRTUAL VISIT (OUTPATIENT)
Dept: ONCOLOGY | Facility: CLINIC | Age: 70
End: 2025-08-04
Payer: COMMERCIAL

## 2025-08-04 DIAGNOSIS — Z17.0 MALIGNANT NEOPLASM OF OVERLAPPING SITES OF LEFT BREAST IN FEMALE, ESTROGEN RECEPTOR POSITIVE (H): Primary | ICD-10-CM

## 2025-08-04 DIAGNOSIS — G62.9 NEUROPATHY: ICD-10-CM

## 2025-08-04 DIAGNOSIS — R73.09 ELEVATED GLUCOSE: ICD-10-CM

## 2025-08-04 DIAGNOSIS — M89.8X5 LYTIC BONE LESION OF RIGHT FEMUR: ICD-10-CM

## 2025-08-04 DIAGNOSIS — C79.51 MALIGNANT NEOPLASM METASTATIC TO BONE (H): ICD-10-CM

## 2025-08-04 DIAGNOSIS — D64.9 ANEMIA, UNSPECIFIED TYPE: ICD-10-CM

## 2025-08-04 DIAGNOSIS — C50.812 MALIGNANT NEOPLASM OF OVERLAPPING SITES OF LEFT BREAST IN FEMALE, ESTROGEN RECEPTOR POSITIVE (H): Primary | ICD-10-CM

## 2025-08-04 DIAGNOSIS — C50.912 MALIGNANT NEOPLASM OF LEFT FEMALE BREAST, UNSPECIFIED ESTROGEN RECEPTOR STATUS, UNSPECIFIED SITE OF BREAST (H): ICD-10-CM

## 2025-08-04 PROCEDURE — G2211 COMPLEX E/M VISIT ADD ON: HCPCS

## 2025-08-04 PROCEDURE — 98006 SYNCH AUDIO-VIDEO EST MOD 30: CPT

## 2025-08-04 PROCEDURE — 1126F AMNT PAIN NOTED NONE PRSNT: CPT

## 2025-08-04 RX ORDER — DIPHENHYDRAMINE HYDROCHLORIDE 50 MG/ML
50 INJECTION, SOLUTION INTRAMUSCULAR; INTRAVENOUS
Status: CANCELLED | OUTPATIENT
Start: 2025-08-06

## 2025-08-04 RX ORDER — MEPERIDINE HYDROCHLORIDE 25 MG/ML
25 INJECTION INTRAMUSCULAR; INTRAVENOUS; SUBCUTANEOUS
OUTPATIENT
Start: 2025-08-12

## 2025-08-04 RX ORDER — HEPARIN SODIUM,PORCINE 10 UNIT/ML
5-20 VIAL (ML) INTRAVENOUS DAILY PRN
Status: CANCELLED | OUTPATIENT
Start: 2025-08-06

## 2025-08-04 RX ORDER — DIPHENHYDRAMINE HCL 25 MG
50 CAPSULE ORAL
Status: CANCELLED
Start: 2025-08-06

## 2025-08-04 RX ORDER — DIPHENHYDRAMINE HYDROCHLORIDE 50 MG/ML
50 INJECTION, SOLUTION INTRAMUSCULAR; INTRAVENOUS
Start: 2025-08-12

## 2025-08-04 RX ORDER — HEPARIN SODIUM,PORCINE 10 UNIT/ML
5-20 VIAL (ML) INTRAVENOUS DAILY PRN
OUTPATIENT
Start: 2025-08-12

## 2025-08-04 RX ORDER — DIPHENHYDRAMINE HYDROCHLORIDE 50 MG/ML
25 INJECTION, SOLUTION INTRAMUSCULAR; INTRAVENOUS
Start: 2025-08-19

## 2025-08-04 RX ORDER — DIPHENHYDRAMINE HCL 25 MG
50 CAPSULE ORAL
Start: 2025-08-12

## 2025-08-04 RX ORDER — MEPERIDINE HYDROCHLORIDE 25 MG/ML
25 INJECTION INTRAMUSCULAR; INTRAVENOUS; SUBCUTANEOUS
OUTPATIENT
Start: 2025-08-19

## 2025-08-04 RX ORDER — METHYLPREDNISOLONE SODIUM SUCCINATE 40 MG/ML
40 INJECTION INTRAMUSCULAR; INTRAVENOUS
Status: CANCELLED
Start: 2025-08-06

## 2025-08-04 RX ORDER — DIPHENHYDRAMINE HYDROCHLORIDE 50 MG/ML
25 INJECTION, SOLUTION INTRAMUSCULAR; INTRAVENOUS
Status: CANCELLED
Start: 2025-08-06

## 2025-08-04 RX ORDER — ALBUTEROL SULFATE 90 UG/1
1-2 INHALANT RESPIRATORY (INHALATION)
Start: 2025-08-12

## 2025-08-04 RX ORDER — LORAZEPAM 2 MG/ML
0.5 INJECTION INTRAMUSCULAR EVERY 4 HOURS PRN
OUTPATIENT
Start: 2025-08-12

## 2025-08-04 RX ORDER — HEPARIN SODIUM (PORCINE) LOCK FLUSH IV SOLN 100 UNIT/ML 100 UNIT/ML
5 SOLUTION INTRAVENOUS
OUTPATIENT
Start: 2025-08-12

## 2025-08-04 RX ORDER — DIPHENHYDRAMINE HYDROCHLORIDE 50 MG/ML
50 INJECTION, SOLUTION INTRAMUSCULAR; INTRAVENOUS
OUTPATIENT
Start: 2025-08-19

## 2025-08-04 RX ORDER — HEPARIN SODIUM,PORCINE 10 UNIT/ML
5-20 VIAL (ML) INTRAVENOUS DAILY PRN
OUTPATIENT
Start: 2025-08-19

## 2025-08-04 RX ORDER — ONDANSETRON 2 MG/ML
8 INJECTION INTRAMUSCULAR; INTRAVENOUS ONCE
OUTPATIENT
Start: 2025-08-12

## 2025-08-04 RX ORDER — ALBUTEROL SULFATE 0.83 MG/ML
2.5 SOLUTION RESPIRATORY (INHALATION)
OUTPATIENT
Start: 2025-08-12

## 2025-08-04 RX ORDER — DIPHENHYDRAMINE HYDROCHLORIDE 50 MG/ML
25 INJECTION, SOLUTION INTRAMUSCULAR; INTRAVENOUS
Start: 2025-08-12

## 2025-08-04 RX ORDER — EPINEPHRINE 1 MG/ML
0.3 INJECTION, SOLUTION, CONCENTRATE INTRAVENOUS EVERY 5 MIN PRN
Status: CANCELLED | OUTPATIENT
Start: 2025-08-06

## 2025-08-04 RX ORDER — HEPARIN SODIUM (PORCINE) LOCK FLUSH IV SOLN 100 UNIT/ML 100 UNIT/ML
5 SOLUTION INTRAVENOUS
OUTPATIENT
Start: 2025-08-19

## 2025-08-04 RX ORDER — MEPERIDINE HYDROCHLORIDE 25 MG/ML
25 INJECTION INTRAMUSCULAR; INTRAVENOUS; SUBCUTANEOUS
Status: CANCELLED | OUTPATIENT
Start: 2025-08-06

## 2025-08-04 RX ORDER — ALBUTEROL SULFATE 0.83 MG/ML
2.5 SOLUTION RESPIRATORY (INHALATION)
Status: CANCELLED | OUTPATIENT
Start: 2025-08-06

## 2025-08-04 RX ORDER — LORAZEPAM 2 MG/ML
0.5 INJECTION INTRAMUSCULAR EVERY 4 HOURS PRN
Status: CANCELLED | OUTPATIENT
Start: 2025-08-06

## 2025-08-04 RX ORDER — DIPHENHYDRAMINE HCL 25 MG
50 CAPSULE ORAL
Start: 2025-08-19

## 2025-08-04 RX ORDER — ALBUTEROL SULFATE 90 UG/1
1-2 INHALANT RESPIRATORY (INHALATION)
Status: CANCELLED
Start: 2025-08-06

## 2025-08-04 RX ORDER — LORAZEPAM 2 MG/ML
0.5 INJECTION INTRAMUSCULAR EVERY 4 HOURS PRN
OUTPATIENT
Start: 2025-08-19

## 2025-08-04 RX ORDER — DIPHENHYDRAMINE HYDROCHLORIDE 50 MG/ML
50 INJECTION, SOLUTION INTRAMUSCULAR; INTRAVENOUS
Status: CANCELLED
Start: 2025-08-06

## 2025-08-04 RX ORDER — EPINEPHRINE 1 MG/ML
0.3 INJECTION, SOLUTION, CONCENTRATE INTRAVENOUS EVERY 5 MIN PRN
OUTPATIENT
Start: 2025-08-12

## 2025-08-04 RX ORDER — ALBUTEROL SULFATE 90 UG/1
1-2 INHALANT RESPIRATORY (INHALATION)
Start: 2025-08-19

## 2025-08-04 RX ORDER — ALBUTEROL SULFATE 0.83 MG/ML
2.5 SOLUTION RESPIRATORY (INHALATION)
OUTPATIENT
Start: 2025-08-19

## 2025-08-04 RX ORDER — METHYLPREDNISOLONE SODIUM SUCCINATE 40 MG/ML
40 INJECTION INTRAMUSCULAR; INTRAVENOUS
Start: 2025-08-12

## 2025-08-04 RX ORDER — GABAPENTIN 250 MG/5ML
250 SOLUTION ORAL AT BEDTIME
Qty: 470 ML | Refills: 0 | Status: SHIPPED | OUTPATIENT
Start: 2025-08-04

## 2025-08-04 RX ORDER — DIPHENHYDRAMINE HYDROCHLORIDE 50 MG/ML
50 INJECTION, SOLUTION INTRAMUSCULAR; INTRAVENOUS
OUTPATIENT
Start: 2025-08-12

## 2025-08-04 RX ORDER — HEPARIN SODIUM (PORCINE) LOCK FLUSH IV SOLN 100 UNIT/ML 100 UNIT/ML
5 SOLUTION INTRAVENOUS
Status: CANCELLED | OUTPATIENT
Start: 2025-08-06

## 2025-08-04 RX ORDER — EPINEPHRINE 1 MG/ML
0.3 INJECTION, SOLUTION, CONCENTRATE INTRAVENOUS EVERY 5 MIN PRN
OUTPATIENT
Start: 2025-08-19

## 2025-08-04 RX ORDER — ONDANSETRON 2 MG/ML
8 INJECTION INTRAMUSCULAR; INTRAVENOUS ONCE
OUTPATIENT
Start: 2025-08-19

## 2025-08-04 RX ORDER — METHYLPREDNISOLONE SODIUM SUCCINATE 40 MG/ML
40 INJECTION INTRAMUSCULAR; INTRAVENOUS
Start: 2025-08-19

## 2025-08-04 RX ORDER — ONDANSETRON 2 MG/ML
8 INJECTION INTRAMUSCULAR; INTRAVENOUS ONCE
Status: CANCELLED | OUTPATIENT
Start: 2025-08-06

## 2025-08-04 RX ORDER — DIPHENHYDRAMINE HYDROCHLORIDE 50 MG/ML
50 INJECTION, SOLUTION INTRAMUSCULAR; INTRAVENOUS
Start: 2025-08-19

## 2025-08-06 ENCOUNTER — INFUSION THERAPY VISIT (OUTPATIENT)
Dept: INFUSION THERAPY | Facility: CLINIC | Age: 70
End: 2025-08-06
Attending: INTERNAL MEDICINE
Payer: COMMERCIAL

## 2025-08-06 VITALS
SYSTOLIC BLOOD PRESSURE: 122 MMHG | DIASTOLIC BLOOD PRESSURE: 82 MMHG | RESPIRATION RATE: 18 BRPM | HEART RATE: 136 BPM | OXYGEN SATURATION: 98 % | WEIGHT: 150.4 LBS | TEMPERATURE: 98.5 F | BODY MASS INDEX: 27.33 KG/M2

## 2025-08-06 DIAGNOSIS — C50.912 MALIGNANT NEOPLASM OF LEFT FEMALE BREAST, UNSPECIFIED ESTROGEN RECEPTOR STATUS, UNSPECIFIED SITE OF BREAST (H): Primary | ICD-10-CM

## 2025-08-06 DIAGNOSIS — C79.51 MALIGNANT NEOPLASM METASTATIC TO BONE (H): ICD-10-CM

## 2025-08-06 DIAGNOSIS — C50.912 MALIGNANT NEOPLASM OF LEFT FEMALE BREAST, UNSPECIFIED ESTROGEN RECEPTOR STATUS, UNSPECIFIED SITE OF BREAST (H): ICD-10-CM

## 2025-08-06 DIAGNOSIS — C79.51 MALIGNANT NEOPLASM METASTATIC TO BONE (H): Primary | ICD-10-CM

## 2025-08-06 LAB
ALBUMIN SERPL BCG-MCNC: 3.8 G/DL (ref 3.5–5.2)
ALP SERPL-CCNC: 150 U/L (ref 40–150)
ALT SERPL W P-5'-P-CCNC: 12 U/L (ref 0–50)
ANION GAP SERPL CALCULATED.3IONS-SCNC: 14 MMOL/L (ref 7–15)
AST SERPL W P-5'-P-CCNC: 37 U/L (ref 0–45)
BASO STIPL BLD QL SMEAR: PRESENT
BASOPHILS # BLD MANUAL: 0.1 10E3/UL (ref 0–0.2)
BASOPHILS NFR BLD MANUAL: 1 %
BILIRUB SERPL-MCNC: 0.3 MG/DL
BUN SERPL-MCNC: 28.3 MG/DL (ref 8–23)
CALCIUM SERPL-MCNC: 9.3 MG/DL (ref 8.8–10.4)
CANCER AG15-3 SERPL-ACNC: 1146 U/ML
CHLORIDE SERPL-SCNC: 99 MMOL/L (ref 98–107)
CREAT SERPL-MCNC: 0.9 MG/DL (ref 0.51–0.95)
EGFRCR SERPLBLD CKD-EPI 2021: 69 ML/MIN/1.73M2
EOSINOPHIL # BLD MANUAL: 0.1 10E3/UL (ref 0–0.7)
EOSINOPHIL NFR BLD MANUAL: 1 %
ERYTHROCYTE [DISTWIDTH] IN BLOOD BY AUTOMATED COUNT: 22.9 % (ref 10–15)
GLUCOSE SERPL-MCNC: 173 MG/DL (ref 70–99)
HCO3 SERPL-SCNC: 25 MMOL/L (ref 22–29)
HCT VFR BLD AUTO: 26.3 % (ref 35–47)
HGB BLD-MCNC: 8.2 G/DL (ref 11.7–15.7)
LYMPHOCYTES # BLD MANUAL: 2.6 10E3/UL (ref 0.8–5.3)
LYMPHOCYTES NFR BLD MANUAL: 29 %
MCH RBC QN AUTO: 28.7 PG (ref 26.5–33)
MCHC RBC AUTO-ENTMCNC: 31.2 G/DL (ref 31.5–36.5)
MCV RBC AUTO: 92 FL (ref 78–100)
METAMYELOCYTES # BLD MANUAL: 0.2 10E3/UL
METAMYELOCYTES NFR BLD MANUAL: 2 %
MONOCYTES # BLD MANUAL: 0.5 10E3/UL (ref 0–1.3)
MONOCYTES NFR BLD MANUAL: 5 %
NEUTROPHILS # BLD MANUAL: 5.6 10E3/UL (ref 1.6–8.3)
NEUTROPHILS NFR BLD MANUAL: 62 %
NRBC # BLD AUTO: 1.1 10E3/UL
NRBC BLD MANUAL-RTO: 12 %
PLAT MORPH BLD: ABNORMAL
PLATELET # BLD AUTO: 238 10E3/UL (ref 150–450)
POLYCHROMASIA BLD QL SMEAR: SLIGHT
POTASSIUM SERPL-SCNC: 4.3 MMOL/L (ref 3.4–5.3)
PROT SERPL-MCNC: 6.8 G/DL (ref 6.4–8.3)
RBC # BLD AUTO: 2.86 10E6/UL (ref 3.8–5.2)
RBC MORPH BLD: ABNORMAL
SODIUM SERPL-SCNC: 138 MMOL/L (ref 135–145)
STOMATOCYTES BLD QL SMEAR: SLIGHT
WBC # BLD AUTO: 9.1 10E3/UL (ref 4–11)

## 2025-08-06 PROCEDURE — 258N000003 HC RX IP 258 OP 636

## 2025-08-06 PROCEDURE — 96413 CHEMO IV INFUSION 1 HR: CPT

## 2025-08-06 PROCEDURE — 250N000011 HC RX IP 250 OP 636

## 2025-08-06 PROCEDURE — 86300 IMMUNOASSAY TUMOR CA 15-3: CPT

## 2025-08-06 PROCEDURE — 82310 ASSAY OF CALCIUM: CPT

## 2025-08-06 PROCEDURE — 36591 DRAW BLOOD OFF VENOUS DEVICE: CPT

## 2025-08-06 PROCEDURE — 96372 THER/PROPH/DIAG INJ SC/IM: CPT | Mod: XU

## 2025-08-06 PROCEDURE — 99207 PR NO CHARGE LOS: CPT

## 2025-08-06 PROCEDURE — 96375 TX/PRO/DX INJ NEW DRUG ADDON: CPT

## 2025-08-06 PROCEDURE — 85014 HEMATOCRIT: CPT

## 2025-08-06 PROCEDURE — 85007 BL SMEAR W/DIFF WBC COUNT: CPT

## 2025-08-06 RX ORDER — HEPARIN SODIUM (PORCINE) LOCK FLUSH IV SOLN 100 UNIT/ML 100 UNIT/ML
5 SOLUTION INTRAVENOUS EVERY 8 HOURS
Status: DISCONTINUED | OUTPATIENT
Start: 2025-08-06 | End: 2025-08-06 | Stop reason: HOSPADM

## 2025-08-06 RX ORDER — ONDANSETRON 2 MG/ML
8 INJECTION INTRAMUSCULAR; INTRAVENOUS ONCE
Status: COMPLETED | OUTPATIENT
Start: 2025-08-06 | End: 2025-08-06

## 2025-08-06 RX ORDER — HEPARIN SODIUM (PORCINE) LOCK FLUSH IV SOLN 100 UNIT/ML 100 UNIT/ML
5 SOLUTION INTRAVENOUS
Status: DISCONTINUED | OUTPATIENT
Start: 2025-08-06 | End: 2025-08-06 | Stop reason: HOSPADM

## 2025-08-06 RX ADMIN — Medication 5 ML: at 07:45

## 2025-08-06 RX ADMIN — DENOSUMAB 120 MG: 120 INJECTION SUBCUTANEOUS at 08:56

## 2025-08-06 RX ADMIN — ONDANSETRON 8 MG: 2 INJECTION, SOLUTION INTRAMUSCULAR; INTRAVENOUS at 09:03

## 2025-08-06 RX ADMIN — Medication 5 ML: at 10:28

## 2025-08-06 RX ADMIN — SODIUM CHLORIDE 250 ML: 0.9 INJECTION, SOLUTION INTRAVENOUS at 08:52

## 2025-08-06 RX ADMIN — PACLITAXEL 120 MG: 6 INJECTION, SOLUTION INTRAVENOUS at 09:18

## 2025-08-12 ENCOUNTER — INFUSION THERAPY VISIT (OUTPATIENT)
Dept: INFUSION THERAPY | Facility: CLINIC | Age: 70
End: 2025-08-12
Attending: INTERNAL MEDICINE
Payer: COMMERCIAL

## 2025-08-12 VITALS
RESPIRATION RATE: 16 BRPM | BODY MASS INDEX: 27.05 KG/M2 | OXYGEN SATURATION: 97 % | WEIGHT: 148.9 LBS | SYSTOLIC BLOOD PRESSURE: 132 MMHG | DIASTOLIC BLOOD PRESSURE: 85 MMHG | HEART RATE: 126 BPM | TEMPERATURE: 98.2 F

## 2025-08-12 DIAGNOSIS — C79.51 MALIGNANT NEOPLASM METASTATIC TO BONE (H): ICD-10-CM

## 2025-08-12 DIAGNOSIS — C50.912 MALIGNANT NEOPLASM OF UNSPECIFIED SITE OF LEFT FEMALE BREAST (H): Primary | ICD-10-CM

## 2025-08-12 DIAGNOSIS — C50.912 MALIGNANT NEOPLASM OF LEFT FEMALE BREAST, UNSPECIFIED ESTROGEN RECEPTOR STATUS, UNSPECIFIED SITE OF BREAST (H): ICD-10-CM

## 2025-08-12 DIAGNOSIS — C50.912 MALIGNANT NEOPLASM OF LEFT FEMALE BREAST, UNSPECIFIED ESTROGEN RECEPTOR STATUS, UNSPECIFIED SITE OF BREAST (H): Primary | ICD-10-CM

## 2025-08-12 LAB
BASOPHILS # BLD MANUAL: 0.1 10E3/UL (ref 0–0.2)
BASOPHILS NFR BLD MANUAL: 1 %
DACRYOCYTES BLD QL SMEAR: SLIGHT
EOSINOPHIL # BLD MANUAL: 0.1 10E3/UL (ref 0–0.7)
EOSINOPHIL NFR BLD MANUAL: 2 %
ERYTHROCYTE [DISTWIDTH] IN BLOOD BY AUTOMATED COUNT: 22.5 % (ref 10–15)
HCT VFR BLD AUTO: 24.7 % (ref 35–47)
HGB BLD-MCNC: 7.7 G/DL (ref 11.7–15.7)
HOLD SPECIMEN: NORMAL
LYMPHOCYTES # BLD MANUAL: 2.1 10E3/UL (ref 0.8–5.3)
LYMPHOCYTES NFR BLD MANUAL: 36 %
MCH RBC QN AUTO: 28.8 PG (ref 26.5–33)
MCHC RBC AUTO-ENTMCNC: 31.2 G/DL (ref 31.5–36.5)
MCV RBC AUTO: 93 FL (ref 78–100)
METAMYELOCYTES # BLD MANUAL: 0.1 10E3/UL
METAMYELOCYTES NFR BLD MANUAL: 1 %
MONOCYTES # BLD MANUAL: 0.2 10E3/UL (ref 0–1.3)
MONOCYTES NFR BLD MANUAL: 4 %
NEUTROPHILS # BLD MANUAL: 3.3 10E3/UL (ref 1.6–8.3)
NEUTROPHILS NFR BLD MANUAL: 56 %
NRBC # BLD AUTO: 0.6 10E3/UL
NRBC BLD MANUAL-RTO: 11 %
PLAT MORPH BLD: ABNORMAL
PLATELET # BLD AUTO: 200 10E3/UL (ref 150–450)
POLYCHROMASIA BLD QL SMEAR: SLIGHT
RBC # BLD AUTO: 2.67 10E6/UL (ref 3.8–5.2)
RBC MORPH BLD: ABNORMAL
STOMATOCYTES BLD QL SMEAR: SLIGHT
WBC # BLD AUTO: 5.8 10E3/UL (ref 4–11)

## 2025-08-12 PROCEDURE — 85007 BL SMEAR W/DIFF WBC COUNT: CPT

## 2025-08-12 PROCEDURE — 85027 COMPLETE CBC AUTOMATED: CPT

## 2025-08-12 PROCEDURE — 99207 PR NO CHARGE LOS: CPT

## 2025-08-12 PROCEDURE — 96375 TX/PRO/DX INJ NEW DRUG ADDON: CPT

## 2025-08-12 PROCEDURE — 36591 DRAW BLOOD OFF VENOUS DEVICE: CPT

## 2025-08-12 PROCEDURE — 96413 CHEMO IV INFUSION 1 HR: CPT

## 2025-08-12 PROCEDURE — 258N000003 HC RX IP 258 OP 636

## 2025-08-12 PROCEDURE — 250N000011 HC RX IP 250 OP 636

## 2025-08-12 RX ORDER — HEPARIN SODIUM (PORCINE) LOCK FLUSH IV SOLN 100 UNIT/ML 100 UNIT/ML
500 SOLUTION INTRAVENOUS EVERY 8 HOURS
Status: DISCONTINUED | OUTPATIENT
Start: 2025-08-12 | End: 2025-08-12 | Stop reason: HOSPADM

## 2025-08-12 RX ORDER — ONDANSETRON 2 MG/ML
8 INJECTION INTRAMUSCULAR; INTRAVENOUS ONCE
Status: COMPLETED | OUTPATIENT
Start: 2025-08-12 | End: 2025-08-12

## 2025-08-12 RX ORDER — HEPARIN SODIUM (PORCINE) LOCK FLUSH IV SOLN 100 UNIT/ML 100 UNIT/ML
5 SOLUTION INTRAVENOUS
Status: DISCONTINUED | OUTPATIENT
Start: 2025-08-12 | End: 2025-08-12 | Stop reason: HOSPADM

## 2025-08-12 RX ADMIN — PACLITAXEL 120 MG: 6 INJECTION, SOLUTION INTRAVENOUS at 09:11

## 2025-08-12 RX ADMIN — Medication 5 ML: at 10:21

## 2025-08-12 RX ADMIN — ONDANSETRON 8 MG: 2 INJECTION, SOLUTION INTRAMUSCULAR; INTRAVENOUS at 08:51

## 2025-08-12 RX ADMIN — Medication 500 UNITS: at 07:54

## 2025-08-12 RX ADMIN — SODIUM CHLORIDE 250 ML: 0.9 INJECTION, SOLUTION INTRAVENOUS at 08:42

## 2025-08-19 ENCOUNTER — INFUSION THERAPY VISIT (OUTPATIENT)
Dept: INFUSION THERAPY | Facility: CLINIC | Age: 70
End: 2025-08-19
Attending: INTERNAL MEDICINE
Payer: COMMERCIAL

## 2025-08-19 VITALS
OXYGEN SATURATION: 97 % | HEART RATE: 126 BPM | BODY MASS INDEX: 27.38 KG/M2 | WEIGHT: 150.7 LBS | TEMPERATURE: 98.1 F | RESPIRATION RATE: 18 BRPM | SYSTOLIC BLOOD PRESSURE: 135 MMHG | DIASTOLIC BLOOD PRESSURE: 89 MMHG

## 2025-08-19 DIAGNOSIS — C79.51 MALIGNANT NEOPLASM METASTATIC TO BONE (H): Primary | ICD-10-CM

## 2025-08-19 DIAGNOSIS — C50.912 MALIGNANT NEOPLASM OF LEFT FEMALE BREAST, UNSPECIFIED ESTROGEN RECEPTOR STATUS, UNSPECIFIED SITE OF BREAST (H): ICD-10-CM

## 2025-08-19 DIAGNOSIS — C50.912 MALIGNANT NEOPLASM OF LEFT FEMALE BREAST, UNSPECIFIED ESTROGEN RECEPTOR STATUS, UNSPECIFIED SITE OF BREAST (H): Primary | ICD-10-CM

## 2025-08-19 DIAGNOSIS — C79.51 MALIGNANT NEOPLASM METASTATIC TO BONE (H): ICD-10-CM

## 2025-08-19 LAB
BASOPHILS # BLD MANUAL: 0.11 10E3/UL (ref 0–0.2)
BASOPHILS NFR BLD MANUAL: 2 %
DACRYOCYTES BLD QL SMEAR: SLIGHT
EOSINOPHIL # BLD MANUAL: 0.06 10E3/UL (ref 0–0.7)
EOSINOPHIL NFR BLD MANUAL: 1 %
ERYTHROCYTE [DISTWIDTH] IN BLOOD BY AUTOMATED COUNT: 22.5 % (ref 10–15)
GIANT PLATELETS BLD QL SMEAR: SLIGHT
HCT VFR BLD AUTO: 24.5 % (ref 35–47)
HGB BLD-MCNC: 7.7 G/DL (ref 11.7–15.7)
LYMPHOCYTES # BLD MANUAL: 2.31 10E3/UL (ref 0.8–5.3)
LYMPHOCYTES NFR BLD MANUAL: 42 %
MCH RBC QN AUTO: 29.2 PG (ref 26.5–33)
MCHC RBC AUTO-ENTMCNC: 31.4 G/DL (ref 31.5–36.5)
MCV RBC AUTO: 92.8 FL (ref 78–100)
METAMYELOCYTES # BLD MANUAL: 0.06 10E3/UL
METAMYELOCYTES NFR BLD MANUAL: 1 %
MONOCYTES # BLD MANUAL: 0.55 10E3/UL (ref 0–1.3)
MONOCYTES NFR BLD MANUAL: 10 %
NEUTROPHILS # BLD MANUAL: 2.42 10E3/UL (ref 1.6–8.3)
NEUTROPHILS NFR BLD MANUAL: 44 %
NRBC # BLD AUTO: 0.9 10E3/UL
NRBC BLD MANUAL-RTO: 16 %
PATH REV: ABNORMAL
PLAT MORPH BLD: ABNORMAL
PLATELET # BLD AUTO: 247 10E3/UL (ref 150–450)
POLYCHROMASIA BLD QL SMEAR: SLIGHT
RBC # BLD AUTO: 2.64 10E6/UL (ref 3.8–5.2)
RBC MORPH BLD: ABNORMAL
STOMATOCYTES BLD QL SMEAR: SLIGHT
WBC # BLD AUTO: 5.45 10E3/UL (ref 4–11)

## 2025-08-19 PROCEDURE — 250N000011 HC RX IP 250 OP 636

## 2025-08-19 PROCEDURE — 99207 PR NO CHARGE LOS: CPT

## 2025-08-19 PROCEDURE — 96375 TX/PRO/DX INJ NEW DRUG ADDON: CPT

## 2025-08-19 PROCEDURE — 36591 DRAW BLOOD OFF VENOUS DEVICE: CPT

## 2025-08-19 PROCEDURE — 96413 CHEMO IV INFUSION 1 HR: CPT

## 2025-08-19 PROCEDURE — 85018 HEMOGLOBIN: CPT

## 2025-08-19 PROCEDURE — 85007 BL SMEAR W/DIFF WBC COUNT: CPT

## 2025-08-19 PROCEDURE — 258N000003 HC RX IP 258 OP 636

## 2025-08-19 RX ORDER — ONDANSETRON 2 MG/ML
8 INJECTION INTRAMUSCULAR; INTRAVENOUS ONCE
Status: COMPLETED | OUTPATIENT
Start: 2025-08-19 | End: 2025-08-19

## 2025-08-19 RX ORDER — HEPARIN SODIUM (PORCINE) LOCK FLUSH IV SOLN 100 UNIT/ML 100 UNIT/ML
5 SOLUTION INTRAVENOUS
Status: DISCONTINUED | OUTPATIENT
Start: 2025-08-19 | End: 2025-08-19 | Stop reason: HOSPADM

## 2025-08-19 RX ADMIN — Medication 5 ML: at 10:52

## 2025-08-19 RX ADMIN — ONDANSETRON 8 MG: 2 INJECTION, SOLUTION INTRAMUSCULAR; INTRAVENOUS at 09:27

## 2025-08-19 RX ADMIN — PACLITAXEL 120 MG: 6 INJECTION, SOLUTION INTRAVENOUS at 09:44

## 2025-08-19 RX ADMIN — SODIUM CHLORIDE 250 ML: 0.9 INJECTION, SOLUTION INTRAVENOUS at 09:25

## 2025-08-19 RX ADMIN — Medication 5 ML: at 08:20

## 2025-08-19 ASSESSMENT — PAIN SCALES - GENERAL: PAINLEVEL_OUTOF10: NO PAIN (0)

## 2025-08-25 ENCOUNTER — ANCILLARY PROCEDURE (OUTPATIENT)
Dept: GENERAL RADIOLOGY | Facility: CLINIC | Age: 70
End: 2025-08-25
Attending: ORTHOPAEDIC SURGERY
Payer: COMMERCIAL

## 2025-08-25 ENCOUNTER — OFFICE VISIT (OUTPATIENT)
Dept: ORTHOPEDICS | Facility: CLINIC | Age: 70
End: 2025-08-25
Payer: COMMERCIAL

## 2025-08-25 DIAGNOSIS — M89.8X5 LYTIC BONE LESION OF RIGHT FEMUR: ICD-10-CM

## 2025-08-25 DIAGNOSIS — M89.8X5 LYTIC BONE LESION OF RIGHT FEMUR: Primary | ICD-10-CM

## 2025-08-25 PROCEDURE — 73552 X-RAY EXAM OF FEMUR 2/>: CPT | Mod: GC | Performed by: RADIOLOGY

## 2025-08-25 PROCEDURE — 99213 OFFICE O/P EST LOW 20 MIN: CPT | Performed by: ORTHOPAEDIC SURGERY

## 2025-09-03 ENCOUNTER — INFUSION THERAPY VISIT (OUTPATIENT)
Dept: INFUSION THERAPY | Facility: CLINIC | Age: 70
End: 2025-09-03
Payer: COMMERCIAL

## 2025-09-03 ENCOUNTER — ONCOLOGY VISIT (OUTPATIENT)
Dept: ONCOLOGY | Facility: CLINIC | Age: 70
End: 2025-09-03
Attending: INTERNAL MEDICINE
Payer: COMMERCIAL

## 2025-09-03 ENCOUNTER — TELEPHONE (OUTPATIENT)
Dept: ONCOLOGY | Facility: CLINIC | Age: 70
End: 2025-09-03

## 2025-09-03 VITALS
DIASTOLIC BLOOD PRESSURE: 87 MMHG | BODY MASS INDEX: 28.45 KG/M2 | OXYGEN SATURATION: 93 % | TEMPERATURE: 98.1 F | SYSTOLIC BLOOD PRESSURE: 126 MMHG | HEART RATE: 125 BPM | WEIGHT: 156.6 LBS | RESPIRATION RATE: 18 BRPM

## 2025-09-03 VITALS
TEMPERATURE: 98.1 F | BODY MASS INDEX: 28.44 KG/M2 | RESPIRATION RATE: 18 BRPM | OXYGEN SATURATION: 93 % | HEART RATE: 125 BPM | SYSTOLIC BLOOD PRESSURE: 126 MMHG | DIASTOLIC BLOOD PRESSURE: 87 MMHG | WEIGHT: 156.53 LBS

## 2025-09-03 DIAGNOSIS — C50.912 MALIGNANT NEOPLASM OF LEFT FEMALE BREAST, UNSPECIFIED ESTROGEN RECEPTOR STATUS, UNSPECIFIED SITE OF BREAST (H): Primary | ICD-10-CM

## 2025-09-03 DIAGNOSIS — C50.812 MALIGNANT NEOPLASM OF OVERLAPPING SITES OF LEFT BREAST IN FEMALE, ESTROGEN RECEPTOR POSITIVE (H): ICD-10-CM

## 2025-09-03 DIAGNOSIS — M89.8X5 LYTIC BONE LESION OF RIGHT FEMUR: ICD-10-CM

## 2025-09-03 DIAGNOSIS — R73.09 ELEVATED GLUCOSE: ICD-10-CM

## 2025-09-03 DIAGNOSIS — Z17.0 MALIGNANT NEOPLASM OF OVERLAPPING SITES OF LEFT BREAST IN FEMALE, ESTROGEN RECEPTOR POSITIVE (H): ICD-10-CM

## 2025-09-03 DIAGNOSIS — C50.912 MALIGNANT NEOPLASM OF LEFT FEMALE BREAST, UNSPECIFIED ESTROGEN RECEPTOR STATUS, UNSPECIFIED SITE OF BREAST (H): ICD-10-CM

## 2025-09-03 DIAGNOSIS — C79.51 MALIGNANT NEOPLASM METASTATIC TO BONE (H): ICD-10-CM

## 2025-09-03 DIAGNOSIS — R19.7 DIARRHEA, UNSPECIFIED TYPE: Primary | ICD-10-CM

## 2025-09-03 DIAGNOSIS — D64.9 ANEMIA, UNSPECIFIED TYPE: ICD-10-CM

## 2025-09-03 DIAGNOSIS — G62.9 NEUROPATHY: ICD-10-CM

## 2025-09-03 DIAGNOSIS — C79.51 MALIGNANT NEOPLASM METASTATIC TO BONE (H): Primary | ICD-10-CM

## 2025-09-03 LAB
ABO + RH BLD: NORMAL
ALBUMIN SERPL BCG-MCNC: 3.8 G/DL (ref 3.5–5.2)
ALP SERPL-CCNC: 113 U/L (ref 40–150)
ALT SERPL W P-5'-P-CCNC: 10 U/L (ref 0–50)
ANION GAP SERPL CALCULATED.3IONS-SCNC: 16 MMOL/L (ref 7–15)
AST SERPL W P-5'-P-CCNC: 43 U/L (ref 0–45)
BASO STIPL BLD QL SMEAR: PRESENT
BASOPHILS # BLD MANUAL: 0.08 10E3/UL (ref 0–0.2)
BASOPHILS NFR BLD MANUAL: 1 %
BILIRUB SERPL-MCNC: 0.3 MG/DL
BLD GP AB SCN SERPL QL: NEGATIVE
BUN SERPL-MCNC: 26.7 MG/DL (ref 8–23)
CALCIUM SERPL-MCNC: 9.8 MG/DL (ref 8.8–10.4)
CANCER AG15-3 SERPL-ACNC: 1154 U/ML
CHLORIDE SERPL-SCNC: 100 MMOL/L (ref 98–107)
CREAT SERPL-MCNC: 1.14 MG/DL (ref 0.51–0.95)
EGFRCR SERPLBLD CKD-EPI 2021: 52 ML/MIN/1.73M2
EOSINOPHIL # BLD MANUAL: 0.25 10E3/UL (ref 0–0.7)
EOSINOPHIL NFR BLD MANUAL: 3 %
ERYTHROCYTE [DISTWIDTH] IN BLOOD BY AUTOMATED COUNT: 22.9 % (ref 10–15)
FRAGMENTS BLD QL SMEAR: ABNORMAL
GIANT PLATELETS BLD QL SMEAR: SLIGHT
GLUCOSE SERPL-MCNC: 157 MG/DL (ref 70–99)
HCO3 SERPL-SCNC: 23 MMOL/L (ref 22–29)
HCT VFR BLD AUTO: 23.7 % (ref 35–47)
HGB BLD-MCNC: 7.3 G/DL (ref 11.7–15.7)
LYMPHOCYTES # BLD MANUAL: 2.35 10E3/UL (ref 0.8–5.3)
LYMPHOCYTES NFR BLD MANUAL: 28 %
MCH RBC QN AUTO: 29.4 PG (ref 26.5–33)
MCHC RBC AUTO-ENTMCNC: 30.8 G/DL (ref 31.5–36.5)
MCV RBC AUTO: 95.6 FL (ref 78–100)
METAMYELOCYTES # BLD MANUAL: 0.08 10E3/UL
METAMYELOCYTES NFR BLD MANUAL: 1 %
MONOCYTES # BLD MANUAL: 0.42 10E3/UL (ref 0–1.3)
MONOCYTES NFR BLD MANUAL: 5 %
MYELOCYTES # BLD MANUAL: 0.08 10E3/UL
MYELOCYTES NFR BLD MANUAL: 1 %
NEUTROPHILS # BLD MANUAL: 5.12 10E3/UL (ref 1.6–8.3)
NEUTROPHILS NFR BLD MANUAL: 61 %
NRBC # BLD AUTO: 0.5 10E3/UL
NRBC BLD MANUAL-RTO: 6 %
PLAT MORPH BLD: ABNORMAL
PLATELET # BLD AUTO: 229 10E3/UL (ref 150–450)
POLYCHROMASIA BLD QL SMEAR: SLIGHT
POTASSIUM SERPL-SCNC: 4.2 MMOL/L (ref 3.4–5.3)
PROT SERPL-MCNC: 7 G/DL (ref 6.4–8.3)
RBC # BLD AUTO: 2.48 10E6/UL (ref 3.8–5.2)
RBC MORPH BLD: ABNORMAL
SODIUM SERPL-SCNC: 139 MMOL/L (ref 135–145)
SPECIMEN EXP DATE BLD: NORMAL
STOMATOCYTES BLD QL SMEAR: ABNORMAL
VARIANT LYMPHS BLD QL SMEAR: PRESENT
WBC # BLD AUTO: 8.38 10E3/UL (ref 4–11)

## 2025-09-03 PROCEDURE — 36591 DRAW BLOOD OFF VENOUS DEVICE: CPT

## 2025-09-03 PROCEDURE — G0463 HOSPITAL OUTPT CLINIC VISIT: HCPCS | Mod: 25 | Performed by: INTERNAL MEDICINE

## 2025-09-03 PROCEDURE — 258N000003 HC RX IP 258 OP 636: Performed by: INTERNAL MEDICINE

## 2025-09-03 PROCEDURE — 96372 THER/PROPH/DIAG INJ SC/IM: CPT | Mod: XU | Performed by: INTERNAL MEDICINE

## 2025-09-03 PROCEDURE — G2211 COMPLEX E/M VISIT ADD ON: HCPCS | Performed by: INTERNAL MEDICINE

## 2025-09-03 PROCEDURE — 86900 BLOOD TYPING SEROLOGIC ABO: CPT | Performed by: INTERNAL MEDICINE

## 2025-09-03 PROCEDURE — 96375 TX/PRO/DX INJ NEW DRUG ADDON: CPT

## 2025-09-03 PROCEDURE — 99215 OFFICE O/P EST HI 40 MIN: CPT | Performed by: INTERNAL MEDICINE

## 2025-09-03 PROCEDURE — 86300 IMMUNOASSAY TUMOR CA 15-3: CPT

## 2025-09-03 PROCEDURE — 99207 PR NO CHARGE LOS: CPT

## 2025-09-03 PROCEDURE — 85027 COMPLETE CBC AUTOMATED: CPT

## 2025-09-03 PROCEDURE — 84155 ASSAY OF PROTEIN SERUM: CPT

## 2025-09-03 PROCEDURE — 96413 CHEMO IV INFUSION 1 HR: CPT

## 2025-09-03 PROCEDURE — 250N000011 HC RX IP 250 OP 636: Performed by: INTERNAL MEDICINE

## 2025-09-03 PROCEDURE — 250N000011 HC RX IP 250 OP 636

## 2025-09-03 PROCEDURE — 85007 BL SMEAR W/DIFF WBC COUNT: CPT

## 2025-09-03 RX ORDER — ALBUTEROL SULFATE 90 UG/1
1-2 INHALANT RESPIRATORY (INHALATION)
Status: CANCELLED
Start: 2025-09-03

## 2025-09-03 RX ORDER — DIPHENHYDRAMINE HCL 25 MG
50 CAPSULE ORAL
Status: CANCELLED
Start: 2025-09-03

## 2025-09-03 RX ORDER — DIPHENHYDRAMINE HYDROCHLORIDE 50 MG/ML
50 INJECTION INTRAMUSCULAR; INTRAVENOUS
Start: 2025-09-10

## 2025-09-03 RX ORDER — MEPERIDINE HYDROCHLORIDE 25 MG/ML
25 INJECTION INTRAMUSCULAR; INTRAVENOUS; SUBCUTANEOUS
Status: CANCELLED | OUTPATIENT
Start: 2025-09-03

## 2025-09-03 RX ORDER — HEPARIN SODIUM (PORCINE) LOCK FLUSH IV SOLN 100 UNIT/ML 100 UNIT/ML
5 SOLUTION INTRAVENOUS
Status: DISCONTINUED | OUTPATIENT
Start: 2025-09-03 | End: 2025-09-03 | Stop reason: HOSPADM

## 2025-09-03 RX ORDER — ALBUTEROL SULFATE 0.83 MG/ML
2.5 SOLUTION RESPIRATORY (INHALATION)
Status: CANCELLED | OUTPATIENT
Start: 2025-09-03

## 2025-09-03 RX ORDER — DIPHENHYDRAMINE HYDROCHLORIDE 50 MG/ML
50 INJECTION INTRAMUSCULAR; INTRAVENOUS
OUTPATIENT
Start: 2025-09-17

## 2025-09-03 RX ORDER — METHYLPREDNISOLONE SODIUM SUCCINATE 40 MG/ML
40 INJECTION INTRAMUSCULAR; INTRAVENOUS
Start: 2025-09-17

## 2025-09-03 RX ORDER — DIPHENHYDRAMINE HCL 25 MG
50 CAPSULE ORAL
Start: 2025-09-10

## 2025-09-03 RX ORDER — ALBUTEROL SULFATE 0.83 MG/ML
2.5 SOLUTION RESPIRATORY (INHALATION)
OUTPATIENT
Start: 2025-09-10

## 2025-09-03 RX ORDER — EPINEPHRINE 1 MG/ML
0.3 INJECTION, SOLUTION INTRAMUSCULAR; SUBCUTANEOUS EVERY 5 MIN PRN
Status: CANCELLED | OUTPATIENT
Start: 2025-09-03

## 2025-09-03 RX ORDER — HEPARIN SODIUM (PORCINE) LOCK FLUSH IV SOLN 100 UNIT/ML 100 UNIT/ML
5 SOLUTION INTRAVENOUS EVERY 8 HOURS
Status: DISCONTINUED | OUTPATIENT
Start: 2025-09-03 | End: 2025-09-03 | Stop reason: HOSPADM

## 2025-09-03 RX ORDER — EPINEPHRINE 1 MG/ML
0.3 INJECTION, SOLUTION INTRAMUSCULAR; SUBCUTANEOUS EVERY 5 MIN PRN
OUTPATIENT
Start: 2025-09-17

## 2025-09-03 RX ORDER — DIPHENHYDRAMINE HYDROCHLORIDE 50 MG/ML
50 INJECTION INTRAMUSCULAR; INTRAVENOUS
Status: CANCELLED
Start: 2025-09-03

## 2025-09-03 RX ORDER — ALBUTEROL SULFATE 0.83 MG/ML
2.5 SOLUTION RESPIRATORY (INHALATION)
OUTPATIENT
Start: 2025-09-17

## 2025-09-03 RX ORDER — ONDANSETRON 2 MG/ML
8 INJECTION INTRAMUSCULAR; INTRAVENOUS ONCE
OUTPATIENT
Start: 2025-09-10

## 2025-09-03 RX ORDER — HEPARIN SODIUM,PORCINE 10 UNIT/ML
5-20 VIAL (ML) INTRAVENOUS DAILY PRN
OUTPATIENT
Start: 2025-09-10

## 2025-09-03 RX ORDER — ONDANSETRON 2 MG/ML
8 INJECTION INTRAMUSCULAR; INTRAVENOUS ONCE
Status: COMPLETED | OUTPATIENT
Start: 2025-09-03 | End: 2025-09-03

## 2025-09-03 RX ORDER — DIPHENOXYLATE HYDROCHLORIDE AND ATROPINE SULFATE 2.5; .025 MG/1; MG/1
1 TABLET ORAL 4 TIMES DAILY PRN
Qty: 20 TABLET | Refills: 0 | Status: SHIPPED | OUTPATIENT
Start: 2025-09-03

## 2025-09-03 RX ORDER — DIPHENHYDRAMINE HYDROCHLORIDE 50 MG/ML
25 INJECTION INTRAMUSCULAR; INTRAVENOUS
Start: 2025-09-17

## 2025-09-03 RX ORDER — HEPARIN SODIUM (PORCINE) LOCK FLUSH IV SOLN 100 UNIT/ML 100 UNIT/ML
5 SOLUTION INTRAVENOUS
OUTPATIENT
Start: 2025-09-17

## 2025-09-03 RX ORDER — HEPARIN SODIUM,PORCINE 10 UNIT/ML
5-20 VIAL (ML) INTRAVENOUS DAILY PRN
OUTPATIENT
Start: 2025-09-17

## 2025-09-03 RX ORDER — METHYLPREDNISOLONE SODIUM SUCCINATE 40 MG/ML
40 INJECTION INTRAMUSCULAR; INTRAVENOUS
Start: 2025-09-10

## 2025-09-03 RX ORDER — DIPHENHYDRAMINE HYDROCHLORIDE 50 MG/ML
50 INJECTION INTRAMUSCULAR; INTRAVENOUS
Status: CANCELLED | OUTPATIENT
Start: 2025-09-03

## 2025-09-03 RX ORDER — ONDANSETRON 2 MG/ML
8 INJECTION INTRAMUSCULAR; INTRAVENOUS ONCE
OUTPATIENT
Start: 2025-09-17

## 2025-09-03 RX ORDER — DIPHENHYDRAMINE HYDROCHLORIDE 50 MG/ML
50 INJECTION INTRAMUSCULAR; INTRAVENOUS
OUTPATIENT
Start: 2025-09-10

## 2025-09-03 RX ORDER — ONDANSETRON 2 MG/ML
8 INJECTION INTRAMUSCULAR; INTRAVENOUS ONCE
Status: CANCELLED | OUTPATIENT
Start: 2025-09-03

## 2025-09-03 RX ORDER — EPINEPHRINE 1 MG/ML
0.3 INJECTION, SOLUTION INTRAMUSCULAR; SUBCUTANEOUS EVERY 5 MIN PRN
OUTPATIENT
Start: 2025-09-10

## 2025-09-03 RX ORDER — DIPHENHYDRAMINE HYDROCHLORIDE 50 MG/ML
25 INJECTION INTRAMUSCULAR; INTRAVENOUS
Status: CANCELLED
Start: 2025-09-03

## 2025-09-03 RX ORDER — METHYLPREDNISOLONE SODIUM SUCCINATE 40 MG/ML
40 INJECTION INTRAMUSCULAR; INTRAVENOUS
Status: CANCELLED
Start: 2025-09-03

## 2025-09-03 RX ORDER — DIPHENHYDRAMINE HYDROCHLORIDE 50 MG/ML
50 INJECTION INTRAMUSCULAR; INTRAVENOUS
Start: 2025-09-17

## 2025-09-03 RX ORDER — DIPHENHYDRAMINE HCL 25 MG
50 CAPSULE ORAL
Start: 2025-09-17

## 2025-09-03 RX ORDER — HEPARIN SODIUM (PORCINE) LOCK FLUSH IV SOLN 100 UNIT/ML 100 UNIT/ML
5 SOLUTION INTRAVENOUS
Status: CANCELLED | OUTPATIENT
Start: 2025-09-03

## 2025-09-03 RX ORDER — ALBUTEROL SULFATE 90 UG/1
1-2 INHALANT RESPIRATORY (INHALATION)
Start: 2025-09-10

## 2025-09-03 RX ORDER — HEPARIN SODIUM,PORCINE 10 UNIT/ML
5-20 VIAL (ML) INTRAVENOUS DAILY PRN
Status: CANCELLED | OUTPATIENT
Start: 2025-09-03

## 2025-09-03 RX ORDER — MEPERIDINE HYDROCHLORIDE 25 MG/ML
25 INJECTION INTRAMUSCULAR; INTRAVENOUS; SUBCUTANEOUS
OUTPATIENT
Start: 2025-09-17

## 2025-09-03 RX ORDER — ALBUTEROL SULFATE 90 UG/1
1-2 INHALANT RESPIRATORY (INHALATION)
Start: 2025-09-17

## 2025-09-03 RX ORDER — DIPHENHYDRAMINE HYDROCHLORIDE 50 MG/ML
25 INJECTION INTRAMUSCULAR; INTRAVENOUS
Start: 2025-09-10

## 2025-09-03 RX ORDER — HEPARIN SODIUM (PORCINE) LOCK FLUSH IV SOLN 100 UNIT/ML 100 UNIT/ML
5 SOLUTION INTRAVENOUS
OUTPATIENT
Start: 2025-09-10

## 2025-09-03 RX ORDER — MEPERIDINE HYDROCHLORIDE 25 MG/ML
25 INJECTION INTRAMUSCULAR; INTRAVENOUS; SUBCUTANEOUS
OUTPATIENT
Start: 2025-09-10

## 2025-09-03 RX ADMIN — DENOSUMAB 120 MG: 120 INJECTION SUBCUTANEOUS at 09:24

## 2025-09-03 RX ADMIN — Medication 5 ML: at 10:54

## 2025-09-03 RX ADMIN — Medication 5 ML: at 07:34

## 2025-09-03 RX ADMIN — ONDANSETRON 8 MG: 2 INJECTION, SOLUTION INTRAMUSCULAR; INTRAVENOUS at 09:38

## 2025-09-03 RX ADMIN — SODIUM CHLORIDE 250 ML: 0.9 INJECTION, SOLUTION INTRAVENOUS at 09:36

## 2025-09-03 RX ADMIN — PACLITAXEL 120 MG: 6 INJECTION, SOLUTION INTRAVENOUS at 09:45

## 2025-09-03 ASSESSMENT — PAIN SCALES - GENERAL
PAINLEVEL_OUTOF10: NO PAIN (0)
PAINLEVEL_OUTOF10: NO PAIN (0)

## (undated) DEVICE — SPECIMEN CONTAINER 3OZ W/FORMALIN 59901

## (undated) DEVICE — ENDO TRAP POLYP E-TRAP 00711099

## (undated) DEVICE — KIT ENDO TURNOVER/PROCEDURE CARRY-ON 101822

## (undated) DEVICE — ENDO SNARE POLYPECTOMY OVAL 15MM LOOP SD-240U-15

## (undated) DEVICE — TUBING SUCTION 12"X1/4" N612

## (undated) DEVICE — KIT ENDO FIRST STEP DISINFECTANT 200ML W/POUCH EP-4

## (undated) DEVICE — SUCTION MANIFOLD NEPTUNE 2 SYS 1 PORT 702-025-000

## (undated) DEVICE — GOWN IMPERVIOUS 2XL BLUE

## (undated) DEVICE — SOL WATER IRRIG 1000ML BOTTLE 2F7114

## (undated) RX ORDER — DIPHENHYDRAMINE HYDROCHLORIDE 50 MG/ML
INJECTION INTRAMUSCULAR; INTRAVENOUS
Status: DISPENSED
Start: 2020-10-14

## (undated) RX ORDER — FENTANYL CITRATE 50 UG/ML
INJECTION, SOLUTION INTRAMUSCULAR; INTRAVENOUS
Status: DISPENSED
Start: 2025-01-27

## (undated) RX ORDER — ONDANSETRON 2 MG/ML
INJECTION INTRAMUSCULAR; INTRAVENOUS
Status: DISPENSED
Start: 2020-10-14

## (undated) RX ORDER — FENTANYL CITRATE 50 UG/ML
INJECTION, SOLUTION INTRAMUSCULAR; INTRAVENOUS
Status: DISPENSED
Start: 2020-10-14

## (undated) RX ORDER — HEPARIN SODIUM (PORCINE) LOCK FLUSH IV SOLN 100 UNIT/ML 100 UNIT/ML
SOLUTION INTRAVENOUS
Status: DISPENSED
Start: 2020-10-14

## (undated) RX ORDER — HEPARIN SODIUM (PORCINE) LOCK FLUSH IV SOLN 100 UNIT/ML 100 UNIT/ML
SOLUTION INTRAVENOUS
Status: DISPENSED
Start: 2025-01-27